# Patient Record
Sex: FEMALE | Race: WHITE | HISPANIC OR LATINO | Employment: OTHER | URBAN - METROPOLITAN AREA
[De-identification: names, ages, dates, MRNs, and addresses within clinical notes are randomized per-mention and may not be internally consistent; named-entity substitution may affect disease eponyms.]

---

## 2020-05-22 ENCOUNTER — OFFICE VISIT (OUTPATIENT)
Dept: URGENT CARE | Facility: CLINIC | Age: 30
End: 2020-05-22

## 2020-05-22 VITALS
HEIGHT: 55 IN | DIASTOLIC BLOOD PRESSURE: 58 MMHG | OXYGEN SATURATION: 98 % | TEMPERATURE: 98.3 F | RESPIRATION RATE: 18 BRPM | WEIGHT: 204 LBS | BODY MASS INDEX: 47.21 KG/M2 | HEART RATE: 107 BPM | SYSTOLIC BLOOD PRESSURE: 113 MMHG

## 2020-05-22 DIAGNOSIS — B35.4 TINEA CORPORIS: Primary | ICD-10-CM

## 2020-05-22 PROCEDURE — 99213 OFFICE O/P EST LOW 20 MIN: CPT | Performed by: NURSE PRACTITIONER

## 2020-05-22 RX ORDER — LAMOTRIGINE 200 MG/1
50 TABLET ORAL 2 TIMES DAILY
COMMUNITY
End: 2020-08-27 | Stop reason: SDUPTHER

## 2020-07-30 ENCOUNTER — APPOINTMENT (EMERGENCY)
Dept: RADIOLOGY | Facility: HOSPITAL | Age: 30
End: 2020-07-30
Payer: COMMERCIAL

## 2020-07-30 ENCOUNTER — HOSPITAL ENCOUNTER (EMERGENCY)
Facility: HOSPITAL | Age: 30
Discharge: HOME | End: 2020-07-30
Attending: EMERGENCY MEDICINE
Payer: COMMERCIAL

## 2020-07-30 VITALS
RESPIRATION RATE: 18 BRPM | HEART RATE: 98 BPM | HEIGHT: 55 IN | OXYGEN SATURATION: 98 % | DIASTOLIC BLOOD PRESSURE: 86 MMHG | SYSTOLIC BLOOD PRESSURE: 138 MMHG | TEMPERATURE: 98.7 F | WEIGHT: 213 LBS | BODY MASS INDEX: 49.3 KG/M2

## 2020-07-30 DIAGNOSIS — S39.012A LUMBOSACRAL STRAIN, INITIAL ENCOUNTER: ICD-10-CM
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
## 2020-07-30 PROCEDURE — 72100 X-RAY EXAM L-S SPINE 2/3 VWS: CPT

## 2020-07-30 PROCEDURE — 99284 EMERGENCY DEPT VISIT MOD MDM: CPT

## 2020-07-30 PROCEDURE — 99284 EMERGENCY DEPT VISIT MOD MDM: CPT | Performed by: PHYSICIAN ASSISTANT

## 2020-07-30 RX ORDER — ARIPIPRAZOLE 10 MG/1
10 TABLET ORAL
COMMUNITY

## 2020-07-30 RX ORDER — PRAZOSIN HYDROCHLORIDE 2 MG/1
2 CAPSULE ORAL
COMMUNITY
End: 2021-06-30 | Stop reason: ALTCHOICE

## 2020-07-30 RX ORDER — QUETIAPINE FUMARATE 25 MG/1
50 TABLET, FILM COATED ORAL
COMMUNITY
End: 2022-06-28

## 2020-07-30 RX ORDER — CITALOPRAM 10 MG/1
10 TABLET ORAL EVERY MORNING
COMMUNITY
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
## 2020-08-24 ENCOUNTER — TELEPHONE (OUTPATIENT)
Dept: NEUROLOGY | Facility: CLINIC | Age: 30
End: 2020-08-24

## 2020-08-24 NOTE — TELEPHONE ENCOUNTER
[facility] called regarding getting this patient scheduled with our office  Patient has seizures and is on Keppra and Lamictal  Recently moved from [place]  Saw neuro in [place] via telehealth but he will not refill Lamictal unless she is seen in person (which isn't possible due to the patient living in [place] now)   [facility] will fax referral and will have patient call back to schedule

## 2020-08-25 ENCOUNTER — HOSPITAL ENCOUNTER (EMERGENCY)
Facility: HOSPITAL | Age: 30
Discharge: HOME | End: 2020-08-26
Attending: EMERGENCY MEDICINE | Admitting: EMERGENCY MEDICINE
Payer: SUBSIDIZED

## 2020-08-25 ENCOUNTER — APPOINTMENT (EMERGENCY)
Dept: RADIOLOGY | Facility: HOSPITAL | Age: 30
End: 2020-08-25
Payer: SUBSIDIZED

## 2020-08-25 VITALS
OXYGEN SATURATION: 100 % | HEART RATE: 98 BPM | SYSTOLIC BLOOD PRESSURE: 128 MMHG | DIASTOLIC BLOOD PRESSURE: 65 MMHG | TEMPERATURE: 98.3 F | RESPIRATION RATE: 20 BRPM

## 2020-08-25 DIAGNOSIS — E87.1 HYPONATREMIA: Primary | ICD-10-CM

## 2020-08-25 DIAGNOSIS — Z20.822 PERSON UNDER INVESTIGATION FOR COVID-19: ICD-10-CM

## 2020-08-25 DIAGNOSIS — Z76.0 MEDICATION REFILL: ICD-10-CM

## 2020-08-25 DIAGNOSIS — Z87.898 HISTORY OF SEIZURE: ICD-10-CM

## 2020-08-25 LAB
ALBUMIN SERPL BCP-MCNC: 3.1 G/DL (ref 3.5–5)
ALP SERPL-CCNC: 129 U/L (ref 46–116)
ALT SERPL W P-5'-P-CCNC: 21 U/L (ref 12–78)
ANION GAP SERPL CALCULATED.3IONS-SCNC: 8 MMOL/L (ref 4–13)
AST SERPL W P-5'-P-CCNC: 18 U/L (ref 5–45)
BACTERIA UR QL AUTO: ABNORMAL /HPF
BASOPHILS # BLD AUTO: 0.02 THOUSANDS/ΜL (ref 0–0.1)
BASOPHILS NFR BLD AUTO: 0 % (ref 0–1)
BILIRUB SERPL-MCNC: 0.1 MG/DL (ref 0.2–1)
BILIRUB UR QL STRIP: ABNORMAL
BUN SERPL-MCNC: 20 MG/DL (ref 5–25)
CALCIUM SERPL-MCNC: 8.8 MG/DL (ref 8.3–10.1)
CHLORIDE SERPL-SCNC: 101 MMOL/L (ref 100–108)
CLARITY UR: ABNORMAL
CO2 SERPL-SCNC: 26 MMOL/L (ref 21–32)
COLOR UR: ABNORMAL
CREAT SERPL-MCNC: 0.79 MG/DL (ref 0.6–1.3)
EOSINOPHIL # BLD AUTO: 0.09 THOUSAND/ΜL (ref 0–0.61)
EOSINOPHIL NFR BLD AUTO: 2 % (ref 0–6)
ERYTHROCYTE [DISTWIDTH] IN BLOOD BY AUTOMATED COUNT: 13.8 % (ref 11.6–15.1)
EXT PREG TEST URINE: NEGATIVE
EXT. CONTROL ED NAV: NORMAL
GFR SERPL CREATININE-BSD FRML MDRD: 101 ML/MIN/1.73SQ M
GLUCOSE SERPL-MCNC: 103 MG/DL (ref 65–140)
GLUCOSE UR STRIP-MCNC: NEGATIVE MG/DL
HCT VFR BLD AUTO: 34.9 % (ref 34.8–46.1)
HGB BLD-MCNC: 11.5 G/DL (ref 11.5–15.4)
HGB UR QL STRIP.AUTO: ABNORMAL
KETONES UR STRIP-MCNC: NEGATIVE MG/DL
LEUKOCYTE ESTERASE UR QL STRIP: NEGATIVE
LYMPHOCYTES # BLD AUTO: 1.17 THOUSANDS/ΜL (ref 0.6–4.47)
LYMPHOCYTES NFR BLD AUTO: 22 % (ref 14–44)
MCH RBC QN AUTO: 28.3 PG (ref 26.8–34.3)
MCHC RBC AUTO-ENTMCNC: 33 G/DL (ref 31.4–37.4)
MCV RBC AUTO: 86 FL (ref 82–98)
MONOCYTES # BLD AUTO: 0.58 THOUSAND/ΜL (ref 0.17–1.22)
MONOCYTES NFR BLD AUTO: 11 % (ref 4–12)
NEUTROPHILS # BLD AUTO: 3.42 THOUSANDS/ΜL (ref 1.85–7.62)
NEUTS SEG NFR BLD AUTO: 65 % (ref 43–75)
NITRITE UR QL STRIP: NEGATIVE
NON-SQ EPI CELLS URNS QL MICRO: ABNORMAL /HPF
PH UR STRIP.AUTO: 5.5 [PH]
PLATELET # BLD AUTO: 349 THOUSANDS/UL (ref 149–390)
PMV BLD AUTO: 8.7 FL (ref 8.9–12.7)
POTASSIUM SERPL-SCNC: 3.5 MMOL/L (ref 3.5–5.3)
PROT SERPL-MCNC: 7.8 G/DL (ref 6.4–8.2)
PROT UR STRIP-MCNC: ABNORMAL MG/DL
RBC # BLD AUTO: 4.06 MILLION/UL (ref 3.81–5.12)
RBC #/AREA URNS AUTO: ABNORMAL /HPF
S PYO DNA THROAT QL NAA+PROBE: NORMAL
SODIUM SERPL-SCNC: 135 MMOL/L (ref 136–145)
SP GR UR STRIP.AUTO: >=1.03 (ref 1–1.03)
UROBILINOGEN UR QL STRIP.AUTO: 0.2 E.U./DL
WBC # BLD AUTO: 5.28 THOUSAND/UL (ref 4.31–10.16)
WBC #/AREA URNS AUTO: ABNORMAL /HPF

## 2020-08-25 PROCEDURE — 85025 COMPLETE CBC W/AUTO DIFF WBC: CPT | Performed by: EMERGENCY MEDICINE

## 2020-08-25 PROCEDURE — 81025 URINE PREGNANCY TEST: CPT | Performed by: EMERGENCY MEDICINE

## 2020-08-25 PROCEDURE — 81001 URINALYSIS AUTO W/SCOPE: CPT | Performed by: EMERGENCY MEDICINE

## 2020-08-25 PROCEDURE — U0003 INFECTIOUS AGENT DETECTION BY NUCLEIC ACID (DNA OR RNA); SEVERE ACUTE RESPIRATORY SYNDROME CORONAVIRUS 2 (SARS-COV-2) (CORONAVIRUS DISEASE [COVID-19]), AMPLIFIED PROBE TECHNIQUE, MAKING USE OF HIGH THROUGHPUT TECHNOLOGIES AS DESCRIBED BY CMS-2020-01-R: HCPCS | Performed by: EMERGENCY MEDICINE

## 2020-08-25 PROCEDURE — 36415 COLL VENOUS BLD VENIPUNCTURE: CPT | Performed by: EMERGENCY MEDICINE

## 2020-08-25 PROCEDURE — 87651 STREP A DNA AMP PROBE: CPT | Performed by: EMERGENCY MEDICINE

## 2020-08-25 PROCEDURE — 96360 HYDRATION IV INFUSION INIT: CPT

## 2020-08-25 PROCEDURE — 80053 COMPREHEN METABOLIC PANEL: CPT | Performed by: EMERGENCY MEDICINE

## 2020-08-25 PROCEDURE — 99284 EMERGENCY DEPT VISIT MOD MDM: CPT

## 2020-08-25 PROCEDURE — 99284 EMERGENCY DEPT VISIT MOD MDM: CPT | Performed by: EMERGENCY MEDICINE

## 2020-08-25 PROCEDURE — 71045 X-RAY EXAM CHEST 1 VIEW: CPT

## 2020-08-25 RX ORDER — LAMOTRIGINE 25 MG/1
50 TABLET ORAL 2 TIMES DAILY
Qty: 28 TABLET | Refills: 0 | Status: SHIPPED | OUTPATIENT
Start: 2020-08-25 | End: 2020-08-27 | Stop reason: SDUPTHER

## 2020-08-25 RX ORDER — LEVETIRACETAM 500 MG/1
1500 TABLET ORAL EVERY 12 HOURS SCHEDULED
Qty: 42 TABLET | Refills: 0 | Status: SHIPPED | OUTPATIENT
Start: 2020-08-25 | End: 2020-08-27 | Stop reason: SDUPTHER

## 2020-08-25 RX ADMIN — SODIUM CHLORIDE 1000 ML: 0.9 INJECTION, SOLUTION INTRAVENOUS at 22:58

## 2020-08-25 NOTE — Clinical Note
Moris Schmitt was seen and treated in our emergency department on 8/25/2020  Diagnosis:     Flor Dejesus  may return to work on return date  She may return on this date: 08/27/2020    Do not return to work until you have been informed of a negative COVID test     If you have any questions or concerns, please don't hesitate to call        Shruti Yeboah, DO    ______________________________           _______________          _______________  Hospital Representative                              Date                                Time

## 2020-08-26 NOTE — ED NOTES
Per MD patient is to go home after she finishes her fluids        Eulalia Villaseñor RN  08/25/20 9032

## 2020-08-26 NOTE — DISCHARGE INSTRUCTIONS
Return to the ER for further concerns or worsening symptoms  Follow up with your primary care physician in 1-2 days  You have been given a prescription of your anti-epileptic meds, so you may resume them until your appointment with neurology  Continue them as previously prescribed  You are being tested for Corona Virus (COVID 19)  You must self isolate until the test results are released, which may take as many as 7 days  Wear a mask when you need to be within 6 feet of anyone, until you are cleared  Do not go to work or school until your results are negative

## 2020-08-26 NOTE — ED PROVIDER NOTES
History  Chief Complaint   Patient presents with    Sore Throat     pt presents to the ed wtih sore throat, chills and loss of taste with nasal congestion for 2 days     Chills     Pt in the ER with c/o sore throat x 2 days, associated with ageusia/nasal congestion/vomiting/diarrhea  Pt is able to tolerate oral secretions, and is in NAD during my initial eval  She denies voice changes  She denies abd pain or fevers  She denies sick contacts  No relief with dayquil  Pt has a hx of epilepsy and depression  She is currently not compliant with her anti-epileptics because she recently moved from [facility], was unable to get a refill, and has an appt with neuro in approx 1 wk  History provided by:  Patient   used: No    Sore Throat   Location:  Generalized  Quality:  Aching  Severity:  Mild  Onset quality:  Gradual  Timing:  Constant  Progression:  Worsening  Chronicity:  New  Relieved by:  None tried  Worsened by:  Drinking and eating  Ineffective treatments:  OTC medications  Associated symptoms: chills and trouble swallowing    Associated symptoms: no abdominal pain, no cough, no fever, no neck stiffness, no shortness of breath and no voice change        Prior to Admission Medications   Prescriptions Last Dose Informant Patient Reported? Taking?    ARIPiprazole (ABILIFY) 5 mg tablet   Yes No   Sig: Take 5 mg by mouth daily   QUEtiapine (SEROquel) 25 mg tablet   Yes No   Sig: Take 25 mg by mouth daily at bedtime   citalopram (CeleXA) 10 mg tablet   Yes No   Sig: Take 10 mg by mouth daily   lamoTRIgine (LaMICtal) 200 MG tablet   Yes No   Sig: Take 50 mg by mouth 2 (two) times a day    levETIRAcetam (KEPPRA PO)   Yes No   Sig: Take 1,500 mg by mouth 2 (two) times a day   prazosin (MINIPRESS) 2 mg capsule   Yes No   Sig: Take 2 mg by mouth daily at bedtime      Facility-Administered Medications: None       Past Medical History:   Diagnosis Date    [place]'s esophagus     Depression     Hypoglycemia  Seizures ([facility] [address] )     Tachycardia        Past Surgical History:   Procedure Laterality Date    COLON SURGERY      EYE SURGERY         History reviewed  No pertinent family history  I have reviewed and agree with the history as documented  E-Cigarette/Vaping    E-Cigarette Use Never User      E-Cigarette/Vaping Substances     Social History     Tobacco Use    Smoking status: Former Smoker    Smokeless tobacco: Never Used   Substance Use Topics    Alcohol use: Never     Frequency: Never    Drug use: Yes     Types: Marijuana     Comment: last use was a couple weeks ago       Review of Systems   Constitutional: Positive for chills  Negative for fever  HENT: Positive for sore throat and trouble swallowing  Negative for voice change  Respiratory: Negative for cough, chest tightness and shortness of breath  Gastrointestinal: Positive for diarrhea and vomiting  Negative for abdominal pain and nausea  Genitourinary: Negative for dysuria, frequency, hematuria and urgency  Musculoskeletal: Negative for back pain, neck pain and neck stiffness  All other systems reviewed and are negative  Physical Exam  Physical Exam  Vitals signs and nursing note reviewed  Constitutional:       General: She is not in acute distress  Appearance: She is well-developed  She is not diaphoretic  HENT:      Head: Normocephalic and atraumatic  Right Ear: No tenderness  Left Ear: No tenderness  Nose: Congestion present  Mouth/Throat:      Mouth: Mucous membranes are moist  No oral lesions  Pharynx: No pharyngeal swelling, oropharyngeal exudate, posterior oropharyngeal erythema or uvula swelling  Tonsils: No tonsillar exudate  2+ on the right  2+ on the left  Eyes:      Conjunctiva/sclera: Conjunctivae normal       Pupils: Pupils are equal, round, and reactive to light  Neck:      Musculoskeletal: Normal range of motion and neck supple     Cardiovascular:      Rate and Rhythm: Normal rate and regular rhythm  Heart sounds: Normal heart sounds  No murmur  Pulmonary:      Effort: Pulmonary effort is normal  No respiratory distress  Breath sounds: Normal breath sounds  Abdominal:      General: Bowel sounds are normal  There is no distension  Palpations: Abdomen is soft  Tenderness: There is no abdominal tenderness  Musculoskeletal: Normal range of motion  General: No deformity  Skin:     General: Skin is warm and dry  Capillary Refill: Capillary refill takes less than 2 seconds  Coloration: Skin is not pale  Findings: No rash  Neurological:      General: No focal deficit present  Mental Status: She is alert and oriented to person, place, and time  Cranial Nerves: No cranial nerve deficit     Psychiatric:         Mood and Affect: Mood normal          Behavior: Behavior normal          Vital Signs  ED Triage Vitals [08/25/20 2152]   Temperature Pulse Respirations Blood Pressure SpO2   98 3 °F (36 8 °C) 98 20 128/65 100 %      Temp Source Heart Rate Source Patient Position - Orthostatic VS BP Location FiO2 (%)   Tympanic Monitor -- -- --      Pain Score       --           Vitals:    08/25/20 2152   BP: 128/65   Pulse: 98         Visual Acuity      ED Medications  Medications   sodium chloride 0 9 % bolus 1,000 mL (0 mL Intravenous Stopped 8/26/20 0012)       Diagnostic Studies  Results Reviewed     Procedure Component Value Units Date/Time    Strep A PCR [[id number]]  (Normal) Collected:  08/25/20 2223    Lab Status:  Final result Specimen:  Throat Updated:  08/25/20 2257     STREP A PCR None Detected    Comprehensive metabolic panel [[id number]]  (Abnormal) Collected:  08/25/20 2221    Lab Status:  Final result Specimen:  Blood from Arm, Left Updated:  08/25/20 2243     Sodium 135 mmol/L      Potassium 3 5 mmol/L      Chloride 101 mmol/L      CO2 26 mmol/L      ANION GAP 8 mmol/L      BUN 20 mg/dL      Creatinine 0 79 mg/dL Glucose 103 mg/dL      Calcium 8 8 mg/dL      AST 18 U/L      ALT 21 U/L      Alkaline Phosphatase 129 U/L      Total Protein 7 8 g/dL      Albumin 3 1 g/dL      Total Bilirubin 0 10 mg/dL      eGFR 101 ml/min/1 73sq m     Narrative:       National Kidney Disease Foundation guidelines for Chronic Kidney Disease (CKD):     Stage 1 with normal or high GFR (GFR > 90 mL/min/1 73 square meters)    Stage 2 Mild CKD (GFR = 60-89 mL/min/1 73 square meters)    Stage 3A Moderate CKD (GFR = 45-59 mL/min/1 73 square meters)    Stage 3B Moderate CKD (GFR = 30-44 mL/min/1 73 square meters)    Stage 4 Severe CKD (GFR = 15-29 mL/min/1 73 square meters)    Stage 5 End Stage CKD (GFR <15 mL/min/1 73 square meters)  Note: GFR calculation is accurate only with a steady state creatinine    Urine Microscopic [[id number]]  (Abnormal) Collected:  08/25/20 2226    Lab Status:  Final result Specimen:  Urine, Clean Catch Updated:  08/25/20 2243     RBC, UA Innumerable /hpf      WBC, UA 2-4 /hpf      Epithelial Cells Moderate /hpf      Bacteria, UA None Seen /hpf     UA (URINE) with reflex to Scope [[id number]]  (Abnormal) Collected:  08/25/20 2226    Lab Status:  Final result Specimen:  Urine, Clean Catch Updated:  08/25/20 2233     Color, UA Nan     Clarity, UA Cloudy     Specific Gravity, UA >=1 030     pH, UA 5 5     Leukocytes, UA Negative     Nitrite, UA Negative     Protein, UA 30 (1+) mg/dl      Glucose, UA Negative mg/dl      Ketones, UA Negative mg/dl      Urobilinogen, UA 0 2 E U /dl      Bilirubin, UA Interference- unable to analyze     Blood, UA Large    CBC and differential [[id number]]  (Abnormal) Collected:  08/25/20 2221    Lab Status:  Final result Specimen:  Blood from Arm, Left Updated:  08/25/20 2229     WBC 5 28 Thousand/uL      RBC 4 06 Million/uL      Hemoglobin 11 5 g/dL      Hematocrit 34 9 %      MCV 86 fL      MCH 28 3 pg      MCHC 33 0 g/dL      RDW 13 8 %      MPV 8 7 fL      Platelets 938 Thousands/uL Neutrophils Relative 65 %      Lymphocytes Relative 22 %      Monocytes Relative 11 %      Eosinophils Relative 2 %      Basophils Relative 0 %      Neutrophils Absolute 3 42 Thousands/µL      Lymphocytes Absolute 1 17 Thousands/µL      Monocytes Absolute 0 58 Thousand/µL      Eosinophils Absolute 0 09 Thousand/µL      Basophils Absolute 0 02 Thousands/µL     Novel Coronavirus (COVID-19), PCR [facility] [[id number]] Collected:  08/25/20 2223    Lab Status: In process Specimen:  Nares from Nose Updated:  08/25/20 2227    POCT pregnancy, urine [[id number]]  (Normal) Resulted:  08/25/20 2226    Lab Status:  Final result Updated:  08/25/20 2226     EXT PREG TEST UR (Ref: Negative) negative     Control valid                 XR chest 1 view portable    (Results Pending)              Procedures  Procedures         ED Course       US AUDIT      Most Recent Value   Initial Alcohol Screen: US AUDIT-C    1  How often do you have a drink containing alcohol?  0 Filed at: 08/25/2020 2243   2  How many drinks containing alcohol do you have on a typical day you are drinking? 0 Filed at: 08/25/2020 2243   3a  Male UNDER 65: How often do you have five or more drinks on one occasion? 0 Filed at: 08/25/2020 2243   3b  FEMALE Any Age, or MALE 65+: How often do you have 4 or more drinks on one occassion? 0 Filed at: 08/25/2020 2243   Audit-C Score  0 Filed at: 08/25/2020 2243                  RANDY/DAST-10      Most Recent Value   How many times in the past year have you    Used an illegal drug or used a prescription medication for non-medical reasons? Never Filed at: 08/25/2020 2242                                MDM  Number of Diagnoses or Management Options  History of seizure:   Hyponatremia:   Medication refill:   Person under investigation for COVID-19:   Diagnosis management comments: Pt in the ER with multiple somatic complaints  Labs and imaging reviewed  +hyponatremia  COVID 19 test ordered and pending   Pt advised to self isolate until resulted  Will give a script for 1wk of Keppra and Lamictal  She will keep appt as scheduled  Amount and/or Complexity of Data Reviewed  Clinical lab tests: ordered and reviewed  Tests in the radiology section of CPT®: ordered and reviewed    Risk of Complications, Morbidity, and/or Mortality  Presenting problems: moderate  Diagnostic procedures: moderate  Management options: moderate    Patient Progress  Patient progress: improved        Disposition  Final diagnoses:   Hyponatremia   Person under investigation for COVID-19   History of seizure   Medication refill     Time reflects when diagnosis was documented in both MDM as applicable and the Disposition within this note     Time User Action Codes Description Comment    8/25/2020 11:39 PM Radha Epp Add [E87 1] Hyponatremia     8/25/2020 11:40 PM Kathie Martinez [Z20 828] Person under investigation for COVID-19     8/25/2020 11:41 PM Radha Epp Add [C24 049] History of seizure     8/25/2020 11:41 PM Radha Epp Add [Z76 0] Medication refill       ED Disposition     ED Disposition Condition Date/Time Comment    Discharge Stable Tue Aug 25, 2020 11:39 PM Margoth Cook discharge to home/self care              Follow-up Information     Follow up With Specialties Details Why [facility] Schedule an appointment as soon as possible for a visit in 2 days for follow up [address] [place] [address]  [phone]            Discharge Medication List as of 8/25/2020 11:44 PM      START taking these medications    Details   !! lamoTRIgine (LaMICtal) 25 mg tablet Take 2 tablets (50 mg total) by mouth 2 (two) times a day for 7 days, Starting Tue 8/25/2020, Until Tue 9/1/2020, Print      !! levETIRAcetam (KEPPRA) 500 mg tablet Take 3 tablets (1,500 mg total) by mouth every 12 (twelve) hours for 7 days, Starting Tue 8/25/2020, Until Tue 9/1/2020, Print       !! - Potential duplicate medications found  Please discuss with provider  CONTINUE these medications which have NOT CHANGED    Details   ARIPiprazole (ABILIFY) 5 mg tablet Take 5 mg by mouth daily, Historical Med      citalopram (CeleXA) 10 mg tablet Take 10 mg by mouth daily, Historical Med      !! lamoTRIgine (LaMICtal) 200 MG tablet Take 50 mg by mouth 2 (two) times a day , Historical Med      !! levETIRAcetam (KEPPRA PO) Take 1,500 mg by mouth 2 (two) times a day, Historical Med      prazosin (MINIPRESS) 2 mg capsule Take 2 mg by mouth daily at bedtime, Historical Med      QUEtiapine (SEROquel) 25 mg tablet Take 25 mg by mouth daily at bedtime, Historical Med       !! - Potential duplicate medications found  Please discuss with provider  No discharge procedures on file      PDMP Review     None          ED Provider  Electronically Signed by           Ximena Tuttle DO  08/26/20 5681

## 2020-08-27 ENCOUNTER — CONSULT (OUTPATIENT)
Dept: NEUROLOGY | Facility: CLINIC | Age: 30
End: 2020-08-27

## 2020-08-27 VITALS — BODY MASS INDEX: 49.06 KG/M2 | TEMPERATURE: 96.2 F | HEIGHT: 55 IN | WEIGHT: 212 LBS

## 2020-08-27 DIAGNOSIS — G40.909 NONINTRACTABLE EPILEPSY WITHOUT STATUS EPILEPTICUS, UNSPECIFIED EPILEPSY TYPE (HCC): ICD-10-CM

## 2020-08-27 PROCEDURE — 99204 OFFICE O/P NEW MOD 45 MIN: CPT | Performed by: PSYCHIATRY & NEUROLOGY

## 2020-08-27 RX ORDER — LEVETIRACETAM 750 MG/1
1500 TABLET ORAL EVERY 12 HOURS SCHEDULED
Qty: 120 TABLET | Refills: 5 | Status: SHIPPED | OUTPATIENT
Start: 2020-08-27 | End: 2020-12-31 | Stop reason: SDUPTHER

## 2020-08-27 RX ORDER — LAMOTRIGINE 25 MG/1
50 TABLET ORAL 2 TIMES DAILY
Qty: 120 TABLET | Refills: 5 | Status: SHIPPED | OUTPATIENT
Start: 2020-08-27 | End: 2020-09-10 | Stop reason: SDUPTHER
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
## 2020-08-27 NOTE — PATIENT INSTRUCTIONS
1  Restart lamotrigine 50 mg twice daily and levetiracetam 1500 mg twice daily  2  Call in 2 weeks to consider increase in lamotrigine  3  Return in 3 months  4  Let us know if seizures worsen

## 2020-08-28 ENCOUNTER — TELEPHONE (OUTPATIENT)
Dept: OTHER | Facility: OTHER | Age: 30
End: 2020-08-28

## 2020-08-28 LAB — SARS-COV-2 RNA SPEC QL NAA+PROBE: NOT DETECTED

## 2020-08-28 NOTE — TELEPHONE ENCOUNTER
Your test for COVID-19, also known as novel coronavirus, came back negative  You do not have COVID-19  If you have any additional questions, we can schedule a virtual visit for you with a provider or call the [phone] Option 7 for care advice  For additional information , please visit the Coronavirus FAQ on the [id number] [address]  ([employer])  Advised that test results can be printed out from My Chart  Patient verbalized understanding and denied having any questions

## 2020-09-10 ENCOUNTER — TELEPHONE (OUTPATIENT)
Dept: NEUROLOGY | Facility: CLINIC | Age: 30
End: 2020-09-10

## 2020-09-10 DIAGNOSIS — Z87.898 HISTORY OF SEIZURE: ICD-10-CM

## 2020-09-10 RX ORDER — LAMOTRIGINE 100 MG/1
100 TABLET ORAL 2 TIMES DAILY
Qty: 60 TABLET | Refills: 5 | Status: SHIPPED | OUTPATIENT
Start: 2020-09-10 | End: 2020-11-11 | Stop reason: SDUPTHER

## 2020-09-10 NOTE — TELEPHONE ENCOUNTER
Patient called to provide update  Since office visit 8/27/2020, patient has had 7 seizures  Taking medications as prescribed  Confirmed medications as:  Lamotrigine 25mg tablet - 2 tablets BID  levetiracetam 750mg tablet - 2 tablets BID  Please advise with any further recommendations

## 2020-09-14 NOTE — TELEPHONE ENCOUNTER
Patient made aware to increase lamotrigine and that a new rx was sent to pharm  Advised her to be aware of SE and risk of SJS rash  Patient verbalized understanding  Transferred to schedule a f/u

## 2020-09-17 ENCOUNTER — TELEPHONE (OUTPATIENT)
Dept: OTHER | Facility: HOSPITAL | Age: 30
End: 2020-09-17

## 2020-09-17 DIAGNOSIS — G40.909 NONINTRACTABLE EPILEPSY WITHOUT STATUS EPILEPTICUS, UNSPECIFIED EPILEPSY TYPE (HCC): Primary | ICD-10-CM

## 2020-09-17 PROBLEM — F41.8 DEPRESSION WITH ANXIETY: Status: ACTIVE | Noted: 2020-09-17

## 2020-09-17 NOTE — TELEPHONE ENCOUNTER
Can you look into her current insurance situation and what options she has for coverage right now? There is now more motivation to obtain EEG data and potentially MRI given that seizures have continued after restarting levetiracetam and lamotrigine  However, my understanding at her visit was that she did not have health insurance  I am placing an order for a routine EEG now  This may need to be followed up by additional tests such as ambulatory EEG or brain MRI

## 2020-09-18 NOTE — TELEPHONE ENCOUNTER
MSW phoned patient and discussed Medical Assistance application process  Patient shared that she has not applied MA yet because she is waiting on the award letter from [employer] since she just got approved for benefits  MSW encouraged patient to start online application w/o award letter since she currently has no income she should be eligible for insurance from the state  MSW offered to provide patient with contact info to contact the [facility] dept human services and [employer] for online application but patient shared that she already has it  MSW asked patient if she can follow up with her in 3 weeks  Patient is agreeable with plan and will complete MA online application ASAP

## 2020-10-09 NOTE — TELEPHONE ENCOUNTER
Patient shared that she was approved for disability and already applied for MA  MSW informed patient that if she has any billing issues to contact the [facility] billing department/financial counselors  At this point patient has to wait for her application to be processed and approved  MSW made patient aware to contact her for any questions or future social needs  Patient reported understanding

## 2020-11-11 ENCOUNTER — APPOINTMENT (EMERGENCY)
Dept: RADIOLOGY | Facility: HOSPITAL | Age: 30
End: 2020-11-11

## 2020-11-11 ENCOUNTER — HOSPITAL ENCOUNTER (EMERGENCY)
Facility: HOSPITAL | Age: 30
Discharge: HOME | End: 2020-11-11
Attending: EMERGENCY MEDICINE | Admitting: EMERGENCY MEDICINE

## 2020-11-11 ENCOUNTER — TELEPHONE (OUTPATIENT)
Dept: NEUROLOGY | Facility: CLINIC | Age: 30
End: 2020-11-11

## 2020-11-11 VITALS
BODY MASS INDEX: 51.83 KG/M2 | RESPIRATION RATE: 20 BRPM | DIASTOLIC BLOOD PRESSURE: 58 MMHG | SYSTOLIC BLOOD PRESSURE: 110 MMHG | WEIGHT: 223 LBS | HEART RATE: 94 BPM | TEMPERATURE: 97.5 F | OXYGEN SATURATION: 98 %

## 2020-11-11 DIAGNOSIS — G40.909 NONINTRACTABLE EPILEPSY WITHOUT STATUS EPILEPTICUS, UNSPECIFIED EPILEPSY TYPE (HCC): Primary | ICD-10-CM

## 2020-11-11 DIAGNOSIS — Z87.898 HISTORY OF SEIZURE: ICD-10-CM

## 2020-11-11 DIAGNOSIS — Z87.898 HISTORY OF SEIZURES: ICD-10-CM

## 2020-11-11 DIAGNOSIS — M54.9 BACK PAIN: Primary | ICD-10-CM

## 2020-11-11 DIAGNOSIS — M25.562 LEFT KNEE PAIN: ICD-10-CM

## 2020-11-11 PROCEDURE — 72100 X-RAY EXAM L-S SPINE 2/3 VWS: CPT

## 2020-11-11 PROCEDURE — 99283 EMERGENCY DEPT VISIT LOW MDM: CPT

## 2020-11-11 PROCEDURE — 99284 EMERGENCY DEPT VISIT MOD MDM: CPT | Performed by: PHYSICIAN ASSISTANT

## 2020-11-11 PROCEDURE — 73564 X-RAY EXAM KNEE 4 OR MORE: CPT

## 2020-11-11 RX ORDER — LAMOTRIGINE 150 MG/1
150 TABLET ORAL 2 TIMES DAILY
Qty: 60 TABLET | Refills: 5 | Status: SHIPPED | OUTPATIENT
Start: 2020-11-11 | End: 2020-12-02 | Stop reason: HOSPADM

## 2020-11-11 RX ORDER — CYCLOBENZAPRINE HCL 10 MG
10 TABLET ORAL 3 TIMES DAILY PRN
Qty: 9 TABLET | Refills: 0 | Status: SHIPPED | OUTPATIENT
Start: 2020-11-11 | End: 2020-12-02 | Stop reason: HOSPADM

## 2020-11-13 ENCOUNTER — OFFICE VISIT (OUTPATIENT)
Dept: OBGYN CLINIC | Facility: CLINIC | Age: 30
End: 2020-11-13

## 2020-11-13 VITALS
HEART RATE: 90 BPM | DIASTOLIC BLOOD PRESSURE: 70 MMHG | BODY MASS INDEX: 51.61 KG/M2 | SYSTOLIC BLOOD PRESSURE: 100 MMHG | HEIGHT: 55 IN | WEIGHT: 223 LBS

## 2020-11-13 DIAGNOSIS — S80.02XA CONTUSION OF LEFT KNEE, INITIAL ENCOUNTER: Primary | ICD-10-CM

## 2020-11-13 DIAGNOSIS — M25.562 ACUTE PAIN OF LEFT KNEE: ICD-10-CM

## 2020-11-13 PROCEDURE — 99203 OFFICE O/P NEW LOW 30 MIN: CPT | Performed by: ORTHOPAEDIC SURGERY

## 2020-11-21 ENCOUNTER — OFFICE VISIT (OUTPATIENT)
Dept: URGENT CARE | Age: 30
End: 2020-11-21
Payer: OTHER GOVERNMENT

## 2020-11-21 VITALS — OXYGEN SATURATION: 100 % | HEART RATE: 99 BPM | RESPIRATION RATE: 18 BRPM | TEMPERATURE: 98.4 F

## 2020-11-21 DIAGNOSIS — Z20.822 CLOSE EXPOSURE TO COVID-19 VIRUS: Primary | ICD-10-CM

## 2020-11-21 PROCEDURE — U0003 INFECTIOUS AGENT DETECTION BY NUCLEIC ACID (DNA OR RNA); SEVERE ACUTE RESPIRATORY SYNDROME CORONAVIRUS 2 (SARS-COV-2) (CORONAVIRUS DISEASE [COVID-19]), AMPLIFIED PROBE TECHNIQUE, MAKING USE OF HIGH THROUGHPUT TECHNOLOGIES AS DESCRIBED BY CMS-2020-01-R: HCPCS | Performed by: NURSE PRACTITIONER

## 2020-11-21 PROCEDURE — G0382 LEV 3 HOSP TYPE B ED VISIT: HCPCS | Performed by: NURSE PRACTITIONER

## 2020-11-23 ENCOUNTER — TELEPHONE (OUTPATIENT)
Dept: NEUROLOGY | Facility: CLINIC | Age: 30
End: 2020-11-23

## 2020-11-24 LAB — SARS-COV-2 RNA SPEC QL NAA+PROBE: NOT DETECTED

## 2020-11-30 ENCOUNTER — APPOINTMENT (EMERGENCY)
Dept: RADIOLOGY | Facility: HOSPITAL | Age: 30
End: 2020-11-30
Payer: COMMERCIAL

## 2020-11-30 ENCOUNTER — TELEPHONE (OUTPATIENT)
Dept: NEUROLOGY | Facility: CLINIC | Age: 30
End: 2020-11-30

## 2020-11-30 ENCOUNTER — TELEPHONE (OUTPATIENT)
Dept: OTHER | Facility: OTHER | Age: 30
End: 2020-11-30

## 2020-11-30 ENCOUNTER — HOSPITAL ENCOUNTER (OUTPATIENT)
Facility: HOSPITAL | Age: 30
Setting detail: OBSERVATION
Discharge: HOME | End: 2020-12-02
Attending: EMERGENCY MEDICINE | Admitting: INTERNAL MEDICINE
Payer: COMMERCIAL

## 2020-11-30 DIAGNOSIS — R42 DIZZINESS: ICD-10-CM

## 2020-11-30 DIAGNOSIS — R56.9 SEIZURES (HCC): Primary | ICD-10-CM

## 2020-11-30 DIAGNOSIS — R20.2 PARESTHESIA: ICD-10-CM

## 2020-11-30 LAB
ALBUMIN SERPL BCP-MCNC: 3.2 G/DL (ref 3.5–5)
ALP SERPL-CCNC: 110 U/L (ref 46–116)
ALT SERPL W P-5'-P-CCNC: 22 U/L (ref 12–78)
ANION GAP SERPL CALCULATED.3IONS-SCNC: 12 MMOL/L (ref 4–13)
AST SERPL W P-5'-P-CCNC: 30 U/L (ref 5–45)
BACTERIA UR QL AUTO: ABNORMAL /HPF
BASOPHILS # BLD AUTO: 0.12 THOUSANDS/ΜL (ref 0–0.1)
BASOPHILS NFR BLD AUTO: 1 % (ref 0–1)
BILIRUB SERPL-MCNC: 0.2 MG/DL (ref 0.2–1)
BILIRUB UR QL STRIP: NEGATIVE
BUN SERPL-MCNC: 19 MG/DL (ref 5–25)
CALCIUM ALBUM COR SERPL-MCNC: 9.5 MG/DL (ref 8.3–10.1)
CALCIUM SERPL-MCNC: 8.9 MG/DL (ref 8.3–10.1)
CHLORIDE SERPL-SCNC: 101 MMOL/L (ref 100–108)
CLARITY UR: ABNORMAL
CO2 SERPL-SCNC: 24 MMOL/L (ref 21–32)
COLOR UR: ABNORMAL
CREAT SERPL-MCNC: 0.72 MG/DL (ref 0.6–1.3)
EOSINOPHIL # BLD AUTO: 0.08 THOUSAND/ΜL (ref 0–0.61)
EOSINOPHIL NFR BLD AUTO: 1 % (ref 0–6)
ERYTHROCYTE [DISTWIDTH] IN BLOOD BY AUTOMATED COUNT: 14.2 % (ref 11.6–15.1)
EXT PREG TEST URINE: NEGATIVE
EXT. CONTROL ED NAV: NORMAL
GFR SERPL CREATININE-BSD FRML MDRD: 114 ML/MIN/1.73SQ M
GLUCOSE SERPL-MCNC: 103 MG/DL (ref 65–140)
GLUCOSE UR STRIP-MCNC: NEGATIVE MG/DL
HCT VFR BLD AUTO: 39.6 % (ref 34.8–46.1)
HGB BLD-MCNC: 12.2 G/DL (ref 11.5–15.4)
HGB UR QL STRIP.AUTO: ABNORMAL
IMM GRANULOCYTES # BLD AUTO: 0.01 THOUSAND/UL (ref 0–0.2)
IMM GRANULOCYTES NFR BLD AUTO: 0 % (ref 0–2)
KETONES UR STRIP-MCNC: NEGATIVE MG/DL
LEUKOCYTE ESTERASE UR QL STRIP: ABNORMAL
LYMPHOCYTES # BLD AUTO: 1.89 THOUSANDS/ΜL (ref 0.6–4.47)
LYMPHOCYTES NFR BLD AUTO: 23 % (ref 14–44)
MAGNESIUM SERPL-MCNC: 1.9 MG/DL (ref 1.6–2.6)
MCH RBC QN AUTO: 26.8 PG (ref 26.8–34.3)
MCHC RBC AUTO-ENTMCNC: 30.8 G/DL (ref 31.4–37.4)
MCV RBC AUTO: 87 FL (ref 82–98)
MONOCYTES # BLD AUTO: 0.51 THOUSAND/ΜL (ref 0.17–1.22)
MONOCYTES NFR BLD AUTO: 6 % (ref 4–12)
NEUTROPHILS # BLD AUTO: 5.75 THOUSANDS/ΜL (ref 1.85–7.62)
NEUTS SEG NFR BLD AUTO: 69 % (ref 43–75)
NITRITE UR QL STRIP: NEGATIVE
NON-SQ EPI CELLS URNS QL MICRO: ABNORMAL /HPF
NRBC BLD AUTO-RTO: 0 /100 WBCS
PH UR STRIP.AUTO: 5.5 [PH]
PLATELET # BLD AUTO: 417 THOUSANDS/UL (ref 149–390)
PMV BLD AUTO: 9.1 FL (ref 8.9–12.7)
POTASSIUM SERPL-SCNC: 3.8 MMOL/L (ref 3.5–5.3)
PROT SERPL-MCNC: 8 G/DL (ref 6.4–8.2)
PROT UR STRIP-MCNC: ABNORMAL MG/DL
RBC # BLD AUTO: 4.55 MILLION/UL (ref 3.81–5.12)
RBC #/AREA URNS AUTO: ABNORMAL /HPF
SODIUM SERPL-SCNC: 137 MMOL/L (ref 136–145)
SP GR UR STRIP.AUTO: >=1.03 (ref 1–1.03)
TROPONIN I SERPL-MCNC: <0.02 NG/ML
TSH SERPL DL<=0.05 MIU/L-ACNC: 3.77 UIU/ML (ref 0.36–3.74)
UROBILINOGEN UR QL STRIP.AUTO: 0.2 E.U./DL
WBC # BLD AUTO: 8.36 THOUSAND/UL (ref 4.31–10.16)
WBC #/AREA URNS AUTO: ABNORMAL /HPF

## 2020-11-30 PROCEDURE — 93005 ELECTROCARDIOGRAM TRACING: CPT

## 2020-11-30 PROCEDURE — 70498 CT ANGIOGRAPHY NECK: CPT

## 2020-11-30 PROCEDURE — 99285 EMERGENCY DEPT VISIT HI MDM: CPT

## 2020-11-30 PROCEDURE — 85025 COMPLETE CBC W/AUTO DIFF WBC: CPT | Performed by: EMERGENCY MEDICINE

## 2020-11-30 PROCEDURE — 87086 URINE CULTURE/COLONY COUNT: CPT | Performed by: PHYSICIAN ASSISTANT

## 2020-11-30 PROCEDURE — 96361 HYDRATE IV INFUSION ADD-ON: CPT

## 2020-11-30 PROCEDURE — 83735 ASSAY OF MAGNESIUM: CPT | Performed by: EMERGENCY MEDICINE

## 2020-11-30 PROCEDURE — 36415 COLL VENOUS BLD VENIPUNCTURE: CPT

## 2020-11-30 PROCEDURE — 81001 URINALYSIS AUTO W/SCOPE: CPT | Performed by: EMERGENCY MEDICINE

## 2020-11-30 PROCEDURE — 84484 ASSAY OF TROPONIN QUANT: CPT | Performed by: EMERGENCY MEDICINE

## 2020-11-30 PROCEDURE — 99285 EMERGENCY DEPT VISIT HI MDM: CPT | Performed by: EMERGENCY MEDICINE

## 2020-11-30 PROCEDURE — 96360 HYDRATION IV INFUSION INIT: CPT

## 2020-11-30 PROCEDURE — 80053 COMPREHEN METABOLIC PANEL: CPT | Performed by: EMERGENCY MEDICINE

## 2020-11-30 PROCEDURE — 84443 ASSAY THYROID STIM HORMONE: CPT | Performed by: EMERGENCY MEDICINE

## 2020-11-30 PROCEDURE — 70496 CT ANGIOGRAPHY HEAD: CPT

## 2020-11-30 PROCEDURE — 81025 URINE PREGNANCY TEST: CPT | Performed by: EMERGENCY MEDICINE

## 2020-11-30 PROCEDURE — G1004 CDSM NDSC: HCPCS

## 2020-11-30 RX ORDER — LAMOTRIGINE 25 MG/1
25 TABLET ORAL ONCE
Status: COMPLETED | OUTPATIENT
Start: 2020-12-01 | End: 2020-12-01

## 2020-11-30 RX ADMIN — SODIUM CHLORIDE 1000 ML: 0.9 INJECTION, SOLUTION INTRAVENOUS at 21:34

## 2020-11-30 RX ADMIN — IOHEXOL 85 ML: 350 INJECTION, SOLUTION INTRAVENOUS at 23:00

## 2020-12-01 ENCOUNTER — APPOINTMENT (OUTPATIENT)
Dept: NON INVASIVE DIAGNOSTICS | Facility: HOSPITAL | Age: 30
End: 2020-12-01
Payer: COMMERCIAL

## 2020-12-01 ENCOUNTER — APPOINTMENT (OUTPATIENT)
Dept: RADIOLOGY | Facility: HOSPITAL | Age: 30
End: 2020-12-01
Payer: COMMERCIAL

## 2020-12-01 PROBLEM — R20.2 PARESTHESIA: Status: ACTIVE | Noted: 2020-12-01

## 2020-12-01 PROBLEM — R42 DIZZINESS: Status: ACTIVE | Noted: 2020-12-01

## 2020-12-01 PROBLEM — R79.89 TSH ELEVATION: Status: ACTIVE | Noted: 2020-12-01

## 2020-12-01 PROBLEM — N39.0 URINARY TRACT INFECTION WITHOUT HEMATURIA: Status: ACTIVE | Noted: 2020-12-01

## 2020-12-01 PROBLEM — R56.9 SEIZURES (HCC): Status: ACTIVE | Noted: 2020-12-01

## 2020-12-01 LAB
ANION GAP SERPL CALCULATED.3IONS-SCNC: 10 MMOL/L (ref 4–13)
BASOPHILS # BLD AUTO: 0.05 THOUSANDS/ΜL (ref 0–0.1)
BASOPHILS NFR BLD AUTO: 1 % (ref 0–1)
BUN SERPL-MCNC: 19 MG/DL (ref 5–25)
CALCIUM SERPL-MCNC: 8.2 MG/DL (ref 8.3–10.1)
CHLORIDE SERPL-SCNC: 103 MMOL/L (ref 100–108)
CHOLEST SERPL-MCNC: 158 MG/DL (ref 50–200)
CO2 SERPL-SCNC: 24 MMOL/L (ref 21–32)
CREAT SERPL-MCNC: 0.73 MG/DL (ref 0.6–1.3)
EOSINOPHIL # BLD AUTO: 0.08 THOUSAND/ΜL (ref 0–0.61)
EOSINOPHIL NFR BLD AUTO: 1 % (ref 0–6)
ERYTHROCYTE [DISTWIDTH] IN BLOOD BY AUTOMATED COUNT: 14.5 % (ref 11.6–15.1)
EST. AVERAGE GLUCOSE BLD GHB EST-MCNC: 108 MG/DL
GFR SERPL CREATININE-BSD FRML MDRD: 112 ML/MIN/1.73SQ M
GLUCOSE SERPL-MCNC: 98 MG/DL (ref 65–140)
HBA1C MFR BLD: 5.4 %
HCT VFR BLD AUTO: 34.3 % (ref 34.8–46.1)
HDLC SERPL-MCNC: 51 MG/DL
HGB BLD-MCNC: 10.2 G/DL (ref 11.5–15.4)
IMM GRANULOCYTES # BLD AUTO: 0.01 THOUSAND/UL (ref 0–0.2)
IMM GRANULOCYTES NFR BLD AUTO: 0 % (ref 0–2)
LDLC SERPL CALC-MCNC: 81 MG/DL (ref 0–100)
LYMPHOCYTES # BLD AUTO: 1.21 THOUSANDS/ΜL (ref 0.6–4.47)
LYMPHOCYTES NFR BLD AUTO: 22 % (ref 14–44)
MCH RBC QN AUTO: 26.6 PG (ref 26.8–34.3)
MCHC RBC AUTO-ENTMCNC: 29.7 G/DL (ref 31.4–37.4)
MCV RBC AUTO: 89 FL (ref 82–98)
MONOCYTES # BLD AUTO: 0.45 THOUSAND/ΜL (ref 0.17–1.22)
MONOCYTES NFR BLD AUTO: 8 % (ref 4–12)
NEUTROPHILS # BLD AUTO: 3.83 THOUSANDS/ΜL (ref 1.85–7.62)
NEUTS SEG NFR BLD AUTO: 68 % (ref 43–75)
NRBC BLD AUTO-RTO: 0 /100 WBCS
PLATELET # BLD AUTO: 310 THOUSANDS/UL (ref 149–390)
PMV BLD AUTO: 9.1 FL (ref 8.9–12.7)
POTASSIUM SERPL-SCNC: 3.5 MMOL/L (ref 3.5–5.3)
RBC # BLD AUTO: 3.84 MILLION/UL (ref 3.81–5.12)
SODIUM SERPL-SCNC: 137 MMOL/L (ref 136–145)
T4 FREE SERPL-MCNC: 0.97 NG/DL (ref 0.76–1.46)
TRIGL SERPL-MCNC: 132 MG/DL
WBC # BLD AUTO: 5.63 THOUSAND/UL (ref 4.31–10.16)

## 2020-12-01 PROCEDURE — 84439 ASSAY OF FREE THYROXINE: CPT | Performed by: PHYSICIAN ASSISTANT

## 2020-12-01 PROCEDURE — 83036 HEMOGLOBIN GLYCOSYLATED A1C: CPT | Performed by: PHYSICIAN ASSISTANT

## 2020-12-01 PROCEDURE — 70553 MRI BRAIN STEM W/O & W/DYE: CPT

## 2020-12-01 PROCEDURE — 80061 LIPID PANEL: CPT | Performed by: PHYSICIAN ASSISTANT

## 2020-12-01 PROCEDURE — 99219 PR INITIAL OBSERVATION CARE/DAY 50 MINUTES: CPT | Performed by: INTERNAL MEDICINE

## 2020-12-01 PROCEDURE — 97116 GAIT TRAINING THERAPY: CPT

## 2020-12-01 PROCEDURE — 90686 IIV4 VACC NO PRSV 0.5 ML IM: CPT | Performed by: PHYSICIAN ASSISTANT

## 2020-12-01 PROCEDURE — 85025 COMPLETE CBC W/AUTO DIFF WBC: CPT | Performed by: PHYSICIAN ASSISTANT

## 2020-12-01 PROCEDURE — 90471 IMMUNIZATION ADMIN: CPT | Performed by: PHYSICIAN ASSISTANT

## 2020-12-01 PROCEDURE — 99245 OFF/OP CONSLTJ NEW/EST HI 55: CPT | Performed by: NURSE PRACTITIONER

## 2020-12-01 PROCEDURE — G1004 CDSM NDSC: HCPCS

## 2020-12-01 PROCEDURE — A9585 GADOBUTROL INJECTION: HCPCS | Performed by: PHYSICIAN ASSISTANT

## 2020-12-01 PROCEDURE — 80048 BASIC METABOLIC PNL TOTAL CA: CPT | Performed by: PHYSICIAN ASSISTANT

## 2020-12-01 PROCEDURE — 97163 PT EVAL HIGH COMPLEX 45 MIN: CPT

## 2020-12-01 RX ORDER — ARIPIPRAZOLE 5 MG/1
5 TABLET ORAL DAILY
Status: DISCONTINUED | OUTPATIENT
Start: 2020-12-01 | End: 2020-12-02 | Stop reason: HOSPADM

## 2020-12-01 RX ORDER — SULFAMETHOXAZOLE AND TRIMETHOPRIM 800; 160 MG/1; MG/1
1 TABLET ORAL EVERY 12 HOURS SCHEDULED
Status: DISCONTINUED | OUTPATIENT
Start: 2020-12-01 | End: 2020-12-01

## 2020-12-01 RX ORDER — PRAZOSIN HYDROCHLORIDE 1 MG/1
2 CAPSULE ORAL
Status: DISCONTINUED | OUTPATIENT
Start: 2020-12-01 | End: 2020-12-02 | Stop reason: HOSPADM

## 2020-12-01 RX ORDER — LAMOTRIGINE 25 MG/1
25 TABLET ORAL 2 TIMES DAILY
Status: DISCONTINUED | OUTPATIENT
Start: 2020-12-01 | End: 2020-12-02 | Stop reason: HOSPADM

## 2020-12-01 RX ORDER — ATORVASTATIN CALCIUM 40 MG/1
40 TABLET, FILM COATED ORAL EVERY EVENING
Status: DISCONTINUED | OUTPATIENT
Start: 2020-12-01 | End: 2020-12-02 | Stop reason: HOSPADM

## 2020-12-01 RX ORDER — ONDANSETRON 2 MG/ML
4 INJECTION INTRAMUSCULAR; INTRAVENOUS ONCE
Status: COMPLETED | OUTPATIENT
Start: 2020-12-01 | End: 2020-12-01

## 2020-12-01 RX ORDER — ACETAMINOPHEN 325 MG/1
650 TABLET ORAL EVERY 4 HOURS PRN
Status: DISCONTINUED | OUTPATIENT
Start: 2020-12-01 | End: 2020-12-02 | Stop reason: HOSPADM

## 2020-12-01 RX ORDER — CITALOPRAM 20 MG/1
10 TABLET ORAL DAILY
Status: DISCONTINUED | OUTPATIENT
Start: 2020-12-01 | End: 2020-12-02 | Stop reason: HOSPADM

## 2020-12-01 RX ORDER — LEVETIRACETAM 500 MG/1
1500 TABLET ORAL EVERY 12 HOURS SCHEDULED
Status: DISCONTINUED | OUTPATIENT
Start: 2020-12-01 | End: 2020-12-02 | Stop reason: HOSPADM

## 2020-12-01 RX ORDER — LEVETIRACETAM 500 MG/1
1500 TABLET ORAL EVERY 12 HOURS SCHEDULED
Status: DISCONTINUED | OUTPATIENT
Start: 2020-12-01 | End: 2020-12-01

## 2020-12-01 RX ORDER — QUETIAPINE FUMARATE 25 MG/1
25 TABLET, FILM COATED ORAL
Status: DISCONTINUED | OUTPATIENT
Start: 2020-12-01 | End: 2020-12-02 | Stop reason: HOSPADM

## 2020-12-01 RX ORDER — ASPIRIN 81 MG/1
81 TABLET, CHEWABLE ORAL DAILY
Status: DISCONTINUED | OUTPATIENT
Start: 2020-12-01 | End: 2020-12-02 | Stop reason: HOSPADM

## 2020-12-01 RX ADMIN — ENOXAPARIN SODIUM 40 MG: 40 INJECTION SUBCUTANEOUS at 08:33

## 2020-12-01 RX ADMIN — LAMOTRIGINE 25 MG: 25 TABLET ORAL at 00:10

## 2020-12-01 RX ADMIN — LEVETIRACETAM 1500 MG: 500 TABLET, FILM COATED ORAL at 08:32

## 2020-12-01 RX ADMIN — SULFAMETHOXAZOLE AND TRIMETHOPRIM 1 TABLET: 800; 160 TABLET ORAL at 02:30

## 2020-12-01 RX ADMIN — QUETIAPINE FUMARATE 25 MG: 25 TABLET ORAL at 21:18

## 2020-12-01 RX ADMIN — SULFAMETHOXAZOLE AND TRIMETHOPRIM 1 TABLET: 800; 160 TABLET ORAL at 12:07

## 2020-12-01 RX ADMIN — ARIPIPRAZOLE 5 MG: 5 TABLET ORAL at 08:33

## 2020-12-01 RX ADMIN — INFLUENZA VIRUS VACCINE 0.5 ML: 15; 15; 15; 15 SUSPENSION INTRAMUSCULAR at 02:32

## 2020-12-01 RX ADMIN — ONDANSETRON 4 MG: 2 INJECTION INTRAMUSCULAR; INTRAVENOUS at 00:21

## 2020-12-01 RX ADMIN — LEVETIRACETAM 1500 MG: 100 INJECTION, SOLUTION INTRAVENOUS at 00:10

## 2020-12-01 RX ADMIN — ATORVASTATIN CALCIUM 40 MG: 40 TABLET, FILM COATED ORAL at 18:44

## 2020-12-01 RX ADMIN — ASPIRIN 81 MG CHEWABLE TABLET 81 MG: 81 TABLET CHEWABLE at 08:33

## 2020-12-01 RX ADMIN — QUETIAPINE FUMARATE 25 MG: 25 TABLET ORAL at 02:30

## 2020-12-01 RX ADMIN — LEVETIRACETAM 1500 MG: 500 TABLET, FILM COATED ORAL at 21:18

## 2020-12-01 RX ADMIN — LAMOTRIGINE 25 MG: 25 TABLET ORAL at 18:44

## 2020-12-01 RX ADMIN — CITALOPRAM HYDROBROMIDE 10 MG: 20 TABLET ORAL at 08:32

## 2020-12-01 RX ADMIN — ACETAMINOPHEN 650 MG: 325 TABLET, FILM COATED ORAL at 20:57

## 2020-12-01 RX ADMIN — LAMOTRIGINE 25 MG: 25 TABLET ORAL at 08:33

## 2020-12-01 RX ADMIN — GADOBUTROL 10 ML: 604.72 INJECTION INTRAVENOUS at 13:08

## 2020-12-02 ENCOUNTER — APPOINTMENT (OUTPATIENT)
Dept: NEUROLOGY | Facility: HOSPITAL | Age: 30
End: 2020-12-02
Attending: FAMILY MEDICINE
Payer: COMMERCIAL

## 2020-12-02 ENCOUNTER — APPOINTMENT (OUTPATIENT)
Dept: NON INVASIVE DIAGNOSTICS | Facility: HOSPITAL | Age: 30
End: 2020-12-02
Payer: COMMERCIAL

## 2020-12-02 VITALS
RESPIRATION RATE: 17 BRPM | OXYGEN SATURATION: 100 % | DIASTOLIC BLOOD PRESSURE: 77 MMHG | WEIGHT: 223 LBS | SYSTOLIC BLOOD PRESSURE: 123 MMHG | TEMPERATURE: 98.5 F | BODY MASS INDEX: 51.61 KG/M2 | HEIGHT: 55 IN | HEART RATE: 95 BPM

## 2020-12-02 PROBLEM — N39.0 URINARY TRACT INFECTION WITHOUT HEMATURIA: Status: RESOLVED | Noted: 2020-12-01 | Resolved: 2020-12-02

## 2020-12-02 PROBLEM — R20.2 PARESTHESIA: Status: RESOLVED | Noted: 2020-12-01 | Resolved: 2020-12-02

## 2020-12-02 LAB
ANION GAP SERPL CALCULATED.3IONS-SCNC: 7 MMOL/L (ref 4–13)
BACTERIA UR CULT: NORMAL
BUN SERPL-MCNC: 13 MG/DL (ref 5–25)
CALCIUM SERPL-MCNC: 8.5 MG/DL (ref 8.3–10.1)
CHLORIDE SERPL-SCNC: 104 MMOL/L (ref 100–108)
CO2 SERPL-SCNC: 27 MMOL/L (ref 21–32)
CREAT SERPL-MCNC: 0.65 MG/DL (ref 0.6–1.3)
GFR SERPL CREATININE-BSD FRML MDRD: 120 ML/MIN/1.73SQ M
GLUCOSE SERPL-MCNC: 97 MG/DL (ref 65–140)
POTASSIUM SERPL-SCNC: 3.7 MMOL/L (ref 3.5–5.3)
SODIUM SERPL-SCNC: 138 MMOL/L (ref 136–145)

## 2020-12-02 PROCEDURE — 93306 TTE W/DOPPLER COMPLETE: CPT

## 2020-12-02 PROCEDURE — 95816 EEG AWAKE AND DROWSY: CPT | Performed by: PSYCHIATRY & NEUROLOGY

## 2020-12-02 PROCEDURE — NC001 PR NO CHARGE: Performed by: PSYCHIATRY & NEUROLOGY

## 2020-12-02 PROCEDURE — 99214 OFFICE O/P EST MOD 30 MIN: CPT | Performed by: NURSE PRACTITIONER

## 2020-12-02 PROCEDURE — 95816 EEG AWAKE AND DROWSY: CPT

## 2020-12-02 PROCEDURE — 80048 BASIC METABOLIC PNL TOTAL CA: CPT | Performed by: FAMILY MEDICINE

## 2020-12-02 PROCEDURE — 93306 TTE W/DOPPLER COMPLETE: CPT | Performed by: INTERNAL MEDICINE

## 2020-12-02 PROCEDURE — 99217 PR OBSERVATION CARE DISCHARGE MANAGEMENT: CPT | Performed by: FAMILY MEDICINE

## 2020-12-02 RX ORDER — LAMOTRIGINE 25 MG/1
TABLET ORAL
Qty: 140 TABLET | Refills: 0 | Status: SHIPPED | OUTPATIENT
Start: 2020-12-02 | End: 2021-04-15

## 2020-12-02 RX ADMIN — LAMOTRIGINE 25 MG: 25 TABLET ORAL at 09:17

## 2020-12-02 RX ADMIN — LEVETIRACETAM 1500 MG: 500 TABLET, FILM COATED ORAL at 11:00

## 2020-12-02 RX ADMIN — ENOXAPARIN SODIUM 40 MG: 40 INJECTION SUBCUTANEOUS at 09:21

## 2020-12-02 RX ADMIN — ARIPIPRAZOLE 5 MG: 5 TABLET ORAL at 09:17

## 2020-12-02 RX ADMIN — CITALOPRAM HYDROBROMIDE 10 MG: 20 TABLET ORAL at 09:17

## 2020-12-02 RX ADMIN — ASPIRIN 81 MG CHEWABLE TABLET 81 MG: 81 TABLET CHEWABLE at 09:17

## 2020-12-04 LAB
ATRIAL RATE: 96 BPM
P AXIS: 10 DEGREES
PR INTERVAL: 116 MS
QRS AXIS: 27 DEGREES
QRSD INTERVAL: 90 MS
QT INTERVAL: 348 MS
QTC INTERVAL: 439 MS
T WAVE AXIS: 13 DEGREES
VENTRICULAR RATE: 96 BPM

## 2020-12-04 PROCEDURE — 93010 ELECTROCARDIOGRAM REPORT: CPT | Performed by: INTERNAL MEDICINE

## 2020-12-31 ENCOUNTER — TELEMEDICINE (OUTPATIENT)
Dept: NEUROLOGY | Facility: CLINIC | Age: 30
End: 2020-12-31
Payer: COMMERCIAL

## 2020-12-31 VITALS — BODY MASS INDEX: 50.91 KG/M2 | WEIGHT: 220 LBS | HEIGHT: 55 IN

## 2020-12-31 DIAGNOSIS — R20.2 PARESTHESIA: ICD-10-CM

## 2020-12-31 DIAGNOSIS — G40.909 NONINTRACTABLE EPILEPSY WITHOUT STATUS EPILEPTICUS, UNSPECIFIED EPILEPSY TYPE (HCC): Primary | ICD-10-CM

## 2020-12-31 PROCEDURE — 99214 OFFICE O/P EST MOD 30 MIN: CPT | Performed by: PSYCHIATRY & NEUROLOGY

## 2020-12-31 RX ORDER — LEVETIRACETAM 750 MG/1
1500 TABLET ORAL EVERY 12 HOURS SCHEDULED
Qty: 360 TABLET | Refills: 3 | Status: SHIPPED | OUTPATIENT
Start: 2020-12-31 | End: 2022-01-04

## 2020-12-31 RX ORDER — LAMOTRIGINE 100 MG/1
TABLET ORAL
Qty: 120 TABLET | Refills: 3 | Status: SHIPPED | OUTPATIENT
Start: 2021-01-15 | End: 2021-04-15 | Stop reason: ALTCHOICE
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
## 2020-12-31 NOTE — PATIENT INSTRUCTIONS
1  Continue levetiracetam 1500 mg twice daily  2  Increase lamotrigine to 75 mg twice daily tomorrow x 2 weeks, then 100 mg twice daily x 1 week, then 100 mg AM / 150 mg PM x 1 week, then 150 mg twice daily x 1 week, then 150 mg AM / 200 mg PM x 1 week, then take 200 mg twice daily  3  Schedule 24 hour ambulatory EEG  4  Let us know if there are seizures  5  Return in about 3 months

## 2021-01-15 ENCOUNTER — OFFICE VISIT (OUTPATIENT)
Dept: OBGYN CLINIC | Facility: CLINIC | Age: 31
End: 2021-01-15
Payer: COMMERCIAL

## 2021-01-15 VITALS — BODY MASS INDEX: 53.46 KG/M2 | WEIGHT: 230 LBS | DIASTOLIC BLOOD PRESSURE: 80 MMHG | SYSTOLIC BLOOD PRESSURE: 120 MMHG

## 2021-01-15 DIAGNOSIS — N91.2 AMENORRHEA: ICD-10-CM

## 2021-01-15 DIAGNOSIS — Z87.42 HISTORY OF IRREGULAR MENSTRUAL BLEEDING: Primary | ICD-10-CM

## 2021-01-15 PROCEDURE — 99203 OFFICE O/P NEW LOW 30 MIN: CPT | Performed by: NURSE PRACTITIONER

## 2021-01-15 NOTE — ASSESSMENT & PLAN NOTE
Rx for labs and pelvic ultrasound to assess for irregular menses  Pt to schedule appt to review results  Will also need to schedule annual exam to update pap smear

## 2021-01-15 NOTE — PROGRESS NOTES
Assessment/Plan:    History of irregular menstrual bleeding  Rx for labs and pelvic ultrasound to assess for irregular menses  Pt to schedule appt to review results  Will also need to schedule annual exam to update pap smear  Diagnoses and all orders for this visit:    History of irregular menstrual bleeding  -     CBC and differential  -     Follicle stimulating hormone  -     Insulin, fasting  -     Luteinizing hormone  -     Prolactin  -     T4, free  -     Testosterone  -     TSH, 3rd generation  -     Pregnancy Test (HCG Qualitative); Future  -     US pelvis complete w transvaginal; Future    Amenorrhea  -     CBC and differential  -     Follicle stimulating hormone  -     Insulin, fasting  -     Luteinizing hormone  -     Prolactin  -     T4, free  -     Testosterone  -     TSH, 3rd generation  -     Pregnancy Test (HCG Qualitative); Future  -     US pelvis complete w transvaginal; Future          Subjective:      Patient ID: Radha Gonzalez is a 35 y o  female  Pt presents as a new patient for irregular menses  In the past she has had irregular menses but they have been very regular x 5 years  Had regular monthly menses and now her menses is 15 days late  Is trying for pregnancy and took several OTC pregnancy tests all which were negative  Concerned due to history of irregular menses and already difficult time trying to conceive  Has been trying to conceive x 6 years with current partner  Did conceive in past x 2 which ended in early loss, different partner  Current partner has one daughter 3years old, healthy  Current partner denies any fertility issues  Lluveras several times a week without contraception  Has been tracking ovulation, and timing intercourse as well as using OPKs x 1 year  Seems to get positive ovulation tests  Denies any abnormal facial hair, does have issues with losing weight, No thyroid issues she is aware of     Strong family hx of diabetes  Unsure of last annual exam and pap smear  Denies any hx of abnormal pap smears in her past      Denies any change in medications  Has changed her diet and started to exercise in an effort to lose weight  Denies any pelvic pain, dyspareunia  Denies any abnormal discharge, itching, or odor vaginally  The following portions of the patient's history were reviewed and updated as appropriate: allergies, current medications, past family history, past medical history, past social history, past surgical history and problem list     Review of Systems   Genitourinary: Positive for menstrual problem  All other systems reviewed and are negative  Objective:      /80   Wt 104 kg (230 lb)   LMP 11/29/2020   Breastfeeding No   BMI 53 46 kg/m²          Physical Exam  Constitutional:       Appearance: Normal appearance  Cardiovascular:      Rate and Rhythm: Normal rate and regular rhythm  Pulmonary:      Effort: Pulmonary effort is normal       Breath sounds: Normal breath sounds  Abdominal:      General: Abdomen is flat  Bowel sounds are normal       Palpations: Abdomen is soft  Musculoskeletal: Normal range of motion  Skin:     General: Skin is warm and dry  Neurological:      Mental Status: She is alert  Psychiatric:         Mood and Affect: Mood normal          Behavior: Behavior normal          Thought Content:  Thought content normal          Judgment: Judgment normal

## 2021-01-18 ENCOUNTER — LAB (OUTPATIENT)
Dept: LAB | Facility: HOSPITAL | Age: 31
End: 2021-01-18
Payer: COMMERCIAL

## 2021-01-18 ENCOUNTER — TRANSCRIBE ORDERS (OUTPATIENT)
Dept: ADMINISTRATIVE | Facility: HOSPITAL | Age: 31
End: 2021-01-18

## 2021-01-18 DIAGNOSIS — Z87.42 HISTORY OF IRREGULAR MENSTRUAL BLEEDING: ICD-10-CM

## 2021-01-18 DIAGNOSIS — N91.2 AMENORRHEA: ICD-10-CM

## 2021-01-18 LAB
BASOPHILS # BLD AUTO: 0.07 THOUSANDS/ΜL (ref 0–0.1)
BASOPHILS NFR BLD AUTO: 1 % (ref 0–1)
EOSINOPHIL # BLD AUTO: 0.12 THOUSAND/ΜL (ref 0–0.61)
EOSINOPHIL NFR BLD AUTO: 2 % (ref 0–6)
ERYTHROCYTE [DISTWIDTH] IN BLOOD BY AUTOMATED COUNT: 14.1 % (ref 11.6–15.1)
FSH SERPL-ACNC: 2.1 MIU/ML
HCG SERPL QL: NEGATIVE
HCT VFR BLD AUTO: 37 % (ref 34.8–46.1)
HGB BLD-MCNC: 10.9 G/DL (ref 11.5–15.4)
IMM GRANULOCYTES # BLD AUTO: 0.02 THOUSAND/UL (ref 0–0.2)
IMM GRANULOCYTES NFR BLD AUTO: 0 % (ref 0–2)
INSULIN SERPL-ACNC: 167.8 MU/L (ref 3–25)
LH SERPL-ACNC: 2.8 MIU/ML
LYMPHOCYTES # BLD AUTO: 1.16 THOUSANDS/ΜL (ref 0.6–4.47)
LYMPHOCYTES NFR BLD AUTO: 21 % (ref 14–44)
MCH RBC QN AUTO: 25.9 PG (ref 26.8–34.3)
MCHC RBC AUTO-ENTMCNC: 29.5 G/DL (ref 31.4–37.4)
MCV RBC AUTO: 88 FL (ref 82–98)
MONOCYTES # BLD AUTO: 0.36 THOUSAND/ΜL (ref 0.17–1.22)
MONOCYTES NFR BLD AUTO: 6 % (ref 4–12)
NEUTROPHILS # BLD AUTO: 3.94 THOUSANDS/ΜL (ref 1.85–7.62)
NEUTS SEG NFR BLD AUTO: 70 % (ref 43–75)
NRBC BLD AUTO-RTO: 0 /100 WBCS
PLATELET # BLD AUTO: 372 THOUSANDS/UL (ref 149–390)
PMV BLD AUTO: 9.3 FL (ref 8.9–12.7)
PROLACTIN SERPL-MCNC: 11.8 NG/ML
RBC # BLD AUTO: 4.21 MILLION/UL (ref 3.81–5.12)
T4 FREE SERPL-MCNC: 0.82 NG/DL (ref 0.76–1.46)
TESTOST SERPL-MCNC: 24 NG/DL
TSH SERPL DL<=0.05 MIU/L-ACNC: 3.6 UIU/ML (ref 0.36–3.74)
WBC # BLD AUTO: 5.67 THOUSAND/UL (ref 4.31–10.16)

## 2021-01-18 PROCEDURE — 36415 COLL VENOUS BLD VENIPUNCTURE: CPT | Performed by: NURSE PRACTITIONER

## 2021-01-18 PROCEDURE — 83001 ASSAY OF GONADOTROPIN (FSH): CPT | Performed by: NURSE PRACTITIONER

## 2021-01-18 PROCEDURE — 84403 ASSAY OF TOTAL TESTOSTERONE: CPT | Performed by: NURSE PRACTITIONER

## 2021-01-18 PROCEDURE — 84443 ASSAY THYROID STIM HORMONE: CPT | Performed by: NURSE PRACTITIONER

## 2021-01-18 PROCEDURE — 84146 ASSAY OF PROLACTIN: CPT | Performed by: NURSE PRACTITIONER

## 2021-01-18 PROCEDURE — 83002 ASSAY OF GONADOTROPIN (LH): CPT | Performed by: NURSE PRACTITIONER

## 2021-01-18 PROCEDURE — 84439 ASSAY OF FREE THYROXINE: CPT | Performed by: NURSE PRACTITIONER

## 2021-01-18 PROCEDURE — 85025 COMPLETE CBC W/AUTO DIFF WBC: CPT | Performed by: NURSE PRACTITIONER

## 2021-01-18 PROCEDURE — 83525 ASSAY OF INSULIN: CPT | Performed by: NURSE PRACTITIONER

## 2021-01-18 PROCEDURE — 84703 CHORIONIC GONADOTROPIN ASSAY: CPT

## 2021-01-28 ENCOUNTER — HOSPITAL ENCOUNTER (OUTPATIENT)
Dept: RADIOLOGY | Facility: HOSPITAL | Age: 31
Discharge: HOME | End: 2021-01-28
Payer: COMMERCIAL

## 2021-01-28 DIAGNOSIS — Z87.42 HISTORY OF IRREGULAR MENSTRUAL BLEEDING: ICD-10-CM

## 2021-01-28 DIAGNOSIS — N91.2 AMENORRHEA: ICD-10-CM

## 2021-01-28 PROCEDURE — 76856 US EXAM PELVIC COMPLETE: CPT

## 2021-01-28 PROCEDURE — 76830 TRANSVAGINAL US NON-OB: CPT

## 2021-02-08 ENCOUNTER — TELEMEDICINE (OUTPATIENT)
Dept: OBGYN CLINIC | Facility: CLINIC | Age: 31
End: 2021-02-08
Payer: COMMERCIAL

## 2021-02-08 DIAGNOSIS — E28.2 PCOS (POLYCYSTIC OVARIAN SYNDROME): Primary | ICD-10-CM

## 2021-02-08 DIAGNOSIS — N84.0 ENDOMETRIAL POLYP: ICD-10-CM

## 2021-02-08 PROCEDURE — 99213 OFFICE O/P EST LOW 20 MIN: CPT | Performed by: OBSTETRICS & GYNECOLOGY

## 2021-02-08 NOTE — PROGRESS NOTES
Virtual Brief Visit    Assessment/Plan:    Problem List Items Addressed This Visit        Endocrine    PCOS (polycystic ovarian syndrome) - Primary     I have reviewed the criteria for diagnosis of PCOS and note that the patient has a clinical picture of PCOS with her obesity and amenorrhea as well as increased insulin resistance, and testosterone  Pelvic ultrasound also indicated PCOS  I have counseled the patient on the risks of chronic anovulation, including the risk of endometrial hyperplasia as well as endometrial cancer  Reviewed the importance of a period at least every 3 months  I reviewed various management options including but not limited to, regular exercise and diet modifications, metformin, and hormonal therapy (combination and progesterone only)  We discussed strategies for weight loss in light of the metabolic risks of hypertension, diabetes as well as hyperlipidemia and coronary artery disease in the long term  Encourage possibly considering advise from a nutritionist and/or  to help with diet modifications and regular exercise regimen  Reviewed metformon vs pregnitude and will likely pursue one of these to help regulate cycles  Also, encouraged patient to consider learning a form of NFP to help track her fertile signs and increase her chance for conception  We discussed the risks and benefits of starting metformin for insulin resistance  Patient is aware that metformin is not a birth control and will increase her ferility  Will start on Metformin 500mg BID with meals x 1 month to assess for side effects and how well she tolerates the Metformin  Will then increase to 850mg or 1,000mg  Pt to schedule follow up appointment for endometrial biopsy, will discuss at that appointment  All questions have been answered to her satisfaction           Relevant Medications    metFORMIN (GLUCOPHAGE) 500 mg tablet       Genitourinary    Endometrial polyp     Discussed pelvic ultrasound findings from ultrasound completed on 1/28/2021    "Endometrium:   Normal caliber of 7 mm  Endometrium is heterogeneous  Ill-defined relatively echogenic ovoid structure within the endometrial echocomplex measuring 7 mm x 6 mm which may be a small endometrial mass or polyp  No focally increased vascularity "    The ovaries are normal size  There are numerous bilateral ovarian follicles which are mostly peripheral distribution with relatively echogenic central ovarian stroma  This appearance can be seen in the setting of (but is not diagnostic of) polycystic   ovarian syndrome "    Discussed combination of pelvic ultrasound and recently completed lab results indicate PCOS  PCOS discussed in detail  Reviewed endometrial polyp, will perform endometrial biopsy to assess for "mass or polyp" as well as heterogeneous endometrium  RTO endometrial biopsy or sooner as needed  Reason for visit is No chief complaint on file  Encounter provider REY Teresa    Provider located at [address] [place] [address] OB/GYN [address]  [place] [address] [phone]    Recent Visits  No visits were found meeting these conditions  Showing recent visits within past 7 days and meeting all other requirements     Today's Visits  Date Type Provider Dept   02/08/21 Telemedicine RE[facility] Marbella Johnson   Showing today's visits and meeting all other requirements     Future Appointments  No visits were found meeting these conditions  Showing future appointments within next 150 days and meeting all other requirements        After connecting through telephone and patient was informed that this is not a secure, HIPAA-compliant platform  She agrees to proceed  , the patient was identified by name and date of birth   Markbrad Nilesh was informed that this is a telemedicine visit and that the visit is being conducted through telephone and patient was informed that this is not a secure, HIPAA-compliant platform  She agrees to proceed     My office door was closed  No one else was in the room  She acknowledged consent and understanding of privacy and security of the platform  The patient has agreed to participate and understands she can discontinue the visit at any time  Patient is aware this is a billable service  Subjective    Constance Lockwood is a 28 y o  female who present for a follow up  Pt presents via telephone for follow up to discuss pelvic ultrasound and lab results  She was scheduled as an in office visit but had transportation problems and asked to switch to a telephone visit  She was seen in office 1/15/2021 to discuss irregular menses  She has infrequent, irregular menses and hx of infertility  Has been trying to conceive with same partner x 6 years  After office visit was given a script for lab tests and pelvic ultrasound to assess for irregular menses  States she did get a menses since she saw me  LMP: 1/20/2021 lasted about 4 days and was moderate flow, denies any pain  Previous menses was 11/29/2020             Past Medical History:   Diagnosis Date    Autism     [place]'s esophagus     Depression     Female infertility     Gastric ulcer     Hypoglycemia     Miscarriage     Seizures (HCC)     Tachycardia        Past Surgical History:   Procedure Laterality Date    EYE SURGERY         Current Outpatient Medications   Medication Sig Dispense Refill    ARIPiprazole (ABILIFY) 5 mg tablet Take 5 mg by mouth daily      citalopram (CeleXA) 10 mg tablet Take 10 mg by mouth daily      lamoTRIgine (LaMICtal) 100 mg tablet 1 pill twice daily x 1 wk, 1 pill AM / 1 5 pills PM x 1 wk, 1 5 pills twice daily x 1 wk, 1 5 pills AM / 2 pills PM x 1 wk, then take 2 pills twice daily 120 tablet 3    lamoTRIgine (LaMICtal) 25 mg tablet Take 50mg (2 tabs) BID x 2 weeks, then increase to 75mg (3 tabs) bid x 2 weeks then start the 100mg bid that you have at home 140 tablet 0    levETIRAcetam (KEPPRA) 750 mg tablet Take 2 tablets (1,500 mg total) by mouth every 12 (twelve) hours 360 tablet 3    metFORMIN (GLUCOPHAGE) 500 mg tablet Take 1 tablet (500 mg total) by mouth 2 (two) times a day with meals 60 tablet 0    prazosin (MINIPRESS) 2 mg capsule Take 2 mg by mouth daily at bedtime      QUEtiapine (SEROquel) 25 mg tablet Take 25 mg by mouth daily at bedtime       No current facility-administered medications for this visit  Allergies   Allergen Reactions    Penicillins Hives       Review of Systems    There were no vitals filed for this visit  I spent 30 minutes directly with the patient during this visit    VIRTUAL VISIT DISCLAIMER    Deondre Lantigua acknowledges that she has consented to an online visit or consultation  She understands that the online visit is based solely on information provided by her, and that, in the absence of a face-to-face physical evaluation by the physician, the diagnosis she receives is both limited and provisional in terms of accuracy and completeness  This is not intended to replace a full medical face-to-face evaluation by the physician  Deondre Lantigua understands and accepts these terms

## 2021-02-08 NOTE — ASSESSMENT & PLAN NOTE
Discussed pelvic ultrasound findings from ultrasound completed on 1/28/2021    "Endometrium:   Normal caliber of 7 mm  Endometrium is heterogeneous  Ill-defined relatively echogenic ovoid structure within the endometrial echocomplex measuring 7 mm x 6 mm which may be a small endometrial mass or polyp  No focally increased vascularity "    The ovaries are normal size  There are numerous bilateral ovarian follicles which are mostly peripheral distribution with relatively echogenic central ovarian stroma  This appearance can be seen in the setting of (but is not diagnostic of) polycystic   ovarian syndrome "    Discussed combination of pelvic ultrasound and recently completed lab results indicate PCOS  PCOS discussed in detail  Reviewed endometrial polyp, will perform endometrial biopsy to assess for "mass or polyp" as well as heterogeneous endometrium  RTO endometrial biopsy or sooner as needed

## 2021-02-08 NOTE — ASSESSMENT & PLAN NOTE
I have reviewed the criteria for diagnosis of PCOS and note that the patient has a clinical picture of PCOS with her obesity and amenorrhea as well as increased insulin resistance, and testosterone  Pelvic ultrasound also indicated PCOS  I have counseled the patient on the risks of chronic anovulation, including the risk of endometrial hyperplasia as well as endometrial cancer  Reviewed the importance of a period at least every 3 months  I reviewed various management options including but not limited to, regular exercise and diet modifications, metformin, and hormonal therapy (combination and progesterone only)  We discussed strategies for weight loss in light of the metabolic risks of hypertension, diabetes as well as hyperlipidemia and coronary artery disease in the long term  Encourage possibly considering advise from a nutritionist and/or  to help with diet modifications and regular exercise regimen  Reviewed metformon vs pregnitude and will likely pursue one of these to help regulate cycles  Also, encouraged patient to consider learning a form of NFP to help track her fertile signs and increase her chance for conception  We discussed the risks and benefits of starting metformin for insulin resistance  Patient is aware that metformin is not a birth control and will increase her ferility  Will start on Metformin 500mg BID with meals x 1 month to assess for side effects and how well she tolerates the Metformin  Will then increase to 850mg or 1,000mg  Pt to schedule follow up appointment for endometrial biopsy, will discuss at that appointment  All questions have been answered to her satisfaction

## 2021-02-19 ENCOUNTER — PROCEDURE VISIT (OUTPATIENT)
Dept: OBGYN CLINIC | Facility: CLINIC | Age: 31
End: 2021-02-19
Payer: COMMERCIAL

## 2021-02-19 VITALS
DIASTOLIC BLOOD PRESSURE: 62 MMHG | BODY MASS INDEX: 54.48 KG/M2 | WEIGHT: 234.4 LBS | TEMPERATURE: 97.2 F | SYSTOLIC BLOOD PRESSURE: 100 MMHG

## 2021-02-19 DIAGNOSIS — N84.0 ENDOMETRIAL POLYP: Primary | ICD-10-CM

## 2021-02-19 DIAGNOSIS — Z12.4 PAP SMEAR FOR CERVICAL CANCER SCREENING: ICD-10-CM

## 2021-02-19 DIAGNOSIS — E28.2 PCOS (POLYCYSTIC OVARIAN SYNDROME): ICD-10-CM

## 2021-02-19 PROCEDURE — 58100 BIOPSY OF UTERUS LINING: CPT | Performed by: NURSE PRACTITIONER

## 2021-02-19 NOTE — PROGRESS NOTES
Endometrial biopsy    Date/Time: 2/19/2021 2:49 PM  Performed by: REY Mendenhall  Authorized by: REY Mendenhall   Universal Protocol:  Consent: Verbal consent obtained  Written consent obtained  Risks and benefits: risks, benefits and alternatives were discussed  Consent given by: patient  Time out: Immediately prior to procedure a "time out" was called to verify the correct patient, procedure, equipment, support staff and site/side marked as required  Timeout called at: 2/19/2021 2:05 AM   Patient understanding: patient states understanding of the procedure being performed  Patient consent: the patient's understanding of the procedure matches consent given  Procedure consent: procedure consent matches procedure scheduled  Relevant documents: relevant documents present and verified  Radiology Images displayed and confirmed  If images not available, report reviewed: imaging studies available  Patient identity confirmed: verbally with patient      Indication:     Indications: Other disorder of menstruation and other abnormal bleeding from female genital tract    Pre-procedure:     Premeds:  Ibuprofen  Procedure:     Procedure: endometrial biopsy with Pipelle      A bivalve speculum was placed in the vagina: yes      Cervix cleaned and prepped: yes      Uterus sounded: no      Patient tolerated procedure well with no complications: yes      Unable to perform due to: cervical stenosis    Findings:     Uterus size:  6-8 weeks    Cervix: stenotic      Adnexa: normal    Comments:     Procedure comments:  No intercourse, tampon use, soaking in bath tub, or swimming for the next 3 days to avoid risk of infection  Call office with excessive bleeding, cramping, fever, or chills  Unable to complete endometrial biopsy due to cervical stenosis of inernal os  Pt tolerated procedure well   Options reviewed such as repeat ultrasound in 6 weeks to reassess "mass or polyp", return for repeat of endometrial biopsy after cytotec use the night before procedure, or schedule surgical consult with physican for hysteroscopy  Pt would like to repeat pelvic ultrasound  Aware based on results may still require endometrial biopsy vs hysteroscopy  Will call with follow up results  Pap done today, will call with results  She started Metformin about 2 weeks ago  Taking 500mg BID with meals, only side effect so far is a headache  Advised to continue Metformin 500mg and when current supply is finished (30 day supply) will switch to 850mg BID with meals     RTO annual exam or sooner as needed

## 2021-02-23 LAB
CYTOLOGIST CVX/VAG CYTO: NORMAL
DX ICD CODE: NORMAL
Lab: NORMAL
OTHER STN SPEC: NORMAL
OTHER STN SPEC: NORMAL
PATH REPORT.FINAL DX SPEC: NORMAL
SL AMB NOTE:: NORMAL
SL AMB SPECIMEN ADEQUACY: NORMAL
SL AMB TEST METHODOLOGY: NORMAL

## 2021-03-02 ENCOUNTER — TELEPHONE (OUTPATIENT)
Dept: BARIATRICS | Facility: CLINIC | Age: 31
End: 2021-03-02

## 2021-03-02 RX ORDER — LAMOTRIGINE 200 MG/1
225 TABLET ORAL 2 TIMES DAILY
COMMUNITY
End: 2021-08-04 | Stop reason: SDUPTHER
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
## 2021-03-03 ENCOUNTER — CLINICAL SUPPORT (OUTPATIENT)
Dept: BARIATRICS | Facility: CLINIC | Age: 31
End: 2021-03-03

## 2021-03-03 VITALS
SYSTOLIC BLOOD PRESSURE: 110 MMHG | HEART RATE: 108 BPM | HEIGHT: 55 IN | DIASTOLIC BLOOD PRESSURE: 80 MMHG | WEIGHT: 231.8 LBS | TEMPERATURE: 97 F | BODY MASS INDEX: 53.64 KG/M2

## 2021-03-03 DIAGNOSIS — E66.01 MORBID (SEVERE) OBESITY DUE TO EXCESS CALORIES (HCC): Primary | ICD-10-CM

## 2021-03-03 DIAGNOSIS — Z98.84 BARIATRIC SURGERY STATUS: ICD-10-CM

## 2021-03-03 PROCEDURE — RECHECK

## 2021-03-03 NOTE — PROGRESS NOTES
Bariatric Nutrition Assessment Note    Type of surgery    Preop (6 months wt checks)  Surgery Date: TBD  Surgeon: TBD    Nutrition Assessment   Margoth Cook  28 y o   female     Wt with BMI of 25: 108lbs  Pre-Op Excess Wt: 123 8lbs  Temperature (!) 97 °F (36 1 °C), height 4' 7" (1 397 m), weight 105 kg (231 lb 12 8 oz), not currently breastfeeding  Body mass index is 53 88 kg/m²  Weight History   Onset of Obesity: Childhood  Family history of obesity: Yes  Wt Loss Attempts: Exercise  High Protein/Low CHO diets (Atkins, Union, etc )  Self Created Diets (Portion Control, Healthy Food Choices, etc )  Maximum Wt Lost: 50lbs with diet and exercise in 5949-0000    Review of History and Medications   · Tested in 2017 for sleep apnea--did home study while in [place] and found to have GARIMA  Used CPAP at that time, but doesn't have the CPAP any more because insurance took it away when she lost her insurance when she moved out of [place]    · PCOS  · Depression  · Per pt Pre-e DM started on Metformin in Feb 2021  · H/o of LINA--just eating higher iron foods, no supplements  · GERD with [place]'s esophagus    Labs:  TSH & CBC checked in Jan 2021--may need repeat closer to sx  CBC, BMP, lipdis & AC1 checked in Dec 2020--may need repeat (and CMP) closer to sx    Past Medical History:   Diagnosis Date    Autism     [place]'s esophagus     Depression     Female infertility     Gastric ulcer     Hypoglycemia     Miscarriage     Seizures ([address] )     Tachycardia      Past Surgical History:   Procedure Laterality Date    EYE SURGERY       Social History     Socioeconomic History    Marital status: Single     Spouse name: Not on file    Number of children: Not on file    Years of education: Not on file    Highest education level: Not on file   Occupational History    Not on file   Social Needs    Financial resource strain: Not on file    Food insecurity     Worry: Not on file     Inability: Not on file   Meggan Kimball Transportation needs     Medical: Not on file     Non-medical: Not on file   Tobacco Use    Smoking status: Never Smoker    Smokeless tobacco: Never Used   Substance and Sexual Activity    Alcohol use: Never     Frequency: Never    Drug use: Not Currently    Sexual activity: Yes     Partners: Male     Birth control/protection: None     Comment: Trying to conceive for 6 years now   Lifestyle    Physical activity     Days per week: Not on file     Minutes per session: Not on file    Stress: Not on file   Relationships    Social connections     Talks on phone: Not on file     Gets together: Not on file     Attends Jewish service: Not on file     Active member of club or organization: Not on file     Attends meetings of clubs or organizations: Not on file     Relationship status: Not on file    Intimate partner violence     Fear of current or ex partner: Not on file     Emotionally abused: Not on file     Physically abused: Not on file     Forced sexual activity: Not on file   Other Topics Concern    Not on file   Social History Narrative    Not on file       Current Outpatient Medications:     ARIPiprazole (ABILIFY) 5 mg tablet, Take 5 mg by mouth daily, Disp: , Rfl:     citalopram (CeleXA) 10 mg tablet, Take 10 mg by mouth daily, Disp: , Rfl:     lamoTRIgine (LaMICtal) 100 mg tablet, 1 pill twice daily x 1 wk, 1 pill AM / 1 5 pills PM x 1 wk, 1 5 pills twice daily x 1 wk, 1 5 pills AM / 2 pills PM x 1 wk, then take 2 pills twice daily, Disp: 120 tablet, Rfl: 3    lamoTRIgine (LaMICtal) 200 MG tablet, Take 200 mg by mouth 2 (two) times a day, Disp: , Rfl:     lamoTRIgine (LaMICtal) 25 mg tablet, Take 50mg (2 tabs) BID x 2 weeks, then increase to 75mg (3 tabs) bid x 2 weeks then start the 100mg bid that you have at home, Disp: 140 tablet, Rfl: 0    levETIRAcetam (KEPPRA) 750 mg tablet, Take 2 tablets (1,500 mg total) by mouth every 12 (twelve) hours, Disp: 360 tablet, Rfl: 3    metFORMIN (GLUCOPHAGE) 850 mg tablet, Take 1 tablet (850 mg total) by mouth 2 (two) times a day with meals, Disp: 60 tablet, Rfl: 3    prazosin (MINIPRESS) 2 mg capsule, Take 2 mg by mouth daily at bedtime, Disp: , Rfl:     QUEtiapine (SEROquel) 25 mg tablet, Take 25 mg by mouth daily at bedtime, Disp: , Rfl:     Food Intake and Lifestyle Assessment   Food Intake Assessment completed via usual diet recall  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), Sat/Sun, 9-10am  Breakfast: Cereal (apple cinn cheerios or rice krispie cereal or cheerios or frosted flakes w/whole milk lactaid) or frozen pancakes or eggs  Snack: none   Lunch: times varies b/w 1-3pm:  Usually fast food-[employer] or [employer] (cheese burger w/fries and soda) or subway  If home will make mac & cheese or hot dog or burger or sandwiches or Ramen  Snack: very rare but sometimes a granola bar or cookie  Dinner: she normally cooks (cooks for the household which includes roommate, roommate's  and son and roommate's sister & boyfriend and herself)-pasta or chicken stir barrera or ravoli or beef stew or sloppy joes, no veggies  Snack: ice cream and/or cookies  Beverage intake: water, whole milk, regular soda and alcohol (very rare)  Protein supplement: none  Estimated protein intake per day: 50-70gm  Estimated fluid intake per day: used to be 2L soda per day, now down to 2 cans per day, 48oz water per day, no coffee, very rare wine  Meals eaten away from home: At least once a day is usually fast food  Typical meal pattern: 3 meals per day and 1-2 snacks per day  Eating Behaviors: Consumption of high calorie/ high fat foods, Consumption of high calorie beverages, Large portion sizes, Frequent snacking/ grazing, Mindless eating and Craves sweet foods  Food allergies or intolerances:    Allergies   Allergen Reactions    Haloperidol Other (See Comments)     Jaw locks up    Penicillins Hives     Cultural or Jewish considerations: none    Physical Assessment  Physical Activity  Types of exercise: None because weather  Before was doing 2 walks per day--1st walk about 10 min, 2nd walk 20-30mins  Current physical limitations: h/o seizures (had since childhood, but controlled with meds)    Psychosocial Assessment   Support systems: parent(s) friend(s) relative(s)  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    Nutrition Diagnosis  Diagnosis: Overweight / Obesity (NC-3 3)  Related to: Physical inactivity and Excessive energy intake  As Evidenced by: BMI >25     Nutrition Prescription: Recommend the following diet  Regular    Interventions and Teaching   Discussed pre-op and post-op nutrition guidelines  Patient educated and handouts provided    Surgical changes to stomach / GI  Capacity of post-surgery stomach  Diet progression  Adequate hydration  Sugar and fat restriction to decrease "dumping syndrome"  Fat restriction to decrease steatorrhea  Expected weight loss  Weight loss plateaus/ possibility of weight regain  Exercise  Suggestions for pre-op diet  Nutrition considerations after surgery  Protein supplements  Meal planning and preparation  Appropriate carbohydrate, protein, and fat intake, and food/fluid choices to maximize safe weight loss, nutrient intake, and tolerance   Dietary and lifestyle changes  Possible problems with poor eating habits  Intuitive eating  Techniques for self monitoring and keeping daily food journal  Potential for food intolerance after surgery, and ways to deal with them including: lactose intolerance, nausea, reflux, vomiting, diarrhea, food intolerance, appetite changes, gas  Vitamin / Mineral supplementation of Multivitamin with minerals and Vitamin D  Post-operative pregnancy guidelines    Education provided to: patient    Barriers to learning: No barriers identified  Readiness to change: preparation    Prior research on procedure: books, internet and friends or family    Comprehension: verbalizes understanding     Expected Compliance: good    Recommendations  Pt is an appropriate candidate for surgery   Yes     Evaluation / Monitoring  Dietitian to Monitor: Eating pattern as discussed Tolerance of nutrition prescription Body weight Lab values Physical activity    Pre-op weight loss:  Do Not Gain weight  Lose about 5%: -11 6lbs (220 2lbs)    Goals  Eliminate sugar sweetened beverages: decrease soda and increase water  Food journal via [employer]  Exercise 30 minutes 5 times per week--can split up into 10 mins 3x/day of walking  Complete lesson plans 1-6  Eat 3 meals per day   fast food-can use a protein shake as a meal replacement for lunch (provided with samples) or pack lunch  Eliminate mindless snacking    Time Spent:   1 Hour

## 2021-03-08 ENCOUNTER — APPOINTMENT (EMERGENCY)
Dept: RADIOLOGY | Facility: HOSPITAL | Age: 31
End: 2021-03-08
Payer: COMMERCIAL

## 2021-03-08 ENCOUNTER — HOSPITAL ENCOUNTER (EMERGENCY)
Facility: HOSPITAL | Age: 31
Discharge: HOME | End: 2021-03-09
Attending: EMERGENCY MEDICINE
Payer: COMMERCIAL

## 2021-03-08 VITALS
WEIGHT: 238.4 LBS | DIASTOLIC BLOOD PRESSURE: 88 MMHG | RESPIRATION RATE: 18 BRPM | OXYGEN SATURATION: 99 % | SYSTOLIC BLOOD PRESSURE: 137 MMHG | BODY MASS INDEX: 55.41 KG/M2 | TEMPERATURE: 99.1 F | HEART RATE: 102 BPM

## 2021-03-08 DIAGNOSIS — S09.90XA INJURY OF HEAD, INITIAL ENCOUNTER: ICD-10-CM

## 2021-03-08 DIAGNOSIS — S43.409A SHOULDER SPRAIN: ICD-10-CM
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
## 2021-03-08 LAB
EXT PREG TEST URINE: NEGATIVE
EXT. CONTROL ED NAV: NORMAL

## 2021-03-08 PROCEDURE — 73030 X-RAY EXAM OF SHOULDER: CPT

## 2021-03-08 PROCEDURE — 70450 CT HEAD/BRAIN W/O DYE: CPT

## 2021-03-08 PROCEDURE — G1004 CDSM NDSC: HCPCS

## 2021-03-08 PROCEDURE — 99284 EMERGENCY DEPT VISIT MOD MDM: CPT

## 2021-03-08 PROCEDURE — 71045 X-RAY EXAM CHEST 1 VIEW: CPT

## 2021-03-08 PROCEDURE — 99285 EMERGENCY DEPT VISIT HI MDM: CPT | Performed by: EMERGENCY MEDICINE

## 2021-03-08 PROCEDURE — 81025 URINE PREGNANCY TEST: CPT | Performed by: EMERGENCY MEDICINE
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
## 2021-03-09 NOTE — DISCHARGE INSTRUCTIONS
Return for any new or worsening symptoms  You may follow up with your neurologist if you may feel dizzy or have headaches after this head injury

## 2021-03-14 ENCOUNTER — HOSPITAL ENCOUNTER (EMERGENCY)
Facility: HOSPITAL | Age: 31
Discharge: HOME | End: 2021-03-14
Attending: EMERGENCY MEDICINE | Admitting: EMERGENCY MEDICINE
Payer: COMMERCIAL

## 2021-03-14 ENCOUNTER — APPOINTMENT (EMERGENCY)
Dept: RADIOLOGY | Facility: HOSPITAL | Age: 31
End: 2021-03-14
Payer: COMMERCIAL

## 2021-03-14 VITALS
RESPIRATION RATE: 20 BRPM | HEART RATE: 119 BPM | TEMPERATURE: 99.1 F | SYSTOLIC BLOOD PRESSURE: 131 MMHG | BODY MASS INDEX: 54.81 KG/M2 | WEIGHT: 235.8 LBS | OXYGEN SATURATION: 98 % | DIASTOLIC BLOOD PRESSURE: 80 MMHG
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
## 2021-03-14 DIAGNOSIS — M79.605 LEFT LEG PAIN: ICD-10-CM

## 2021-03-14 PROCEDURE — 73552 X-RAY EXAM OF FEMUR 2/>: CPT

## 2021-03-14 PROCEDURE — 99284 EMERGENCY DEPT VISIT MOD MDM: CPT

## 2021-03-14 PROCEDURE — 99284 EMERGENCY DEPT VISIT MOD MDM: CPT | Performed by: PHYSICIAN ASSISTANT
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
## 2021-03-20 ENCOUNTER — HOSPITAL ENCOUNTER (EMERGENCY)
Facility: HOSPITAL | Age: 31
Discharge: HOME | End: 2021-03-20
Attending: EMERGENCY MEDICINE | Admitting: EMERGENCY MEDICINE
Payer: COMMERCIAL

## 2021-03-20 ENCOUNTER — APPOINTMENT (EMERGENCY)
Dept: RADIOLOGY | Facility: HOSPITAL | Age: 31
End: 2021-03-20
Attending: EMERGENCY MEDICINE
Payer: COMMERCIAL

## 2021-03-20 VITALS
OXYGEN SATURATION: 100 % | SYSTOLIC BLOOD PRESSURE: 137 MMHG | WEIGHT: 230 LBS | TEMPERATURE: 100.6 F | BODY MASS INDEX: 53.46 KG/M2 | DIASTOLIC BLOOD PRESSURE: 70 MMHG | HEART RATE: 126 BPM | RESPIRATION RATE: 20 BRPM

## 2021-03-20 DIAGNOSIS — B34.9 VIRAL SYNDROME: Primary | ICD-10-CM

## 2021-03-20 PROCEDURE — 87635 SARS-COV-2 COVID-19 AMP PRB: CPT | Performed by: EMERGENCY MEDICINE

## 2021-03-20 PROCEDURE — 99283 EMERGENCY DEPT VISIT LOW MDM: CPT

## 2021-03-20 PROCEDURE — 99284 EMERGENCY DEPT VISIT MOD MDM: CPT | Performed by: EMERGENCY MEDICINE

## 2021-03-20 PROCEDURE — 71045 X-RAY EXAM CHEST 1 VIEW: CPT

## 2021-03-21 LAB — SARS-COV-2 RNA RESP QL NAA+PROBE: NEGATIVE

## 2021-03-21 NOTE — ED PROVIDER NOTES
History  Chief Complaint   Patient presents with    Fever - 9 weeks to 74 years     Pt states she is SOB, congested, and fever of 100 7 at home  Last took tylenol at 2p  Patient states she started with nasal congestion which dropped into the chest associated with poorly productive cough, shortness of breath, body aches and fever  Patient is unsure of any known sick contacts  Denies any urinary complaints  Patient arrives awake alert SaO2 is 100% on room air on arrival          Prior to Admission Medications   Prescriptions Last Dose Informant Patient Reported? Taking?    ARIPiprazole (ABILIFY) 5 mg tablet   Yes No   Sig: Take 5 mg by mouth daily   QUEtiapine (SEROquel) 25 mg tablet   Yes No   Sig: Take 25 mg by mouth daily at bedtime   citalopram (CeleXA) 10 mg tablet   Yes No   Sig: Take 10 mg by mouth daily   lamoTRIgine (LaMICtal) 100 mg tablet   No No   Si pill twice daily x 1 wk, 1 pill AM / 1 5 pills PM x 1 wk, 1 5 pills twice daily x 1 wk, 1 5 pills AM / 2 pills PM x 1 wk, then take 2 pills twice daily   Patient not taking: Reported on 3/3/2021   lamoTRIgine (LaMICtal) 200 MG tablet   Yes No   Sig: Take 200 mg by mouth 2 (two) times a day   lamoTRIgine (LaMICtal) 25 mg tablet   No No   Sig: Take 50mg (2 tabs) BID x 2 weeks, then increase to 75mg (3 tabs) bid x 2 weeks then start the 100mg bid that you have at home   Patient not taking: Reported on 3/3/2021   levETIRAcetam (KEPPRA) 750 mg tablet   No No   Sig: Take 2 tablets (1,500 mg total) by mouth every 12 (twelve) hours   metFORMIN (GLUCOPHAGE) 850 mg tablet   No No   Sig: Take 1 tablet (850 mg total) by mouth 2 (two) times a day with meals   prazosin (MINIPRESS) 2 mg capsule   Yes No   Sig: Take 2 mg by mouth daily at bedtime      Facility-Administered Medications: None       Past Medical History:   Diagnosis Date    Autism     [place]'s esophagus     Depression     Female infertility     Gastric ulcer     Hypoglycemia     Miscarriage  Reflux esophagitis     Seizures (HCC)     Sleep apnea     Tachycardia        Past Surgical History:   Procedure Laterality Date    EYE SURGERY         Family History   Problem Relation Age of Onset    Bipolar disorder Mother     Depression Mother     Depression Father     Diabetes Maternal Grandmother     Thyroid disease Maternal Grandmother     Diabetes Maternal Grandfather     Diabetes Paternal Grandmother     Diabetes Paternal Grandfather      I have reviewed and agree with the history as documented  E-Cigarette/Vaping    E-Cigarette Use Never User      E-Cigarette/Vaping Substances    Nicotine No     THC No     CBD No      Social History     Tobacco Use    Smoking status: Never Smoker    Smokeless tobacco: Never Used   Substance Use Topics    Alcohol use: Never     Frequency: Never    Drug use: Not Currently       Review of Systems   Constitutional: Positive for fatigue and fever  Negative for chills  HENT: Positive for congestion and sore throat  Respiratory: Positive for cough and shortness of breath  Cardiovascular: Negative for chest pain and leg swelling  Gastrointestinal: Negative for abdominal pain and nausea  Genitourinary: Negative for difficulty urinating and dysuria  Musculoskeletal: Positive for arthralgias and myalgias  Skin: Negative for rash  Neurological: Positive for weakness  Negative for headaches  Hematological: Does not bruise/bleed easily  Psychiatric/Behavioral: Negative for confusion  All other systems reviewed and are negative  Physical Exam  Physical Exam  Vitals signs and nursing note reviewed  Constitutional:       Appearance: Normal appearance  HENT:      Head: Normocephalic  Right Ear: External ear normal       Left Ear: External ear normal       Nose: Nose normal       Mouth/Throat:      Pharynx: Oropharynx is clear     Eyes:      Conjunctiva/sclera: Conjunctivae normal    Neck:      Musculoskeletal: Normal range of motion and neck supple  Cardiovascular:      Rate and Rhythm: Normal rate and regular rhythm  Pulses: Normal pulses  Pulmonary:      Effort: Pulmonary effort is normal       Breath sounds: Normal breath sounds  Abdominal:      Palpations: Abdomen is soft  Tenderness: There is no abdominal tenderness  Musculoskeletal: Normal range of motion  Skin:     General: Skin is warm and dry  Capillary Refill: Capillary refill takes less than 2 seconds  Neurological:      General: No focal deficit present  Mental Status: She is alert  Psychiatric:         Mood and Affect: Mood normal          Behavior: Behavior normal          Vital Signs  ED Triage Vitals [03/20/21 2206]   Temperature Pulse Respirations Blood Pressure SpO2   (!) 100 6 °F (38 1 °C) (!) 126 20 137/70 100 %      Temp Source Heart Rate Source Patient Position - Orthostatic VS BP Location FiO2 (%)   Tympanic Monitor Sitting Right arm --      Pain Score       No Pain           Vitals:    03/20/21 2206   BP: 137/70   Pulse: (!) 126   Patient Position - Orthostatic VS: Sitting         Visual Acuity      ED Medications  Medications - No data to display    Diagnostic Studies  Results Reviewed     Procedure Component Value Units Date/Time    Novel Coronavirus [facility] [[id number]] Collected: 03/20/21 2227    Lab Status: In process Specimen: Nares from Nose Updated: 03/20/21 2229                 XR chest 1 view portable    (Results Pending)              Procedures  Procedures         ED Course                                           MDM  Number of Diagnoses or Management Options  Viral syndrome:   Diagnosis management comments: Chest x-ray reviewed with patient  O2 sat remains 100%  COVID testing sent    Have advised the patient's stay sequestered until results are known      Disposition  Final diagnoses:   Viral syndrome     Time reflects when diagnosis was documented in both MDM as applicable and the Disposition within this note     Time User Action Codes Description Comment    3/20/2021 11:05 PM Synetta Graft Add [B34 9] Viral syndrome       ED Disposition     ED Disposition Condition Date/Time Comment    Discharge Stable Sat Mar 20, 2021 11:05 PM Margoth Kimtomayor discharge to home/self care  Follow-up Information     Follow up With Specialties Details Why Contact Info    [employer]  Schedule an appointment as soon as possible for a visit in 1 day  [phone]            Patient's Medications   Discharge Prescriptions    No medications on file     No discharge procedures on file      PDMP Review     None          ED Provider  Electronically Signed by           Sylvia Gutierrez MD  03/20/21 5695

## 2021-04-13 ENCOUNTER — TELEPHONE (OUTPATIENT)
Dept: BARIATRICS | Facility: CLINIC | Age: 31
End: 2021-04-13

## 2021-04-13 NOTE — TELEPHONE ENCOUNTER
Spoke with patient to follow up on scheduling her first weight check  Patient said she did get scheduled with a PCP who she will be meeting with on 4/22 to discuss bariatric surgery  Patient was able to schedule with SW and BRENNAN for 4/26 at 1:30 to follow up and patient can be scheduled with surgeon after that appointment

## 2021-04-15 ENCOUNTER — TELEMEDICINE (OUTPATIENT)
Dept: NEUROLOGY | Facility: CLINIC | Age: 31
End: 2021-04-15
Payer: COMMERCIAL

## 2021-04-15 ENCOUNTER — TELEPHONE (OUTPATIENT)
Dept: NEUROLOGY | Facility: CLINIC | Age: 31
End: 2021-04-15

## 2021-04-15 DIAGNOSIS — G40.909 NONINTRACTABLE EPILEPSY WITHOUT STATUS EPILEPTICUS, UNSPECIFIED EPILEPSY TYPE (HCC): Primary | ICD-10-CM

## 2021-04-15 PROCEDURE — 99214 OFFICE O/P EST MOD 30 MIN: CPT | Performed by: PSYCHIATRY & NEUROLOGY

## 2021-04-15 RX ORDER — LAMOTRIGINE 25 MG/1
25 TABLET ORAL 2 TIMES DAILY
Qty: 180 TABLET | Refills: 1 | Status: SHIPPED | OUTPATIENT
Start: 2021-04-15 | End: 2021-11-11 | Stop reason: SDUPTHER

## 2021-04-15 NOTE — TELEPHONE ENCOUNTER
Patient calling to switch her appointment that is scheduled for today to a virtual video appointment  States that she does not have a ride to her appointment  She is agreeable to a video appointment using [employer]   Can be reached at [phone]
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
## 2021-04-15 NOTE — PATIENT INSTRUCTIONS
1  Increase lamotrigine to 225 mg twice daily  2  Schedule ambulatory EEG  3  Let us know if there are additional seizures after increasing lamotrigine  4  Return in about 3 months

## 2021-04-16 ENCOUNTER — OFFICE VISIT (OUTPATIENT)
Dept: OBGYN CLINIC | Facility: CLINIC | Age: 31
End: 2021-04-16
Payer: COMMERCIAL

## 2021-04-16 VITALS
BODY MASS INDEX: 54.38 KG/M2 | HEIGHT: 55 IN | DIASTOLIC BLOOD PRESSURE: 74 MMHG | SYSTOLIC BLOOD PRESSURE: 128 MMHG | WEIGHT: 235 LBS

## 2021-04-16 DIAGNOSIS — E28.2 PCOS (POLYCYSTIC OVARIAN SYNDROME): ICD-10-CM

## 2021-04-16 DIAGNOSIS — N97.9 INFERTILITY, FEMALE: Primary | ICD-10-CM

## 2021-04-16 PROCEDURE — 3008F BODY MASS INDEX DOCD: CPT | Performed by: NURSE PRACTITIONER

## 2021-04-16 PROCEDURE — 99213 OFFICE O/P EST LOW 20 MIN: CPT | Performed by: NURSE PRACTITIONER

## 2021-04-16 NOTE — PROGRESS NOTES
Assessment/Plan:    Infertility, female  Recommend patient follow up with Joanne Brie in office to further discuss infertility  Reviewed list on patients phone of labs required prior to seeing reproductive endocrinologist  Will order labs  Also noted was the need for HSG  I reviewed with patient she may require a D & C d/t known polyp in uterus  Pt was explaining her reproductive endocrinologist in [place] will send recommended testing and orders to us and she will have the procedure through them  Reviewed with patient unsure if we will be able to accommodate the needed procedures and tests required  I recommend she follow with Joanne Baird in our office to discuss fertility  PCOS (polycystic ovarian syndrome)  Recommend pt start taking Metformin twice daily with meals and continue to monitor menses  Call if no menses x 3 months  Diagnoses and all orders for this visit:    Infertility, female  -     Vitamin D 25 hydroxy; Future  -     Antimullerian hormone (AMH); Future  -     Estradiol; Future  -     Progesterone; Future  -     Follicle stimulating hormone  -     Luteinizing hormone  -     Rubella antibody, IgG  -     Comprehensive metabolic panel; Future  -     Hepatitis B surface antigen; Future  -     Hepatitis C antibody; Future  -     HIV 1/2 Antigen/Antibody (4th Generation) w Reflex [facility]  -     RPR; Future  -     Varicella zoster antibody, IgG; Future  -     Progesterone; Future  -     Estradiol; Future    PCOS (polycystic ovarian syndrome)  -     metFORMIN (GLUCOPHAGE) 850 mg tablet; Take 1 tablet (850 mg total) by mouth 2 (two) times a day with meals          Subjective:      Patient ID: Omega Donaldson is a 28 y o  female  Pt presents to discuss irregular menses and fertility  Baseline lab work was done 1/2021 and patient was started on Metformin to encourage cycle regulation and fertility  She is here today as a follow up after starting Metformin      Pt had researched reproductive endocrinologist to help in conception  She had found an office in [place] and was given orders to have several labs and procedures done prior to seeing them  She states they will work with her regular GYN due to distance  Pt will need an HSG per [facility] in [place]  Pt is beginning fertility treatement with them  Planning on September for ICSI  Pt has a list of labs to be ordered per her fertility specilist prior to being seen  Was advised since a "travel" pt they recommend she have labs done through GYN and have records released to them for reviewing  Reviewed need for follow up ultrasound to assess polyp as may first require a D & C due to known polyp in uterus  Pt states menses occurred in January which was heavy, lasted about 5 days  Last menses was 4/1/2021 and lasted 7 days, moderate flow menses  Pt is on Metformin 850mg ordered as BID but pt mistook and was taking it daily with breakfast  Occasional diarrhea but not too bad  The following portions of the patient's history were reviewed and updated as appropriate: allergies, current medications, past family history, past medical history, past social history, past surgical history and problem list     Review of Systems   Genitourinary: Positive for menstrual problem  All other systems reviewed and are negative  Objective:      /74 (BP Location: Right arm, Patient Position: Sitting, Cuff Size: Large)   Ht 4' 7" (1 397 m)   Wt 107 kg (235 lb)   LMP 04/01/2021 (Exact Date)   BMI 54 62 kg/m²          Physical Exam  Vitals signs and nursing note reviewed  Constitutional:       Appearance: Normal appearance  Cardiovascular:      Rate and Rhythm: Normal rate and regular rhythm  Pulses: Normal pulses  Heart sounds: Normal heart sounds  Pulmonary:      Effort: Pulmonary effort is normal       Breath sounds: Normal breath sounds  Abdominal:      General: Abdomen is flat   Bowel sounds are normal  Palpations: Abdomen is soft  Skin:     General: Skin is warm and dry  Neurological:      Mental Status: She is alert and oriented to person, place, and time  Psychiatric:         Mood and Affect: Mood normal          Behavior: Behavior normal          Thought Content:  Thought content normal          Judgment: Judgment normal

## 2021-04-19 PROBLEM — N97.9 INFERTILITY, FEMALE: Status: ACTIVE | Noted: 2021-04-19

## 2021-04-19 NOTE — ASSESSMENT & PLAN NOTE
Recommend pt start taking Metformin twice daily with meals and continue to monitor menses  Call if no menses x 3 months

## 2021-04-19 NOTE — ASSESSMENT & PLAN NOTE
Recommend patient follow up with Florin Araujo in office to further discuss infertility  Reviewed list on patients phone of labs required prior to seeing reproductive endocrinologist  Will order labs  Also noted was the need for HSG  I reviewed with patient she may require a D & C d/t known polyp in uterus  Pt was explaining her reproductive endocrinologist in [place] will send recommended testing and orders to us and she will have the procedure through them  Reviewed with patient unsure if we will be able to accommodate the needed procedures and tests required  I recommend she follow with Florin Araujo in our office to discuss fertility

## 2021-04-26 ENCOUNTER — HOSPITAL ENCOUNTER (OUTPATIENT)
Dept: RADIOLOGY | Facility: HOSPITAL | Age: 31
Discharge: HOME | End: 2021-04-26
Payer: COMMERCIAL

## 2021-04-26 DIAGNOSIS — N84.0 ENDOMETRIAL POLYP: ICD-10-CM

## 2021-04-26 PROCEDURE — 76856 US EXAM PELVIC COMPLETE: CPT

## 2021-04-26 PROCEDURE — 76830 TRANSVAGINAL US NON-OB: CPT

## 2021-04-27 ENCOUNTER — TELEPHONE (OUTPATIENT)
Dept: BARIATRICS | Facility: CLINIC | Age: 31
End: 2021-04-27

## 2021-04-27 NOTE — TELEPHONE ENCOUNTER
DANUTA spoke with patient about missed appointment with DANUTA and RD yesterday and offered to reschedule  Patient said she is out of town until the week of May 10, she was able to schedule again for Friday, May 14  DANUTA informed patient that if in the future she is out of town or not able to come to the office for any reason but still wants to attend her appointment, she can do so virtually  If she ever needs to reschedule however, just to call the office  She appreciated the information

## 2021-05-17 ENCOUNTER — TELEPHONE (OUTPATIENT)
Dept: BARIATRICS | Facility: CLINIC | Age: 31
End: 2021-05-17

## 2021-05-17 NOTE — TELEPHONE ENCOUNTER
DANUTA spoke with patient regarding missed appointment on 5/14, patient said the person who usually gives her a ride is out of town and she was hoping that her  would be set up in time to make it to the appointment  Patient asked if this first weight check could be virtual and then she would schedule to come in person to the next visit since her  would be able to help with rides then    Patient is scheduled for a virtual visit with DANUTA and BRENNAN on 5/18 at [facility]

## 2021-05-18 ENCOUNTER — OFFICE VISIT (OUTPATIENT)
Dept: BARIATRICS | Facility: CLINIC | Age: 31
End: 2021-05-18

## 2021-05-18 DIAGNOSIS — E66.01 MORBID (SEVERE) OBESITY DUE TO EXCESS CALORIES (HCC): Primary | ICD-10-CM

## 2021-05-18 DIAGNOSIS — E66.01 MORBID OBESITY (HCC): Primary | ICD-10-CM

## 2021-05-18 PROCEDURE — RECHECK

## 2021-05-18 NOTE — PROGRESS NOTES
Patient's name and  were verified during visit  Provider explained that [employer] is a HIPPA compliant platform and to further protect their confidentiality the provider was alone and the office door was closed  Patient consented to the virtual video visit  This visit is free  Patient presented for virtual visit, she said she hasn't weighed herself lately because she has been traveling a lot to see family  Patient's sister is in [place] and due to have a baby, her great grandmother is very ill in [place] and she has also been with her  Patient doesn't plan to travel again until about July to see her sister, she is not going to see her great grandmother again due to [place] and how sick she is  Patient is experiencing some intense grief over her grandmother's poor health, she describes periods of acceptance over her not being in pain in more but also anger over losing her  Patient will isolate herself because she doesn't want to be nasty to her family and doesn't want to be around others  She also doesn't have an appetite at times, she will only eat one meal a day  On average she says she's having two meals a day, still struggling to get in breakfast because she doesn't normally have an appetite for it  She admits to emotional, bored eating, she will have a bowl of ice cream or snack throughout the day  She was logging her foods but hasn't been consistent with all that is going on  Patient was receptive to education about how grief can impact a person's mood and choices and finding non-food coping skills to navigate those difficult feelings is important to her overall mind and body wellness  Patient agreed to work on those skills, seeking out a support person that she can vent to, channeling her anger into physical activity and being mindful about how her emotions are impacting her choices    Patient is going to be seeing her therapist again this coming week and agrees to increase the frequency of her appointments to support her during this difficult time  She reports she is taking her medication consistently to manage symptoms of anxiety and depression, she denies any current SI/HI  Patient has been taking her dogs for a walk individually so that she gets in more steps  She was having difficult sleeping for the past few weeks but more recently she is sleeping better  Patient is scheduled to come into the office in a month to meet with DANUTA and BRENNAN

## 2021-05-18 NOTE — PROGRESS NOTES
Bariatric Nutrition Follow-up Note    Virtual visit:  i  Name was verified by patient stating name? yes  ii   verified by patient stating? yes  iii  You verified the patient is alone? yes  iv  I would like to verify that you were offered a live visit but are now consenting to this telephone visit? yes  v  This visit is free yes      Type of surgery    Preop (6 months wt checks)---1 of 6 wt check today  Surgery Date: TBD  Surgeon: TBD--pt still needs to see the surgeon    Nutrition Assessment   Margoth Alcantarr  28 y o   female     Wt with BMI of 25: 108lbs  Pre-Op Excess Wt: 123 8lbs  not currently breastfeeding  There is no height or weight on file to calculate BMI  Pt does not have working scale at home therefore no weight on file for this visit    Review of History and Medications   · Tested in  for sleep apnea--did home study while in [place] and found to have GARIMA  Used CPAP at that time, but doesn't have the CPAP any more because insurance took it away when she lost her insurance when she moved out of [place]    · PCOS  · Depression  · Per pt Pre-e DM started on Metformin in 2021  · H/o of LINA--just eating higher iron foods, no supplements  · GERD with [place]'s esophagus    Labs:  TSH & CBC checked in 2021--may need repeat closer to sx  CBC, BMP, lipdis & AC1 checked in Dec 2020--may need repeat (and CMP) closer to sx    Past Medical History:   Diagnosis Date    Autism     [place]'s esophagus     Depression     Female infertility     Gastric ulcer     Hypoglycemia     Miscarriage     Reflux esophagitis     Seizures ([facility] [address] )     Sleep apnea     Tachycardia      Past Surgical History:   Procedure Laterality Date    EYE SURGERY       Social History     Socioeconomic History    Marital status: Single     Spouse name: Not on file    Number of children: Not on file    Years of education: Not on file    Highest education level: Not on file   Occupational History    Not on file   Social Needs    Financial resource strain: Not on file    Food insecurity     Worry: Not on file     Inability: Not on file    Transportation needs     Medical: Not on file     Non-medical: Not on file   Tobacco Use    Smoking status: Never Smoker    Smokeless tobacco: Never Used   Substance and Sexual Activity    Alcohol use: Never     Frequency: Never    Drug use: Not Currently    Sexual activity: Not Currently     Partners: Male     Birth control/protection: None     Comment: Trying to conceive for 6 years now   Lifestyle    Physical activity     Days per week: Not on file     Minutes per session: Not on file    Stress: Not on file   Relationships    Social connections     Talks on phone: Not on file     Gets together: Not on file     Attends Druze service: Not on file     Active member of club or organization: Not on file     Attends meetings of clubs or organizations: Not on file     Relationship status: Not on file    Intimate partner violence     Fear of current or ex partner: Not on file     Emotionally abused: Not on file     Physically abused: Not on file     Forced sexual activity: Not on file   Other Topics Concern    Not on file   Social History Narrative    Not on file       Current Outpatient Medications:     ARIPiprazole (ABILIFY) 5 mg tablet, Take 5 mg by mouth daily, Disp: , Rfl:     citalopram (CeleXA) 10 mg tablet, Take 10 mg by mouth daily, Disp: , Rfl:     lamoTRIgine (LaMICtal) 200 MG tablet, Take 200 mg by mouth 2 (two) times a day, Disp: , Rfl:     lamoTRIgine (LaMICtal) 25 mg tablet, Take 1 tablet (25 mg total) by mouth 2 (two) times a day, Disp: 180 tablet, Rfl: 1    levETIRAcetam (KEPPRA) 750 mg tablet, Take 2 tablets (1,500 mg total) by mouth every 12 (twelve) hours, Disp: 360 tablet, Rfl: 3    metFORMIN (GLUCOPHAGE) 850 mg tablet, Take 1 tablet (850 mg total) by mouth 2 (two) times a day with meals, Disp: 60 tablet, Rfl: 3    prazosin (MINIPRESS) 2 mg capsule, Take 2 mg by mouth daily at bedtime, Disp: , Rfl:     QUEtiapine (SEROquel) 25 mg tablet, Take 25 mg by mouth daily at bedtime, Disp: , Rfl:     Food Intake and Lifestyle Assessment   Food Intake Assessment completed via usual diet recall  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), Sat/Sun, 9-10am  Breakfast: skips often OR will do cereal (about 1 cup w/milk) OR 1/2 bagel or 1 frozen pancake OR toast--suggested a protein shake or protein food for breakfast   Snack: none   Lunch: yesterday-had sandwich with roasted turkey, 1/2 slice of cheese on white bread  times varies b/w 1-3pm:  Usually fast food-[employer] or [employer] (cheese burger w/fries and soda) or subway  If home will make mac & cheese or hot dog or burger or sandwiches or Ramen  Snack: this is the weak time, but has become better  Ie:  Apple or ice cream or smoothie with frozen fruit, Lactose free whole milk, but will switch to skim that she also enjoys  Dinner: Last night had 1 beef taco on a regular tortilla and only used 1/2 of the [employer] seasoning pack for less salt  Other options:  hamburger or pasta with sunil sauce (larger portion) OR grilled chicken Caesar salad and dips fork into dressing OR chicken patties OR fried chicken in air fryer  Occasional mixed veggies --will work on increasing Or hotdog in cressent roll OR sloppy alvaro's OR pulled BBQ chicken   Snack: ice cream and/or cookies  Beverage intake: water, whole milk, regular soda and alcohol (very rare)  Protein supplement: none  Estimated protein intake per day: 50-70gm  Estimated fluid intake per day: used to be 2L soda per day--now cut it out,  48oz water per day, no coffee, very rare wine  Meals eaten away from home:   At least once a day is usually fast food  Typical meal pattern: 3 meals per day and 1-2 snacks per day  Eating Behaviors: Consumption of high calorie/ high fat foods, Consumption of high calorie beverages, Large portion sizes, Frequent snacking/ grazing, Mindless eating and Craves sweet foods    Food allergies or intolerances: Allergies   Allergen Reactions    Haloperidol Other (See Comments)     Jaw locks up    Penicillins Hives     Cultural or Muslim considerations: none    Physical Assessment  Physical Activity  Types of exercise: walking more--1-2 walks per day  Didn't do for about 2 weeks because feet were very swollen  Taking the 3 dogs out separately for their walks  Current physical limitations: h/o seizures (had since childhood, but controlled with meds)    Psychosocial Assessment   Support systems: parent(s) friend(s) relative(s)  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    Nutrition Diagnosis  Diagnosis: Overweight / Obesity (NC-3 3)  Related to: Physical inactivity and Excessive energy intake  As Evidenced by: BMI >25     Nutrition Prescription: Recommend the following diet  Regular    Interventions and Teaching   Discussed pre-op and post-op nutrition guidelines  Patient educated and handouts provided    Surgical changes to stomach / GI  Capacity of post-surgery stomach  Diet progression  Adequate hydration  Expected weight loss  Weight loss plateaus/ possibility of weight regain  Exercise  Suggestions for pre-op diet  Nutrition considerations after surgery  Protein supplements  Meal planning and preparation  Appropriate carbohydrate, protein, and fat intake, and food/fluid choices to maximize safe weight loss, nutrient intake, and tolerance   Dietary and lifestyle changes  Possible problems with poor eating habits  Intuitive eating  Techniques for self monitoring and keeping daily food journal    Education provided to: patient    Barriers to learning: No barriers identified  Readiness to change: preparation    Prior research on procedure: books, internet and friends or family    Comprehension: verbalizes understanding     Expected Compliance: good      Evaluation / Monitoring  Dietitian to Monitor: Eating pattern as discussed Tolerance of nutrition prescription Body weight Lab values Physical activity    Workflow:   Psych and/or D+A Clearance: N/A   Bloodwork: will get done after 3rd wt check in July   PCP Letter: going to PCP 5/28/21   Support Group: not complete   Weight Loss: Do Not Gain weight, Lose about 5%: -11 6lbs (220 2lbs)   Nicotine test: N/A   6 Month Pre-Operative Program: 1 of 6 today   EGD: to be scheduled   Cardiac Risk Assessment: not complete    Sleep Studies: encouraged to call and schedule    Pt has done well with some diet changes--eliminated the soda, but still needs to increase water to 64oz  Has been cooking more at home, but most often skips breakfast   Suggested to pt to at least do a protein shake for breakfast to start the day off with some food and ensure it is high protein  Reviewed work flow and pt scheduled to come back in one month        Goals  Continue to avoid the soda  Increase water to 64oz  Food journal via notebook and bring in at next visit  Exercise 30 minutes 5 times per week--can split up into 10 mins 3x/day of walking  Complete lesson plans 1-6  Eat 3 meals per day  Have protein shake for breakfast    Time Spent:   30 mins

## 2021-05-28 ENCOUNTER — OFFICE VISIT (OUTPATIENT)
Dept: FAMILY MEDICINE CLINIC | Facility: CLINIC | Age: 31
End: 2021-05-28
Payer: COMMERCIAL

## 2021-05-28 VITALS
WEIGHT: 234 LBS | DIASTOLIC BLOOD PRESSURE: 70 MMHG | RESPIRATION RATE: 18 BRPM | HEIGHT: 55 IN | BODY MASS INDEX: 54.15 KG/M2 | OXYGEN SATURATION: 97 % | SYSTOLIC BLOOD PRESSURE: 108 MMHG | HEART RATE: 118 BPM | TEMPERATURE: 97.7 F

## 2021-05-28 DIAGNOSIS — G40.909 NONINTRACTABLE EPILEPSY WITHOUT STATUS EPILEPTICUS, UNSPECIFIED EPILEPSY TYPE (HCC): ICD-10-CM

## 2021-05-28 DIAGNOSIS — R20.2 PARESTHESIA OF RIGHT UPPER EXTREMITY: ICD-10-CM

## 2021-05-28 DIAGNOSIS — Z76.89 ENCOUNTER TO ESTABLISH CARE: Primary | ICD-10-CM

## 2021-05-28 DIAGNOSIS — Z23 ENCOUNTER FOR IMMUNIZATION: ICD-10-CM

## 2021-05-28 PROCEDURE — 90471 IMMUNIZATION ADMIN: CPT

## 2021-05-28 PROCEDURE — 99213 OFFICE O/P EST LOW 20 MIN: CPT | Performed by: FAMILY MEDICINE

## 2021-05-28 PROCEDURE — 90715 TDAP VACCINE 7 YRS/> IM: CPT

## 2021-05-28 NOTE — PROGRESS NOTES
Assessment/Plan:     Diagnoses and all orders for this visit:    Encounter to establish care    Nonintractable epilepsy without status epilepticus, unspecified epilepsy type ([facility] [address] )  Pt had recent absence seizures after being seizure free for 5 months in setting of medication compliance  Has not notified neurologist  No neurologic deficits noted on exam  Counseled pt to notify neurologist ASAP  Has upcoming EEG  Bipolar depression  Pt is on antipsychotics - seroquel and abilify- for bipolar depression  These medications can decrease seizure threshold  Pt reports psychiatrist at [facility] and neurologist are aware pt is on these medications  With current seizure episode, I strongly recommend that pt contact both physicians and re-assess appropriateness of these medications with history of epilepsy  Paresthesia of right upper extremity  Pt had positive Phalen's test with reproduction of paresthesias at tips of all fingers  Since symptoms first involved entire upper extremity but only involves hand now, suspect transient brachial plexus entrapment which is slowly resolving with some residual median nerve associated symptoms  Counseled pt to follow up with neurologist for this also in case they recommend an EMG  Encounter for immunization  -     TDAP VACCINE GREATER THAN OR EQUAL TO 8YO IM        Subjective:      Patient ID: Meryle Croft is a 28 y o  female  HPI  Pt is here to establish care  - reports having epilepsy since age 13  Had been seizure free since dec 2020 but reports a absence seizure 1 week ago for 2 mins, witnessed by friend  Denies tongue biting, loss of bowel or bladder function, post-ictal state  Pt reports having both grand mal and absence seizures  Reports compliance with antiepileptics  Scheduled for EEG 6/21  Follows with neurologist   - Reports pain, numbness and tingling in right hand including all fingers   Onset 1 week ago before seizure, initially involved entire right upper extremity  Symptoms are intermittent  Strength and ROM intact  The following portions of the patient's history were reviewed and updated as appropriate: allergies, current medications, past family history, past medical history, past social history, past surgical history and problem list     Review of Systems   Constitutional: Negative for chills and fever  HENT: Negative for ear pain and sore throat  Eyes: Negative for pain and visual disturbance  Respiratory: Negative for cough, chest tightness and shortness of breath  Cardiovascular: Negative for chest pain and palpitations  Gastrointestinal: Negative for abdominal pain, constipation, diarrhea, nausea and vomiting  Genitourinary: Negative for dysuria, hematuria and menstrual problem  Musculoskeletal: Negative for arthralgias and back pain  Skin: Negative for color change and rash  Neurological: Positive for seizures (per hpi) and numbness (per hpi)  Negative for syncope  Psychiatric/Behavioral: Negative for dysphoric mood and suicidal ideas  All other systems reviewed and are negative  Objective:      /70 (BP Location: Left arm, Patient Position: Sitting, Cuff Size: Large)   Pulse (!) 118   Temp 97 7 °F (36 5 °C) (Tympanic)   Resp 18   Ht 4' 7" (1 397 m)   Wt 106 kg (234 lb)   LMP 05/07/2021   SpO2 97%   BMI 54 39 kg/m²          Physical Exam  Constitutional:       Appearance: Normal appearance  HENT:      Head: Normocephalic and atraumatic  Cardiovascular:      Rate and Rhythm: Normal rate and regular rhythm  Pulses: Normal pulses  Heart sounds: Normal heart sounds  Pulmonary:      Effort: Pulmonary effort is normal  No respiratory distress  Breath sounds: Normal breath sounds  No wheezing  Skin:     General: Skin is warm and dry  Capillary Refill: Capillary refill takes less than 2 seconds  Neurological:      General: No focal deficit present        Mental Status: She is alert and oriented to person, place, and time  Cranial Nerves: Cranial nerves are intact  Sensory: Sensation is intact  Motor: Motor function is intact        Comments: When performing Phalen's test, pt reports paresthesias at tip of all fingers, like indicating median nerve entrapment   Psychiatric:         Mood and Affect: Mood normal          Behavior: Behavior normal

## 2021-05-28 NOTE — PATIENT INSTRUCTIONS
- Discuss with neurologist and psychiatrist if you should continue seroquel and abilify since it increases your likelihood of having a seizure

## 2021-05-29 PROBLEM — F31.9 BIPOLAR DEPRESSION (HCC): Status: ACTIVE | Noted: 2021-05-29

## 2021-06-04 ENCOUNTER — TELEPHONE (OUTPATIENT)
Dept: NEUROLOGY | Facility: CLINIC | Age: 31
End: 2021-06-04

## 2021-06-04 NOTE — TELEPHONE ENCOUNTER
Spoke with patient  DARCY  Primary care concerned for resolving right brachial plexus injury  She describes relatively sudden onset right arm pain from the shoulder to the hand 2 days prior to the seizure  The arm pain has resolved, but right hand paresthesias and pain have continued - primarily the fingers, thumb not involved  Pain reaches 5/10  No clear weakness, but has been dropping thing at times  Will have her monitor the symptoms for now  If symptoms worsen or do not continue to improve over the next 2 weeks she will let us know  Seizure  She considers the seizure provoked by possible UTI and stress  Offered to increase lamotrigine to 250 mg bid, but she prefers to stay at 225 mg bid  AEEG is scheduled to take place in a couple weeks

## 2021-06-04 NOTE — TELEPHONE ENCOUNTER
Patient calling to report a seizure that occurred 2 weeks ago  Said that she had right arm pain 2 days leading up to the seizure  Said that she still has this pain and is described as numbness and tingling from shoulder to fingertips  Was told to follow up with our office to determine if it is related to seizures or possible carpel tunnel  Seizure description: Described as an absence seizure that lasted less than a minute  Said that she was starring off to her right side  Witnessed? yes  Duration? Less than a minute  Multiple Seizures? no     Brought to the ER? No     Usual type of seizure? Yes  Sleep deprivation? No  Missed Medications? No     Recently/Currently ill (URI, UTI, infections etc ) Was taking an antibiotic for a UTI a week prior to seizure  Nitrofurantoin mono mcr 100 mg 7 days  Any new medicatons (including OTC) No    ETOH or Drug use? No    New Stressors?  No  Current Medications confirmed as:      Lamotrigine: 225 mg BID  Keppra: 1,500 mg BID    [phone]

## 2021-06-10 ENCOUNTER — CONSULT (OUTPATIENT)
Dept: CARDIOLOGY CLINIC | Facility: CLINIC | Age: 31
End: 2021-06-10
Payer: COMMERCIAL

## 2021-06-10 VITALS
OXYGEN SATURATION: 97 % | HEIGHT: 55 IN | DIASTOLIC BLOOD PRESSURE: 64 MMHG | HEART RATE: 98 BPM | BODY MASS INDEX: 54.85 KG/M2 | SYSTOLIC BLOOD PRESSURE: 110 MMHG | RESPIRATION RATE: 18 BRPM | TEMPERATURE: 98 F | WEIGHT: 237 LBS

## 2021-06-10 DIAGNOSIS — E66.01 MORBID OBESITY (HCC): Primary | ICD-10-CM

## 2021-06-10 PROCEDURE — 1036F TOBACCO NON-USER: CPT | Performed by: INTERNAL MEDICINE

## 2021-06-10 PROCEDURE — 93000 ELECTROCARDIOGRAM COMPLETE: CPT | Performed by: INTERNAL MEDICINE

## 2021-06-10 PROCEDURE — 99243 OFF/OP CNSLTJ NEW/EST LOW 30: CPT | Performed by: INTERNAL MEDICINE

## 2021-06-10 PROCEDURE — 3008F BODY MASS INDEX DOCD: CPT | Performed by: INTERNAL MEDICINE

## 2021-06-10 NOTE — PROGRESS NOTES
Consultation - Cardiology Office  [facility]  Eddye Balwinder Cook 28 y o  female MRN: [id number]  : 1990  Unit/Bed#:  Encounter: [id number]      ASSESSMENT:  Perioperative cardiac risk assessment for bariatric surgery  Patient has no cardiac symptoms like chest pain, unusual dyspnea, palpitations or syncope  She has no cardiac history of MI, CHF or cardiac arrhythmias  Her functional capacity is more than 4 Mets  Her cardiac examination is benign  She is hemodynamically stable  He EKG does not show any evidence of ischemia or prior infarction    Patient has low/acceptable perioperative cardiac risk for bariatric surgery      Morbid obesity:  BMI 54 39    Seizure disorder    Bipolar depression    Paresthesia of right upper extremity secondary to 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    Diabetes mellitus type 2      RECOMMENDATIONS:    Patient has low/acceptable perioperative cardiac risk for bariatric surgery and may proceed without any further cardiac testing or intervention          Thank you for your consultation  If you have any question please call me at [facility]      Primary Care Physician Requesting Consult: No primary care provider on file  Reason for Consult / Principal Problem:  Perioperative cardiac risk assessment        HPI :     Yanci Frost is a 28y o  year old female who was referred by primary care doctor for evaluation of perioperative cardiac risk  Patient has morbid obesity, diabetes mellitus and seizure disorder  She is going to have bariatric surgery  She has no cardiac symptoms or any could cardiac history  She is hemodynamically stable and her cardiac examination is benign  Her EKG does not show any evidence of ischemia or prior infarction    REVIEW OF SYSTEMS:      Constitutional: Negative for activity change, appetite change, chills, fatigue, fever and unexpected weight change     HENT: Negative for congestion, sore throat and trouble swallowing  Eyes: Negative for discharge and redness  Respiratory: Negative for apnea, cough, chest tightness, shortness of breath and wheezing  Cardiovascular: Negative for chest pain, shortness of breath, palpitations and leg swelling  Gastrointestinal: Negative for abdominal distention, abdominal pain, anal bleeding, blood in stool, constipation, diarrhea, nausea and vomiting  Endocrine: Negative for polydipsia, polyphagia and polyuria  Genitourinary: Negative for difficulty urinating, dysuria, flank pain and hematuria  Musculoskeletal: Negative for arthralgias, myalgias and neck stiffness  Skin: Negative for pallor and rash  Allergic/Immunologic: Negative for environmental allergies  Neurological: Negative for dizziness, syncope, light-headedness, numbness and headaches  Hematological: Negative for adenopathy  Does not bruise/bleed easily  Psychiatric/Behavioral: Negative for confusion and hallucinations  The patient is not nervous/anxious        Historical Information   Past Medical History:   Diagnosis Date    Autism     [place]'s esophagus     Female infertility     Gastric ulcer     Reflux esophagitis     Sleep apnea      Past Surgical History:   Procedure Laterality Date    EYE SURGERY       Social History     Substance and Sexual Activity   Alcohol Use Yes    Frequency: Monthly or less     Social History     Substance and Sexual Activity   Drug Use Not Currently     Social History     Tobacco Use   Smoking Status Never Smoker   Smokeless Tobacco Never Used     Family History:   Family History   Problem Relation Age of Onset    Bipolar disorder Mother     Depression Mother     Depression Father     Diabetes Maternal Grandmother     Thyroid disease Maternal Grandmother     Diabetes Maternal Grandfather     Diabetes Paternal Grandmother     Diabetes Paternal Grandfather        Meds/Allergies     Allergies   Allergen Reactions    Haloperidol Other (See Comments) Jaw locks up    Penicillins Hives    Pollen Extract Allergic Rhinitis       Current Outpatient Medications:     ARIPiprazole (ABILIFY) 5 mg tablet, Take 5 mg by mouth daily, Disp: , Rfl:     citalopram (CeleXA) 10 mg tablet, Take 10 mg by mouth daily, Disp: , Rfl:     lamoTRIgine (LaMICtal) 200 MG tablet, Take 200 mg by mouth 2 (two) times a day, Disp: , Rfl:     lamoTRIgine (LaMICtal) 25 mg tablet, Take 1 tablet (25 mg total) by mouth 2 (two) times a day, Disp: 180 tablet, Rfl: 1    levETIRAcetam (KEPPRA) 750 mg tablet, Take 2 tablets (1,500 mg total) by mouth every 12 (twelve) hours, Disp: 360 tablet, Rfl: 3    metFORMIN (GLUCOPHAGE) 850 mg tablet, Take 1 tablet (850 mg total) by mouth 2 (two) times a day with meals, Disp: 60 tablet, Rfl: 3    prazosin (MINIPRESS) 2 mg capsule, Take 2 mg by mouth daily at bedtime, Disp: , Rfl:     QUEtiapine (SEROquel) 25 mg tablet, Take 25 mg by mouth daily at bedtime, Disp: , Rfl:     Vitals:   BP is 110/64 mmHg  HR 98/Min  BP Readings from Last 3 Encounters:   05/28/21 108/70   04/16/21 128/74   03/20/21 137/70         PHYSICAL EXAMINATION:  Neurologic:  Alert & oriented x 3, no new focal deficits, Not in any acute distress,  Constitutional:  Obese  Eyes:  Pupil equal and reacting to light, conjunctiva normal, No JVP, No LNP   HENT:  Atraumatic, oropharynx moist, Neck- normal range of motion, no tenderness,  Neck supple   Respiratory:  Bilateral air entry, mostly clear to auscultation  Cardiovascular: S1-S2 regular with a I/VI systolic murmur   GI:  Soft, nondistended, normal bowel sounds, nontender, no hepatosplenomegaly appreciated  Musculoskeletal:  No edema, no tenderness, no deformities     Skin:  Well hydrated, no rash   Lymphatic:  No lymphadenopathy noted   Extremities:  No edema and distal pulses are present    Diagnostic Studies Review Cardio:      EKG:  Normal sinus rhythm, heart rate 98 per minute,    Cardiac testing:   Results for orders placed during the hospital encounter of 20   Echo complete with contrast if indicated    Narrative Emily [address]  [address] [facility]  [phone]    Transthoracic Echocardiogram  2D, M-mode, Doppler, and Color Doppler    Study date:  02-Dec-2020    Patient: Rama George  MR number: [id number]  Account number: [de-identified]  : 1990  Age: 35 years  Gender: Female  Status: Outpatient  Location: Bedside  Height: 55 in  Weight: 222 6 lb  BP: 116/ 74 mmHg    Indications: TIA    Diagnoses: G45 9 - Transient cerebral ischemic attack, unspecified    Sonographer:  KATIE Oconnor  Referring Physician:  Sarahy Porter PA-C  Group:  [facility]  Interpreting Physician:  Veronica Campoverde MD    SUMMARY    LEFT VENTRICLE:  Normal left ventricular size and systolic function with ejection fraction of about 65%  There is mild concentric left ventricular hypertrophy  Diastolic function appears normal    RIGHT VENTRICLE:  Normal right ventricular size and systolic function  TAPSE is 1 6    LEFT ATRIUM:  Normal size left atrium  No definite left atrial thrombus seen    ATRIAL SEPTUM:  No definite interatrial shunt noted    RIGHT ATRIUM:  Normal size right atrium    MITRAL VALVE:  Mitral valve leaflets are mildly thickened  There is trace to mild mitral regurgitation  No mitral stenosis seen    AORTIC VALVE:  Normal appearing trileaflet aortic valve with no appreciable stenosis or regurgitation    TRICUSPID VALVE:  There is trace tricuspid regurgitation with normal PA pressure    HISTORY: PRIOR HISTORY: Autism,Spain's esophagus,Gastric ulcer,Hypoglycemia,tachycardia,seizures  PROCEDURE: The procedure was performed at the bedside  This was a routine study  The transthoracic approach was used  The study included complete 2D imaging, M-mode, complete spectral Doppler, and color Doppler  The heart rate was 86 bpm,  at the start of the study   Images were obtained from the parasternal, apical, subcostal, and suprasternal notch acoustic windows  Image quality was adequate  LEFT VENTRICLE: Normal left ventricular size and systolic function with ejection fraction of about 65%  There is mild concentric left ventricular hypertrophy  Diastolic function appears normal    RIGHT VENTRICLE: Normal right ventricular size and systolic function  TAPSE is 1 6    LEFT ATRIUM: Normal size left atrium  No definite left atrial thrombus seen    ATRIAL SEPTUM: No definite interatrial shunt noted    RIGHT ATRIUM: Normal size right atrium    MITRAL VALVE: Mitral valve leaflets are mildly thickened  There is trace to mild mitral regurgitation  No mitral stenosis seen    AORTIC VALVE: Normal appearing trileaflet aortic valve with no appreciable stenosis or regurgitation    TRICUSPID VALVE: There is trace tricuspid regurgitation with normal PA pressure    SYSTEM MEASUREMENT TABLES    2D  EF (Teich): 62 15 %  EF (Teich): 62 15 %  %FS: 33 49 %  %FS: 33 49 %  Ao Diam: 2 84 cm  Ao Diam: 2 84 cm  EDV(Teich): 105 3 ml  EDV(Teich): 105 3 ml  ESV(Teich): 39 86 ml  ESV(Teich): 39 86 ml  IVSd: 0 97 cm  IVSd: 0 97 cm  LA Area: 16 09 cm2  LA Area: 16 09 cm2  LA Diam: 3 93 cm  LA Diam: 3 93 cm  LVIDd: 4 76 cm  LVIDd: 4 76 cm  LVIDs: 3 16 cm  LVIDs: 3 16 cm  LVOT Diam: 1 97 cm  LVOT Diam: 1 97 cm  LVPWd: 0 96 cm  LVPWd: 0 96 cm  RA Area: 11 67 cm2  RA Area: 11 67 cm2  RVIDd: 3 06 cm  RVIDd: 3 06 cm  SV (Teich): 65 45 ml  SV (Teich): 65 45 ml    PW  MV E/A Ratio: 1 07    [employer] Accredited Echocardiography Laboratory    Prepared and electronically signed by    Jeremiah Miller MD  Signed 02-Dec-2020 15:03:11           Imaging:  Chest X-Ray:   No Chest XR results available for this patient  CT-scan of the chest:     Cta Head And Neck With And Without Contrast    Result Date: 11/30/2020  Impression 1  No acute intracranial hemorrhage, mass effect or extra-axial collection   2   No hemodynamically significant stenosis, dissection or occlusion of the carotid or vertebral arteries  3   No intracranial aneurysm  No hemodynamically significant stenosis or occlusion of the major vessels of the [place]  Workstation performed: [id number]     Lab Review   Lab Results   Component Value Date    WBC 5 67 01/18/2021    HGB 10 9 (L) 01/18/2021    HCT 37 0 01/18/2021    MCV 88 01/18/2021    RDW 14 1 01/18/2021     01/18/2021     BMP:  Lab Results   Component Value Date    SODIUM 138 12/02/2020    K 3 7 12/02/2020     12/02/2020    CO2 27 12/02/2020    BUN 13 12/02/2020    CREATININE 0 65 12/02/2020    GLUC 97 12/02/2020    CALCIUM 8 5 12/02/2020    CORRECTEDCA 9 5 11/30/2020    EGFR 120 12/02/2020    MG 1 9 11/30/2020     LFT:  Lab Results   Component Value Date    AST 30 11/30/2020    ALT 22 11/30/2020    ALKPHOS 110 11/30/2020    TP 8 0 11/30/2020    ALB 3 2 (L) 11/30/2020      Lab Results   Component Value Date    NYP1OIYYOPXM 3 596 01/18/2021     No components found for: [facility]  Lab Results   Component Value Date    HGBA1C 5 4 12/01/2020     Lipid Profile:   Lab Results   Component Value Date    CHOLESTEROL 158 12/01/2020    HDL 51 12/01/2020    LDLCALC 81 12/01/2020    TRIG 132 12/01/2020     Lab Results   Component Value Date    CHOLESTEROL 158 12/01/2020     Lab Results   Component Value Date    TROPONINI <0 02 11/30/2020     No results found for: NTBNP   No results found for this or any previous visit (from the past 672 hour(s))  Dr Violeta Nichols MD, [facility]      "This note has been constructed using a voice recognition system  Therefore there may be syntax, spelling, and/or grammatical errors   Please call if you have any questions  "

## 2021-06-21 ENCOUNTER — OFFICE VISIT (OUTPATIENT)
Dept: FAMILY MEDICINE CLINIC | Facility: CLINIC | Age: 31
End: 2021-06-21
Payer: COMMERCIAL

## 2021-06-21 VITALS
OXYGEN SATURATION: 99 % | HEART RATE: 103 BPM | SYSTOLIC BLOOD PRESSURE: 102 MMHG | WEIGHT: 235 LBS | RESPIRATION RATE: 18 BRPM | BODY MASS INDEX: 54.38 KG/M2 | DIASTOLIC BLOOD PRESSURE: 64 MMHG | TEMPERATURE: 98.7 F | HEIGHT: 55 IN

## 2021-06-21 DIAGNOSIS — F84.0 AUTISM: ICD-10-CM

## 2021-06-21 DIAGNOSIS — F31.9 BIPOLAR DEPRESSION (HCC): ICD-10-CM

## 2021-06-21 DIAGNOSIS — H91.93 DECREASED HEARING OF BOTH EARS: ICD-10-CM

## 2021-06-21 DIAGNOSIS — G40.909 NONINTRACTABLE EPILEPSY WITHOUT STATUS EPILEPTICUS, UNSPECIFIED EPILEPSY TYPE (HCC): ICD-10-CM

## 2021-06-21 DIAGNOSIS — Z00.00 ANNUAL VISIT FOR GENERAL ADULT MEDICAL EXAMINATION WITHOUT ABNORMAL FINDINGS: Primary | ICD-10-CM

## 2021-06-21 DIAGNOSIS — F41.8 DEPRESSION WITH ANXIETY: ICD-10-CM

## 2021-06-21 PROCEDURE — 99395 PREV VISIT EST AGE 18-39: CPT | Performed by: FAMILY MEDICINE

## 2021-06-21 NOTE — PROGRESS NOTES
[address] [facility]    NAME: Margoth Cook  AGE: 28 y o  SEX: female  : 1990     DATE: 2021     Assessment and Plan:     1  Annual visit for general adult medical examination without abnormal findings    2  Nonintractable epilepsy without status epilepticus, unspecified epilepsy type [facility])  Had recent absence seizure and has followed up with neurology  No medications changed  EEG scheduled  3  Autism  Follows with Family [facility]ance  Referral to Behavioral therapy for Cognitive Behavioral therapy, per insurance   Referral to Social Work to coordinate need for caretaker to help with ADLs, appointments, transportation    - Ambulatory referral to [facility] for Cognitive Behavioral Therapy  - Ambulatory referral to social work care management program; Future    4  Bipolar depression ([facility] [address] )  Follows with [facility]  Referral to Behavioral therapy for Cognitive Behavioral therapy, per insurance   Referral to Social Work to coordinate need for caretaker to help with ADLs, appointments, transportation    - Ambulatory referral to [facility] for Cognitive Behavioral Therapy  - Ambulatory referral to social work care management program; Future    5  Depression with anxiety  Follows with Family [facility]ance  Referral to Behavioral therapy for Cognitive Behavioral therapy, per insurance   Referral to Social Work to coordinate need for caretaker to help with ADLs, appointments, transportation    - Ambulatory referral to [facility] for Cognitive Behavioral Therapy  - Ambulatory referral to social work care management program; Future    6  Decreased hearing of both ears  Ear exam clear, cranial nerves intact  - Ambulatory referral to Audiology; Future    Immunizations and preventive care screenings were discussed with patient today   Appropriate education was printed on patient's after visit summary  Counseling:  Alcohol/drug use: discussed moderation in alcohol intake, the recommendations for healthy alcohol use, and avoidance of illicit drug use  Dental Health: discussed importance of regular tooth brushing, flossing, and dental visits  Injury prevention: discussed safety/seat belts, safety helmets, smoke detectors, carbon dioxide detectors, and smoking near bedding or upholstery  Sexual health: discussed sexually transmitted diseases, partner selection, use of condoms, avoidance of unintended pregnancy, and contraceptive alternatives  · Exercise: the importance of regular exercise/physical activity was discussed  Recommend exercise 3-5 times per week for at least 30 minutes  No follow-ups on file  Chief Complaint:     Chief Complaint   Patient presents with    reports that she spoke with neuro and psych     was advised to continue Seroquel and Abilify for now with monitoring    needs referral for cognitive behavioral therapy    Per her CM, needs letter stating that she needs a caretaker     needs assist with transport to appts and housekeeping      History of Present Illness:     Adult Annual Physical   Patient here for a comprehensive physical exam   - discussed with neurology and psychiatry about re-adjusting seroquel and abilify since they decrease seizure threshold- pt reports they recommended continuing the medications as are right now and will continue to monitor  If seizures recur, will discontinue seroquel and consider alternative therapy  EEG was scheduled for today but has to be rescheduled due to transport issues  - pt reports her  from her insurance has recommended pt receive cognitive behavioral thera[y and also get a "caretaker" to help coordinate appointments, transportation, ADLs  Diet and Physical Activity  · Diet/Nutrition: following pre-op bariatic diet  · Exercise: no formal exercise        General Health  · Sleep: gets 4-6 hours of sleep on average  · Hearing: decreased - bilateral   · Vision: no vision problems  · Dental: regular dental visits  /GYN Health  · Last menstrual period: 6/11/2021  · Irregular menses due to polyps  Plan for polypectomy with gynecologist    · Contraceptive method: none  · History of STDs?: no      Review of Systems:     Review of Systems   Constitutional: Negative for chills and fever  HENT: Negative for ear pain and sore throat  Eyes: Negative for pain and visual disturbance  Respiratory: Negative for cough, chest tightness and shortness of breath  Cardiovascular: Negative for chest pain and palpitations  Gastrointestinal: Negative for abdominal pain, constipation, diarrhea, nausea and vomiting  Genitourinary: Negative for dysuria, hematuria and menstrual problem  Musculoskeletal: Negative for arthralgias and back pain  Skin: Negative for color change and rash  Neurological: Negative for seizures and syncope  Psychiatric/Behavioral: Negative for dysphoric mood and suicidal ideas  Past Medical History:     Past Medical History:   Diagnosis Date    Autism     [place]'s esophagus     Female infertility     Gastric ulcer     Reflux esophagitis     Sleep apnea       Past Surgical History:     Past Surgical History:   Procedure Laterality Date    EYE SURGERY        Social History:     Social History     Socioeconomic History    Marital status: Single     Spouse name: None    Number of children: None    Years of education: None    Highest education level: None   Occupational History    None   Tobacco Use    Smoking status: Never Smoker    Smokeless tobacco: Never Used   Vaping Use    Vaping Use: Never used   Substance and Sexual Activity    Alcohol use:  Yes    Drug use: Not Currently    Sexual activity: Not Currently     Partners: Male     Birth control/protection: None     Comment: Trying to conceive for 6 years now   Other Topics Concern    None   Social History Narrative    None     Social Determinants of Health     Financial Resource Strain:     Difficulty of Paying Living Expenses:    Food Insecurity:     Worried About Running Out of Food in the Last Year:     [address] in the Last Year:    Transportation Needs:     Lack of Transportation (Medical):      Lack of Transportation (Non-Medical):    Physical Activity:     Days of Exercise per Week:     Minutes of Exercise per Session:    Stress:     Feeling of Stress :    Social Connections:     Frequency of Communication with Friends and Family:     Frequency of Social Gatherings with Friends and Family:     Attends Taoism Services:     Active Member of Clubs or Organizations:     Attends Club or Organization Meetings:     Marital Status:    Intimate Partner Violence:     Fear of Current or Ex-Partner:     Emotionally Abused:     Physically Abused:     Sexually Abused:       Family History:     Family History   Problem Relation Age of Onset    Bipolar disorder Mother     Depression Mother     Depression Father     Diabetes Maternal Grandmother     Thyroid disease Maternal Grandmother     Diabetes Maternal Grandfather     Diabetes Paternal Grandmother     Diabetes Paternal Grandfather       Current Medications:     Current Outpatient Medications   Medication Sig Dispense Refill    ARIPiprazole (ABILIFY) 5 mg tablet Take 5 mg by mouth daily      citalopram (CeleXA) 10 mg tablet Take 10 mg by mouth daily      lamoTRIgine (LaMICtal) 200 MG tablet Take 200 mg by mouth 2 (two) times a day      lamoTRIgine (LaMICtal) 25 mg tablet Take 1 tablet (25 mg total) by mouth 2 (two) times a day 180 tablet 1    levETIRAcetam (KEPPRA) 750 mg tablet Take 2 tablets (1,500 mg total) by mouth every 12 (twelve) hours 360 tablet 3    metFORMIN (GLUCOPHAGE) 850 mg tablet Take 1 tablet (850 mg total) by mouth 2 (two) times a day with meals 60 tablet 3    prazosin (MINIPRESS) 2 mg capsule Take 2 mg by mouth daily at bedtime      QUEtiapine (SEROquel) 25 mg tablet Take 25 mg by mouth daily at bedtime       No current facility-administered medications for this visit  Allergies: Allergies   Allergen Reactions    Haloperidol Other (See Comments)     Jaw locks up    Penicillins Hives    Pollen Extract Allergic Rhinitis      Physical Exam:     /64 (BP Location: Left arm, Patient Position: Sitting, Cuff Size: Large)   Pulse 103   Temp 98 7 °F (37 1 °C) (Tympanic)   Resp 18   Ht 4' 7" (1 397 m)   Wt 107 kg (235 lb)   SpO2 99%   BMI 54 62 kg/m²     Physical Exam  Vitals and nursing note reviewed  Constitutional:       General: She is not in acute distress  Appearance: She is well-developed  HENT:      Head: Normocephalic and atraumatic  Right Ear: Tympanic membrane, ear canal and external ear normal  There is no impacted cerumen  Left Ear: Tympanic membrane, ear canal and external ear normal  There is no impacted cerumen  Nose: Nose normal  No rhinorrhea  Mouth/Throat:      Mouth: Mucous membranes are moist       Pharynx: Oropharynx is clear  No oropharyngeal exudate  Eyes:      Extraocular Movements: Extraocular movements intact  Conjunctiva/sclera: Conjunctivae normal       Pupils: Pupils are equal, round, and reactive to light  Cardiovascular:      Rate and Rhythm: Normal rate and regular rhythm  Heart sounds: No murmur heard  Pulmonary:      Effort: Pulmonary effort is normal  No respiratory distress  Breath sounds: Normal breath sounds  No wheezing  Abdominal:      General: Bowel sounds are normal  There is no distension  Palpations: Abdomen is soft  There is no mass  Tenderness: There is no abdominal tenderness  Hernia: No hernia is present  Musculoskeletal:         General: Normal range of motion  Cervical back: Normal range of motion and neck supple  Right lower leg: No edema  Left lower leg: No edema  Lymphadenopathy:      Cervical: No cervical adenopathy  Skin:     General: Skin is warm and dry  Capillary Refill: Capillary refill takes less than 2 seconds  Neurological:      General: No focal deficit present  Mental Status: She is alert and oriented to person, place, and time     Psychiatric:         Mood and Affect: Mood normal          Behavior: Behavior normal           Debora Farnsworth DO   [address]

## 2021-06-22 ENCOUNTER — TELEPHONE (OUTPATIENT)
Dept: BARIATRICS | Facility: CLINIC | Age: 31
End: 2021-06-22

## 2021-06-22 NOTE — PATIENT INSTRUCTIONS

## 2021-06-22 NOTE — TELEPHONE ENCOUNTER
first call made regarding missed appointment on 6/14,  patient informed of necessity to keep continuous contact with office to support her through surgical process and halting process if she's not able to proceed at this time   and SW numbers given for her to make contact by 6/21
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
## 2021-06-25 ENCOUNTER — PATIENT OUTREACH (OUTPATIENT)
Dept: FAMILY MEDICINE CLINIC | Facility: CLINIC | Age: 31
End: 2021-06-25

## 2021-06-29 ENCOUNTER — PATIENT OUTREACH (OUTPATIENT)
Dept: FAMILY MEDICINE CLINIC | Facility: CLINIC | Age: 31
End: 2021-06-29

## 2021-06-29 NOTE — PROGRESS NOTES
DANUTA RAMOS received referral from Dr Leary Call due to "Pt reports that her  with her insurance recommended that pt get a "caretaker" to coordinate her appointments, transportation and help her with ADLs at home " Dr Leary Call unsure if patient would qualify or if appropriate since patient is able to complete ADLs independently and is able to get to her appointments on her own  DANUTA RAMOS called patient ([phone]) and introduced DANUTA CM role  DANUTA RAMOS confirmed patient's address, contact information, emergency contacts and insurance  DANUTA RAMOS completed assessment with patient  Patient is single, disabled and receives about $1,219 a month in SSD  Patient stated she receives about $40 a month in SSD  Patient has a HS diploma  Patient stated she rents an apartment with her friend who she spits the rent with  Patient stated she pays about $400 a month in rent and does not receive any rental assistance  Patient has a 2nd floor apartment with a FF BRITTANY  Patient does not utilize an assistive device  Patient stated that sometimes when she is weak after a seizure she needs assistance with bathing and dressing  Patient is independent with all other ADLs  Patient stated her roommate Whitney Fan drives her to places when available  Per patient Whitney Fan works Monday, Thursday and Friday  Patient stated she has a  through [employer] but cannot remember her name or phone number  Patient considers her friend Whitney Fan and her father her supports  Patient denies any substance abuse hx and is not a   Patient stated she has PTSD, anxiety, MDD and Autism  Patient stated she sees her counselor one time a week at [facility] and sees her psychiatrist every 4-6 weeks  Patient stated no barriers to obtaining medication  DANUTA RAMOS discussed MLTSS program with patient and the application process  Patient stated she will call to apply  DANUTA RAMOS provided patient with MLTSS phone number and DANUTA RAMOS contact information   Patient had no other questions, concerns or needs  SW CM will continue to follow up regarding

## 2021-06-30 ENCOUNTER — CONSULT (OUTPATIENT)
Dept: OBGYN CLINIC | Facility: CLINIC | Age: 31
End: 2021-06-30
Payer: COMMERCIAL

## 2021-06-30 VITALS
SYSTOLIC BLOOD PRESSURE: 102 MMHG | BODY MASS INDEX: 54.38 KG/M2 | WEIGHT: 235 LBS | HEIGHT: 55 IN | DIASTOLIC BLOOD PRESSURE: 72 MMHG

## 2021-06-30 DIAGNOSIS — N84.0 ENDOMETRIAL POLYP: Primary | ICD-10-CM

## 2021-06-30 PROCEDURE — 99213 OFFICE O/P EST LOW 20 MIN: CPT | Performed by: OBSTETRICS & GYNECOLOGY

## 2021-06-30 PROCEDURE — 3008F BODY MASS INDEX DOCD: CPT | Performed by: OBSTETRICS & GYNECOLOGY

## 2021-06-30 PROCEDURE — 1036F TOBACCO NON-USER: CPT | Performed by: OBSTETRICS & GYNECOLOGY

## 2021-06-30 NOTE — PROGRESS NOTES
Assessment/Plan:    Endometrial polyp, for hysteroscopy with D&C for removal and further evaluation  Risks and benefits and anticipated postoperative course reviewed with patient  Consent reviewed and signed  Will return for H and P prior to procedure as she would like to schedule in later August when she returns from a pre planned engagement  All       Problem List Items Addressed This Visit        Genitourinary    Endometrial polyp - Primary            Subjective:      Patient ID: Porfirio Blank is a 28 y o  female  Leisa Suresh being seen today for discussion - had irregular cycles but then became regular for about 2 years - then became irregular again in Jan - had an [address] which showed small possible polyp and they tried an EBx but it was unsuccessful due to cervical stenosis - repeat US still showed small polyp - would like to have D and C for evaluation and treatment - last 2 cycles were regular and not an issue with flow or cramping - but does have some blood clots - lasts 4-6 days -  Reviewed risks and benefits of hysteroscopy with D&C for further evaluation and treatment  Reviewed the consent and the consent was signed  Patient plans to proceed with infertility treatment after this procedure with her provider in [place]  Will schedule a procedure for later in August when she has returned from a pre planned engagement  The following portions of the patient's history were reviewed and updated as appropriate:   She  has a past medical history of Autism, Spain's esophagus, Female infertility, Gastric ulcer, Reflux esophagitis, and Sleep apnea    She   Patient Active Problem List    Diagnosis Date Noted    Bipolar depression ([facility] ) 05/29/2021    [place]'s esophagus     Gastric ulcer     Sleep apnea     Reflux esophagitis     Female infertility     Autism     Infertility, female 04/19/2021    PCOS (polycystic ovarian syndrome) 02/08/2021    Endometrial polyp 02/08/2021    History of irregular menstrual bleeding 01/15/2021    TSH elevation 12/01/2020    Depression with anxiety 09/17/2020    Nonintractable epilepsy without status epilepticus ([facility] ) 09/17/2020     She  has a past surgical history that includes Eye surgery  Her family history includes Bipolar disorder in her mother; Depression in her father and mother; Diabetes in her maternal grandfather, maternal grandmother, paternal grandfather, and paternal grandmother; Thyroid disease in her maternal grandmother  She  reports that she has never smoked  She has never used smokeless tobacco  She reports current alcohol use  She reports previous drug use  Current Outpatient Medications   Medication Sig Dispense Refill    ARIPiprazole (ABILIFY) 5 mg tablet Take 5 mg by mouth daily      citalopram (CeleXA) 10 mg tablet Take 10 mg by mouth daily      lamoTRIgine (LaMICtal) 200 MG tablet Take 200 mg by mouth 2 (two) times a day      lamoTRIgine (LaMICtal) 25 mg tablet Take 1 tablet (25 mg total) by mouth 2 (two) times a day 180 tablet 1    levETIRAcetam (KEPPRA) 750 mg tablet Take 2 tablets (1,500 mg total) by mouth every 12 (twelve) hours 360 tablet 3    metFORMIN (GLUCOPHAGE) 850 mg tablet Take 1 tablet (850 mg total) by mouth 2 (two) times a day with meals 60 tablet 3    QUEtiapine (SEROquel) 25 mg tablet Take 25 mg by mouth daily at bedtime       No current facility-administered medications for this visit       Current Outpatient Medications on File Prior to Visit   Medication Sig    ARIPiprazole (ABILIFY) 5 mg tablet Take 5 mg by mouth daily    citalopram (CeleXA) 10 mg tablet Take 10 mg by mouth daily    lamoTRIgine (LaMICtal) 200 MG tablet Take 200 mg by mouth 2 (two) times a day    lamoTRIgine (LaMICtal) 25 mg tablet Take 1 tablet (25 mg total) by mouth 2 (two) times a day    levETIRAcetam (KEPPRA) 750 mg tablet Take 2 tablets (1,500 mg total) by mouth every 12 (twelve) hours    metFORMIN (GLUCOPHAGE) 850 mg tablet Take 1 tablet (850 mg total) by mouth 2 (two) times a day with meals    QUEtiapine (SEROquel) 25 mg tablet Take 25 mg by mouth daily at bedtime    [DISCONTINUED] prazosin (MINIPRESS) 2 mg capsule Take 2 mg by mouth daily at bedtime (Patient not taking: Reported on 6/30/2021)     No current facility-administered medications on file prior to visit  She is allergic to haloperidol, penicillins, and pollen extract       Review of Systems   Constitutional: Negative for chills and fever  HENT: Negative for ear pain and sore throat  Eyes: Negative for pain and visual disturbance  Respiratory: Negative for cough and shortness of breath  Cardiovascular: Negative for chest pain and palpitations  Gastrointestinal: Negative for abdominal pain and vomiting  Genitourinary: Negative for dysuria and hematuria  Musculoskeletal: Negative for arthralgias and back pain  Skin: Negative for color change and rash  Neurological: Negative for seizures and syncope  All other systems reviewed and are negative  Objective:      Ht 4' 7" (1 397 m)   Wt 107 kg (235 lb)   LMP 06/11/2021 (Exact Date)   BMI 54 62 kg/m²          Physical Exam  Constitutional:       General: She is not in acute distress  Appearance: She is obese  She is not ill-appearing  Comments: Young white female    HENT:      Head: Normocephalic and atraumatic  Neurological:      General: No focal deficit present  Mental Status: She is alert and oriented to person, place, and time     Psychiatric:         Mood and Affect: Mood normal          Behavior: Behavior normal

## 2021-07-01 ENCOUNTER — PREP FOR PROCEDURE (OUTPATIENT)
Dept: BARIATRICS | Facility: CLINIC | Age: 31
End: 2021-07-01

## 2021-07-01 ENCOUNTER — OFFICE VISIT (OUTPATIENT)
Dept: BARIATRICS | Facility: CLINIC | Age: 31
End: 2021-07-01

## 2021-07-01 VITALS — BODY MASS INDEX: 54.25 KG/M2 | TEMPERATURE: 97.5 F | WEIGHT: 233.4 LBS

## 2021-07-01 DIAGNOSIS — E66.01 MORBID (SEVERE) OBESITY DUE TO EXCESS CALORIES (HCC): Primary | ICD-10-CM

## 2021-07-01 DIAGNOSIS — Z98.84 BARIATRIC SURGERY STATUS: Primary | ICD-10-CM

## 2021-07-01 DIAGNOSIS — Z11.59 SPECIAL SCREENING EXAMINATION FOR UNSPECIFIED VIRAL DISEASE: ICD-10-CM

## 2021-07-01 PROCEDURE — RECHECK

## 2021-07-01 NOTE — PROGRESS NOTES
Patient presents to weight check 2 of 6 with a slight weight gain  She reports some mood and sleep disturbance due to the passing of her great grandmother and the grief she is experiencing  Patient's appetite has been poor, she hasn't been eating much and sleep has bee disrupted and fitful  She has done some meal planning, focusing mostly on lunch and dinner, still higher in carbs at times  She skips breakfast at times due to no appetite or will have a protein shake, she doesn't really like breakfast foods  DANUTA suggested having whatever food she enjoys as long as its protein, pointing out the impact on metabolism and eating done later in the evening may impact morning appetite  Patient reviewed foods she enjoys and healthiest versions such as sandwiches and fish, along with portion control  She is using smaller plates, DANUTA suggested she reference manual regarding that and food choices, will follow up with RD at next visit  Patient is seeing her therapist and discussing coping skills to manage her grief through talking with others and switching up activities  She has worked on allowing herself a limited time on some days to grieve and think of her sadness but then making herself get up and do some type of activity  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

## 2021-07-13 ENCOUNTER — PATIENT OUTREACH (OUTPATIENT)
Dept: FAMILY MEDICINE CLINIC | Facility: CLINIC | Age: 31
End: 2021-07-13

## 2021-07-13 NOTE — PROGRESS NOTES
DANUTA RAMOS called patient ([phone]) to follow up regarding patient applying for MLTSS program  Patient stated that she has not applied and had 'a lot' going on  DANUTA RAMOS reviewed MLTSS process with patient  Per patient, she will apply on her own  DANUTA RAMOS will continue to follow up regarding and encouraged patient to call DANUTA RAMOS for any future needs

## 2021-07-14 ENCOUNTER — TELEPHONE (OUTPATIENT)
Dept: NEUROLOGY | Facility: CLINIC | Age: 31
End: 2021-07-14

## 2021-07-14 NOTE — TELEPHONE ENCOUNTER
THE [facility] for patient to [facility] and confirm appointment with Dr Yoanna Tai  Gave direct numbers in [place]

## 2021-07-22 NOTE — PROGRESS NOTES
Bariatric Nutrition Follow-up Note    Virtual visit:  i  Name was verified by patient stating name? yes  ii   verified by patient stating? yes  iii  You verified the patient is alone? yes  iv  I would like to verify that you were offered a live visit but are now consenting to this telephone visit? yes  v  This visit is free yes      Type of surgery    Preop (6 months wt checks)---3 of 6 wt check today  Surgery Date: TBD  Surgeon: TBD--pt still needs to see the surgeon    Nutrition Assessment   Margoth Cook  28 y o   female     Wt with BMI of 25: 108lbs  Pre-Op Excess Wt: 123 8lbs  Height 4' 7" (1 397 m), weight 105 kg (232 lb), not currently breastfeeding  Body mass index is 53 92 kg/m²  Weight stable     Review of History and Medications   · Tested in  for sleep apnea--did home study while in [place] and found to have GARIMA  Used CPAP at that time, but doesn't have the CPAP any more because insurance took it away when she lost her insurance when she moved out of [place]    · PCOS  · Depression  · Per pt Pre-e DM started on Metformin in 2021  · H/o of LINA--just eating higher iron foods, no supplements  · GERD with Spain's esophagus    Labs:  TSH & CBC checked in 2021--may need repeat closer to sx  CBC, BMP, lipdis & AC1 checked in Dec 2020--may need repeat (and CMP) closer to sx    Past Medical History:   Diagnosis Date    Autism     [place]'s esophagus     Female infertility     Gastric ulcer     Reflux esophagitis     Sleep apnea      Past Surgical History:   Procedure Laterality Date    EYE SURGERY       Social History     Socioeconomic History    Marital status: Single     Spouse name: Not on file    Number of children: Not on file    Years of education: Not on file    Highest education level: Not on file   Occupational History    Not on file   Tobacco Use    Smoking status: Never Smoker    Smokeless tobacco: Never Used   Vaping Use    Vaping Use: Never used   Substance and Sexual Activity    Alcohol use: Yes    Drug use: Not Currently    Sexual activity: Not Currently     Partners: Male     Birth control/protection: None     Comment: Trying to conceive for 6 years now   Other Topics Concern    Not on file   Social History Narrative    Not on file     Social Determinants of Health     Financial Resource Strain: Low Risk     Difficulty of Paying Living Expenses: Not very hard   Food Insecurity:     Worried About [address] [employer] [address] in the Last Year:     [facility] in the Last Year:    Transportation Needs: No Transportation Needs    Lack of Transportation (Medical): No    Lack of Transportation (Non-Medical):  No   Physical Activity:     Days of Exercise per Week:     Minutes of Exercise per Session:    Stress:     Feeling of Stress :    Social Connections:     Frequency of Communication with Friends and Family:     Frequency of Social Gatherings with Friends and Family:     Attends Buddhism Services:     Active Member of Clubs or Organizations:     Attends Club or Organization Meetings:     Marital Status:    Intimate Partner Violence:     Fear of Current or Ex-Partner:     Emotionally Abused:     Physically Abused:     Sexually Abused:        Current Outpatient Medications:     ARIPiprazole (ABILIFY) 5 mg tablet, Take 5 mg by mouth daily, Disp: , Rfl:     citalopram (CeleXA) 10 mg tablet, Take 10 mg by mouth daily, Disp: , Rfl:     lamoTRIgine (LaMICtal) 200 MG tablet, Take 200 mg by mouth 2 (two) times a day, Disp: , Rfl:     lamoTRIgine (LaMICtal) 25 mg tablet, Take 1 tablet (25 mg total) by mouth 2 (two) times a day, Disp: 180 tablet, Rfl: 1    levETIRAcetam (KEPPRA) 750 mg tablet, Take 2 tablets (1,500 mg total) by mouth every 12 (twelve) hours, Disp: 360 tablet, Rfl: 3    metFORMIN (GLUCOPHAGE) 850 mg tablet, Take 1 tablet (850 mg total) by mouth 2 (two) times a day with meals, Disp: 60 tablet, Rfl: 3    QUEtiapine (SEROquel) 25 mg tablet, Take 25 mg by mouth daily at bedtime, Disp: , Rfl:    patient has not yet started her MVI and vitamin D    Food Intake and Lifestyle Assessment -updates in bold  Food Intake Assessment completed via usual diet recall  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), Sat/Sun, 9-10am  Breakfast: still continues to skip most days, using Ensure Max protein shake 3-5 days a week  Snack: none   Lunch: yesterday-had sandwich with roasted turkey, 1/2 slice of cheese on white bread  times varies b/w 1-3pm:  Usually fast food-[employer] or [employer] (cheese burger w/fries and soda) or subway  If home will make mac & cheese or hot dog or burger or sandwiches or Rik did not eat lunch yesterday because she was not feeling well; grilled chicken with asparagus or pizza sauce on whole wheat libia bread, water  Snack:none  Dinner: BBQ chicken (in crock pot) with mixed vegetables or a fruit; air fried chicken with sweet potato fries  Snack: ice cream and/or cookies granola bar or handful of roasted peanuts  Beverage intake: water, whole milk, regular soda and alcohol (very rare)  Protein supplement: none using Ensure Max  Estimated protein intake per day: 50-70gm  Estimated fluid intake per day: used to be 2L soda per day--now cut it out,  48oz water per day,  8oz lactiad milk  no coffee, very rare wine  Meals eaten away from home: At least once a day is usually fast food has not had fast food in 1 month  Typical meal pattern: 3 meals per day and 1-2 snacks per day  Eating Behaviors: Consumption of high calorie/ high fat foods, Consumption of high calorie beverages, Large portion sizes, Frequent snacking/ grazing, Mindless eating and Craves sweet foods    Food allergies or intolerances:    Allergies   Allergen Reactions    Haloperidol Other (See Comments)     Jaw locks up    Penicillins Hives    Pollen Extract Allergic Rhinitis     Cultural or Baptism considerations: none    Physical Assessment  Physical Activity  Types of exercise: walking 5-6 days a week (AM 15-20 minutes, PM 30-60 minutes)  Current physical limitations: h/o seizures (had since childhood, but controlled with meds)    Psychosocial Assessment   Support systems: parent(s) friend(s) relative(s)  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    Nutrition Diagnosis  Diagnosis: Overweight / Obesity (NC-3 3)  Related to: Physical inactivity and Excessive energy intake  As Evidenced by: BMI >25     Nutrition Prescription: Recommend the following diet  Regular    Interventions and Teaching   Discussed pre-op and post-op nutrition guidelines  Patient educated and handouts provided    Surgical changes to stomach / GI  Capacity of post-surgery stomach  Diet progression  Adequate hydration  Expected weight loss  Weight loss plateaus/ possibility of weight regain  Exercise  Suggestions for pre-op diet  Nutrition considerations after surgery  Protein supplements  Meal planning and preparation  Appropriate carbohydrate, protein, and fat intake, and food/fluid choices to maximize safe weight loss, nutrient intake, and tolerance   Dietary and lifestyle changes  Possible problems with poor eating habits  Intuitive eating  Techniques for self monitoring and keeping daily food journal    Education provided to: patient    Barriers to learning: No barriers identified  Readiness to change: preparation    Prior research on procedure: books, internet and friends or family    Comprehension: verbalizes understanding     Expected Compliance: good      Evaluation / Monitoring  Dietitian to Monitor: Eating pattern as discussed Tolerance of nutrition prescription Body weight Lab values Physical activity    Workflow:   Psych and/or D+A Clearance: N/A   Bloodwork: will get done after 3rd wt check in July   PCP Letter: going to PCP 5/28/21   Support Group: not complete   Weight Loss: Do Not Gain weight, Lose about 5%: -11 6lbs (220 2lbs)   Nicotine test: N/A   6 Month Pre-Operative Program: 3 of 6 today   EGD: scheduled for 8/6/2021   Cardiac Risk Assessment: complete 6/16/2021   Sleep Studies: scheduled for 8/3/2021    Patient has started to include a protein shake for breakfast, encouraged her to do so daily  She appears to have decreased the dining out and increased her walking  Encouraged patient to begin measuring portions and food journaling to support greater pre-op weight loss  Reviewed 30/60 rule and recommend that patient begin to practice  Reviewed workflow and ordered updated blood work for patient to complete  Goals  1  Continue with a protein shake for breakfast  2  Practice 30/60 rule daily  3    Food journal [place] 4 days per week includes measuring portions    Time Spent:   30 mins

## 2021-07-26 ENCOUNTER — TELEPHONE (OUTPATIENT)
Dept: GASTROENTEROLOGY | Facility: AMBULARY SURGERY CENTER | Age: 31
End: 2021-07-26

## 2021-07-27 ENCOUNTER — OFFICE VISIT (OUTPATIENT)
Dept: BARIATRICS | Facility: CLINIC | Age: 31
End: 2021-07-27

## 2021-07-27 VITALS — HEIGHT: 55 IN | BODY MASS INDEX: 53.69 KG/M2 | WEIGHT: 232 LBS

## 2021-07-27 DIAGNOSIS — Z01.812 PRE-PROCEDURAL LABORATORY EXAMINATION: Primary | ICD-10-CM

## 2021-07-27 DIAGNOSIS — E66.01 MORBID (SEVERE) OBESITY DUE TO EXCESS CALORIES (HCC): Primary | ICD-10-CM

## 2021-07-27 DIAGNOSIS — E66.01 MORBID (SEVERE) OBESITY DUE TO EXCESS CALORIES (HCC): ICD-10-CM

## 2021-07-27 PROCEDURE — RECHECK

## 2021-07-27 PROCEDURE — 3008F BODY MASS INDEX DOCD: CPT | Performed by: OBSTETRICS & GYNECOLOGY

## 2021-07-27 PROCEDURE — RECHECK: Performed by: DIETITIAN, REGISTERED

## 2021-07-28 ENCOUNTER — TELEPHONE (OUTPATIENT)
Dept: PREADMISSION TESTING | Facility: HOSPITAL | Age: 31
End: 2021-07-28

## 2021-07-28 NOTE — PRE-PROCEDURE INSTRUCTIONS
Pre-Surgery Instructions:   Medication Instructions    ARIPiprazole (ABILIFY) 5 mg tablet Patient was instructed by Physician and understands   citalopram (CeleXA) 10 mg tablet Patient was instructed by Physician and understands   lamoTRIgine (LaMICtal) 200 MG tablet Patient was instructed by Physician and understands   lamoTRIgine (LaMICtal) 25 mg tablet Patient was instructed by Physician and understands   levETIRAcetam (KEPPRA) 750 mg tablet Instructed patient per Anesthesia Guidelines   metFORMIN (GLUCOPHAGE) 850 mg tablet Patient was instructed by Physician and understands   QUEtiapine (SEROquel) 25 mg tablet Patient was instructed by Physician and understands  Pt to take keppra a m of procedure

## 2021-07-31 PROCEDURE — 87635 SARS-COV-2 COVID-19 AMP PRB: CPT | Performed by: OBSTETRICS & GYNECOLOGY

## 2021-08-03 ENCOUNTER — HOSPITAL ENCOUNTER (EMERGENCY)
Facility: HOSPITAL | Age: 31
Discharge: HOME | End: 2021-08-03
Attending: EMERGENCY MEDICINE | Admitting: EMERGENCY MEDICINE
Payer: COMMERCIAL

## 2021-08-03 ENCOUNTER — APPOINTMENT (EMERGENCY)
Dept: RADIOLOGY | Facility: HOSPITAL | Age: 31
End: 2021-08-03
Payer: COMMERCIAL

## 2021-08-03 VITALS
SYSTOLIC BLOOD PRESSURE: 118 MMHG | OXYGEN SATURATION: 96 % | DIASTOLIC BLOOD PRESSURE: 78 MMHG | HEART RATE: 100 BPM | RESPIRATION RATE: 16 BRPM | TEMPERATURE: 99.2 F

## 2021-08-03 DIAGNOSIS — J20.9 ACUTE BRONCHITIS: ICD-10-CM

## 2021-08-03 DIAGNOSIS — B34.9 VIRAL SYNDROME: Primary | ICD-10-CM

## 2021-08-03 PROBLEM — R50.9 FEVER: Status: ACTIVE | Noted: 2021-08-03

## 2021-08-03 PROBLEM — R65.10 SIRS (SYSTEMIC INFLAMMATORY RESPONSE SYNDROME) (HCC): Status: ACTIVE | Noted: 2021-08-03

## 2021-08-03 LAB
ALBUMIN SERPL BCP-MCNC: 3.1 G/DL (ref 3.5–5)
ALP SERPL-CCNC: 107 U/L (ref 46–116)
ALT SERPL W P-5'-P-CCNC: 30 U/L (ref 12–78)
AMPHETAMINES SERPL QL SCN: NEGATIVE
ANION GAP SERPL CALCULATED.3IONS-SCNC: 13 MMOL/L (ref 4–13)
AST SERPL W P-5'-P-CCNC: 23 U/L (ref 5–45)
BARBITURATES UR QL: NEGATIVE
BASOPHILS # BLD AUTO: 0.05 THOUSANDS/ΜL (ref 0–0.1)
BASOPHILS NFR BLD AUTO: 1 % (ref 0–1)
BENZODIAZ UR QL: NEGATIVE
BILIRUB SERPL-MCNC: 0.19 MG/DL (ref 0.2–1)
BILIRUB UR QL STRIP: NEGATIVE
BUN SERPL-MCNC: 16 MG/DL (ref 5–25)
CALCIUM ALBUM COR SERPL-MCNC: 9 MG/DL (ref 8.3–10.1)
CALCIUM SERPL-MCNC: 8.3 MG/DL (ref 8.3–10.1)
CHLORIDE SERPL-SCNC: 99 MMOL/L (ref 100–108)
CLARITY UR: NORMAL
CO2 SERPL-SCNC: 24 MMOL/L (ref 21–32)
COCAINE UR QL: NEGATIVE
COLOR UR: NORMAL
CREAT SERPL-MCNC: 0.9 MG/DL (ref 0.6–1.3)
EOSINOPHIL # BLD AUTO: 0.06 THOUSAND/ΜL (ref 0–0.61)
EOSINOPHIL NFR BLD AUTO: 1 % (ref 0–6)
ERYTHROCYTE [DISTWIDTH] IN BLOOD BY AUTOMATED COUNT: 14.5 % (ref 11.6–15.1)
ETHANOL SERPL-MCNC: <3 MG/DL (ref 0–3)
GFR SERPL CREATININE-BSD FRML MDRD: 86 ML/MIN/1.73SQ M
GLUCOSE SERPL-MCNC: 99 MG/DL (ref 65–140)
GLUCOSE UR STRIP-MCNC: NEGATIVE MG/DL
HCT VFR BLD AUTO: 34.7 % (ref 34.8–46.1)
HGB BLD-MCNC: 10.6 G/DL (ref 11.5–15.4)
HGB UR QL STRIP.AUTO: NEGATIVE
IMM GRANULOCYTES # BLD AUTO: 0.02 THOUSAND/UL (ref 0–0.2)
IMM GRANULOCYTES NFR BLD AUTO: 0 % (ref 0–2)
KETONES UR STRIP-MCNC: NEGATIVE MG/DL
LEUKOCYTE ESTERASE UR QL STRIP: NEGATIVE
LYMPHOCYTES # BLD AUTO: 0.68 THOUSANDS/ΜL (ref 0.6–4.47)
LYMPHOCYTES NFR BLD AUTO: 15 % (ref 14–44)
MAGNESIUM SERPL-MCNC: 1.8 MG/DL (ref 1.6–2.6)
MCH RBC QN AUTO: 24.9 PG (ref 26.8–34.3)
MCHC RBC AUTO-ENTMCNC: 30.5 G/DL (ref 31.4–37.4)
MCV RBC AUTO: 82 FL (ref 82–98)
METHADONE UR QL: NEGATIVE
MONOCYTES # BLD AUTO: 0.39 THOUSAND/ΜL (ref 0.17–1.22)
MONOCYTES NFR BLD AUTO: 9 % (ref 4–12)
NEUTROPHILS # BLD AUTO: 3.34 THOUSANDS/ΜL (ref 1.85–7.62)
NEUTS SEG NFR BLD AUTO: 74 % (ref 43–75)
NITRITE UR QL STRIP: NEGATIVE
NRBC BLD AUTO-RTO: 0 /100 WBCS
OPIATES UR QL SCN: NEGATIVE
OXYCODONE+OXYMORPHONE UR QL SCN: NEGATIVE
PCP UR QL: NEGATIVE
PH UR STRIP.AUTO: 6 [PH]
PLATELET # BLD AUTO: 353 THOUSANDS/UL (ref 149–390)
PMV BLD AUTO: 9.1 FL (ref 8.9–12.7)
POTASSIUM SERPL-SCNC: 3.5 MMOL/L (ref 3.5–5.3)
PROT SERPL-MCNC: 7.5 G/DL (ref 6.4–8.2)
PROT UR STRIP-MCNC: NEGATIVE MG/DL
RBC # BLD AUTO: 4.25 MILLION/UL (ref 3.81–5.12)
SARS-COV-2 RNA RESP QL NAA+PROBE: NEGATIVE
SODIUM SERPL-SCNC: 136 MMOL/L (ref 136–145)
SP GR UR STRIP.AUTO: 1.01 (ref 1–1.03)
THC UR QL: POSITIVE
UROBILINOGEN UR QL STRIP.AUTO: 0.2 E.U./DL
WBC # BLD AUTO: 4.54 THOUSAND/UL (ref 4.31–10.16)

## 2021-08-03 PROCEDURE — 80053 COMPREHEN METABOLIC PANEL: CPT | Performed by: EMERGENCY MEDICINE

## 2021-08-03 PROCEDURE — 99285 EMERGENCY DEPT VISIT HI MDM: CPT | Performed by: EMERGENCY MEDICINE

## 2021-08-03 PROCEDURE — 93005 ELECTROCARDIOGRAM TRACING: CPT

## 2021-08-03 PROCEDURE — 80307 DRUG TEST PRSMV CHEM ANLYZR: CPT | Performed by: EMERGENCY MEDICINE

## 2021-08-03 PROCEDURE — 82077 ASSAY SPEC XCP UR&BREATH IA: CPT | Performed by: EMERGENCY MEDICINE

## 2021-08-03 PROCEDURE — 80177 DRUG SCRN QUAN LEVETIRACETAM: CPT | Performed by: EMERGENCY MEDICINE

## 2021-08-03 PROCEDURE — 96361 HYDRATE IV INFUSION ADD-ON: CPT

## 2021-08-03 PROCEDURE — 85025 COMPLETE CBC W/AUTO DIFF WBC: CPT | Performed by: EMERGENCY MEDICINE

## 2021-08-03 PROCEDURE — 96374 THER/PROPH/DIAG INJ IV PUSH: CPT

## 2021-08-03 PROCEDURE — 83735 ASSAY OF MAGNESIUM: CPT | Performed by: EMERGENCY MEDICINE

## 2021-08-03 PROCEDURE — U0003 INFECTIOUS AGENT DETECTION BY NUCLEIC ACID (DNA OR RNA); SEVERE ACUTE RESPIRATORY SYNDROME CORONAVIRUS 2 (SARS-COV-2) (CORONAVIRUS DISEASE [COVID-19]), AMPLIFIED PROBE TECHNIQUE, MAKING USE OF HIGH THROUGHPUT TECHNOLOGIES AS DESCRIBED BY CMS-2020-01-R: HCPCS | Performed by: EMERGENCY MEDICINE

## 2021-08-03 PROCEDURE — 36415 COLL VENOUS BLD VENIPUNCTURE: CPT | Performed by: EMERGENCY MEDICINE

## 2021-08-03 PROCEDURE — 99284 EMERGENCY DEPT VISIT MOD MDM: CPT

## 2021-08-03 PROCEDURE — 94640 AIRWAY INHALATION TREATMENT: CPT

## 2021-08-03 PROCEDURE — 71045 X-RAY EXAM CHEST 1 VIEW: CPT

## 2021-08-03 PROCEDURE — 81003 URINALYSIS AUTO W/O SCOPE: CPT | Performed by: EMERGENCY MEDICINE

## 2021-08-03 PROCEDURE — U0005 INFEC AGEN DETEC AMPLI PROBE: HCPCS | Performed by: EMERGENCY MEDICINE

## 2021-08-03 RX ORDER — BENZONATATE 100 MG/1
100 CAPSULE ORAL EVERY 8 HOURS
Qty: 30 CAPSULE | Refills: 0 | Status: SHIPPED | OUTPATIENT
Start: 2021-08-03 | End: 2021-08-20 | Stop reason: SDDI

## 2021-08-03 RX ORDER — ALBUTEROL SULFATE 90 UG/1
2 AEROSOL, METERED RESPIRATORY (INHALATION) ONCE
Status: COMPLETED | OUTPATIENT
Start: 2021-08-03 | End: 2021-08-03

## 2021-08-03 RX ORDER — HYDROCODONE POLISTIREX AND CHLORPHENIRAMINE POLISTIREX 10; 8 MG/5ML; MG/5ML
5 SUSPENSION, EXTENDED RELEASE ORAL EVERY 12 HOURS PRN
Status: DISCONTINUED | OUTPATIENT
Start: 2021-08-03 | End: 2021-08-04 | Stop reason: HOSPADM

## 2021-08-03 RX ORDER — PROMETHAZINE HYDROCHLORIDE AND CODEINE PHOSPHATE 6.25; 1 MG/5ML; MG/5ML
5 SYRUP ORAL EVERY 4 HOURS PRN
Qty: 120 ML | Refills: 0 | Status: SHIPPED | OUTPATIENT
Start: 2021-08-03 | End: 2021-08-13

## 2021-08-03 RX ORDER — PREDNISONE 50 MG/1
50 TABLET ORAL DAILY
Qty: 5 TABLET | Refills: 0 | Status: SHIPPED | OUTPATIENT
Start: 2021-08-03 | End: 2021-08-08

## 2021-08-03 RX ORDER — ALBUTEROL SULFATE 90 UG/1
2 AEROSOL, METERED RESPIRATORY (INHALATION) EVERY 4 HOURS PRN
Qty: 18 G | Refills: 0 | Status: ON HOLD | OUTPATIENT
Start: 2021-08-03 | End: 2021-09-22 | Stop reason: ALTCHOICE

## 2021-08-03 RX ORDER — METHYLPREDNISOLONE SODIUM SUCCINATE 125 MG/2ML
60 INJECTION, POWDER, LYOPHILIZED, FOR SOLUTION INTRAMUSCULAR; INTRAVENOUS ONCE
Status: COMPLETED | OUTPATIENT
Start: 2021-08-03 | End: 2021-08-03

## 2021-08-03 RX ADMIN — IPRATROPIUM BROMIDE 0.5 MG: 0.5 SOLUTION RESPIRATORY (INHALATION) at 21:05

## 2021-08-03 RX ADMIN — ALBUTEROL SULFATE 2 PUFF: 90 AEROSOL, METERED RESPIRATORY (INHALATION) at 21:06

## 2021-08-03 RX ADMIN — HYDROCODONE POLISTIREX AND CHLORPHENIRAMINE POLISITREX 5 ML: 10; 8 SUSPENSION, EXTENDED RELEASE ORAL at 21:06

## 2021-08-03 RX ADMIN — METHYLPREDNISOLONE SODIUM SUCCINATE 60 MG: 125 INJECTION, POWDER, FOR SOLUTION INTRAMUSCULAR; INTRAVENOUS at 21:06

## 2021-08-03 RX ADMIN — SODIUM CHLORIDE 1000 ML: 0.9 INJECTION, SOLUTION INTRAVENOUS at 18:55

## 2021-08-03 NOTE — ASSESSMENT & PLAN NOTE
Patient was diagnosed with depression in ____  Patient stated she has been stable on current home medical regimen  Follows-up with _____      · Continue home Abilify 5mg  · Continue home Celexa 10mg  · Continue home Seroquel 25mg

## 2021-08-03 NOTE — ASSESSMENT & PLAN NOTE
Patient states her temperature was a high of 103 with relief from Tylenol  Patient's current temp is 100 7°      · Tylenol 650 mg PRN  · Cold compress  · Maintenance fluids  · Monitor and replete electrolytes as needed

## 2021-08-03 NOTE — ASSESSMENT & PLAN NOTE
Patient stated she has had a temperature as high as 103 today, was relived with tylenol  In the ED patient's intial temp was 100 7° and her pulse was 115  Patient states she has also had a non-productive cough x1 day  COVID negative       · Blood cultures x2  · Chest x-ray  · Start patient on pip/tazo  · UA with reflex to culture

## 2021-08-04 ENCOUNTER — TELEPHONE (OUTPATIENT)
Dept: FAMILY MEDICINE CLINIC | Facility: CLINIC | Age: 31
End: 2021-08-04

## 2021-08-04 DIAGNOSIS — G40.909 NONINTRACTABLE EPILEPSY WITHOUT STATUS EPILEPTICUS, UNSPECIFIED EPILEPSY TYPE (HCC): Primary | ICD-10-CM

## 2021-08-04 DIAGNOSIS — J20.9 ACUTE BRONCHITIS, UNSPECIFIED ORGANISM: Primary | ICD-10-CM

## 2021-08-04 LAB
ATRIAL RATE: 109 BPM
P AXIS: 44 DEGREES
PR INTERVAL: 122 MS
QRS AXIS: 21 DEGREES
QRSD INTERVAL: 80 MS
QT INTERVAL: 324 MS
QTC INTERVAL: 436 MS
T WAVE AXIS: 8 DEGREES
VENTRICULAR RATE: 109 BPM

## 2021-08-04 PROCEDURE — 93010 ELECTROCARDIOGRAM REPORT: CPT | Performed by: INTERNAL MEDICINE

## 2021-08-04 RX ORDER — LAMOTRIGINE 200 MG/1
200 TABLET ORAL 2 TIMES DAILY
Qty: 60 TABLET | Refills: 11 | Status: SHIPPED | OUTPATIENT
Start: 2021-08-04 | End: 2021-11-11 | Stop reason: SDUPTHER

## 2021-08-04 NOTE — TELEPHONE ENCOUNTER
Patient calling to say that [facility] does not have the patient's lamotrigine 200 mg tabs on file and needs a new script  Please sign if agreeable

## 2021-08-04 NOTE — TELEPHONE ENCOUNTER
Patient was seen in the ER last night and said she was diagnosed with bronchitis  She was told to call us for albuterol neb solution refills  - she has a nebulizer at home already

## 2021-08-04 NOTE — ASSESSMENT & PLAN NOTE
Patient was diagnosed and confirmed to have PCOS in 4/21  Patient was previously counseled on the importance of a menstrual period every 3 months and various weight loss strategies was discussed  Patient was cautioned on metabolic diseases and was started on metformin  · Continue home Metformin 850mg

## 2021-08-04 NOTE — TELEPHONE ENCOUNTER
[facility]  Let me know if you wanted it sent somewhere else or if we should be sending a different pills size

## 2021-08-04 NOTE — ED PROVIDER NOTES
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  Her symptoms started after she babysat the child  Patient is having cough productive of white and yellow sputum  Patient feels chest tightness  Patient is not a smoker  Patient came to the ED for further evaluation  Patient took Tylenol prior to arrival to the ED  T-max at home was 103 4  History provided by:  Patient  Fever - 9 weeks to 74 years  Associated symptoms: cough    Associated symptoms: no chest pain, no chills, no confusion, no congestion, no diarrhea, no dysuria, no ear pain, no headaches, no nausea and no rash    Cough  Associated symptoms: fever and shortness of breath    Associated symptoms: no chest pain, no chills, no ear pain, no eye discharge, no headaches, no rash and no wheezing    Syncope  Associated symptoms: fever and shortness of breath    Associated symptoms: no chest pain, no confusion, no dizziness, no headaches, no nausea, no palpitations and no weakness        Prior to Admission Medications   Prescriptions Last Dose Informant Patient Reported? Taking?    ARIPiprazole (ABILIFY) 5 mg tablet   Yes Yes   Sig: Take 5 mg by mouth daily   QUEtiapine (SEROquel) 25 mg tablet   Yes Yes   Sig: Take 50 mg by mouth daily at bedtime    citalopram (CeleXA) 10 mg tablet   Yes Yes   Sig: Take 10 mg by mouth daily   lamoTRIgine (LaMICtal) 25 mg tablet Not Taking at Unknown time  No No   Sig: Take 1 tablet (25 mg total) by mouth 2 (two) times a day   Patient not taking: Reported on 8/3/2021   levETIRAcetam (KEPPRA) 750 mg tablet   No Yes   Sig: Take 2 tablets (1,500 mg total) by mouth every 12 (twelve) hours   Patient taking differently: Take 1,500 mg by mouth every 12 (twelve) hours    metFORMIN (GLUCOPHAGE) 850 mg tablet   No Yes   Sig: Take 1 tablet (850 mg total) by mouth 2 (two) times a day with meals      Facility-Administered Medications: None       Past Medical History:   Diagnosis Date    Autism     [place]'s esophagus     Female infertility     Gastric ulcer     Reflux esophagitis     Sleep apnea        Past Surgical History:   Procedure Laterality Date    EGD      with Bx due to barretts esophagus    EYE SURGERY Bilateral     lazy eye repair       Family History   Problem Relation Age of Onset    Bipolar disorder Mother     Depression Mother     Depression Father     Diabetes Maternal Grandmother     Thyroid disease Maternal Grandmother     Diabetes Maternal Grandfather     Diabetes Paternal Grandmother     Diabetes Paternal Grandfather      I have reviewed and agree with the history as documented  E-Cigarette/Vaping    E-Cigarette Use Never User      E-Cigarette/Vaping Substances    Nicotine No     THC No     CBD No      Social History     Tobacco Use    Smoking status: Never Smoker    Smokeless tobacco: Never Used   Vaping Use    Vaping Use: Never used   Substance Use Topics    Alcohol use: Yes     Comment: occ    Drug use: Not Currently       Review of Systems   Constitutional: Positive for fever  Negative for activity change, appetite change and chills  HENT: Negative for congestion and ear pain  Eyes: Negative for pain and discharge  Respiratory: Positive for cough, chest tightness and shortness of breath  Negative for wheezing and stridor  Cardiovascular: Positive for syncope  Negative for chest pain and palpitations  Gastrointestinal: Negative for abdominal distention, abdominal pain, constipation, diarrhea and nausea  Endocrine: Negative for cold intolerance  Genitourinary: Negative for dysuria, frequency and urgency  Musculoskeletal: Negative for arthralgias and back pain  Skin: Negative for color change and rash  Allergic/Immunologic: Negative for environmental allergies and food allergies  Neurological: Negative for dizziness, weakness, numbness and headaches  Hematological: Negative for adenopathy  Psychiatric/Behavioral: Negative for agitation, behavioral problems and confusion  The patient is not nervous/anxious  All other systems reviewed and are negative  Physical Exam  Physical Exam  Vitals and nursing note reviewed  Constitutional:       Appearance: She is well-developed  HENT:      Head: Normocephalic and atraumatic  Eyes:      Conjunctiva/sclera: Conjunctivae normal    Cardiovascular:      Rate and Rhythm: Regular rhythm  Tachycardia present  Heart sounds: Normal heart sounds  Pulmonary:      Effort: Pulmonary effort is normal       Breath sounds: Normal breath sounds  Comments: Patient is mildly tachypneic  Lungs are clear to auscultation bilateral   Abdominal:      General: Bowel sounds are normal  There is no distension  Palpations: Abdomen is soft  Tenderness: There is no abdominal tenderness  Musculoskeletal:         General: Normal range of motion  Cervical back: Normal range of motion and neck supple  Skin:     General: Skin is warm and dry  Neurological:      Mental Status: She is alert and oriented to person, place, and time  Psychiatric:         Behavior: Behavior normal          Thought Content:  Thought content normal          Judgment: Judgment normal          Vital Signs  ED Triage Vitals   Temperature Pulse Respirations Blood Pressure SpO2   08/03/21 1802 08/03/21 1802 08/03/21 1802 08/03/21 1802 08/03/21 1802   (!) 100 7 °F (38 2 °C) (!) 115 20 128/64 99 %      Temp src Heart Rate Source Patient Position - Orthostatic VS BP Location FiO2 (%)   -- 08/03/21 1852 08/03/21 1802 08/03/21 1802 --    Monitor Sitting Left arm       Pain Score       --                  Vitals:    08/03/21 2030 08/03/21 2045 08/03/21 2200 08/03/21 2215   BP: 126/67 118/74 127/61 118/78   Pulse: 92 100 100 100   Patient Position - Orthostatic VS:             Visual Acuity      ED Medications  Medications   sodium chloride 0 9 % bolus 1,000 mL (0 mL Intravenous Stopped 8/3/21 2036)   ipratropium (ATROVENT) 0 02 % inhalation solution 0 5 mg (0 5 mg Nebulization Given 8/3/21 2105)   albuterol (PROVENTIL HFA,VENTOLIN HFA) inhaler 2 puff (2 puffs Inhalation Given 8/3/21 2106)   methylPREDNISolone sodium succinate (Solu-MEDROL) injection 60 mg (60 mg Intravenous Given 8/3/21 2106)       Diagnostic Studies  Results Reviewed     Procedure Component Value Units Date/Time    Rapid drug screen, urine [[id number]]  (Abnormal) Collected: 08/03/21 1945    Lab Status: Final result Specimen: Urine, Clean Catch Updated: 08/03/21 2039     Amph/Meth UR Negative     Barbiturate Ur Negative     Benzodiazepine Urine Negative     Cocaine Urine Negative     Methadone Urine Negative     Opiate Urine Negative     PCP Ur Negative     THC Urine Positive     Oxycodone Urine Negative    Narrative:      Presumptive report  If requested, specimen will be sent to reference lab for confirmation  FOR MEDICAL PURPOSES ONLY  IF CONFIRMATION NEEDED PLEASE CONTACT THE LAB WITHIN 5 DAYS      Drug Screen Cutoff Levels:  AMPHETAMINE/METHAMPHETAMINES  1000 ng/mL  BARBITURATES     200 ng/mL  BENZODIAZEPINES     200 ng/mL  COCAINE      300 ng/mL  METHADONE      300 ng/mL  OPIATES      300 ng/mL  PHENCYCLIDINE     25 ng/mL  THC       50 ng/mL  OXYCODONE      100 ng/mL    UA w Reflex to Microscopic w Reflex to Culture [[id number]] Collected: 08/03/21 1944    Lab Status: Final result Specimen: Urine, Clean Catch Updated: 08/03/21 1954     Color, UA Light Yellow     Clarity, UA Slightly Cloudy     Specific Gravity, UA 1 015     pH, UA 6 0     Leukocytes, UA Negative     Nitrite, UA Negative     Protein, UA Negative mg/dl      Glucose, UA Negative mg/dl      Ketones, UA Negative mg/dl      Urobilinogen, UA 0 2 E U /dl      Bilirubin, UA Negative     Blood, UA Negative    Novel Coronavirus (Covid-19),PCR [facility] - 2 Hour Stat [[id number]]  (Normal) Collected: 08/03/21 1844    Lab Status: Final result Specimen: Nares from Nose Updated: 08/03/21 1945     SARS-CoV-2 Negative    Narrative: The specimen collection materials, transport medium, and/or testing methodology utilized in the production of these test results have been proven to be reliable in a limited validation with an abbreviated program under the Emergency Utilization Authorization provided by the FDA  Testing reported as "Presumptive positive" will be confirmed with secondary testing to ensure result accuracy  Clinical caution and judgement should be used with the interpretation of these results with consideration of the clinical impression and other laboratory testing  Testing reported as "Positive" or "Negative" has been proven to be accurate according to standard laboratory validation requirements  All testing is performed with control materials showing appropriate reactivity at standard intervals        Comprehensive metabolic panel [[id number]]  (Abnormal) Collected: 08/03/21 1842    Lab Status: Final result Specimen: Blood from Arm, Left Updated: 08/03/21 1910     Sodium 136 mmol/L      Potassium 3 5 mmol/L      Chloride 99 mmol/L      CO2 24 mmol/L      ANION GAP 13 mmol/L      BUN 16 mg/dL      Creatinine 0 90 mg/dL      Glucose 99 mg/dL      Calcium 8 3 mg/dL      Corrected Calcium 9 0 mg/dL      AST 23 U/L      ALT 30 U/L      Alkaline Phosphatase 107 U/L      Total Protein 7 5 g/dL      Albumin 3 1 g/dL      Total Bilirubin 0 19 mg/dL      eGFR 86 ml/min/1 73sq m     Narrative:      [place] guidelines for Chronic Kidney Disease (CKD):     Stage 1 with normal or high GFR (GFR > 90 mL/min/1 73 square meters)    Stage 2 Mild CKD (GFR = 60-89 mL/min/1 73 square meters)    Stage 3A Moderate CKD (GFR = 45-59 mL/min/1 73 square meters)    Stage 3B Moderate CKD (GFR = 30-44 mL/min/1 73 square meters)    Stage 4 Severe CKD (GFR = 15-29 mL/min/1 73 square meters)    Stage 5 End Stage CKD (GFR <15 mL/min/1 73 square meters)  Note: GFR calculation is accurate only with a steady state creatinine    Magnesium [[id number]]  (Normal) Collected: 08/03/21 1842    Lab Status: Final result Specimen: Blood from Arm, Left Updated: 08/03/21 1910     Magnesium 1 8 mg/dL     Ethanol [[id number]]  (Normal) Collected: 08/03/21 1842    Lab Status: Final result Specimen: Blood from Arm, Left Updated: 08/03/21 1905     Ethanol Lvl <3 mg/dL     CBC and differential [[id number]]  (Abnormal) Collected: 08/03/21 1842    Lab Status: Final result Specimen: Blood from Arm, Left Updated: 08/03/21 1854     WBC 4 54 Thousand/uL      RBC 4 25 Million/uL      Hemoglobin 10 6 g/dL      Hematocrit 34 7 %      MCV 82 fL      MCH 24 9 pg      MCHC 30 5 g/dL      RDW 14 5 %      MPV 9 1 fL      Platelets 531 Thousands/uL      nRBC 0 /100 WBCs      Neutrophils Relative 74 %      Immat GRANS % 0 %      Lymphocytes Relative 15 %      Monocytes Relative 9 %      Eosinophils Relative 1 %      Basophils Relative 1 %      Neutrophils Absolute 3 34 Thousands/µL      Immature Grans Absolute 0 02 Thousand/uL      Lymphocytes Absolute 0 68 Thousands/µL      Monocytes Absolute 0 39 Thousand/µL      Eosinophils Absolute 0 06 Thousand/µL      Basophils Absolute 0 05 Thousands/µL     Levetiracetam level [[id number]] Collected: 08/03/21 1842    Lab Status: In process Specimen: Blood from Arm, Left Updated: 08/03/21 1851                 XR chest 1 view portable   Final Result by Alix Baca MD (08/04 0543)      No acute cardiopulmonary disease                    Workstation performed: [id number]                    Procedures  ECG 12 Lead Documentation Only    Date/Time: 8/3/2021 7:00 PM  Performed by: Lenny Fishman DO  Authorized by: Lenny Fishman DO     Indications / Diagnosis:  Shortness of breath  ECG reviewed by me, the ED Provider: yes    Patient location:  ED  Previous ECG:     Previous ECG:  Compared to current    Similarity:  Changes noted    Comparison to cardiac monitor: Yes    Interpretation:     Interpretation: non-specific    Comments:      Sinus rhythm, rate 109, normal axis, normal intervals, no acute issues sensorium is noted, otherwise unremarkable EKG, sinus tachycardia there is new compared to previous EKG  ED Course  ED Course as of Aug 04 1232   Tue Aug 03, 2021   2147 Patient re-examined bedside  Patient is feeling significantly better after steroids and neb treatment  Patient no longer has chest tightness  After taking Tussionex patient is no longer coughing  At this time that her symptoms are most likely viral in origin secondary to viral syndrome as well as acute bronchitis  Patient discharged home on prednisone burst, albuterol as well as Phenergan with codeine with follow-up to PCP in 2-3 days  Patient to take over-the-counter Tylenol/ibuprofen as needed for pain/fever                                                MDM  Number of Diagnoses or Management Options  Acute bronchitis: new and requires workup  Viral syndrome: new and requires workup  Diagnosis management comments: Obtain blood work, EKG, chest x-ray  Give IV fluids, steroids, neb treatment, cough medicine and reassessed       Amount and/or Complexity of Data Reviewed  Clinical lab tests: ordered and reviewed  Tests in the radiology section of CPT®: ordered and reviewed  Tests in the medicine section of CPT®: reviewed and ordered  Review and summarize past medical records: yes  Independent visualization of images, tracings, or specimens: yes    Risk of Complications, Morbidity, and/or Mortality  General comments: Lab and radiology results were essentially unremarkable  Patient felt significantly better after medication she was given in the ED  At this time patient's symptoms are consistent with viral syndrome as well as acute bronchitis that is also most likely viral in origin  I discussed supportive care  At this time patient discharged home on steroid burst, albuterol inhaler, cough syrup as well as Tessalon Perles  Patient to follow-up with PCP in 3-4 days  Close return instructions given to return to the ER for any worsening symptoms  Patient agrees with discharge plan  Patient well appearing at time of discharge  Please Note: Fluency Direct voice recognition software may have been used in the creation of this document  Wrong words or sound a like substitutions may have occurred due to the inherent limitations of the voice software  Patient Progress  Patient progress: improved      Disposition  Final diagnoses:   Viral syndrome   Acute bronchitis     Time reflects when diagnosis was documented in both MDM as applicable and the Disposition within this note     Time User Action Codes Description Comment    8/3/2021  9:51 PM Roseanne Pac Add [B34 9] Viral syndrome     8/3/2021  9:52 PM Medardoris Pac Add [J20 9] Acute bronchitis       ED Disposition     ED Disposition Condition Date/Time Comment    Discharge Stable Tue Aug 3, 2021  9:51 PM [facility] discharge to home/self care              Follow-up Information    None         Discharge Medication List as of 8/3/2021  9:57 PM      START taking these medications    Details   albuterol (Ventolin HFA) 90 mcg/act inhaler Inhale 2 puffs every 4 (four) hours as needed for wheezing, Starting Tue 8/3/2021, Normal      benzonatate (TESSALON PERLES) 100 mg capsule Take 1 capsule (100 mg total) by mouth every 8 (eight) hours, Starting Tue 8/3/2021, Normal      predniSONE 50 mg tablet Take 1 tablet (50 mg total) by mouth daily for 5 days, Starting Tue 8/3/2021, Until Sun 8/8/2021, Normal      promethazine-codeine (PHENERGAN WITH CODEINE) 6 25-10 mg/5 mL syrup Take 5 mL by mouth every 4 (four) hours as needed for cough for up to 10 days, Starting Tue 8/3/2021, Until Fri 8/13/2021 at 2359, Normal         CONTINUE these medications which have NOT CHANGED    Details   ARIPiprazole (ABILIFY) 5 mg tablet Take 5 mg by mouth daily, Historical Med      citalopram (CeleXA) 10 mg tablet Take 10 mg by mouth daily, Historical Med      !! lamoTRIgine (LaMICtal) 25 mg tablet Take 1 tablet (25 mg total) by mouth 2 (two) times a day, Starting Thu 4/15/2021, Normal      levETIRAcetam (KEPPRA) 750 mg tablet Take 2 tablets (1,500 mg total) by mouth every 12 (twelve) hours, Starting u 12/31/2020, Normal      metFORMIN (GLUCOPHAGE) 850 mg tablet Take 1 tablet (850 mg total) by mouth 2 (two) times a day with meals, Starting Fri 4/16/2021, Normal      QUEtiapine (SEROquel) 25 mg tablet Take 50 mg by mouth daily at bedtime , Historical Med      !! lamoTRIgine (LaMICtal) 200 MG tablet Take 225 mg by mouth 2 (two) times a day , Historical Med       !! - Potential duplicate medications found  Please discuss with provider  No discharge procedures on file      PDMP Review     None          ED Provider  Electronically Signed by           Rusty Bill DO  08/04/21 3306

## 2021-08-05 ENCOUNTER — TELEPHONE (OUTPATIENT)
Dept: BARIATRICS | Facility: CLINIC | Age: 31
End: 2021-08-05

## 2021-08-05 NOTE — TELEPHONE ENCOUNTER
Patient called in to advise she had a fever on Tuesday and has not had one since  She is scheduled for a EGD on 8/6/21 and wanted to see if the doctor feels she can still have the procedure  Currently being treated for bronchitis and on meds since Tuesday  Sent a msg to the doctor to advise

## 2021-08-06 ENCOUNTER — PREP FOR PROCEDURE (OUTPATIENT)
Dept: BARIATRICS | Facility: CLINIC | Age: 31
End: 2021-08-06

## 2021-08-06 DIAGNOSIS — Z11.59 SPECIAL SCREENING EXAMINATION FOR UNSPECIFIED VIRAL DISEASE: Primary | ICD-10-CM

## 2021-08-06 DIAGNOSIS — E66.01 MORBID (SEVERE) OBESITY DUE TO EXCESS CALORIES (HCC): Primary | ICD-10-CM

## 2021-08-06 RX ORDER — ALBUTEROL SULFATE 2.5 MG/3ML
2.5 SOLUTION RESPIRATORY (INHALATION) EVERY 4 HOURS PRN
Qty: 180 ML | Refills: 0 | Status: ON HOLD | OUTPATIENT
Start: 2021-08-06 | End: 2021-09-22 | Stop reason: ALTCHOICE

## 2021-08-06 NOTE — TELEPHONE ENCOUNTER
Called pt, she reported she is still wheezing  Prescribed nebulizer refills  Counseled pt that nebulizer and inhaler are the same albuterol so she should not use both more than 3 times a week  Pt verbalizes understanding

## 2021-08-09 ENCOUNTER — TELEPHONE (OUTPATIENT)
Dept: OBGYN CLINIC | Facility: CLINIC | Age: 31
End: 2021-08-09

## 2021-08-09 LAB — LEVETIRACETAM SERPL-MCNC: <1 UG/ML (ref 10–40)

## 2021-08-09 NOTE — TELEPHONE ENCOUNTER
Patient needs to reschedule surgery because could not keep apt for H&P  Aware has to have H&P in order to proceed with surgical procedure  Patient offered all open availability between now and her surgery and was unable to accommodate  Patient aware will call her to reschedule surgery

## 2021-08-11 VITALS — WEIGHT: 236 LBS | BODY MASS INDEX: 54.62 KG/M2 | HEIGHT: 55 IN

## 2021-08-11 NOTE — PRE-PROCEDURE INSTRUCTIONS
Pre-Surgery Instructions:   Medication Instructions    albuterol (2 5 mg/3 mL) 0 083 % nebulizer solution Not using    albuterol (Ventolin HFA) 90 mcg/act inhaler Not using    ARIPiprazole (ABILIFY) 5 mg tablet Instructed patient per Anesthesia Guidelines   benzonatate (TESSALON PERLES) 100 mg capsule Not using    citalopram (CeleXA) 10 mg tablet Pt to take the am of the procedure    lamoTRIgine (LaMICtal) 200 MG tablet Pt to take the am of the procedure    lamoTRIgine (LaMICtal) 25 mg tablet Pt to take the am of the procedure    levETIRAcetam (KEPPRA) 750 mg tablet Pt to take the am of the procedure    metFORMIN (GLUCOPHAGE) 850 mg tablet Instructed patient per Anesthesia Guidelines   QUEtiapine (SEROquel) 25 mg tablet Instructed patient per Anesthesia Guidelines  Pt to follow Dr Karen Lew instructions  Pt to take blood sugar the am of the procedure  Pt to take lamictal,keppra, celexa the am of the procedure at least 2 hrs prior to arrival time, with a sip of water    Omar De La Cruz [phone]

## 2021-08-13 ENCOUNTER — PATIENT OUTREACH (OUTPATIENT)
Dept: FAMILY MEDICINE CLINIC | Facility: CLINIC | Age: 31
End: 2021-08-13

## 2021-08-13 NOTE — PROGRESS NOTES
DANUTA RAMOS called patient ([phone]) to follow up regarding patient applying for MLTSS program Patient stated she had called and spoke with insurance and was told she will not qualify for MLTSS due to being independent with all ADLs  Dr Amarjit Perez had stated prior that she was unsure if patient would qualify due to being able to perform all ADLs independently  Patient had no other questions, concerns or needs  DANUTA RAMOS encouraged patient to call DANUTA RAMOS for any future needs  Please re consult DANUTA RAMOS as needed

## 2021-08-17 ENCOUNTER — TELEPHONE (OUTPATIENT)
Dept: BARIATRICS | Facility: CLINIC | Age: 31
End: 2021-08-17

## 2021-08-17 PROCEDURE — U0003 INFECTIOUS AGENT DETECTION BY NUCLEIC ACID (DNA OR RNA); SEVERE ACUTE RESPIRATORY SYNDROME CORONAVIRUS 2 (SARS-COV-2) (CORONAVIRUS DISEASE [COVID-19]), AMPLIFIED PROBE TECHNIQUE, MAKING USE OF HIGH THROUGHPUT TECHNOLOGIES AS DESCRIBED BY CMS-2020-01-R: HCPCS | Performed by: SURGERY

## 2021-08-17 PROCEDURE — U0005 INFEC AGEN DETEC AMPLI PROBE: HCPCS | Performed by: SURGERY

## 2021-08-17 NOTE — TELEPHONE ENCOUNTER
Spoke w/patient on 8/16/21 to make sure she was Covid-19 tested  She never went on 8/14/21 to be tested  She told me she had no transportation on 8/16/21 to get it done and was going to get it done on 8/17/21

## 2021-08-20 ENCOUNTER — HOSPITAL ENCOUNTER (OUTPATIENT)
Dept: PERIOP | Facility: HOSPITAL | Age: 31
Setting detail: OUTPATIENT SURGERY
Discharge: HOME | End: 2021-08-20
Attending: SURGERY
Payer: COMMERCIAL

## 2021-08-20 ENCOUNTER — ANESTHESIA (OUTPATIENT)
Dept: PERIOP | Facility: HOSPITAL | Age: 31
End: 2021-08-20
Payer: COMMERCIAL

## 2021-08-20 ENCOUNTER — ANESTHESIA EVENT (OUTPATIENT)
Dept: PERIOP | Facility: HOSPITAL | Age: 31
End: 2021-08-20

## 2021-08-20 VITALS
DIASTOLIC BLOOD PRESSURE: 72 MMHG | TEMPERATURE: 98.5 F | SYSTOLIC BLOOD PRESSURE: 114 MMHG | WEIGHT: 235.38 LBS | RESPIRATION RATE: 16 BRPM | HEART RATE: 101 BPM | BODY MASS INDEX: 54.47 KG/M2 | HEIGHT: 55 IN | OXYGEN SATURATION: 100 %

## 2021-08-20 DIAGNOSIS — E66.01 MORBID (SEVERE) OBESITY DUE TO EXCESS CALORIES (HCC): ICD-10-CM

## 2021-08-20 DIAGNOSIS — K22.70 BARRETT'S ESOPHAGUS DETERMINED BY ENDOSCOPY: Primary | ICD-10-CM

## 2021-08-20 LAB
EXT PREGNANCY TEST URINE: NEGATIVE
EXT. CONTROL: NORMAL

## 2021-08-20 PROCEDURE — 88342 IMHCHEM/IMCYTCHM 1ST ANTB: CPT | Performed by: PATHOLOGY

## 2021-08-20 PROCEDURE — 81025 URINE PREGNANCY TEST: CPT | Performed by: ANESTHESIOLOGY

## 2021-08-20 PROCEDURE — 43239 EGD BIOPSY SINGLE/MULTIPLE: CPT | Performed by: SURGERY

## 2021-08-20 PROCEDURE — 88341 IMHCHEM/IMCYTCHM EA ADD ANTB: CPT | Performed by: PATHOLOGY

## 2021-08-20 PROCEDURE — 88305 TISSUE EXAM BY PATHOLOGIST: CPT | Performed by: PATHOLOGY

## 2021-08-20 RX ORDER — PROPOFOL 10 MG/ML
INJECTION, EMULSION INTRAVENOUS AS NEEDED
Status: DISCONTINUED | OUTPATIENT
Start: 2021-08-20 | End: 2021-08-20

## 2021-08-20 RX ORDER — PANTOPRAZOLE SODIUM 40 MG/1
40 TABLET, DELAYED RELEASE ORAL DAILY
Qty: 60 TABLET | Refills: 0 | Status: SHIPPED | OUTPATIENT
Start: 2021-08-20 | End: 2021-12-09

## 2021-08-20 RX ORDER — SODIUM CHLORIDE, SODIUM LACTATE, POTASSIUM CHLORIDE, CALCIUM CHLORIDE 600; 310; 30; 20 MG/100ML; MG/100ML; MG/100ML; MG/100ML
INJECTION, SOLUTION INTRAVENOUS CONTINUOUS PRN
Status: DISCONTINUED | OUTPATIENT
Start: 2021-08-20 | End: 2021-08-20

## 2021-08-20 RX ORDER — ONDANSETRON 2 MG/ML
4 INJECTION INTRAMUSCULAR; INTRAVENOUS ONCE AS NEEDED
Status: CANCELLED | OUTPATIENT
Start: 2021-08-20

## 2021-08-20 RX ADMIN — SODIUM CHLORIDE, SODIUM LACTATE, POTASSIUM CHLORIDE, AND CALCIUM CHLORIDE: .6; .31; .03; .02 INJECTION, SOLUTION INTRAVENOUS at 09:00

## 2021-08-20 RX ADMIN — PROPOFOL 150 MG: 10 INJECTION, EMULSION INTRAVENOUS at 09:03

## 2021-08-20 RX ADMIN — PROPOFOL 50 MG: 10 INJECTION, EMULSION INTRAVENOUS at 09:07

## 2021-08-20 RX ADMIN — PROPOFOL 50 MG: 10 INJECTION, EMULSION INTRAVENOUS at 09:05

## 2021-08-20 NOTE — ANESTHESIA PREPROCEDURE EVALUATION
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)      PULMONARY   (+) Sleep apnea        Physical Exam    Airway    Mallampati score: II  TM Distance: >3 FB  Neck ROM: full     Dental   No notable dental hx     Cardiovascular  Rhythm: regular, Rate: normal,     Pulmonary  Breath sounds clear to auscultation,     Other Findings        Anesthesia Plan  ASA Score- 3     Anesthesia Type- IV sedation with anesthesia with ASA Monitors  Additional Monitors:   Airway Plan:           Plan Factors-Exercise tolerance (METS): >4 METS  Chart reviewed  Existing labs reviewed  Patient summary reviewed  Patient is not a current smoker  Induction- intravenous  Postoperative Plan-     Informed Consent- Anesthetic plan and risks discussed with patient  I personally reviewed this patient with the CRNA  Discussed and agreed on the Anesthesia Plan with the CRNA  Amairani Barraza

## 2021-08-20 NOTE — H&P
This is a 28 y o  female with a history of morbid obesity and Body mass index is 54 71 kg/m²  History of GERD and [place]'s esophagus  Last endoscopy 11/2019  Here for an EGD to evaluate the anatomy of the GI tract and to rule out the presence of H  pylori  Physical Exam    /57   Pulse 103   Temp 98 5 °F (36 9 °C) (Tympanic)   Resp 18   Ht 4' 7" (1 397 m)   Wt 107 kg (235 lb 6 oz)   LMP 07/13/2021 Comment: Negative  SpO2 98%   BMI 54 71 kg/m²    AAOx3  RRR  CTA B  Abdomen obese  Benign  A/P:    This is a 28 y o  female with a history of morbid obesity and Body mass index is 54 71 kg/m²  History of GERD and [place]'s esophagus  Last endoscopy 11/2019  Will proceed with the EGD and biopsies        Obye Cohen MD  8/20/2021  8:56 AM

## 2021-08-20 NOTE — ANESTHESIA POSTPROCEDURE EVALUATION
Post-Op Assessment Note    CV Status:  Stable  Pain Score: 0    Pain management: adequate     Mental Status:  Sleepy   Hydration Status:  Stable and euvolemic   PONV Controlled:  Controlled   Airway Patency:  Patent and adequate      Post Op Vitals Reviewed: Yes      Staff: CRNA         No complications documented      BP   134/59   Temp      Pulse  106   Resp   20   SpO2 98

## 2021-08-25 ENCOUNTER — OFFICE VISIT (OUTPATIENT)
Dept: BARIATRICS | Facility: CLINIC | Age: 31
End: 2021-08-25

## 2021-08-25 ENCOUNTER — OFFICE VISIT (OUTPATIENT)
Dept: AUDIOLOGY | Facility: CLINIC | Age: 31
End: 2021-08-25
Payer: COMMERCIAL

## 2021-08-25 VITALS
SYSTOLIC BLOOD PRESSURE: 102 MMHG | BODY MASS INDEX: 53.97 KG/M2 | HEIGHT: 55 IN | DIASTOLIC BLOOD PRESSURE: 64 MMHG | HEART RATE: 110 BPM | WEIGHT: 233.2 LBS

## 2021-08-25 DIAGNOSIS — H90.5 SENSORY HEARING LOSS, UNILATERAL: Primary | ICD-10-CM

## 2021-08-25 DIAGNOSIS — H91.93 DECREASED HEARING OF BOTH EARS: ICD-10-CM

## 2021-08-25 DIAGNOSIS — G40.909 NONINTRACTABLE EPILEPSY WITHOUT STATUS EPILEPTICUS, UNSPECIFIED EPILEPSY TYPE (HCC): ICD-10-CM

## 2021-08-25 DIAGNOSIS — E66.01 CLASS 3 SEVERE OBESITY DUE TO EXCESS CALORIES WITH SERIOUS COMORBIDITY AND BODY MASS INDEX (BMI) OF 50.0 TO 59.9 IN ADULT (HCC): ICD-10-CM

## 2021-08-25 DIAGNOSIS — K22.70 BARRETT'S ESOPHAGUS: ICD-10-CM

## 2021-08-25 DIAGNOSIS — K21.00 REFLUX ESOPHAGITIS: ICD-10-CM

## 2021-08-25 DIAGNOSIS — E66.01 MORBID (SEVERE) OBESITY DUE TO EXCESS CALORIES (HCC): ICD-10-CM

## 2021-08-25 DIAGNOSIS — N97.9 INFERTILITY, FEMALE: ICD-10-CM

## 2021-08-25 DIAGNOSIS — E28.2 PCOS (POLYCYSTIC OVARIAN SYNDROME): ICD-10-CM

## 2021-08-25 DIAGNOSIS — G47.30 SLEEP APNEA: ICD-10-CM

## 2021-08-25 DIAGNOSIS — Z01.818 ENCOUNTER FOR OTHER PREPROCEDURAL EXAMINATION: Primary | ICD-10-CM

## 2021-08-25 PROBLEM — E66.813 CLASS 3 SEVERE OBESITY DUE TO EXCESS CALORIES WITH SERIOUS COMORBIDITY AND BODY MASS INDEX (BMI) OF 50.0 TO 59.9 IN ADULT (HCC): Status: ACTIVE | Noted: 2021-08-25

## 2021-08-25 PROCEDURE — 92567 TYMPANOMETRY: CPT | Performed by: AUDIOLOGIST

## 2021-08-25 PROCEDURE — 92557 COMPREHENSIVE HEARING TEST: CPT | Performed by: AUDIOLOGIST

## 2021-08-25 PROCEDURE — 99204 OFFICE O/P NEW MOD 45 MIN: CPT | Performed by: SURGERY

## 2021-08-25 NOTE — LETTER
August 25, 2021     Mendel GainesHugh  [facility] [address] [facility] [place][note 2[place] [address]    Patient: Mario Pal   YOB: 1990   Date of Visit: 8/25/2021       Dear Dr Janine Singer:    Thank you for referring Mario Pal to me for evaluation for metabolic and bariatric surgery  Below are my notes for this consultation  If you have questions, please do not hesitate to call me  I look forward to following your patient along with you  Sincerely,        Tereza Lawrence MD        CC: No Recipients  Tereza Lawrence MD  8/25/2021 12:06 PM  Sign when Signing Visit      BARIATRIC INITIAL CONSULT - Emiliano Colby [address] [facility] 28 y o  female MRN: [id number]  Unit/Bed#:  Encounter: [id number]      HPI:  Mario Pal is a 28 y o  female who presents with a longstanding history of morbid obesity and inability to sustain a meaningful weight loss  She is currently employed in   She desires to pursue metabolic and bariatric surgery to improve her health  Denies tobacco  Denies NSAIDs  +GERD (h/o [place]'s esophagus)  Denies DVT/PE  Here today to discuss bariatric options  Visit type: initial visit    Symptoms: inability to loss weight, edema and knee pain    Associated Symptoms: none    Associated Conditions: glucose intolerance, sleep apnea and abdominal obesity  Disease Complications: PCOS, mild stress incontinence, GERD ([place]'s esophagus), GARIMA  Weight Loss Interest: high    Exercise Frequency:daily  Types of Exercise: walking      Review of Systems   Cardiovascular: Positive for leg swelling (foot swelling)  Gastrointestinal:        Reflux   Genitourinary:        Mild stress incontinence   Musculoskeletal:        Knee pain   Neurological: Positive for seizures         Historical Information   Past Medical History:   Diagnosis Date    Autism     [place]'s esophagus     Diabetes mellitus ([address] )     Female infertility     Gastric ulcer     History of bronchitis     Irregular menses     Morbid obesity with BMI of 50 0-59 9, adult (HCC)     Reflux esophagitis     Seizures (HCC)     Sleep apnea     no CPAP     Past Surgical History:   Procedure Laterality Date    EGD      with Bx due to barretts esophagus    EYE SURGERY Bilateral     lazy eye repair    WISDOM TOOTH EXTRACTION       Social History   Social History     Substance and Sexual Activity   Alcohol Use Yes    Comment: occ     Social History     Substance and Sexual Activity   Drug Use Not Currently     Social History     Tobacco Use   Smoking Status Never Smoker   Smokeless Tobacco Never Used     Family History: non-contributory    Meds/Allergies   all medications and allergies reviewed  Allergies   Allergen Reactions    Haloperidol Other (See Comments)     Jaw locks up    Penicillins Hives    Pollen Extract Allergic Rhinitis       Objective       Current Vitals:   /64 (BP Location: Right arm, Patient Position: Sitting, Cuff Size: Large)   Pulse (!) 110   Ht 4' 7" (1 397 m)   Wt 106 kg (233 lb 3 2 oz)   BMI 54 20 kg/m²       Invasive Devices     None                 Physical Exam  Constitutional:       Appearance: Normal appearance  HENT:      Head: Atraumatic  Nose: No rhinorrhea  Eyes:      Extraocular Movements: Extraocular movements intact  Cardiovascular:      Rate and Rhythm: Normal rate  Pulmonary:      Effort: Pulmonary effort is normal  No respiratory distress  Abdominal:      General: Abdomen is flat  There is no distension  Palpations: Abdomen is soft  Tenderness: There is no abdominal tenderness  Musculoskeletal:         General: Normal range of motion  Cervical back: Normal range of motion  Skin:     General: Skin is warm and dry  Neurological:      General: No focal deficit present  Mental Status: She is alert and oriented to person, place, and time     Psychiatric:         Mood and Affect: Mood normal          Behavior: Behavior normal          Lab Results: I have personally reviewed pertinent lab results  Imaging: I have personally reviewed pertinent reports  EKG, Pathology, and Other Studies: I have personally reviewed pertinent reports  Assessment/PLAN:    28 y o  yo female with a long standing h/o of obesity and inability to sustain any meaningful weight loss on her own despite several attempts  She is interested in the Laparoscopic najma-en-y gastric bypass  As a part of her pre op evaluation, she will be referred to a cardiologist and for a sleep evaluation and consult after successfully completing an evaluation with our pre-certification/, registered dietician and licensed clinical   She needs an EGD to evaluate the anatomy of her GI tract prior to the operation  I have spent over 45 minutes with her face to face in the office today discussing her options and details of the surgery  We have seen an animation of the surgery on the computer that illustrates how the operation is done and how the anatomy will be altered with the procedure  Over 50% of this was coordinating care  She was given the opportunity to ask questions and I have answered all of them  I have discussed and educated the patient with regards to the components of our multidisciplinary program and the importance of compliance and follow up in the post operative period  The patient was also instructed with regards to the importance of behavior modification, nutritional counseling, support meeting attendance and lifestyle changes that are important to ensure success  Although there is a great statistical chance of improvement or even resolution of most of her associated comorbidities, the results vary from patient to patient and they largely depend on her commitment and compliance  Weight loss goal will be determined at the time of her evaluation      Tyesha Ponce MD  8/25/2021  12:00 PM

## 2021-08-25 NOTE — PROGRESS NOTES
BARIATRIC INITIAL CONSULT - BARIATRIC SURGERY    Margoth Cook 28 y o  female MRN: [id number]  Unit/Bed#:  Encounter: [id number]      HPI:  Florian Peres is a 28 y o  female who presents with a longstanding history of morbid obesity and inability to sustain a meaningful weight loss  She is currently employed in   She desires to pursue metabolic and bariatric surgery to improve her health  Denies tobacco  Denies NSAIDs  +GERD (h/o [place]'s esophagus)  Denies DVT/PE  Here today to discuss bariatric options  Visit type: initial visit    Symptoms: inability to loss weight, edema and knee pain    Associated Symptoms: none    Associated Conditions: glucose intolerance, sleep apnea and abdominal obesity  Disease Complications: PCOS, mild stress incontinence, GERD ([place]'s esophagus), GARIMA  Weight Loss Interest: high    Exercise Frequency:daily  Types of Exercise: walking      Review of Systems   Cardiovascular: Positive for leg swelling (foot swelling)  Gastrointestinal:        Reflux   Genitourinary:        Mild stress incontinence   Musculoskeletal:        Knee pain   Neurological: Positive for seizures         Historical Information   Past Medical History:   Diagnosis Date    Autism     [place]'s esophagus     Diabetes mellitus [facility] [address] )     Female infertility     Gastric ulcer     History of bronchitis     Irregular menses     Morbid obesity with BMI of 50 0-59 9, adult (HCC)     Reflux esophagitis     Seizures (HCC)     Sleep apnea     no CPAP     Past Surgical History:   Procedure Laterality Date    EGD      with Bx due to barretts esophagus    EYE SURGERY Bilateral     lazy eye repair    WISDOM TOOTH EXTRACTION       Social History   Social History     Substance and Sexual Activity   Alcohol Use Yes    Comment: occ     Social History     Substance and Sexual Activity   Drug Use Not Currently     Social History     Tobacco Use   Smoking Status Never Smoker   Smokeless Tobacco Never Used     Family History: non-contributory    Meds/Allergies   all medications and allergies reviewed  Allergies   Allergen Reactions    Haloperidol Other (See Comments)     Jaw locks up    Penicillins Hives    Pollen Extract Allergic Rhinitis       Objective       Current Vitals:   /64 (BP Location: Right arm, Patient Position: Sitting, Cuff Size: Large)   Pulse (!) 110   Ht 4' 7" (1 397 m)   Wt 106 kg (233 lb 3 2 oz)   BMI 54 20 kg/m²       Invasive Devices     None                 Physical Exam  Constitutional:       Appearance: Normal appearance  HENT:      Head: Atraumatic  Nose: No rhinorrhea  Eyes:      Extraocular Movements: Extraocular movements intact  Cardiovascular:      Rate and Rhythm: Normal rate  Pulmonary:      Effort: Pulmonary effort is normal  No respiratory distress  Abdominal:      General: Abdomen is flat  There is no distension  Palpations: Abdomen is soft  Tenderness: There is no abdominal tenderness  Musculoskeletal:         General: Normal range of motion  Cervical back: Normal range of motion  Skin:     General: Skin is warm and dry  Neurological:      General: No focal deficit present  Mental Status: She is alert and oriented to person, place, and time  Psychiatric:         Mood and Affect: Mood normal          Behavior: Behavior normal          Lab Results: I have personally reviewed pertinent lab results  Imaging: I have personally reviewed pertinent reports  EKG, Pathology, and Other Studies: I have personally reviewed pertinent reports  Assessment/PLAN:    28 y o  yo female with a long standing h/o of obesity and inability to sustain any meaningful weight loss on her own despite several attempts  She is interested in the Laparoscopic najma-en-y gastric bypass      As a part of her pre op evaluation, she will be referred to a cardiologist and for a sleep evaluation and consult after successfully completing an evaluation with our pre-certification/, registered dietician and licensed clinical   She needs an EGD to evaluate the anatomy of her GI tract prior to the operation  I have spent over 45 minutes with her face to face in the office today discussing her options and details of the surgery  We have seen an animation of the surgery on the computer that illustrates how the operation is done and how the anatomy will be altered with the procedure  Over 50% of this was coordinating care  She was given the opportunity to ask questions and I have answered all of them  I have discussed and educated the patient with regards to the components of our multidisciplinary program and the importance of compliance and follow up in the post operative period  The patient was also instructed with regards to the importance of behavior modification, nutritional counseling, support meeting attendance and lifestyle changes that are important to ensure success  Although there is a great statistical chance of improvement or even resolution of most of her associated comorbidities, the results vary from patient to patient and they largely depend on her commitment and compliance  Weight loss goal will be determined at the time of her evaluation      Hector Diane MD  8/25/2021  12:00 PM

## 2021-08-25 NOTE — PROGRESS NOTES
HEARING EVALUATION    Name:  Radha Goode  :  1990  Age:  28 y o  Date of Evaluation: 21     History: Difficulty Understanding  Reason for visit: Radha Goode is being seen today at the request of Dr Grzegorz Riley for an evaluation of hearing  Patient reports subjective changes with her hearing sensitivities that began several years ago  Subjectively patient feels she hears better out of her left ear vs her right ear  Patient reports noticeable difficulties when others whisper to her and when in background noise situations  Patient also finds localizing where sounds are coming from to be hard for her and admits her television volume is louder then most prefer  Patient denies any family history of hearing loss  Admits to recreational noise exposure with target shooting; does use hearing protection  Patient reports as a child she had several ear infections; did not require PE tubes  Today she reports intermittent otalgia in both ears along with bilateral tinnitus  EVALUATION:    Otoscopic Evaluation:   Right Ear: Clear and healthy ear canal and tympanic membrane   Left Ear: Clear and healthy ear canal and tympanic membrane    Tympanometry:   Right: Type A - normal middle ear pressure and compliance   Left: Type A - normal middle ear pressure and compliance    Audiogram Results:  Pure tone testing revealed normal hearing sensitivity bilaterally  SRT and PTA are in agreement indicating good test reliability  Word recognition scores were  excellent bilaterally  *see attached audiogram      RECOMMENDATIONS:  Return to [facility]  for F/U and Copy to Patient/Caregiver    PATIENT EDUCATION:   Discussed results and recommendations with the patient at length  Overall hearing appears to be within normal hearing limits, bilaterally  Effective communication strategies were reviewed with the patient at length   Patient instructed should any issues/changes arise in the future to return for another audiogram; she voiced appreciation  Questions were addressed and the patient was encouraged to contact our department should concerns arise        Giulia Mcclure , CCC-A  Clinical Audiologist

## 2021-08-27 ENCOUNTER — TELEPHONE (OUTPATIENT)
Dept: OBGYN CLINIC | Facility: CLINIC | Age: 31
End: 2021-08-27

## 2021-09-09 ENCOUNTER — OFFICE VISIT (OUTPATIENT)
Dept: OBGYN CLINIC | Facility: CLINIC | Age: 31
End: 2021-09-09

## 2021-09-09 VITALS
TEMPERATURE: 97.7 F | BODY MASS INDEX: 54.62 KG/M2 | SYSTOLIC BLOOD PRESSURE: 112 MMHG | DIASTOLIC BLOOD PRESSURE: 80 MMHG | WEIGHT: 236 LBS | HEIGHT: 55 IN

## 2021-09-09 DIAGNOSIS — N88.2 CERVICAL STENOSIS (UTERINE CERVIX): ICD-10-CM

## 2021-09-09 DIAGNOSIS — Z01.818 PREOP EXAMINATION: Primary | ICD-10-CM

## 2021-09-09 DIAGNOSIS — N84.0 ENDOMETRIAL POLYP: ICD-10-CM

## 2021-09-09 PROCEDURE — PREOP: Performed by: STUDENT IN AN ORGANIZED HEALTH CARE EDUCATION/TRAINING PROGRAM

## 2021-09-09 RX ORDER — MISOPROSTOL 200 UG/1
400 TABLET ORAL ONCE
Qty: 2 TABLET | Refills: 0 | Status: SHIPPED | OUTPATIENT
Start: 2021-09-09 | End: 2021-09-22 | Stop reason: HOSPADM

## 2021-09-09 NOTE — H&P (VIEW-ONLY)
History & Physical - OB/GYN   Mario Pal 28 y o  female MRN: [id number]  Unit/Bed#:  Encounter: [id number]    HPI:  Mario Pal is a 28 y o  Juan Carlos Speller who presents for surgical H&P for [facility]  hysteroscopy and polypectomy on 9/22/2021 with Dr Jose Barry  Patient was seen on 6/30/2021 for consultation with Dr Devang Márquez and was counseled at this time for [facility]  hysteroscopy- please refer to not from this day for full history  She denies any interim changes since last visit  Continues to have irregular menses and last menses lasted 9 days which is abnormal from her last menstrual- 6/13/2021  Patient has plans for clomid IUI with donor sperm with her IUI following her D&C      Active Problems:  Patient Active Problem List   Diagnosis    Depression with anxiety    Nonintractable epilepsy without status epilepticus ([facility] )    TSH elevation    History of irregular menstrual bleeding    PCOS (polycystic ovarian syndrome)    Endometrial polyp    Infertility, female    [place]'s esophagus    Gastric ulcer    Sleep apnea    Reflux esophagitis    Female infertility    Autism    Bipolar depression ([facility] )    SIRS (systemic inflammatory response syndrome) (HCC)    Fever    Morbid (severe) obesity due to excess calories (HCC)    Class 3 severe obesity due to excess calories with serious comorbidity and body mass index (BMI) of 50 0 to 59 9 in adult [facility])       PMH:  Past Medical History:   Diagnosis Date    Autism     [place]'s esophagus     [facility]     Female infertility     Gastric ulcer     History of bronchitis     Irregular menses     Morbid obesity with BMI of 50 0-59 9, adult (HCC)     Reflux esophagitis     Seizures (HCC)     Sleep apnea     no CPAP       PSH:  Past Surgical History:   Procedure Laterality Date    EGD      with Bx due to barretts esophagus    EYE SURGERY Bilateral     lazy eye repair    WISDOM TOOTH EXTRACTION       Social Hx:  Denies x3    OB Hx:  OB History  Para Term  AB Living   2 0 0 0 2 0   SAB TAB Ectopic Multiple Live Births   2 0 0 0 0      # Outcome Date GA Lbr Lopez/2nd Weight Sex Delivery Anes PTL Lv   2 2019           1 2012               Meds:  Current Outpatient Medications on File Prior to Visit   Medication Sig Dispense Refill    ARIPiprazole (ABILIFY) 5 mg tablet Take 5 mg by mouth daily with lunch       citalopram (CeleXA) 10 mg tablet Take 10 mg by mouth every morning       lamoTRIgine (LaMICtal) 200 MG tablet Take 1 tablet (200 mg total) by mouth 2 (two) times a day Take with one 25 mg tablet for total dose of 225 mg  60 tablet 11    lamoTRIgine (LaMICtal) 25 mg tablet Take 1 tablet (25 mg total) by mouth 2 (two) times a day 180 tablet 1    levETIRAcetam (KEPPRA) 750 mg tablet Take 2 tablets (1,500 mg total) by mouth every 12 (twelve) hours (Patient taking differently: Take 1,500 mg by mouth every 12 (twelve) hours ) 360 tablet 3    metFORMIN (GLUCOPHAGE) 850 mg tablet Take 1 tablet (850 mg total) by mouth 2 (two) times a day with meals 60 tablet 3    pantoprazole (PROTONIX) 40 mg tablet Take 1 tablet (40 mg total) by mouth daily 60 tablet 0    prazosin (MINIPRESS) 2 mg capsule Take 2 mg by mouth daily at bedtime      QUEtiapine (SEROquel) 25 mg tablet Take 25 mg by mouth daily at bedtime       albuterol (2 5 mg/3 mL) 0 083 % nebulizer solution Take 3 mL (2 5 mg total) by nebulization every 4 (four) hours as needed for wheezing or shortness of breath (Patient not taking: Reported on 2021) 180 mL 0    albuterol (Ventolin HFA) 90 mcg/act inhaler Inhale 2 puffs every 4 (four) hours as needed for wheezing (Patient not taking: Reported on 2021) 18 g 0     No current facility-administered medications on file prior to visit  Allergies:   Allergies   Allergen Reactions    Haloperidol Other (See Comments)     Jaw locks up    Penicillins Hives    Pollen Extract Allergic Rhinitis     Physical Exam:  /80 (BP Location: Right arm, Patient Position: Sitting, Cuff Size: Extra-Large)   Temp 97 7 °F (36 5 °C) (Temporal)   Ht 4' 7" (1 397 m)   Wt 107 kg (236 lb)   LMP 07/13/2021 (Exact Date)   BMI 54 85 kg/m²     Physical Exam  Vitals reviewed  Constitutional:       General: She is not in acute distress  Appearance: Normal appearance  She is well-developed  She is obese  She is not ill-appearing or diaphoretic  HENT:      Head: Normocephalic and atraumatic  Cardiovascular:      Rate and Rhythm: Normal rate and regular rhythm  Heart sounds: Normal heart sounds  No murmur heard  No friction rub  No gallop  Pulmonary:      Effort: Pulmonary effort is normal  No respiratory distress  Breath sounds: Normal breath sounds  No wheezing or rales  Abdominal:      Palpations: Abdomen is soft  Tenderness: There is no abdominal tenderness  There is no guarding or rebound  Genitourinary:     Vagina: Normal    Musculoskeletal:      Cervical back: Normal range of motion and neck supple  Comments: Extremities: Trace edema bilaterally on LE, No tenderness or erythema   Skin:     General: Skin is warm and dry  Neurological:      Mental Status: She is alert and oriented to person, place, and time  Psychiatric:         Mood and Affect: Mood normal          Behavior: Behavior normal        Ida Bryan MD  09/09/21        Assessment/ Plan:  28 y o  [id number] with irregular menses and possible polyp for D&C hysteroscopy and polypectomy scheduled 9/22/2021  Reviewed procedure, same day procedure, pain expectations with patient  Cytotec sent to pharmacy to be taken night before surgery to assist with cervical stenosis  All questions and concerns addressed       Ida Bryan MD  09/09/21

## 2021-09-09 NOTE — H&P
History & Physical - OB/GYN   Mario Pal 28 y o  female MRN: [id number]  Unit/Bed#:  Encounter: [id number]    HPI:  Mario Pal is a 28 y o  Juan Carlos Speller who presents for surgical H&P for [facility]  hysteroscopy and polypectomy on 9/22/2021 with Dr Jose Barry  Patient was seen on 6/30/2021 for consultation with Dr Devang Márquez and was counseled at this time for [facility]  hysteroscopy- please refer to not from this day for full history  She denies any interim changes since last visit  Continues to have irregular menses and last menses lasted 9 days which is abnormal from her last menstrual- 6/13/2021  Patient has plans for clomid IUI with donor sperm with her IUI following her D&C      Active Problems:  Patient Active Problem List   Diagnosis    Depression with anxiety    Nonintractable epilepsy without status epilepticus ([facility] )    TSH elevation    History of irregular menstrual bleeding    PCOS (polycystic ovarian syndrome)    Endometrial polyp    Infertility, female    [place]'s esophagus    Gastric ulcer    Sleep apnea    Reflux esophagitis    Female infertility    Autism    Bipolar depression ([facility] )    SIRS (systemic inflammatory response syndrome) (HCC)    Fever    Morbid (severe) obesity due to excess calories (HCC)    Class 3 severe obesity due to excess calories with serious comorbidity and body mass index (BMI) of 50 0 to 59 9 in adult [facility])       PMH:  Past Medical History:   Diagnosis Date    Autism     [place]'s esophagus     Diabetes mellitus ([facility] )     Female infertility     Gastric ulcer     History of bronchitis     Irregular menses     Morbid obesity with BMI of 50 0-59 9, adult (HCC)     Reflux esophagitis     Seizures (HCC)     Sleep apnea     no CPAP       PSH:  Past Surgical History:   Procedure Laterality Date    EGD      with Bx due to barretts esophagus    EYE SURGERY Bilateral     lazy eye repair    WISDOM TOOTH EXTRACTION       Social Hx:  Denies x3    OB Hx:  OB History  Para Term  AB Living   2 0 0 0 2 0   SAB TAB Ectopic Multiple Live Births   2 0 0 0 0      # Outcome Date GA Lbr Lopez/2nd Weight Sex Delivery Anes PTL Lv   2 2019           1 2012               Meds:  Current Outpatient Medications on File Prior to Visit   Medication Sig Dispense Refill    ARIPiprazole (ABILIFY) 5 mg tablet Take 5 mg by mouth daily with lunch       citalopram (CeleXA) 10 mg tablet Take 10 mg by mouth every morning       lamoTRIgine (LaMICtal) 200 MG tablet Take 1 tablet (200 mg total) by mouth 2 (two) times a day Take with one 25 mg tablet for total dose of 225 mg  60 tablet 11    lamoTRIgine (LaMICtal) 25 mg tablet Take 1 tablet (25 mg total) by mouth 2 (two) times a day 180 tablet 1    levETIRAcetam (KEPPRA) 750 mg tablet Take 2 tablets (1,500 mg total) by mouth every 12 (twelve) hours (Patient taking differently: Take 1,500 mg by mouth every 12 (twelve) hours ) 360 tablet 3    metFORMIN (GLUCOPHAGE) 850 mg tablet Take 1 tablet (850 mg total) by mouth 2 (two) times a day with meals 60 tablet 3    pantoprazole (PROTONIX) 40 mg tablet Take 1 tablet (40 mg total) by mouth daily 60 tablet 0    prazosin (MINIPRESS) 2 mg capsule Take 2 mg by mouth daily at bedtime      QUEtiapine (SEROquel) 25 mg tablet Take 25 mg by mouth daily at bedtime       albuterol (2 5 mg/3 mL) 0 083 % nebulizer solution Take 3 mL (2 5 mg total) by nebulization every 4 (four) hours as needed for wheezing or shortness of breath (Patient not taking: Reported on 2021) 180 mL 0    albuterol (Ventolin HFA) 90 mcg/act inhaler Inhale 2 puffs every 4 (four) hours as needed for wheezing (Patient not taking: Reported on 2021) 18 g 0     No current facility-administered medications on file prior to visit  Allergies:   Allergies   Allergen Reactions    Haloperidol Other (See Comments)     Jaw locks up    Penicillins Hives    Pollen Extract Allergic Rhinitis     Physical Exam:  /80 (BP Location: Right arm, Patient Position: Sitting, Cuff Size: Extra-Large)   Temp 97 7 °F (36 5 °C) (Temporal)   Ht 4' 7" (1 397 m)   Wt 107 kg (236 lb)   LMP 07/13/2021 (Exact Date)   BMI 54 85 kg/m²     Physical Exam  Vitals reviewed  Constitutional:       General: She is not in acute distress  Appearance: Normal appearance  She is well-developed  She is obese  She is not ill-appearing or diaphoretic  HENT:      Head: Normocephalic and atraumatic  Cardiovascular:      Rate and Rhythm: Normal rate and regular rhythm  Heart sounds: Normal heart sounds  No murmur heard  No friction rub  No gallop  Pulmonary:      Effort: Pulmonary effort is normal  No respiratory distress  Breath sounds: Normal breath sounds  No wheezing or rales  Abdominal:      Palpations: Abdomen is soft  Tenderness: There is no abdominal tenderness  There is no guarding or rebound  Genitourinary:     Vagina: Normal    Musculoskeletal:      Cervical back: Normal range of motion and neck supple  Comments: Extremities: Trace edema bilaterally on LE, No tenderness or erythema   Skin:     General: Skin is warm and dry  Neurological:      Mental Status: She is alert and oriented to person, place, and time  Psychiatric:         Mood and Affect: Mood normal          Behavior: Behavior normal        Jagjit Zuñiga MD  09/09/21        Assessment/ Plan:  28 y o  [id number] with irregular menses and possible polyp for D&C hysteroscopy and polypectomy scheduled 9/22/2021  Reviewed procedure, same day procedure, pain expectations with patient  Cytotec sent to pharmacy to be taken night before surgery to assist with cervical stenosis  All questions and concerns addressed       Jagjit Zuñiga MD  09/09/21

## 2021-09-16 NOTE — PRE-PROCEDURE INSTRUCTIONS
My Surgical Experience    The following information was developed to assist you to prepare for your operation  What do I need to do before coming to the hospital?   Arrange for a responsible person to drive you to and from the hospital    Arrange care for your children at home  Children are not allowed in the recovery areas of the hospital   Plan to wear clothing that is easy to put on and take off  If you are having shoulder surgery, wear a shirt that buttons or zippers in the front  Bathing  o Shower the evening before and the morning of your surgery with an antibacterial soap  Please refer to the Pre Op Showering Instructions for Surgery Patients Sheet   o Remove nail polish and all body piercing jewelry  o Do not shave any body part for at least 24 hours before surgery-this includes face, arms, legs and upper body  Food  o Nothing to eat or drink after midnight the night before your surgery  This includes candy and chewing gum  o Exception: If your surgery is after 12:00pm (noon), you may have clear liquids such as 7-Up®, ginger ale, apple or cranberry juice, Jell-O®, water, or clear broth until 8:00 am  o Do not drink milk or juice with pulp on the morning before surgery  o Do not drink alcohol 24 hours before surgery  Medicine  o Follow instructions you received from your surgeon about which medicines you may take on the day of surgery  o If instructed to take medicine on the morning of surgery, take pills with just a small sip of water  Call your prescribing doctor for specific infroamtion on what to do if you take insulin    What should I bring to the hospital?    Bring:  Larjefe Merl or a walker, if you have them, for foot or knee surgery   A list of the daily medicines, vitamins, minerals, herbals and nutritional supplements you take   Include the dosages of medicines and the time you take them each day   Glasses, dentures or hearing aids   Minimal clothing; you will be wearing hospital sleepwear   Photo ID; required to verify your identity   If you have a Living Will or Power of , bring a copy of the documents   If you have an ostomy, bring an extra pouch and any supplies you use    Do not bring   Medicines or inhalers   Money, valuables or jewelry    What other information should I know about the day of surgery?  Notify your surgeons if you develop a cold, sore throat, cough, fever, rash or any other illness   Report to the Ambulatory Surgical/Same Day Surgery Unit   You will be instructed to stop at Registration only if you have not been pre-registered   Inform your  fi they do not stay that they will be asked by the staff to leave a phone number where they can be reached   Be available to be reached before surgery  In the event the operating room schedule changes, you may be asked to come in earlier or later than expected    *It is important to tell your doctor and others involved in your health care if you are taking or have been taking any non-prescription drugs, vitamins, minerals, herbals or other nutritional supplements  Any of these may interact with some food or medicines and cause a reaction      Pre-Surgery Instructions:   Medication Instructions    ARIPiprazole (ABILIFY) 5 mg tablet Instructed patient per Anesthesia Guidelines   citalopram (CeleXA) 10 mg tablet Instructed patient per Anesthesia Guidelines   lamoTRIgine (LaMICtal) 200 MG tablet Instructed patient per Anesthesia Guidelines   lamoTRIgine (LaMICtal) 25 mg tablet Instructed patient per Anesthesia Guidelines   levETIRAcetam (KEPPRA) 750 mg tablet Instructed patient per Anesthesia Guidelines   metFORMIN (GLUCOPHAGE) 850 mg tablet Instructed patient per Anesthesia Guidelines   pantoprazole (PROTONIX) 40 mg tablet Instructed patient per Anesthesia Guidelines   prazosin (MINIPRESS) 2 mg capsule Instructed patient per Anesthesia Guidelines      QUEtiapine (SEROquel) 25 mg tablet Instructed patient per Anesthesia Guidelines  To take lamictal, keppra, protonix and celexa a m  of surgery

## 2021-09-17 ENCOUNTER — TELEPHONE (OUTPATIENT)
Dept: BARIATRICS | Facility: CLINIC | Age: 31
End: 2021-09-17

## 2021-09-17 ENCOUNTER — APPOINTMENT (OUTPATIENT)
Dept: LAB | Facility: HOSPITAL | Age: 31
End: 2021-09-17
Attending: OBSTETRICS & GYNECOLOGY
Payer: COMMERCIAL

## 2021-09-17 DIAGNOSIS — N84.0 POLYP OF CORPUS UTERI: ICD-10-CM

## 2021-09-17 LAB
ALBUMIN SERPL BCP-MCNC: 3 G/DL (ref 3.5–5)
ALP SERPL-CCNC: 104 U/L (ref 46–116)
ALT SERPL W P-5'-P-CCNC: 28 U/L (ref 12–78)
ANION GAP SERPL CALCULATED.3IONS-SCNC: 9 MMOL/L (ref 4–13)
AST SERPL W P-5'-P-CCNC: 17 U/L (ref 5–45)
BASOPHILS # BLD AUTO: 0.05 THOUSANDS/ΜL (ref 0–0.1)
BASOPHILS NFR BLD AUTO: 1 % (ref 0–1)
BILIRUB SERPL-MCNC: 0.24 MG/DL (ref 0.2–1)
BUN SERPL-MCNC: 13 MG/DL (ref 5–25)
CALCIUM ALBUM COR SERPL-MCNC: 9.7 MG/DL (ref 8.3–10.1)
CALCIUM SERPL-MCNC: 8.9 MG/DL (ref 8.3–10.1)
CHLORIDE SERPL-SCNC: 103 MMOL/L (ref 100–108)
CO2 SERPL-SCNC: 28 MMOL/L (ref 21–32)
CREAT SERPL-MCNC: 0.71 MG/DL (ref 0.6–1.3)
EOSINOPHIL # BLD AUTO: 0.08 THOUSAND/ΜL (ref 0–0.61)
EOSINOPHIL NFR BLD AUTO: 2 % (ref 0–6)
ERYTHROCYTE [DISTWIDTH] IN BLOOD BY AUTOMATED COUNT: 14.8 % (ref 11.6–15.1)
GFR SERPL CREATININE-BSD FRML MDRD: 115 ML/MIN/1.73SQ M
GLUCOSE SERPL-MCNC: 128 MG/DL (ref 65–140)
HCT VFR BLD AUTO: 34.9 % (ref 34.8–46.1)
HGB BLD-MCNC: 10.4 G/DL (ref 11.5–15.4)
IMM GRANULOCYTES # BLD AUTO: 0.02 THOUSAND/UL (ref 0–0.2)
IMM GRANULOCYTES NFR BLD AUTO: 0 % (ref 0–2)
LYMPHOCYTES # BLD AUTO: 1.29 THOUSANDS/ΜL (ref 0.6–4.47)
LYMPHOCYTES NFR BLD AUTO: 24 % (ref 14–44)
MCH RBC QN AUTO: 24.9 PG (ref 26.8–34.3)
MCHC RBC AUTO-ENTMCNC: 29.8 G/DL (ref 31.4–37.4)
MCV RBC AUTO: 84 FL (ref 82–98)
MONOCYTES # BLD AUTO: 0.22 THOUSAND/ΜL (ref 0.17–1.22)
MONOCYTES NFR BLD AUTO: 4 % (ref 4–12)
NEUTROPHILS # BLD AUTO: 3.7 THOUSANDS/ΜL (ref 1.85–7.62)
NEUTS SEG NFR BLD AUTO: 69 % (ref 43–75)
NRBC BLD AUTO-RTO: 0 /100 WBCS
PLATELET # BLD AUTO: 363 THOUSANDS/UL (ref 149–390)
PMV BLD AUTO: 9.3 FL (ref 8.9–12.7)
POTASSIUM SERPL-SCNC: 3.5 MMOL/L (ref 3.5–5.3)
PROT SERPL-MCNC: 7.3 G/DL (ref 6.4–8.2)
RBC # BLD AUTO: 4.17 MILLION/UL (ref 3.81–5.12)
SODIUM SERPL-SCNC: 140 MMOL/L (ref 136–145)
TSH SERPL DL<=0.05 MIU/L-ACNC: 2.09 UIU/ML (ref 0.36–3.74)
WBC # BLD AUTO: 5.36 THOUSAND/UL (ref 4.31–10.16)

## 2021-09-17 PROCEDURE — 36415 COLL VENOUS BLD VENIPUNCTURE: CPT

## 2021-09-17 PROCEDURE — 85025 COMPLETE CBC W/AUTO DIFF WBC: CPT

## 2021-09-17 PROCEDURE — 80053 COMPREHEN METABOLIC PANEL: CPT

## 2021-09-17 PROCEDURE — U0003 INFECTIOUS AGENT DETECTION BY NUCLEIC ACID (DNA OR RNA); SEVERE ACUTE RESPIRATORY SYNDROME CORONAVIRUS 2 (SARS-COV-2) (CORONAVIRUS DISEASE [COVID-19]), AMPLIFIED PROBE TECHNIQUE, MAKING USE OF HIGH THROUGHPUT TECHNOLOGIES AS DESCRIBED BY CMS-2020-01-R: HCPCS | Performed by: OBSTETRICS & GYNECOLOGY

## 2021-09-17 PROCEDURE — 84443 ASSAY THYROID STIM HORMONE: CPT

## 2021-09-17 PROCEDURE — U0005 INFEC AGEN DETEC AMPLI PROBE: HCPCS | Performed by: OBSTETRICS & GYNECOLOGY

## 2021-09-17 NOTE — TELEPHONE ENCOUNTER
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

## 2021-09-21 ENCOUNTER — ANESTHESIA EVENT (OUTPATIENT)
Dept: PERIOP | Facility: HOSPITAL | Age: 31
End: 2021-09-21
Payer: COMMERCIAL

## 2021-09-21 PROBLEM — E11.9 DIABETES MELLITUS, TYPE 2 (HCC): Status: ACTIVE | Noted: 2021-09-21

## 2021-09-21 NOTE — ANESTHESIA PREPROCEDURE EVALUATION
Procedure:  DILATATION AND CURETTAGE (D&C) WITH HYSTEROSCOPY (N/A Uterus)    Relevant Problems   ENDO   (+) Diabetes mellitus, type 2 (HCC)      GI/HEPATIC   (+) Gastric ulcer   (+) Gastroesophageal reflux disease      NEURO/PSYCH   (+) Depression with anxiety   (+) History of irregular menstrual bleeding   (+) Nonintractable epilepsy without status epilepticus ([address] ) (Last Sz 7/13/21)      PULMONARY   (+) Sleep apnea (no CPAP)      Other   (+) Autism   (+) [place]'s esophagus   (+) Bipolar depression (HCC)   (+) Morbid (severe) obesity due to excess calories (HCC)   (+) PCOS (polycystic ovarian syndrome)        Physical Exam    Airway    Mallampati score: III  TM Distance: >3 FB  Neck ROM: full     Dental       Cardiovascular  Rhythm: regular, Rate: normal,     Pulmonary  Breath sounds clear to auscultation,     Other Findings        Anesthesia Plan  ASA Score- 3     Anesthesia Type- general with ASA Monitors  Additional Monitors:   Airway Plan: LMA  Plan Factors-    Chart reviewed  Patient is not a current smoker  Induction- intravenous  Postoperative Plan- Plan for postoperative opioid use  Informed Consent- Anesthetic plan and risks discussed with patient  I personally reviewed this patient with the CRNA  Discussed and agreed on the Anesthesia Plan with the CRNA  Sj Chatman

## 2021-09-22 ENCOUNTER — HOSPITAL ENCOUNTER (OUTPATIENT)
Facility: HOSPITAL | Age: 31
Setting detail: OUTPATIENT SURGERY
Discharge: HOME | End: 2021-09-22
Attending: OBSTETRICS & GYNECOLOGY | Admitting: OBSTETRICS & GYNECOLOGY
Payer: COMMERCIAL

## 2021-09-22 ENCOUNTER — ANESTHESIA (OUTPATIENT)
Dept: PERIOP | Facility: HOSPITAL | Age: 31
End: 2021-09-22
Payer: COMMERCIAL

## 2021-09-22 VITALS
HEIGHT: 55 IN | HEART RATE: 96 BPM | WEIGHT: 236.6 LBS | DIASTOLIC BLOOD PRESSURE: 73 MMHG | OXYGEN SATURATION: 100 % | TEMPERATURE: 97.4 F | SYSTOLIC BLOOD PRESSURE: 127 MMHG | RESPIRATION RATE: 18 BRPM | BODY MASS INDEX: 54.76 KG/M2

## 2021-09-22 DIAGNOSIS — G89.18 POST-OP PAIN: Primary | ICD-10-CM

## 2021-09-22 DIAGNOSIS — N84.0 ENDOMETRIAL POLYP: ICD-10-CM

## 2021-09-22 PROBLEM — K21.9 GASTROESOPHAGEAL REFLUX DISEASE: Status: ACTIVE | Noted: 2021-09-22

## 2021-09-22 LAB
EXT PREGNANCY TEST URINE: NEGATIVE
EXT. CONTROL: NORMAL

## 2021-09-22 PROCEDURE — 81025 URINE PREGNANCY TEST: CPT | Performed by: OBSTETRICS & GYNECOLOGY

## 2021-09-22 PROCEDURE — 88305 TISSUE EXAM BY PATHOLOGIST: CPT | Performed by: PATHOLOGY

## 2021-09-22 PROCEDURE — 58558 HYSTEROSCOPY BIOPSY: CPT | Performed by: STUDENT IN AN ORGANIZED HEALTH CARE EDUCATION/TRAINING PROGRAM

## 2021-09-22 RX ORDER — MIDAZOLAM HYDROCHLORIDE 2 MG/2ML
INJECTION, SOLUTION INTRAMUSCULAR; INTRAVENOUS AS NEEDED
Status: DISCONTINUED | OUTPATIENT
Start: 2021-09-22 | End: 2021-09-22

## 2021-09-22 RX ORDER — FENTANYL CITRATE 50 UG/ML
INJECTION, SOLUTION INTRAMUSCULAR; INTRAVENOUS AS NEEDED
Status: DISCONTINUED | OUTPATIENT
Start: 2021-09-22 | End: 2021-09-22

## 2021-09-22 RX ORDER — ACETAMINOPHEN 500 MG
500 TABLET ORAL EVERY 6 HOURS PRN
Refills: 0
Start: 2021-09-22

## 2021-09-22 RX ORDER — SODIUM CHLORIDE, SODIUM LACTATE, POTASSIUM CHLORIDE, CALCIUM CHLORIDE 600; 310; 30; 20 MG/100ML; MG/100ML; MG/100ML; MG/100ML
125 INJECTION, SOLUTION INTRAVENOUS CONTINUOUS
Status: DISCONTINUED | OUTPATIENT
Start: 2021-09-22 | End: 2021-09-22 | Stop reason: HOSPADM

## 2021-09-22 RX ORDER — ONDANSETRON 2 MG/ML
4 INJECTION INTRAMUSCULAR; INTRAVENOUS EVERY 6 HOURS PRN
Status: CANCELLED | OUTPATIENT
Start: 2021-09-22

## 2021-09-22 RX ORDER — ACETAMINOPHEN 325 MG/1
975 TABLET ORAL ONCE
Status: COMPLETED | OUTPATIENT
Start: 2021-09-22 | End: 2021-09-22

## 2021-09-22 RX ORDER — FENTANYL CITRATE/PF 50 MCG/ML
25 SYRINGE (ML) INJECTION
Status: DISCONTINUED | OUTPATIENT
Start: 2021-09-22 | End: 2021-09-22 | Stop reason: HOSPADM

## 2021-09-22 RX ORDER — ONDANSETRON 2 MG/ML
INJECTION INTRAMUSCULAR; INTRAVENOUS AS NEEDED
Status: DISCONTINUED | OUTPATIENT
Start: 2021-09-22 | End: 2021-09-22

## 2021-09-22 RX ORDER — DEXAMETHASONE SODIUM PHOSPHATE 4 MG/ML
INJECTION, SOLUTION INTRA-ARTICULAR; INTRALESIONAL; INTRAMUSCULAR; INTRAVENOUS; SOFT TISSUE AS NEEDED
Status: DISCONTINUED | OUTPATIENT
Start: 2021-09-22 | End: 2021-09-22

## 2021-09-22 RX ORDER — CLINDAMYCIN PHOSPHATE 900 MG/50ML
900 INJECTION INTRAVENOUS ONCE
Status: COMPLETED | OUTPATIENT
Start: 2021-09-22 | End: 2021-09-22

## 2021-09-22 RX ORDER — GABAPENTIN 100 MG/1
100 CAPSULE ORAL ONCE
Status: COMPLETED | OUTPATIENT
Start: 2021-09-22 | End: 2021-09-22

## 2021-09-22 RX ORDER — CEFAZOLIN SODIUM 2 G/50ML
2000 SOLUTION INTRAVENOUS ONCE
Status: DISCONTINUED | OUTPATIENT
Start: 2021-09-22 | End: 2021-09-22

## 2021-09-22 RX ORDER — SODIUM CHLORIDE, SODIUM LACTATE, POTASSIUM CHLORIDE, CALCIUM CHLORIDE 600; 310; 30; 20 MG/100ML; MG/100ML; MG/100ML; MG/100ML
75 INJECTION, SOLUTION INTRAVENOUS CONTINUOUS
Status: DISCONTINUED | OUTPATIENT
Start: 2021-09-22 | End: 2021-09-22 | Stop reason: HOSPADM

## 2021-09-22 RX ORDER — ACETAMINOPHEN 325 MG/1
650 TABLET ORAL EVERY 6 HOURS PRN
Status: CANCELLED | OUTPATIENT
Start: 2021-09-22

## 2021-09-22 RX ORDER — MAGNESIUM HYDROXIDE 1200 MG/15ML
LIQUID ORAL AS NEEDED
Status: DISCONTINUED | OUTPATIENT
Start: 2021-09-22 | End: 2021-09-22 | Stop reason: HOSPADM

## 2021-09-22 RX ORDER — PROPOFOL 10 MG/ML
INJECTION, EMULSION INTRAVENOUS AS NEEDED
Status: DISCONTINUED | OUTPATIENT
Start: 2021-09-22 | End: 2021-09-22

## 2021-09-22 RX ORDER — IBUPROFEN 400 MG/1
400 TABLET ORAL EVERY 8 HOURS PRN
Status: CANCELLED | OUTPATIENT
Start: 2021-09-22

## 2021-09-22 RX ADMIN — DEXAMETHASONE SODIUM PHOSPHATE 8 MG: 4 INJECTION, SOLUTION INTRA-ARTICULAR; INTRALESIONAL; INTRAMUSCULAR; INTRAVENOUS; SOFT TISSUE at 12:06

## 2021-09-22 RX ADMIN — FENTANYL CITRATE 25 MCG: 50 INJECTION INTRAMUSCULAR; INTRAVENOUS at 13:08

## 2021-09-22 RX ADMIN — PROPOFOL 50 MG: 10 INJECTION, EMULSION INTRAVENOUS at 11:54

## 2021-09-22 RX ADMIN — GENTAMICIN SULFATE 80 MG: 40 INJECTION, SOLUTION INTRAMUSCULAR; INTRAVENOUS at 11:51

## 2021-09-22 RX ADMIN — ACETAMINOPHEN 975 MG: 325 TABLET, FILM COATED ORAL at 10:55

## 2021-09-22 RX ADMIN — FENTANYL CITRATE 25 MCG: 50 INJECTION INTRAMUSCULAR; INTRAVENOUS at 13:24

## 2021-09-22 RX ADMIN — SODIUM CHLORIDE, SODIUM LACTATE, POTASSIUM CHLORIDE, AND CALCIUM CHLORIDE 75 ML/HR: .6; .31; .03; .02 INJECTION, SOLUTION INTRAVENOUS at 11:14

## 2021-09-22 RX ADMIN — GABAPENTIN 100 MG: 100 CAPSULE ORAL at 10:55

## 2021-09-22 RX ADMIN — FENTANYL CITRATE 25 MCG: 50 INJECTION INTRAMUSCULAR; INTRAVENOUS at 13:00

## 2021-09-22 RX ADMIN — FENTANYL CITRATE 50 MCG: 50 INJECTION, SOLUTION INTRAMUSCULAR; INTRAVENOUS at 11:57

## 2021-09-22 RX ADMIN — CLINDAMYCIN PHOSPHATE 900 MG: 18 INJECTION, SOLUTION INTRAMUSCULAR; INTRAVENOUS at 11:43

## 2021-09-22 RX ADMIN — PROPOFOL 50 MG: 10 INJECTION, EMULSION INTRAVENOUS at 11:47

## 2021-09-22 RX ADMIN — ONDANSETRON 4 MG: 2 INJECTION INTRAMUSCULAR; INTRAVENOUS at 12:06

## 2021-09-22 RX ADMIN — PROPOFOL 200 MG: 10 INJECTION, EMULSION INTRAVENOUS at 11:45

## 2021-09-22 RX ADMIN — SODIUM CHLORIDE, SODIUM LACTATE, POTASSIUM CHLORIDE, AND CALCIUM CHLORIDE: .6; .31; .03; .02 INJECTION, SOLUTION INTRAVENOUS at 11:37

## 2021-09-22 RX ADMIN — FENTANYL CITRATE 50 MCG: 50 INJECTION, SOLUTION INTRAMUSCULAR; INTRAVENOUS at 11:45

## 2021-09-22 RX ADMIN — MIDAZOLAM 2 MG: 1 INJECTION INTRAMUSCULAR; INTRAVENOUS at 11:38

## 2021-09-22 NOTE — DISCHARGE INSTRUCTIONS
Hysteroscopy   WHAT YOU SHOULD KNOW:   A hysteroscopy is a procedure to look at the inside of your uterus  Other procedures may also be done during the hysteroscopy  AFTER YOU LEAVE:   Medicines:   · Acetaminophen  decreases pain  It is available without a doctor's order  Ask how much to take and how often to take it  Follow directions  Acetaminophen can cause liver damage if not taken correctly  · NSAIDs  help decrease swelling and pain  This medicine is available with or without a doctor's order  NSAIDs can cause stomach bleeding or kidney problems in certain people  If you take blood thinner medicine, always ask your primary healthcare provider (PHP) if NSAIDs are safe for you  Always read the medicine label and follow directions  · Take your medicine as directed  Contact your PHP if you think your medicine is not helping or if you have side effects  Tell him if you are allergic to any medicine  Keep a list of the medicines, vitamins, and herbs you take  Include the amounts, and when and why you take them  Bring the list or the pill bottles to follow-up visits  Carry your medicine list with you in case of an emergency  Follow up with your PHP or gynecologist as directed:  Write down your questions so you remember to ask them during your visits  Activity guidelines: You may feel like resting more after the procedure  Slowly start to do more each day  Rest when you feel it is needed  Ask your PHP when you can return to your daily activities  Self-care:   · Do not put anything into your vagina until your PHP says it is okay  Do not have sex, douche, or use tampons  · You may need to continue to wear a sanitary pad for up to a week  You may have light bleeding or spotting  Contact your PHP if:   · You have a fever  · You have to change your sanitary pad every hour  · You have chills, a cough, or feel weak and achy  · You have abdominal pain that does not go away      · You have abnormal or foul-smelling vaginal discharge  · Your skin is itchy, swollen, or you have a rash  · You have questions or concerns about your condition or care  © 2014 [address] is for End User's use only and may not be sold, redistributed or otherwise used for commercial purposes  All illustrations and images included in CareNotes® are the copyrighted property of A D A M [employer] Inc  or [employer] Charles  The above information is an  only  It is not intended as medical advice for individual conditions or treatments  Talk to your doctor, nurse or pharmacist before following any medical regimen to see if it is safe and effective for you

## 2021-09-22 NOTE — INTERVAL H&P NOTE
H&P reviewed  After examining the patient I find no changes in the patients condition since the H&P had been written    Reviewed procedure and anticipated recovery - questions answered    Vitals:    09/22/21 1040   BP: 111/58   Pulse: 102   Resp: 16   Temp: 98 5 °F (36 9 °C)   SpO2: 98%

## 2021-09-22 NOTE — OP NOTE
OPERATIVE REPORT  PATIENT NAME: Murali Zavala    :  1990  MRN: [id number]  Pt Location: [facility] OR ROOM 01    SURGERY DATE: 2021    Surgeon(s) and Role:     * Alena Slade MD - Primary     * Demetris Pepe MD - Assisting    Preop Diagnosis:  Endometrial polyp [N84 0]    Post-Op Diagnosis Codes:     * Endometrial polyp [N84 0]    Procedure(s) (LRB):  DILATATION AND CURETTAGE (D&C) WITH HYSTEROSCOPY (N/A)    Specimen(s):  ID Type Source Tests Collected by Time Destination   1 : ENDOMETRIAL CURRETTINGS Tissue Endometrium TISSUE EXAM Alena Slade MD 2021 1204    2 : POLYP Tissue Polyp, Cervical/Endometrial TISSUE EXAM Alena Slade MD 2021 1205        Estimated Blood Loss:   5cc    Drains:  * No LDAs found *    Anesthesia Type:   General LMA    Operative Indications:  Endometrial polyp [N84 0]    Operative Findings:  1  Bimanual exam is small and mobile anteverted uterus is palpated, no adnexal masses appreciated, however exam was limited secondary to patient's body habitus  2  Speculum exam the vulva, vagina cervix appears grossly normal with no visible lesions- the cervix was small and nulliparous appearing  3  On hysteroscopy there was abundant polypoid tissue noted on the anterior and right side of the uterine cavity with fluffy endometrial tissue- bilateral ostia were not visualized  Following polypectomy curettage uterus appeared devoid of polyps structurally grossly normal     Complications:   None    Brief History  [facility] is a 27 yo G0 obese female presenting with menorrhagia and suspected endometrial polyp  Patient had endometrial biopsy attempted but was unsuccessful secondary to cervical stenosis  Patient was treated prior to surgery with Cytotec  All risks, benefits, and alternatives to the procedure were discussed with the patient and she had the opportunity to ask questions  Informed consent was obtained       Description of Procedure    Patient was taken to the operating room correct patient and correct procedure were confirmed  General LMA anesthesia (LMA) was administered and the patient was positioned on the OR table in the dorsal lithotomy position  All pressure points were padded and a adri hugger was placed to maintain control of core body temperature  A bimanual exam was performed and the uterus was noted to be anteverted, normal in size and consistency with no palpable adnexal masses or fullness appreciated, however this exam was limited secondary to patient body habitus  The patient was prepped vaginally and chlorhexidine and draped in the usual sterile fashion  A time-out was performed per procedure  Operative Technique    A weighted speculum was inserted into the vagina and a Sins retractor was used to visualize the anterior lip of the cervix, which was then grasped with an Allis  The uterus was sounded to 6cm  The cervix was serially dilated using hanks dilators to accommodate for introduction of the hysteroscope  Hysteroscope was introduced under direct visualization using normal saline solution as the distention media  Hysteroscope was advanced to the uterine fundus and the entire uterine cavity was inspected in a systematic manner  There was noted to be abundant polypoid tissue and fluffy endometrial tissue  Hysteroscope was withdrawn- polypectomy was performed polyp forceps and sharp curetting was performed, starting at the 12'oclock position and rotating a total of 360 degrees to cover all surfaces  Polypoid and endometrial tissue was obtained and sent for pathology  The hysteroscope was then re-introduced under direct visualization, again using normal saline solution as the distention media  Entire uterine cavity was inspected in a systematic manner  Adequate curetting was confirmed and hysteroscopy was withdrawn  The Allis was removed from the anterior lip of the cervix  Weighted speculum was then removed from the vagina      At the conclusion of the procedure, all needle, sponge, and instrument counts were noted to be correct x2  Patient tolerated the procedure well and was transferred to PACU in stable condition prior to discharge with follow up in 1-2 weeks  Procedure was performed along with Dr Severino Shah  No qualified resident was available for this case       Patient Disposition:  PACU     SIGNATURE: Tiesha Ewing MD  DATE: September 22, 2021  TIME: 12:30 PM

## 2021-09-23 ENCOUNTER — TELEPHONE (OUTPATIENT)
Dept: OBGYN CLINIC | Facility: CLINIC | Age: 31
End: 2021-09-23

## 2021-09-23 NOTE — TELEPHONE ENCOUNTER
Called patient to check in on her post op  Patient is doing well  Aware will call with results once reported  Aware to call if needs anything

## 2021-10-12 ENCOUNTER — TELEPHONE (OUTPATIENT)
Dept: BARIATRICS | Facility: CLINIC | Age: 31
End: 2021-10-12

## 2021-10-18 ENCOUNTER — TELEPHONE (OUTPATIENT)
Dept: FAMILY MEDICINE CLINIC | Facility: CLINIC | Age: 31
End: 2021-10-18

## 2021-10-18 DIAGNOSIS — R09.81 CONGESTED NOSE: ICD-10-CM

## 2021-10-18 DIAGNOSIS — R05.9 COUGH: Primary | ICD-10-CM

## 2021-10-19 PROCEDURE — U0005 INFEC AGEN DETEC AMPLI PROBE: HCPCS | Performed by: STUDENT IN AN ORGANIZED HEALTH CARE EDUCATION/TRAINING PROGRAM

## 2021-10-19 PROCEDURE — U0003 INFECTIOUS AGENT DETECTION BY NUCLEIC ACID (DNA OR RNA); SEVERE ACUTE RESPIRATORY SYNDROME CORONAVIRUS 2 (SARS-COV-2) (CORONAVIRUS DISEASE [COVID-19]), AMPLIFIED PROBE TECHNIQUE, MAKING USE OF HIGH THROUGHPUT TECHNOLOGIES AS DESCRIBED BY CMS-2020-01-R: HCPCS | Performed by: STUDENT IN AN ORGANIZED HEALTH CARE EDUCATION/TRAINING PROGRAM

## 2021-10-22 ENCOUNTER — OFFICE VISIT (OUTPATIENT)
Dept: URGENT CARE | Facility: CLINIC | Age: 31
End: 2021-10-22
Payer: COMMERCIAL

## 2021-10-22 VITALS
HEART RATE: 88 BPM | WEIGHT: 238 LBS | OXYGEN SATURATION: 100 % | DIASTOLIC BLOOD PRESSURE: 71 MMHG | TEMPERATURE: 98.4 F | SYSTOLIC BLOOD PRESSURE: 143 MMHG | BODY MASS INDEX: 55.32 KG/M2 | RESPIRATION RATE: 18 BRPM

## 2021-10-22 DIAGNOSIS — J06.9 VIRAL URI WITH COUGH: Primary | ICD-10-CM

## 2021-10-22 PROCEDURE — 99212 OFFICE O/P EST SF 10 MIN: CPT | Performed by: PHYSICIAN ASSISTANT

## 2021-10-25 ENCOUNTER — HOSPITAL ENCOUNTER (OUTPATIENT)
Dept: NEUROLOGY | Facility: HOSPITAL | Age: 31
Discharge: HOME | End: 2021-10-25
Attending: PSYCHIATRY & NEUROLOGY

## 2021-10-25 DIAGNOSIS — G40.909 NONINTRACTABLE EPILEPSY WITHOUT STATUS EPILEPTICUS, UNSPECIFIED EPILEPSY TYPE (HCC): ICD-10-CM

## 2021-10-26 ENCOUNTER — HOSPITAL ENCOUNTER (OUTPATIENT)
Dept: NEUROLOGY | Facility: HOSPITAL | Age: 31
Discharge: HOME | End: 2021-10-26
Attending: PSYCHIATRY & NEUROLOGY
Payer: COMMERCIAL

## 2021-10-26 DIAGNOSIS — G40.909 NONINTRACTABLE EPILEPSY WITHOUT STATUS EPILEPTICUS, UNSPECIFIED EPILEPSY TYPE (HCC): ICD-10-CM

## 2021-10-26 PROCEDURE — 95708 EEG WO VID EA 12-26HR UNMNTR: CPT

## 2021-10-28 PROCEDURE — 95719 EEG PHYS/QHP EA INCR W/O VID: CPT | Performed by: PSYCHIATRY & NEUROLOGY

## 2021-11-04 ENCOUNTER — TELEPHONE (OUTPATIENT)
Dept: NEUROLOGY | Facility: CLINIC | Age: 31
End: 2021-11-04

## 2021-11-11 ENCOUNTER — OFFICE VISIT (OUTPATIENT)
Dept: NEUROLOGY | Facility: CLINIC | Age: 31
End: 2021-11-11
Payer: COMMERCIAL

## 2021-11-11 VITALS
BODY MASS INDEX: 54.85 KG/M2 | TEMPERATURE: 97.2 F | WEIGHT: 236 LBS | HEART RATE: 95 BPM | SYSTOLIC BLOOD PRESSURE: 96 MMHG | DIASTOLIC BLOOD PRESSURE: 60 MMHG

## 2021-11-11 DIAGNOSIS — R20.2 PARESTHESIA OF RIGHT UPPER EXTREMITY: ICD-10-CM

## 2021-11-11 DIAGNOSIS — G40.909 NONINTRACTABLE EPILEPSY WITHOUT STATUS EPILEPTICUS, UNSPECIFIED EPILEPSY TYPE (HCC): Primary | ICD-10-CM

## 2021-11-11 DIAGNOSIS — M79.601 RIGHT UPPER LIMB PAIN: ICD-10-CM

## 2021-11-11 PROCEDURE — 99214 OFFICE O/P EST MOD 30 MIN: CPT | Performed by: PSYCHIATRY & NEUROLOGY

## 2021-11-11 PROCEDURE — 1036F TOBACCO NON-USER: CPT | Performed by: PSYCHIATRY & NEUROLOGY

## 2021-11-11 RX ORDER — LAMOTRIGINE 200 MG/1
200 TABLET ORAL 2 TIMES DAILY
Qty: 180 TABLET | Refills: 3 | Status: SHIPPED | OUTPATIENT
Start: 2021-11-11

## 2021-11-11 RX ORDER — LAMOTRIGINE 25 MG/1
50 TABLET ORAL 2 TIMES DAILY
Qty: 180 TABLET | Refills: 1 | Status: SHIPPED | OUTPATIENT
Start: 2021-11-11 | End: 2022-06-14

## 2021-11-16 NOTE — PROGRESS NOTES
Patient's name and  were verified during visit  Provider explained that [employer] is a HIPPA compliant platform and to further protect their confidentiality the provider was alone and the office door was closed  Patient consented to the virtual video visit  This visit is free  Patient presented for 3 of 6 weight check, self reporting a weight on her home scale of 232lbs on  when she last weighed herself  She has been working on having breakfast each morning, being able to have three meals a day for 4 out of 7 days in the last week  Prior to that she was still struggling with some grieving and stress but she reports feeling better  Her sister just had a baby so she is enjoying spending time with her, going for frequent walks with her as activity  Patient is getting bored with the foods she makes and she is also frustrated with being the only one in the home who cooks  She reports she buys all of the food and cooks it, otherwise her friend and the friend's child would just eat PB&J sandwiches or Por[employer] noodles  SW talked with patient about self care and the importance of setting boundaries with others  Patient agreed that she could have a talk with her again, SW encouraged patient to work on having three meals a day, checking in on body cues and being aware of emotions having impact on appetite  Patient is scheduled for in office visit in a month with DANUTA and BRENNAN 
details…

## 2021-12-08 DIAGNOSIS — K22.70 BARRETT'S ESOPHAGUS DETERMINED BY ENDOSCOPY: ICD-10-CM

## 2021-12-09 RX ORDER — PANTOPRAZOLE SODIUM 40 MG/1
TABLET, DELAYED RELEASE ORAL
Qty: 60 TABLET | Refills: 0 | Status: SHIPPED | OUTPATIENT
Start: 2021-12-09 | End: 2022-02-23

## 2021-12-13 ENCOUNTER — TELEPHONE (OUTPATIENT)
Dept: NEUROLOGY | Facility: CLINIC | Age: 31
End: 2021-12-13

## 2022-01-04 DIAGNOSIS — G40.909 NONINTRACTABLE EPILEPSY WITHOUT STATUS EPILEPTICUS, UNSPECIFIED EPILEPSY TYPE (HCC): ICD-10-CM

## 2022-01-04 RX ORDER — LEVETIRACETAM 750 MG/1
1500 TABLET ORAL EVERY 12 HOURS SCHEDULED
Qty: 360 TABLET | Refills: 3 | Status: SHIPPED | OUTPATIENT
Start: 2022-01-04

## 2022-02-12 ENCOUNTER — HOSPITAL ENCOUNTER (EMERGENCY)
Facility: HOSPITAL | Age: 32
Discharge: HOME | End: 2022-02-12
Attending: EMERGENCY MEDICINE | Admitting: EMERGENCY MEDICINE
Payer: COMMERCIAL

## 2022-02-12 VITALS
OXYGEN SATURATION: 100 % | RESPIRATION RATE: 20 BRPM | DIASTOLIC BLOOD PRESSURE: 56 MMHG | WEIGHT: 234.13 LBS | HEIGHT: 55 IN | BODY MASS INDEX: 54.18 KG/M2 | HEART RATE: 95 BPM | TEMPERATURE: 98.7 F | SYSTOLIC BLOOD PRESSURE: 112 MMHG

## 2022-02-12 DIAGNOSIS — G40.919 BREAKTHROUGH SEIZURE (HCC): Primary | ICD-10-CM

## 2022-02-12 LAB
ANION GAP SERPL CALCULATED.3IONS-SCNC: 12 MMOL/L (ref 4–13)
BASOPHILS # BLD AUTO: 0.11 THOUSANDS/ΜL (ref 0–0.1)
BASOPHILS NFR BLD AUTO: 1 % (ref 0–1)
BUN SERPL-MCNC: 17 MG/DL (ref 5–25)
CALCIUM SERPL-MCNC: 8.8 MG/DL (ref 8.3–10.1)
CHLORIDE SERPL-SCNC: 101 MMOL/L (ref 100–108)
CO2 SERPL-SCNC: 26 MMOL/L (ref 21–32)
CREAT SERPL-MCNC: 0.65 MG/DL (ref 0.6–1.3)
EOSINOPHIL # BLD AUTO: 0.06 THOUSAND/ΜL (ref 0–0.61)
EOSINOPHIL NFR BLD AUTO: 1 % (ref 0–6)
ERYTHROCYTE [DISTWIDTH] IN BLOOD BY AUTOMATED COUNT: 15.9 % (ref 11.6–15.1)
GFR SERPL CREATININE-BSD FRML MDRD: 118 ML/MIN/1.73SQ M
GLUCOSE SERPL-MCNC: 89 MG/DL (ref 65–140)
HCG SERPL QL: NEGATIVE
HCT VFR BLD AUTO: 37.2 % (ref 34.8–46.1)
HGB BLD-MCNC: 11.3 G/DL (ref 11.5–15.4)
IMM GRANULOCYTES # BLD AUTO: 0.02 THOUSAND/UL (ref 0–0.2)
IMM GRANULOCYTES NFR BLD AUTO: 0 % (ref 0–2)
LYMPHOCYTES # BLD AUTO: 1.68 THOUSANDS/ΜL (ref 0.6–4.47)
LYMPHOCYTES NFR BLD AUTO: 22 % (ref 14–44)
MCH RBC QN AUTO: 25.1 PG (ref 26.8–34.3)
MCHC RBC AUTO-ENTMCNC: 30.4 G/DL (ref 31.4–37.4)
MCV RBC AUTO: 83 FL (ref 82–98)
MONOCYTES # BLD AUTO: 0.38 THOUSAND/ΜL (ref 0.17–1.22)
MONOCYTES NFR BLD AUTO: 5 % (ref 4–12)
NEUTROPHILS # BLD AUTO: 5.45 THOUSANDS/ΜL (ref 1.85–7.62)
NEUTS SEG NFR BLD AUTO: 71 % (ref 43–75)
NRBC BLD AUTO-RTO: 0 /100 WBCS
PLATELET # BLD AUTO: 419 THOUSANDS/UL (ref 149–390)
PMV BLD AUTO: 8.6 FL (ref 8.9–12.7)
POTASSIUM SERPL-SCNC: 3.6 MMOL/L (ref 3.5–5.3)
RBC # BLD AUTO: 4.5 MILLION/UL (ref 3.81–5.12)
SODIUM SERPL-SCNC: 139 MMOL/L (ref 136–145)
WBC # BLD AUTO: 7.7 THOUSAND/UL (ref 4.31–10.16)

## 2022-02-12 PROCEDURE — 96360 HYDRATION IV INFUSION INIT: CPT

## 2022-02-12 PROCEDURE — 99284 EMERGENCY DEPT VISIT MOD MDM: CPT

## 2022-02-12 PROCEDURE — 85025 COMPLETE CBC W/AUTO DIFF WBC: CPT | Performed by: EMERGENCY MEDICINE

## 2022-02-12 PROCEDURE — 80048 BASIC METABOLIC PNL TOTAL CA: CPT | Performed by: EMERGENCY MEDICINE

## 2022-02-12 PROCEDURE — 36415 COLL VENOUS BLD VENIPUNCTURE: CPT | Performed by: EMERGENCY MEDICINE

## 2022-02-12 PROCEDURE — 99285 EMERGENCY DEPT VISIT HI MDM: CPT | Performed by: EMERGENCY MEDICINE

## 2022-02-12 PROCEDURE — 96361 HYDRATE IV INFUSION ADD-ON: CPT

## 2022-02-12 PROCEDURE — 84703 CHORIONIC GONADOTROPIN ASSAY: CPT | Performed by: EMERGENCY MEDICINE

## 2022-02-12 RX ADMIN — SODIUM CHLORIDE 1000 ML: 0.9 INJECTION, SOLUTION INTRAVENOUS at 19:15

## 2022-02-13 NOTE — ED NOTES
Patient is resting in room,no distress or seizure activity notes at this time,vital signs within normal limits,IVF's infusing via gravity  Side rails are up,call bell in reach       Jamey Gurrola RN  02/12/22 1947

## 2022-02-13 NOTE — ED PROVIDER NOTES
History  Chief Complaint   Patient presents with    Seizure - Prior Hx Of     Patient comes today post two seizure at home,was having a headache prior to seizure     32yoF hx epilepsy, only slept 2 hours last night, had a witnessed clonic-tonic seizure today  Now back to baseline  Adherent to lamictal   No recent illness  Prior to Admission Medications   Prescriptions Last Dose Informant Patient Reported? Taking?    ARIPiprazole (ABILIFY) 5 mg tablet   Yes Yes   Sig: Take 5 mg by mouth daily with lunch    QUEtiapine (SEROquel) 25 mg tablet   Yes Yes   Sig: Take 50 mg by mouth daily at bedtime     acetaminophen (TYLENOL) 500 mg tablet   No No   Sig: Take 1 tablet (500 mg total) by mouth every 6 (six) hours as needed for mild pain   citalopram (CeleXA) 10 mg tablet   Yes Yes   Sig: Take 10 mg by mouth every morning    lamoTRIgine (LaMICtal) 200 MG tablet   No Yes   Sig: Take 1 tablet (200 mg total) by mouth 2 (two) times a day Take with one 25 mg tablet for total dose of 225 mg    lamoTRIgine (LaMICtal) 25 mg tablet   No Yes   Sig: Take 2 tablets (50 mg total) by mouth 2 (two) times a day   levETIRAcetam (KEPPRA) 750 mg tablet   No Yes   Sig: Take 2 tablets (1,500 mg total) by mouth every 12 (twelve) hours   metFORMIN (GLUCOPHAGE) 850 mg tablet   No Yes   Sig: Take 1 tablet (850 mg total) by mouth 2 (two) times a day with meals   pantoprazole (PROTONIX) 40 mg tablet   No Yes   Sig: TAKE ONE TABLET BY MOUTH EVERY DAY   prazosin (MINIPRESS) 2 mg capsule   Yes Yes   Sig: Take 2 mg by mouth daily at bedtime      Facility-Administered Medications: None       Past Medical History:   Diagnosis Date    Autism     [place]'s esophagus     Diabetes mellitus ([facility] [address] )     Female infertility     Gastric ulcer     History of bronchitis     Irregular menses     Morbid obesity with BMI of 50 0-59 9, adult ([facility])     Reflux esophagitis     Seizures ([facility] [address] )     last one 7/13/21    Sleep apnea     no CPAP       Past Surgical History:   Procedure Laterality Date    EGD      with Bx due to barretts esophagus    EYE SURGERY Bilateral     lazy eye repair    RI HYSTEROSCOPY,W/ENDO BX N/A 9/22/2021    Procedure: DILATATION AND CURETTAGE (D&C) WITH HYSTEROSCOPY;  Surgeon: Teressa Agrawal MD;  Location: [facility];  Service: Gynecology    WISDOM TOOTH EXTRACTION         Family History   Problem Relation Age of Onset    Bipolar disorder Mother     Depression Mother     Depression Father     Diabetes Maternal Grandmother     Thyroid disease Maternal Grandmother     Diabetes Maternal Grandfather     Diabetes Paternal Grandmother     Diabetes Paternal Grandfather      I have reviewed and agree with the history as documented  E-Cigarette/Vaping    E-Cigarette Use Never User      E-Cigarette/Vaping Substances    Nicotine No     THC No     CBD No      Social History     Tobacco Use    Smoking status: Never Smoker    Smokeless tobacco: Never Used   Vaping Use    Vaping Use: Never used   Substance Use Topics    Alcohol use: Yes     Comment: occ    Drug use: Not Currently       Review of Systems   Constitutional: Negative for fever  Respiratory: Negative for cough  Gastrointestinal: Negative for abdominal pain  Musculoskeletal: Negative for back pain  Neurological: Negative for headaches  All other systems reviewed and are negative  Physical Exam  Physical Exam  Vitals reviewed  Constitutional:       Appearance: She is well-developed  HENT:      Head: Normocephalic and atraumatic  Right Ear: External ear normal       Left Ear: External ear normal       Nose: Nose normal  No rhinorrhea  Mouth/Throat:      Mouth: Mucous membranes are moist    Eyes:      Conjunctiva/sclera: Conjunctivae normal    Cardiovascular:      Rate and Rhythm: Normal rate and regular rhythm  Pulmonary:      Effort: Pulmonary effort is normal       Breath sounds: Normal breath sounds  No wheezing or rales     Abdominal: Palpations: Abdomen is soft  Tenderness: There is no abdominal tenderness  Musculoskeletal:      Cervical back: Neck supple  Right lower leg: No edema  Left lower leg: No edema  Skin:     General: Skin is warm and dry  Neurological:      General: No focal deficit present  Mental Status: She is alert and oriented to person, place, and time     Psychiatric:         Mood and Affect: Mood normal          Vital Signs  ED Triage Vitals [02/12/22 1901]   Temperature Pulse Respirations Blood Pressure SpO2   98 7 °F (37 1 °C) 96 20 116/66 100 %      Temp Source Heart Rate Source Patient Position - Orthostatic VS BP Location FiO2 (%)   Tympanic Monitor Lying Left arm --      Pain Score       --           Vitals:    02/12/22 1915 02/12/22 1930 02/12/22 2000 02/12/22 2030   BP: 116/66 126/77 132/70 112/56   Pulse: 97 93 90 95   Patient Position - Orthostatic VS:    Lying         Visual Acuity      ED Medications  Medications   sodium chloride 0 9 % bolus 1,000 mL (0 mL Intravenous Stopped 2/12/22 2149)       Diagnostic Studies  Results Reviewed     Procedure Component Value Units Date/Time    Pregnancy Test (HCG Qualitative) [[id number]]  (Normal) Collected: 02/12/22 1915    Lab Status: Final result Specimen: Blood from Arm, Right Updated: 02/12/22 2101     Preg, Serum Negative    Basic metabolic panel [[id number]] Collected: 02/12/22 1915    Lab Status: Final result Specimen: Blood from Arm, Right Updated: 02/12/22 1954     Sodium 139 mmol/L      Potassium 3 6 mmol/L      Chloride 101 mmol/L      CO2 26 mmol/L      ANION GAP 12 mmol/L      BUN 17 mg/dL      Creatinine 0 65 mg/dL      Glucose 89 mg/dL      Calcium 8 8 mg/dL      eGFR 118 ml/min/1 73sq m     Narrative:      [place] guidelines for Chronic Kidney Disease (CKD):     Stage 1 with normal or high GFR (GFR > 90 mL/min/1 73 square meters)    Stage 2 Mild CKD (GFR = 60-89 mL/min/1 73 square meters)    Stage 3A Moderate CKD (GFR = 45-59 mL/min/1 73 square meters)    Stage 3B Moderate CKD (GFR = 30-44 mL/min/1 73 square meters)    Stage 4 Severe CKD (GFR = 15-29 mL/min/1 73 square meters)    Stage 5 End Stage CKD (GFR <15 mL/min/1 73 square meters)  Note: GFR calculation is accurate only with a steady state creatinine    CBC and differential [[id number]]  (Abnormal) Collected: 02/12/22 1915    Lab Status: Final result Specimen: Blood from Arm, Right Updated: 02/12/22 1921     WBC 7 70 Thousand/uL      RBC 4 50 Million/uL      Hemoglobin 11 3 g/dL      Hematocrit 37 2 %      MCV 83 fL      MCH 25 1 pg      MCHC 30 4 g/dL      RDW 15 9 %      MPV 8 6 fL      Platelets 370 Thousands/uL      nRBC 0 /100 WBCs      Neutrophils Relative 71 %      Immat GRANS % 0 %      Lymphocytes Relative 22 %      Monocytes Relative 5 %      Eosinophils Relative 1 %      Basophils Relative 1 %      Neutrophils Absolute 5 45 Thousands/µL      Immature Grans Absolute 0 02 Thousand/uL      Lymphocytes Absolute 1 68 Thousands/µL      Monocytes Absolute 0 38 Thousand/µL      Eosinophils Absolute 0 06 Thousand/µL      Basophils Absolute 0 11 Thousands/µL                  No orders to display              Procedures  Procedures         ED Course                               SBIRT 20yo+      Most Recent Value   SBIRT (24 yo +)    In order to provide better care to our patients, we are screening all of our patients for alcohol and drug use  Would it be okay to ask you these screening questions? Yes Filed at: 02/12/2022 1908   Initial Alcohol Screen: US AUDIT-C     1  How often do you have a drink containing alcohol? 0 Filed at: 02/12/2022 1908   2  How many drinks containing alcohol do you have on a typical day you are drinking? 0 Filed at: 02/12/2022 1908   3a  Male UNDER 65: How often do you have five or more drinks on one occasion? 0 Filed at: 02/12/2022 1908   3b  FEMALE Any Age, or MALE 65+:  How often do you have 4 or more drinks on one occassion? 0 Filed at: 02/12/2022 1908   Audit-C Score 0 Filed at: 02/12/2022 5346   RANDY: How many times in the past year have you    Used an illegal drug or used a prescription medication for non-medical reasons? Never Filed at: 02/12/2022 1908                    [facility]  Number of Diagnoses or Management Options  Breakthrough seizure [facility])  Diagnosis management comments: Breakthrough seizure - likely due to sleep deprivation  Pt will f/u with neuro outpt  Discharged with safe ride home  Disposition  Final diagnoses:   Breakthrough seizure ([address] )     Time reflects when diagnosis was documented in both MDM as applicable and the Disposition within this note     Time User Action Codes Description Comment    2/12/2022  9:04 PM Reather Todd Add [G40 919] Breakthrough seizure [facility])       ED Disposition     ED Disposition Condition Date/Time Comment    Discharge Stable Sat Feb 12, 2022 2[address] discharge to home/self care              Follow-up Information     Follow up With Specialties Details Why Contact Info    Neurology - call tomorrow to discuss breakthrough seizure and medication dosing              Discharge Medication List as of 2/12/2022  9:05 PM      CONTINUE these medications which have NOT CHANGED    Details   ARIPiprazole (ABILIFY) 5 mg tablet Take 5 mg by mouth daily with lunch , Historical Med      citalopram (CeleXA) 10 mg tablet Take 10 mg by mouth every morning , Historical Med      !! lamoTRIgine (LaMICtal) 200 MG tablet Take 1 tablet (200 mg total) by mouth 2 (two) times a day Take with one 25 mg tablet for total dose of 225 mg , Starting Thu 11/11/2021, Normal      !! lamoTRIgine (LaMICtal) 25 mg tablet Take 2 tablets (50 mg total) by mouth 2 (two) times a day, Starting Thu 11/11/2021, Normal      levETIRAcetam (KEPPRA) 750 mg tablet Take 2 tablets (1,500 mg total) by mouth every 12 (twelve) hours, Starting Tue 1/4/2022, Normal      metFORMIN (GLUCOPHAGE) 850 mg tablet Take 1 tablet (850 mg total) by mouth 2 (two) times a day with meals, Starting Fri 4/16/2021, Normal      pantoprazole (PROTONIX) 40 mg tablet TAKE ONE TABLET BY MOUTH EVERY DAY, Normal      prazosin (MINIPRESS) 2 mg capsule Take 2 mg by mouth daily at bedtime, Historical Med      QUEtiapine (SEROquel) 25 mg tablet Take 50 mg by mouth daily at bedtime  , Historical Med      acetaminophen (TYLENOL) 500 mg tablet Take 1 tablet (500 mg total) by mouth every 6 (six) hours as needed for mild pain, Starting Wed 9/22/2021, No Print       !! - Potential duplicate medications found  Please discuss with provider  No discharge procedures on file      PDMP Review     None          ED Provider  Electronically Signed by           Mehrdad Rodriguez DO  02/13/22 2729

## 2022-02-15 ENCOUNTER — OFFICE VISIT (OUTPATIENT)
Dept: PULMONOLOGY | Facility: MEDICAL CENTER | Age: 32
End: 2022-02-15
Payer: COMMERCIAL

## 2022-02-15 VITALS
BODY MASS INDEX: 52.95 KG/M2 | DIASTOLIC BLOOD PRESSURE: 74 MMHG | HEIGHT: 55 IN | HEART RATE: 88 BPM | OXYGEN SATURATION: 98 % | RESPIRATION RATE: 12 BRPM | SYSTOLIC BLOOD PRESSURE: 122 MMHG | TEMPERATURE: 97 F | WEIGHT: 228.8 LBS

## 2022-02-15 DIAGNOSIS — E66.01 MORBID (SEVERE) OBESITY DUE TO EXCESS CALORIES (HCC): ICD-10-CM

## 2022-02-15 DIAGNOSIS — E66.01 MORBID OBESITY (HCC): ICD-10-CM

## 2022-02-15 DIAGNOSIS — G47.33 OBSTRUCTIVE SLEEP APNEA SYNDROME: ICD-10-CM

## 2022-02-15 DIAGNOSIS — G47.33 OSA (OBSTRUCTIVE SLEEP APNEA): Primary | ICD-10-CM

## 2022-02-15 DIAGNOSIS — R06.83 SNORING: ICD-10-CM

## 2022-02-15 DIAGNOSIS — G47.19 EXCESSIVE DAYTIME SLEEPINESS: ICD-10-CM

## 2022-02-15 PROCEDURE — 99214 OFFICE O/P EST MOD 30 MIN: CPT | Performed by: INTERNAL MEDICINE

## 2022-02-15 PROCEDURE — 1036F TOBACCO NON-USER: CPT | Performed by: INTERNAL MEDICINE

## 2022-02-15 PROCEDURE — 3008F BODY MASS INDEX DOCD: CPT | Performed by: INTERNAL MEDICINE

## 2022-02-15 NOTE — PATIENT INSTRUCTIONS
Sleep Apnea   AMBULATORY CARE:   Sleep apnea  is a condition that causes you to stop breathing often during sleep  Types of sleep apnea:   · Obstructive sleep apnea (GARIMA)  is the most common kind  The muscles and tissues around your throat relax and block air from passing through  Obesity, use of alcohol or cigarettes, or a family history are common causes  GARIMA may increase your risk for complications after surgery  · Central sleep apnea (CSA)  means your brain does not send signals to the muscles that control breathing  You do not take a breath even though your airway is open  Common causes include medical conditions such as heart failure, being older than 40, or use of opioids  · Complex (or mixed) sleep apnea  means you have both obstructive and central sleep apnea  Common signs and symptoms:   · Loud snoring or long pauses in breathing    · Feeling sleepy, slow, and tired during the day    · Snorting, gasping, or choking while you sleep, and waking up suddenly because of these    · Feeling irritable during the day    · Dry mouth or a headache in the mornings    · Heavy night sweating    · A hard time thinking, remembering things, or focusing on your tasks the following day    Call your local emergency number (911 in the [address]) if:   · You have chest pain or trouble breathing  Call your doctor if:   · You have new or worsening signs or symptoms  · You have questions or concerns about your condition or care  Treatment  depends on the kind of apnea you have  · A mouth device  may be needed if you have mild sleep apnea  These are designed to keep your throat open  Ask your dentist or healthcare provider about the best mouth device for you  · A machine  may be used to help you get more air during sleep  A mask may be placed over your nose and mouth, or just your nose  The mask is hooked to the machine  You will get air through the mask      ? A continuous positive airway pressure (CPAP) machine is used to keep your airway open during sleep  The machine blows a gentle stream of air into the mask when you breathe  This helps keep your airway open so you can breathe more regularly  Extra oxygen may be given through the machine  ? A bilevel positive airway pressure (BiPAP) machine  gives air but lowers the pressure when you breathe out  ? An adaptive servo-ventilator (ASV)  is a machine that learns your usual breathing pattern  Then, it uses pressure to give you air and prevent stops in your breathing  · Surgery  to expand your airway or remove extra tissues may be needed  Surgery is usually only considered if other treatments do not work  Manage or prevent sleep apnea:   · Reach and maintain a healthy weight  Ask your healthcare provider what a healthy weight is for you  Ask him or her to help you create a safe weight loss plan if you are overweight  Even a small goal of a 10% weight loss can improve your symptoms  · Do not smoke  Nicotine and other chemicals in cigarettes and cigars can cause lung damage  Ask your healthcare provider for information if you currently smoke and need help to quit  E-cigarettes or smokeless tobacco still contain nicotine  Talk to your healthcare provider before you use these products  · Do not drink alcohol or take sedative medicine before you go to sleep  Alcohol and sedatives can relax the muscles and tissues around your throat  This can block the airflow to your lungs  · Sleep on your side or use pillows designed to prevent sleep apnea  This prevents your tongue or other tissues from blocking your throat  You can also raise the head of your bed  Follow up with your doctor or specialist as directed: You may need to have blood tests during your follow-up visits  Work with your provider to find the right breathing support equipment and settings for you  Write down your questions so you remember to ask them during your visits    © Copyright [employer] 2021 Information is for [employer] use only and may not be sold, redistributed or otherwise used for commercial purposes  All illustrations and images included in CareNotes® are the copyrighted property of [employer] M [employer]  or Birdie Houston  The above information is an  only  It is not intended as medical advice for individual conditions or treatments  Talk to your doctor, nurse or pharmacist before following any medical regimen to see if it is safe and effective for you

## 2022-02-15 NOTE — ASSESSMENT & PLAN NOTE
· Shantell Montoya has high pretest probability for sleep apnea given her body mass index, history of prior diagnosed obstructive sleep apnea, snoring, excessive daytime sleepiness    I would like to order a home sleep study test to reestablish her diagnosis and restart PAP therapy

## 2022-02-15 NOTE — ASSESSMENT & PLAN NOTE
Body mass index of 53 18, she will benefit from weight loss, she follows up with weight loss program possibly undergoing a bariatric surgery not determined when

## 2022-02-15 NOTE — PROGRESS NOTES
Sleep Consultation   Miguel Goldberg 33 y o  female MRN: [id number]      Reason for consultation: GARIMA    Requesting physician: Dr Jessika Ramos     Assessment/Plan  1  GARIMA (obstructive sleep apnea)  Assessment & Plan:  · Kishore Ellsworth has high pretest probability for sleep apnea given her body mass index, history of prior diagnosed obstructive sleep apnea, snoring, excessive daytime sleepiness  I would like to order a home sleep study test to reestablish her diagnosis and restart PAP therapy    Orders:  -     Home Study; Future    2  Morbid obesity ([facility] [address] )  Assessment & Plan: Body mass index of 53 18, she will benefit from weight loss, she follows up with weight loss program possibly undergoing a bariatric surgery not determined when    Orders:  -     Ambulatory referral to Sleep Medicine  -     Home Study; Future    3  Obstructive sleep apnea syndrome  Assessment & Plan:  · Kishore Ellsworth has high pretest probability for sleep apnea given her body mass index, history of prior diagnosed obstructive sleep apnea, snoring, excessive daytime sleepiness  I would like to order a home sleep study test to reestablish her diagnosis and restart PAP therapy      4  Morbid (severe) obesity due to excess calories [facility])  Assessment & Plan: Body mass index of 53 18, she will benefit from weight loss, she follows up with weight loss program possibly undergoing a bariatric surgery not determined when      5  Excessive daytime sleepiness  Assessment & Plan:  Secondary to untreated GARIMA, detailed above      6   Snoring  Assessment & Plan:  Secondary to GARIMA, as detailed above            History of Present Illness   HPI:  Miguel Goldberg is a 33 y o  female with PMHx as below who comes in for evaluation of obstructive sleep apnea the patient was diagnosed many years ago in [place], she lost her machine, she moved to [place] recently and would like to undergo a sleep study for clearance for bariatric surgery, knowing that her history was proven to be positive for sleep apnea she will need a sleep study, she is also very symptomatic she tells me that she has a very low quality sleep wakes up very unrefreshed with frequent headache in the morning, she is witnessed to snore, snort and stops breathing while she is asleep, her average sleep is from 10:00 p m  To 740 a m  She wakes up unrefreshed and she would take a nap if she gets an opportunity in the afternoon she also has complained of restless leg syndrome symptoms, recurrent acid reflux and sleep talking she denies alcohol, caffeine, drug, tobacco abuse and her Zapata scale is 13            Review of Systems      Genitourinary need to urinate more than twice a night and hot flashes at night   Cardiology ankle/leg swelling   Gastrointestinal frequent heartburn/acid reflux   Neurology need to move extremities, numbness/tingling of an extremity, forgetfulness, poor concentration or confusion, , difficulty with memory and balance problems   Constitutional fatigue and weight change   Integumentary none   Psychiatry anxiety, depression and aggressiveness or irritability   Musculoskeletal muscle aches, legs twitching/jerking and leg cramps   Pulmonary shortness of breath with activity, frequent cough, snoring and difficulty breathing when lying flat    ENT throat clearing   Endocrine none   Hematological none               Historical Information   Past Medical History:   Diagnosis Date    Autism     Spain's esophagus     Diabetes mellitus ([facility] )     Female infertility     Gastric ulcer     History of bronchitis     Irregular menses     Morbid obesity with BMI of 50 0-59 9, adult ([facility])     Reflux esophagitis     Seizures ([facility] [address] )     last one 7/13/21    Sleep apnea     no CPAP     Past Surgical History:   Procedure Laterality Date    EGD      with Bx due to barretts esophagus    EYE SURGERY Bilateral     lazy eye repair    [place] HYSTEROSCOPY,W/ENDO BX N/A 9/22/2021    Procedure: DILATATION AND CURETTAGE (D&C) WITH HYSTEROSCOPY;  Surgeon: Maximus Sears MD;  Location: [facility];  Service: Gynecology    WISDOM TOOTH EXTRACTION       Family History   Problem Relation Age of Onset    Bipolar disorder Mother     Depression Mother     Depression Father     Diabetes Maternal Grandmother     Thyroid disease Maternal Grandmother     Diabetes Maternal Grandfather     Diabetes Paternal Grandmother     Diabetes Paternal Grandfather      Social History     Socioeconomic History    Marital status: Single     Spouse name: Not on file    Number of children: Not on file    Years of education: Not on file    Highest education level: Not on file   Occupational History    Not on file   Tobacco Use    Smoking status: Never Smoker    Smokeless tobacco: Never Used   Vaping Use    Vaping Use: Never used   Substance and Sexual Activity    Alcohol use: Yes     Comment: occ    Drug use: Not Currently    Sexual activity: Not Currently     Partners: Male     Birth control/protection: None     Comment: Trying to conceive for 6 years now   Other Topics Concern    Not on file   Social History Narrative    Not on file     Social Determinants of Health     Financial Resource Strain: Low Risk     Difficulty of Paying Living Expenses: Not very hard   Food Insecurity: Not on file   Transportation Needs: No Transportation Needs    Lack of Transportation (Medical): No    Lack of Transportation (Non-Medical):  No   Physical Activity: Not on file   Stress: Not on file   Social Connections: Not on file   Intimate Partner Violence: Not on file   Housing Stability: Low Risk     Unable to Pay for Housing in the Last Year: No    Number of Places Lived in the Last Year: 1    Unstable Housing in the Last Year: No       Occupational History: On disability     Meds/Allergies   Allergies   Allergen Reactions    Haloperidol Other (See Comments)     Jaw locks up    Penicillins Hives    Pollen Extract Allergic Rhinitis       Home medications:  Prior to Admission medications    Medication Sig Start Date End Date Taking? Authorizing Provider   acetaminophen (TYLENOL) 500 mg tablet Take 1 tablet (500 mg total) by mouth every 6 (six) hours as needed for mild pain 9/22/21   Nettie Smalls MD   ARIPiprazole (ABILIFY) 5 mg tablet Take 5 mg by mouth daily with lunch     Historical Provider, MD   citalopram (CeleXA) 10 mg tablet Take 10 mg by mouth every morning     Historical Provider, MD   lamoTRIgine (LaMICtal) 200 MG tablet Take 1 tablet (200 mg total) by mouth 2 (two) times a day Take with one 25 mg tablet for total dose of 225 mg  11/11/21   Treasure Armas MD   lamoTRIgine (LaMICtal) 25 mg tablet Take 2 tablets (50 mg total) by mouth 2 (two) times a day 11/11/21   Treasure Armas MD   levETIRAcetam (KEPPRA) 750 mg tablet Take 2 tablets (1,500 mg total) by mouth every 12 (twelve) hours 1/4/22   Treasure Armas MD   metFORMIN (GLUCOPHAGE) 850 mg tablet Take 1 tablet (850 mg total) by mouth 2 (two) times a day with meals 4/16/21   REY Perry   pantoprazole (PROTONIX) 40 mg tablet TAKE ONE TABLET BY MOUTH EVERY DAY 12/9/21   Osmany Sanchez MD   prazosin (MINIPRESS) 2 mg capsule Take 2 mg by mouth daily at bedtime    Historical Provider, MD   QUEtiapine (SEROquel) 25 mg tablet Take 50 mg by mouth daily at bedtime      Historical Provider, MD       Vitals:   Blood pressure 122/74, pulse 88, temperature (!) 97 °F (36 1 °C), temperature source Temporal, resp  rate 12, height 4' 7" (1 397 m), weight 104 kg (228 lb 12 8 oz), SpO2 98 %, not currently breastfeeding ,  Body mass index is 53 18 kg/m²         Physical Exam  General:  Awake alert and oriented x 3, conversant without conversational dyspnea, NAD, normal affect  HEENT:   Sclera noninjected, nonicteric OU, Nares patent,  no craniofacial abnormalities, Mucous membranes, moist, no oral lesions, normal dentition, Mallampati class 4  NECK:  Trachea midline, no accessory muscle use, no stridor,  JVP not elevated  CARDIAC: Reg, single s1/S2, no m/r/g  PULM: CTA bilaterally no wheezing, rhonchi or rales  ABD: Soft nontender, nondistended, no rebound, no rigidity, no guarding  EXT: No cyanosis, no clubbing, no edema, normal capillary refill  NEURO: no focal neurologic deficits, AAOx3, moving all extremities appropriately    Labs: I have personally reviewed pertinent lab results  , ABG: No results found for: PHART, MFY5FQI, PO2ART, KAH3GGU, C7GIMIEZ, BEART, SOURCE, BNP: No results found for: BNP, CBC: No results found for: WBC, HGB, HCT, MCV, PLT, ADJUSTEDWBC, MCH, MCHC, RDW, MPV, NRBC, CMP: No results found for: SODIUM, K, CL, CO2, ANIONGAP, BUN, CREATININE, GLUCOSE, CALCIUM, AST, ALT, ALKPHOS, PROT, BILITOT, EGFR, PT/INR: No results found for: PT, INR, Troponin: No results found for: TROPONINI  Lab Results   Component Value Date    WBC 7 70 02/12/2022    HGB 11 3 (L) 02/12/2022    HCT 37 2 02/12/2022    MCV 83 02/12/2022     (H) 02/12/2022      Lab Results   Component Value Date    CALCIUM 8 8 02/12/2022    K 3 6 02/12/2022    CO2 26 02/12/2022     02/12/2022    BUN 17 02/12/2022    CREATININE 0 65 02/12/2022     No results found for: IRON, TIBC, FERRITIN  No results found for: [id number]  No results found for: Julieta Cole MD  [address] [facility]       Portions of the record may have been created with voice recognition software  Occasional wrong word or "sound a like" substitutions may have occurred due to the inherent limitations of voice recognition software  Read the chart carefully and recognize, using context, where substitutions have occurred

## 2022-02-23 ENCOUNTER — TELEPHONE (OUTPATIENT)
Dept: NEUROLOGY | Facility: CLINIC | Age: 32
End: 2022-02-23

## 2022-02-23 DIAGNOSIS — K22.70 BARRETT'S ESOPHAGUS DETERMINED BY ENDOSCOPY: ICD-10-CM

## 2022-02-23 RX ORDER — PANTOPRAZOLE SODIUM 40 MG/1
TABLET, DELAYED RELEASE ORAL
Qty: 60 TABLET | Refills: 0 | Status: SHIPPED | OUTPATIENT
Start: 2022-02-23 | End: 2022-03-02

## 2022-03-02 ENCOUNTER — CONSULT (OUTPATIENT)
Dept: GASTROENTEROLOGY | Facility: CLINIC | Age: 32
End: 2022-03-02
Payer: MEDICARE

## 2022-03-02 VITALS
WEIGHT: 227 LBS | SYSTOLIC BLOOD PRESSURE: 125 MMHG | DIASTOLIC BLOOD PRESSURE: 86 MMHG | BODY MASS INDEX: 52.54 KG/M2 | HEART RATE: 92 BPM | TEMPERATURE: 97.8 F | OXYGEN SATURATION: 96 % | HEIGHT: 55 IN

## 2022-03-02 DIAGNOSIS — R13.19 ESOPHAGEAL DYSPHAGIA: ICD-10-CM

## 2022-03-02 DIAGNOSIS — D53.9 NUTRITIONAL ANEMIA, UNSPECIFIED: ICD-10-CM

## 2022-03-02 DIAGNOSIS — D64.9 NORMOCYTIC ANEMIA: ICD-10-CM

## 2022-03-02 DIAGNOSIS — Z87.19 HISTORY OF BARRETT'S ESOPHAGUS: ICD-10-CM

## 2022-03-02 DIAGNOSIS — K21.9 GASTROESOPHAGEAL REFLUX DISEASE WITHOUT ESOPHAGITIS: Primary | ICD-10-CM

## 2022-03-02 DIAGNOSIS — R11.2 NON-INTRACTABLE VOMITING WITH NAUSEA, UNSPECIFIED VOMITING TYPE: ICD-10-CM

## 2022-03-02 DIAGNOSIS — K59.09 CHRONIC CONSTIPATION: ICD-10-CM

## 2022-03-02 PROCEDURE — 99204 OFFICE O/P NEW MOD 45 MIN: CPT | Performed by: PHYSICIAN ASSISTANT

## 2022-03-02 RX ORDER — DOCUSATE SODIUM 100 MG/1
100 CAPSULE, LIQUID FILLED ORAL 2 TIMES DAILY
Qty: 60 CAPSULE | Refills: 5 | Status: SHIPPED | OUTPATIENT
Start: 2022-03-02

## 2022-03-02 RX ORDER — OMEPRAZOLE 40 MG/1
40 CAPSULE, DELAYED RELEASE ORAL DAILY
Qty: 30 CAPSULE | Refills: 5 | Status: SHIPPED | OUTPATIENT
Start: 2022-03-02 | End: 2022-03-31 | Stop reason: SDUPTHER

## 2022-03-02 RX ORDER — FAMOTIDINE 40 MG/1
40 TABLET, FILM COATED ORAL
Qty: 30 TABLET | Refills: 5 | Status: SHIPPED | OUTPATIENT
Start: 2022-03-02 | End: 2022-05-11 | Stop reason: SDUPTHER

## 2022-03-02 NOTE — PROGRESS NOTES
[facility] - Outpatient Consultation  Royce Partida 33 y o  female MRN: [id number]  Encounter: [id number]          ASSESSMENT AND PLAN:      #1  History of Hmalin's esophagus with GERD and esophageal dysphagia: reports having EGD every 3 months before moving her from [place] but does not recall having RFA or dysplasia  Recent EGD in fall 2021 with bariatrics did not show any hamlin's esophagus on biopsies, reports being on pantoprazole but having persistent symptoms, worse at night  -resume prilosec as this helped her in the past in [place]  -add pepcid before bedtime  -anti-reflux diet and measures  -likely recommend repeat EGD in 3-5 years for hx hamlin's  -will try to obtain records from GI in [place] Dr Ryan Chaparro [phone]  -GES to rule out gastroparesis which may be an exacerbating cause for GERD    #2  Chronic constipation: reports straining and only having 2 BM weekly  Reports taking a stool softener twice daily before and this helped and she would go 4-5 times per week  TSH normal in September   -start colace BID  -if no improvement, consider miralax or prescription options such as linzess or amitiza  -increase water intake  -fiber supplement    #3  Non-intractable vomiting with nausea, unspecified vomiting type: rule out gastroparesis, also could be from uncontrolled GERD  -switch meds as above  -small frequent meals  -glycemic control  -plan for gastric emptying study    #4   Normocytic anemia: reports only having her menses twice yearly, no obvious GI blood loss, hgb appears to have slowly decreased since 2020  -recheck hgb  -check iron, folate, B12  -may need to consider repeat colonoscopy if anemia persists and she is iron deficient, reports having one in 2017    Follow up in office after gastric emptying study   ______________________________________________________________________    HPI:  This is a 21-year-old female with a history of Hamlin's esophagus, GERD, diabetes, PCOS, sleep apnea, epilepsy, infertility, bipolar, obesity presents for new patient visit for multiple GI issues  Patient reports that she was following with a GI specialist in [place] before she moved up here last year  She reports that she had a history of Spain's esophagus and was getting upper endoscopies every 3 months but could not recall being told that she had dysplasia or having RFA in the past   She reports that she originally had her 1st EGD and colonoscopy in 2017 and been following with her GI physician up until 2019 but has not seen anyone since that time  She was taking Prilosec previously which was helping her but since coming here she has been taking pantoprazole  She saw the weight  and had an EGD by the bariatric surgeons in September of 2021 and biopsies at that time did not show any signs of Spain's however he did have endoscopic signs of Spain's esophagus  She reports that recently she has been having significant worsening of acid reflux specifically at night and she will sometimes vomit acid in the middle the night  She has nausea and bloating but denies any vomiting  She reports that she feels like food gets stuck on the way down as well as liquids  Denies any pain with swallowing  She reports that she has had history of constipation for many years she typically moves her bowels one to 2 times a week and has to strain to move her bowels  She denies any significant rectal bleeding or black tarry stools  She reports that she was on a stool softener twice daily previously and was moving her bowels 4 to 5 times a week but has not been taking this now  She reports in the past she has had intermittent bleeding from hemorrhoids and had hemorrhoidal banding in the past   She has a new anemia that was noted since 2020  She had a normal TSH recently    She reports that her maternal aunt has history of stomach cancer but denies any other family history of GI cancers  She currently is not working  REVIEW OF SYSTEMS:    CONSTITUTIONAL: Denies any fever, chills, rigors, and weight loss  HEENT: No earache or tinnitus  Denies hearing loss or visual disturbances  CARDIOVASCULAR: No chest pain or palpitations  RESPIRATORY: Denies any cough, hemoptysis, shortness of breath or dyspnea on exertion  GASTROINTESTINAL: As noted in the History of Present Illness  GENITOURINARY: No problems with urination  Denies any hematuria or dysuria  NEUROLOGIC: No dizziness or vertigo, denies headaches  MUSCULOSKELETAL: Denies any muscle or joint pain  SKIN: Denies skin rashes or itching  ENDOCRINE: Denies excessive thirst  Denies intolerance to heat or cold  PSYCHOSOCIAL: Denies depression or anxiety  Denies any recent memory loss         Historical Information   Past Medical History:   Diagnosis Date    Autism     [place] esophagus     [place]'s esophagus     Diabetes mellitus ([facility] [address] [facility]     Female infertility     Gastric ulcer     History of bronchitis     Irregular menses     Morbid obesity with BMI of 50 0-59 9, adult (HCC)     Reflux esophagitis     Seizures ([facility] [address] )     last one 7/13/21    Sleep apnea     no CPAP     Past Surgical History:   Procedure Laterality Date    EGD      with Bx due to barretts esophagus    EYE SURGERY Bilateral     lazy eye repair    [place] HYSTEROSCOPY,W/ENDO BX N/A 9/22/2021    Procedure: DILATATION AND CURETTAGE (D&C) WITH HYSTEROSCOPY;  Surgeon: Paty Olson MD;  Location: [facility];  Service: Gynecology    WISDOM TOOTH EXTRACTION       Social History   Social History     Substance and Sexual Activity   Alcohol Use Yes    Comment: occ     Social History     Substance and Sexual Activity   Drug Use Not Currently     Social History     Tobacco Use   Smoking Status Never Smoker   Smokeless Tobacco Never Used     Family History   Problem Relation Age of Onset    Bipolar disorder Mother     Depression Mother     Depression Father  Diabetes Maternal Grandmother     Thyroid disease Maternal Grandmother     Diabetes Maternal Grandfather     Diabetes Paternal Grandmother     Diabetes Paternal Grandfather        Meds/Allergies       Current Outpatient Medications:     acetaminophen (TYLENOL) 500 mg tablet    ARIPiprazole (ABILIFY) 10 mg tablet    citalopram (CeleXA) 10 mg tablet    lamoTRIgine (LaMICtal) 200 MG tablet    lamoTRIgine (LaMICtal) 25 mg tablet    levETIRAcetam (KEPPRA) 750 mg tablet    PRAZOSIN HCL PO    docusate sodium (COLACE) 100 mg capsule    famotidine (PEPCID) 40 MG tablet    metFORMIN (GLUCOPHAGE) 850 mg tablet    omeprazole (PriLOSEC) 40 MG capsule    QUEtiapine (SEROquel) 25 mg tablet    Allergies   Allergen Reactions    Haloperidol Other (See Comments)     Jaw locks up    Penicillins Hives    Pollen Extract Allergic Rhinitis           Objective     Blood pressure 125/86, pulse 92, temperature 97 8 °F (36 6 °C), height 4' 7" (1 397 m), weight 103 kg (227 lb), SpO2 96 %, not currently breastfeeding  Body mass index is 52 76 kg/m²  PHYSICAL EXAM:      General Appearance:   Alert, cooperative, no distress   HEENT:   Normocephalic, atraumatic, anicteric      Neck:  Supple, symmetrical, trachea midline   Lungs:   Clear to auscultation bilaterally; no rales, rhonchi or wheezing; respirations unlabored    Heart[de-identified]   Regular rate and rhythm; no murmur, rub, or gallop  Abdomen:   Soft, non-tender, obese abdomen, non-distended; normal bowel sounds; no masses, no organomegaly    Genitalia:   Deferred    Rectal:   Deferred    Extremities:  No cyanosis, clubbing or edema    Pulses:  2+ and symmetric    Skin:  No jaundice, rashes, or lesions    Lymph nodes:  No palpable cervical lymphadenopathy        Lab Results:   No visits with results within 1 Day(s) from this visit     Latest known visit with results is:   Admission on 02/12/2022, Discharged on 02/12/2022   Component Date Value    WBC 02/12/2022 7 70  RBC 02/12/2022 4 50     Hemoglobin 02/12/2022 11 3*    Hematocrit 02/12/2022 37 2     MCV 02/12/2022 83     MCH 02/12/2022 25 1*    MCHC 02/12/2022 30 4*    RDW 02/12/2022 15 9*    MPV 02/12/2022 8 6*    Platelets 25/78/1789 419*    nRBC 02/12/2022 0     Neutrophils Relative 02/12/2022 71     Immat GRANS % 02/12/2022 0     Lymphocytes Relative 02/12/2022 22     Monocytes Relative 02/12/2022 5     Eosinophils Relative 02/12/2022 1     Basophils Relative 02/12/2022 1     Neutrophils Absolute 02/12/2022 5 45     Immature Grans Absolute 02/12/2022 0 02     Lymphocytes Absolute 02/12/2022 1 68     Monocytes Absolute 02/12/2022 0 38     Eosinophils Absolute 02/12/2022 0 06     Basophils Absolute 02/12/2022 0 11*    Sodium 02/12/2022 139     Potassium 02/12/2022 3 6     Chloride 02/12/2022 101     CO2 02/12/2022 26     ANION GAP 02/12/2022 12     BUN 02/12/2022 17     Creatinine 02/12/2022 0 65     Glucose 02/12/2022 89     Calcium 02/12/2022 8 8     eGFR 02/12/2022 118     Preg, Serum 02/12/2022 Negative          Radiology Results:   No results found

## 2022-03-03 ENCOUNTER — OFFICE VISIT (OUTPATIENT)
Dept: NEUROLOGY | Facility: CLINIC | Age: 32
End: 2022-03-03
Payer: MEDICARE

## 2022-03-03 VITALS
TEMPERATURE: 96.1 F | SYSTOLIC BLOOD PRESSURE: 112 MMHG | HEART RATE: 105 BPM | DIASTOLIC BLOOD PRESSURE: 77 MMHG | BODY MASS INDEX: 52.54 KG/M2 | HEIGHT: 55 IN | WEIGHT: 227 LBS

## 2022-03-03 DIAGNOSIS — G40.909 NONINTRACTABLE EPILEPSY WITHOUT STATUS EPILEPTICUS, UNSPECIFIED EPILEPSY TYPE (HCC): Primary | ICD-10-CM

## 2022-03-03 PROCEDURE — 99214 OFFICE O/P EST MOD 30 MIN: CPT | Performed by: PSYCHIATRY & NEUROLOGY

## 2022-03-03 PROCEDURE — 3008F BODY MASS INDEX DOCD: CPT | Performed by: INTERNAL MEDICINE
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
## 2022-03-03 NOTE — PATIENT INSTRUCTIONS
1  Continue current seizure medications unchanged  2  Let us know if there are seizures or problems with your medication  3  Return in 3 months to see Dr Maribel Messina

## 2022-03-25 ENCOUNTER — HOSPITAL ENCOUNTER (OUTPATIENT)
Dept: RADIOLOGY | Facility: HOSPITAL | Age: 32
Discharge: HOME | End: 2022-03-25
Payer: MEDICARE

## 2022-03-25 DIAGNOSIS — R11.2 NON-INTRACTABLE VOMITING WITH NAUSEA, UNSPECIFIED VOMITING TYPE: ICD-10-CM

## 2022-03-25 PROCEDURE — G1004 CDSM NDSC: HCPCS

## 2022-03-25 PROCEDURE — A9541 TC99M SULFUR COLLOID: HCPCS

## 2022-03-25 PROCEDURE — 78264 GASTRIC EMPTYING IMG STUDY: CPT

## 2022-04-06 ENCOUNTER — TELEPHONE (OUTPATIENT)
Dept: NEUROLOGY | Facility: CLINIC | Age: 32
End: 2022-04-06

## 2022-04-06 NOTE — TELEPHONE ENCOUNTER
Clark Huffman called to schedule her follow up appointment with Dr Wil Mayer from her 3-3-22 appointment  She asked for an August appointment   Scheduled her for 8/18/22 at 430 pm in [facility]

## 2022-04-15 ENCOUNTER — HOSPITAL ENCOUNTER (EMERGENCY)
Facility: HOSPITAL | Age: 32
Discharge: HOME | End: 2022-04-15
Attending: EMERGENCY MEDICINE | Admitting: EMERGENCY MEDICINE
Payer: MEDICARE

## 2022-04-15 ENCOUNTER — APPOINTMENT (EMERGENCY)
Dept: RADIOLOGY | Facility: HOSPITAL | Age: 32
End: 2022-04-15
Payer: MEDICARE

## 2022-04-15 VITALS
DIASTOLIC BLOOD PRESSURE: 62 MMHG | BODY MASS INDEX: 53.78 KG/M2 | SYSTOLIC BLOOD PRESSURE: 128 MMHG | HEART RATE: 93 BPM | OXYGEN SATURATION: 100 % | RESPIRATION RATE: 18 BRPM | TEMPERATURE: 97.5 F | WEIGHT: 231.4 LBS

## 2022-04-15 DIAGNOSIS — R30.0 DYSURIA: ICD-10-CM

## 2022-04-15 DIAGNOSIS — R10.9 ABDOMINAL PAIN: Primary | ICD-10-CM

## 2022-04-15 LAB
ALBUMIN SERPL BCP-MCNC: 3.1 G/DL (ref 3.5–5)
ALP SERPL-CCNC: 119 U/L (ref 46–116)
ALT SERPL W P-5'-P-CCNC: 27 U/L (ref 12–78)
ANION GAP SERPL CALCULATED.3IONS-SCNC: 10 MMOL/L (ref 4–13)
AST SERPL W P-5'-P-CCNC: 17 U/L (ref 5–45)
BASOPHILS # BLD AUTO: 0.1 THOUSANDS/ΜL (ref 0–0.1)
BASOPHILS NFR BLD AUTO: 2 % (ref 0–1)
BILIRUB SERPL-MCNC: 0.1 MG/DL (ref 0.2–1)
BILIRUB UR QL STRIP: NEGATIVE
BUN SERPL-MCNC: 18 MG/DL (ref 5–25)
CALCIUM ALBUM COR SERPL-MCNC: 9.2 MG/DL (ref 8.3–10.1)
CALCIUM SERPL-MCNC: 8.5 MG/DL (ref 8.3–10.1)
CHLORIDE SERPL-SCNC: 100 MMOL/L (ref 100–108)
CLARITY UR: ABNORMAL
CO2 SERPL-SCNC: 25 MMOL/L (ref 21–32)
COLOR UR: ABNORMAL
CREAT SERPL-MCNC: 0.8 MG/DL (ref 0.6–1.3)
EOSINOPHIL # BLD AUTO: 0.12 THOUSAND/ΜL (ref 0–0.61)
EOSINOPHIL NFR BLD AUTO: 2 % (ref 0–6)
ERYTHROCYTE [DISTWIDTH] IN BLOOD BY AUTOMATED COUNT: 14.9 % (ref 11.6–15.1)
EXT PREG TEST URINE: NEGATIVE
EXT. CONTROL ED NAV: NORMAL
GFR SERPL CREATININE-BSD FRML MDRD: 98 ML/MIN/1.73SQ M
GLUCOSE SERPL-MCNC: 95 MG/DL (ref 65–140)
GLUCOSE UR STRIP-MCNC: NEGATIVE MG/DL
HCT VFR BLD AUTO: 37.7 % (ref 34.8–46.1)
HGB BLD-MCNC: 11.2 G/DL (ref 11.5–15.4)
HGB UR QL STRIP.AUTO: NEGATIVE
IMM GRANULOCYTES # BLD AUTO: 0.03 THOUSAND/UL (ref 0–0.2)
IMM GRANULOCYTES NFR BLD AUTO: 0 % (ref 0–2)
KETONES UR STRIP-MCNC: ABNORMAL MG/DL
LEUKOCYTE ESTERASE UR QL STRIP: NEGATIVE
LIPASE SERPL-CCNC: 81 U/L (ref 73–393)
LYMPHOCYTES # BLD AUTO: 1.36 THOUSANDS/ΜL (ref 0.6–4.47)
LYMPHOCYTES NFR BLD AUTO: 20 % (ref 14–44)
MCH RBC QN AUTO: 24.7 PG (ref 26.8–34.3)
MCHC RBC AUTO-ENTMCNC: 29.7 G/DL (ref 31.4–37.4)
MCV RBC AUTO: 83 FL (ref 82–98)
MONOCYTES # BLD AUTO: 0.48 THOUSAND/ΜL (ref 0.17–1.22)
MONOCYTES NFR BLD AUTO: 7 % (ref 4–12)
NEUTROPHILS # BLD AUTO: 4.69 THOUSANDS/ΜL (ref 1.85–7.62)
NEUTS SEG NFR BLD AUTO: 69 % (ref 43–75)
NITRITE UR QL STRIP: NEGATIVE
NRBC BLD AUTO-RTO: 0 /100 WBCS
PH UR STRIP.AUTO: 6.5 [PH]
PLATELET # BLD AUTO: 378 THOUSANDS/UL (ref 149–390)
PMV BLD AUTO: 8.9 FL (ref 8.9–12.7)
POTASSIUM SERPL-SCNC: 3.8 MMOL/L (ref 3.5–5.3)
PROT SERPL-MCNC: 7.6 G/DL (ref 6.4–8.2)
PROT UR STRIP-MCNC: NEGATIVE MG/DL
RBC # BLD AUTO: 4.53 MILLION/UL (ref 3.81–5.12)
SODIUM SERPL-SCNC: 135 MMOL/L (ref 136–145)
SP GR UR STRIP.AUTO: 1.02 (ref 1–1.03)
UROBILINOGEN UR QL STRIP.AUTO: 0.2 E.U./DL
WBC # BLD AUTO: 6.78 THOUSAND/UL (ref 4.31–10.16)

## 2022-04-15 PROCEDURE — 83690 ASSAY OF LIPASE: CPT | Performed by: EMERGENCY MEDICINE

## 2022-04-15 PROCEDURE — 36415 COLL VENOUS BLD VENIPUNCTURE: CPT | Performed by: EMERGENCY MEDICINE

## 2022-04-15 PROCEDURE — 85025 COMPLETE CBC W/AUTO DIFF WBC: CPT | Performed by: EMERGENCY MEDICINE

## 2022-04-15 PROCEDURE — 80053 COMPREHEN METABOLIC PANEL: CPT | Performed by: EMERGENCY MEDICINE

## 2022-04-15 PROCEDURE — 81003 URINALYSIS AUTO W/O SCOPE: CPT | Performed by: EMERGENCY MEDICINE

## 2022-04-15 PROCEDURE — 81025 URINE PREGNANCY TEST: CPT | Performed by: EMERGENCY MEDICINE

## 2022-04-15 PROCEDURE — 99284 EMERGENCY DEPT VISIT MOD MDM: CPT | Performed by: EMERGENCY MEDICINE

## 2022-04-15 PROCEDURE — 74177 CT ABD & PELVIS W/CONTRAST: CPT

## 2022-04-15 PROCEDURE — G1004 CDSM NDSC: HCPCS

## 2022-04-15 PROCEDURE — 99284 EMERGENCY DEPT VISIT MOD MDM: CPT

## 2022-04-15 RX ADMIN — IOHEXOL 100 ML: 350 INJECTION, SOLUTION INTRAVENOUS at 18:39

## 2022-04-15 NOTE — ED NOTES
IV removed  Discharge discussed with pt  Pt independably ambulatory out of department       Divine Argueta RN  04/15/22 [id number]

## 2022-04-15 NOTE — ED PROVIDER NOTES
History  Chief Complaint   Patient presents with    Abdominal Pain     States since 4/9, pain anterior aspect right thigh, at times radiates to right groin, pain RLQ abdomen that radiates suprapubic area  States " pressure to urinate " and pain perineum   Pain with urination and BM  Patient started having pain in her right upper thigh worse with certain movement and position  Pain then spread within last week to the right groin area and the right lower quadrant  Patient states the pain is associated with burning dysuria, frequency, urgency and low volumes of urine  She has been trying to hold her urine until she has to go and then is very painful as she urinates  Pain is radiating to the right side of the back  No fever chills  Prior to Admission Medications   Prescriptions Last Dose Informant Patient Reported? Taking?    ARIPiprazole (ABILIFY) 10 mg tablet  Self Yes No   Sig: Take 10 mg by mouth daily with lunch     PRAZOSIN HCL PO  Self Yes No   Sig: Take 3 mg by mouth daily at bedtime     QUEtiapine (SEROquel) 25 mg tablet  Self Yes No   Sig: Take 50 mg by mouth daily at bedtime     Patient not taking: Reported on 3/2/2022    acetaminophen (TYLENOL) 500 mg tablet  Self No No   Sig: Take 1 tablet (500 mg total) by mouth every 6 (six) hours as needed for mild pain   citalopram (CeleXA) 10 mg tablet  Self Yes No   Sig: Take 10 mg by mouth every morning    docusate sodium (COLACE) 100 mg capsule   No No   Sig: Take 1 capsule (100 mg total) by mouth 2 (two) times a day   famotidine (PEPCID) 40 MG tablet   No No   Sig: Take 1 tablet (40 mg total) by mouth daily at bedtime   lamoTRIgine (LaMICtal) 200 MG tablet  Self No No   Sig: Take 1 tablet (200 mg total) by mouth 2 (two) times a day Take with one 25 mg tablet for total dose of 225 mg    lamoTRIgine (LaMICtal) 25 mg tablet  Self No No   Sig: Take 2 tablets (50 mg total) by mouth 2 (two) times a day   levETIRAcetam (KEPPRA) 750 mg tablet  Self No No Sig: Take 2 tablets (1,500 mg total) by mouth every 12 (twelve) hours   metFORMIN (GLUCOPHAGE) 850 mg tablet  Self No No   Sig: Take 1 tablet (850 mg total) by mouth 2 (two) times a day with meals   Patient not taking: Reported on 3/2/2022    omeprazole (PriLOSEC) 40 MG capsule   No No   Sig: Take 1 capsule (40 mg total) by mouth 2 (two) times a day before meals   ondansetron (ZOFRAN) 4 mg tablet   No No   Sig: Take 1 tablet (4 mg total) by mouth every 8 (eight) hours as needed for nausea or vomiting      Facility-Administered Medications: None       Past Medical History:   Diagnosis Date    Autism     [place] esophagus     [place]'s esophagus     Diabetes mellitus ([facility] [address] )     Female infertility     Gastric ulcer     History of bronchitis     Irregular menses     Morbid obesity with BMI of 50 0-59 9, adult (HCC)     Reflux esophagitis     Seizures ([facility] [address] )     last one 7/13/21    Sleep apnea     no CPAP       Past Surgical History:   Procedure Laterality Date    EGD      with Bx due to barretts esophagus    EYE SURGERY Bilateral     lazy eye repair    [facility] HYSTEROSCOPY,W/ENDO BX N/A 9/22/2021    Procedure: DILATATION AND CURETTAGE (D&C) WITH HYSTEROSCOPY;  Surgeon: Tre Kimball MD;  Location: [facility];  Service: Gynecology    WISDOM TOOTH EXTRACTION         Family History   Problem Relation Age of Onset    Bipolar disorder Mother     Depression Mother     Depression Father     Diabetes Maternal Grandmother     Thyroid disease Maternal Grandmother     Diabetes Maternal Grandfather     Diabetes Paternal Grandmother     Diabetes Paternal Grandfather      I have reviewed and agree with the history as documented      E-Cigarette/Vaping    E-Cigarette Use Never User      E-Cigarette/Vaping Substances    Nicotine No     THC No     CBD No     Flavoring No     Other No     Unknown No      Social History     Tobacco Use    Smoking status: Never Smoker    Smokeless tobacco: Never Used   Vaping Use  Vaping Use: Never used   Substance Use Topics    Alcohol use: Yes     Comment: occ    Drug use: Not Currently       Review of Systems   Constitutional: Negative for chills and fever  HENT: Negative for congestion and sore throat  Respiratory: Negative for cough and shortness of breath  Cardiovascular: Negative for chest pain  Gastrointestinal: Positive for abdominal pain  Negative for vomiting  Genitourinary: Positive for difficulty urinating, dysuria, flank pain, pelvic pain and urgency  Negative for hematuria and vaginal discharge  Musculoskeletal: Positive for arthralgias  Skin: Negative for rash  Neurological: Negative for weakness and headaches  Hematological: Does not bruise/bleed easily  Psychiatric/Behavioral: Negative for confusion  All other systems reviewed and are negative  Physical Exam  Physical Exam  Vitals and nursing note reviewed  Constitutional:       Appearance: She is obese  HENT:      Head: Normocephalic  Mouth/Throat:      Mouth: Mucous membranes are moist    Eyes:      Extraocular Movements: Extraocular movements intact  Cardiovascular:      Rate and Rhythm: Normal rate and regular rhythm  Heart sounds: Normal heart sounds  Pulmonary:      Effort: Pulmonary effort is normal    Abdominal:      General: Abdomen is protuberant  Bowel sounds are normal       Palpations: Abdomen is soft  Tenderness: There is abdominal tenderness in the right lower quadrant, suprapubic area and left lower quadrant  Hernia: No hernia is present  Skin:     General: Skin is warm and dry  Capillary Refill: Capillary refill takes less than 2 seconds  Neurological:      General: No focal deficit present  Mental Status: She is alert and oriented to person, place, and time     Psychiatric:         Mood and Affect: Mood normal          Behavior: Behavior normal          Vital Signs  ED Triage Vitals [04/15/22 1515]   Temperature Pulse Respirations Blood Pressure SpO2   99 1 °F (37 3 °C) (!) 112 20 120/76 99 %      Temp Source Heart Rate Source Patient Position - Orthostatic VS BP Location FiO2 (%)   Tympanic Monitor Sitting Left arm --      Pain Score       7           Vitals:    04/15/22 1515 04/15/22 1721 04/15/22 1730   BP: 120/76 121/56 121/56   Pulse: (!) 112 (!) 106 (!) 110   Patient Position - Orthostatic VS: Sitting Lying          Visual Acuity      ED Medications  Medications   iohexol (OMNIPAQUE) 350 MG/ML injection (SINGLE-DOSE) 100 mL (100 mL Intravenous Given 4/15/22 1839)       Diagnostic Studies  Results Reviewed     Procedure Component Value Units Date/Time    Comprehensive metabolic panel [[id number]]  (Abnormal) Collected: 04/15/22 1807    Lab Status: Final result Specimen: Blood from Arm, Right Updated: 04/15/22 1828     Sodium 135 mmol/L      Potassium 3 8 mmol/L      Chloride 100 mmol/L      CO2 25 mmol/L      ANION GAP 10 mmol/L      BUN 18 mg/dL      Creatinine 0 80 mg/dL      Glucose 95 mg/dL      Calcium 8 5 mg/dL      Corrected Calcium 9 2 mg/dL      AST 17 U/L      ALT 27 U/L      Alkaline Phosphatase 119 U/L      Total Protein 7 6 g/dL      Albumin 3 1 g/dL      Total Bilirubin 0 10 mg/dL      eGFR 98 ml/min/1 73sq m     Narrative:      [place] guidelines for Chronic Kidney Disease (CKD):     Stage 1 with normal or high GFR (GFR > 90 mL/min/1 73 square meters)    Stage 2 Mild CKD (GFR = 60-89 mL/min/1 73 square meters)    Stage 3A Moderate CKD (GFR = 45-59 mL/min/1 73 square meters)    Stage 3B Moderate CKD (GFR = 30-44 mL/min/1 73 square meters)    Stage 4 Severe CKD (GFR = 15-29 mL/min/1 73 square meters)    Stage 5 End Stage CKD (GFR <15 mL/min/1 73 square meters)  Note: GFR calculation is accurate only with a steady state creatinine    Lipase [[id number]]  (Normal) Collected: 04/15/22 1807    Lab Status: Final result Specimen: Blood from Arm, Right Updated: 04/15/22 1828     Lipase 81 u/L     CBC and differential [[id number]]  (Abnormal) Collected: 04/15/22 1807    Lab Status: Final result Specimen: Blood from Arm, Right Updated: 04/15/22 1812     WBC 6 78 Thousand/uL      RBC 4 53 Million/uL      Hemoglobin 11 2 g/dL      Hematocrit 37 7 %      MCV 83 fL      MCH 24 7 pg      MCHC 29 7 g/dL      RDW 14 9 %      MPV 8 9 fL      Platelets 379 Thousands/uL      nRBC 0 /100 WBCs      Neutrophils Relative 69 %      Immat GRANS % 0 %      Lymphocytes Relative 20 %      Monocytes Relative 7 %      Eosinophils Relative 2 %      Basophils Relative 2 %      Neutrophils Absolute 4 69 Thousands/µL      Immature Grans Absolute 0 03 Thousand/uL      Lymphocytes Absolute 1 36 Thousands/µL      Monocytes Absolute 0 48 Thousand/µL      Eosinophils Absolute 0 12 Thousand/µL      Basophils Absolute 0 10 Thousands/µL     UA (URINE) with reflex to Scope [[id number]]  (Abnormal) Collected: 04/15/22 1716    Lab Status: Final result Specimen: Urine, Clean Catch Updated: 04/15/22 1723     Color, UA Light Yellow     Clarity, UA Slightly Cloudy     Specific Gravity, UA 1 025     pH, UA 6 5     Leukocytes, UA Negative     Nitrite, UA Negative     Protein, UA Negative mg/dl      Glucose, UA Negative mg/dl      Ketones, UA 15 (1+) mg/dl      Urobilinogen, UA 0 2 E U /dl      Bilirubin, UA Negative     Blood, UA Negative    POCT pregnancy, urine [[id number]]  (Normal) Resulted: 04/15/22 1718    Lab Status: Final result Updated: 04/15/22 1718     EXT PREG TEST UR (Ref: Negative) negative     Control valid                 CT abdomen pelvis with contrast   Final Result by Gigi Grewal MD (04/15 1902)      No acute intra-abdominal or pelvic process              Workstation performed: [id number]                    Procedures  Procedures         ED Course                                             MDM  Number of Diagnoses or Management Options  Abdominal pain  Dysuria  Diagnosis management comments: Lower abdominal pain associated with urinary symptoms  Will check possible UTI or pyelonephritis  CT scan the abdomen      Disposition  Final diagnoses:   Abdominal pain   Dysuria     Time reflects when diagnosis was documented in both MDM as applicable and the Disposition within this note     Time User Action Codes Description Comment    4/15/2022  7:13 PM Rolando Ifrah [email or web address] Add [R10 9] Abdominal pain     4/15/2022  7:13 PM Pamela Balderrama Add [R30 0] Dysuria       ED Disposition     ED Disposition Condition Date/Time Comment    Discharge Stable Fri Apr 15, 2022  7:13 PM [address] discharge to home/self care  Follow-up Information     Follow up With Specialties Details Why [address]   Schedule an appointment as soon as possible for a visit   [address] [facility]    Kelley Licona MD Urology   [address] [facility]  [phone]            Patient's Medications   Discharge Prescriptions    No medications on file       No discharge procedures on file      PDMP Review     None          ED Provider  Electronically Signed by           Robert Michele MD  04/15/22 [id number]

## 2022-04-22 ENCOUNTER — OFFICE VISIT (OUTPATIENT)
Dept: BARIATRICS | Facility: CLINIC | Age: 32
End: 2022-04-22
Payer: MEDICARE

## 2022-04-22 VITALS
HEIGHT: 56 IN | SYSTOLIC BLOOD PRESSURE: 116 MMHG | DIASTOLIC BLOOD PRESSURE: 80 MMHG | HEART RATE: 101 BPM | BODY MASS INDEX: 51.92 KG/M2 | TEMPERATURE: 99.3 F | WEIGHT: 230.8 LBS | RESPIRATION RATE: 18 BRPM

## 2022-04-22 DIAGNOSIS — E28.2 PCOS (POLYCYSTIC OVARIAN SYNDROME): ICD-10-CM

## 2022-04-22 DIAGNOSIS — F31.9 BIPOLAR DEPRESSION (HCC): ICD-10-CM

## 2022-04-22 DIAGNOSIS — K21.9 GASTROESOPHAGEAL REFLUX DISEASE: ICD-10-CM

## 2022-04-22 DIAGNOSIS — E66.01 CLASS 3 SEVERE OBESITY DUE TO EXCESS CALORIES WITH SERIOUS COMORBIDITY AND BODY MASS INDEX (BMI) OF 50.0 TO 59.9 IN ADULT (HCC): Primary | ICD-10-CM

## 2022-04-22 DIAGNOSIS — E11.9 DIABETES MELLITUS, TYPE 2 (HCC): ICD-10-CM

## 2022-04-22 DIAGNOSIS — G47.33 OSA (OBSTRUCTIVE SLEEP APNEA): ICD-10-CM

## 2022-04-22 DIAGNOSIS — G40.909 NONINTRACTABLE EPILEPSY WITHOUT STATUS EPILEPTICUS (HCC): ICD-10-CM

## 2022-04-22 PROCEDURE — 99214 OFFICE O/P EST MOD 30 MIN: CPT | Performed by: PHYSICIAN ASSISTANT

## 2022-04-22 NOTE — PATIENT INSTRUCTIONS
Goals: Food log (ie ) www myfitnesspal com,sparkpeople  com,loseit com,calorieking  com,etc  baritastic-weigh and measure   No sugary beverages  At least 64oz of water daily  eliminate soda consumption, use 2% milk   Increase physical activity by 10 minutes daily   Gradually increase physical activity to a goal of 5 days per week for 30 minutes of MODERATE intensity PLUS 2 days per week of FULL BODY resistance training -keep up the great work with the walking -30 minutes a day 3-4 days per week   1584-9346 calories   Look into protein based pasta

## 2022-04-22 NOTE — ASSESSMENT & PLAN NOTE
Taking pepcid and prilosec  -should improve with weight loss, dietary, and lifestyle changes  -continue management with prescribing provider

## 2022-04-22 NOTE — ASSESSMENT & PLAN NOTE
-Discussed options of HealthyCORE-Intensive Lifestyle Intervention Program, Very Low Calorie Diet-VLCD, Conservative Program, Alex-En-Y Gastric Bypass and Vertical Sleeve Gastrectomy and the role of weight loss medications   -Initial weight loss goal of 5-10% weight loss for improved health  -Screening labs  Recommend checking lab coverage before having labs drawn   -tsh 9/17/21 and cmp 4/15/22 reviewed and all within acceptable limits  -Patient is interested in pursuing Conservative Program     Goals:  Food log (ie ) www myfitnesspal com,sparkpeople  com,loseit com,calorieking  com,etc  baritastic-weigh and measure   No sugary beverages  At least 64oz of water daily  eliminate soda consumption, use 2% milk   Increase physical activity by 10 minutes daily   Gradually increase physical activity to a goal of 5 days per week for 30 minutes of MODERATE intensity PLUS 2 days per week of FULL BODY resistance training -keep up the great work with the walking -30 minutes a day 3-4 days per week   [id number] calories   Look into protein based pasta

## 2022-04-22 NOTE — ASSESSMENT & PLAN NOTE
Lab Results   Component Value Date    HGBA1C 5 4 12/01/2020   Taking metformin  -should improve with weight loss, dietary, and lifestyle changes  -continue management with prescribing provider

## 2022-04-22 NOTE — PROGRESS NOTES
Assessment/Plan:    Nonintractable epilepsy without status epilepticus ([facility] )  Taking keppra  -continue management with prescribing provider      Bipolar depression ([place] [address] )  Taking seroquel,lamictal, celexa, abilify  -continue management with prescribing provider      Gastroesophageal reflux disease  Taking pepcid and prilosec  -should improve with weight loss, dietary, and lifestyle changes  -continue management with prescribing provider      Diabetes mellitus, type 2 ([place] [address] )    Lab Results   Component Value Date    HGBA1C 5 4 12/01/2020   Taking metformin  -should improve with weight loss, dietary, and lifestyle changes  -continue management with prescribing provider      Class 3 severe obesity due to excess calories with serious comorbidity and body mass index (BMI) of 50 0 to 59 9 in adult [facility])  -Discussed options of HealthyCORE-Intensive Lifestyle Intervention Program, Very Low Calorie Diet-VLCD, Conservative Program, Alex-En-Y Gastric Bypass and Vertical Sleeve Gastrectomy and the role of weight loss medications   -Initial weight loss goal of 5-10% weight loss for improved health  -Screening labs  Recommend checking lab coverage before having labs drawn   -tsh 9/17/21 and cmp 4/15/22 reviewed and all within acceptable limits  -Patient is interested in pursuing Conservative Program     Goals:  Food log (ie ) www myfitnesspal com,sparkpeople  com,loseit com,calorieking  com,etc  baritastic-weigh and measure   No sugary beverages  At least 64oz of water daily  eliminate soda consumption, use 2% milk   Increase physical activity by 10 minutes daily   Gradually increase physical activity to a goal of 5 days per week for 30 minutes of MODERATE intensity PLUS 2 days per week of FULL BODY resistance training -keep up the great work with the walking -30 minutes a day 3-4 days per week   [id number] calories   Look into protein based pasta       GARIMA (obstructive sleep apnea)  -encouraged to follow with sleep medicine  -may improve with 20-30% weight loss        Follow up in approximately 2 months with Non-Surgical Physician/Advanced Practitioner  Diagnoses and all orders for this visit:    Class 3 severe obesity due to excess calories with serious comorbidity and body mass index (BMI) of 50 0 to 59 9 in adult [facility])  -     Lipid panel; Future  -     HEMOGLOBIN A1C W/ EAG ESTIMATION; Future  -     Insulin, fasting; Future    Nonintractable epilepsy without status epilepticus ([place] [address] )  -     Lipid panel; Future  -     HEMOGLOBIN A1C W/ EAG ESTIMATION; Future  -     Insulin, fasting; Future    Bipolar depression ([place] [address] )  -     Lipid panel; Future  -     HEMOGLOBIN A1C W/ EAG ESTIMATION; Future  -     Insulin, fasting; Future    Gastroesophageal reflux disease  -     Lipid panel; Future  -     HEMOGLOBIN A1C W/ EAG ESTIMATION; Future  -     Insulin, fasting; Future    Diabetes mellitus, type 2 ([place] [address] )  -     Lipid panel; Future  -     HEMOGLOBIN A1C W/ EAG ESTIMATION; Future  -     Insulin, fasting; Future    GARIMA (obstructive sleep apnea)  -     Lipid panel; Future  -     HEMOGLOBIN A1C W/ EAG ESTIMATION; Future  -     Insulin, fasting; Future    PCOS (polycystic ovarian syndrome)  -     Lipid panel; Future  -     HEMOGLOBIN A1C W/ EAG ESTIMATION; Future  -     Insulin, fasting; Future          Subjective:   Chief Complaint   Patient presents with    Consult     Initial visit w/ baritricdona        Patient ID: Melissa Koenig  is a 33 y o  female with excess weight/obesity here to pursue weight management      Past Medical History:   Diagnosis Date    Autism     [place] esophagus     [place]'s esophagus     Diabetes mellitus ([place] [address] )     Female infertility     Gastric ulcer     History of bronchitis     Irregular menses     Morbid obesity with BMI of 50 0-59 9, adult ([facility])     Reflux esophagitis     Seizures ([place] [address] )     last one 7/13/21    Sleep apnea     no CPAP       HPI:  Patient was previously in the surgical program and got nervous so she decided not to go through with surgery and just wants to try medical first    Currently she has cut back on her portions and working on eliminating soda  Walking 5-6 days per week for no less than 45 minutes   Goals: STG under 200lbs PAL681 lbs   Hydration: 1-2 bottles of water, 1 can of soda  Whole milk with cereal  Alcohol: none     The following portions of the patient's history were reviewed and updated as appropriate: allergies, current medications, past family history, past medical history, past social history, past surgical history and problem list     Review of Systems   HENT: Negative for sore throat  Respiratory: Negative for cough and shortness of breath  Cardiovascular: Negative for chest pain and palpitations  Gastrointestinal: Positive for abdominal pain (interstial cystits )  Negative for constipation, diarrhea, nausea and vomiting         + GERD-controlled with meds    Skin: Negative for rash  Psychiatric/Behavioral: Negative for suicidal ideas (denies HI)  + depression and anxiety-controlled        Objective:    /80 (BP Location: Left arm, Patient Position: Sitting, Cuff Size: Large)   Pulse 101   Temp 99 3 °F (37 4 °C) (Tympanic)   Resp 18   Ht 4' 8" (1 422 m)   Wt 105 kg (230 lb 12 8 oz)   BMI 51 74 kg/m²     Physical Exam  Vitals and nursing note reviewed  Constitutional   General appearance: Abnormal   well developed and morbidly obese  Eyes No conjunctival injection   Pulmonary   Respiratory effort: No increased work of breathing or signs of respiratory distress  Abdomen   Abdomen: Abnormal   The abdomen was obese  Musculoskeletal   Gait and station: Normal     Skin  Dry   No visible rashes   Psychiatric   Orientation to person, place and time: Normal     Affect: appropriate  Neurological   Normal gait

## 2022-04-29 ENCOUNTER — OFFICE VISIT (OUTPATIENT)
Dept: FAMILY MEDICINE CLINIC | Facility: CLINIC | Age: 32
End: 2022-04-29
Payer: MEDICARE

## 2022-04-29 VITALS
RESPIRATION RATE: 18 BRPM | WEIGHT: 229.6 LBS | HEIGHT: 56 IN | SYSTOLIC BLOOD PRESSURE: 118 MMHG | OXYGEN SATURATION: 98 % | TEMPERATURE: 97.7 F | HEART RATE: 107 BPM | BODY MASS INDEX: 51.65 KG/M2 | DIASTOLIC BLOOD PRESSURE: 76 MMHG

## 2022-04-29 DIAGNOSIS — R82.4 KETONURIA: Primary | ICD-10-CM

## 2022-04-29 DIAGNOSIS — R30.0 DYSURIA: ICD-10-CM

## 2022-04-29 PROBLEM — D50.8 IRON DEFICIENCY ANEMIA SECONDARY TO INADEQUATE DIETARY IRON INTAKE: Status: ACTIVE | Noted: 2022-04-29

## 2022-04-29 LAB
SL AMB  POCT GLUCOSE, UA: ABNORMAL
SL AMB LEUKOCYTE ESTERASE,UA: 500
SL AMB POCT BILIRUBIN,UA: 3
SL AMB POCT BLOOD,UA: 250
SL AMB POCT CLARITY,UA: ABNORMAL
SL AMB POCT COLOR,UA: ABNORMAL
SL AMB POCT KETONES,UA: 15
SL AMB POCT NITRITE,UA: ABNORMAL
SL AMB POCT PH,UA: 6.5
SL AMB POCT SPECIFIC GRAVITY,UA: 1.01
SL AMB POCT URINE PROTEIN: 500
SL AMB POCT UROBILINOGEN: 4

## 2022-04-29 PROCEDURE — 99213 OFFICE O/P EST LOW 20 MIN: CPT | Performed by: FAMILY MEDICINE

## 2022-04-29 PROCEDURE — 81002 URINALYSIS NONAUTO W/O SCOPE: CPT | Performed by: FAMILY MEDICINE

## 2022-04-29 NOTE — PROGRESS NOTES
Assessment/Plan:     Diagnoses and all orders for this visit:    Ketonuria  Dysuria  Results for orders placed or performed in visit on 04/29/22   POCT urine dip   Result Value Ref Range    LEUKOCYTE ESTERASE,     NITRITE,UA pos     SL AMB POCT UROBILINOGEN 4     POCT URINE PROTEIN 500      PH,UA 6 5     BLOOD,     SPECIFIC GRAVITY,UA 1 015     KETONES,UA 15     BILIRUBIN,UA 3     GLUCOSE, UA norm      COLOR,UA red     CLARITY,UA cloudy    pt on menses so UA findings can be secondary to this  One prior urine culture on file, no growth on 11/30/2020  Prior UA does not demonstrate persistent/recurrent leukocytes, blood, protein but only intermittently  Will check urine culture at this time  This can be interstitial cystitis due to pt's persistent  symptoms  Will rule out UTI by urine culture  No antibiotics at this time, will await urine culture  If urine culture negative, will delve into further extensive history taking which was limited today due to short appointment time which is not adequate to discuss possible diagnosis of interstitial cystitis  If diagnosis present, discussed with pt that lifestyle modifications are paramount  Will need pelvic exam to rule out secondary causes such as cystocele, urethral prolapse, atrophic vaginitis  -     Urine culture        Subjective:      Patient ID: Radha Gonzalez is a 33 y o  female  HPI  Pt was seen in ED with concerns of UTI/pyelonephritis  CT wnl  UA showed ketones  Per pt, she was asked to follow up for concerns of interstitial cystitis and urology follow up  Reports she always has urinary pressure but does not urinate when trying  Reports intermittent dysuria  Does not recall history of recurrent UTI       The following portions of the patient's history were reviewed and updated as appropriate: allergies, current medications, past family history, past medical history, past social history, past surgical history and problem list     Review of Systems   Constitutional: Negative for chills and fever  Respiratory: Negative for chest tightness and shortness of breath  Cardiovascular: Negative for chest pain and palpitations  Gastrointestinal: Negative for abdominal pain  Genitourinary: Positive for difficulty urinating and dysuria  Skin: Negative for rash  Neurological: Negative for light-headedness and headaches  Psychiatric/Behavioral: Negative for dysphoric mood and suicidal ideas  The patient is not nervous/anxious  Objective:      /76   Pulse (!) 107   Temp 97 7 °F (36 5 °C) (Tympanic)   Resp 18   Ht 4' 8" (1 422 m)   Wt 104 kg (229 lb 9 6 oz)   SpO2 98%   BMI 51 48 kg/m²          Physical Exam  Constitutional:       Appearance: Normal appearance  HENT:      Head: Normocephalic and atraumatic  Cardiovascular:      Rate and Rhythm: Normal rate and regular rhythm  Pulses: Normal pulses  Heart sounds: Normal heart sounds  No murmur heard  Pulmonary:      Effort: Pulmonary effort is normal  No respiratory distress  Breath sounds: Normal breath sounds  No wheezing  Abdominal:      General: Bowel sounds are normal  There is no distension  Palpations: Abdomen is soft  Tenderness: There is no abdominal tenderness  Musculoskeletal:      Right lower leg: No edema  Left lower leg: No edema  Skin:     General: Skin is warm and dry  Neurological:      General: No focal deficit present  Mental Status: She is alert and oriented to person, place, and time     Psychiatric:         Mood and Affect: Mood normal          Behavior: Behavior normal

## 2022-04-30 LAB
BACTERIA UR CULT: NORMAL
Lab: NORMAL

## 2022-05-05 ENCOUNTER — HOSPITAL ENCOUNTER (OUTPATIENT)
Dept: NEUROLOGY | Facility: HOSPITAL | Age: 32
Discharge: HOME | End: 2022-05-05
Attending: PSYCHIATRY & NEUROLOGY
Payer: MEDICARE

## 2022-05-05 DIAGNOSIS — M79.601 RIGHT UPPER LIMB PAIN: ICD-10-CM

## 2022-05-05 PROCEDURE — 95909 NRV CNDJ TST 5-6 STUDIES: CPT | Performed by: PSYCHIATRY & NEUROLOGY

## 2022-05-05 PROCEDURE — 95886 MUSC TEST DONE W/N TEST COMP: CPT | Performed by: PSYCHIATRY & NEUROLOGY

## 2022-05-11 ENCOUNTER — OFFICE VISIT (OUTPATIENT)
Dept: GASTROENTEROLOGY | Facility: CLINIC | Age: 32
End: 2022-05-11
Payer: MEDICARE

## 2022-05-11 ENCOUNTER — TELEPHONE (OUTPATIENT)
Dept: GASTROENTEROLOGY | Facility: CLINIC | Age: 32
End: 2022-05-11

## 2022-05-11 VITALS
HEIGHT: 56 IN | SYSTOLIC BLOOD PRESSURE: 118 MMHG | DIASTOLIC BLOOD PRESSURE: 74 MMHG | WEIGHT: 230.6 LBS | BODY MASS INDEX: 51.87 KG/M2 | OXYGEN SATURATION: 100 % | RESPIRATION RATE: 18 BRPM | HEART RATE: 90 BPM

## 2022-05-11 DIAGNOSIS — R13.19 ESOPHAGEAL DYSPHAGIA: ICD-10-CM

## 2022-05-11 DIAGNOSIS — Z87.19 HISTORY OF BARRETT'S ESOPHAGUS: ICD-10-CM

## 2022-05-11 DIAGNOSIS — K21.9 GASTROESOPHAGEAL REFLUX DISEASE WITHOUT ESOPHAGITIS: Primary | ICD-10-CM

## 2022-05-11 DIAGNOSIS — K59.09 OTHER CONSTIPATION: ICD-10-CM

## 2022-05-11 PROBLEM — R13.10 DYSPHAGIA: Status: ACTIVE | Noted: 2022-05-11

## 2022-05-11 PROBLEM — K59.00 CONSTIPATION: Status: ACTIVE | Noted: 2022-05-11

## 2022-05-11 PROCEDURE — 99213 OFFICE O/P EST LOW 20 MIN: CPT | Performed by: INTERNAL MEDICINE

## 2022-05-11 RX ORDER — FAMOTIDINE 40 MG/1
40 TABLET, FILM COATED ORAL
Qty: 30 TABLET | Refills: 5 | Status: SHIPPED | OUTPATIENT
Start: 2022-05-11

## 2022-05-11 RX ORDER — OMEPRAZOLE 40 MG/1
40 CAPSULE, DELAYED RELEASE ORAL
Qty: 60 CAPSULE | Refills: 3 | Status: SHIPPED | OUTPATIENT
Start: 2022-05-11

## 2022-05-11 NOTE — PATIENT INSTRUCTIONS
Continue omeprazole twice daily and take famotidine close to bedtime  Prop yourself up while asleep (wedge pillow, or old phone books [facility] [place])

## 2022-05-11 NOTE — LETTER
May 11, 2022     [place], [address] [facility] [address]    Patient: Ameya Bo   YOB: 1990   Date of Visit: 5/11/2022       Dear Dr Lauren Cespedes:    Thank you for referring Ameya Bo to me for evaluation  Below are my notes for this consultation  If you have questions, please do not hesitate to call me  I look forward to following your patient along with you  Sincerely,        Josefina Linton MD        CC: No Recipients  Josefina Linton MD  5/11/2022  3:37 PM  Sign when Signing Visit  [address] [facility]  Ameya Bo 33 y o  female MRN: [id number]            Assessment & Plan:  81-year-old female here for follow-up of reflux symptoms, chronic constipation    1  GERD: Endoscopy last year that ext was negative for [place]'s esophagus  -continue omeprazole 40 mg twice daily  -recommend that she take her famotidine closer to bedtime, she is currently taking her 2nd dose of famotidine around 8:00 p m   -continue to maintain at least 3 or 4 hours between dinner and lying down   -recommended that she use a wedge pillow to keep the head of bed elevated to minimize reflux symptoms  -continue with weight loss goals   -no further changes at this time, patient appears to be gradually improving  -if she is being considered for bariatric surgery, she would do better with a gastric bypass    2  Mild dysphagia: Unclear etiology, most likely secondary to reflux, may have some mild underlying dysmotility   -recommend checking a barium esophagram  -if she has persistent dysphagia, would recommend esophageal manometry prior to her gastric bypass    3  Chronic constipation: Much improved with daily Colace  -continue current regimen   -patient had a colonoscopy in 2018 outside hospital      [facility] was seen today for gerd  Diagnoses and all orders for this visit:    Gastroesophageal reflux disease without esophagitis  -     FL barium swallow;  Future  - omeprazole (PriLOSEC) 40 MG capsule; Take 1 capsule (40 mg total) by mouth in the morning and 1 capsule (40 mg total) in the evening  Take before meals  -     famotidine (PEPCID) 40 MG tablet; Take 1 tablet (40 mg total) by mouth daily at bedtime    Other constipation    Esophageal dysphagia  -     FL barium swallow; Future    History of [place]'s esophagus  -     omeprazole (PriLOSEC) 40 MG capsule; Take 1 capsule (40 mg total) by mouth in the morning and 1 capsule (40 mg total) in the evening  Take before meals  -     famotidine (PEPCID) 40 MG tablet; Take 1 tablet (40 mg total) by mouth daily at bedtime            _____________________________________________________________        CC: Follow-up    HPI:  Ailyn Mcleod is a 33 y  o female who is here for follow-up  Pleasant 23-year-old female, here for follow-up of GERD, dysphagia, constipation  Doing much better, reports a bowel function has improved with daily stool softeners, she takes Colace not having 1 to 2 formed bowel movements daily  Still having occasional nocturnal regurgitation by 2 days per week  Her reflux symptoms during the daytime much improved with current regimen  Still having occasional dysphagia as well  Denies any nausea vomiting  Denies any abdominal pain    Trying to lose weight, has dropped about 10 lb           ROS:  The remainder of the ROS was negative except for the pertinent positives mentioned in HPI        Allergies: Haloperidol, Penicillins, and Pollen extract    Medications:   Current Outpatient Medications:     ARIPiprazole (ABILIFY) 10 mg tablet, Take 10 mg by mouth daily with lunch  , Disp: , Rfl:     citalopram (CeleXA) 10 mg tablet, Take 10 mg by mouth every morning , Disp: , Rfl:     docusate sodium (COLACE) 100 mg capsule, Take 1 capsule (100 mg total) by mouth 2 (two) times a day, Disp: 60 capsule, Rfl: 5    famotidine (PEPCID) 40 MG tablet, Take 1 tablet (40 mg total) by mouth daily at bedtime, Disp: 30 tablet, Rfl: 5    lamoTRIgine (LaMICtal) 200 MG tablet, Take 1 tablet (200 mg total) by mouth 2 (two) times a day Take with one 25 mg tablet for total dose of 225 mg , Disp: 180 tablet, Rfl: 3    lamoTRIgine (LaMICtal) 25 mg tablet, Take 2 tablets (50 mg total) by mouth 2 (two) times a day, Disp: 180 tablet, Rfl: 1    levETIRAcetam (KEPPRA) 750 mg tablet, Take 2 tablets (1,500 mg total) by mouth every 12 (twelve) hours, Disp: 360 tablet, Rfl: 3    metFORMIN (GLUCOPHAGE) 850 mg tablet, Take 1 tablet (850 mg total) by mouth 2 (two) times a day with meals, Disp: 60 tablet, Rfl: 3    omeprazole (PriLOSEC) 40 MG capsule, Take 1 capsule (40 mg total) by mouth in the morning and 1 capsule (40 mg total) in the evening  Take before meals  , Disp: 60 capsule, Rfl: 3    ondansetron (ZOFRAN) 4 mg tablet, Take 1 tablet (4 mg total) by mouth every 8 (eight) hours as needed for nausea or vomiting, Disp: 60 tablet, Rfl: 1    acetaminophen (TYLENOL) 500 mg tablet, Take 1 tablet (500 mg total) by mouth every 6 (six) hours as needed for mild pain (Patient not taking: No sig reported), Disp: , Rfl: 0    PRAZOSIN HCL PO, Take 3 mg by mouth daily at bedtime   (Patient not taking: No sig reported), Disp: , Rfl:     QUEtiapine (SEROquel) 25 mg tablet, Take 50 mg by mouth daily at bedtime   (Patient not taking: No sig reported), Disp: , Rfl:     Past Medical History:   Diagnosis Date    Autism     [place] esophagus     [place]'s esophagus     Diabetes mellitus ([facility] )     Female infertility     Gastric ulcer     History of bronchitis     Irregular menses     Morbid obesity with BMI of 50 0-59 9, adult ([facility])     Reflux esophagitis     Seizures ([facility] )     last one 7/13/21    Sleep apnea     no CPAP       Past Surgical History:   Procedure Laterality Date    EGD      with Bx due to barretts esophagus    EYE SURGERY Bilateral     lazy eye repair    ND HYSTEROSCOPY,W/ENDO BX N/A 9/22/2021    Procedure: DILATATION AND CURETTAGE (D&C) WITH HYSTEROSCOPY;  Surgeon: Tameka Waddell MD;  Location: [facility];  Service: Gynecology    WISDOM TOOTH EXTRACTION         Family History   Problem Relation Age of Onset    Bipolar disorder Mother     Depression Mother     Depression Father     Diabetes Maternal Grandmother     Thyroid disease Maternal Grandmother     Hypertension Maternal Grandmother     Heart disease Maternal Grandmother     Diabetes Maternal Grandfather     Diabetes Paternal Grandmother     Breast cancer Paternal Grandmother     Stroke Paternal Grandmother     Diabetes Paternal Grandfather     Breast cancer Maternal Aunt     Stomach cancer Maternal Aunt         reports that she has been smoking cigarettes  She has been smoking about 0 00 packs per day for the past 0 00 years  She has never used smokeless tobacco  She reports current alcohol use  She reports current drug use  Drug: Marijuana        Physical Exam:    /74 (BP Location: Right arm, Patient Position: Sitting, Cuff Size: Standard)   Pulse 90   Resp 18   Ht 4' 8" (1 422 m)   Wt 105 kg (230 lb 9 6 oz)   SpO2 100%   BMI 51 70 kg/m²     Gen: wn/wd, NAD, morbidly obese  HEENT: anicteric, MMM, no cervical LAD  CVS: RRR, no m/r/g  CHEST: CTA b/l  ABD: +BS, soft, NT,ND, no hepatosplenomegaly  EXT: no c/c/e  NEURO: aaox3  SKIN: NO rashes

## 2022-05-11 NOTE — PROGRESS NOTES
[facility]) [facility]  Stone Dey 33 y o  female MRN: [id number]            Assessment & Plan:  52-year-old female here for follow-up of reflux symptoms, chronic constipation    1  GERD: Endoscopy last year that ext was negative for [place]'s esophagus  -continue omeprazole 40 mg twice daily  -recommend that she take her famotidine closer to bedtime, she is currently taking her 2nd dose of famotidine around 8:00 p m   -continue to maintain at least 3 or 4 hours between dinner and lying down   -recommended that she use a wedge pillow to keep the head of bed elevated to minimize reflux symptoms  -continue with weight loss goals   -no further changes at this time, patient appears to be gradually improving  -if she is being considered for bariatric surgery, she would do better with a gastric bypass    2  Mild dysphagia: Unclear etiology, most likely secondary to reflux, may have some mild underlying dysmotility   -recommend checking a barium esophagram  -if she has persistent dysphagia, would recommend esophageal manometry prior to her gastric bypass    3  Chronic constipation: Much improved with daily Colace  -continue current regimen   -patient had a colonoscopy in 2018 outside hospital      Yadijesse Mann was seen today for gerd  Diagnoses and all orders for this visit:    Gastroesophageal reflux disease without esophagitis  -     FL barium swallow; Future  -     omeprazole (PriLOSEC) 40 MG capsule; Take 1 capsule (40 mg total) by mouth in the morning and 1 capsule (40 mg total) in the evening  Take before meals  -     famotidine (PEPCID) 40 MG tablet; Take 1 tablet (40 mg total) by mouth daily at bedtime    Other constipation    Esophageal dysphagia  -     FL barium swallow; Future    History of [place]'s esophagus  -     omeprazole (PriLOSEC) 40 MG capsule; Take 1 capsule (40 mg total) by mouth in the morning and 1 capsule (40 mg total) in the evening  Take before meals    -     famotidine (PEPCID) 40 MG tablet; Take 1 tablet (40 mg total) by mouth daily at bedtime            _____________________________________________________________        CC: Follow-up    HPI:  Eusebia Francois is a 33 y  o female who is here for follow-up  Pleasant 36-year-old female, here for follow-up of GERD, dysphagia, constipation  Doing much better, reports a bowel function has improved with daily stool softeners, she takes Colace not having 1 to 2 formed bowel movements daily  Still having occasional nocturnal regurgitation by 2 days per week  Her reflux symptoms during the daytime much improved with current regimen  Still having occasional dysphagia as well  Denies any nausea vomiting  Denies any abdominal pain    Trying to lose weight, has dropped about 10 lb           ROS:  The remainder of the ROS was negative except for the pertinent positives mentioned in HPI        Allergies: Haloperidol, Penicillins, and Pollen extract    Medications:   Current Outpatient Medications:     ARIPiprazole (ABILIFY) 10 mg tablet, Take 10 mg by mouth daily with lunch  , Disp: , Rfl:     citalopram (CeleXA) 10 mg tablet, Take 10 mg by mouth every morning , Disp: , Rfl:     docusate sodium (COLACE) 100 mg capsule, Take 1 capsule (100 mg total) by mouth 2 (two) times a day, Disp: 60 capsule, Rfl: 5    famotidine (PEPCID) 40 MG tablet, Take 1 tablet (40 mg total) by mouth daily at bedtime, Disp: 30 tablet, Rfl: 5    lamoTRIgine (LaMICtal) 200 MG tablet, Take 1 tablet (200 mg total) by mouth 2 (two) times a day Take with one 25 mg tablet for total dose of 225 mg , Disp: 180 tablet, Rfl: 3    lamoTRIgine (LaMICtal) 25 mg tablet, Take 2 tablets (50 mg total) by mouth 2 (two) times a day, Disp: 180 tablet, Rfl: 1    levETIRAcetam (KEPPRA) 750 mg tablet, Take 2 tablets (1,500 mg total) by mouth every 12 (twelve) hours, Disp: 360 tablet, Rfl: 3    metFORMIN (GLUCOPHAGE) 850 mg tablet, Take 1 tablet (850 mg total) by mouth 2 (two) times a day with meals, Disp: 60 tablet, Rfl: 3    omeprazole (PriLOSEC) 40 MG capsule, Take 1 capsule (40 mg total) by mouth in the morning and 1 capsule (40 mg total) in the evening  Take before meals  , Disp: 60 capsule, Rfl: 3    ondansetron (ZOFRAN) 4 mg tablet, Take 1 tablet (4 mg total) by mouth every 8 (eight) hours as needed for nausea or vomiting, Disp: 60 tablet, Rfl: 1    acetaminophen (TYLENOL) 500 mg tablet, Take 1 tablet (500 mg total) by mouth every 6 (six) hours as needed for mild pain (Patient not taking: No sig reported), Disp: , Rfl: 0    PRAZOSIN HCL PO, Take 3 mg by mouth daily at bedtime   (Patient not taking: No sig reported), Disp: , Rfl:     QUEtiapine (SEROquel) 25 mg tablet, Take 50 mg by mouth daily at bedtime   (Patient not taking: No sig reported), Disp: , Rfl:     Past Medical History:   Diagnosis Date    Autism     [place] esophagus     [place]'s esophagus     Diabetes mellitus ([facility] )     Female infertility     Gastric ulcer     History of bronchitis     Irregular menses     Morbid obesity with BMI of 50 0-59 9, adult ([facility])     Reflux esophagitis     Seizures ([facility] )     last one 7/13/21    Sleep apnea     no CPAP       Past Surgical History:   Procedure Laterality Date    EGD      with Bx due to barretts esophagus    EYE SURGERY Bilateral     lazy eye repair    [place] HYSTEROSCOPY,W/ENDO BX N/A 9/22/2021    Procedure: DILATATION AND CURETTAGE (D&C) WITH HYSTEROSCOPY;  Surgeon: Pilo Shea MD;  Location: [facility];  Service: Gynecology    WISDOM TOOTH EXTRACTION         Family History   Problem Relation Age of Onset    Bipolar disorder Mother     Depression Mother     Depression Father     Diabetes Maternal Grandmother     Thyroid disease Maternal Grandmother     Hypertension Maternal Grandmother     Heart disease Maternal Grandmother     Diabetes Maternal Grandfather     Diabetes Paternal Grandmother     Breast cancer Paternal Grandmother     Stroke Paternal Grandmother     Diabetes Paternal Grandfather     Breast cancer Maternal Aunt     Stomach cancer Maternal Aunt         reports that she has been smoking cigarettes  She has been smoking about 0 00 packs per day for the past 0 00 years  She has never used smokeless tobacco  She reports current alcohol use  She reports current drug use  Drug: Marijuana        Physical Exam:    /74 (BP Location: Right arm, Patient Position: Sitting, Cuff Size: Standard)   Pulse 90   Resp 18   Ht 4' 8" (1 422 m)   Wt 105 kg (230 lb 9 6 oz)   SpO2 100%   BMI 51 70 kg/m²     Gen: wn/wd, NAD, morbidly obese  HEENT: anicteric, MMM, no cervical LAD  CVS: RRR, no m/r/g  CHEST: CTA b/l  ABD: +BS, soft, NT,ND, no hepatosplenomegaly  EXT: no c/c/e  NEURO: aaox3  SKIN: NO rashes

## 2022-05-14 ENCOUNTER — OFFICE VISIT (OUTPATIENT)
Dept: URGENT CARE | Facility: CLINIC | Age: 32
End: 2022-05-14
Payer: MEDICARE

## 2022-05-14 VITALS
SYSTOLIC BLOOD PRESSURE: 118 MMHG | WEIGHT: 230 LBS | HEART RATE: 93 BPM | HEIGHT: 55 IN | TEMPERATURE: 97.4 F | RESPIRATION RATE: 16 BRPM | BODY MASS INDEX: 53.23 KG/M2 | DIASTOLIC BLOOD PRESSURE: 74 MMHG | OXYGEN SATURATION: 99 %

## 2022-05-14 DIAGNOSIS — J01.00 ACUTE MAXILLARY SINUSITIS, RECURRENCE NOT SPECIFIED: Primary | ICD-10-CM

## 2022-05-14 PROCEDURE — 99213 OFFICE O/P EST LOW 20 MIN: CPT | Performed by: PHYSICIAN ASSISTANT

## 2022-05-14 RX ORDER — AZITHROMYCIN 250 MG/1
TABLET, FILM COATED ORAL
Qty: 6 TABLET | Refills: 0 | Status: SHIPPED | OUTPATIENT
Start: 2022-05-14 | End: 2022-05-18

## 2022-05-14 NOTE — PATIENT INSTRUCTIONS
Take antibiotic as prescribed  Can continue over-the-counter cough and cold medication as needed  Follow-up with PCP  Return or be seen in ER with any progressing or worsening symptoms

## 2022-05-14 NOTE — PROGRESS NOTES
[facility]        NAME: Norma Esqueda is a 33 y o  female  : 1990    MRN: [id number]  DATE: May 14, 2022  TIME: 8:37 AM    Assessment and Plan   Acute maxillary sinusitis, recurrence not specified [J01 00]  1  Acute maxillary sinusitis, recurrence not specified  azithromycin (ZITHROMAX) 250 mg tablet         Patient Instructions     Patient Instructions   Take antibiotic as prescribed  Can continue over-the-counter cough and cold medication as needed  Follow-up with PCP  Return or be seen in ER with any progressing or worsening symptoms  Follow up with PCP in 3-5 days  Proceed to  ER if symptoms worsen  Chief Complaint     Chief Complaint   Patient presents with    Earache     Pt reports of worsening congestion and bilateral ear pain  History of Present Illness       Patient is a 36-year-old female presenting today with nasal congestion, sinus pain and pressure x5 days  Patient notes over the last few days she has had worsening congestion and sinus pressure, has been taking over-the-counter Tylenol which she states provides temporary resolution of her symptoms, notes she has had sinus problems in the past which have developed and to infections and feels similar symptoms today  Denies any known sick contacts  Denies fever, chills, nasal discharge or drainage, N/V/D  Review of Systems   Review of Systems   Constitutional: Negative for chills, fatigue and fever  HENT: Positive for congestion, postnasal drip, sinus pressure and sinus pain  Negative for ear pain, sore throat and trouble swallowing  Eyes: Negative for pain  Respiratory: Negative for cough and shortness of breath  Gastrointestinal: Negative for abdominal pain  Musculoskeletal: Negative for arthralgias and myalgias  Skin: Negative for rash  Neurological: Negative for headaches           Current Medications       Current Outpatient Medications:     azithromycin (ZITHROMAX) 250 mg tablet, Take 2 tablets today then 1 tablet daily x 4 days, Disp: 6 tablet, Rfl: 0    acetaminophen (TYLENOL) 500 mg tablet, Take 1 tablet (500 mg total) by mouth every 6 (six) hours as needed for mild pain (Patient not taking: No sig reported), Disp: , Rfl: 0    ARIPiprazole (ABILIFY) 10 mg tablet, Take 10 mg by mouth daily with lunch  , Disp: , Rfl:     citalopram (CeleXA) 10 mg tablet, Take 10 mg by mouth every morning , Disp: , Rfl:     docusate sodium (COLACE) 100 mg capsule, Take 1 capsule (100 mg total) by mouth 2 (two) times a day, Disp: 60 capsule, Rfl: 5    famotidine (PEPCID) 40 MG tablet, Take 1 tablet (40 mg total) by mouth daily at bedtime, Disp: 30 tablet, Rfl: 5    lamoTRIgine (LaMICtal) 200 MG tablet, Take 1 tablet (200 mg total) by mouth 2 (two) times a day Take with one 25 mg tablet for total dose of 225 mg , Disp: 180 tablet, Rfl: 3    lamoTRIgine (LaMICtal) 25 mg tablet, Take 2 tablets (50 mg total) by mouth 2 (two) times a day, Disp: 180 tablet, Rfl: 1    levETIRAcetam (KEPPRA) 750 mg tablet, Take 2 tablets (1,500 mg total) by mouth every 12 (twelve) hours, Disp: 360 tablet, Rfl: 3    metFORMIN (GLUCOPHAGE) 850 mg tablet, Take 1 tablet (850 mg total) by mouth 2 (two) times a day with meals, Disp: 60 tablet, Rfl: 3    omeprazole (PriLOSEC) 40 MG capsule, Take 1 capsule (40 mg total) by mouth in the morning and 1 capsule (40 mg total) in the evening  Take before meals  , Disp: 60 capsule, Rfl: 3    ondansetron (ZOFRAN) 4 mg tablet, Take 1 tablet (4 mg total) by mouth every 8 (eight) hours as needed for nausea or vomiting, Disp: 60 tablet, Rfl: 1    PRAZOSIN HCL PO, Take 3 mg by mouth daily at bedtime   (Patient not taking: No sig reported), Disp: , Rfl:     QUEtiapine (SEROquel) 25 mg tablet, Take 50 mg by mouth daily at bedtime   (Patient not taking: No sig reported), Disp: , Rfl:     Current Allergies     Allergies as of 05/14/2022 - Reviewed 05/14/2022   Allergen Reaction Noted    Haloperidol Other (See Comments) 02/04/2021    Penicillins Hives 05/22/2020    Pollen extract Allergic Rhinitis 05/28/2021            The following portions of the patient's history were reviewed and updated as appropriate: allergies, current medications, past family history, past medical history, past social history, past surgical history and problem list      Past Medical History:   Diagnosis Date    Autism     [place] esophagus     [place]'s esophagus     Diabetes mellitus ([facility]     Female infertility     Gastric ulcer     History of bronchitis     Irregular menses     Morbid obesity with BMI of 50 0-59 9, adult ([facility] )     Reflux esophagitis     Seizures ([facility] )     last one 7/13/21    Sleep apnea     no CPAP       Past Surgical History:   Procedure Laterality Date    EGD      with Bx due to barretts esophagus    EYE SURGERY Bilateral     lazy eye repair    NV HYSTEROSCOPY,W/ENDO BX N/A 9/22/2021    Procedure: DILATATION AND CURETTAGE (D&C) WITH HYSTEROSCOPY;  Surgeon: Dwight Buenrsotro MD;  Location: [address] [place] [address];  Service: Gynecology    WISDOM TOOTH EXTRACTION         Family History   Problem Relation Age of Onset    Bipolar disorder Mother     Depression Mother     Depression Father     Diabetes Maternal Grandmother     Thyroid disease Maternal Grandmother     Hypertension Maternal Grandmother     Heart disease Maternal Grandmother     Diabetes Maternal Grandfather     Diabetes Paternal Grandmother     Breast cancer Paternal Grandmother     Stroke Paternal Grandmother     Diabetes Paternal Grandfather     Breast cancer Maternal Aunt     Stomach cancer Maternal Aunt          Medications have been verified  Objective   /74   Pulse 93   Temp (!) 97 4 °F (36 3 °C)   Resp 16   Ht 4' 7" (1 397 m)   Wt 104 kg (230 lb)   SpO2 99%   BMI 53 46 kg/m²        Physical Exam     Physical Exam  Vitals and nursing note reviewed     Constitutional:       General: She is not in acute distress  Appearance: Normal appearance  She is not toxic-appearing  HENT:      Head: Normocephalic and atraumatic  Right Ear: Tympanic membrane, ear canal and external ear normal       Left Ear: Tympanic membrane, ear canal and external ear normal       Nose:      Right Turbinates: Swollen and pale  Left Turbinates: Swollen and pale  Right Sinus: Maxillary sinus tenderness present  No frontal sinus tenderness  Left Sinus: Maxillary sinus tenderness present  No frontal sinus tenderness  Mouth/Throat:      Mouth: Mucous membranes are moist       Pharynx: Oropharynx is clear  No oropharyngeal exudate or posterior oropharyngeal erythema  Eyes:      Conjunctiva/sclera: Conjunctivae normal       Pupils: Pupils are equal, round, and reactive to light  Cardiovascular:      Rate and Rhythm: Normal rate and regular rhythm  Pulses: Normal pulses  Heart sounds: Normal heart sounds  Pulmonary:      Effort: Pulmonary effort is normal       Breath sounds: Normal breath sounds  Musculoskeletal:      Cervical back: Normal range of motion  No tenderness  Lymphadenopathy:      Cervical: No cervical adenopathy  Skin:     General: Skin is warm  Capillary Refill: Capillary refill takes less than 2 seconds  Neurological:      Mental Status: She is alert

## 2022-05-18 ENCOUNTER — OFFICE VISIT (OUTPATIENT)
Dept: OBGYN CLINIC | Facility: CLINIC | Age: 32
End: 2022-05-18
Payer: MEDICARE

## 2022-05-18 VITALS
DIASTOLIC BLOOD PRESSURE: 80 MMHG | BODY MASS INDEX: 53.46 KG/M2 | HEIGHT: 55 IN | WEIGHT: 231 LBS | SYSTOLIC BLOOD PRESSURE: 106 MMHG

## 2022-05-18 DIAGNOSIS — Z11.3 SCREEN FOR STD (SEXUALLY TRANSMITTED DISEASE): Primary | ICD-10-CM

## 2022-05-18 PROCEDURE — 99213 OFFICE O/P EST LOW 20 MIN: CPT | Performed by: NURSE PRACTITIONER

## 2022-05-18 PROCEDURE — 87491 CHLMYD TRACH DNA AMP PROBE: CPT | Performed by: NURSE PRACTITIONER

## 2022-05-18 PROCEDURE — 87591 N.GONORRHOEAE DNA AMP PROB: CPT | Performed by: NURSE PRACTITIONER

## 2022-05-18 PROCEDURE — 87661 TRICHOMONAS VAGINALIS AMPLIF: CPT | Performed by: NURSE PRACTITIONER

## 2022-05-18 NOTE — PROGRESS NOTES
Assessment/Plan:    Screen for STD (sexually transmitted disease)  GC/CT/Trich testing done today  Rx for HIV, Hep C, RPR given  Will call with results and appropriate follow up  Condoms with every sexual interaction       Diagnoses and all orders for this visit:    Screen for STD (sexually transmitted disease)  -     Hepatitis C antibody  -     Human Immunodeficiency Virus 1/2 Antigen / Antibody ( Fourth Generation) with Reflex Testing  -     RPR  -     Chlamydia/GC amplified DNA by PCR  -     Trichomonas Vaginalis, FINA    Other orders  -     albuterol (PROVENTIL HFA,VENTOLIN HFA) 90 mcg/act inhaler; Inhale 2 puffs every 6 (six) hours as needed for wheezing          Subjective:      Patient ID: Melissa Koenig is a 33 y o  female  Pt presents for STD testing  Has new partner x 3 months  Would like STD testing  Partner denies any STDs  Pt denies any discharge, itching, or odor vaginally  Denies any lumps, bumps or sores vaginally  LMP: 4/27/2022  Menses regular  Recently stopped Metformin as PCP would like her to redo blood work  Has had unprotected intercourse  Denies pregnancy        The following portions of the patient's history were reviewed and updated as appropriate: allergies, current medications, past family history, past medical history, past social history, past surgical history and problem list     Review of Systems   Genitourinary: Negative  Objective:    /80 (BP Location: Right arm, Patient Position: Sitting, Cuff Size: Extra-Large)   Ht 4' 7" (1 397 m)   Wt 105 kg (231 lb)   LMP 04/27/2022 (Exact Date)   BMI 53 69 kg/m²      Physical Exam  Vitals and nursing note reviewed  Constitutional:       Appearance: Normal appearance  She is well-developed  HENT:      Head: Normocephalic and atraumatic  Neck:      Thyroid: No thyromegaly  Cardiovascular:      Rate and Rhythm: Normal rate and regular rhythm  Heart sounds: Normal heart sounds  Pulmonary:      Effort: Pulmonary effort is normal       Breath sounds: Normal breath sounds  Abdominal:      General: Bowel sounds are normal  There is no distension  Palpations: Abdomen is soft  There is no mass  Tenderness: There is no abdominal tenderness  There is no guarding or rebound  Genitourinary:     Labia:         Right: No rash, tenderness, lesion or injury  Left: No rash, tenderness, lesion or injury  Vagina: Normal       Cervix: Normal       Uterus: Normal        Adnexa: Right adnexa normal and left adnexa normal         Right: No mass, tenderness or fullness  Left: No mass, tenderness or fullness  Musculoskeletal:         General: Normal range of motion  Cervical back: Normal range of motion and neck supple  Skin:     General: Skin is warm and dry  Neurological:      Mental Status: She is alert and oriented to person, place, and time  Psychiatric:         Behavior: Behavior normal          Thought Content:  Thought content normal          Judgment: Judgment normal

## 2022-05-18 NOTE — ASSESSMENT & PLAN NOTE
GC/CT/Trich testing done today    Rx for HIV, Hep C, RPR given  Will call with results and appropriate follow up  Condoms with every sexual interaction

## 2022-05-19 ENCOUNTER — HOSPITAL ENCOUNTER (OUTPATIENT)
Dept: SLEEP CENTER | Facility: CLINIC | Age: 32
Discharge: HOME | End: 2022-05-19
Payer: MEDICARE

## 2022-05-19 DIAGNOSIS — G47.33 OSA (OBSTRUCTIVE SLEEP APNEA): ICD-10-CM

## 2022-05-19 DIAGNOSIS — E66.01 MORBID OBESITY (HCC): ICD-10-CM

## 2022-05-19 PROCEDURE — G0399 HOME SLEEP TEST/TYPE 3 PORTA: HCPCS

## 2022-05-20 LAB
C TRACH DNA SPEC QL NAA+PROBE: NEGATIVE
N GONORRHOEA DNA SPEC QL NAA+PROBE: NEGATIVE

## 2022-05-20 NOTE — PROGRESS NOTES
Home Sleep Study Documentation    Pre-Sleep Home Study:    Set-up and instructions performed by: BRITTNY Bueno, MARTINEZ    Technician performed demonstration for Patient: yes    Return demonstration performed by Patient: yes    Written instructions provided to Patient: yes    Patient signed consent form: yes        Post-Sleep Home Study:    Additional comments by Patient:       Home Sleep Study Failed:no:    Failure reason: N/A    Reported or Detected: N/A    Scored by: SE Dela Cruz, MARTINEZ

## 2022-05-21 PROCEDURE — 95806 SLEEP STUDY UNATT&RESP EFFT: CPT | Performed by: INTERNAL MEDICINE

## 2022-05-22 LAB — T VAGINALIS RRNA SPEC QL NAA+PROBE: NEGATIVE

## 2022-06-03 ENCOUNTER — TELEPHONE (OUTPATIENT)
Dept: PULMONOLOGY | Facility: CLINIC | Age: 32
End: 2022-06-03

## 2022-06-03 NOTE — TELEPHONE ENCOUNTER
Pt called re: she would like a call back from the doctor to discuss he sleep study results and next steps    Please advise: [phone]

## 2022-06-06 ENCOUNTER — TELEPHONE (OUTPATIENT)
Dept: SLEEP CENTER | Facility: CLINIC | Age: 32
End: 2022-06-06

## 2022-06-06 DIAGNOSIS — G47.33 OSA (OBSTRUCTIVE SLEEP APNEA): Primary | ICD-10-CM

## 2022-06-06 NOTE — TELEPHONE ENCOUNTER
An order for an auto titrating CPAP placed, could you guys discussed with her the results and set her up with the DME please thanks

## 2022-06-06 NOTE — TELEPHONE ENCOUNTER
Patient of Dr Janette Delgado in the [facility] pulmonary office  Sleep study resulted and shows severe GARIMA  AHI 68 2  Per study results, recommended that patient restart CPAP therapy  Per office note 2/15/22, patient was diagnosed many years ago but lost her machine  Dr Janette Delgado, would you like to place order for CPAP?

## 2022-06-06 NOTE — TELEPHONE ENCOUNTER
Pt calling back again to discuss sleep study results and next steps w/ Dr Osmin Teague    Please advise: [phone]

## 2022-06-07 NOTE — TELEPHONE ENCOUNTER
Called patient and reviewed sleep study results  Patient agreeable to use [employer] for DME provider  Advised I will send message to pulmonary office to process order  Someone from [address] should then reach out to arrange set up of machine  Also advised that insurance will require follow up with the physician 31-90 days after starting use of machine  I will send message to pulmonary office to reach out to schedule appointment  Patient agreeable

## 2022-06-12 DIAGNOSIS — G40.909 NONINTRACTABLE EPILEPSY WITHOUT STATUS EPILEPTICUS, UNSPECIFIED EPILEPSY TYPE (HCC): ICD-10-CM

## 2022-06-13 ENCOUNTER — TELEPHONE (OUTPATIENT)
Dept: NEUROLOGY | Facility: CLINIC | Age: 32
End: 2022-06-13

## 2022-06-13 NOTE — TELEPHONE ENCOUNTER
[facility] requesting the patient contact the office to reschedule the appointment on 08/18/22 due the provider no longer coming to the [place] branch

## 2022-06-14 RX ORDER — LAMOTRIGINE 25 MG/1
TABLET ORAL
Qty: 180 TABLET | Refills: 3 | Status: SHIPPED | OUTPATIENT
Start: 2022-06-14

## 2022-06-14 NOTE — TELEPHONE ENCOUNTER
Pt called and advised of the below  Pt states that she is currently taking lamotrigine 225 mg bid  She's been stable w/ current dosage  She has enough 200 mg tabs     She needs script for 25 mg tabs

## 2022-06-14 NOTE — TELEPHONE ENCOUNTER
We received refill request for lamotrigine, but Rx was for 25 mg twice a day  Please confirm dosing with patient, as it appears she is taking two 25 mg tabs with one 200 mg tab for total dose of 250 mg twice a day

## 2022-06-21 NOTE — TELEPHONE ENCOUNTER
Patient is calling, she received a call from [facility]  They told her everything was all good to go and that she needed to call the office to get set up and start the process of her Cpap   Please advise

## 2022-06-28 ENCOUNTER — OFFICE VISIT (OUTPATIENT)
Dept: BARIATRICS | Facility: CLINIC | Age: 32
End: 2022-06-28
Payer: MEDICARE

## 2022-06-28 VITALS
HEIGHT: 55 IN | DIASTOLIC BLOOD PRESSURE: 70 MMHG | HEART RATE: 96 BPM | SYSTOLIC BLOOD PRESSURE: 100 MMHG | BODY MASS INDEX: 51.84 KG/M2 | WEIGHT: 224 LBS

## 2022-06-28 DIAGNOSIS — E16.1 HYPERINSULINEMIA: ICD-10-CM

## 2022-06-28 DIAGNOSIS — E66.9 DIABETES MELLITUS TYPE 2 IN OBESE (HCC): ICD-10-CM

## 2022-06-28 DIAGNOSIS — E11.69 DIABETES MELLITUS TYPE 2 IN OBESE (HCC): ICD-10-CM

## 2022-06-28 DIAGNOSIS — E28.2 PCOS (POLYCYSTIC OVARIAN SYNDROME): ICD-10-CM

## 2022-06-28 DIAGNOSIS — E66.01 CLASS 3 SEVERE OBESITY DUE TO EXCESS CALORIES WITH SERIOUS COMORBIDITY AND BODY MASS INDEX (BMI) OF 50.0 TO 59.9 IN ADULT (HCC): Primary | ICD-10-CM

## 2022-06-28 PROCEDURE — 99214 OFFICE O/P EST MOD 30 MIN: CPT | Performed by: FAMILY MEDICINE

## 2022-06-28 RX ORDER — METFORMIN HYDROCHLORIDE 750 MG/1
750 TABLET, EXTENDED RELEASE ORAL
Qty: 30 TABLET | Refills: 2 | Status: SHIPPED | OUTPATIENT
Start: 2022-06-28

## 2022-06-28 NOTE — PROGRESS NOTES
Assessment/Plan:  GERMAINE was seen today for follow-up  Diagnoses and all orders for this visit:    Class 3 severe obesity due to excess calories with serious comorbidity and body mass index (BMI) of 50 0 to 59 9 in adult [facility])  -     Comprehensive metabolic panel; Future  -     Hemoglobin A1C; Future  -     Insulin, fasting; Future  -     metFORMIN (GLUCOPHAGE-XR) 750 mg 24 hr tablet; Take 1 tablet (750 mg total) by mouth daily with breakfast  Has made progress with weight loss  Patient requesting medication  Due to PCOS and hyperinsulinemia have recommended restarting metformin but with extended release which may decrease the side effect of diarrhea  Patient in agreement  She will take metformin extended release 750 mg daily  Check labs  PCOS (polycystic ovarian syndrome)  -     metFORMIN (GLUCOPHAGE-XR) 750 mg 24 hr tablet; Take 1 tablet (750 mg total) by mouth daily with breakfast  Patient has documented PCOS  Should benefit from metformin  Diabetes mellitus type 2 in obese (HCC)  -     Hemoglobin A1C; Future  -     Insulin, fasting; Future  Patient has diagnosis of type 2 diabetes on her problem list however I question the diagnosis based on previous glucose levels and A1c values  Check additional labs for monitoring  Hyperinsulinemia  -     Hemoglobin A1C; Future  -     Insulin, fasting; Future  -     metFORMIN (GLUCOPHAGE-XR) 750 mg 24 hr tablet; Take 1 tablet (750 mg total) by mouth daily with breakfast  Rather high insulin levels previously  May benefit from metformin  Check labs to monitor insulin levels  Goals:  Food log (ie ) www myfitnesspal com,sparkpeople  com,loseit com,calorieking  com,etc  , No sugary beverages  At least 64oz of water daily  , Increase physical activity by 10 minutes daily and Gradually increase physical activity to a goal of 5 days per week for 30 minutes of MODERATE intensity PLUS 2 days per week of FULL BODY resistance training    Total time spent: 30 minutes with >50% face-to-face time with the patient  Follow up in approximately 3 months with Non-Surgical Physician/Advanced Practitioner  Subjective:   Chief Complaint   Patient presents with    Follow-up     Pt is here for MWM follow up       Patient ID: Faizan Elizondo  is a 33 y o  female with excess weight/obesity here to pursue weight management  Patient is pursuing Conservative Program    Most recent notes and records were reviewed  Patient presents for weight management follow-up  She has cut back on her portions and is tracking her calories using my fitness Pal   Currently she is locking about 1028 calories daily  She is measuring all of her food  She continues to walk 5-6 days per week and has increased her time walking to 1-1 5 hours  She has stop drinking soda  She has increased her water intake and is drinking about 2 5 16 9 oz bottles of water daily  She would like to try medication to help with further weight loss  She was on metformin previously for fertility  Was taking the medication twice daily but it was causing diarrhea     -Initial weight loss goal of 5-10% weight loss for improved health  Wt Readings from Last 10 Encounters:   06/28/22 102 kg (224 lb)   05/18/22 105 kg (231 lb)   05/14/22 104 kg (230 lb)   05/11/22 105 kg (230 lb 9 6 oz)   04/29/22 104 kg (229 lb 9 6 oz)   04/22/22 105 kg (230 lb 12 8 oz)   04/15/22 105 kg (231 lb 6 4 oz)   03/03/22 103 kg (227 lb)   03/02/22 103 kg (227 lb)   02/15/22 104 kg (228 lb 12 8 oz)     Initial weight: 230 8lbs  Current weight: 224lbs  Change in weight: -6 8lbs          The following portions of the patient's history were reviewed and updated as appropriate: allergies, current medications, past family history, past medical history, past social history, past surgical history, and problem list     Review of Systems   Constitutional: Negative for fever  Respiratory: Negative for shortness of breath  Cardiovascular: Negative for chest pain  Objective:  /70 (BP Location: Left arm, Patient Position: Sitting, Cuff Size: Large)   Pulse 96   Ht 4' 7" (1 397 m)   Wt 102 kg (224 lb)   LMP 06/21/2022   BMI 52 06 kg/m²   Constitutional: Well-developed, well-nourished and Obese Body mass index is 52 06 kg/m²  Veljeannine [place] HEENT: No conjunctival injection  Pulmonary: No increased work of breathing or signs of respiratory distress  CV: Well-perfused  GI: Obese  Non-distended  MSK: No edema   Neuro: Oriented to person, place and time  Normal Speech  Normal gait  Psych: Normal affect and mood       Labs and Imaging  Most recent labs and imaging reviewed

## 2022-07-19 NOTE — PROGRESS NOTES
Patient seen with DME Mask Fitting at our [facility] office for 30 day [employer] hugo

## 2022-07-21 ENCOUNTER — OFFICE VISIT (OUTPATIENT)
Dept: FAMILY MEDICINE CLINIC | Facility: CLINIC | Age: 32
End: 2022-07-21
Payer: MEDICARE

## 2022-07-21 VITALS
SYSTOLIC BLOOD PRESSURE: 112 MMHG | DIASTOLIC BLOOD PRESSURE: 68 MMHG | WEIGHT: 229.4 LBS | BODY MASS INDEX: 53.09 KG/M2 | HEART RATE: 97 BPM | TEMPERATURE: 98.9 F | HEIGHT: 55 IN | OXYGEN SATURATION: 97 %

## 2022-07-21 DIAGNOSIS — Z91.89 OTHER SPECIFIED PERSONAL RISK FACTORS, NOT ELSEWHERE CLASSIFIED: ICD-10-CM

## 2022-07-21 DIAGNOSIS — G47.33 OSA (OBSTRUCTIVE SLEEP APNEA): ICD-10-CM

## 2022-07-21 DIAGNOSIS — N97.9 FEMALE INFERTILITY: ICD-10-CM

## 2022-07-21 DIAGNOSIS — Z80.3 FAMILY HISTORY OF MALIGNANT NEOPLASM OF FEMALE BREAST: ICD-10-CM

## 2022-07-21 DIAGNOSIS — Z00.00 WELCOME TO MEDICARE PREVENTIVE VISIT: Primary | ICD-10-CM

## 2022-07-21 DIAGNOSIS — E11.9 TYPE 2 DIABETES MELLITUS WITHOUT COMPLICATION, WITHOUT LONG-TERM CURRENT USE OF INSULIN (HCC): ICD-10-CM

## 2022-07-21 DIAGNOSIS — Z12.39 BREAST CANCER SCREENING, HIGH RISK PATIENT: ICD-10-CM

## 2022-07-21 PROBLEM — R65.10 SIRS (SYSTEMIC INFLAMMATORY RESPONSE SYNDROME) (HCC): Status: RESOLVED | Noted: 2021-08-03 | Resolved: 2022-07-21

## 2022-07-21 PROCEDURE — G0402 INITIAL PREVENTIVE EXAM: HCPCS | Performed by: FAMILY MEDICINE

## 2022-07-21 NOTE — PATIENT INSTRUCTIONS
Medicare Preventive Visit Patient Instructions  Thank you for completing your Welcome to Medicare Visit or Medicare Annual Wellness Visit today  Your next wellness visit will be due in one year (7/22/2023)  The screening/preventive services that you may require over the next 5-10 years are detailed below  Some tests may not apply to you based off risk factors and/or age  Screening tests ordered at today's visit but not completed yet may show as past due  Also, please note that scanned in results may not display below  Preventive Screenings:  Service Recommendations Previous Testing/Comments   Colorectal Cancer Screening  * Colonoscopy    * Fecal Occult Blood Test (FOBT)/Fecal Immunochemical Test (FIT)  * Fecal DNA/Cologuard Test  * Flexible Sigmoidoscopy Age: 54-65 years old   Colonoscopy: every 10 years (may be performed more frequently if at higher risk)  OR  FOBT/FIT: every 1 year  OR  Cologuard: every 3 years  OR  Sigmoidoscopy: every 5 years  Screening may be recommended earlier than age 48 if at higher risk for colorectal cancer  Also, an individualized decision between you and your healthcare provider will decide whether screening between the ages of 74-80 would be appropriate  Colonoscopy: Not on file  FOBT/FIT: Not on file  Cologuard: Not on file  Sigmoidoscopy: Not on file          Breast Cancer Screening Age: 36 years old  Frequency: every 1-2 years  Not required if history of left and right mastectomy Mammogram: Not on file        Cervical Cancer Screening Between the ages of 21-29, pap smear recommended once every 3 years  Between the ages of 33-67, can perform pap smear with HPV co-testing every 5 years     Recommendations may differ for women with a history of total hysterectomy, cervical cancer, or abnormal pap smears in past  Pap Smear: 02/19/2021        Hepatitis C Screening Once for adults born between 1945 and 1965  More frequently in patients at high risk for Hepatitis C Hep C Antibody: Not on file        Diabetes Screening 1-2 times per year if you're at risk for diabetes or have pre-diabetes Fasting glucose: No results in last 5 years   A1C: 5 4 %        Cholesterol Screening Once every 5 years if you don't have a lipid disorder  May order more often based on risk factors  Lipid panel: 12/01/2020          Other Preventive Screenings Covered by Medicare:  1  Abdominal Aortic Aneurysm (AAA) Screening: covered once if your at risk  You're considered to be at risk if you have a family history of AAA  2  Lung Cancer Screening: covers low dose CT scan once per year if you meet all of the following conditions: (1) Age 50-69; (2) No signs or symptoms of lung cancer; (3) Current smoker or have quit smoking within the last 15 years; (4) You have a tobacco smoking history of at least 30 pack years (packs per day multiplied by number of years you smoked); (5) You get a written order from a healthcare provider  3  Glaucoma Screening: covered annually if you're considered high risk: (1) You have diabetes OR (2) Family history of glaucoma OR (3)  aged 48 and older OR (3)  American aged 72 and older  3  Osteoporosis Screening: covered every 2 years if you meet one of the following conditions: (1) You're estrogen deficient and at risk for osteoporosis based off medical history and other findings; (2) Have a vertebral abnormality; (3) On glucocorticoid therapy for more than 3 months; (4) Have primary hyperparathyroidism; (5) On osteoporosis medications and need to assess response to drug therapy  · Last bone density test (DXA Scan): Not on file  5  HIV Screening: covered annually if you're between the age of 12-76  Also covered annually if you are younger than 13 and older than 72 with risk factors for HIV infection  For pregnant patients, it is covered up to 3 times per pregnancy      Immunizations:  Immunization Recommendations   Influenza Vaccine Annual influenza vaccination during flu season is recommended for all persons aged >= 6 months who do not have contraindications   Pneumococcal Vaccine (Prevnar and Pneumovax)  * Prevnar = PCV13  * Pneumovax = PPSV23   Adults 25-60 years old: 1-3 doses may be recommended based on certain risk factors  Adults 72 years old: Prevnar (PCV13) vaccine recommended followed by Pneumovax (PPSV23) vaccine  If already received PPSV23 since turning 65, then PCV13 recommended at least one year after PPSV23 dose  Hepatitis B Vaccine 3 dose series if at intermediate or high risk (ex: diabetes, end stage renal disease, liver disease)   Tetanus (Td) Vaccine - COST NOT COVERED BY MEDICARE PART B Following completion of primary series, a booster dose should be given every 10 years to maintain immunity against tetanus  Td may also be given as tetanus wound prophylaxis  Tdap Vaccine - COST NOT COVERED BY MEDICARE PART B Recommended at least once for all adults  For pregnant patients, recommended with each pregnancy  Shingles Vaccine (Shingrix) - COST NOT COVERED BY MEDICARE PART B  2 shot series recommended in those aged 48 and above     Health Maintenance Due:      Topic Date Due    Hepatitis C Screening  Never done    HIV Screening  Never done    Cervical Cancer Screening  02/19/2026     Immunizations Due:      Topic Date Due    Pneumococcal Vaccine: Pediatrics (0 to 5 Years) and At-Risk Patients (6 to 59 Years) (1 - PCV) Never done    COVID-19 Vaccine (3 - Booster for Pfizer series) 02/19/2022    Influenza Vaccine (1) 09/01/2022     Advance Directives   What are advance directives? Advance directives are legal documents that state your wishes and plans for medical care  These plans are made ahead of time in case you lose your ability to make decisions for yourself  Advance directives can apply to any medical decision, such as the treatments you want, and if you want to donate organs  What are the types of advance directives?   There are many types of advance directives, and each state has rules about how to use them  You may choose a combination of any of the following:  · Living will: This is a written record of the treatment you want  You can also choose which treatments you do not want, which to limit, and which to stop at a certain time  This includes surgery, medicine, IV fluid, and tube feedings  · Durable power of  for healthcare [facility]): This is a written record that states who you want to make healthcare choices for you when you are unable to make them for yourself  This person, called a proxy, is usually a family member or a friend  You may choose more than 1 proxy  · Do not resuscitate (DNR) order:  A DNR order is used in case your heart stops beating or you stop breathing  It is a request not to have certain forms of treatment, such as CPR  A DNR order may be included in other types of advance directives  · Medical directive: This covers the care that you want if you are in a coma, near death, or unable to make decisions for yourself  You can list the treatments you want for each condition  Treatment may include pain medicine, surgery, blood transfusions, dialysis, IV or tube feedings, and a ventilator (breathing machine)  · Values history: This document has questions about your views, beliefs, and how you feel and think about life  This information can help others choose the care that you would choose  Why are advance directives important? An advance directive helps you control your care  Although spoken wishes may be used, it is better to have your wishes written down  Spoken wishes can be misunderstood, or not followed  Treatments may be given even if you do not want them  An advance directive may make it easier for your family to make difficult choices about your care  Cigarette Smoking and Your Health   Risks to your health if you smoke:  Nicotine and other chemicals found in tobacco damage every cell in your body   Even if you are a light smoker, you have an increased risk for cancer, heart disease, and lung disease  If you are pregnant or have diabetes, smoking increases your risk for complications  Benefits to your health if you stop smoking:   · You decrease respiratory symptoms such as coughing, wheezing, and shortness of breath  · You reduce your risk for cancers of the lung, mouth, throat, kidney, bladder, pancreas, stomach, and cervix  If you already have cancer, you increase the benefits of chemotherapy  You also reduce your risk for cancer returning or a second cancer from developing  · You reduce your risk for heart disease, blood clots, heart attack, and stroke  · You reduce your risk for lung infections, and diseases such as pneumonia, asthma, chronic bronchitis, and emphysema  · Your circulation improves  More oxygen can be delivered to your body  If you have diabetes, you lower your risk for complications, such as kidney, artery, and eye diseases  You also lower your risk for nerve damage  Nerve damage can lead to amputations, poor vision, and blindness  · You improve your body's ability to heal and to fight infections  For more information and support to stop smoking:   · [employer]  Phone: 6- 157 - [phone]  Web Address: www [employer]  Weight Management   Why it is important to manage your weight:  Being overweight increases your risk of health conditions such as heart disease, high blood pressure, type 2 diabetes, and certain types of cancer  It can also increase your risk for osteoarthritis, sleep apnea, and other respiratory problems  Aim for a slow, steady weight loss  Even a small amount of weight loss can lower your risk of health problems  How to lose weight safely:  A safe and healthy way to lose weight is to eat fewer calories and get regular exercise  You can lose up about 1 pound a week by decreasing the number of calories you eat by 500 calories each day     Healthy meal plan for weight management:  A healthy meal plan includes a variety of foods, contains fewer calories, and helps you stay healthy  A healthy meal plan includes the following:  · Eat whole-grain foods more often  A healthy meal plan should contain fiber  Fiber is the part of grains, fruits, and vegetables that is not broken down by your body  Whole-grain foods are healthy and provide extra fiber in your diet  Some examples of whole-grain foods are whole-wheat breads and pastas, oatmeal, brown rice, and bulgur  · Eat a variety of vegetables every day  Include dark, leafy greens such as spinach, kale, mundo greens, and mustard greens  Eat yellow and orange vegetables such as carrots, sweet potatoes, and winter squash  · Eat a variety of fruits every day  Choose fresh or canned fruit (canned in its own juice or light syrup) instead of juice  Fruit juice has very little or no fiber  · Eat low-fat dairy foods  Drink fat-free (skim) milk or 1% milk  Eat fat-free yogurt and low-fat cottage cheese  Try low-fat cheeses such as mozzarella and other reduced-fat cheeses  · Choose meat and other protein foods that are low in fat  Choose beans or other legumes such as split peas or lentils  Choose fish, skinless poultry (chicken or turkey), or lean cuts of red meat (beef or pork)  Before you cook meat or poultry, cut off any visible fat  · Use less fat and oil  Try baking foods instead of frying them  Add less fat, such as margarine, sour cream, regular salad dressing and mayonnaise to foods  Eat fewer high-fat foods  Some examples of high-fat foods include french fries, doughnuts, ice cream, and cakes  · Eat fewer sweets  Limit foods and drinks that are high in sugar  This includes candy, cookies, regular soda, and sweetened drinks  Exercise:  Exercise at least 30 minutes per day on most days of the week  Some examples of exercise include walking, biking, dancing, and swimming   You can also fit in more physical activity by taking the stairs instead of the elevator or parking farther away from stores  Ask your healthcare provider about the best exercise plan for you  © Copyright [employer] 2018 Information is for End User's use only and may not be sold, redistributed or otherwise used for commercial purposes   All illustrations and images included in CareNotes® are the copyrighted property of [employer]  or [address] [place] [address] [employer]

## 2022-07-21 NOTE — PROGRESS NOTES
Assessment and Plan:     1  Welcome to Medicare preventive visit  Hgb A1c, lipid panel ordered by different providers- re-printed scripts for pt  2  Type 2 diabetes mellitus without complication, without long-term current use of insulin ([address] )  DM foot exam normal  Hgb A1c ordered by another provider- reprinted script for pt  Hgb A1c 5 4 12/2020  At goal continue metformin  Regular eye exams- asked pt to have records faxed  - Microalbumin / creatinine urine ratio    3  GARIMA (obstructive sleep apnea)  Compliant with CPAP  4  Female infertility  Following with specialist in [place]  Friend is going to be surrogate  5  Family history of malignant neoplasm of female breast  6  Breast cancer screening, high risk patient  Maternal aunt BL inflammatory breast cancer at age 35  High risk  Mammogram ordered  - Mammo diagnostic left w 3d & cad; Future       Preventive health issues were discussed with patient, and age appropriate screening tests were ordered as noted in patient's After Visit Summary  Personalized health advice and appropriate referrals for health education or preventive services given if needed, as noted in patient's After Visit Summary  History of Present Illness:     Patient presents for a Medicare Wellness Visit    HPI   Patient Care Team:  Barbara Andres DO as PCP - General (Family Medicine)  Rita Calero MD as PCP - [address] [place] [address] (RTE)  Rita Calero MD as PCP - [facility] ([facility])     Review of Systems:     Review of Systems   Constitutional: Negative for chills and fever  HENT: Negative for ear pain and sore throat  Eyes: Negative for pain and visual disturbance  Respiratory: Negative for cough, chest tightness and shortness of breath  Cardiovascular: Negative for chest pain and palpitations  Gastrointestinal: Negative for abdominal pain, constipation, diarrhea, nausea and vomiting  Genitourinary: Negative for dysuria, hematuria and menstrual problem  Musculoskeletal: Negative for arthralgias and back pain  Skin: Negative for color change and rash  Neurological: Negative for seizures and syncope  Psychiatric/Behavioral: Negative for dysphoric mood and suicidal ideas  All other systems reviewed and are negative         Problem List:     Patient Active Problem List   Diagnosis    Depression with anxiety    Nonintractable epilepsy without status epilepticus ([place] [address] )    TSH elevation    History of irregular menstrual bleeding    PCOS (polycystic ovarian syndrome)    Endometrial polyp    Infertility, female    [place]'s esophagus    Gastric ulcer    GARIMA (obstructive sleep apnea)    Female infertility    Autism    Bipolar depression ([place] [address] )    SIRS (systemic inflammatory response syndrome) (HCC)    Fever    Morbid obesity ([place] [address] )    Class 3 severe obesity due to excess calories with serious comorbidity and body mass index (BMI) of 50 0 to 59 9 in adult ([place] [address] )    Diabetes mellitus, type 2 (HCC)    Gastroesophageal reflux disease    Excessive daytime sleepiness    Snoring    Iron deficiency anemia secondary to inadequate dietary iron intake    Right upper limb pain    Constipation    Dysphagia    Screen for STD (sexually transmitted disease)      Past Medical and Surgical History:     Past Medical History:   Diagnosis Date    Autism     [place] esophagus     [place]'s esophagus     Depression     Diabetes mellitus ([place] [address] )     Female infertility     Gastric ulcer     History of bronchitis     Irregular menses     Miscarriage     Morbid obesity with BMI of 50 0-59 9, adult ([place] [address] )     Reflux esophagitis     Seizures ([place] [address] )     last one 7/13/21    Sleep apnea     no CPAP     Past Surgical History:   Procedure Laterality Date    EGD      with Bx due to barretts esophagus    EYE SURGERY Bilateral     lazy eye repair    MO HYSTEROSCOPY,W/ENDO BX N/A 9/22/2021    Procedure: DILATATION AND CURETTAGE (D&C) WITH HYSTEROSCOPY;  Surgeon: Porter Miller Danielle Smith MD;  [facility];  Service: Gynecology    WISDOM TOOTH EXTRACTION        Family History:     Family History   Problem Relation Age of Onset    Bipolar disorder Mother     Depression Mother     Depression Father     Diabetes Maternal Grandmother     Thyroid disease Maternal Grandmother     Hypertension Maternal Grandmother     Heart disease Maternal Grandmother     Diabetes Maternal Grandfather     Diabetes Paternal Grandmother     Breast cancer Paternal Grandmother     Stroke Paternal Grandmother     Diabetes Paternal Grandfather     Breast cancer Maternal Aunt     Stomach cancer Maternal Aunt       Social History:     Social History     Socioeconomic History    Marital status: Single     Spouse name: Not on file    Number of children: Not on file    Years of education: Not on file    Highest education level: Not on file   Occupational History    Not on file   Tobacco Use    Smoking status: Current Every Day Smoker     Packs/day: 0 00     Years: 0 00     Pack years: 0 00     Types: Cigarettes    Smokeless tobacco: Never Used    Tobacco comment: Black n mild    Vaping Use    Vaping Use: Never used   Substance and Sexual Activity    Alcohol use: Yes     Comment: occ    Drug use: Not Currently     Types: Marijuana     Comment: about a couple times a week     Sexual activity: Yes     Partners: Male     Birth control/protection: None     Comment: Trying to conceive for 2 years now   Other Topics Concern    Not on file   Social History Narrative    Not on file     Social Determinants of Health     Financial Resource Strain: Not on file   Food Insecurity: Not on file   Transportation Needs: Not on file   Physical Activity: Not on file   Stress: Not on file   Social Connections: Not on file   Intimate Partner Violence: Not on file   Housing Stability: Not on file      Medications and Allergies:     Current Outpatient Medications   Medication Sig Dispense Refill    acetaminophen (TYLENOL) 500 mg tablet Take 1 tablet (500 mg total) by mouth every 6 (six) hours as needed for mild pain (Patient not taking: No sig reported)  0    albuterol (PROVENTIL HFA,VENTOLIN HFA) 90 mcg/act inhaler Inhale 2 puffs every 6 (six) hours as needed for wheezing      ARIPiprazole (ABILIFY) 10 mg tablet Take 10 mg by mouth daily with lunch        citalopram (CeleXA) 10 mg tablet Take 10 mg by mouth every morning       docusate sodium (COLACE) 100 mg capsule Take 1 capsule (100 mg total) by mouth 2 (two) times a day 60 capsule 5    famotidine (PEPCID) 40 MG tablet Take 1 tablet (40 mg total) by mouth daily at bedtime 30 tablet 5    lamoTRIgine (LaMICtal) 200 MG tablet Take 1 tablet (200 mg total) by mouth 2 (two) times a day Take with one 25 mg tablet for total dose of 225 mg  180 tablet 3    lamoTRIgine (LaMICtal) 25 mg tablet TAKE ONE TABLET BY MOUTH TWICE A  tablet 3    levETIRAcetam (KEPPRA) 750 mg tablet Take 2 tablets (1,500 mg total) by mouth every 12 (twelve) hours 360 tablet 3    metFORMIN (GLUCOPHAGE-XR) 750 mg 24 hr tablet Take 1 tablet (750 mg total) by mouth daily with breakfast 30 tablet 2    omeprazole (PriLOSEC) 40 MG capsule Take 1 capsule (40 mg total) by mouth in the morning and 1 capsule (40 mg total) in the evening  Take before meals  60 capsule 3    ondansetron (ZOFRAN) 4 mg tablet Take 1 tablet (4 mg total) by mouth every 8 (eight) hours as needed for nausea or vomiting 60 tablet 1     No current facility-administered medications for this visit       Allergies   Allergen Reactions    Haloperidol Other (See Comments)     Jaw locks up    Penicillins Hives    Pollen Extract Allergic Rhinitis      Immunizations:     Immunization History   Administered Date(s) Administered    COVID-19 PFIZER VACCINE 0 3 ML IM 08/29/2021, 09/19/2021    Influenza, injectable, quadrivalent, preservative free 0 5 mL 12/01/2020    Tdap 05/28/2021      Health Maintenance:         Topic Date Due  Hepatitis C Screening  Never done    HIV Screening  Never done    Cervical Cancer Screening  02/19/2026         Topic Date Due    Pneumococcal Vaccine: Pediatrics (0 to 5 Years) and At-Risk Patients (6 to 59 Years) (1 - PCV) Never done    COVID-19 Vaccine (3 - Booster for Pfizer series) 02/19/2022    Influenza Vaccine (1) 09/01/2022      Medicare Screening Tests and Risk Assessments:     Siddharth Bai is here for her Welcome to Medicare visit  Health Risk Assessment:   Patient rates overall health as very good  Patient feels that their physical health rating is much better  Patient is very satisfied with their life  Eyesight was rated as same  Hearing was rated as same  Patient feels that their emotional and mental health rating is much better  Patients states they are never, rarely angry  Patient states they are sometimes unusually tired/fatigued  Pain experienced in the last 7 days has been none  Patient states that she has experienced no weight loss or gain in last 6 months  Fall Risk Screening: In the past year, patient has experienced: no history of falling in past year      Urinary Incontinence Screening:   Patient has not leaked urine accidently in the last six months  Home Safety:  Patient does not have trouble with stairs inside or outside of their home  Patient has working smoke alarms and has working carbon monoxide detector  Home safety hazards include: none  Nutrition:   Current diet is Low Cholesterol, Low Carb and Limited junk food  Medications:   Patient is not currently taking any over-the-counter supplements  Patient is able to manage medications  Activities of Daily Living (ADLs)/Instrumental Activities of Daily Living (IADLs):   Walk and transfer into and out of bed and chair?: Yes  Dress and groom yourself?: Yes    Bathe or shower yourself?: Yes    Feed yourself?  Yes  Do your laundry/housekeeping?: Yes  Manage your money, pay your bills and track your expenses?: Yes  Make your own meals?: Yes    Do your own shopping?: Yes    Previous Hospitalizations:   Any hospitalizations or ED visits within the last 12 months?: Yes      Cognitive Screening:   Provider or family/friend/caregiver concerned regarding cognition?: No    PREVENTIVE SCREENINGS      Cardiovascular Screening:    General: Screening Current    Due for: Lipid Panel      Diabetes Screening:     General: History Diabetes    Due for: Blood Glucose      Colorectal Cancer Screening:     General: Screening Not Indicated      Breast Cancer Screening:     General: Screening Not Indicated      Cervical Cancer Screening:    General: Screening Current      Osteoporosis Screening:    General: Screening Not Indicated      Abdominal Aortic Aneurysm (AAA) Screening:        General: Screening Not Indicated      Lung Cancer Screening:     General: Screening Not Indicated      Hepatitis C Screening:    General: Screening Not Indicated    Screening, Brief Intervention, and Referral to Treatment (SBIRT)    Screening  Typical number of drinks in a day: 0  Typical number of drinks in a week: 0  Interpretation: Low risk drinking behavior  Single Item Drug Screening:  How often have you used an illegal drug (including marijuana) or a prescription medication for non-medical reasons in the past year? never    Single Item Drug Screen Score: 0  Interpretation: Negative screen for possible drug use disorder    Other Counseling Topics:   Car/seat belt/driving safety, skin self-exam, sunscreen and regular weightbearing exercise and calcium and vitamin D intake  No exam data present     Physical Exam:     /68 (BP Location: Left arm, Patient Position: Sitting, Cuff Size: Extra-Large)   Pulse 97   Temp 98 9 °F (37 2 °C) (Tympanic)   Ht 4' 7" (1 397 m)   Wt 104 kg (229 lb 6 4 oz)   LMP 06/21/2022   SpO2 97%   BMI 53 32 kg/m²     Physical Exam  Constitutional:       Appearance: Normal appearance     HENT:      Head: Normocephalic and atraumatic  Cardiovascular:      Rate and Rhythm: Normal rate  Pulses: no weak pulses          Dorsalis pedis pulses are 2+ on the right side and 2+ on the left side  Posterior tibial pulses are 2+ on the right side and 2+ on the left side  Pulmonary:      Effort: Pulmonary effort is normal  No respiratory distress  Feet:      Right foot:      Skin integrity: No ulcer, skin breakdown, erythema, warmth, callus or dry skin  Left foot:      Skin integrity: No ulcer, skin breakdown, erythema, warmth, callus or dry skin  Skin:     Capillary Refill: Capillary refill takes less than 2 seconds  Neurological:      General: No focal deficit present  Mental Status: She is alert and oriented to person, place, and time  Psychiatric:         Mood and Affect: Mood normal          Behavior: Behavior normal       Diabetic Foot Exam    Patient's shoes and socks removed  Right Foot/Ankle   Right Foot Inspection  Skin Exam: skin normal and skin intact  No dry skin, no warmth, no callus, no erythema, no maceration, no abnormal color, no pre-ulcer, no ulcer and no callus  Toe Exam: ROM and strength within normal limits  Sensory   Vibration: intact  Proprioception: intact  Monofilament testing: intact    Vascular  Capillary refills: < 3 seconds  The right DP pulse is 2+  The right PT pulse is 2+  Left Foot/Ankle  Left Foot Inspection  Skin Exam: skin normal and skin intact  No dry skin, no warmth, no erythema, no maceration, normal color, no pre-ulcer, no ulcer and no callus  Toe Exam: ROM and strength within normal limits  Sensory   Vibration: intact  Proprioception: intact  Monofilament testing: intact    Vascular  Capillary refills: < 3 seconds  The left DP pulse is 2+  The left PT pulse is 2+       Assign Risk Category  No deformity present  No loss of protective sensation  No weak pulses  Risk: 0     Visual Acuity Screening    Right eye Left eye Both eyes   Without correction:  20/40 20/30-2   With correction:      Comments: Unable to see with Right Eye      Rohan Calzada DO

## 2022-07-27 ENCOUNTER — CLINICAL SUPPORT (OUTPATIENT)
Dept: FAMILY MEDICINE CLINIC | Facility: CLINIC | Age: 32
End: 2022-07-27
Payer: MEDICARE

## 2022-07-27 ENCOUNTER — TELEPHONE (OUTPATIENT)
Dept: FAMILY MEDICINE CLINIC | Facility: CLINIC | Age: 32
End: 2022-07-27

## 2022-07-27 DIAGNOSIS — Z11.1 SCREENING-PULMONARY TB: Primary | ICD-10-CM

## 2022-07-27 PROCEDURE — 86580 TB INTRADERMAL TEST: CPT

## 2022-07-27 NOTE — TELEPHONE ENCOUNTER
Patient came in today for PPD placement, she has a form that needs completion and your signature, then place it back in the needs attention white folder so she can have it completed when she comes in for abisai PPD Read on Friday    The form is in your folder

## 2022-07-28 ENCOUNTER — TELEPHONE (OUTPATIENT)
Dept: PULMONOLOGY | Facility: CLINIC | Age: 32
End: 2022-07-28

## 2022-07-28 DIAGNOSIS — G47.33 OSA (OBSTRUCTIVE SLEEP APNEA): Primary | ICD-10-CM

## 2022-07-28 NOTE — TELEPHONE ENCOUNTER
Do you have the form? She is coming in tomorrow for the PPD read so hopefully she can get the form back completed  Can you put it in the white team needs attention folder at the nurses station?   Thank you

## 2022-07-28 NOTE — TELEPHONE ENCOUNTER
Pt called re: she is having issues with her nasal mask  Water is pooling in the nasal mask and she feels like she's drowning  She has tried to place the tube temp on auto and place it on different settings but nothing is helping    Please advise: [phone]

## 2022-07-28 NOTE — TELEPHONE ENCOUNTER
Hello! I have completed the form and signed it except for the PPD results and the question asking if pt is free of communicable diseases  Once PPD is negative, we can officially answer that pt is free of communicable diseases and give her the form back  Thank you!

## 2022-07-29 ENCOUNTER — CLINICAL SUPPORT (OUTPATIENT)
Dept: FAMILY MEDICINE CLINIC | Facility: CLINIC | Age: 32
End: 2022-07-29

## 2022-07-29 ENCOUNTER — APPOINTMENT (OUTPATIENT)
Dept: LAB | Facility: HOSPITAL | Age: 32
End: 2022-07-29
Payer: MEDICARE

## 2022-07-29 ENCOUNTER — HOSPITAL ENCOUNTER (OUTPATIENT)
Dept: RADIOLOGY | Facility: HOSPITAL | Age: 32
Discharge: HOME | End: 2022-07-29
Payer: MEDICARE

## 2022-07-29 VITALS — HEIGHT: 55 IN | BODY MASS INDEX: 52.54 KG/M2 | WEIGHT: 227 LBS

## 2022-07-29 DIAGNOSIS — E16.1 HYPERINSULINEMIA: ICD-10-CM

## 2022-07-29 DIAGNOSIS — E28.2 PCOS (POLYCYSTIC OVARIAN SYNDROME): ICD-10-CM

## 2022-07-29 DIAGNOSIS — Z12.39 BREAST CANCER SCREENING, HIGH RISK PATIENT: ICD-10-CM

## 2022-07-29 DIAGNOSIS — G40.909 NONINTRACTABLE EPILEPSY WITHOUT STATUS EPILEPTICUS, UNSPECIFIED EPILEPSY TYPE (HCC): ICD-10-CM

## 2022-07-29 DIAGNOSIS — D64.9 NORMOCYTIC ANEMIA: ICD-10-CM

## 2022-07-29 DIAGNOSIS — K21.9 GASTROESOPHAGEAL REFLUX DISEASE: ICD-10-CM

## 2022-07-29 DIAGNOSIS — Z80.3 FAMILY HISTORY OF MALIGNANT NEOPLASM OF FEMALE BREAST: ICD-10-CM

## 2022-07-29 DIAGNOSIS — E66.9 DIABETES MELLITUS TYPE 2 IN OBESE (HCC): ICD-10-CM

## 2022-07-29 DIAGNOSIS — G40.909 NONINTRACTABLE EPILEPSY WITHOUT STATUS EPILEPTICUS (HCC): ICD-10-CM

## 2022-07-29 DIAGNOSIS — Z11.1 ENCOUNTER FOR PPD SKIN TEST READING: Primary | ICD-10-CM

## 2022-07-29 DIAGNOSIS — G47.33 OSA (OBSTRUCTIVE SLEEP APNEA): ICD-10-CM

## 2022-07-29 DIAGNOSIS — F31.9 BIPOLAR DEPRESSION (HCC): ICD-10-CM

## 2022-07-29 DIAGNOSIS — D53.9 NUTRITIONAL ANEMIA, UNSPECIFIED: ICD-10-CM

## 2022-07-29 DIAGNOSIS — E66.01 CLASS 3 SEVERE OBESITY DUE TO EXCESS CALORIES WITH SERIOUS COMORBIDITY AND BODY MASS INDEX (BMI) OF 50.0 TO 59.9 IN ADULT (HCC): ICD-10-CM

## 2022-07-29 DIAGNOSIS — E11.69 DIABETES MELLITUS TYPE 2 IN OBESE (HCC): ICD-10-CM

## 2022-07-29 DIAGNOSIS — Z91.89 OTHER SPECIFIED PERSONAL RISK FACTORS, NOT ELSEWHERE CLASSIFIED: ICD-10-CM

## 2022-07-29 DIAGNOSIS — E11.9 DIABETES MELLITUS, TYPE 2 (HCC): ICD-10-CM

## 2022-07-29 LAB
ALBUMIN SERPL BCP-MCNC: 3.3 G/DL (ref 3.5–5)
ALP SERPL-CCNC: 128 U/L (ref 46–116)
ALT SERPL W P-5'-P-CCNC: 27 U/L (ref 12–78)
ANION GAP SERPL CALCULATED.3IONS-SCNC: 9 MMOL/L (ref 4–13)
AST SERPL W P-5'-P-CCNC: 19 U/L (ref 5–45)
BASOPHILS # BLD AUTO: 0.09 THOUSANDS/ΜL (ref 0–0.1)
BASOPHILS NFR BLD AUTO: 2 % (ref 0–1)
BILIRUB SERPL-MCNC: 0.25 MG/DL (ref 0.2–1)
BUN SERPL-MCNC: 15 MG/DL (ref 5–25)
CALCIUM ALBUM COR SERPL-MCNC: 9.5 MG/DL (ref 8.3–10.1)
CALCIUM SERPL-MCNC: 8.9 MG/DL (ref 8.3–10.1)
CHLORIDE SERPL-SCNC: 101 MMOL/L (ref 96–108)
CHOLEST SERPL-MCNC: 185 MG/DL
CO2 SERPL-SCNC: 28 MMOL/L (ref 21–32)
CREAT SERPL-MCNC: 0.66 MG/DL (ref 0.6–1.3)
EOSINOPHIL # BLD AUTO: 0.13 THOUSAND/ΜL (ref 0–0.61)
EOSINOPHIL NFR BLD AUTO: 2 % (ref 0–6)
ERYTHROCYTE [DISTWIDTH] IN BLOOD BY AUTOMATED COUNT: 14.5 % (ref 11.6–15.1)
EST. AVERAGE GLUCOSE BLD GHB EST-MCNC: 120 MG/DL
FERRITIN SERPL-MCNC: 8 NG/ML (ref 8–388)
GFR SERPL CREATININE-BSD FRML MDRD: 118 ML/MIN/1.73SQ M
GLUCOSE P FAST SERPL-MCNC: 92 MG/DL (ref 65–99)
HBA1C MFR BLD: 5.8 %
HCT VFR BLD AUTO: 37.7 % (ref 34.8–46.1)
HCV AB SER QL: NORMAL
HDLC SERPL-MCNC: 58 MG/DL
HGB BLD-MCNC: 11.4 G/DL (ref 11.5–15.4)
IMM GRANULOCYTES # BLD AUTO: 0.02 THOUSAND/UL (ref 0–0.2)
IMM GRANULOCYTES NFR BLD AUTO: 0 % (ref 0–2)
INSULIN SERPL-ACNC: 20.1 MU/L (ref 3–25)
IRON SATN MFR SERPL: 9 % (ref 15–50)
IRON SERPL-MCNC: 37 UG/DL (ref 50–170)
LDLC SERPL CALC-MCNC: 103 MG/DL (ref 0–100)
LYMPHOCYTES # BLD AUTO: 1.31 THOUSANDS/ΜL (ref 0.6–4.47)
LYMPHOCYTES NFR BLD AUTO: 22 % (ref 14–44)
MCH RBC QN AUTO: 25.1 PG (ref 26.8–34.3)
MCHC RBC AUTO-ENTMCNC: 30.2 G/DL (ref 31.4–37.4)
MCV RBC AUTO: 83 FL (ref 82–98)
MONOCYTES # BLD AUTO: 0.27 THOUSAND/ΜL (ref 0.17–1.22)
MONOCYTES NFR BLD AUTO: 5 % (ref 4–12)
NEUTROPHILS # BLD AUTO: 4.08 THOUSANDS/ΜL (ref 1.85–7.62)
NEUTS SEG NFR BLD AUTO: 69 % (ref 43–75)
NRBC BLD AUTO-RTO: 0 /100 WBCS
PLATELET # BLD AUTO: 458 THOUSANDS/UL (ref 149–390)
PMV BLD AUTO: 8.9 FL (ref 8.9–12.7)
POTASSIUM SERPL-SCNC: 3.7 MMOL/L (ref 3.5–5.3)
PROT SERPL-MCNC: 8.2 G/DL (ref 6.4–8.4)
RBC # BLD AUTO: 4.55 MILLION/UL (ref 3.81–5.12)
SODIUM SERPL-SCNC: 138 MMOL/L (ref 135–147)
TIBC SERPL-MCNC: 416 UG/DL (ref 250–450)
TRIGL SERPL-MCNC: 120 MG/DL
TSH SERPL DL<=0.05 MIU/L-ACNC: 3.07 UIU/ML (ref 0.45–4.5)
WBC # BLD AUTO: 5.9 THOUSAND/UL (ref 4.31–10.16)

## 2022-07-29 PROCEDURE — 82043 UR ALBUMIN QUANTITATIVE: CPT | Performed by: STUDENT IN AN ORGANIZED HEALTH CARE EDUCATION/TRAINING PROGRAM

## 2022-07-29 PROCEDURE — 86803 HEPATITIS C AB TEST: CPT | Performed by: NURSE PRACTITIONER

## 2022-07-29 PROCEDURE — 83525 ASSAY OF INSULIN: CPT

## 2022-07-29 PROCEDURE — 82570 ASSAY OF URINE CREATININE: CPT | Performed by: STUDENT IN AN ORGANIZED HEALTH CARE EDUCATION/TRAINING PROGRAM

## 2022-07-29 PROCEDURE — 80061 LIPID PANEL: CPT

## 2022-07-29 PROCEDURE — 83540 ASSAY OF IRON: CPT

## 2022-07-29 PROCEDURE — 84443 ASSAY THYROID STIM HORMONE: CPT

## 2022-07-29 PROCEDURE — 80177 DRUG SCRN QUAN LEVETIRACETAM: CPT

## 2022-07-29 PROCEDURE — 77063 BREAST TOMOSYNTHESIS BI: CPT

## 2022-07-29 PROCEDURE — 80175 DRUG SCREEN QUAN LAMOTRIGINE: CPT

## 2022-07-29 PROCEDURE — 80053 COMPREHEN METABOLIC PANEL: CPT

## 2022-07-29 PROCEDURE — 85025 COMPLETE CBC W/AUTO DIFF WBC: CPT

## 2022-07-29 PROCEDURE — 77067 SCR MAMMO BI INCL CAD: CPT

## 2022-07-29 PROCEDURE — 83036 HEMOGLOBIN GLYCOSYLATED A1C: CPT

## 2022-07-29 PROCEDURE — 82728 ASSAY OF FERRITIN: CPT

## 2022-07-29 PROCEDURE — 36415 COLL VENOUS BLD VENIPUNCTURE: CPT | Performed by: NURSE PRACTITIONER

## 2022-07-29 PROCEDURE — 82607 VITAMIN B-12: CPT

## 2022-07-29 PROCEDURE — 83550 IRON BINDING TEST: CPT

## 2022-07-29 PROCEDURE — 86592 SYPHILIS TEST NON-TREP QUAL: CPT | Performed by: NURSE PRACTITIONER

## 2022-07-29 PROCEDURE — 87389 HIV-1 AG W/HIV-1&-2 AB AG IA: CPT

## 2022-07-29 PROCEDURE — 82746 ASSAY OF FOLIC ACID SERUM: CPT

## 2022-07-30 LAB
CREAT UR-MCNC: 135 MG/DL
FOLATE SERPL-MCNC: 17.4 NG/ML (ref 3.1–17.5)
MICROALBUMIN UR-MCNC: 23.2 MG/L (ref 0–20)
MICROALBUMIN/CREAT 24H UR: 17 MG/G CREATININE (ref 0–30)
VIT B12 SERPL-MCNC: 570 PG/ML (ref 100–900)

## 2022-07-31 LAB
HIV 1+2 AB+HIV1 P24 AG SERPL QL IA: NORMAL
RPR SER QL: NORMAL

## 2022-08-01 LAB
LAMOTRIGINE SERPL-MCNC: <1 UG/ML (ref 2–20)
LEVETIRACETAM SERPL-MCNC: <1 UG/ML (ref 10–40)

## 2022-08-02 ENCOUNTER — TELEPHONE (OUTPATIENT)
Dept: BARIATRICS | Facility: CLINIC | Age: 32
End: 2022-08-02

## 2022-08-02 ENCOUNTER — OFFICE VISIT (OUTPATIENT)
Dept: FAMILY MEDICINE CLINIC | Facility: CLINIC | Age: 32
End: 2022-08-02
Payer: MEDICARE

## 2022-08-02 VITALS
HEART RATE: 94 BPM | BODY MASS INDEX: 52.77 KG/M2 | HEIGHT: 55 IN | SYSTOLIC BLOOD PRESSURE: 98 MMHG | WEIGHT: 228 LBS | OXYGEN SATURATION: 99 % | TEMPERATURE: 98.1 F | DIASTOLIC BLOOD PRESSURE: 66 MMHG | RESPIRATION RATE: 18 BRPM

## 2022-08-02 DIAGNOSIS — D50.9 IRON DEFICIENCY ANEMIA, UNSPECIFIED IRON DEFICIENCY ANEMIA TYPE: ICD-10-CM

## 2022-08-02 DIAGNOSIS — E03.9 HYPOTHYROIDISM, UNSPECIFIED TYPE: Primary | ICD-10-CM

## 2022-08-02 DIAGNOSIS — R79.89 ELEVATED PLATELET COUNT: ICD-10-CM

## 2022-08-02 DIAGNOSIS — Z71.3 NUTRITIONAL COUNSELING: ICD-10-CM

## 2022-08-02 PROBLEM — E11.9 DIABETES MELLITUS, TYPE 2 (HCC): Status: RESOLVED | Noted: 2021-09-21 | Resolved: 2022-08-02

## 2022-08-02 PROCEDURE — 99213 OFFICE O/P EST LOW 20 MIN: CPT | Performed by: FAMILY MEDICINE

## 2022-08-02 RX ORDER — LEVOTHYROXINE SODIUM 0.03 MG/1
25 TABLET ORAL
Qty: 30 TABLET | Refills: 2 | Status: SHIPPED | OUTPATIENT
Start: 2022-08-02 | End: 2022-10-31

## 2022-08-02 RX ORDER — FERROUS SULFATE TAB EC 324 MG (65 MG FE EQUIVALENT) 324 (65 FE) MG
324 TABLET DELAYED RESPONSE ORAL
Qty: 60 TABLET | Refills: 2 | Status: SHIPPED | OUTPATIENT
Start: 2022-08-02

## 2022-08-02 NOTE — TELEPHONE ENCOUNTER
----- Message from Lucía Riggins sent at 8/2/2022  7:58 AM EDT -----  Spoke with pt who stated she is interested in going back to surgical side  She does have a 3 month f/u with you scheduled in October  What do you advice?

## 2022-08-02 NOTE — PROGRESS NOTES
[id number] [address]  Pranav Glasgow MD, 22     Meryle Croft MRN: [id number] : 1990 Age: 33 y o  Assessment/Plan       Diagnoses and all orders for this visit:    Hypothyroidism, unspecified type  -     levothyroxine (Synthroid) 25 mcg tablet; Take 1 tablet (25 mcg total) by mouth daily in the early morning    Iron deficiency anemia, unspecified iron deficiency anemia type  -     ferrous sulfate 324 (65 Fe) mg; Take 1 tablet (324 mg total) by mouth 2 (two) times a day before meals  -Start Vitamin C supplementation    Nutrition counseling          -Provided    Elevated platelet count  -     Retic Count; Future          Reviewed labs with patient  Started patient on synthroid 25 mcg and iron supplement with Vitamin C  Patient states she has been taking her lamictal and keppra regularly now that insurance issues are corrected  Scheduled to see neurologist in November  Follow up TSH in 3 months  No follow-ups on file  Meryle Croft acknowledged understanding of treatment plan, all questions answered  Subjective      Meryle Croft is a 33 y o  female  No chief complaint on file  Patient presents today to review labs  She states that she has a history of seizures and her last seizure was in May  Notes she had not been taking her lamictal and keppra for a while due to insurance issues and having to switch neurologists  Notes she has been having weight gain and hair loss  She is on metformin for PCOS        HPI      The following portions of the patient's history were reviewed and updated as appropriate: allergies, current medications, past family history, past medical history, past social history, past surgical history and problem list      Past Medical History:   Diagnosis Date    Autism     [place] esophagus     [place]'s esophagus     Depression     Diabetes mellitus ([facility] )     Female infertility     Gastric ulcer     History of bronchitis     Irregular menses     Miscarriage     Morbid obesity with BMI of 50 0-59 9, adult ([facility] [address] )     Reflux esophagitis     Seizures ([facility] [address] )     last one 7/13/21    Sleep apnea     no CPAP       Allergies   Allergen Reactions    Haloperidol Other (See Comments)     Jaw locks up    Penicillins Hives    Pollen Extract Allergic Rhinitis       Past Surgical History:   Procedure Laterality Date    EGD      with Bx due to barretts esophagus    EYE SURGERY Bilateral     lazy eye repair    [place] HYSTEROSCOPY,W/ENDO BX N/A 9/22/2021    Procedure: DILATATION AND CURETTAGE (D&C) WITH HYSTEROSCOPY;  Surgeon: Dany Nunez MD;  Location: [facility];  Service: Gynecology    WISDOM TOOTH EXTRACTION         Family History   Problem Relation Age of Onset    Bipolar disorder Mother     Depression Mother     Depression Father     Diabetes Maternal Grandmother     Thyroid disease Maternal Grandmother     Hypertension Maternal Grandmother     Heart disease Maternal Grandmother     Diabetes Maternal Grandfather     Diabetes Paternal Grandmother     Breast cancer Paternal Grandmother     Stroke Paternal Grandmother     Diabetes Paternal Grandfather     Breast cancer Maternal Aunt 35        bilateral    Stomach cancer Maternal Aunt        Social History     Socioeconomic History    Marital status: Single     Spouse name: Not on file    Number of children: Not on file    Years of education: Not on file    Highest education level: Not on file   Occupational History    Not on file   Tobacco Use    Smoking status: Never Smoker    Smokeless tobacco: Never Used   Vaping Use    Vaping Use: Never used   Substance and Sexual Activity    Alcohol use: Yes     Comment: occ    Drug use: Not Currently     Types: Marijuana     Comment: about a couple times a week, quit June 2022    Sexual activity: Yes     Partners: Male     Birth control/protection: None     Comment: Trying to conceive for 2 years now   Other Topics Concern    Not on file   Social History Narrative    Not on file     Social Determinants of Health     Financial Resource Strain: Not on file   Food Insecurity: Not on file   Transportation Needs: Not on file   Physical Activity: Not on file   Stress: Not on file   Social Connections: Not on file   Intimate Partner Violence: Not on file   Housing Stability: Not on file       Current Outpatient Medications   Medication Sig Dispense Refill    acetaminophen (TYLENOL) 500 mg tablet Take 1 tablet (500 mg total) by mouth every 6 (six) hours as needed for mild pain (Patient not taking: No sig reported)  0    albuterol (PROVENTIL HFA,VENTOLIN HFA) 90 mcg/act inhaler Inhale 2 puffs every 6 (six) hours as needed for wheezing      ARIPiprazole (ABILIFY) 10 mg tablet Take 10 mg by mouth daily with lunch        citalopram (CeleXA) 10 mg tablet Take 10 mg by mouth every morning       docusate sodium (COLACE) 100 mg capsule Take 1 capsule (100 mg total) by mouth 2 (two) times a day 60 capsule 5    famotidine (PEPCID) 40 MG tablet Take 1 tablet (40 mg total) by mouth daily at bedtime 30 tablet 5    lamoTRIgine (LaMICtal) 200 MG tablet Take 1 tablet (200 mg total) by mouth 2 (two) times a day Take with one 25 mg tablet for total dose of 225 mg  180 tablet 3    lamoTRIgine (LaMICtal) 25 mg tablet TAKE ONE TABLET BY MOUTH TWICE A  tablet 3    levETIRAcetam (KEPPRA) 750 mg tablet Take 2 tablets (1,500 mg total) by mouth every 12 (twelve) hours 360 tablet 3    metFORMIN (GLUCOPHAGE-XR) 750 mg 24 hr tablet Take 1 tablet (750 mg total) by mouth daily with breakfast 30 tablet 2    omeprazole (PriLOSEC) 40 MG capsule Take 1 capsule (40 mg total) by mouth in the morning and 1 capsule (40 mg total) in the evening  Take before meals   60 capsule 3    ondansetron (ZOFRAN) 4 mg tablet Take 1 tablet (4 mg total) by mouth every 8 (eight) hours as needed for nausea or vomiting 60 tablet 1     No current facility-administered medications for this visit  Review of Systems   Constitutional: Positive for fatigue  Negative for activity change and appetite change  Respiratory: Negative for cough, shortness of breath and wheezing  Cardiovascular: Negative for chest pain and leg swelling  Gastrointestinal: Negative for abdominal pain, constipation, diarrhea, nausea and vomiting  Musculoskeletal: Negative for back pain  Neurological: Negative for light-headedness and headaches  And as noted in HPI    Objective      LMP 07/25/2022     Physical Exam  Constitutional:       Appearance: She is obese  Cardiovascular:      Rate and Rhythm: Normal rate and regular rhythm  Pulses: Normal pulses  Heart sounds: Normal heart sounds  Pulmonary:      Effort: Pulmonary effort is normal       Breath sounds: Normal breath sounds  Abdominal:      General: Bowel sounds are normal  There is no distension  Tenderness: There is no abdominal tenderness  Musculoskeletal:         General: Normal range of motion  Neurological:      General: No focal deficit present  Mental Status: She is alert and oriented to person, place, and time  Psychiatric:         Mood and Affect: Mood normal          Behavior: Behavior normal          Thought Content: Thought content normal          Judgment: Judgment normal              Some portions of this record may have been generated with voice recognition software  There may be translation, syntax, or grammatical errors  Occasional wrong word or "sound-a-like" substitutions may have occurred due to the inherent limitations of the voice recognition software  Read the chart carefully and recognize, using context, where substations may have occurred  If you have any questions, please contact the dictating provider for clarification or correction, as needed

## 2022-08-02 NOTE — TELEPHONE ENCOUNTER
If she does not wish to follow-up with me that is fine  She will then need to obtain her metformin from her PCP however

## 2022-08-03 ENCOUNTER — APPOINTMENT (OUTPATIENT)
Dept: LAB | Facility: HOSPITAL | Age: 32
End: 2022-08-03
Payer: MEDICARE

## 2022-08-03 DIAGNOSIS — R79.89 ELEVATED PLATELET COUNT: ICD-10-CM

## 2022-08-03 LAB
RETICS # AUTO: NORMAL 10*3/UL (ref 14097–95744)
RETICS # CALC: 1.3 % (ref 0.37–1.87)

## 2022-08-03 PROCEDURE — 36415 COLL VENOUS BLD VENIPUNCTURE: CPT

## 2022-08-03 PROCEDURE — 85045 AUTOMATED RETICULOCYTE COUNT: CPT

## 2022-08-04 ENCOUNTER — TELEPHONE (OUTPATIENT)
Dept: BARIATRICS | Facility: CLINIC | Age: 32
End: 2022-08-04

## 2022-08-04 DIAGNOSIS — D64.9 ANEMIA: Primary | ICD-10-CM

## 2022-08-04 NOTE — TELEPHONE ENCOUNTER
Please advise patient of LINA - I am referring her to hematology for iron infusions  Please notify her LDL cholesterol is mildly elevated and TSH thyroid function WNL     Thank you

## 2022-08-04 NOTE — TELEPHONE ENCOUNTER
Pt had an appt with PCP and these things were discussed at that appt  I'm not even sure why you received a copy

## 2022-08-09 ENCOUNTER — TELEPHONE (OUTPATIENT)
Dept: HEMATOLOGY ONCOLOGY | Facility: CLINIC | Age: 32
End: 2022-08-09

## 2022-08-11 ENCOUNTER — TELEPHONE (OUTPATIENT)
Dept: NEUROLOGY | Facility: CLINIC | Age: 32
End: 2022-08-11

## 2022-08-11 DIAGNOSIS — G40.909 NONINTRACTABLE EPILEPSY WITHOUT STATUS EPILEPTICUS, UNSPECIFIED EPILEPSY TYPE (HCC): Primary | ICD-10-CM

## 2022-08-11 NOTE — TELEPHONE ENCOUNTER
Called and advised pt of all of the below  She verbalized clear understanding  Pt states that she was off of keppra for 3 months d/t insurance issues  Ins issues had been resolved  She re-started keppra 750 mg 2 tab bid this month  She has been taking lamotrigine 225 mg bid  She did not missed any dose of her lamotrigine  States that since off of keppra, she had 1 unwitnessed seizure in June  Her usual seizure  Since restarting keppra, no new episode        [phone], ok to leave a detailed message

## 2022-08-11 NOTE — TELEPHONE ENCOUNTER
Recheck levels on week before scheduled visit with Dr Adrienne Gupta in November  Let us know if there are more seizures

## 2022-08-11 NOTE — TELEPHONE ENCOUNTER
----- Message from Michelle Cardoza MD sent at 8/8/2022  1:41 PM EDT -----  Levetiracetam and lamotrigine levels were undetectable suggesting she was not taking lamotrigine and levetiracetam as prescribed at the time of the collection  Please ask about adherence and seizure frequency

## 2022-08-29 ENCOUNTER — TELEPHONE (OUTPATIENT)
Dept: HEMATOLOGY ONCOLOGY | Facility: CLINIC | Age: 32
End: 2022-08-29

## 2022-08-29 NOTE — TELEPHONE ENCOUNTER
Appointment Cancellation Or Reschedule     Person calling in Patient    Provider Dr Ousmane Patiño   Office Visit Date and Time  9/30/22 9:40am   Office Visit Location [place]   Did patient want to reschedule their office appointment? If so, when was it scheduled to? Yes 9/14/22 9:40am   Did you have STAR scheduled for this appointment? no   Do you need STAR set up for your new appointment? If yes, please send to "PATIENT RIDESHARE" pool for STAR rescheduling no   If you are cancelling appointment, can we notify STAR to cancel ride? If yes, please send to "PATIENT RIDESHARE" pool for STAR to cancel service no   Is this patient calling to reschedule an infusion appointment? no   When is their next infusion appointment? no   Is this patient a Chemo patient? no   Reason for Cancellation or Reschedule Wanted earlier date     If the patient is a treatment patient, please route this to the office nurse  If the patient is not on treatment, please route to the office MA  If the patient is a surgical oncology patient, please route to surg/onc clinical pool

## 2022-09-07 ENCOUNTER — OFFICE VISIT (OUTPATIENT)
Dept: PULMONOLOGY | Facility: MEDICAL CENTER | Age: 32
End: 2022-09-07
Payer: MEDICARE

## 2022-09-07 VITALS
HEIGHT: 55 IN | BODY MASS INDEX: 54.11 KG/M2 | WEIGHT: 233.8 LBS | SYSTOLIC BLOOD PRESSURE: 122 MMHG | DIASTOLIC BLOOD PRESSURE: 68 MMHG | RESPIRATION RATE: 12 BRPM | HEART RATE: 81 BPM | OXYGEN SATURATION: 98 % | TEMPERATURE: 97.3 F

## 2022-09-07 DIAGNOSIS — E66.01 CLASS 3 SEVERE OBESITY DUE TO EXCESS CALORIES WITH SERIOUS COMORBIDITY AND BODY MASS INDEX (BMI) OF 50.0 TO 59.9 IN ADULT (HCC): Chronic | ICD-10-CM

## 2022-09-07 DIAGNOSIS — G47.33 OSA (OBSTRUCTIVE SLEEP APNEA): Primary | Chronic | ICD-10-CM

## 2022-09-07 PROBLEM — E66.813 CLASS 3 SEVERE OBESITY DUE TO EXCESS CALORIES WITH SERIOUS COMORBIDITY AND BODY MASS INDEX (BMI) OF 50.0 TO 59.9 IN ADULT (HCC): Chronic | Status: ACTIVE | Noted: 2021-08-25

## 2022-09-07 PROCEDURE — 99214 OFFICE O/P EST MOD 30 MIN: CPT | Performed by: NURSE PRACTITIONER

## 2022-09-07 RX ORDER — TRAZODONE HYDROCHLORIDE 50 MG/1
25 TABLET ORAL
COMMUNITY
Start: 2022-09-01

## 2022-09-07 NOTE — ASSESSMENT & PLAN NOTE
Patient has severe obstructive sleep apnea  She is here today as she has used her device for over 30 days  Compliance is reviewed from August 7th to September 5, 2022  Patient has 100% compliant in usage and usage over 4 hours is 25 days or 83%  Her average usage is 6 hours and 11 minutes  This is on an auto CPAP 5-20 cm of water pressure  Her apneic hypopnea index is reduced to 0 2 events per hour  She also feels as though she does have a problem with condensation in her mask  I would like her to have a mask refit and she can make an appointment here on a Tuesday or go to her durable medical equipment company    She continues to use an benefit from her auto CPAP

## 2022-09-07 NOTE — PROGRESS NOTES
Assessment/Plan:     Problem List Items Addressed This Visit        Respiratory    GARIMA (obstructive sleep apnea) - Primary (Chronic)     Patient has severe obstructive sleep apnea  She is here today as she has used her device for over 30 days  Compliance is reviewed from August 7th to September 5, 2022  Patient has 100% compliant in usage and usage over 4 hours is 25 days or 83%  Her average usage is 6 hours and 11 minutes  This is on an auto CPAP 5-20 cm of water pressure  Her apneic hypopnea index is reduced to 0 2 events per hour  She also feels as though she does have a problem with condensation in her mask  I would like her to have a mask refit and she can make an appointment here on a Tuesday or go to her durable medical equipment company  She continues to use an benefit from her auto CPAP            Other    Class 3 severe obesity due to excess calories with serious comorbidity and body mass index (BMI) of 50 0 to 59 9 in [facility]) (Chronic)     [facility]  She has been evaluated by Bariatric surgery  She is contemplating now whether to have the bariatric surgery  According to patient her primary care physician has recent concerns about the safety and efficacy  She also was slightly anemic and is following up with Hematology-Oncology  Last hemoglobin was 11 2 g  Patient is very committed on losing weight and is currently involved in meal planning and increasing exercise to 2 hours a day  She also has a diagnosis of polycystic ovarian syndrome  She is now on Glucophage                   Return in about 1 year (around 9/7/2023)  All questions are answered to the patient's satisfaction and understanding  She verbalizes understanding  She is encouraged to call with any further questions or concerns  Portions of the record may have been created with voice recognition software    Occasional wrong word or "sound a like" substitutions may have occurred due to the inherent limitations of voice recognition software  Read the chart carefully and recognize, using context, where substitutions have occurred  Electronically Signed by Iola Boas, CRNP    ______________________________________________________________________    Chief Complaint: No chief complaint on file  Patient ID: Donis Severino is a 33 y o  y o  female has a past medical history of Autism, [place] esophagus, [place]'s esophagus, Depression, Diabetes mellitus ([facility] [address] ), Female infertility, Gastric ulcer, History of bronchitis, Irregular menses, Miscarriage, Morbid obesity with BMI of 50 0-59 9, adult ([facility] [address] ), Reflux esophagitis, Seizures ([facility] [address] ), and Sleep apnea  9/7/2022  Patient presents today for follow-up visit  HPI  Donis Severino is a 27-year-old female who was seen initially in February 2022  She was noted to have high probability for sleep apnea secondary to her elevated body mass index and also snoring  This patient also has a history of morbid obesity  She also has been contemplating bariatric surgery  She is here today for compliance  Patient had her sleep study with apneic hypopnea index as 68 2  Her lowest O2 saturation was 62%  Review of Systems   Constitutional: Negative  HENT: Negative  Eyes: Negative  Respiratory: Positive for shortness of breath  Cardiovascular: Negative  Gastrointestinal: Negative  Endocrine: Negative  Genitourinary: Negative  Musculoskeletal: Negative  Skin: Negative  Allergic/Immunologic: Negative  Hematological: Negative  Psychiatric/Behavioral: Negative  Smoking history: She reports that she has never smoked   She has never used smokeless tobacco     The following portions of the patient's history were reviewed and updated as appropriate: current medications, past family history, past medical history, past social history, past surgical history and problem list     Immunization History   Administered Date(s) Administered   [employer] COVID-19 PFIZER VACCINE 0 3 ML IM 08/29/2021, 09/19/2021    Influenza, injectable, quadrivalent, preservative free 0 5 mL 12/01/2020    Tdap 05/28/2021    Tuberculin Skin Test-PPD Intradermal 07/27/2022     Current Outpatient Medications   Medication Sig Dispense Refill    albuterol (PROVENTIL HFA,VENTOLIN HFA) 90 mcg/act inhaler Inhale 2 puffs every 6 (six) hours as needed for wheezing      ARIPiprazole (ABILIFY) 10 mg tablet Take 10 mg by mouth daily with lunch        citalopram (CeleXA) 10 mg tablet Take 10 mg by mouth every morning       docusate sodium (COLACE) 100 mg capsule Take 1 capsule (100 mg total) by mouth 2 (two) times a day 60 capsule 5    famotidine (PEPCID) 40 MG tablet Take 1 tablet (40 mg total) by mouth daily at bedtime 30 tablet 5    ferrous sulfate 324 (65 Fe) mg Take 1 tablet (324 mg total) by mouth 2 (two) times a day before meals 60 tablet 2    lamoTRIgine (LaMICtal) 200 MG tablet Take 1 tablet (200 mg total) by mouth 2 (two) times a day Take with one 25 mg tablet for total dose of 225 mg  180 tablet 3    lamoTRIgine (LaMICtal) 25 mg tablet TAKE ONE TABLET BY MOUTH TWICE A  tablet 3    levETIRAcetam (KEPPRA) 750 mg tablet Take 2 tablets (1,500 mg total) by mouth every 12 (twelve) hours 360 tablet 3    levothyroxine (Synthroid) 25 mcg tablet Take 1 tablet (25 mcg total) by mouth daily in the early morning 30 tablet 2    metFORMIN (GLUCOPHAGE-XR) 750 mg 24 hr tablet Take 1 tablet (750 mg total) by mouth daily with breakfast 30 tablet 2    omeprazole (PriLOSEC) 40 MG capsule Take 1 capsule (40 mg total) by mouth in the morning and 1 capsule (40 mg total) in the evening  Take before meals   60 capsule 3    ondansetron (ZOFRAN) 4 mg tablet Take 1 tablet (4 mg total) by mouth every 8 (eight) hours as needed for nausea or vomiting 60 tablet 1    traZODone (DESYREL) 50 mg tablet       acetaminophen (TYLENOL) 500 mg tablet Take 1 tablet (500 mg total) by mouth every 6 (six) hours as needed for mild pain (Patient not taking: No sig reported)  0     No current facility-administered medications for this visit  Allergies: Haloperidol, Penicillins, and Pollen extract    Objective:  Vitals:    09/07/22 0824   BP: 122/68   Pulse: 81   Resp: 12   Temp: (!) 97 3 °F (36 3 °C)   TempSrc: Tympanic   SpO2: 98%   Weight: 106 kg (233 lb 12 8 oz)   Height: 4' (1 219 m)   Oxygen Therapy  SpO2: 98 %    Wt Readings from Last 3 Encounters:   09/07/22 106 kg (233 lb 12 8 oz)   08/02/22 103 kg (228 lb)   07/29/22 103 kg (227 lb)     Body mass index is 71 35 kg/m²  Physical Exam  Constitutional:       Appearance: She is obese  HENT:      Head: Normocephalic and atraumatic  Nose: Nose normal       Mouth/Throat:      Mouth: Mucous membranes are moist       Comments: Mallampati 4  Eyes:      Pupils: Pupils are equal, round, and reactive to light  Cardiovascular:      Rate and Rhythm: Normal rate and regular rhythm  Pulses: Normal pulses  Pulmonary:      Effort: Pulmonary effort is normal    Musculoskeletal:         General: Normal range of motion  Cervical back: Normal range of motion  Skin:     General: Skin is warm and dry  Capillary Refill: Capillary refill takes less than 2 seconds  Neurological:      General: No focal deficit present  Mental Status: She is alert and oriented to person, place, and time     Psychiatric:         Mood and Affect: Mood normal          Behavior: Behavior normal          Lab Review:   Appointment on 08/03/2022   Component Date Value    Retic Ct Abs 08/03/2022 53,600     Retic Ct Pct 08/03/2022 1 30    Appointment on 07/29/2022   Component Date Value    Vitamin B-12 07/29/2022 570     Folate 07/29/2022 17 4     Sodium 07/29/2022 138     Potassium 07/29/2022 3 7     Chloride 07/29/2022 101     CO2 07/29/2022 28     ANION GAP 07/29/2022 9     BUN 07/29/2022 15     Creatinine 07/29/2022 0 66     Glucose, Fasting 07/29/2022 92     Calcium 07/29/2022 8 9     Corrected Calcium 07/29/2022 9 5     AST 07/29/2022 19     ALT 07/29/2022 27     Alkaline Phosphatase 07/29/2022 128 (A)    Total Protein 07/29/2022 8 2     Albumin 07/29/2022 3 3 (A)    Total Bilirubin 07/29/2022 0 25     eGFR 07/29/2022 118     Hemoglobin A1C 07/29/2022 5 8 (A)    EAG 07/29/2022 120     Insulin, Fasting 07/29/2022 20 1     Lamotrigine Lvl 07/29/2022 <1 0 (A)    Levetiracetam Lvl 07/29/2022 <1 0 (A)    HIV-1/HIV-2 Ab 07/29/2022 Non-Reactive     TSH 3RD GENERATON 07/29/2022 3 073     Cholesterol 07/29/2022 185     Triglycerides 07/29/2022 120     HDL, Direct 07/29/2022 58     LDL Calculated 07/29/2022 103 (A)    WBC 07/29/2022 5 90     RBC 07/29/2022 4 55     Hemoglobin 07/29/2022 11 4 (A)    Hematocrit 07/29/2022 37 7     MCV 07/29/2022 83     MCH 07/29/2022 25 1 (A)    MCHC 07/29/2022 30 2 (A)    RDW 07/29/2022 14 5     MPV 07/29/2022 8 9     Platelets 84/76/6640 458 (A)    nRBC 07/29/2022 0     Neutrophils Relative 07/29/2022 69     Immat GRANS % 07/29/2022 0     Lymphocytes Relative 07/29/2022 22     Monocytes Relative 07/29/2022 5     Eosinophils Relative 07/29/2022 2     Basophils Relative 07/29/2022 2 (A)    Neutrophils Absolute 07/29/2022 4 08     Immature Grans Absolute 07/29/2022 0 02     Lymphocytes Absolute 07/29/2022 1 31     Monocytes Absolute 07/29/2022 0 27     Eosinophils Absolute 07/29/2022 0 13     Basophils Absolute 07/29/2022 0 09     Iron Saturation 07/29/2022 9 (A)    TIBC 07/29/2022 416     Iron 07/29/2022 37 (A)    Ferritin 07/29/2022 8    Office Visit on 07/21/2022   Component Date Value    Creatinine, Ur 07/29/2022 135 0     Microalbum  ,U,Random 07/29/2022 23 2 (A)    Microalb Creat Ratio 07/29/2022 17    Office Visit on 05/18/2022   Component Date Value    Hepatitis C Ab 07/29/2022 Non-reactive     RPR 07/29/2022 Non-Reactive     N gonorrhoeae, DNA Probe 05/18/2022 Negative     Chlamydia trachomatis, D* 05/18/2022 Negative     Trichomonas vaginosis 05/18/2022 Negative    Office Visit on 04/29/2022   Component Date Value    LEUKOCYTE ESTERASE,UA 04/29/2022 500     NITRITE,UA 04/29/2022 pos     SL AMB POCT UROBILINOGEN 04/29/2022 4     POCT URINE PROTEIN 04/29/2022 500      PH,UA 04/29/2022 6 5     BLOOD,UA 04/29/2022 250     SPECIFIC GRAVITY,UA 04/29/2022 1 015     KETONES,UA 04/29/2022 15     BILIRUBIN,UA 04/29/2022 3     GLUCOSE, UA 04/29/2022 norm      COLOR,UA 04/29/2022 red     CLARITY,UA 04/29/2022 cloudy     Urine Culture Result 04/29/2022 Final report     Result 1 04/29/2022 Comment    Admission on 04/15/2022, Discharged on 04/15/2022   Component Date Value    Color, UA 04/15/2022 Light Yellow     Clarity, UA 04/15/2022 Slightly Cloudy     Specific Gravity, UA 04/15/2022 1 025     pH, UA 04/15/2022 6 5     Leukocytes, UA 04/15/2022 Negative     Nitrite, UA 04/15/2022 Negative     Protein, UA 04/15/2022 Negative     Glucose, UA 04/15/2022 Negative     Ketones, UA 04/15/2022 15 (1+) (A)    Urobilinogen, UA 04/15/2022 0 2     Bilirubin, UA 04/15/2022 Negative     Occult Blood, UA 04/15/2022 Negative     EXT PREG TEST UR (Ref: N* 04/15/2022 negative     Control 04/15/2022 valid     WBC 04/15/2022 6 78     RBC 04/15/2022 4 53     Hemoglobin 04/15/2022 11 2 (A)    Hematocrit 04/15/2022 37 7     MCV 04/15/2022 83     MCH 04/15/2022 24 7 (A)    MCHC 04/15/2022 29 7 (A)    RDW 04/15/2022 14 9     MPV 04/15/2022 8 9     Platelets 80/15/9366 378     nRBC 04/15/2022 0     Neutrophils Relative 04/15/2022 69     Immat GRANS % 04/15/2022 0     Lymphocytes Relative 04/15/2022 20     Monocytes Relative 04/15/2022 7     Eosinophils Relative 04/15/2022 2     Basophils Relative 04/15/2022 2 (A)    Neutrophils Absolute 04/15/2022 4 69     Immature Grans Absolute 04/15/2022 0 03     Lymphocytes Absolute 04/15/2022 1 36     Monocytes Absolute 04/15/2022 0 48     Eosinophils Absolute 04/15/2022 0 12     Basophils Absolute 04/15/2022 0 10     Sodium 04/15/2022 135 (A)    Potassium 04/15/2022 3 8     Chloride 04/15/2022 100     CO2 04/15/2022 25     ANION GAP 04/15/2022 10     BUN 04/15/2022 18     Creatinine 04/15/2022 0 80     Glucose 04/15/2022 95     Calcium 04/15/2022 8 5     Corrected Calcium 04/15/2022 9 2     AST 04/15/2022 17     ALT 04/15/2022 27     Alkaline Phosphatase 04/15/2022 119 (A)    Total Protein 04/15/2022 7 6     Albumin 04/15/2022 3 1 (A)    Total Bilirubin 04/15/2022 0 10 (A)    eGFR 04/15/2022 98     Lipase 04/15/2022 81        Past Surgical History:   Procedure Laterality Date    EGD      with Bx due to barretts esophagus    EYE SURGERY Bilateral     lazy eye repair    FL HYSTEROSCOPY,W/ENDO BX N/A 9/22/2021    Procedure: DILATATION AND CURETTAGE (D&C) WITH HYSTEROSCOPY;  Surgeon: Alirio Amezquita MD;  Location: [facility] OR;  Service: Gynecology    WISDOM TOOTH EXTRACTION          Family History   Problem Relation Age of Onset    Bipolar disorder Mother     Depression Mother     Depression Father     Diabetes Maternal Grandmother     Thyroid disease Maternal Grandmother     Hypertension Maternal Grandmother     Heart disease Maternal Grandmother     Diabetes Maternal Grandfather     Diabetes Paternal Grandmother     Breast cancer Paternal Grandmother     Stroke Paternal Grandmother     Diabetes Paternal Grandfather     Breast cancer Maternal Aunt 35        bilateral    Stomach cancer Maternal Aunt         Diagnostics:  I have personally reviewed pertinent reports  Office Spirometry Results:     ESS:    No results found

## 2022-09-07 NOTE — ASSESSMENT & PLAN NOTE
[facility]  She has been evaluated by Bariatric surgery  She is contemplating now whether to have the bariatric surgery  According to patient her primary care physician has recent concerns about the safety and efficacy  She also was slightly anemic and is following up with Hematology-Oncology  Last hemoglobin was 11 2 g  Patient is very committed on losing weight and is currently involved in meal planning and increasing exercise to 2 hours a day  She also has a diagnosis of polycystic ovarian syndrome    She is now on Glucophage

## 2022-09-07 NOTE — PATIENT INSTRUCTIONS
Sleep Apnea   AMBULATORY CARE:   Sleep apnea  is a condition that causes you to stop breathing often during sleep  Types of sleep apnea:   Obstructive sleep apnea (GARIMA)  is the most common kind  The muscles and tissues around your throat relax and block air from passing through  Obesity, use of alcohol or cigarettes, or a family history are common causes  GARIMA may increase your risk for complications after surgery  Central sleep apnea (CSA)  means your brain does not send signals to the muscles that control breathing  You do not take a breath even though your airway is open  Common causes include medical conditions such as heart failure, being older than 40, or use of opioids  Complex (or mixed) sleep apnea  means you have both obstructive and central sleep apnea  Common signs and symptoms:   Loud snoring or long pauses in breathing    Feeling sleepy, slow, and tired during the day    Snorting, gasping, or choking while you sleep, and waking up suddenly because of these    Feeling irritable during the day    Dry mouth or a headache in the mornings    Heavy night sweating    A hard time thinking, remembering things, or focusing on your tasks the following day    Call your local emergency number (911 in the [address] [facility]) if:   You have chest pain or trouble breathing  Call your doctor if:   You have new or worsening signs or symptoms  You have questions or concerns about your condition or care  Treatment  depends on the kind of apnea you have  A mouth device  may be needed if you have mild sleep apnea  These are designed to keep your throat open  Ask your dentist or healthcare provider about the best mouth device for you  A machine  may be used to help you get more air during sleep  A mask may be placed over your nose and mouth, or just your nose  The mask is hooked to the machine  You will get air through the mask      A continuous positive airway pressure (CPAP) machine  is used to keep your airway open during sleep  The machine blows a gentle stream of air into the mask when you breathe  This helps keep your airway open so you can breathe more regularly  Extra oxygen may be given through the machine  A bilevel positive airway pressure (BiPAP) machine  gives air but lowers the pressure when you breathe out  An adaptive servo-ventilator (ASV)  is a machine that learns your usual breathing pattern  Then, it uses pressure to give you air and prevent stops in your breathing  Surgery  to expand your airway or remove extra tissues may be needed  Surgery is usually only considered if other treatments do not work  Manage or prevent sleep apnea:   Reach and maintain a healthy weight  Ask your healthcare provider what a healthy weight is for you  Ask him or her to help you create a safe weight loss plan if you are overweight  Even a small goal of a 10% weight loss can improve your symptoms  Do not smoke  Nicotine and other chemicals in cigarettes and cigars can cause lung damage  Ask your healthcare provider for information if you currently smoke and need help to quit  E-cigarettes or smokeless tobacco still contain nicotine  Talk to your healthcare provider before you use these products  Do not drink alcohol or take sedative medicine before you go to sleep  Alcohol and sedatives can relax the muscles and tissues around your throat  This can block the airflow to your lungs  Sleep on your side or use pillows designed to prevent sleep apnea  This prevents your tongue or other tissues from blocking your throat  You can also raise the head of your bed  Follow up with your doctor or specialist as directed: You may need to have blood tests during your follow-up visits  Work with your provider to find the right breathing support equipment and settings for you  Write down your questions so you remember to ask them during your visits    © Copyright [employer] 2022 Information is for End User's use only and may not be sold, redistributed or otherwise used for commercial purposes  All illustrations and images included in CareNotes® are the copyrighted property of A [employer]  or [employer]ie [employer]  The above information is an  only  It is not intended as medical advice for individual conditions or treatments  Talk to your doctor, nurse or pharmacist before following any medical regimen to see if it is safe and effective for you

## 2022-09-08 DIAGNOSIS — G40.909 NONINTRACTABLE EPILEPSY WITHOUT STATUS EPILEPTICUS, UNSPECIFIED EPILEPSY TYPE (HCC): ICD-10-CM

## 2022-09-08 RX ORDER — LAMOTRIGINE 25 MG/1
TABLET ORAL
Qty: 180 TABLET | Refills: 1 | Status: SHIPPED | OUTPATIENT
Start: 2022-09-08

## 2022-09-14 ENCOUNTER — TELEPHONE (OUTPATIENT)
Dept: HEMATOLOGY ONCOLOGY | Facility: MEDICAL CENTER | Age: 32
End: 2022-09-14

## 2022-09-14 ENCOUNTER — CONSULT (OUTPATIENT)
Dept: HEMATOLOGY ONCOLOGY | Facility: MEDICAL CENTER | Age: 32
End: 2022-09-14
Payer: MEDICARE

## 2022-09-14 VITALS
DIASTOLIC BLOOD PRESSURE: 66 MMHG | RESPIRATION RATE: 18 BRPM | WEIGHT: 235 LBS | OXYGEN SATURATION: 98 % | BODY MASS INDEX: 54.38 KG/M2 | HEART RATE: 79 BPM | HEIGHT: 55 IN | TEMPERATURE: 97.8 F | SYSTOLIC BLOOD PRESSURE: 124 MMHG

## 2022-09-14 DIAGNOSIS — K90.49 MALABSORPTION DUE TO INTOLERANCE, NOT ELSEWHERE CLASSIFIED: ICD-10-CM

## 2022-09-14 DIAGNOSIS — E66.01 MORBID OBESITY (HCC): Chronic | ICD-10-CM

## 2022-09-14 DIAGNOSIS — D64.9 ANEMIA: ICD-10-CM

## 2022-09-14 DIAGNOSIS — D50.8 IRON DEFICIENCY ANEMIA SECONDARY TO INADEQUATE DIETARY IRON INTAKE: ICD-10-CM

## 2022-09-14 DIAGNOSIS — D50.8 IRON DEFICIENCY ANEMIA SECONDARY TO INADEQUATE DIETARY IRON INTAKE: Primary | ICD-10-CM

## 2022-09-14 DIAGNOSIS — E28.2 PCOS (POLYCYSTIC OVARIAN SYNDROME): Primary | ICD-10-CM

## 2022-09-14 PROCEDURE — 99204 OFFICE O/P NEW MOD 45 MIN: CPT | Performed by: INTERNAL MEDICINE

## 2022-09-14 RX ORDER — SODIUM CHLORIDE 9 MG/ML
20 INJECTION, SOLUTION INTRAVENOUS ONCE
Status: CANCELLED | OUTPATIENT
Start: 2022-09-20

## 2022-09-14 NOTE — PROGRESS NOTES
Margoth Kimtomayor  1990  [facility] [address] [id number]  HEMATOLOGY/ONCOLOGY CONSULTATION REPORT    DISCUSSION/SUMMARY:    78-year-old female with anemia  Recent workup was consistent with iron deficiency anemia  Patient has trouble tolerating the oral iron supplements  Mrs Qiu is symptomatic  We discussed options  We discussed IV iron, potential benefits versus risks/toxicities  Patient is in agreement - will be treated with Feraheme  We discussed what to monitor for as far as progressive fatigue, decreased activities, chewing ice, restless legs, respiratory issues, dizziness etc  Mrs Zuhair Valverde also understands that after she has gastric bypass, the iron deficiency anemia may become more of an issue  Patient is to return in 3 months with repeat blood work before  Mrs Zuhair Valverde knows to call the hematology/oncology office if there are any other questions or concerns  Carefully review your medication list and verify that the list is accurate and up-to-date  Please call the hematology/oncology office if there are medications missing from the list, medications on the list that you are not currently taking or if there is a dosage or instruction that is different from how you're taking that medication  Patient goals and areas of care:  IV iron  Barriers to care:  None   Patient is able to self-care   ______________________________________________________________________________________    Chief Complaint   Patient presents with    Consult    Iron deficiency anemia     History of Present Illness:  78-year-old female referred for evaluation of anemia  Mrs Zuhair Valverde is being scheduled for gastric bypass surgery  Patient has anemia  Prior workup was consistent with iron deficiency and patient has been placed on oral iron supplements    Patient does not always able to tolerate the medications  Mrs Leandro Polanco states being fatigued  Patient finds it hard to get through the day  No chewing ice or restless legs  No headaches, blurred vision or dizziness but activities are decreased  Patient also finds it hard to concentrate  No GI or  issues or bleeding  Patient has PCOS and has irregular and at times heavy menstrual periods  Patient states that her mother and grandmother had anemia problems and required transfusions routinely  Specifics not presently available  Patient is unaware of any family members with thalassemia  Review of Systems   Constitutional: Positive for fatigue  HENT: Negative  Eyes: Negative  Respiratory: Negative  Cardiovascular: Negative  Gastrointestinal: Negative  Endocrine: Negative  Genitourinary: Negative  Musculoskeletal: Negative  Skin: Negative  Allergic/Immunologic: Negative  Neurological: Negative  Hematological: Negative  Psychiatric/Behavioral: Negative  All other systems reviewed and are negative      Patient Active Problem List   Diagnosis    Depression with anxiety    [facility]intractable epilepsy without status epilepticus [facility]    TSH elevation    History of irregular menstrual bleeding    PCOS (polycystic ovarian syndrome)    Endometrial polyp    Infertility, female    [place]'s esophagus    Gastric ulcer    GARIMA (obstructive sleep apnea)    Female infertility    Autism    Bipolar depression [facility]    Fever    Morbid obesity ([facility] )    Class 3 severe obesity due to excess calories with serious comorbidity and body mass index (BMI) of 50 0 to 59 9 in adult (HCC)    Gastroesophageal reflux disease    Excessive daytime sleepiness    Iron deficiency anemia secondary to inadequate dietary iron intake    Right upper limb pain    Constipation    Dysphagia     Past Medical History:   Diagnosis Date    Autism     [place] esophagus     [place]'s esophagus     Depression     Diabetes mellitus [facility])     Female infertility     Gastric ulcer     History of bronchitis     Irregular menses     Miscarriage     Morbid obesity with BMI of 50 0-59 9, adult ([facility] )     Reflux esophagitis     Seizures ([facility] )     last one 7/13/21    Sleep apnea     no CPAP     Past Surgical History:   Procedure Laterality Date    EGD      with Bx due to barretts esophagus    EYE SURGERY Bilateral     lazy eye repair    OR HYSTEROSCOPY,W/ENDO BX N/A 9/22/2021    Procedure: DILATATION AND CURETTAGE (D&C) WITH HYSTEROSCOPY;  Surgeon: Eve Meyers MD;  [facility];  Service: Gynecology    WISDOM TOOTH EXTRACTION     Past surgical history:  No prior blood transfusions    Family History   Problem Relation Age of Onset    Bipolar disorder Mother     Depression Mother     Depression Father     Diabetes Maternal Grandmother     Thyroid disease Maternal Grandmother     Hypertension Maternal Grandmother     Heart disease Maternal Grandmother     Diabetes Maternal Grandfather     Diabetes Paternal Grandmother     Breast cancer Paternal Grandmother     Stroke Paternal Grandmother     Diabetes Paternal Grandfather     Breast cancer Maternal Aunt 35        bilateral    Stomach cancer Maternal Aunt    Family history:  No known familial or genetic diseases, no children    Social History     Socioeconomic History    Marital status: Single     Spouse name: Not on file    Number of children: Not on file    Years of education: Not on file    Highest education level: Not on file   Occupational History    Not on file   Tobacco Use    Smoking status: Never Smoker    Smokeless tobacco: Never Used   Vaping Use    Vaping Use: Never used   Substance and Sexual Activity    Alcohol use: Yes     Comment: occ    Drug use: Not Currently     Types: Marijuana     Comment: about a couple times a week, quit June 2022    Sexual activity: Yes     Partners: Male     Birth control/protection: None     Comment: Trying to conceive for 2 years now   Other Topics Concern    Not on file   Social History Narrative    Not on file     Social Determinants of Health     Financial Resource Strain: Not on file   Food Insecurity: Not on file   Transportation Needs: Not on file   Physical Activity: Not on file   Stress: Not on file   Social Connections: Not on file   Intimate Partner Violence: Not on file   Housing Stability: Not on file   Social history: Presently not working, no toxic exposure, no drug, alcohol or tobacco    Current Outpatient Medications:     albuterol (PROVENTIL HFA,VENTOLIN HFA) 90 mcg/act inhaler, Inhale 2 puffs every 6 (six) hours as needed for wheezing, Disp: , Rfl:     ARIPiprazole (ABILIFY) 10 mg tablet, Take 10 mg by mouth daily with lunch  , Disp: , Rfl:     citalopram (CeleXA) 10 mg tablet, Take 10 mg by mouth every morning , Disp: , Rfl:     docusate sodium (COLACE) 100 mg capsule, Take 1 capsule (100 mg total) by mouth 2 (two) times a day, Disp: 60 capsule, Rfl: 5    famotidine (PEPCID) 40 MG tablet, Take 1 tablet (40 mg total) by mouth daily at bedtime, Disp: 30 tablet, Rfl: 5    ferrous sulfate 324 (65 Fe) mg, Take 1 tablet (324 mg total) by mouth 2 (two) times a day before meals, Disp: 60 tablet, Rfl: 2    lamoTRIgine (LaMICtal) 200 MG tablet, Take 1 tablet (200 mg total) by mouth 2 (two) times a day Take with one 25 mg tablet for total dose of 225 mg , Disp: 180 tablet, Rfl: 3    lamoTRIgine (LaMICtal) 25 mg tablet, TAKE TWO TABLETS BY MOUTH TWICE A DAY, Disp: 180 tablet, Rfl: 1    levETIRAcetam (KEPPRA) 750 mg tablet, Take 2 tablets (1,500 mg total) by mouth every 12 (twelve) hours, Disp: 360 tablet, Rfl: 3    levothyroxine (Synthroid) 25 mcg tablet, Take 1 tablet (25 mcg total) by mouth daily in the early morning, Disp: 30 tablet, Rfl: 2    metFORMIN (GLUCOPHAGE-XR) 750 mg 24 hr tablet, Take 1 tablet (750 mg total) by mouth daily with breakfast, Disp: 30 tablet, Rfl: 2    omeprazole (PriLOSEC) 40 MG capsule, Take 1 capsule (40 mg total) by mouth in the morning and 1 capsule (40 mg total) in the evening  Take before meals  , Disp: 60 capsule, Rfl: 3    ondansetron (ZOFRAN) 4 mg tablet, Take 1 tablet (4 mg total) by mouth every 8 (eight) hours as needed for nausea or vomiting, Disp: 60 tablet, Rfl: 1    traZODone (DESYREL) 50 mg tablet, , Disp: , Rfl:     acetaminophen (TYLENOL) 500 mg tablet, Take 1 tablet (500 mg total) by mouth every 6 (six) hours as needed for mild pain (Patient not taking: No sig reported), Disp: , Rfl: 0    Allergies   Allergen Reactions    Haloperidol Other (See Comments)     Jaw locks up    Penicillins Hives    Pollen Extract Allergic Rhinitis       Vitals:    09/14/22 0945   BP: 124/66   Pulse: 79   Resp: 18   Temp: 97 8 °F (36 6 °C)   SpO2: 98%     Physical Exam  Constitutional:       Appearance: She is well-developed  Comments: Obese young female, no respiratory distress, no signs of pain   HENT:      Head: Normocephalic and atraumatic  Right Ear: External ear normal       Left Ear: External ear normal    Eyes:      Conjunctiva/sclera: Conjunctivae normal       Pupils: Pupils are equal, round, and reactive to light  Cardiovascular:      Rate and Rhythm: Normal rate and regular rhythm  Heart sounds: Normal heart sounds  Pulmonary:      Effort: Pulmonary effort is normal       Breath sounds: Normal breath sounds  Comments: Clear bilaterally  Abdominal:      General: Bowel sounds are normal       Palpations: Abdomen is soft  Comments: +bowel sounds, nontender, soft, no guarding, can not palpate liver or spleen   Musculoskeletal:         General: Normal range of motion  Cervical back: Normal range of motion and neck supple  Skin:     General: Skin is warm  Comments: Good color, warm, moist, no petechiae or ecchymoses   Neurological:      Mental Status: She is alert and oriented to person, place, and time        Deep Tendon Reflexes: Reflexes are normal and symmetric  Psychiatric:         Behavior: Behavior normal          Thought Content:  Thought content normal          Judgment: Judgment normal      Extremities:  Minimal bilateral lower extremity edema, no cords, pulses are 1+  Lymphatics:  No adenopathy in the neck, supraclavicular region, axilla bilaterally    Labs        07/29/2022 BUN = 15 creatinine = 0 66 calcium = 8 9 LFTs WNL TSH = 3 073 iron = 37 = decreased iron saturation = 9 = decreased TIBC = 416 ferritin = 8 (8-388 ng/mL)

## 2022-09-14 NOTE — TELEPHONE ENCOUNTER
Please set up IV iron infusions at [place] on [id number] [facility] or Friday please

## 2022-09-19 ENCOUNTER — TELEPHONE (OUTPATIENT)
Dept: OBGYN CLINIC | Facility: HOSPITAL | Age: 32
End: 2022-09-19

## 2022-09-19 ENCOUNTER — TELEPHONE (OUTPATIENT)
Dept: HEMATOLOGY ONCOLOGY | Facility: CLINIC | Age: 32
End: 2022-09-19

## 2022-09-19 NOTE — TELEPHONE ENCOUNTER
CALL RETURN FORM   Reason for patient call? Patient calling requesting a call back to confirm if she needs to have labs completed before her first infusion tomorrow 9/19 at 2:30pm      Patient's primary oncologist?  Dr Alma Carmen    Name of person the patient was calling for? Tino De La Rosa    Any additional information to add, if applicable? [phone]   Informed patient that the message will be forwarded to the team and someone will get back to them as soon as possible    Did you relay this information to the patient?  Yes

## 2022-09-20 ENCOUNTER — HOSPITAL ENCOUNTER (OUTPATIENT)
Dept: INFUSION CENTER | Facility: HOSPITAL | Age: 32
Discharge: HOME | End: 2022-09-20
Attending: INTERNAL MEDICINE
Payer: MEDICARE

## 2022-09-20 VITALS
RESPIRATION RATE: 20 BRPM | DIASTOLIC BLOOD PRESSURE: 56 MMHG | TEMPERATURE: 96.5 F | OXYGEN SATURATION: 99 % | SYSTOLIC BLOOD PRESSURE: 107 MMHG | HEART RATE: 99 BPM

## 2022-09-20 DIAGNOSIS — D50.8 IRON DEFICIENCY ANEMIA SECONDARY TO INADEQUATE DIETARY IRON INTAKE: Primary | ICD-10-CM

## 2022-09-20 PROCEDURE — 96365 THER/PROPH/DIAG IV INF INIT: CPT

## 2022-09-20 RX ORDER — SODIUM CHLORIDE 9 MG/ML
20 INJECTION, SOLUTION INTRAVENOUS ONCE
Status: COMPLETED | OUTPATIENT
Start: 2022-09-20 | End: 2022-09-20

## 2022-09-20 RX ORDER — SODIUM CHLORIDE 9 MG/ML
20 INJECTION, SOLUTION INTRAVENOUS ONCE
Status: CANCELLED | OUTPATIENT
Start: 2022-09-27

## 2022-09-20 RX ADMIN — SODIUM CHLORIDE 20 ML/HR: 0.9 INJECTION, SOLUTION INTRAVENOUS at 15:17

## 2022-09-20 RX ADMIN — FERUMOXYTOL 510 MG: 510 INJECTION INTRAVENOUS at 15:17

## 2022-09-20 NOTE — PROGRESS NOTES
Assessment and Plan:   Ms Ming Spivey is a 67-year-old female with history significant for Barretts esophagus, recurrent miscarriages and iron deficiency anemia who presents for an evaluation of multiple symptoms including chronic pain and to evaluate for an autoimmune disease given her significant family history of systemic lupus erythematosus  She is self-referred today  - Susy Lockett presents today for an evaluation of multiple complaints she has been experiencing over the past few years including constitutional symptoms, hair thinning, sicca complaints, recurrent painful mouth ulcerations, chronic myalgias and a history of 2 miscarriages in the context of a significant family history of systemic lupus erythematosus  Given her family history as well as the multiple complaints she is presenting with to further evaluate I would like to complete a full autoimmune panel as listed below  I will see her back in the office to review the results and determine based on this if there may be concerns for an underlying rheumatic condition  In the meanwhile to help with the chronic pain which appears to be one of her main concerns I requested she discontinue Tylenol and ibuprofen and I will prescribe her meloxicam 15 mg once a day as needed  I will need to be cautious with NSAID use given the history of [place]'s esophagus  Plan:  Diagnoses and all orders for this visit:    Diffuse arthralgia  -     Antinuclear Antibodies, IFA; Future  -     Sjogren's Antibodies; Future  -     Sedimentation rate, automated; Future  -     C-reactive protein; Future  -     RF Screen w/ Reflex to Titer; Future  -     Cyclic citrul peptide antibody, IgG; Future  -     HLA-B27 antigen; Future  -     Thyroid Antibodies Panel; Future  -     meloxicam (Mobic) 15 mg tablet;  Take 1 tablet (15 mg total) by mouth daily as needed for moderate pain    Sicca, unspecified type ([address] )    Recurrent mouth ulceration    History of recurrent miscarriages    Family history of systemic lupus erythematosus  -     Antinuclear Antibodies, IFA; Future  -     Sjogren's Antibodies; Future  -     Sedimentation rate, automated; Future  -     C-reactive protein; Future  -     RF Screen w/ Reflex to Titer; Future  -     Cyclic citrul peptide antibody, IgG; Future  -     HLA-B27 antigen; Future    Vitamin D deficiency  -     Vitamin D 25 hydroxy; Future    Iron deficiency    Hypothyroidism, unspecified type  -     Thyroid Antibodies Panel; Future    Chronic gout with tophus, unspecified cause, unspecified site   Comments:  For HLA B27  Orders:  -     HLA-B27 antigen; Future    Class 3 severe obesity without serious comorbidity with body mass index (BMI) of 50 0 to 59 9 in adult, unspecified obesity type ([facility] )      I have personally reviewed pertinent films in PACS of the right shoulder XR which is normal        Activities as tolerated  Exercise: try to maintain a low impact exercise regimen as much as possible  Walk for 30 minutes a day for at least 3 days a week  Continue other medications as prescribed by PCP and other specialists  RTC in 2 months  HPI  Ms Cally Roper is a 45-year-old female with history significant for Barretts esophagus, recurrent miscarriages and iron deficiency anemia who presents for an evaluation of multiple symptoms including chronic pain and to evaluate for an autoimmune disease given her significant family history of systemic lupus erythematosus  She is self-referred today  Patient reports due to multiple symptoms she has been experiencing over the past few years as well as an extensive family history of systemic lupus erythematosus in her great grandmother and aunts as well as her grandmother being worked up for lupus, she wanted to seek a rheumatology evaluation  She has experienced chronic body pain for a few years now but states since November 2021 her symptoms have worsened and are present fairly constantly    She does have good days where she is asymptomatic and is unable to attribute any aggravating or relieving factors  She describes most of her pain as affecting her arms diffusely as well as in her lower legs  She does not really endorse specific joint pains  No joint swelling or morning stiffness of her joints  She has been taking over-the-counter Tylenol and ibuprofen 2 tablets 1 to 2 times a day as needed which does not really help her  She has not been prescribed any medications for pain  She has also been experiencing a numbness sensation in her hands and did undergo a right upper extremity EMG/NCS study which showed very mild carpal tunnel syndrome  She reports over the past 3 years she has experienced intermittent fevers of up to 80 F which occurs every few months and lasts 1-2 days before resolving spontaneously  She does not have any additional infectious symptoms with the fevers  She describes generalized hair thinning without focal alopecia  She has been experiencing occasional dry eyes but more significant dry mouth  She reports from time to time she will notice small scab like lesions scattered on her arms which resolve spontaneously within a few days to weeks  She does not have any pictures of this  She experiences recurrent painful mouth ulcerations mostly on her inner lip  She describes symptoms of chest pain but this does not sound only descriptive of pleuritic type of chest pain as she is able to reproduce the pain  She has a history of 2 miscarriages of unclear etiology  In addition to the family history of lupus she also reports a history of Crohn's disease in an uncle  She denies unintentional weight loss, focal alopecia, inflammatory eye disease, skin rashes, psoriasis, photosensitivity, nose ulcers, swollen glands, inflammatory bowel disease, blood clots, Raynaud's or history of renal disease      The following portions of the patient's history were reviewed and updated as appropriate: allergies, current medications, past family history, past medical history, past social history, past surgical history and problem list       Review of Systems  Constitutional: Negative for weight change, chills, night sweats  Positive for fatigue and fevers  ENT/Mouth: Negative for hearing changes, ear pain, nasal congestion, sinus pain, hoarseness, sore throat, rhinorrhea, swallowing difficulty  Eyes: Negative for pain, redness, discharge, vision changes  Cardiovascular: Negative for SOB, palpitations  Positive for chest pain  Respiratory: Negative for cough, sputum, wheezing, dyspnea  Gastrointestinal: Negative for nausea, vomiting, diarrhea, constipation, pain, heartburn  Genitourinary: Negative for dysuria, urinary frequency, hematuria  Musculoskeletal: As per HPI  Skin: Negative for skin rash, color changes  Neuro: Negative for weakness, tingling, loss of consciousness  Positive for numbness  Psych: Negative for anxiety, depression  Heme/Lymph: Negative for easy bruising, bleeding, lymphadenopathy          Past Medical History:   Diagnosis Date    Autism     [place] esophagus     [place]'s esophagus     Depression     Diabetes mellitus ([facility] )     Female infertility     Gastric ulcer     History of bronchitis     Irregular menses     Miscarriage     Morbid obesity with BMI of 50 0-59 9, adult (HCC)     Reflux esophagitis     Seizures ([facility] )     last one 7/13/21    Sleep apnea     no CPAP       Past Surgical History:   Procedure Laterality Date    EGD      with Bx due to barretts esophagus    EYE SURGERY Bilateral     lazy eye repair    [place] HYSTEROSCOPY,W/ENDO BX N/A 9/22/2021    Procedure: DILATATION AND CURETTAGE (D&C) WITH HYSTEROSCOPY;  Surgeon: Jillian Randall MD;  Location: [facility];  Service: Gynecology    WISDOM TOOTH EXTRACTION         Social History     Socioeconomic History    Marital status: Single     Spouse name: Not on file    Number of children: Not on file    Years of education: Not on file    Highest education level: Not on file   Occupational History    Not on file   Tobacco Use    Smoking status: Never Smoker    Smokeless tobacco: Never Used   Vaping Use    Vaping Use: Never used   Substance and Sexual Activity    Alcohol use: Yes     Comment: occ    Drug use: Not Currently     Types: Marijuana     Comment: about a couple times a week, quit June 2022    Sexual activity: Yes     Partners: Male     Birth control/protection: None     Comment: Trying to conceive for 2 years now   Other Topics Concern    Not on file   Social History Narrative    Not on file     Social Determinants of Health     Financial Resource Strain: Not on file   Food Insecurity: Not on file   Transportation Needs: Not on file   Physical Activity: Not on file   Stress: Not on file   Social Connections: Not on file   Intimate Partner Violence: Not on file   Housing Stability: Not on file       Family History   Problem Relation Age of Onset    Bipolar disorder Mother     Depression Mother     Depression Father     Diabetes Maternal Grandmother     Thyroid disease Maternal Grandmother     Hypertension Maternal Grandmother     Heart disease Maternal Grandmother     Diabetes Maternal Grandfather     Diabetes Paternal Grandmother     Breast cancer Paternal Grandmother     Stroke Paternal Grandmother     Diabetes Paternal Grandfather     Breast cancer Maternal Aunt 35        bilateral    Stomach cancer Maternal Aunt        Allergies   Allergen Reactions    Haloperidol Other (See Comments)     Jaw locks up    Penicillins Hives    Pollen Extract Allergic Rhinitis       Current Outpatient Medications:     meloxicam (Mobic) 15 mg tablet, Take 1 tablet (15 mg total) by mouth daily as needed for moderate pain, Disp: 30 tablet, Rfl: 0    acetaminophen (TYLENOL) 500 mg tablet, Take 1 tablet (500 mg total) by mouth every 6 (six) hours as needed for mild pain (Patient not taking: No sig reported), Disp: , Rfl: 0    albuterol (PROVENTIL HFA,VENTOLIN HFA) 90 mcg/act inhaler, Inhale 2 puffs every 6 (six) hours as needed for wheezing, Disp: , Rfl:     ARIPiprazole (ABILIFY) 10 mg tablet, Take 10 mg by mouth daily with lunch  , Disp: , Rfl:     citalopram (CeleXA) 10 mg tablet, Take 10 mg by mouth every morning , Disp: , Rfl:     docusate sodium (COLACE) 100 mg capsule, Take 1 capsule (100 mg total) by mouth 2 (two) times a day, Disp: 60 capsule, Rfl: 5    famotidine (PEPCID) 40 MG tablet, Take 1 tablet (40 mg total) by mouth daily at bedtime, Disp: 30 tablet, Rfl: 5    ferrous sulfate 324 (65 Fe) mg, Take 1 tablet (324 mg total) by mouth 2 (two) times a day before meals, Disp: 60 tablet, Rfl: 2    lamoTRIgine (LaMICtal) 200 MG tablet, Take 1 tablet (200 mg total) by mouth 2 (two) times a day Take with one 25 mg tablet for total dose of 225 mg , Disp: 180 tablet, Rfl: 3    lamoTRIgine (LaMICtal) 25 mg tablet, TAKE TWO TABLETS BY MOUTH TWICE A DAY, Disp: 180 tablet, Rfl: 1    levETIRAcetam (KEPPRA) 750 mg tablet, Take 2 tablets (1,500 mg total) by mouth every 12 (twelve) hours, Disp: 360 tablet, Rfl: 3    levothyroxine (Synthroid) 25 mcg tablet, Take 1 tablet (25 mcg total) by mouth daily in the early morning, Disp: 30 tablet, Rfl: 2    metFORMIN (GLUCOPHAGE-XR) 750 mg 24 hr tablet, Take 1 tablet (750 mg total) by mouth daily with breakfast, Disp: 30 tablet, Rfl: 2    omeprazole (PriLOSEC) 40 MG capsule, Take 1 capsule (40 mg total) by mouth in the morning and 1 capsule (40 mg total) in the evening  Take before meals  , Disp: 60 capsule, Rfl: 3    ondansetron (ZOFRAN) 4 mg tablet, Take 1 tablet (4 mg total) by mouth every 8 (eight) hours as needed for nausea or vomiting, Disp: 60 tablet, Rfl: 1    traZODone (DESYREL) 50 mg tablet, , Disp: , Rfl:   No current facility-administered medications for this visit        Objective:    Vitals:    09/21/22 1043   BP: 110/60   Pulse: 87 SpO2: 99%   Weight: 107 kg (236 lb)   Height: 4' 7" (1 397 m)       Physical Exam  General: Well appearing, well nourished, in no distress  Oriented x 3, normal mood and affect  Ambulating without difficulty  Obese  Skin: Good turgor, no rash visualized today, unusual bruising or prominent lesions  No malar rash  Hair: Normal texture and distribution  No alopecia noted  Nails: Normal color, no deformities  HEENT:  Head: Normocephalic, atraumatic  Eyes: Conjunctiva clear, sclera non-icteric, EOM intact  Mouth:  No oral ulcerations  Extremities: No amputations or deformities, cyanosis, edema  Musculoskeletal:   There is no peripheral joint soft tissue swelling or tenderness noted  Negative fibromyalgia tender points and no specific myofascial tenderness noted  Neurologic: Alert and oriented  No focal neurological deficits appreciated  Psychiatric: Normal mood and affect  Dorthey Schirmer, M D    Rheumatology

## 2022-09-21 ENCOUNTER — LAB (OUTPATIENT)
Dept: LAB | Facility: HOSPITAL | Age: 32
End: 2022-09-21
Payer: MEDICARE

## 2022-09-21 ENCOUNTER — OFFICE VISIT (OUTPATIENT)
Dept: RHEUMATOLOGY | Facility: CLINIC | Age: 32
End: 2022-09-21
Payer: MEDICARE

## 2022-09-21 VITALS
OXYGEN SATURATION: 99 % | SYSTOLIC BLOOD PRESSURE: 110 MMHG | WEIGHT: 236 LBS | HEART RATE: 87 BPM | BODY MASS INDEX: 54.62 KG/M2 | HEIGHT: 55 IN | DIASTOLIC BLOOD PRESSURE: 60 MMHG

## 2022-09-21 DIAGNOSIS — E55.9 VITAMIN D DEFICIENCY: ICD-10-CM

## 2022-09-21 DIAGNOSIS — E66.01 CLASS 3 SEVERE OBESITY WITHOUT SERIOUS COMORBIDITY WITH BODY MASS INDEX (BMI) OF 50.0 TO 59.9 IN ADULT, UNSPECIFIED OBESITY TYPE (HCC): ICD-10-CM

## 2022-09-21 DIAGNOSIS — Z82.69 FAMILY HISTORY OF SYSTEMIC LUPUS ERYTHEMATOSUS: ICD-10-CM

## 2022-09-21 DIAGNOSIS — G40.909 NONINTRACTABLE EPILEPSY WITHOUT STATUS EPILEPTICUS, UNSPECIFIED EPILEPSY TYPE (HCC): ICD-10-CM

## 2022-09-21 DIAGNOSIS — M25.50 DIFFUSE ARTHRALGIA: ICD-10-CM

## 2022-09-21 DIAGNOSIS — M1A.9XX1 CHRONIC GOUT WITH TOPHUS, UNSPECIFIED CAUSE, UNSPECIFIED SITE: ICD-10-CM

## 2022-09-21 DIAGNOSIS — K13.79 RECURRENT MOUTH ULCERATION: ICD-10-CM

## 2022-09-21 DIAGNOSIS — M35.00 SICCA, UNSPECIFIED TYPE (HCC): ICD-10-CM

## 2022-09-21 DIAGNOSIS — M25.50 DIFFUSE ARTHRALGIA: Primary | ICD-10-CM

## 2022-09-21 DIAGNOSIS — E61.1 IRON DEFICIENCY: ICD-10-CM

## 2022-09-21 DIAGNOSIS — E03.9 HYPOTHYROIDISM, UNSPECIFIED TYPE: ICD-10-CM

## 2022-09-21 DIAGNOSIS — N96 HISTORY OF RECURRENT MISCARRIAGES: ICD-10-CM

## 2022-09-21 LAB
25(OH)D3 SERPL-MCNC: 17.3 NG/ML (ref 30–100)
CRP SERPL QL: 19.5 MG/L
ERYTHROCYTE [SEDIMENTATION RATE] IN BLOOD: 52 MM/HOUR (ref 0–19)

## 2022-09-21 PROCEDURE — 86376 MICROSOMAL ANTIBODY EACH: CPT

## 2022-09-21 PROCEDURE — 86140 C-REACTIVE PROTEIN: CPT

## 2022-09-21 PROCEDURE — 86235 NUCLEAR ANTIGEN ANTIBODY: CPT

## 2022-09-21 PROCEDURE — 86800 THYROGLOBULIN ANTIBODY: CPT

## 2022-09-21 PROCEDURE — 36415 COLL VENOUS BLD VENIPUNCTURE: CPT

## 2022-09-21 PROCEDURE — 86200 CCP ANTIBODY: CPT

## 2022-09-21 PROCEDURE — 82306 VITAMIN D 25 HYDROXY: CPT

## 2022-09-21 PROCEDURE — 86430 RHEUMATOID FACTOR TEST QUAL: CPT

## 2022-09-21 PROCEDURE — 85652 RBC SED RATE AUTOMATED: CPT

## 2022-09-21 PROCEDURE — 86038 ANTINUCLEAR ANTIBODIES: CPT

## 2022-09-21 PROCEDURE — 99205 OFFICE O/P NEW HI 60 MIN: CPT | Performed by: INTERNAL MEDICINE

## 2022-09-21 RX ORDER — MELOXICAM 15 MG/1
15 TABLET ORAL DAILY PRN
Qty: 30 TABLET | Refills: 0 | Status: SHIPPED | OUTPATIENT
Start: 2022-09-21

## 2022-09-22 LAB
ENA SS-A AB SER-ACNC: <0.2 AI (ref 0–0.9)
ENA SS-B AB SER-ACNC: <0.2 AI (ref 0–0.9)
RHEUMATOID FACT SER QL LA: NEGATIVE
THYROGLOB AB SERPL-ACNC: <1 IU/ML (ref 0–0.9)
THYROPEROXIDASE AB SERPL-ACNC: 19 IU/ML (ref 0–34)

## 2022-09-23 LAB
ANA TITR SER IF: NEGATIVE {TITER}
CCP AB SER IA-ACNC: 1.3

## 2022-09-27 ENCOUNTER — HOSPITAL ENCOUNTER (OUTPATIENT)
Dept: INFUSION CENTER | Facility: HOSPITAL | Age: 32
Discharge: HOME | End: 2022-09-27
Attending: INTERNAL MEDICINE
Payer: MEDICARE

## 2022-09-27 VITALS
HEART RATE: 93 BPM | OXYGEN SATURATION: 98 % | SYSTOLIC BLOOD PRESSURE: 113 MMHG | DIASTOLIC BLOOD PRESSURE: 56 MMHG | TEMPERATURE: 98.6 F | RESPIRATION RATE: 20 BRPM

## 2022-09-27 DIAGNOSIS — D50.8 IRON DEFICIENCY ANEMIA SECONDARY TO INADEQUATE DIETARY IRON INTAKE: Primary | ICD-10-CM

## 2022-09-27 PROCEDURE — 96365 THER/PROPH/DIAG IV INF INIT: CPT

## 2022-09-27 RX ORDER — SODIUM CHLORIDE 9 MG/ML
20 INJECTION, SOLUTION INTRAVENOUS ONCE
Status: COMPLETED | OUTPATIENT
Start: 2022-09-27 | End: 2022-09-27

## 2022-09-27 RX ORDER — SODIUM CHLORIDE 9 MG/ML
20 INJECTION, SOLUTION INTRAVENOUS ONCE
Status: CANCELLED | OUTPATIENT
Start: 2022-09-27

## 2022-09-27 RX ADMIN — FERUMOXYTOL 510 MG: 510 INJECTION INTRAVENOUS at 15:19

## 2022-09-27 RX ADMIN — SODIUM CHLORIDE 20 ML/HR: 0.9 INJECTION, SOLUTION INTRAVENOUS at 14:55

## 2022-09-30 ENCOUNTER — TELEPHONE (OUTPATIENT)
Dept: RHEUMATOLOGY | Facility: CLINIC | Age: 32
End: 2022-09-30

## 2022-09-30 ENCOUNTER — APPOINTMENT (EMERGENCY)
Dept: RADIOLOGY | Facility: HOSPITAL | Age: 32
End: 2022-09-30
Payer: MEDICARE

## 2022-09-30 ENCOUNTER — DOCUMENTATION (OUTPATIENT)
Dept: RHEUMATOLOGY | Facility: CLINIC | Age: 32
End: 2022-09-30

## 2022-09-30 ENCOUNTER — HOSPITAL ENCOUNTER (EMERGENCY)
Facility: HOSPITAL | Age: 32
Discharge: HOME | End: 2022-10-01
Attending: EMERGENCY MEDICINE
Payer: MEDICARE

## 2022-09-30 DIAGNOSIS — M54.9 UPPER BACK PAIN ON LEFT SIDE: Primary | ICD-10-CM

## 2022-09-30 DIAGNOSIS — E55.9 VITAMIN D DEFICIENCY: Primary | ICD-10-CM

## 2022-09-30 DIAGNOSIS — M35.00 SICCA, UNSPECIFIED TYPE (HCC): Primary | ICD-10-CM

## 2022-09-30 LAB
ALBUMIN SERPL BCP-MCNC: 3.5 G/DL (ref 3.5–5)
ALP SERPL-CCNC: 129 U/L (ref 46–116)
ALT SERPL W P-5'-P-CCNC: 47 U/L (ref 12–78)
ANION GAP SERPL CALCULATED.3IONS-SCNC: 10 MMOL/L (ref 4–13)
AST SERPL W P-5'-P-CCNC: 27 U/L (ref 5–45)
BASOPHILS # BLD AUTO: 0.1 THOUSANDS/ΜL (ref 0–0.1)
BASOPHILS NFR BLD AUTO: 1 % (ref 0–1)
BILIRUB SERPL-MCNC: 0.2 MG/DL (ref 0.2–1)
BUN SERPL-MCNC: 18 MG/DL (ref 5–25)
CALCIUM SERPL-MCNC: 9 MG/DL (ref 8.3–10.1)
CARDIAC TROPONIN I PNL SERPL HS: 4 NG/L
CHLORIDE SERPL-SCNC: 102 MMOL/L (ref 96–108)
CO2 SERPL-SCNC: 27 MMOL/L (ref 21–32)
CREAT SERPL-MCNC: 0.75 MG/DL (ref 0.6–1.3)
D DIMER PPP FEU-MCNC: 0.65 UG/ML FEU
EOSINOPHIL # BLD AUTO: 0.16 THOUSAND/ΜL (ref 0–0.61)
EOSINOPHIL NFR BLD AUTO: 2 % (ref 0–6)
ERYTHROCYTE [DISTWIDTH] IN BLOOD BY AUTOMATED COUNT: 17.3 % (ref 11.6–15.1)
GFR SERPL CREATININE-BSD FRML MDRD: 106 ML/MIN/1.73SQ M
GLUCOSE SERPL-MCNC: 94 MG/DL (ref 65–140)
HCT VFR BLD AUTO: 38.8 % (ref 34.8–46.1)
HGB BLD-MCNC: 11.8 G/DL (ref 11.5–15.4)
HLA-B27 QL NAA+PROBE: NORMAL
IMM GRANULOCYTES # BLD AUTO: 0.04 THOUSAND/UL (ref 0–0.2)
IMM GRANULOCYTES NFR BLD AUTO: 1 % (ref 0–2)
LYMPHOCYTES # BLD AUTO: 1.7 THOUSANDS/ΜL (ref 0.6–4.47)
LYMPHOCYTES NFR BLD AUTO: 24 % (ref 14–44)
MCH RBC QN AUTO: 25.7 PG (ref 26.8–34.3)
MCHC RBC AUTO-ENTMCNC: 30.4 G/DL (ref 31.4–37.4)
MCV RBC AUTO: 85 FL (ref 82–98)
MONOCYTES # BLD AUTO: 0.37 THOUSAND/ΜL (ref 0.17–1.22)
MONOCYTES NFR BLD AUTO: 5 % (ref 4–12)
NEUTROPHILS # BLD AUTO: 4.65 THOUSANDS/ΜL (ref 1.85–7.62)
NEUTS SEG NFR BLD AUTO: 67 % (ref 43–75)
NRBC BLD AUTO-RTO: 0 /100 WBCS
PLATELET # BLD AUTO: 363 THOUSANDS/UL (ref 149–390)
PMV BLD AUTO: 9 FL (ref 8.9–12.7)
POTASSIUM SERPL-SCNC: 3.6 MMOL/L (ref 3.5–5.3)
PROT SERPL-MCNC: 8.1 G/DL (ref 6.4–8.4)
RBC # BLD AUTO: 4.59 MILLION/UL (ref 3.81–5.12)
SODIUM SERPL-SCNC: 139 MMOL/L (ref 135–147)
WBC # BLD AUTO: 7.02 THOUSAND/UL (ref 4.31–10.16)

## 2022-09-30 PROCEDURE — 99285 EMERGENCY DEPT VISIT HI MDM: CPT | Performed by: EMERGENCY MEDICINE

## 2022-09-30 PROCEDURE — 36415 COLL VENOUS BLD VENIPUNCTURE: CPT | Performed by: EMERGENCY MEDICINE

## 2022-09-30 PROCEDURE — 80053 COMPREHEN METABOLIC PANEL: CPT | Performed by: EMERGENCY MEDICINE

## 2022-09-30 PROCEDURE — 71275 CT ANGIOGRAPHY CHEST: CPT

## 2022-09-30 PROCEDURE — G1004 CDSM NDSC: HCPCS

## 2022-09-30 PROCEDURE — 99284 EMERGENCY DEPT VISIT MOD MDM: CPT

## 2022-09-30 PROCEDURE — 93005 ELECTROCARDIOGRAM TRACING: CPT

## 2022-09-30 PROCEDURE — 85025 COMPLETE CBC W/AUTO DIFF WBC: CPT | Performed by: EMERGENCY MEDICINE

## 2022-09-30 PROCEDURE — 84484 ASSAY OF TROPONIN QUANT: CPT | Performed by: EMERGENCY MEDICINE

## 2022-09-30 PROCEDURE — 85379 FIBRIN DEGRADATION QUANT: CPT | Performed by: EMERGENCY MEDICINE

## 2022-09-30 RX ORDER — ERGOCALCIFEROL 1.25 MG/1
50000 CAPSULE ORAL WEEKLY
Qty: 8 CAPSULE | Refills: 0 | Status: SHIPPED | OUTPATIENT
Start: 2022-09-30

## 2022-10-01 VITALS
DIASTOLIC BLOOD PRESSURE: 70 MMHG | BODY MASS INDEX: 54.34 KG/M2 | RESPIRATION RATE: 20 BRPM | HEART RATE: 92 BPM | SYSTOLIC BLOOD PRESSURE: 128 MMHG | WEIGHT: 233.8 LBS | OXYGEN SATURATION: 100 % | TEMPERATURE: 100 F

## 2022-10-01 LAB
ATRIAL RATE: 100 BPM
P AXIS: 48 DEGREES
PR INTERVAL: 124 MS
QRS AXIS: 24 DEGREES
QRSD INTERVAL: 82 MS
QT INTERVAL: 328 MS
QTC INTERVAL: 423 MS
T WAVE AXIS: 11 DEGREES
VENTRICULAR RATE: 100 BPM

## 2022-10-01 PROCEDURE — 93010 ELECTROCARDIOGRAM REPORT: CPT | Performed by: INTERNAL MEDICINE

## 2022-10-01 RX ORDER — NAPROXEN 250 MG/1
250 TABLET ORAL ONCE
Status: COMPLETED | OUTPATIENT
Start: 2022-10-01 | End: 2022-10-01

## 2022-10-01 RX ORDER — BACLOFEN 10 MG/1
10 TABLET ORAL EVERY 8 HOURS PRN
Qty: 30 TABLET | Refills: 0 | Status: SHIPPED | OUTPATIENT
Start: 2022-10-01

## 2022-10-01 RX ORDER — LIDOCAINE 50 MG/G
1 PATCH TOPICAL ONCE
Status: DISCONTINUED | OUTPATIENT
Start: 2022-10-01 | End: 2022-10-01 | Stop reason: HOSPADM

## 2022-10-01 RX ORDER — NAPROXEN 500 MG/1
500 TABLET ORAL 2 TIMES DAILY WITH MEALS
Qty: 20 TABLET | Refills: 0 | Status: SHIPPED | OUTPATIENT
Start: 2022-10-01

## 2022-10-01 RX ORDER — LIDOCAINE 50 MG/G
1 PATCH TOPICAL DAILY
Qty: 30 PATCH | Refills: 0 | Status: SHIPPED | OUTPATIENT
Start: 2022-10-01

## 2022-10-01 RX ORDER — BACLOFEN 10 MG/1
10 TABLET ORAL ONCE
Status: COMPLETED | OUTPATIENT
Start: 2022-10-01 | End: 2022-10-01

## 2022-10-01 RX ADMIN — NAPROXEN 250 MG: 250 TABLET ORAL at 01:27

## 2022-10-01 RX ADMIN — LIDOCAINE 5% 1 PATCH: 700 PATCH TOPICAL at 01:27

## 2022-10-01 RX ADMIN — IOHEXOL 100 ML: 350 INJECTION, SOLUTION INTRAVENOUS at 00:15

## 2022-10-01 RX ADMIN — BACLOFEN 10 MG: 10 TABLET ORAL at 01:27

## 2022-10-01 NOTE — ED CARE HANDOFF
Emergency Department Sign Out Note        Sign out and transfer of care from previous provider  See Separate Emergency Department note  The patient, Aleah Shultz, was evaluated by the previous provider for left upper/scapular back pain  Workup Completed:  Blood work    ED Course / Workup Pending (followup):  CTA lungs                                  ED Course as of 10/01/22 0125   Sat Oct 01, 2022   [id number] Patient pending CTA lungs study  If CTA lungs is unremarkable then discharge patient home on NSAIDs  Procedures  MDM  Number of Diagnoses or Management Options  Upper back pain on left side: new and requires workup     Amount and/or Complexity of Data Reviewed  Clinical lab tests: reviewed  Tests in the radiology section of CPT®: reviewed  Tests in the medicine section of CPT®: reviewed and ordered  Review and summarize past medical records: yes  Independent visualization of images, tracings, or specimens: yes    Risk of Complications, Morbidity, and/or Mortality  General comments: Patient presented with acute onset left upper back pain around the scapular region  Pain started at rest about 1-2 hours prior to arrival   EKG and troponin is unremarkable  Patient has no previous history of cardiac events  Patient's D-dimer was mildly elevated  Patient was pending CTA lungs PE study  No acute abnormalities noted on CTA lungs  At this time patient's pain is most likely musculoskeletal in origin as there is tenderness to palpation of upper back  At this time patient is discharged home on Lidoderm patch, naproxen, baclofen and follow-up to PCP for further evaluation and management  Close return instructions given to return to the ER for any worsening symptoms  Patient agrees with discharge plan  Patient well appearing at time of discharge  Please Note: Fluency Direct voice recognition software may have been used in the creation of this document   Wrong words or sound a like substitutions may have occurred due to the inherent limitations of the voice software  Patient Progress  Patient progress: stable          Disposition  Final diagnoses:   Upper back pain on left side     Time reflects when diagnosis was documented in both MDM as applicable and the Disposition within this note     Time User Action Codes Description Comment    10/1/2022  1:21 AM Ilan Cárdenas [M54 9] Upper back pain on left side       ED Disposition     ED Disposition   Discharge    Condition   Stable    Date/Time   Sat Oct 1, 2022  1:20 AM    Comment   Margoth Cook discharge to home/self care  Follow-up Information     Follow up With Specialties Details Why [facility] Schedule an appointment as soon as possible for a visit   Regan Calderon [address]  [address] [facility]  [phone]          Patient's Medications   Discharge Prescriptions    BACLOFEN 10 MG TABLET    Take 1 tablet (10 mg total) by mouth every 8 (eight) hours as needed for muscle spasms       Start Date: 10/1/2022 End Date: --       Order Dose: 10 mg       Quantity: 30 tablet    Refills: 0    LIDOCAINE (LIDODERM) 5 %    Apply 1 patch topically daily Remove & Discard patch within 12 hours or as directed by MD       Start Date: 10/1/2022 End Date: --       Order Dose: 1 patch       Quantity: 30 patch    Refills: 0    NAPROXEN (NAPROSYN) 500 MG TABLET    Take 1 tablet (500 mg total) by mouth 2 (two) times a day with meals       Start Date: 10/1/2022 End Date: --       Order Dose: 500 mg       Quantity: 20 tablet    Refills: 0     No discharge procedures on file         ED Provider  Electronically Signed by     Jacqueline Chadwick DO  10/01/22 5114

## 2022-10-01 NOTE — ED NOTES
As per CT technician; patient's IV infiltrated during saline flush at the end of the exam  This RN observed slight swelling to the area  IV removed and catheter intact  Patient's arm wrapped in an ace bandage and ice pack applied  Dr Ladi Manley made aware        Samuella Mandi, [place]  10/01/22 5331

## 2022-10-01 NOTE — ED PROVIDER NOTES
History  Chief Complaint   Patient presents with    Shoulder Pain     States started 1 hour ago with pain left shoulder blade   States pain worse if she lifts her arm and when she inhales  States she has been having body pain for a month and was seen by rheumatologist and had blood work done about a week ago  Had iron transfusion last week  Pain upper chest and left breast area for more than a month  32 yo female c/o sudden onset pain in left scapula while doing the dishes about 1 5 hours ago  Pain is sharp and worse with movement and deep breath  No recent illness  No fever, cough, diarrhea, injury  Has been having chest pain for a month  Has been having body pain for a while now and did see rheumatologist outpt  Recently - autommune markers negative but CRP and sed rate were elevated  She was prescribed Meloxicam but doesn't want to take it  History provided by:  Patient   used: No    Shoulder Pain  Associated symptoms: no fever        Prior to Admission Medications   Prescriptions Last Dose Informant Patient Reported? Taking?    ARIPiprazole (ABILIFY) 10 mg tablet  Self Yes No   Sig: Take 10 mg by mouth daily with lunch     acetaminophen (TYLENOL) 500 mg tablet  Self No No   Sig: Take 1 tablet (500 mg total) by mouth every 6 (six) hours as needed for mild pain   Patient not taking: No sig reported   albuterol (PROVENTIL HFA,VENTOLIN HFA) 90 mcg/act inhaler  Self Yes No   Sig: Inhale 2 puffs every 6 (six) hours as needed for wheezing   citalopram (CeleXA) 10 mg tablet  Self Yes No   Sig: Take 10 mg by mouth every morning    docusate sodium (COLACE) 100 mg capsule  Self No No   Sig: Take 1 capsule (100 mg total) by mouth 2 (two) times a day   ergocalciferol (VITAMIN D2) 50,000 units   No No   Sig: Take 1 capsule (50,000 Units total) by mouth once a week   famotidine (PEPCID) 40 MG tablet  Self No No   Sig: Take 1 tablet (40 mg total) by mouth daily at bedtime   ferrous sulfate 324 (65 Fe) mg  Self No No   Sig: Take 1 tablet (324 mg total) by mouth 2 (two) times a day before meals   lamoTRIgine (LaMICtal) 200 MG tablet  Self No No   Sig: Take 1 tablet (200 mg total) by mouth 2 (two) times a day Take with one 25 mg tablet for total dose of 225 mg    lamoTRIgine (LaMICtal) 25 mg tablet  Self No No   Sig: TAKE TWO TABLETS BY MOUTH TWICE A DAY   levETIRAcetam (KEPPRA) 750 mg tablet  Self No No   Sig: Take 2 tablets (1,500 mg total) by mouth every 12 (twelve) hours   levothyroxine (Synthroid) 25 mcg tablet  Self No No   Sig: Take 1 tablet (25 mcg total) by mouth daily in the early morning   meloxicam (Mobic) 15 mg tablet   No No   Sig: Take 1 tablet (15 mg total) by mouth daily as needed for moderate pain   metFORMIN (GLUCOPHAGE-XR) 750 mg 24 hr tablet  Self No No   Sig: Take 1 tablet (750 mg total) by mouth daily with breakfast   omeprazole (PriLOSEC) 40 MG capsule  Self No No   Sig: Take 1 capsule (40 mg total) by mouth in the morning and 1 capsule (40 mg total) in the evening  Take before meals     ondansetron (ZOFRAN) 4 mg tablet  Self No No   Sig: Take 1 tablet (4 mg total) by mouth every 8 (eight) hours as needed for nausea or vomiting   traZODone (DESYREL) 50 mg tablet  Self Yes No      Facility-Administered Medications: None       Past Medical History:   Diagnosis Date    Autism     [place] esophagus     [place]'s esophagus     Depression     Diabetes mellitus ([facility] [address] )     Female infertility     Gastric ulcer     History of bronchitis     Irregular menses     Miscarriage     Morbid obesity with BMI of 50 0-59 9, adult (HCC)     Reflux esophagitis     Seizures ([facility] [address] )     last one 7/13/21    Sleep apnea     no CPAP       Past Surgical History:   Procedure Laterality Date    EGD      with Bx due to barretts esophagus    EYE SURGERY Bilateral     lazy eye repair    OK HYSTEROSCOPY,W/ENDO BX N/A 9/22/2021    Procedure: DILATATION AND CURETTAGE (D&C) WITH HYSTEROSCOPY;  Surgeon: Rena Barrera MD;  Location: [facility];  Service: Gynecology    WISDOM TOOTH EXTRACTION         Family History   Problem Relation Age of Onset    Bipolar disorder Mother     Depression Mother     Depression Father     Diabetes Maternal Grandmother     Thyroid disease Maternal Grandmother     Hypertension Maternal Grandmother     Heart disease Maternal Grandmother     Diabetes Maternal Grandfather     Diabetes Paternal Grandmother     Breast cancer Paternal Grandmother     Stroke Paternal Grandmother     Diabetes Paternal Grandfather     Breast cancer Maternal Aunt 35        bilateral    Stomach cancer Maternal Aunt      I have reviewed and agree with the history as documented  E-Cigarette/Vaping    E-Cigarette Use Never User      E-Cigarette/Vaping Substances    Nicotine No     THC No     CBD No     Flavoring No     Other No     Unknown No      Social History     Tobacco Use    Smoking status: Never Smoker    Smokeless tobacco: Never Used   Vaping Use    Vaping Use: Never used   Substance Use Topics    Alcohol use: Yes     Comment: occ    Drug use: Not Currently     Types: Marijuana     Comment: about a couple times a week, quit June 2022       Review of Systems   Constitutional: Negative for fever  Respiratory: Negative for cough  Cardiovascular: Positive for chest pain  Negative for leg swelling  Gastrointestinal: Negative for diarrhea and vomiting  Musculoskeletal: Positive for arthralgias and myalgias  Skin: Negative for rash  Physical Exam  Physical Exam  Vitals and nursing note reviewed  Constitutional:       General: She is not in acute distress  Appearance: She is well-developed  She is obese  She is not ill-appearing or diaphoretic  HENT:      Head: Normocephalic and atraumatic  Right Ear: External ear normal       Left Ear: External ear normal    Eyes:      General: No scleral icterus       Conjunctiva/sclera: Conjunctivae normal    Cardiovascular: Rate and Rhythm: Regular rhythm  Tachycardia present  Heart sounds: Normal heart sounds  No murmur heard  Pulmonary:      Effort: Pulmonary effort is normal  No respiratory distress  Breath sounds: Normal breath sounds  No wheezing  Chest:      Chest wall: Tenderness present  Abdominal:      General: Bowel sounds are normal  There is no distension  Palpations: Abdomen is soft  Tenderness: There is no abdominal tenderness  Musculoskeletal:         General: No tenderness or deformity  Normal range of motion  Cervical back: Normal range of motion and neck supple  No tenderness  Right lower leg: No edema  Left lower leg: No edema  Skin:     General: Skin is warm and dry  Coloration: Skin is not pale  Findings: No rash  Neurological:      General: No focal deficit present  Mental Status: She is alert and oriented to person, place, and time  Cranial Nerves: No cranial nerve deficit  Motor: No weakness     Psychiatric:         Behavior: Behavior normal          Vital Signs  ED Triage Vitals [09/30/22 2209]   Temperature Pulse Respirations Blood Pressure SpO2   100 °F (37 8 °C) (!) 113 20 121/67 98 %      Temp Source Heart Rate Source Patient Position - Orthostatic VS BP Location FiO2 (%)   Tympanic Monitor Sitting Right arm --      Pain Score       6           Vitals:    09/30/22 2209 10/01/22 0016 10/01/22 0127   BP: 121/67 123/62 128/70   Pulse: (!) 113 100 92   Patient Position - Orthostatic VS: Sitting Sitting Sitting         Visual Acuity      ED Medications  Medications   iohexol (OMNIPAQUE) 350 MG/ML injection (SINGLE-DOSE) 100 mL (100 mL Intravenous Given 10/1/22 0015)   naproxen (NAPROSYN) tablet 250 mg (250 mg Oral Given 10/1/22 0127)   baclofen tablet 10 mg (10 mg Oral Given 10/1/22 0127)       Diagnostic Studies  Results Reviewed     Procedure Component Value Units Date/Time    HS Troponin 0hr (reflex protocol) [[id number]]  (Normal) Collected: 09/30/22 2256    Lab Status: Final result Specimen: Blood from Arm, Left Updated: 09/30/22 2335     hs TnI 0hr 4 ng/L     D-Dimer [[id number]]  (Abnormal) Collected: 09/30/22 2256    Lab Status: Final result Specimen: Blood from Arm, Left Updated: 09/30/22 2329     D-Dimer, Quant 0 65 ug/ml FEU     Comprehensive metabolic panel [[id number]]  (Abnormal) Collected: 09/30/22 2256    Lab Status: Final result Specimen: Blood from Arm, Left Updated: 09/30/22 2328     Sodium 139 mmol/L      Potassium 3 6 mmol/L      Chloride 102 mmol/L      CO2 27 mmol/L      ANION GAP 10 mmol/L      BUN 18 mg/dL      Creatinine 0 75 mg/dL      Glucose 94 mg/dL      Calcium 9 0 mg/dL      AST 27 U/L      ALT 47 U/L      Alkaline Phosphatase 129 U/L      Total Protein 8 1 g/dL      Albumin 3 5 g/dL      Total Bilirubin 0 20 mg/dL      eGFR 106 ml/min/1 73sq m     Narrative:      National Kidney Disease Foundation guidelines for Chronic Kidney Disease (CKD):     Stage 1 with normal or high GFR (GFR > 90 mL/min/1 73 square meters)    Stage 2 Mild CKD (GFR = 60-89 mL/min/1 73 square meters)    Stage 3A Moderate CKD (GFR = 45-59 mL/min/1 73 square meters)    Stage 3B Moderate CKD (GFR = 30-44 mL/min/1 73 square meters)    Stage 4 Severe CKD (GFR = 15-29 mL/min/1 73 square meters)    Stage 5 End Stage CKD (GFR <15 mL/min/1 73 square meters)  Note: GFR calculation is accurate only with a steady state creatinine    CBC and differential [[id number]]  (Abnormal) Collected: 09/30/22 2256    Lab Status: Final result Specimen: Blood from Arm, Left Updated: 09/30/22 2309     WBC 7 02 Thousand/uL      RBC 4 59 Million/uL      Hemoglobin 11 8 g/dL      Hematocrit 38 8 %      MCV 85 fL      MCH 25 7 pg      MCHC 30 4 g/dL      RDW 17 3 %      MPV 9 0 fL      Platelets 318 Thousands/uL      nRBC 0 /100 WBCs      Neutrophils Relative 67 %      Immat GRANS % 1 %      Lymphocytes Relative 24 %      Monocytes Relative 5 %      Eosinophils Relative 2 %      Basophils Relative 1 %      Neutrophils Absolute 4 65 Thousands/µL      Immature Grans Absolute 0 04 Thousand/uL      Lymphocytes Absolute 1 70 Thousands/µL      Monocytes Absolute 0 37 Thousand/µL      Eosinophils Absolute 0 16 Thousand/µL      Basophils Absolute 0 10 Thousands/µL                  CTA ED chest PE study   Final Result by Miguelangel Sheriff DO (10/01 0115)      No pulmonary embolism or acute pulmonary process is seen  Workstation performed: [id number]                    Procedures  ECG 12 Lead Documentation Only    Date/Time: 9/30/2022 11:03 PM  Performed by: Carter Dangelo MD  Authorized by: Carter Dangelo MD     Indications / Diagnosis:  Scapular pain with deep breathing  ECG reviewed by me, the ED Provider: yes    Patient location:  ED  Previous ECG:     Previous ECG:  Unavailable  Interpretation:     Interpretation: abnormal    Rate:     ECG rate:  100    ECG rate assessment: normal    Rhythm:     Rhythm: sinus rhythm    Ectopy:     Ectopy: none    QRS:     QRS axis:  Normal  Conduction:     Conduction: normal    ST segments:     ST segments:  Normal  T waves:     T waves: normal               ED Course                               SBIRT 22yo+    Flowsheet Row Most Recent Value   SBIRT (23 yo +)    In order to provide better care to our patients, we are screening all of our patients for alcohol and drug use  Would it be okay to ask you these screening questions? No Filed at: 09/30/2022 8541                    MDM  Number of Diagnoses or Management Options  Upper back pain on left side  Diagnosis management comments: 0100 - signed out to night doctor with CTA pending  Plan to discharge if negative        Disposition  Final diagnoses:   Upper back pain on left side     Time reflects when diagnosis was documented in both MDM as applicable and the Disposition within this note     Time User Action Codes Description Comment    10/1/2022  1:21 AM Irean Frankel Add [M54 9] Upper back pain on left side       ED Disposition     ED Disposition   Discharge    Condition   Stable    Date/Time   Sat Oct 1, 2022  1:20 AM    Comment   Margoth Cook discharge to home/self care                 Follow-up Information     Follow up With Specialties Details Why [facility] Schedule an appointment as soon as possible for a visit   [address]  [phone]            Discharge Medication List as of 10/1/2022  1:22 AM      START taking these medications    Details   baclofen 10 mg tablet Take 1 tablet (10 mg total) by mouth every 8 (eight) hours as needed for muscle spasms, Starting Sat 10/1/2022, Normal      lidocaine (Lidoderm) 5 % Apply 1 patch topically daily Remove & Discard patch within 12 hours or as directed by MD, Starting Sat 10/1/2022, Normal      naproxen (NAPROSYN) 500 mg tablet Take 1 tablet (500 mg total) by mouth 2 (two) times a day with meals, Starting Sat 10/1/2022, Normal         CONTINUE these medications which have NOT CHANGED    Details   acetaminophen (TYLENOL) 500 mg tablet Take 1 tablet (500 mg total) by mouth every 6 (six) hours as needed for mild pain, Starting Wed 9/22/2021, No Print      albuterol (PROVENTIL HFA,VENTOLIN HFA) 90 mcg/act inhaler Inhale 2 puffs every 6 (six) hours as needed for wheezing, Historical Med      ARIPiprazole (ABILIFY) 10 mg tablet Take 10 mg by mouth daily with lunch  , Historical Med      citalopram (CeleXA) 10 mg tablet Take 10 mg by mouth every morning , Historical Med      docusate sodium (COLACE) 100 mg capsule Take 1 capsule (100 mg total) by mouth 2 (two) times a day, Starting Wed 3/2/2022, Normal      ergocalciferol (VITAMIN D2) 50,000 units Take 1 capsule (50,000 Units total) by mouth once a week, Starting Fri 9/30/2022, Normal      famotidine (PEPCID) 40 MG tablet Take 1 tablet (40 mg total) by mouth daily at bedtime, Starting Wed 5/11/2022, Normal ferrous sulfate 324 (65 Fe) mg Take 1 tablet (324 mg total) by mouth 2 (two) times a day before meals, Starting Tue 8/2/2022, Normal      !! lamoTRIgine (LaMICtal) 200 MG tablet Take 1 tablet (200 mg total) by mouth 2 (two) times a day Take with one 25 mg tablet for total dose of 225 mg , Starting Thu 11/11/2021, Normal      !! lamoTRIgine (LaMICtal) 25 mg tablet TAKE TWO TABLETS BY MOUTH TWICE A DAY, Normal      levETIRAcetam (KEPPRA) 750 mg tablet Take 2 tablets (1,500 mg total) by mouth every 12 (twelve) hours, Starting Tue 1/4/2022, Normal      levothyroxine (Synthroid) 25 mcg tablet Take 1 tablet (25 mcg total) by mouth daily in the early morning, Starting Tue 8/2/2022, Until Mon 10/31/2022, Normal      meloxicam (Mobic) 15 mg tablet Take 1 tablet (15 mg total) by mouth daily as needed for moderate pain, Starting Wed 9/21/2022, Normal      metFORMIN (GLUCOPHAGE-XR) 750 mg 24 hr tablet Take 1 tablet (750 mg total) by mouth daily with breakfast, Starting Tue 6/28/2022, Normal      omeprazole (PriLOSEC) 40 MG capsule Take 1 capsule (40 mg total) by mouth in the morning and 1 capsule (40 mg total) in the evening  Take before meals  , Starting Wed 5/11/2022, Normal      ondansetron (ZOFRAN) 4 mg tablet Take 1 tablet (4 mg total) by mouth every 8 (eight) hours as needed for nausea or vomiting, Starting Thu 3/31/2022, Normal      traZODone (DESYREL) 50 mg tablet Starting Thu 9/1/2022, Historical Med       !! - Potential duplicate medications found  Please discuss with provider  No discharge procedures on file      PDMP Review     None          ED Provider  Electronically Signed by           Sarah Velazquez MD  75/81/28 1988

## 2022-10-06 ENCOUNTER — OFFICE VISIT (OUTPATIENT)
Dept: OTOLARYNGOLOGY | Facility: CLINIC | Age: 32
End: 2022-10-06
Payer: MEDICARE

## 2022-10-06 VITALS — HEIGHT: 55 IN | BODY MASS INDEX: 53.92 KG/M2 | TEMPERATURE: 97.4 F | WEIGHT: 233 LBS

## 2022-10-06 DIAGNOSIS — M35.00 SICCA, UNSPECIFIED TYPE (HCC): ICD-10-CM

## 2022-10-06 PROCEDURE — 99204 OFFICE O/P NEW MOD 45 MIN: CPT | Performed by: OTOLARYNGOLOGY

## 2022-10-06 NOTE — PROGRESS NOTES
Assessment/Plan: It is not a problem to schedule the patient for a lip biopsy, but it doesn't seem like the main suspicion is for Sjogren's Syndrome, as her symptoms are not suggestive of that  The patient will reevaluate the issue with her rheumatologist before scheduling the procedure  No problem-specific Assessment & Plan notes found for this encounter  Diagnoses and all orders for this visit:    Sicca, unspecified type [facility])  -     Ambulatory Referral to Otolaryngology          Subjective:      Patient ID: Jarrod Molina is a 33 y o  female  Pt referred for lip biopsy to r/o Sjogren's Syndrome  But the patient does not c/o any particular eye or mouth dryness  Her main c/o is diffuse body aches, making me suspect fibromyalgia  The following portions of the patient's history were reviewed and updated as appropriate: allergies, current medications, past family history, past medical history, past social history, past surgical history and problem list     Review of Systems      Objective:      Temp (!) 97 4 °F (36 3 °C) (Temporal)   Ht 4' 7" (1 397 m)   Wt 106 kg (233 lb)   LMP 09/10/2022 (Exact Date)   BMI 54 15 kg/m²          Physical Exam  Constitutional:       Appearance: She is well-developed  HENT:      Head: Normocephalic and atraumatic  Right Ear: Tympanic membrane, ear canal and external ear normal  No drainage  No middle ear effusion  Left Ear: Tympanic membrane, ear canal and external ear normal  No drainage  No middle ear effusion  Nose: Nose normal       Mouth/Throat:      Pharynx: Uvula midline  No oropharyngeal exudate  Tonsils: 2+ on the right  2+ on the left  Neck:      Thyroid: No thyroid mass or thyromegaly  Trachea: Trachea normal  No tracheal deviation  Lymphadenopathy:      Cervical: No cervical adenopathy  Neurological:      Mental Status: She is alert

## 2022-10-12 PROBLEM — R50.9 FEVER: Status: RESOLVED | Noted: 2021-08-03 | Resolved: 2022-10-12

## 2022-10-24 ENCOUNTER — OFFICE VISIT (OUTPATIENT)
Dept: FAMILY MEDICINE CLINIC | Facility: CLINIC | Age: 32
End: 2022-10-24
Payer: MEDICARE

## 2022-10-24 VITALS
TEMPERATURE: 98.2 F | WEIGHT: 232.6 LBS | BODY MASS INDEX: 53.83 KG/M2 | OXYGEN SATURATION: 99 % | DIASTOLIC BLOOD PRESSURE: 62 MMHG | SYSTOLIC BLOOD PRESSURE: 122 MMHG | HEIGHT: 55 IN | HEART RATE: 84 BPM

## 2022-10-24 DIAGNOSIS — E03.9 HYPOTHYROIDISM, UNSPECIFIED TYPE: Primary | ICD-10-CM

## 2022-10-24 DIAGNOSIS — Z80.3 FAMILY HISTORY OF MALIGNANT NEOPLASM OF FEMALE BREAST: ICD-10-CM

## 2022-10-24 DIAGNOSIS — Z23 ENCOUNTER FOR IMMUNIZATION: ICD-10-CM

## 2022-10-24 DIAGNOSIS — D50.8 IRON DEFICIENCY ANEMIA SECONDARY TO INADEQUATE DIETARY IRON INTAKE: ICD-10-CM

## 2022-10-24 DIAGNOSIS — Z12.39 BREAST CANCER SCREENING, HIGH RISK PATIENT: ICD-10-CM

## 2022-10-24 DIAGNOSIS — Z91.89 AT HIGH RISK FOR BREAST CANCER: ICD-10-CM

## 2022-10-24 PROCEDURE — 90682 RIV4 VACC RECOMBINANT DNA IM: CPT

## 2022-10-24 PROCEDURE — G0008 ADMIN INFLUENZA VIRUS VAC: HCPCS

## 2022-10-24 PROCEDURE — 99213 OFFICE O/P EST LOW 20 MIN: CPT | Performed by: FAMILY MEDICINE

## 2022-10-24 NOTE — PROGRESS NOTES
Assessment/Plan:      Diagnoses and all orders for this visit:    Hypothyroidism, unspecified type  Continue levothyroxine, symptomatically improving     Iron deficiency anemia secondary to inadequate dietary iron intake  Hgb 11 8 on 9/30/22  Can reduce supplements to once daily instead of BID, no side effects, continue with vit C  Family history of malignant neoplasm of female breast  At high risk for breast cancer  Mammogram wnl on 7/29/22, non-dense breasts, elevated lifetime risk at 24 72%  hx of maternal aunt, inflammatory breast cancer at age 35  Pt unsure if genetic testing was done  Denies personal or FH of ovarian cancer, uterine cancer, colon cancer  Discussed with pt and she is agreeable to genetic counseling to see if she needs BRCA testing    -     Ambulatory Referral to Oncology Genetics; Future    Encounter for immunization  -     influenza vaccine, quadrivalent, recombinant, PF, 0 5 mL, for patients 18 yr+ (FLUBLOK)        Subjective:      Patient ID: Pipe Gee is a 33 y o  female  HPI    Started levothyroxine, noticing less hair fall  Received feraheme infusion  Has been taking iron supplements BID and following with hematology  The following portions of the patient's history were reviewed and updated as appropriate: allergies, current medications, past family history, past medical history, past social history, past surgical history, and problem list     Review of Systems   Constitutional: Negative for chills and fever  Respiratory: Negative for chest tightness and shortness of breath  Cardiovascular: Negative for chest pain and palpitations  Gastrointestinal: Negative for abdominal pain  Genitourinary: Negative for dysuria  Skin: Negative for rash  Neurological: Negative for light-headedness and headaches  Psychiatric/Behavioral: Negative for dysphoric mood and suicidal ideas  The patient is not nervous/anxious            Objective:      /62 (BP Location: Left arm, Patient Position: Sitting, Cuff Size: Extra-Large)   Pulse 84   Temp 98 2 °F (36 8 °C) (Tympanic)   Ht 4' 7" (1 397 m)   Wt 106 kg (232 lb 9 6 oz)   LMP 10/18/2022 (Exact Date)   SpO2 99%   BMI 54 06 kg/m²          Physical Exam  Constitutional:       Appearance: Normal appearance  HENT:      Head: Normocephalic and atraumatic  Cardiovascular:      Rate and Rhythm: Normal rate and regular rhythm  Pulmonary:      Effort: Pulmonary effort is normal  No respiratory distress  Skin:     General: Skin is warm and dry  Neurological:      General: No focal deficit present  Mental Status: She is alert and oriented to person, place, and time     Psychiatric:         Mood and Affect: Mood normal          Behavior: Behavior normal

## 2022-10-28 ENCOUNTER — TELEPHONE (OUTPATIENT)
Dept: GENETICS | Facility: CLINIC | Age: 32
End: 2022-10-28

## 2022-10-28 NOTE — TELEPHONE ENCOUNTER
I called Andrew Timmons to schedule a new patient appointment with the [facility]       Outcome:  Genetics appointment scheduled for 2/16 at 1:30 for a virtual visit    Personal/Family History Related to Appointment:  Family history of maternal aunt with inflammatory breast at 35  Paternal grandmother with history of breast cancer  Mom with lump in breast, not biopsies  Family history of leukemia    History of Genetic Testing:  Patient reports no personal or family history of genetic testing    Genetics Family History Questionnaire:  I confirmed the patient's e-mail on file as the best e-mail to send an invite link for our genetics family history intake

## 2022-11-04 NOTE — PRE-PROCEDURE INSTRUCTIONS
My Surgical Experience    The following information was developed to assist you to prepare for your operation  What do I need to do before coming to the hospital?  • Arrange for a responsible person to drive you to and from the hospital   • Arrange care for your children at home  Children are not allowed in the recovery areas of the hospital  • Plan to wear clothing that is easy to put on and take off  If you are having shoulder surgery, wear a shirt that buttons or zippers in the front  Bathing  o Shower the evening before and the morning of your surgery with an antibacterial soap  Please refer to the Pre Op Showering Instructions for Surgery Patients Sheet   o Remove nail polish and all body piercing jewelry  o Do not shave any body part for at least 24 hours before surgery-this includes face, arms, legs and upper body  Food  o Nothing to eat or drink after midnight the night before your surgery  This includes candy and chewing gum  o Exception: If your surgery is after 12:00pm (noon), you may have clear liquids such as 7-Up®, ginger ale, apple or cranberry juice, Jell-O®, water, or clear broth until 8:00 am  o Do not drink milk or juice with pulp on the morning before surgery  o Do not drink alcohol 24 hours before surgery  Medicine  o Follow instructions you received from your surgeon about which medicines you may take on the day of surgery  o If instructed to take medicine on the morning of surgery, take pills with just a small sip of water  Call your prescribing doctor for specific infroamtion on what to do if you take insulin    What should I bring to the hospital?    Bring:  • Crutches or a walker, if you have them, for foot or knee surgery  • A list of the daily medicines, vitamins, minerals, herbals and nutritional supplements you take   Include the dosages of medicines and the time you take them each day  • Glasses, dentures or hearing aids  • Minimal clothing; you will be wearing hospital sleepwear  • Photo ID; required to verify your identity  • If you have a Living Will or Power of , bring a copy of the documents  • If you have an ostomy, bring an extra pouch and any supplies you use    Do not bring  • Medicines or inhalers  • Money, valuables or jewelry    What other information should I know about the day of surgery? • Notify your surgeons if you develop a cold, sore throat, cough, fever, rash or any other illness  • Report to the Ambulatory Surgical/Same Day Surgery Unit  • You will be instructed to stop at Registration only if you have not been pre-registered  • Inform your  fi they do not stay that they will be asked by the staff to leave a phone number where they can be reached  • Be available to be reached before surgery  In the event the operating room schedule changes, you may be asked to come in earlier or later than expected    *It is important to tell your doctor and others involved in your health care if you are taking or have been taking any non-prescription drugs, vitamins, minerals, herbals or other nutritional supplements  Any of these may interact with some food or medicines and cause a reaction      Pre-Surgery Instructions:   Medication Instructions   • albuterol (PROVENTIL HFA,VENTOLIN HFA) 90 mcg/act inhaler Uses PRN- OK to take day of surgery   • ARIPiprazole (ABILIFY) 10 mg tablet Take day of surgery  • baclofen 10 mg tablet Hold day of surgery  • citalopram (CeleXA) 10 mg tablet Take day of surgery  • ergocalciferol (VITAMIN D2) 50,000 units Hold day of surgery  • famotidine (PEPCID) 40 MG tablet Take night before surgery   • ferrous sulfate 324 (65 Fe) mg Hold day of surgery  • lamoTRIgine (LaMICtal) 200 MG tablet Take day of surgery  • lamoTRIgine (LaMICtal) 25 mg tablet Take day of surgery  • levETIRAcetam (KEPPRA) 750 mg tablet Take day of surgery  • levothyroxine (Synthroid) 25 mcg tablet Take day of surgery     • meloxicam (Mobic) 15 mg tablet Stop taking 7 days prior to surgery  • metFORMIN (GLUCOPHAGE-XR) 750 mg 24 hr tablet Hold day of surgery  • naproxen (NAPROSYN) 500 mg tablet Stop taking 7 days prior to surgery  • omeprazole (PriLOSEC) 40 MG capsule Take day of surgery     • ondansetron (ZOFRAN) 4 mg tablet Uses PRN- OK to take day of surgery   • traZODone (DESYREL) 50 mg tablet Take night before surgery

## 2022-11-05 DIAGNOSIS — E03.9 HYPOTHYROIDISM, UNSPECIFIED TYPE: ICD-10-CM

## 2022-11-05 DIAGNOSIS — D50.9 IRON DEFICIENCY ANEMIA, UNSPECIFIED IRON DEFICIENCY ANEMIA TYPE: ICD-10-CM

## 2022-11-05 DIAGNOSIS — E55.9 VITAMIN D DEFICIENCY: ICD-10-CM

## 2022-11-05 RX ORDER — ERGOCALCIFEROL 1.25 MG/1
CAPSULE ORAL
Qty: 8 CAPSULE | Refills: 0 | OUTPATIENT
Start: 2022-11-05

## 2022-11-07 RX ORDER — FERROUS SULFATE TAB EC 324 MG (65 MG FE EQUIVALENT) 324 (65 FE) MG
TABLET DELAYED RESPONSE ORAL
Qty: 60 TABLET | Refills: 2 | Status: SHIPPED | OUTPATIENT
Start: 2022-11-07

## 2022-11-07 RX ORDER — LEVOTHYROXINE SODIUM 0.03 MG/1
TABLET ORAL
Qty: 30 TABLET | Refills: 2 | Status: SHIPPED | OUTPATIENT
Start: 2022-11-07

## 2022-11-10 ENCOUNTER — ANESTHESIA EVENT (OUTPATIENT)
Dept: PERIOP | Facility: HOSPITAL | Age: 32
End: 2022-11-10

## 2022-11-10 ENCOUNTER — HOSPITAL ENCOUNTER (OUTPATIENT)
Facility: HOSPITAL | Age: 32
Setting detail: OUTPATIENT SURGERY
Discharge: HOME | End: 2022-11-10
Attending: OTOLARYNGOLOGY | Admitting: OTOLARYNGOLOGY

## 2022-11-10 ENCOUNTER — ANESTHESIA (OUTPATIENT)
Dept: PERIOP | Facility: HOSPITAL | Age: 32
End: 2022-11-10

## 2022-11-10 VITALS
TEMPERATURE: 98.6 F | DIASTOLIC BLOOD PRESSURE: 55 MMHG | BODY MASS INDEX: 53.88 KG/M2 | WEIGHT: 231.8 LBS | SYSTOLIC BLOOD PRESSURE: 107 MMHG | OXYGEN SATURATION: 100 % | HEART RATE: 76 BPM | RESPIRATION RATE: 18 BRPM

## 2022-11-10 DIAGNOSIS — M35.00 SICCA, UNSPECIFIED TYPE (HCC): ICD-10-CM

## 2022-11-10 LAB
EXT PREGNANCY TEST URINE: NEGATIVE
EXT. CONTROL: NORMAL
GLUCOSE SERPL-MCNC: 86 MG/DL (ref 65–140)

## 2022-11-10 RX ORDER — ONDANSETRON 2 MG/ML
4 INJECTION INTRAMUSCULAR; INTRAVENOUS ONCE AS NEEDED
Status: CANCELLED | OUTPATIENT
Start: 2022-11-10

## 2022-11-10 RX ORDER — ACETAMINOPHEN 325 MG/1
650 TABLET ORAL EVERY 6 HOURS PRN
Status: DISCONTINUED | OUTPATIENT
Start: 2022-11-10 | End: 2022-11-10 | Stop reason: HOSPADM

## 2022-11-10 RX ORDER — FENTANYL CITRATE/PF 50 MCG/ML
25 SYRINGE (ML) INJECTION
Status: CANCELLED | OUTPATIENT
Start: 2022-11-10

## 2022-11-10 RX ORDER — PROPOFOL 10 MG/ML
INJECTION, EMULSION INTRAVENOUS AS NEEDED
Status: DISCONTINUED | OUTPATIENT
Start: 2022-11-10 | End: 2022-11-10

## 2022-11-10 RX ORDER — KETAMINE HCL IN NACL, ISO-OSM 100MG/10ML
SYRINGE (ML) INJECTION AS NEEDED
Status: DISCONTINUED | OUTPATIENT
Start: 2022-11-10 | End: 2022-11-10

## 2022-11-10 RX ORDER — MAGNESIUM HYDROXIDE 1200 MG/15ML
LIQUID ORAL AS NEEDED
Status: DISCONTINUED | OUTPATIENT
Start: 2022-11-10 | End: 2022-11-10 | Stop reason: HOSPADM

## 2022-11-10 RX ORDER — MIDAZOLAM HYDROCHLORIDE 2 MG/2ML
INJECTION, SOLUTION INTRAMUSCULAR; INTRAVENOUS AS NEEDED
Status: DISCONTINUED | OUTPATIENT
Start: 2022-11-10 | End: 2022-11-10

## 2022-11-10 RX ORDER — LIDOCAINE HYDROCHLORIDE AND EPINEPHRINE 10; 10 MG/ML; UG/ML
INJECTION, SOLUTION INFILTRATION; PERINEURAL AS NEEDED
Status: DISCONTINUED | OUTPATIENT
Start: 2022-11-10 | End: 2022-11-10 | Stop reason: HOSPADM

## 2022-11-10 RX ORDER — SODIUM CHLORIDE, SODIUM LACTATE, POTASSIUM CHLORIDE, CALCIUM CHLORIDE 600; 310; 30; 20 MG/100ML; MG/100ML; MG/100ML; MG/100ML
125 INJECTION, SOLUTION INTRAVENOUS CONTINUOUS
Status: DISCONTINUED | OUTPATIENT
Start: 2022-11-10 | End: 2022-11-10 | Stop reason: HOSPADM

## 2022-11-10 RX ADMIN — SODIUM CHLORIDE, SODIUM LACTATE, POTASSIUM CHLORIDE, AND CALCIUM CHLORIDE: .6; .31; .03; .02 INJECTION, SOLUTION INTRAVENOUS at 13:00

## 2022-11-10 RX ADMIN — MIDAZOLAM 2 MG: 1 INJECTION INTRAMUSCULAR; INTRAVENOUS at 13:07

## 2022-11-10 RX ADMIN — PROPOFOL 20 MG: 10 INJECTION, EMULSION INTRAVENOUS at 13:10

## 2022-11-10 RX ADMIN — PROPOFOL 20 MG: 10 INJECTION, EMULSION INTRAVENOUS at 13:25

## 2022-11-10 RX ADMIN — MIDAZOLAM 2 MG: 1 INJECTION INTRAMUSCULAR; INTRAVENOUS at 13:02

## 2022-11-10 RX ADMIN — PROPOFOL 20 MG: 10 INJECTION, EMULSION INTRAVENOUS at 13:17

## 2022-11-10 RX ADMIN — PROPOFOL 20 MG: 10 INJECTION, EMULSION INTRAVENOUS at 13:07

## 2022-11-10 RX ADMIN — Medication 10 MG: at 13:07

## 2022-11-10 NOTE — OP NOTE
OPERATIVE REPORT  PATIENT NAME: John Ross    :  1990  MRN: [id number]  Pt Location: [facility] OR ROOM 02    SURGERY DATE: 11/10/2022    Surgeon(s) and Role:     * Maye Baez MD - Primary    Preop Diagnosis:  Sicca, unspecified type ([address] ) [M35 00]    Post-Op Diagnosis Codes:     * Sicca, unspecified type ([address] ) [M35 00]    Procedure(s) (LRB):  EXCISION BIOPSY SALVARY GLANDS LOWER LIP (N/A)    Specimen(s):  ID Type Source Tests Collected by Time Destination   1 : biopsy salvary glands lower lip Tissue Salivary Gland TISSUE EXAM Maye Baez MD 11/10/2022 1315        Estimated Blood Loss:   Minimal    Drains:  * No LDAs found *    Anesthesia Type:   IV Sedation with Anesthesia    Operative Indications:  Sicca, unspecified type ([facility] [address] ) [M35 00]      Operative Findings:  Sparse minor salivary glands    Complications:   None    Procedure and Technique:  After induction of intravenous sedation, the patient was draped in the usual sterile fashion  The lower lip was infiltrated with lidocaine 1% with epinephrine 1:100,000  A horizontal  incision was made along the lower lip, inside of the vermilion, with a #15-blade scalpel  Multiple minor salivary glands were dissected from surrounding tissue with tenotomy scissors, and harvested for biopsy  The wound was reapproximated with simple interrupted sutures of 4-0 chromic catgut     I was present for the entire procedure    Patient Disposition:  PACU         SIGNATURE: Maye Baez MD  DATE: November 10, 2022  TIME: 1:42 PM

## 2022-11-10 NOTE — INTERVAL H&P NOTE
H&P reviewed  After examining the patient I find no changes in the patients condition since the H&P had been written      Vitals:    11/10/22 1222   BP: 96/55   Pulse: 85   Resp: 18   Temp: 98 6 °F (37 °C)   SpO2: 98%

## 2022-11-10 NOTE — PERIOPERATIVE NURSING NOTE
Vitals wdl, Iv access removed, patient tolerate fluids, discharge instructions provided to patient, stated understanding, left floor via wheelchair, discharged to home

## 2022-11-10 NOTE — H&P (VIEW-ONLY)
Hx & Px- ENT   Stone Dey 33 y o  female MRN: [id number]  Unit/Bed#:  Encounter: [id number]      Assessment/Plan     Assessment:  Possible Sjogren's Disease  Plan:  Pt scheduled for minor salivary gland biopsy  History of Present Illness   Physician Requesting Consult: No att  providers found  Reason for Consult / Principal Problem: possible Sjogren's Disease  HPI: Stone Dey is a 33y o  year old female who presents with possible Sjogren's Disease      Consults    Review of Systems    Historical Information   Past Medical History:   Diagnosis Date   • Autism    • [place] esophagus    • [place]'s esophagus    • Depression    • Diabetes mellitus ([facility] [address] )    • Female infertility    • Gastric ulcer    • History of bronchitis    • Irregular menses    • Miscarriage    • Morbid obesity with BMI of 50 0-59 9, adult ([facility] [address] )    • Reflux esophagitis    • Seizures ([facility] [address] )     last one 7/13/22   • Sleep apnea     no CPAP     Past Surgical History:   Procedure Laterality Date   • EGD      with Bx due to barretts esophagus   • EYE SURGERY Bilateral     lazy eye repair   • [place] HYSTEROSCOPY,W/ENDO BX N/A 9/22/2021    Procedure: DILATATION AND CURETTAGE (D&C) WITH HYSTEROSCOPY;  Surgeon: Emily Rosa MD;  Location: [facility];  Service: Gynecology   • WISDOM TOOTH EXTRACTION       Social History   Social History     Substance and Sexual Activity   Alcohol Use Yes    Comment: occ     Social History     Substance and Sexual Activity   Drug Use Not Currently   • Types: Marijuana    Comment: about a couple times a week, quit June 2022     Social History     Tobacco Use   Smoking Status Never Smoker   Smokeless Tobacco Never Used     Family History: non-contributory    Meds/Allergies   all current active meds have been reviewed    Allergies   Allergen Reactions   • Haloperidol Other (See Comments)     Jaw locks up   • Penicillins Hives   • Pollen Extract Allergic Rhinitis       Objective     [unfilled]    Invasive Devices  Report Peripheral Intravenous Line  Duration           Peripheral IV 09/27/22 Left;Proximal;Ventral (anterior) Forearm 43 days                Physical Exam  Neck: no thyromegaly nor adenopathy  Nose: unremarkable  Oropharynx: unremarkable  Ears: unremarkable  Chest: CTA  Heart: RR, no murmur  Abd: soft, NT, nl BS  Lab Results: I have personally reviewed pertinent lab results  Imaging Studies: I have personally reviewed pertinent reports  EKG, Pathology, and Other Studies: I have personally reviewed pertinent reports

## 2022-11-10 NOTE — ANESTHESIA POSTPROCEDURE EVALUATION
Post-Op Assessment Note    CV Status:  Stable  Pain Score: 0    Pain management: adequate     Mental Status:  Alert   Hydration Status:  Stable   PONV Controlled:  None   Airway Patency:  Patent   Two or more mitigation strategies used for obstructive sleep apnea   Post Op Vitals Reviewed: Yes      Staff: CRNA         No complications documented      BP   93/51   Temp      Pulse 65   Resp 16   SpO2 99

## 2022-11-29 ENCOUNTER — OFFICE VISIT (OUTPATIENT)
Dept: GASTROENTEROLOGY | Facility: CLINIC | Age: 32
End: 2022-11-29

## 2022-11-29 VITALS
HEIGHT: 55 IN | DIASTOLIC BLOOD PRESSURE: 73 MMHG | BODY MASS INDEX: 53.92 KG/M2 | TEMPERATURE: 97.9 F | OXYGEN SATURATION: 97 % | SYSTOLIC BLOOD PRESSURE: 114 MMHG | WEIGHT: 233 LBS | HEART RATE: 102 BPM

## 2022-11-29 DIAGNOSIS — R13.14 PHARYNGOESOPHAGEAL DYSPHAGIA: ICD-10-CM

## 2022-11-29 DIAGNOSIS — K21.9 GASTROESOPHAGEAL REFLUX DISEASE WITHOUT ESOPHAGITIS: Primary | ICD-10-CM

## 2022-11-29 RX ORDER — PRAZOSIN HYDROCHLORIDE 2 MG/1
CAPSULE ORAL
COMMUNITY
Start: 2022-10-27

## 2022-11-29 NOTE — PROGRESS NOTES
[facility]  Ailyn Mcleod 33 y o  female MRN: [id number]            Assessment & Plan:    50-year-old female with morbid obesity, persistent reflux, regurgitation and dysphagia/globus sensation    1  Chronic GERD:  -recommend repeat EGD  -recommend 24 hour pH study  -continue twice daily PPI therapy, famotidine at night dietary modification    2  Dysphagia:  Suspect underlying esophageal dysmotility  -repeat EGD with been possible empiric dilation and biopsies for EoE  -schedule esophageal manometry    3  Regurgitation:  Again concern for underlying esophageal dysmotility contributing to her symptoms will evaluate as noted above      Margoth was seen today for gerd and difficulty swallowing  Diagnoses and all orders for this visit:    Gastroesophageal reflux disease without esophagitis  -     EGD; Future  -     Espoh manometry/24hr ph; Future    Pharyngoesophageal dysphagia  -     EGD; Future  -     Espoh manometry/24hr ph; Future    Other orders  -     Diet NPO; Sips with meds; Standing  -     Void on call to OR; Standing  -     Diet NPO; Sips with meds; Standing  -     Void on call to OR; Standing            _____________________________________________________________        CC: Follow-up    HPI:  Ailyn Mcleod is a 33 y  o female who is here for follow-up of GERD and regurgitation  50-year-old morbidly obese female, continues have reflux symptoms and regurgitation  Despite taking twice daily PPI therapy and famotidine at night  She has been very careful not to eat for 3 hours before bedtime  She has reflux symptoms about 2 to 3 days per week especially when she eats red meat and red sauces    She has regurgitation frequently with liquids, and has dysphagia which she describes as difficulty swallowing even her saliva and even some liquids which is more of a globus type sensation which happens on a son regular basis    She has a history of constipation has been taking Dulcolax to help move her bowels  She recently gained some weight  She has tried to modify her diet to lose weight, she has cut back soda  She does maintain about 3 hours between eating dinner and going to sleep  ROS:  The remainder of the ROS was negative except for the pertinent positives mentioned in HPI        Allergies: Haloperidol, Penicillins, and Pollen extract    Medications:   Current Outpatient Medications:   •  acetaminophen (TYLENOL) 500 mg tablet, Take 1 tablet (500 mg total) by mouth every 6 (six) hours as needed for mild pain, Disp: , Rfl: 0  •  albuterol (PROVENTIL HFA,VENTOLIN HFA) 90 mcg/act inhaler, Inhale 2 puffs every 6 (six) hours as needed for wheezing, Disp: , Rfl:   •  ARIPiprazole (ABILIFY) 10 mg tablet, Take 10 mg by mouth daily with lunch  , Disp: , Rfl:   •  baclofen 10 mg tablet, Take 1 tablet (10 mg total) by mouth every 8 (eight) hours as needed for muscle spasms, Disp: 30 tablet, Rfl: 0  •  citalopram (CeleXA) 10 mg tablet, Take 10 mg by mouth every morning , Disp: , Rfl:   •  ergocalciferol (VITAMIN D2) 50,000 units, Take 1 capsule (50,000 Units total) by mouth once a week, Disp: 8 capsule, Rfl: 0  •  famotidine (PEPCID) 40 MG tablet, Take 1 tablet (40 mg total) by mouth daily at bedtime, Disp: 30 tablet, Rfl: 5  •  ferrous sulfate 324 (65 Fe) mg, TAKE ONE TABLET BY MOUTH TWICE A DAY BEFORE MEALS (Patient taking differently: Take 324 mg by mouth daily before breakfast), Disp: 60 tablet, Rfl: 2  •  lamoTRIgine (LaMICtal) 200 MG tablet, Take 1 tablet (200 mg total) by mouth 2 (two) times a day Take with one 25 mg tablet for total dose of 225 mg , Disp: 180 tablet, Rfl: 3  •  lamoTRIgine (LaMICtal) 25 mg tablet, TAKE TWO TABLETS BY MOUTH TWICE A DAY, Disp: 180 tablet, Rfl: 1  •  levETIRAcetam (KEPPRA) 750 mg tablet, Take 2 tablets (1,500 mg total) by mouth every 12 (twelve) hours, Disp: 360 tablet, Rfl: 3  •  levothyroxine 25 mcg tablet, TAKE ONE TABLET BY MOUTH EVERY DAY IN THE EARLY MORNING, Disp: 30 tablet, Rfl: 2  •  meloxicam (Mobic) 15 mg tablet, Take 1 tablet (15 mg total) by mouth daily as needed for moderate pain, Disp: 30 tablet, Rfl: 0  •  metFORMIN (GLUCOPHAGE-XR) 750 mg 24 hr tablet, Take 1 tablet (750 mg total) by mouth daily with breakfast, Disp: 30 tablet, Rfl: 2  •  naproxen (NAPROSYN) 500 mg tablet, Take 1 tablet (500 mg total) by mouth 2 (two) times a day with meals, Disp: 20 tablet, Rfl: 0  •  omeprazole (PriLOSEC) 40 MG capsule, Take 1 capsule (40 mg total) by mouth in the morning and 1 capsule (40 mg total) in the evening  Take before meals  , Disp: 60 capsule, Rfl: 3  •  ondansetron (ZOFRAN) 4 mg tablet, Take 1 tablet (4 mg total) by mouth every 8 (eight) hours as needed for nausea or vomiting, Disp: 60 tablet, Rfl: 1  •  prazosin (MINIPRESS) 2 mg capsule, , Disp: , Rfl:   •  traZODone (DESYREL) 50 mg tablet, 25 mg, Disp: , Rfl:   •  docusate sodium (COLACE) 100 mg capsule, Take 1 capsule (100 mg total) by mouth 2 (two) times a day (Patient not taking: Reported on 10/6/2022), Disp: 60 capsule, Rfl: 5  •  lidocaine (Lidoderm) 5 %, Apply 1 patch topically daily Remove & Discard patch within 12 hours or as directed by MD (Patient not taking: Reported on 10/24/2022), Disp: 30 patch, Rfl: 0    Past Medical History:   Diagnosis Date   • Autism    • [place] esophagus    • [place]'s esophagus    • Depression    • Diabetes mellitus ([address] )    • Female infertility    • Gastric ulcer    • History of bronchitis    • Irregular menses    • Miscarriage    • Morbid obesity with BMI of 50 0-59 9, adult ([address] )    • Reflux esophagitis    • Seizures ([address] )     last one 7/13/22   • Sleep apnea     no CPAP       Past Surgical History:   Procedure Laterality Date   • EGD      with Bx due to barretts esophagus   • EYE SURGERY Bilateral     lazy eye repair   • [place] BIOPSY OF LIP N/A 11/10/2022    Procedure: EXCISION BIOPSY SALVARY GLANDS LOWER LIP;  Surgeon: Ford Felton MD;  Location: [facility];  Service: ENT   • [place] HYSTEROSCOPY,W/ENDO BX N/A 9/22/2021    Procedure: DILATATION AND CURETTAGE (D&C) WITH HYSTEROSCOPY;  Surgeon: Chepe Robb MD;  Location: [facility];  Service: Gynecology   • WISDOM TOOTH EXTRACTION         Family History   Problem Relation Age of Onset   • Bipolar disorder Mother    • Depression Mother    • Depression Father    • Diabetes Maternal Grandmother    • Thyroid disease Maternal Grandmother    • Hypertension Maternal Grandmother    • Heart disease Maternal Grandmother    • Diabetes Maternal Grandfather    • Diabetes Paternal Grandmother    • Breast cancer Paternal Grandmother    • Stroke Paternal Grandmother    • Diabetes Paternal Grandfather    • Breast cancer Maternal Aunt 35        bilateral   • Stomach cancer Maternal Aunt         reports that she has never smoked  She has never used smokeless tobacco  She reports current alcohol use  She reports that she does not currently use drugs after having used the following drugs: Marijuana        Physical Exam:    /73 (BP Location: Left arm, Patient Position: Sitting, Cuff Size: Standard)   Pulse 102   Temp 97 9 °F (36 6 °C)   Ht 4' 7" (1 397 m)   Wt 106 kg (233 lb)   SpO2 97%   BMI 54 15 kg/m²     Gen: wn/wd, NAD, morbidly obese  HEENT: anicteric, MMM, no cervical LAD  CVS: RRR, no m/r/g  CHEST: CTA b/l  ABD: +BS, soft, NT,ND, no hepatosplenomegaly  EXT:  Lower extremity edema  NEURO: aaox3  SKIN: NO rashes

## 2022-11-29 NOTE — PATIENT INSTRUCTIONS
Scheduled date of EGD(as of today): 01/13/23  Physician performing EGD: Cynthia Chaney  [facility]  Instructions reviewed with patient by: VIR[place]IA  Clearances: JEN

## 2022-11-29 NOTE — LETTER
November 29, 2022     [place] Janny, Hugh  [facility] [address] [facility] [address]    Patient: Constance Lockwood   YOB: 1990   Date of Visit: 11/29/2022       Dear Dr Malinda Vazquez:    Thank you for referring Constance Lockwood to me for evaluation  Below are my notes for this consultation  If you have questions, please do not hesitate to call me  I look forward to following your patient along with you  Sincerely,        Tano Nielsen MD        CC: No Recipients  Tano Nielsen MD  11/29/2022  4:37 PM  Sign when Signing Visit  [address] [facility]  Constance Lockwood 33 y o  female MRN: [id number]            Assessment & Plan:    80-year-old female with morbid obesity, persistent reflux, regurgitation and dysphagia/globus sensation    1  Chronic GERD:  -recommend repeat EGD  -recommend 24 hour pH study  -continue twice daily PPI therapy, famotidine at night dietary modification    2  Dysphagia:  Suspect underlying esophageal dysmotility  -repeat EGD with been possible empiric dilation and biopsies for EoE  -schedule esophageal manometry    3  Regurgitation:  Again concern for underlying esophageal dysmotility contributing to her symptoms will evaluate as noted above      Margoth was seen today for gerd and difficulty swallowing  Diagnoses and all orders for this visit:    Gastroesophageal reflux disease without esophagitis  -     EGD; Future  -     Espoh manometry/24hr ph; Future    Pharyngoesophageal dysphagia  -     EGD; Future  -     Espoh manometry/24hr ph; Future    Other orders  -     Diet NPO; Sips with meds; Standing  -     Void on call to OR; Standing  -     Diet NPO; Sips with meds; Standing  -     Void on call to OR; Standing            _____________________________________________________________        CC: Follow-up    HPI:  Constance Lockwood is a 33 y  o female who is here for follow-up of GERD and regurgitation    80-year-old morbidly obese female, continues have reflux symptoms and regurgitation  Despite taking twice daily PPI therapy and famotidine at night  She has been very careful not to eat for 3 hours before bedtime  She has reflux symptoms about 2 to 3 days per week especially when she eats red meat and red sauces  She has regurgitation frequently with liquids, and has dysphagia which she describes as difficulty swallowing even her saliva and even some liquids which is more of a globus type sensation which happens on a son regular basis    She has a history of constipation has been taking Dulcolax to help move her bowels  She recently gained some weight  She has tried to modify her diet to lose weight, she has cut back soda  She does maintain about 3 hours between eating dinner and going to sleep  ROS:  The remainder of the ROS was negative except for the pertinent positives mentioned in HPI        Allergies: Haloperidol, Penicillins, and Pollen extract    Medications:   Current Outpatient Medications:   •  acetaminophen (TYLENOL) 500 mg tablet, Take 1 tablet (500 mg total) by mouth every 6 (six) hours as needed for mild pain, Disp: , Rfl: 0  •  albuterol (PROVENTIL HFA,VENTOLIN HFA) 90 mcg/act inhaler, Inhale 2 puffs every 6 (six) hours as needed for wheezing, Disp: , Rfl:   •  ARIPiprazole (ABILIFY) 10 mg tablet, Take 10 mg by mouth daily with lunch  , Disp: , Rfl:   •  baclofen 10 mg tablet, Take 1 tablet (10 mg total) by mouth every 8 (eight) hours as needed for muscle spasms, Disp: 30 tablet, Rfl: 0  •  citalopram (CeleXA) 10 mg tablet, Take 10 mg by mouth every morning , Disp: , Rfl:   •  ergocalciferol (VITAMIN D2) 50,000 units, Take 1 capsule (50,000 Units total) by mouth once a week, Disp: 8 capsule, Rfl: 0  •  famotidine (PEPCID) 40 MG tablet, Take 1 tablet (40 mg total) by mouth daily at bedtime, Disp: 30 tablet, Rfl: 5  •  ferrous sulfate 324 (65 Fe) mg, TAKE ONE TABLET BY MOUTH TWICE A DAY BEFORE MEALS (Patient taking differently: Take 324 mg by mouth daily before breakfast), Disp: 60 tablet, Rfl: 2  •  lamoTRIgine (LaMICtal) 200 MG tablet, Take 1 tablet (200 mg total) by mouth 2 (two) times a day Take with one 25 mg tablet for total dose of 225 mg , Disp: 180 tablet, Rfl: 3  •  lamoTRIgine (LaMICtal) 25 mg tablet, TAKE TWO TABLETS BY MOUTH TWICE A DAY, Disp: 180 tablet, Rfl: 1  •  levETIRAcetam (KEPPRA) 750 mg tablet, Take 2 tablets (1,500 mg total) by mouth every 12 (twelve) hours, Disp: 360 tablet, Rfl: 3  •  levothyroxine 25 mcg tablet, TAKE ONE TABLET BY MOUTH EVERY DAY IN THE EARLY MORNING, Disp: 30 tablet, Rfl: 2  •  meloxicam (Mobic) 15 mg tablet, Take 1 tablet (15 mg total) by mouth daily as needed for moderate pain, Disp: 30 tablet, Rfl: 0  •  metFORMIN (GLUCOPHAGE-XR) 750 mg 24 hr tablet, Take 1 tablet (750 mg total) by mouth daily with breakfast, Disp: 30 tablet, Rfl: 2  •  naproxen (NAPROSYN) 500 mg tablet, Take 1 tablet (500 mg total) by mouth 2 (two) times a day with meals, Disp: 20 tablet, Rfl: 0  •  omeprazole (PriLOSEC) 40 MG capsule, Take 1 capsule (40 mg total) by mouth in the morning and 1 capsule (40 mg total) in the evening  Take before meals  , Disp: 60 capsule, Rfl: 3  •  ondansetron (ZOFRAN) 4 mg tablet, Take 1 tablet (4 mg total) by mouth every 8 (eight) hours as needed for nausea or vomiting, Disp: 60 tablet, Rfl: 1  •  prazosin (MINIPRESS) 2 mg capsule, , Disp: , Rfl:   •  traZODone (DESYREL) 50 mg tablet, 25 mg, Disp: , Rfl:   •  docusate sodium (COLACE) 100 mg capsule, Take 1 capsule (100 mg total) by mouth 2 (two) times a day (Patient not taking: Reported on 10/6/2022), Disp: 60 capsule, Rfl: 5  •  lidocaine (Lidoderm) 5 %, Apply 1 patch topically daily Remove & Discard patch within 12 hours or as directed by MD (Patient not taking: Reported on 10/24/2022), Disp: 30 patch, Rfl: 0    Past Medical History:   Diagnosis Date   • Autism    • [place] esophagus    • [place]'s esophagus    • Depression    • [facility]    • Female infertility    • Gastric ulcer    • History of bronchitis    • Irregular menses    • Miscarriage    • Morbid obesity with BMI of 50 0-59 9, adult ([facility] )    • Reflux esophagitis    • Seizures ([facility] )     last one 7/13/22   • Sleep apnea     no CPAP       Past Surgical History:   Procedure Laterality Date   • EGD      with Bx due to barretts esophagus   • EYE SURGERY Bilateral     lazy eye repair   • DE BIOPSY OF LIP N/A 11/10/2022    Procedure: EXCISION BIOPSY SALVARY GLANDS LOWER LIP;  Surgeon: Hattie Santos MD;  Location: [address] [place] [address];  Service: ENT   • DE HYSTEROSCOPY,W/ENDO BX N/A 9/22/2021    Procedure: DILATATION AND CURETTAGE (D&C) WITH HYSTEROSCOPY;  Surgeon: Yasmin Alexandra MD;  Location: [address] [place] [address];  Service: Gynecology   • WISDOM TOOTH EXTRACTION         Family History   Problem Relation Age of Onset   • Bipolar disorder Mother    • Depression Mother    • Depression Father    • Diabetes Maternal Grandmother    • Thyroid disease Maternal Grandmother    • Hypertension Maternal Grandmother    • Heart disease Maternal Grandmother    • Diabetes Maternal Grandfather    • Diabetes Paternal Grandmother    • Breast cancer Paternal Grandmother    • Stroke Paternal Grandmother    • Diabetes Paternal Grandfather    • Breast cancer Maternal Aunt 35        bilateral   • Stomach cancer Maternal Aunt         reports that she has never smoked  She has never used smokeless tobacco  She reports current alcohol use  She reports that she does not currently use drugs after having used the following drugs: Marijuana        Physical Exam:    /73 (BP Location: Left arm, Patient Position: Sitting, Cuff Size: Standard)   Pulse 102   Temp 97 9 °F (36 6 °C)   Ht 4' 7" (1 397 m)   Wt 106 kg (233 lb)   SpO2 97%   BMI 54 15 kg/m²     Gen: wn/wd, NAD, morbidly obese  HEENT: anicteric, MMM, no cervical LAD  CVS: RRR, no m/r/g  CHEST: CTA b/l  ABD: +BS, soft, NT,ND, no hepatosplenomegaly  EXT:  Lower extremity edema  NEURO: aaox3  SKIN: NO rashes

## 2022-12-08 ENCOUNTER — APPOINTMENT (OUTPATIENT)
Dept: LAB | Facility: HOSPITAL | Age: 32
End: 2022-12-08

## 2022-12-08 DIAGNOSIS — D50.8 IRON DEFICIENCY ANEMIA SECONDARY TO INADEQUATE DIETARY IRON INTAKE: ICD-10-CM

## 2022-12-08 DIAGNOSIS — K90.49 MALABSORPTION DUE TO INTOLERANCE, NOT ELSEWHERE CLASSIFIED: ICD-10-CM

## 2022-12-08 LAB
BASOPHILS # BLD AUTO: 0.11 THOUSANDS/ÂΜL (ref 0–0.1)
BASOPHILS NFR BLD AUTO: 2 % (ref 0–1)
EOSINOPHIL # BLD AUTO: 0.12 THOUSAND/ÂΜL (ref 0–0.61)
EOSINOPHIL NFR BLD AUTO: 2 % (ref 0–6)
ERYTHROCYTE [DISTWIDTH] IN BLOOD BY AUTOMATED COUNT: 16 % (ref 11.6–15.1)
HCT VFR BLD AUTO: 37.4 % (ref 34.8–46.1)
HGB BLD-MCNC: 12.5 G/DL (ref 11.5–15.4)
IMM GRANULOCYTES # BLD AUTO: 0.02 THOUSAND/UL (ref 0–0.2)
IMM GRANULOCYTES NFR BLD AUTO: 0 % (ref 0–2)
LYMPHOCYTES # BLD AUTO: 1.24 THOUSANDS/ÂΜL (ref 0.6–4.47)
LYMPHOCYTES NFR BLD AUTO: 19 % (ref 14–44)
MCH RBC QN AUTO: 29.7 PG (ref 26.8–34.3)
MCHC RBC AUTO-ENTMCNC: 33.4 G/DL (ref 31.4–37.4)
MCV RBC AUTO: 89 FL (ref 82–98)
MONOCYTES # BLD AUTO: 0.38 THOUSAND/ÂΜL (ref 0.17–1.22)
MONOCYTES NFR BLD AUTO: 6 % (ref 4–12)
NEUTROPHILS # BLD AUTO: 4.75 THOUSANDS/ÂΜL (ref 1.85–7.62)
NEUTS SEG NFR BLD AUTO: 71 % (ref 43–75)
NRBC BLD AUTO-RTO: 0 /100 WBCS
PLATELET # BLD AUTO: 346 THOUSANDS/UL (ref 149–390)
PMV BLD AUTO: 8.9 FL (ref 8.9–12.7)
RBC # BLD AUTO: 4.21 MILLION/UL (ref 3.81–5.12)
WBC # BLD AUTO: 6.62 THOUSAND/UL (ref 4.31–10.16)

## 2022-12-09 LAB
ALBUMIN SERPL BCP-MCNC: 3.2 G/DL (ref 3.5–5)
ALP SERPL-CCNC: 117 U/L (ref 46–116)
ALT SERPL W P-5'-P-CCNC: 28 U/L (ref 12–78)
ANION GAP SERPL CALCULATED.3IONS-SCNC: 7 MMOL/L (ref 4–13)
AST SERPL W P-5'-P-CCNC: 21 U/L (ref 5–45)
BILIRUB SERPL-MCNC: 0.12 MG/DL (ref 0.2–1)
BUN SERPL-MCNC: 17 MG/DL (ref 5–25)
CALCIUM ALBUM COR SERPL-MCNC: 9.6 MG/DL (ref 8.3–10.1)
CALCIUM SERPL-MCNC: 9 MG/DL (ref 8.3–10.1)
CHLORIDE SERPL-SCNC: 107 MMOL/L (ref 96–108)
CO2 SERPL-SCNC: 24 MMOL/L (ref 21–32)
CREAT SERPL-MCNC: 0.62 MG/DL (ref 0.6–1.3)
FERRITIN SERPL-MCNC: 87 NG/ML (ref 8–388)
FOLATE SERPL-MCNC: 15.2 NG/ML (ref 3.1–17.5)
GFR SERPL CREATININE-BSD FRML MDRD: 120 ML/MIN/1.73SQ M
GLUCOSE SERPL-MCNC: 102 MG/DL (ref 65–140)
IRON SATN MFR SERPL: 17 % (ref 15–50)
IRON SERPL-MCNC: 48 UG/DL (ref 50–170)
POTASSIUM SERPL-SCNC: 3.7 MMOL/L (ref 3.5–5.3)
PROT SERPL-MCNC: 7.4 G/DL (ref 6.4–8.4)
SODIUM SERPL-SCNC: 138 MMOL/L (ref 135–147)
TIBC SERPL-MCNC: 288 UG/DL (ref 250–450)
VIT B12 SERPL-MCNC: 512 PG/ML (ref 100–900)

## 2022-12-14 ENCOUNTER — OFFICE VISIT (OUTPATIENT)
Dept: HEMATOLOGY ONCOLOGY | Facility: MEDICAL CENTER | Age: 32
End: 2022-12-14

## 2022-12-14 VITALS
TEMPERATURE: 97.6 F | HEIGHT: 55 IN | HEART RATE: 89 BPM | OXYGEN SATURATION: 98 % | SYSTOLIC BLOOD PRESSURE: 128 MMHG | BODY MASS INDEX: 55.31 KG/M2 | RESPIRATION RATE: 18 BRPM | DIASTOLIC BLOOD PRESSURE: 62 MMHG | WEIGHT: 239 LBS

## 2022-12-14 DIAGNOSIS — E66.01 MORBID OBESITY (HCC): Primary | Chronic | ICD-10-CM

## 2022-12-14 DIAGNOSIS — D50.8 IRON DEFICIENCY ANEMIA SECONDARY TO INADEQUATE DIETARY IRON INTAKE: ICD-10-CM

## 2022-12-14 NOTE — PROGRESS NOTES
Margoth Cook  1990  [facility] [place] [address] [address] [facility] [id number]  HEMATOLOGY/ONCOLOGY CONSULTATION REPORT    DISCUSSION/SUMMARY:    43-year-old female with anemia  Recent workup was consistent with iron deficiency anemia  Patient has trouble tolerating oral iron supplements  Mrs Amanda Oconnor was symptomatic and patient was recently treated with IV iron  Patient states feeling better, closer to baseline  Symptoms associated with the prior anemia are gone  CBC parameters iron studies are better  Patient will continue with an oral iron supplement once a day monitoring for GI side effects  Bariatric surgery follow-up is pending - surgery still needs to be scheduled  We rediscussed what to monitor for as far as progressive fatigue, decreased activities, chewing ice, restless legs, respiratory issues, dizziness etc  Mrs Amanda Oconnor also understands that after she has gastric bypass, the iron deficiency anemia may become more of an issue  Patient is to return to the office in 6 months with blood work before  Mrs Amanda Oconnor knows to call the hematology/oncology office if there are any other questions or concerns  Carefully review your medication list and verify that the list is accurate and up-to-date  Please call the hematology/oncology office if there are medications missing from the list, medications on the list that you are not currently taking or if there is a dosage or instruction that is different from how you're taking that medication      Patient goals and areas of care: Monitor CBC and iron studies  Barriers to care:  None   Patient is able to self-care   ______________________________________________________________________________________    Chief Complaint   Patient presents with   • Follow-up     PCOS (polycystic ovarian syndrome   • History of iron deficiency anemia     History of Present Illness:  43-year-old female previously referred for evaluation of anemia  Mrs Silvia Arora is being scheduled for gastric bypass surgery  Patient has anemia  Prior workup was consistent with iron deficiency and patient had been placed on oral iron supplements  Patient was not able to tolerate the oral iron supplements  Previously we discussed options and patient was treated with IV iron  Mrs Qiu states feeling much better  Activities have improved  No chewing ice or restless legs  No headaches, blurred vision or dizziness but activities are better  Concentration has improved  No GI or  issues or bleeding  Patient has PCOS and has irregular and at times heavy menstrual periods  Patient states that her mother and grandmother had anemia problems and required transfusions routinely  Specifics not presently available  Patient is unaware of any family members with thalassemia  Review of Systems   Constitutional: Positive for fatigue  HENT: Negative  Eyes: Negative  Respiratory: Negative  Cardiovascular: Negative  Gastrointestinal: Negative  Endocrine: Negative  Genitourinary: Negative  Musculoskeletal: Negative  Skin: Negative  Allergic/Immunologic: Negative  Neurological: Negative  Hematological: Negative  Psychiatric/Behavioral: Negative  All other systems reviewed and are negative      Patient Active Problem List   Diagnosis   • Depression with anxiety   • Nonintractable [facility] without status epileptic[facility]   • TSH elevation   • History of irregular menstrual bleeding   • PCOS (polycystic ovarian syndrome)   • Endometrial polyp   • Infertility, female   • [place]'s esophagus   • Gastric ulcer   • GARIMA (obstructive sleep apnea)   • Female infertility   • Autism   • Bipolar depression ([facility] )   • Morbid obesity ([facility] )   • Class 3 severe obesity due to excess calories with serious comorbidity and body mass index (BMI) of 50 0 to 59 9 in [facility])   • Gastroesophageal reflux disease   • Excessive daytime sleepiness   • Iron deficiency anemia secondary to inadequate dietary iron intake   • Right upper limb pain   • Constipation   • Dysphagia   • Hypothyroidism     Past Medical History:   Diagnosis Date   • Autism    • [place] esophagus    • [place]'s esophagus    • Depression    • Diabetes mellitus ([facility] )    • Female infertility    • Gastric ulcer    • History of bronchitis    • Irregular menses    • Miscarriage    • Morbid obesity with BMI of 50 0-59 9, adult ([facility] )    • Reflux esophagitis    • Seizures ([facility] )     last one 7/13/22   • Sleep apnea     no CPAP     Past Surgical History:   Procedure Laterality Date   • EGD      with Bx due to barretts esophagus   • EYE SURGERY Bilateral     lazy eye repair   • NV BIOPSY OF LIP N/A 11/10/2022    Procedure: EXCISION BIOPSY SALVARY GLANDS LOWER LIP;  Surgeon: Jessika Vences MD;  Location: [address] [place] [address];  Service: ENT   • NV HYSTEROSCOPY,W/ENDO BX N/A 9/22/2021    Procedure: DILATATION AND CURETTAGE (D&C) WITH HYSTEROSCOPY;  Surgeon: Deandre Curtis MD;  Location: [address] [place] [address];  Service: Gynecology   • WISDOM TOOTH EXTRACTION     Past surgical history:  No prior blood transfusions    Family History   Problem Relation Age of Onset   • Bipolar disorder Mother    • Depression Mother    • Depression Father    • Diabetes Maternal Grandmother    • Thyroid disease Maternal Grandmother    • Hypertension Maternal Grandmother    • Heart disease Maternal Grandmother    • Diabetes Maternal Grandfather    • Diabetes Paternal Grandmother    • Breast cancer Paternal Grandmother    • Stroke Paternal Grandmother    • Diabetes Paternal Grandfather    • Breast cancer Maternal Aunt 35        bilateral   • Stomach cancer Maternal Aunt    Family history:  No known familial or genetic diseases, no children    Social History     Socioeconomic History   • Marital status: Single     Spouse name: Not on file   • Number of children: Not on file   • Years of education: Not on file   • Highest education level: Not on file   Occupational History   • Not on file   Tobacco Use   • Smoking status: Never   • Smokeless tobacco: Never   Vaping Use   • Vaping Use: Never used   Substance and Sexual Activity   • Alcohol use: Yes     Comment: occ   • Drug use: Not Currently     Types: Marijuana     Comment: about a couple times a week, quit June 2022   • Sexual activity: Yes     Partners: Male     Birth control/protection: None     Comment: Trying to conceive for 2 years now   Other Topics Concern   • Not on file   Social History Narrative   • Not on file     Social Determinants of Health     Financial Resource Strain: Not on file   Food Insecurity: Not on file   Transportation Needs: Not on file   Physical Activity: Not on file   Stress: Not on file   Social Connections: Not on file   Intimate Partner Violence: Not on file   Housing Stability: Not on file   Social history: Presently not working, no toxic exposure, no drug, alcohol or tobacco    Current Outpatient Medications:   •  albuterol (PROVENTIL HFA,VENTOLIN HFA) 90 mcg/act inhaler, Inhale 2 puffs every 6 (six) hours as needed for wheezing, Disp: , Rfl:   •  ARIPiprazole (ABILIFY) 10 mg tablet, Take 10 mg by mouth daily with lunch  , Disp: , Rfl:   •  baclofen 10 mg tablet, Take 1 tablet (10 mg total) by mouth every 8 (eight) hours as needed for muscle spasms, Disp: 30 tablet, Rfl: 0  •  citalopram (CeleXA) 10 mg tablet, Take 10 mg by mouth every morning , Disp: , Rfl:   •  ergocalciferol (VITAMIN D2) 50,000 units, Take 1 capsule (50,000 Units total) by mouth once a week, Disp: 8 capsule, Rfl: 0  •  famotidine (PEPCID) 40 MG tablet, Take 1 tablet (40 mg total) by mouth daily at bedtime, Disp: 30 tablet, Rfl: 5  •  ferrous sulfate 324 (65 Fe) mg, TAKE ONE TABLET BY MOUTH TWICE A DAY BEFORE MEALS (Patient taking differently: Take 324 mg by mouth daily before breakfast), Disp: 60 tablet, Rfl: 2  •  lamoTRIgine (LaMICtal) 200 MG tablet, Take 1 tablet (200 mg total) by mouth 2 (two) times a day Take with one 25 mg tablet for total dose of 225 mg , Disp: 180 tablet, Rfl: 3  •  lamoTRIgine (LaMICtal) 25 mg tablet, TAKE TWO TABLETS BY MOUTH TWICE A DAY, Disp: 180 tablet, Rfl: 1  •  levETIRAcetam (KEPPRA) 750 mg tablet, Take 2 tablets (1,500 mg total) by mouth every 12 (twelve) hours, Disp: 360 tablet, Rfl: 3  •  levothyroxine 25 mcg tablet, TAKE ONE TABLET BY MOUTH EVERY DAY IN THE EARLY MORNING, Disp: 30 tablet, Rfl: 2  •  metFORMIN (GLUCOPHAGE-XR) 750 mg 24 hr tablet, Take 1 tablet (750 mg total) by mouth daily with breakfast, Disp: 30 tablet, Rfl: 2  •  naproxen (NAPROSYN) 500 mg tablet, Take 1 tablet (500 mg total) by mouth 2 (two) times a day with meals, Disp: 20 tablet, Rfl: 0  •  omeprazole (PriLOSEC) 40 MG capsule, Take 1 capsule (40 mg total) by mouth in the morning and 1 capsule (40 mg total) in the evening  Take before meals  , Disp: 60 capsule, Rfl: 3  •  ondansetron (ZOFRAN) 4 mg tablet, Take 1 tablet (4 mg total) by mouth every 8 (eight) hours as needed for nausea or vomiting, Disp: 60 tablet, Rfl: 1  •  traZODone (DESYREL) 50 mg tablet, 25 mg, Disp: , Rfl:   •  acetaminophen (TYLENOL) 500 mg tablet, Take 1 tablet (500 mg total) by mouth every 6 (six) hours as needed for mild pain (Patient not taking: Reported on 12/14/2022), Disp: , Rfl: 0  •  docusate sodium (COLACE) 100 mg capsule, Take 1 capsule (100 mg total) by mouth 2 (two) times a day (Patient not taking: Reported on 10/6/2022), Disp: 60 capsule, Rfl: 5  •  lidocaine (Lidoderm) 5 %, Apply 1 patch topically daily Remove & Discard patch within 12 hours or as directed by MD (Patient not taking: Reported on 10/24/2022), Disp: 30 patch, Rfl: 0  •  meloxicam (Mobic) 15 mg tablet, Take 1 tablet (15 mg total) by mouth daily as needed for moderate pain (Patient not taking: Reported on 12/14/2022), Disp: 30 tablet, Rfl: 0  •  prazosin (MINIPRESS) 2 mg capsule, , Disp: , Rfl:     Allergies Allergen Reactions   • Haloperidol Other (See Comments)     Jaw locks up   • Penicillins Hives   • Pollen Extract Allergic Rhinitis       Vitals:    12/14/22 0915   BP: 128/62   Pulse: 89   Resp: 18   Temp: 97 6 °F (36 4 °C)   SpO2: 98%     Physical Exam  Constitutional:       Appearance: She is well-developed  Comments: Obese young female, no respiratory distress, no signs of pain   HENT:      Head: Normocephalic and atraumatic  Right Ear: External ear normal       Left Ear: External ear normal    Eyes:      Conjunctiva/sclera: Conjunctivae normal       Pupils: Pupils are equal, round, and reactive to light  Cardiovascular:      Rate and Rhythm: Normal rate and regular rhythm  Heart sounds: Normal heart sounds  Pulmonary:      Effort: Pulmonary effort is normal       Breath sounds: Normal breath sounds  Comments: Clear bilaterally  Abdominal:      General: Bowel sounds are normal       Palpations: Abdomen is soft  Comments: +bowel sounds, nontender, soft, no guarding, can not palpate liver or spleen   Musculoskeletal:         General: Normal range of motion  Cervical back: Normal range of motion and neck supple  Skin:     General: Skin is warm  Comments: Good color, warm, moist, no petechiae or ecchymoses   Neurological:      Mental Status: She is alert and oriented to person, place, and time  Deep Tendon Reflexes: Reflexes are normal and symmetric  Psychiatric:         Behavior: Behavior normal          Thought Content:  Thought content normal          Judgment: Judgment normal      Extremities:  Minimal bilateral lower extremity edema, no cords, pulses are 1+  Lymphatics:  No adenopathy in the neck, supraclavicular region, axilla bilaterally    Labs                07/29/2022 BUN = 15 creatinine = 0 66 calcium = 8 9 LFTs WNL TSH = 3 073 iron = 37 = decreased iron saturation = 9 = decreased TIBC = 416 ferritin = 8 (8-388 ng/mL)

## 2022-12-15 ENCOUNTER — HOSPITAL ENCOUNTER (OUTPATIENT)
Dept: RADIOLOGY | Facility: HOSPITAL | Age: 32
Discharge: HOME | End: 2022-12-15
Attending: INTERNAL MEDICINE

## 2022-12-15 DIAGNOSIS — K21.9 GASTROESOPHAGEAL REFLUX DISEASE WITHOUT ESOPHAGITIS: ICD-10-CM

## 2022-12-15 DIAGNOSIS — R13.19 ESOPHAGEAL DYSPHAGIA: ICD-10-CM

## 2022-12-19 DIAGNOSIS — G40.909 NONINTRACTABLE EPILEPSY WITHOUT STATUS EPILEPTICUS, UNSPECIFIED EPILEPSY TYPE (HCC): ICD-10-CM

## 2022-12-19 RX ORDER — LAMOTRIGINE 25 MG/1
TABLET ORAL
Qty: 180 TABLET | Refills: 1 | Status: SHIPPED | OUTPATIENT
Start: 2022-12-19

## 2022-12-19 NOTE — TELEPHONE ENCOUNTER
Please encourage patient to have lamotrigine and levetiracetam levels collected as ordered previously   Also remind her of appointment with Dr Vinay Jones taking place in Feb

## 2022-12-21 ENCOUNTER — TELEPHONE (OUTPATIENT)
Dept: GASTROENTEROLOGY | Facility: HOSPITAL | Age: 32
End: 2022-12-21

## 2023-01-03 ENCOUNTER — OFFICE VISIT (OUTPATIENT)
Dept: URGENT CARE | Facility: CLINIC | Age: 33
End: 2023-01-03

## 2023-01-03 ENCOUNTER — HOSPITAL ENCOUNTER (OUTPATIENT)
Dept: GASTROENTEROLOGY | Facility: HOSPITAL | Age: 33
Discharge: HOME | End: 2023-01-03
Attending: INTERNAL MEDICINE

## 2023-01-03 VITALS
HEART RATE: 99 BPM | TEMPERATURE: 97.8 F | HEIGHT: 55 IN | OXYGEN SATURATION: 98 % | RESPIRATION RATE: 20 BRPM | BODY MASS INDEX: 55.31 KG/M2 | WEIGHT: 239 LBS

## 2023-01-03 VITALS
RESPIRATION RATE: 16 BRPM | TEMPERATURE: 98.3 F | OXYGEN SATURATION: 98 % | SYSTOLIC BLOOD PRESSURE: 115 MMHG | DIASTOLIC BLOOD PRESSURE: 65 MMHG | HEART RATE: 76 BPM

## 2023-01-03 DIAGNOSIS — R13.14 PHARYNGOESOPHAGEAL DYSPHAGIA: ICD-10-CM

## 2023-01-03 DIAGNOSIS — K21.9 GASTROESOPHAGEAL REFLUX DISEASE WITHOUT ESOPHAGITIS: ICD-10-CM

## 2023-01-03 DIAGNOSIS — M72.2 PLANTAR FASCIITIS: Primary | ICD-10-CM

## 2023-01-03 RX ORDER — NAPROXEN 500 MG/1
500 TABLET ORAL 2 TIMES DAILY WITH MEALS
Qty: 30 TABLET | Refills: 0 | Status: SHIPPED | OUTPATIENT
Start: 2023-01-03

## 2023-01-03 NOTE — PROGRESS NOTES
[facility]        NAME: Ryan Pino is a 33 y o  female  : 1990    MRN: [id number]  DATE: January 3, 2023  TIME: 6:02 PM    Assessment and Plan   Plantar fasciitis [M72 2]  1  Plantar fasciitis  naproxen (Naprosyn) 500 mg tablet            Patient Instructions   Patient Instructions     Plantar Fasciitis   WHAT YOU NEED TO KNOW:   PF is swelling of the plantar fascia  The plantar fascia is a thick band of tissue that connects your heel bone to your toes  This part of your foot helps support the arch of your foot and absorbs shock  DISCHARGE INSTRUCTIONS:   Call your doctor if:   · Your pain or swelling suddenly increase  · You develop knee, hip, or back pain  · You have questions or concerns about your condition or care  Medicines: You may  need any of the following:  · Acetaminophen  decreases pain and fever  It is available without a doctor's order  Ask how much to take and how often to take it  Follow directions  Read the labels of all other medicines you are using to see if they also contain acetaminophen, or ask your doctor or pharmacist  Acetaminophen can cause liver damage if not taken correctly  Do not use more than 4 grams (4,000 milligrams) total of acetaminophen in one day  · NSAIDs , such as ibuprofen, help decrease swelling, pain, and fever  NSAIDs can cause stomach bleeding or kidney problems in certain people  If you take blood thinner medicine, always ask your healthcare provider if NSAIDs are safe for you  Always read the medicine label and follow directions  · Take your medicine as directed  Contact your healthcare provider if you think your medicine is not helping or if you have side effects  Tell him of her if you are allergic to any medicine  Keep a list of the medicines, vitamins, and herbs you take  Include the amounts, and when and why you take them  Bring the list or the pill bottles to follow-up visits   Carry your medicine list with you in case of an emergency  Self-care:   · Wear your splint or shoe inserts as directed  You may need to wear a splint at night to keep your foot stretched while you sleep  This will help prevent sharp pain first thing in the morning  Shoe inserts will help decrease stress on your plantar fascia when you walk or exercise  · Apply ice on your plantar fascia  Ice helps prevent tissue damage and decreases swelling and pain  Fill a water bottle with water and freeze it  Wrap a towel around the bottle or cover it with a pillow case  Roll the water bottle under your foot for 10 minutes in the morning and after work  · Massage your plantar fascia as directed  This may help decrease swelling and pain  Roll a golf ball under your foot for 10 minutes  Repeat 3 times each day  · Go to physical therapy as directed  A physical therapist teaches you exercises to help improve movement and strength, and to decrease pain  Prevent plantar fasciitis:   · Maintain a healthy weight  This will help decrease stress on your feet  Ask your healthcare provider how much you should weigh  Ask him to help you create a weight loss plan if you are overweight  · Do low-impact exercises  Low-impact exercises decrease stress on your plantar fascia  Examples include swimming or bicycling  · Start new activities slowly  Increase the intensity and time gradually  · Wear shoes that fit well and support your arch  Replace your shoes before the padding or shock absorption wears out  Do not walk or  bare feet or sandals for long periods of time  Follow up with your doctor as directed:  Write down your questions so you remember to ask them during your visits  © Copyright [employer] 2022 Information is for End User's use only and may not be sold, redistributed or otherwise used for commercial purposes   All illustrations and images included in CareNotes® are the copyrighted property of [employer]  or [employer]  The above information is an  only  It is not intended as medical advice for individual conditions or treatments  Talk to your doctor, nurse or pharmacist before following any medical regimen to see if it is safe and effective for you  Plantar Fasciitis Exercises   WHAT YOU NEED TO KNOW:   Plantar fasciitis exercises help stretch your plantar fascia, calf muscles, and Achilles tendon  They also help strengthen the muscles that support your heel and foot  Exercises and stretching can help prevent plantar fasciitis from getting worse or coming back  DISCHARGE INSTRUCTIONS:   Contact your healthcare provider if:   · Your pain and swelling increase  · You develop new knee, hip, or back pain  · You have questions or concerns about your condition or care  How to do plantar fasciitis exercises:  Ask your healthcare provider when to start these exercises and how often to do them  · Slant board stretch:  Stand on a slanted board with your toes higher than your heel  Press your heel into the board  Keep your knee slightly bent  Hold this position for 1 minute  Repeat 5 times  · Heel stretch:  Stand up straight with your hands on a wall  Place your injured leg slightly behind your other leg  Keep your heels flat on the floor, lean forward, and bend both knees  Hold for 30 seconds  · Calf stretch:  Stand up straight with your hands on a wall  Step forward so that your uninjured foot is in front of your injured foot  Keep your front leg bent and your back leg straight  Gently lean forward until you feel your calf stretch  Hold for 30 seconds and then relax  · Seated plantar fascia stretch:  Sit on a firm surface, such as the floor or a mat  Extend your legs out in front of you  Raise your injured foot a few inches off the ground  Keep your leg straight  Grab the toes of your injured foot and pull them toward you  With your other hand, feel your plantar fascia   You should feel it press outward  Hold for 30 seconds  If you cannot reach your toes, loop a towel or tie around your foot  Gently pull on the towel or tie and flex your toes toward you  · Heel raises:  Stand on the injured leg  Raise your other leg off the ground  Hold onto a railing or wall for balance  Slowly rise up on the toes of your injured leg  Hold for 5 seconds  Slowly lower your heel to the ground  · Toe curls:  Place a towel on the floor  Put your foot flat on the towel  Grab the towel with your toes by curling them around the towel  Lift the towel up with your toes  · Toe taps:  Sit down and place your foot flat on the floor  Keep your heel on the floor  Point all your toes up toward the ceiling  While the 4 smaller toes are pointed up, bend your big toe down and tap it on the ground  Do 10 to 50 taps  Point all 5 toes up toward the ceiling again  This time keep your big toe pointed up and tap the 4 smaller toes on the ground  Do 10 to 50 taps each time  Follow up with your doctor as directed:  Write down your questions so you remember to ask them during your visits  © Copyright [employer] 2022 Information is for End User's use only and may not be sold, redistributed or otherwise used for commercial purposes  All illustrations and images included in CareNotes® are the copyrighted property of [employer] D A M [employer] Inc  or Birdie Lawler   The above information is an  only  It is not intended as medical advice for individual conditions or treatments  Talk to your doctor, nurse or pharmacist before following any medical regimen to see if it is safe and effective for you  Follow up with PCP in 3-5 days  Proceed to  ER if symptoms worsen  Chief Complaint     Chief Complaint   Patient presents with   • Foot Pain     Foot pain from heel  to toe left foot has had symptoms x 2 weeks           History of Present Illness       The patient is a 73-year-old female presenting today for L foot pain  She has pain in her heel that radiates into the toe  Symptoms have been ongoing for 2 weeks  The pain is worse in the morning  Review of Systems   Review of Systems   Musculoskeletal: Positive for arthralgias and gait problem  Negative for joint swelling  Skin: Negative for color change, pallor and wound  Current Medications       Current Outpatient Medications:   •  ARIPiprazole (ABILIFY) 10 mg tablet, Take 10 mg by mouth daily with lunch  , Disp: , Rfl:   •  citalopram (CeleXA) 10 mg tablet, Take 10 mg by mouth every morning , Disp: , Rfl:   •  ergocalciferol (VITAMIN D2) 50,000 units, Take 1 capsule (50,000 Units total) by mouth once a week, Disp: 8 capsule, Rfl: 0  •  famotidine (PEPCID) 40 MG tablet, Take 1 tablet (40 mg total) by mouth daily at bedtime, Disp: 30 tablet, Rfl: 5  •  ferrous sulfate 324 (65 Fe) mg, TAKE ONE TABLET BY MOUTH TWICE A DAY BEFORE MEALS (Patient taking differently: Take 324 mg by mouth daily before breakfast), Disp: 60 tablet, Rfl: 2  •  lamoTRIgine (LaMICtal) 25 mg tablet, TAKE TWO TABLETS BY MOUTH TWICE A DAY, Disp: 180 tablet, Rfl: 1  •  levETIRAcetam (KEPPRA) 750 mg tablet, Take 2 tablets (1,500 mg total) by mouth every 12 (twelve) hours, Disp: 360 tablet, Rfl: 3  •  levothyroxine 25 mcg tablet, TAKE ONE TABLET BY MOUTH EVERY DAY IN THE EARLY MORNING, Disp: 30 tablet, Rfl: 2  •  metFORMIN (GLUCOPHAGE-XR) 750 mg 24 hr tablet, Take 1 tablet (750 mg total) by mouth daily with breakfast, Disp: 30 tablet, Rfl: 2  •  naproxen (Naprosyn) 500 mg tablet, Take 1 tablet (500 mg total) by mouth 2 (two) times a day with meals, Disp: 30 tablet, Rfl: 0  •  omeprazole (PriLOSEC) 40 MG capsule, Take 1 capsule (40 mg total) by mouth in the morning and 1 capsule (40 mg total) in the evening  Take before meals  , Disp: 60 capsule, Rfl: 3  •  traZODone (DESYREL) 50 mg tablet, 25 mg, Disp: , Rfl:   •  acetaminophen (TYLENOL) 500 mg tablet, Take 1 tablet (500 mg total) by mouth every 6 (six) hours as needed for mild pain (Patient not taking: Reported on 12/14/2022), Disp: , Rfl: 0  •  albuterol (PROVENTIL HFA,VENTOLIN HFA) 90 mcg/act inhaler, Inhale 2 puffs every 6 (six) hours as needed for wheezing, Disp: , Rfl:   •  baclofen 10 mg tablet, Take 1 tablet (10 mg total) by mouth every 8 (eight) hours as needed for muscle spasms (Patient not taking: Reported on 1/3/2023), Disp: 30 tablet, Rfl: 0  •  docusate sodium (COLACE) 100 mg capsule, Take 1 capsule (100 mg total) by mouth 2 (two) times a day (Patient not taking: Reported on 10/6/2022), Disp: 60 capsule, Rfl: 5  •  lamoTRIgine (LaMICtal) 200 MG tablet, Take 1 tablet (200 mg total) by mouth 2 (two) times a day Take with one 25 mg tablet for total dose of 225 mg , Disp: 180 tablet, Rfl: 3  •  lidocaine (Lidoderm) 5 %, Apply 1 patch topically daily Remove & Discard patch within 12 hours or as directed by MD (Patient not taking: Reported on 10/24/2022), Disp: 30 patch, Rfl: 0  •  meloxicam (Mobic) 15 mg tablet, Take 1 tablet (15 mg total) by mouth daily as needed for moderate pain (Patient not taking: Reported on 12/14/2022), Disp: 30 tablet, Rfl: 0  •  naproxen (NAPROSYN) 500 mg tablet, Take 1 tablet (500 mg total) by mouth 2 (two) times a day with meals (Patient not taking: Reported on 1/3/2023), Disp: 20 tablet, Rfl: 0  •  ondansetron (ZOFRAN) 4 mg tablet, Take 1 tablet (4 mg total) by mouth every 8 (eight) hours as needed for nausea or vomiting (Patient not taking: Reported on 1/3/2023), Disp: 60 tablet, Rfl: 1  •  prazosin (MINIPRESS) 2 mg capsule, , Disp: , Rfl:     Current Allergies     Allergies as of 01/03/2023 - Reviewed 01/03/2023   Allergen Reaction Noted   • Haloperidol Other (See Comments) 02/04/2021   • Penicillins Hives 05/22/2020   • Pollen extract Allergic Rhinitis 05/28/2021            The following portions of the patient's history were reviewed and updated as appropriate: allergies, current medications, past family history, past medical history, past social history, past surgical history and problem list      Past Medical History:   Diagnosis Date   • Autism    • [place] esophagus    • [place]'s esophagus    • Depression    • Diabetes mellitus ([facility] [address] [address] )    • Female infertility    • Gastric ulcer    • History of bronchitis    • Irregular menses    • Miscarriage    • Morbid obesity with BMI of 50 0-59 9, adult ([facility] [address] )    • Reflux esophagitis    • Seizures ([facility] )     last one 7/13/22   • Sleep apnea     no CPAP       Past Surgical History:   Procedure Laterality Date   • EGD      with Bx due to barretts esophagus   • EYE SURGERY Bilateral     lazy eye repair   • NJ BIOPSY OF LIP N/A 11/10/2022    Procedure: EXCISION BIOPSY SALVARY GLANDS LOWER LIP;  Surgeon: Nigel Piña MD;  Location: [address] [place] [address];  Service: ENT   • NJ HYSTEROSCOPY BX ENDOMETRIUM&/POLYPC W/WO D&C N/A 9/22/2021    Procedure: DILATATION AND CURETTAGE (D&C) WITH HYSTEROSCOPY;  Surgeon: Mark Anthony Evans MD;  Location: [address] [place] [address];  Service: Gynecology   • WISDOM TOOTH EXTRACTION         Family History   Problem Relation Age of Onset   • Bipolar disorder Mother    • Depression Mother    • Depression Father    • Diabetes Maternal Grandmother    • Thyroid disease Maternal Grandmother    • Hypertension Maternal Grandmother    • Heart disease Maternal Grandmother    • Diabetes Maternal Grandfather    • Diabetes Paternal Grandmother    • Breast cancer Paternal Grandmother    • Stroke Paternal Grandmother    • Diabetes Paternal Grandfather    • Breast cancer Maternal Aunt 35        bilateral   • Stomach cancer Maternal Aunt          Medications have been verified  Objective   Pulse 99   Temp 97 8 °F (36 6 °C)   Resp 20   Ht 4' 7" (1 397 m)   Wt 108 kg (239 lb)   SpO2 98%   BMI 55 55 kg/m²        Physical Exam     Physical Exam  Vitals and nursing note reviewed  Constitutional:       General: She is not in acute distress       Appearance: Normal appearance  She is obese  She is not ill-appearing, toxic-appearing or diaphoretic  HENT:      Head: Normocephalic and atraumatic  Cardiovascular:      Rate and Rhythm: Normal rate and regular rhythm  Heart sounds: Normal heart sounds  No murmur heard  No friction rub  No gallop  Pulmonary:      Effort: Pulmonary effort is normal  No respiratory distress  Breath sounds: Normal breath sounds  No stridor  No wheezing, rhonchi or rales  Chest:      Chest wall: No tenderness  Musculoskeletal:         General: Tenderness (left heel tenderness ) present  No swelling  Normal range of motion  Skin:     General: Skin is warm and dry  Capillary Refill: Capillary refill takes less than 2 seconds  Neurological:      Mental Status: She is alert

## 2023-01-03 NOTE — PATIENT INSTRUCTIONS
Plantar Fasciitis   WHAT YOU NEED TO KNOW:   PF is swelling of the plantar fascia  The plantar fascia is a thick band of tissue that connects your heel bone to your toes  This part of your foot helps support the arch of your foot and absorbs shock  DISCHARGE INSTRUCTIONS:   Call your doctor if:   Your pain or swelling suddenly increase  You develop knee, hip, or back pain  You have questions or concerns about your condition or care  Medicines: You may  need any of the following:  Acetaminophen  decreases pain and fever  It is available without a doctor's order  Ask how much to take and how often to take it  Follow directions  Read the labels of all other medicines you are using to see if they also contain acetaminophen, or ask your doctor or pharmacist  Acetaminophen can cause liver damage if not taken correctly  Do not use more than 4 grams (4,000 milligrams) total of acetaminophen in one day  NSAIDs , such as ibuprofen, help decrease swelling, pain, and fever  NSAIDs can cause stomach bleeding or kidney problems in certain people  If you take blood thinner medicine, always ask your healthcare provider if NSAIDs are safe for you  Always read the medicine label and follow directions  Take your medicine as directed  Contact your healthcare provider if you think your medicine is not helping or if you have side effects  Tell him of her if you are allergic to any medicine  Keep a list of the medicines, vitamins, and herbs you take  Include the amounts, and when and why you take them  Bring the list or the pill bottles to follow-up visits  Carry your medicine list with you in case of an emergency  Self-care:   Wear your splint or shoe inserts as directed  You may need to wear a splint at night to keep your foot stretched while you sleep  This will help prevent sharp pain first thing in the morning  Shoe inserts will help decrease stress on your plantar fascia when you walk or exercise       Apply ice on your plantar fascia  Ice helps prevent tissue damage and decreases swelling and pain  Fill a water bottle with water and freeze it  Wrap a towel around the bottle or cover it with a pillow case  Roll the water bottle under your foot for 10 minutes in the morning and after work  Massage your plantar fascia as directed  This may help decrease swelling and pain  Roll a golf ball under your foot for 10 minutes  Repeat 3 times each day  Go to physical therapy as directed  A physical therapist teaches you exercises to help improve movement and strength, and to decrease pain  Prevent plantar fasciitis:   Maintain a healthy weight  This will help decrease stress on your feet  Ask your healthcare provider how much you should weigh  Ask him to help you create a weight loss plan if you are overweight  Do low-impact exercises  Low-impact exercises decrease stress on your plantar fascia  Examples include swimming or bicycling  Start new activities slowly  Increase the intensity and time gradually  Wear shoes that fit well and support your arch  Replace your shoes before the padding or shock absorption wears out  Do not walk or  bare feet or sandals for long periods of time  Follow up with your doctor as directed:  Write down your questions so you remember to ask them during your visits  © [employer] 2022 Information is for End User's use only and may not be sold, redistributed or otherwise used for commercial purposes  All illustrations and images included in CareNotes® are the copyrighted property of [employer] M [employer] Inc  or [facility] [place] [employer] Law[employer]   The above information is an  only  It is not intended as medical advice for individual conditions or treatments  Talk to your doctor, nurse or pharmacist before following any medical regimen to see if it is safe and effective for you    Plantar Fasciitis Exercises   WHAT YOU NEED TO KNOW:   Plantar fasciitis exercises help stretch your plantar fascia, calf muscles, and Achilles tendon  They also help strengthen the muscles that support your heel and foot  Exercises and stretching can help prevent plantar fasciitis from getting worse or coming back  DISCHARGE INSTRUCTIONS:   Contact your healthcare provider if:   Your pain and swelling increase  You develop new knee, hip, or back pain  You have questions or concerns about your condition or care  How to do plantar fasciitis exercises:  Ask your healthcare provider when to start these exercises and how often to do them  Slant board stretch:  Stand on a slanted board with your toes higher than your heel  Press your heel into the board  Keep your knee slightly bent  Hold this position for 1 minute  Repeat 5 times  Heel stretch:  Stand up straight with your hands on a wall  Place your injured leg slightly behind your other leg  Keep your heels flat on the floor, lean forward, and bend both knees  Hold for 30 seconds  Calf stretch:  Stand up straight with your hands on a wall  Step forward so that your uninjured foot is in front of your injured foot  Keep your front leg bent and your back leg straight  Gently lean forward until you feel your calf stretch  Hold for 30 seconds and then relax  Seated plantar fascia stretch:  Sit on a firm surface, such as the floor or a mat  Extend your legs out in front of you  Raise your injured foot a few inches off the ground  Keep your leg straight  Grab the toes of your injured foot and pull them toward you  With your other hand, feel your plantar fascia  You should feel it press outward  Hold for 30 seconds  If you cannot reach your toes, loop a towel or tie around your foot  Gently pull on the towel or tie and flex your toes toward you  Heel raises:  Stand on the injured leg  Raise your other leg off the ground  Hold onto a railing or wall for balance  Slowly rise up on the toes of your injured leg   Hold for 5 seconds  Slowly lower your heel to the ground  Toe curls:  Place a towel on the floor  Put your foot flat on the towel  Grab the towel with your toes by curling them around the towel  Lift the towel up with your toes  Toe taps:  Sit down and place your foot flat on the floor  Keep your heel on the floor  Point all your toes up toward the ceiling  While the 4 smaller toes are pointed up, bend your big toe down and tap it on the ground  Do 10 to 50 taps  Point all 5 toes up toward the ceiling again  This time keep your big toe pointed up and tap the 4 smaller toes on the ground  Do 10 to 50 taps each time  Follow up with your doctor as directed:  Write down your questions so you remember to ask them during your visits  © Copyright [employer] 2022 Information is for End User's use only and may not be sold, redistributed or otherwise used for commercial purposes  All illustrations and images included in CareNotes® are the copyrighted property of [employer]  or Birdie Lawler   The above information is an  only  It is not intended as medical advice for individual conditions or treatments  Talk to your doctor, nurse or pharmacist before following any medical regimen to see if it is safe and effective for you

## 2023-01-03 NOTE — PERIOPERATIVE NURSING NOTE
Patient brought in the room and explained the esophageal manometry procedure  After the confirmation of allergies, lidocaine 2% inserted via right /left nostril and  a transnasal insertion of the High Resolution esophageal manometry catheter was inserted via left nostril  Patient given water to drink during the insertion and once the catheter inserted pressure bands of both Upper esophageal sphincter  (UES) and Lower esophageal sphincter ( LES) were observed on the color contour  Patient instructed to take a deep breath to verify placement of the catheter, diaphragmatic pinch noted on inspiration  Catheter was secured to right/left cheek  Patient was assisted to supine position   Patient was instructed to relax  while acclimating the catheter for about 5 minutes  A 30 second baseline resting pressure was obtained to identify the UES and LES followed by a series of 10 liquid swallows using 5 cc room temperature normal saline to assess esophageal motility and bolus transit  No 10 viscous swallows, 1 multiple rapid drink swallow using 2 cc room temperature normal saline given a total of 5 drinks  Patient instructed to sit up at the edge of the stretcher and given 5 upright liquid swallows using 5 cc room temperature normal saline and 1 rapid drink challenge using 200 cc room temperature water  At the end of the procedure the high resolution esophageal manometry catheter was removed from the nostril intact  sensor PH probe inserted via left nostril and secured  Zephr recorder teachback performed and patient verbalized understanding  Patient instructed to return next day to have probe remove  Discharge instructions given and patient ambulated out of room in stable condition

## 2023-01-10 ENCOUNTER — DOCUMENTATION (OUTPATIENT)
Dept: GENETICS | Facility: CLINIC | Age: 33
End: 2023-01-10

## 2023-01-13 ENCOUNTER — NURSE TRIAGE (OUTPATIENT)
Dept: OTHER | Facility: OTHER | Age: 33
End: 2023-01-13

## 2023-01-13 ENCOUNTER — HOSPITAL ENCOUNTER (OUTPATIENT)
Dept: PERIOP | Facility: HOSPITAL | Age: 33
Setting detail: OUTPATIENT SURGERY
End: 2023-01-13
Attending: INTERNAL MEDICINE

## 2023-01-13 ENCOUNTER — ANESTHESIA (OUTPATIENT)
Dept: PERIOP | Facility: HOSPITAL | Age: 33
End: 2023-01-13

## 2023-01-13 ENCOUNTER — ANESTHESIA EVENT (OUTPATIENT)
Dept: PERIOP | Facility: HOSPITAL | Age: 33
End: 2023-01-13

## 2023-01-13 VITALS
TEMPERATURE: 98.3 F | OXYGEN SATURATION: 100 % | RESPIRATION RATE: 18 BRPM | SYSTOLIC BLOOD PRESSURE: 127 MMHG | HEART RATE: 84 BPM | DIASTOLIC BLOOD PRESSURE: 83 MMHG

## 2023-01-13 DIAGNOSIS — K21.9 GASTROESOPHAGEAL REFLUX DISEASE WITHOUT ESOPHAGITIS: ICD-10-CM

## 2023-01-13 DIAGNOSIS — R13.14 PHARYNGOESOPHAGEAL DYSPHAGIA: ICD-10-CM

## 2023-01-13 LAB
EXT PREGNANCY TEST URINE: NEGATIVE
EXT PREGNANCY TEST URINE: NEGATIVE
EXT. CONTROL: NORMAL
EXT. CONTROL: NORMAL
GLUCOSE SERPL-MCNC: 87 MG/DL (ref 65–140)

## 2023-01-13 RX ORDER — SODIUM CHLORIDE, SODIUM LACTATE, POTASSIUM CHLORIDE, CALCIUM CHLORIDE 600; 310; 30; 20 MG/100ML; MG/100ML; MG/100ML; MG/100ML
INJECTION, SOLUTION INTRAVENOUS CONTINUOUS PRN
Status: DISCONTINUED | OUTPATIENT
Start: 2023-01-13 | End: 2023-01-13

## 2023-01-13 RX ORDER — PROPOFOL 10 MG/ML
INJECTION, EMULSION INTRAVENOUS AS NEEDED
Status: DISCONTINUED | OUTPATIENT
Start: 2023-01-13 | End: 2023-01-13

## 2023-01-13 RX ORDER — LIDOCAINE HYDROCHLORIDE 10 MG/ML
INJECTION, SOLUTION EPIDURAL; INFILTRATION; INTRACAUDAL; PERINEURAL AS NEEDED
Status: DISCONTINUED | OUTPATIENT
Start: 2023-01-13 | End: 2023-01-13

## 2023-01-13 RX ORDER — SODIUM CHLORIDE, SODIUM LACTATE, POTASSIUM CHLORIDE, CALCIUM CHLORIDE 600; 310; 30; 20 MG/100ML; MG/100ML; MG/100ML; MG/100ML
125 INJECTION, SOLUTION INTRAVENOUS CONTINUOUS
Status: DISCONTINUED | OUTPATIENT
Start: 2023-01-13 | End: 2023-01-17 | Stop reason: HOSPADM

## 2023-01-13 RX ADMIN — PROPOFOL 50 MG: 10 INJECTION, EMULSION INTRAVENOUS at 12:41

## 2023-01-13 RX ADMIN — PROPOFOL 100 MG: 10 INJECTION, EMULSION INTRAVENOUS at 12:38

## 2023-01-13 RX ADMIN — LIDOCAINE HYDROCHLORIDE 5 ML: 10 INJECTION, SOLUTION EPIDURAL; INFILTRATION; INTRACAUDAL; PERINEURAL at 12:36

## 2023-01-13 RX ADMIN — PROPOFOL 100 MG: 10 INJECTION, EMULSION INTRAVENOUS at 12:40

## 2023-01-13 RX ADMIN — PROPOFOL 200 MG: 10 INJECTION, EMULSION INTRAVENOUS at 12:36

## 2023-01-13 RX ADMIN — SODIUM CHLORIDE, SODIUM LACTATE, POTASSIUM CHLORIDE, AND CALCIUM CHLORIDE 125 ML/HR: .6; .31; .03; .02 INJECTION, SOLUTION INTRAVENOUS at 12:00

## 2023-01-13 RX ADMIN — SODIUM CHLORIDE, SODIUM LACTATE, POTASSIUM CHLORIDE, AND CALCIUM CHLORIDE: .6; .31; .03; .02 INJECTION, SOLUTION INTRAVENOUS at 11:22

## 2023-01-13 NOTE — TELEPHONE ENCOUNTER
Reason for Disposition  • Fever > 100 4 F (38 0 C)    Answer Assessment - Initial Assessment Questions  1  SYMPTOM: "What's the main symptom you're concerned about?" (e g , pain, fever, vomiting)      Fever, neck pain  2  ONSET: "When did fever  start?"      Around 1700  3  SURGERY: "What surgery was performed?"      Endoscopy  4  DATE of SURGERY: "When was surgery performed?"       Today  5  ANESTHESIA: " What type of anesthesia did you have?" (e g , general, spinal, epidural, local)      General  6  PAIN: "Is there any pain?" If Yes, ask: "How bad is it?"  (Scale 1-10; or mild, moderate, severe)      Tenderness right side of her neck between jaw line and her ear  -no lump present  No sore throat  7  FEVER: "Do you have a fever?" If Yes, ask: "What is your temperature, how was it measured, and when did it start?"      101 1-orally  Took 1000mgs  Tylenol at 1719 now temp is 99 9   8  VOMITING: "Is there any vomiting?" If yes, ask: "How many times?"      Vomited once when she got home after she ate  9  BLEEDING: "Is there any bleeding?" If Yes, ask: "How much?" and "Where?"      No bleeding  10  OTHER SYMPTOMS: "Do you have any other symptoms?" (e g , drainage from wound, painful urination, constipation)        Chills right before she took her temp      Protocols used: POST-OP SYMPTOMS AND QUESTIONS-[facility]

## 2023-01-13 NOTE — TELEPHONE ENCOUNTER
Regarding: post procedure fever  ----- Message from Bryant Richard sent at 1/13/2023  5:27 PM EST -----  " I had an endoscopy today   Now I have a fever of 101 1 and I just took 1000mg of Tylenol "

## 2023-01-13 NOTE — ANESTHESIA POSTPROCEDURE EVALUATION
Post-Op Assessment Note    CV Status:  Stable       Mental Status:  Sleepy   Hydration Status:  Stable   PONV Controlled:  Controlled   Airway Patency:  Patent      Post Op Vitals Reviewed: Yes      Staff: CRNA         No notable events documented      BP   101/59   Temp      Pulse  99   Resp   20   SpO2   97

## 2023-01-13 NOTE — ANESTHESIA PREPROCEDURE EVALUATION
Procedure:  EGD    Relevant Problems   ENDO   (+) Hypothyroidism      GI/HEPATIC   (+) Dysphagia   (+) Gastric ulcer   (+) Gastroesophageal reflux disease      HEMATOLOGY   (+) Iron deficiency anemia secondary to inadequate dietary iron intake      NEURO/PSYCH   (+) Depression with anxiety   (+) History of irregular menstrual bleeding   (+) Nonintractable epilepsy without status epilepticus (HCC)      PULMONARY   (+) GARIMA (obstructive sleep apnea)        Physical Exam    Airway    Mallampati score: III  TM Distance: >3 FB  Neck ROM: full     Dental   No notable dental hx     Cardiovascular  Cardiovascular exam normal    Pulmonary  Pulmonary exam normal     Other Findings        Anesthesia Plan  ASA Score- 3     Anesthesia Type- IV sedation with anesthesia with ASA Monitors  Additional Monitors:   Airway Plan:           Plan Factors-Exercise tolerance (METS): >4 METS  Chart reviewed  Imaging results reviewed  Existing labs reviewed  Patient summary reviewed  Patient is not a current smoker  Obstructive sleep apnea risk education given perioperatively  Induction-     Postoperative Plan-     Informed Consent- Anesthetic plan and risks discussed with patient  I personally reviewed this patient with the CRNA  Discussed and agreed on the Anesthesia Plan with the CRNA  Tania Christianson

## 2023-01-13 NOTE — H&P
History and Physical - [facility]  Porfirio Blank 33 y o  female MRN: [id number]    HPI: Porfirio Blank is a 33y o  year old female who presents with GERD and dysphagia         Review of Systems    Historical Information   Past Medical History:   Diagnosis Date   • Autism    • [place] esophagus    • CPAP (continuous positive airway pressure) dependence    • 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HCC)     not diagnosed as of 1/9/23-trying to R/O   • Depression    • Diabetes mellitus ([facility] [address] [address] )    • Disease of thyroid gland     hypo   • Dysphagia     solids and liquids   • Female infertility    • Fibromyalgia, primary    • Gastric ulcer    • History of bronchitis    • Iron deficiency anemia     infusion in 11/2022   • Irregular menses    • Miscarriage    • Morbid obesity with BMI of 50 0-59 9, adult ([facility])    • Plantar fasciitis of left foot    • Reflux esophagitis    • Seizures ([facility] [address] )     last one 7/13/22   • Sleep apnea     CPAP   • Wears glasses      Past Surgical History:   Procedure Laterality Date   • EGD      with Bx due to barretts esophagus   • EYE SURGERY Bilateral     lazy eye repair   • MI BIOPSY OF LIP N/A 11/10/2022    Procedure: EXCISION BIOPSY SALVARY GLANDS LOWER LIP;  Surgeon: Norma Nur MD;  Location: [facility] OR;  Service: ENT   • MI HYSTEROSCOPY BX ENDOMETRIUM&/POLYPC W/WO D&C N/A 9/22/2021    Procedure: DILATATION AND CURETTAGE (D&C) WITH HYSTEROSCOPY;  Surgeon: Roxie Estevez MD;  Location: [facility] OR;  Service: Gynecology   • WISDOM TOOTH EXTRACTION       Social History   Social History     Substance and Sexual Activity   Alcohol Use Yes    Comment: occ     Social History     Substance and Sexual Activity   Drug Use Not Currently   • Types: Marijuana    Comment: about a couple times a week, quit June 2022     Social History     Tobacco Use   Smoking Status Never   Smokeless Tobacco Never     Family History   Problem Relation Age of Onset   • Bipolar disorder Mother    • Depression Mother    • Depression Father    • Diabetes Maternal Grandmother    • Thyroid disease Maternal Grandmother    • Hypertension Maternal Grandmother    • Heart disease Maternal Grandmother    • Diabetes Maternal Grandfather    • Diabetes Paternal Grandmother    • Breast cancer Paternal Grandmother    • Stroke Paternal Grandmother    • Diabetes Paternal Grandfather    • Breast cancer Maternal Aunt 35        bilateral   • Stomach cancer Maternal Aunt        Meds/Allergies     (Not in a hospital admission)      Allergies   Allergen Reactions   • Haloperidol Other (See Comments)     Jaw locks up   • Penicillins Hives   • Pollen Extract Allergic Rhinitis       Objective     /57   Pulse 89   Temp 98 3 °F (36 8 °C) (Temporal)   Resp 20   LMP 12/24/2022 Comment: negative pregnancy test  SpO2 99%       PHYSICAL EXAM    Gen: NAD  CV: RRR  CHEST: Clear  ABD: soft, NT/ND  EXT: no edema  Neuro: AAO      ASSESSMENT/PLAN:  This is a 33y o  year old female here for GERD and dysphagia         PLAN:   Procedure: Efrain Odor

## 2023-01-13 NOTE — TELEPHONE ENCOUNTER
TC sent to Dr Vasquez [place] regarding patient's SXS  He responded back that if the pain persists to go to the ER otherwise take Tylenol  Patient will see how she feels in the morning so for now will continue with Tylenol every 4 hours

## 2023-01-14 ENCOUNTER — APPOINTMENT (EMERGENCY)
Dept: RADIOLOGY | Facility: HOSPITAL | Age: 33
End: 2023-01-14

## 2023-01-14 ENCOUNTER — HOSPITAL ENCOUNTER (EMERGENCY)
Facility: HOSPITAL | Age: 33
Discharge: HOME | End: 2023-01-14
Attending: EMERGENCY MEDICINE | Admitting: EMERGENCY MEDICINE

## 2023-01-14 VITALS
SYSTOLIC BLOOD PRESSURE: 110 MMHG | OXYGEN SATURATION: 97 % | HEART RATE: 96 BPM | TEMPERATURE: 98.2 F | RESPIRATION RATE: 20 BRPM | DIASTOLIC BLOOD PRESSURE: 60 MMHG

## 2023-01-14 DIAGNOSIS — K21.9 GERD (GASTROESOPHAGEAL REFLUX DISEASE): ICD-10-CM

## 2023-01-14 DIAGNOSIS — R50.82 POSTOPERATIVE FEVER: Primary | ICD-10-CM

## 2023-01-14 DIAGNOSIS — M54.2 NECK DISCOMFORT: ICD-10-CM

## 2023-01-14 LAB
2HR DELTA HS TROPONIN: 0 NG/L
ALBUMIN SERPL BCP-MCNC: 3.6 G/DL (ref 3.5–5)
ALP SERPL-CCNC: 133 U/L (ref 46–116)
ALT SERPL W P-5'-P-CCNC: 34 U/L (ref 12–78)
ANION GAP SERPL CALCULATED.3IONS-SCNC: 9 MMOL/L (ref 4–13)
AST SERPL W P-5'-P-CCNC: 20 U/L (ref 5–45)
BASOPHILS # BLD AUTO: 0.14 THOUSANDS/ÂΜL (ref 0–0.1)
BASOPHILS NFR BLD AUTO: 1 % (ref 0–1)
BILIRUB SERPL-MCNC: 0.29 MG/DL (ref 0.2–1)
BILIRUB UR QL STRIP: NEGATIVE
BUN SERPL-MCNC: 17 MG/DL (ref 5–25)
CALCIUM SERPL-MCNC: 9.4 MG/DL (ref 8.3–10.1)
CARDIAC TROPONIN I PNL SERPL HS: 3 NG/L
CARDIAC TROPONIN I PNL SERPL HS: 3 NG/L
CHLORIDE SERPL-SCNC: 101 MMOL/L (ref 96–108)
CLARITY UR: NORMAL
CO2 SERPL-SCNC: 28 MMOL/L (ref 21–32)
COLOR UR: YELLOW
CREAT SERPL-MCNC: 0.73 MG/DL (ref 0.6–1.3)
EOSINOPHIL # BLD AUTO: 0.05 THOUSAND/ÂΜL (ref 0–0.61)
EOSINOPHIL NFR BLD AUTO: 1 % (ref 0–6)
ERYTHROCYTE [DISTWIDTH] IN BLOOD BY AUTOMATED COUNT: 13.2 % (ref 11.6–15.1)
EXT PREGNANCY TEST URINE: NEGATIVE
EXT. CONTROL: NORMAL
FLUAV RNA RESP QL NAA+PROBE: NEGATIVE
FLUBV RNA RESP QL NAA+PROBE: NEGATIVE
GFR SERPL CREATININE-BSD FRML MDRD: 110 ML/MIN/1.73SQ M
GLUCOSE SERPL-MCNC: 103 MG/DL (ref 65–140)
GLUCOSE UR STRIP-MCNC: NEGATIVE MG/DL
HCT VFR BLD AUTO: 40.5 % (ref 34.8–46.1)
HGB BLD-MCNC: 13.4 G/DL (ref 11.5–15.4)
HGB UR QL STRIP.AUTO: NEGATIVE
IMM GRANULOCYTES # BLD AUTO: 0.01 THOUSAND/UL (ref 0–0.2)
IMM GRANULOCYTES NFR BLD AUTO: 0 % (ref 0–2)
KETONES UR STRIP-MCNC: NEGATIVE MG/DL
LEUKOCYTE ESTERASE UR QL STRIP: NEGATIVE
LYMPHOCYTES # BLD AUTO: 1.43 THOUSANDS/ÂΜL (ref 0.6–4.47)
LYMPHOCYTES NFR BLD AUTO: 14 % (ref 14–44)
MAGNESIUM SERPL-MCNC: 1.8 MG/DL (ref 1.6–2.6)
MCH RBC QN AUTO: 30.1 PG (ref 26.8–34.3)
MCHC RBC AUTO-ENTMCNC: 33.1 G/DL (ref 31.4–37.4)
MCV RBC AUTO: 91 FL (ref 82–98)
MONOCYTES # BLD AUTO: 0.54 THOUSAND/ÂΜL (ref 0.17–1.22)
MONOCYTES NFR BLD AUTO: 5 % (ref 4–12)
NEUTROPHILS # BLD AUTO: 8.3 THOUSANDS/ÂΜL (ref 1.85–7.62)
NEUTS SEG NFR BLD AUTO: 79 % (ref 43–75)
NITRITE UR QL STRIP: NEGATIVE
NRBC BLD AUTO-RTO: 0 /100 WBCS
PH UR STRIP.AUTO: 6 [PH]
PLATELET # BLD AUTO: 343 THOUSANDS/UL (ref 149–390)
PMV BLD AUTO: 9.1 FL (ref 8.9–12.7)
POTASSIUM SERPL-SCNC: 3.7 MMOL/L (ref 3.5–5.3)
PROT SERPL-MCNC: 8.1 G/DL (ref 6.4–8.4)
PROT UR STRIP-MCNC: NEGATIVE MG/DL
RBC # BLD AUTO: 4.45 MILLION/UL (ref 3.81–5.12)
RSV RNA RESP QL NAA+PROBE: NEGATIVE
S PYO DNA THROAT QL NAA+PROBE: NOT DETECTED
SARS-COV-2 RNA RESP QL NAA+PROBE: NEGATIVE
SODIUM SERPL-SCNC: 138 MMOL/L (ref 135–147)
SP GR UR STRIP.AUTO: >=1.03 (ref 1–1.03)
UROBILINOGEN UR QL STRIP.AUTO: 0.2 E.U./DL
WBC # BLD AUTO: 10.47 THOUSAND/UL (ref 4.31–10.16)

## 2023-01-14 RX ORDER — ACETAMINOPHEN 325 MG/1
650 TABLET ORAL ONCE
Status: COMPLETED | OUTPATIENT
Start: 2023-01-14 | End: 2023-01-14

## 2023-01-14 RX ORDER — SUCRALFATE ORAL 1 G/10ML
1 SUSPENSION ORAL 4 TIMES DAILY
Qty: 420 ML | Refills: 0 | Status: SHIPPED | OUTPATIENT
Start: 2023-01-14

## 2023-01-14 RX ORDER — DEXAMETHASONE SODIUM PHOSPHATE 4 MG/ML
10 INJECTION, SOLUTION INTRA-ARTICULAR; INTRALESIONAL; INTRAMUSCULAR; INTRAVENOUS; SOFT TISSUE ONCE
Status: COMPLETED | OUTPATIENT
Start: 2023-01-14 | End: 2023-01-14

## 2023-01-14 RX ORDER — FAMOTIDINE 10 MG/ML
40 INJECTION, SOLUTION INTRAVENOUS ONCE
Status: COMPLETED | OUTPATIENT
Start: 2023-01-14 | End: 2023-01-14

## 2023-01-14 RX ORDER — SUCRALFATE 1 G/1
1 TABLET ORAL ONCE
Status: COMPLETED | OUTPATIENT
Start: 2023-01-14 | End: 2023-01-14

## 2023-01-14 RX ADMIN — SUCRALFATE 1 G: 1 TABLET ORAL at 00:49

## 2023-01-14 RX ADMIN — IOHEXOL 50 ML: 350 INJECTION, SOLUTION INTRAVENOUS at 01:45

## 2023-01-14 RX ADMIN — IOHEXOL 50 ML: 350 INJECTION, SOLUTION INTRAVENOUS at 01:44

## 2023-01-14 RX ADMIN — ACETAMINOPHEN 650 MG: 325 TABLET, FILM COATED ORAL at 00:49

## 2023-01-14 RX ADMIN — FAMOTIDINE 40 MG: 10 INJECTION, SOLUTION INTRAVENOUS at 00:21

## 2023-01-14 RX ADMIN — SODIUM CHLORIDE 1000 ML: 0.9 INJECTION, SOLUTION INTRAVENOUS at 00:18

## 2023-01-14 RX ADMIN — DEXAMETHASONE SODIUM PHOSPHATE 10 MG: 4 INJECTION, SOLUTION INTRAMUSCULAR; INTRAVENOUS at 02:49

## 2023-01-14 NOTE — ED PROVIDER NOTES
History  Chief Complaint   Patient presents with   • Post-Op Problem - Major     Pt  C/o pain in neck, fever and vomiting after endoscopy procedure done yesterday  Pt  recently took tylenol at 6 pm       36-year-old female with past history of morbid obesity, GERD, peptic ulcer disease, autism, GARIMA on CPAP, depression, presents to the ED for evaluation of right ear pain radiating to right anterior neck as well as left anterior neck and left ear pain over the past few hours  Patient had an EGD done with Dr Wendy Colorado earlier this morning  Patient noted to have some fever earlier this evening with T-max of 101 5 °F   Patient took some Tylenol prior to arrival to the ED  Patient's temperature is within normal range in the emergency department  She denies any posterior neck pain  Anterior neck discomfort increases with movement and decreases with rest   Patient also notes some reflux symptoms  Patient came to the ED for further evaluation  Patient was intubated for the procedure  History provided by:  Patient      Prior to Admission Medications   Prescriptions Last Dose Informant Patient Reported? Taking?    ARIPiprazole (ABILIFY) 10 mg tablet   Yes No   Sig: Take 10 mg by mouth every morning   acetaminophen (TYLENOL) 500 mg tablet   No No   Sig: Take 1 tablet (500 mg total) by mouth every 6 (six) hours as needed for mild pain   albuterol (PROVENTIL HFA,VENTOLIN HFA) 90 mcg/act inhaler   Yes No   Sig: Inhale 2 puffs every 6 (six) hours as needed for wheezing   citalopram (CeleXA) 10 mg tablet   Yes No   Sig: Take 10 mg by mouth every morning    docusate sodium (COLACE) 100 mg capsule   No No   Sig: Take 1 capsule (100 mg total) by mouth 2 (two) times a day   ergocalciferol (VITAMIN D2) 50,000 units   No No   Sig: Take 1 capsule (50,000 Units total) by mouth once a week   Patient taking differently: Take 50,000 Units by mouth once a week On Monday   famotidine (PEPCID) 40 MG tablet   No No   Sig: Take 1 tablet (40 mg total) by mouth daily at bedtime   ferrous sulfate 324 (65 Fe) mg   No No   Sig: TAKE ONE TABLET BY MOUTH TWICE A DAY BEFORE MEALS   Patient taking differently: Take 324 mg by mouth daily before breakfast   lamoTRIgine (LaMICtal) 200 MG tablet   No No   Sig: Take 1 tablet (200 mg total) by mouth 2 (two) times a day Take with one 25 mg tablet for total dose of 225 mg    lamoTRIgine (LaMICtal) 25 mg tablet   No No   Sig: TAKE TWO TABLETS BY MOUTH TWICE A DAY   levETIRAcetam (KEPPRA) 750 mg tablet   No No   Sig: Take 2 tablets (1,500 mg total) by mouth every 12 (twelve) hours   levothyroxine 25 mcg tablet   No No   Sig: TAKE ONE TABLET BY MOUTH EVERY DAY IN THE EARLY MORNING   meloxicam (Mobic) 15 mg tablet   No No   Sig: Take 1 tablet (15 mg total) by mouth daily as needed for moderate pain   metFORMIN (GLUCOPHAGE-XR) 750 mg 24 hr tablet   No No   Sig: Take 1 tablet (750 mg total) by mouth daily with breakfast   naproxen (Naprosyn) 500 mg tablet   No No   Sig: Take 1 tablet (500 mg total) by mouth 2 (two) times a day with meals   Patient taking differently: Take 500 mg by mouth 2 (two) times a day as needed   omeprazole (PriLOSEC) 40 MG capsule   No No   Sig: Take 1 capsule (40 mg total) by mouth in the morning and 1 capsule (40 mg total) in the evening  Take before meals     ondansetron (ZOFRAN) 4 mg tablet   No No   Sig: Take 1 tablet (4 mg total) by mouth every 8 (eight) hours as needed for nausea or vomiting   traZODone (DESYREL) 50 mg tablet   Yes No   Si mg daily at bedtime      Facility-Administered Medications: None       Past Medical History:   Diagnosis Date   • Autism    • [place] esophagus    • CPAP (continuous positive airway pressure) dependence    • 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HCC)     not diagnosed as of 24-trying to R/O   • Depression    • Diabetes mellitus ([facility] [address] )    • Disease of thyroid gland     hypo   • Dysphagia     solids and liquids   • Female infertility    • Fibromyalgia, primary    • Gastric ulcer    • History of bronchitis    • Iron deficiency anemia     infusion in 11/2022   • Irregular menses    • Miscarriage    • Morbid obesity with BMI of 50 0-59 9, adult (HCC)    • Plantar fasciitis of left foot    • Reflux esophagitis    • Seizures ([address] )     last one 7/13/22   • Sleep apnea     CPAP   • Wears glasses        Past Surgical History:   Procedure Laterality Date   • EGD      with Bx due to barretts esophagus   • EYE SURGERY Bilateral     lazy eye repair   • SD BIOPSY OF LIP N/A 11/10/2022    Procedure: EXCISION BIOPSY SALVARY GLANDS LOWER LIP;  Surgeon: Garth Perez MD;  Location: [address] [place] [address];  Service: ENT   • SD HYSTEROSCOPY BX ENDOMETRIUM&/POLYPC W/WO D&C N/A 9/22/2021    Procedure: DILATATION AND CURETTAGE (D&C) WITH HYSTEROSCOPY;  Surgeon: Binta Johnson MD;  Location: [facility];  Service: Gynecology   • WISDOM TOOTH EXTRACTION         Family History   Problem Relation Age of Onset   • Bipolar disorder Mother    • Depression Mother    • Depression Father    • Diabetes Maternal Grandmother    • Thyroid disease Maternal Grandmother    • Hypertension Maternal Grandmother    • Heart disease Maternal Grandmother    • Diabetes Maternal Grandfather    • Diabetes Paternal Grandmother    • Breast cancer Paternal Grandmother    • Stroke Paternal Grandmother    • Diabetes Paternal Grandfather    • Breast cancer Maternal Aunt 35        bilateral   • Stomach cancer Maternal Aunt      I have reviewed and agree with the history as documented      E-Cigarette/Vaping   • E-Cigarette Use Never User      E-Cigarette/Vaping Substances   • Nicotine No    • THC No    • CBD No    • Flavoring No    • Other No    • Unknown No      Social History     Tobacco Use   • Smoking status: Never   • Smokeless tobacco: Never   Vaping Use   • Vaping Use: Never used   Substance Use Topics   • Alcohol use: Yes     Comment: occ   • Drug use: Not Currently     Types: Marijuana     Comment: about a couple times a week, quit June 2022       Review of Systems   Constitutional: Negative for chills and fever  HENT: Positive for ear pain  Negative for sore throat  Eyes: Negative for pain and visual disturbance  Respiratory: Negative for cough and shortness of breath  Cardiovascular: Negative for chest pain and palpitations  Gastrointestinal: Negative for abdominal pain and vomiting  Genitourinary: Negative for dysuria and hematuria  Musculoskeletal: Positive for neck pain  Negative for arthralgias and back pain  Skin: Negative for color change and rash  Neurological: Negative for seizures and syncope  All other systems reviewed and are negative  Physical Exam  Physical Exam  Vitals and nursing note reviewed  Constitutional:       General: She is not in acute distress  Appearance: She is well-developed  HENT:      Head: Normocephalic and atraumatic  Right Ear: Tympanic membrane normal       Left Ear: Tympanic membrane normal       Ears:      Comments: Tympanic membrane's are unremarkable bilateral   Eyes:      Conjunctiva/sclera: Conjunctivae normal    Neck:      Comments: Mild tenderness to palpation noted to bilateral anterior neck  No cervical adenopathy noted  No tenderness noted to palpation of posterior neck  Cardiovascular:      Rate and Rhythm: Normal rate and regular rhythm  Heart sounds: No murmur heard  Pulmonary:      Effort: Pulmonary effort is normal  No respiratory distress  Breath sounds: Normal breath sounds  Comments: Lungs are clear to auscultation bilateral   Abdominal:      Palpations: Abdomen is soft  Tenderness: There is no abdominal tenderness  Musculoskeletal:         General: No swelling  Cervical back: Neck supple  Skin:     General: Skin is warm and dry  Capillary Refill: Capillary refill takes less than 2 seconds  Neurological:      Mental Status: She is alert     Psychiatric:         Mood and Affect: Mood normal          Vital Signs  ED Triage Vitals   Temperature Pulse Respirations Blood Pressure SpO2   01/14/23 0003 01/14/23 0003 01/14/23 0003 01/14/23 0003 01/14/23 0003   98 6 °F (37 °C) 58 20 124/73 100 %      Temp Source Heart Rate Source Patient Position - Orthostatic VS BP Location FiO2 (%)   01/14/23 0003 01/14/23 0003 01/14/23 0003 01/14/23 0003 --   Oral Monitor Sitting Left arm       Pain Score       01/14/23 0049       6           Vitals:    01/14/23 0003 01/14/23 0215 01/14/23 0414   BP: 124/73 101/50 111/60   Pulse: 58 (!) 112 86   Patient Position - Orthostatic VS: Sitting Lying Lying         Visual Acuity      ED Medications  Medications   sodium chloride 0 9 % bolus 1,000 mL (0 mL Intravenous Stopped 1/14/23 0118)   Famotidine (PF) (PEPCID) injection 40 mg (40 mg Intravenous Given 1/14/23 0021)   sucralfate (CARAFATE) tablet 1 g (1 g Oral Given 1/14/23 0049)   acetaminophen (TYLENOL) tablet 650 mg (650 mg Oral Given 1/14/23 0049)   iohexol (OMNIPAQUE) 350 MG/ML injection (SINGLE-DOSE) 100 mL (50 mL Intravenous Given 1/14/23 0144)   iohexol (OMNIPAQUE) 350 MG/ML injection (SINGLE-DOSE) 100 mL (50 mL Intravenous Given 1/14/23 0145)   dexamethasone (DECADRON) injection 10 mg (10 mg Intravenous Given 1/14/23 0249)       Diagnostic Studies  Results Reviewed     Procedure Component Value Units Date/Time    HS Troponin I 2hr [[id number]]  (Normal) Collected: 01/14/23 0252    Lab Status: Final result Specimen: Blood from Arm, Right Updated: 01/14/23 0323     hs TnI 2hr 3 ng/L      Delta 2hr hsTnI 0 ng/L     Strep A PCR [[id number]]  (Normal) Collected: 01/14/23 0053    Lab Status: Final result Specimen: Throat Updated: 01/14/23 0128     STREP A PCR Not Detected    HS Troponin 0hr (reflex protocol) [[id number]]  (Normal) Collected: 01/14/23 0053    Lab Status: Final result Specimen: Blood from Arm, Right Updated: 01/14/23 0124     hs TnI 0hr 3 ng/L     FLU/RSV/COVID - if FLU/RSV clinically relevant [[id number]]  (Normal) Collected: 01/14/23 0017    Lab Status: Final result Specimen: Nares from Nose Updated: 01/14/23 0110     SARS-CoV-2 Negative     INFLUENZA A PCR Negative     INFLUENZA B PCR Negative     RSV PCR Negative    Narrative:      FOR PEDIATRIC PATIENTS - copy/paste COVID Guidelines URL to browser: [email or web address]    SARS-CoV-2 assay is a Nucleic Acid Amplification assay intended for the  qualitative detection of nucleic acid from SARS-CoV-2 in nasopharyngeal  swabs  Results are for the presumptive identification of SARS-CoV-2 RNA  Positive results are indicative of infection with SARS-CoV-2, the virus  causing COVID-19, but do not rule out bacterial infection or co-infection  with other viruses  Laboratories within the [place] and its  territories are required to report all positive results to the appropriate  public health authorities  Negative results do not preclude SARS-CoV-2  infection and should not be used as the sole basis for treatment or other  patient management decisions  Negative results must be combined with  clinical observations, patient history, and epidemiological information  This test has not been FDA cleared or approved  This test has been authorized by FDA under an Emergency Use Authorization  (EUA)  This test is only authorized for the duration of time the  declaration that circumstances exist justifying the authorization of the  emergency use of an in vitro diagnostic tests for detection of SARS-CoV-2  virus and/or diagnosis of COVID-19 infection under section 564(b)(1) of  the Act, 21 U  S C  994CWA-3(W)(8), unless the authorization is terminated  or revoked sooner  The test has been validated but independent review by FDA  and CLIA is pending  Test performed using [employer] Gene[employer]pert: This RT-PCR assay targets N2,  a region unique to SARS-CoV-2   A conserved region in the E-gene was chosen  for pan-Sarbecovirus detection which includes SARS-CoV-2  According to CMS-2020-01-R, this platform meets the definition of high-throughput technology      POCT pregnancy, urine [[id number]]  (Normal) Resulted: 01/14/23 0105    Lab Status: Final result Updated: 01/14/23 0105     EXT Preg Test, Ur Negative     Control Valid    UA w Reflex to Microscopic w Reflex to Culture [[id number]] Collected: 01/14/23 0017    Lab Status: Final result Specimen: Urine, Clean Catch Updated: 01/14/23 0046     Color, UA Yellow     Clarity, UA Slightly Cloudy     Specific Gravity, UA >=1 030     pH, UA 6 0     Leukocytes, UA Negative     Nitrite, UA Negative     Protein, UA Negative mg/dl      Glucose, UA Negative mg/dl      Ketones, UA Negative mg/dl      Urobilinogen, UA 0 2 E U /dl      Bilirubin, UA Negative     Occult Blood, UA Negative    Comprehensive metabolic panel [[id number]]  (Abnormal) Collected: 01/14/23 0017    Lab Status: Final result Specimen: Blood from Arm, Right Updated: 01/14/23 0045     Sodium 138 mmol/L      Potassium 3 7 mmol/L      Chloride 101 mmol/L      CO2 28 mmol/L      ANION GAP 9 mmol/L      BUN 17 mg/dL      Creatinine 0 73 mg/dL      Glucose 103 mg/dL      Calcium 9 4 mg/dL      AST 20 U/L      ALT 34 U/L      Alkaline Phosphatase 133 U/L      Total Protein 8 1 g/dL      Albumin 3 6 g/dL      Total Bilirubin 0 29 mg/dL      eGFR 110 ml/min/1 73sq m     Narrative:      [place] guidelines for Chronic Kidney Disease (CKD):   •  Stage 1 with normal or high GFR (GFR > 90 mL/min/1 73 square meters)  •  Stage 2 Mild CKD (GFR = 60-89 mL/min/1 73 square meters)  •  Stage 3A Moderate CKD (GFR = 45-59 mL/min/1 73 square meters)  •  Stage 3B Moderate CKD (GFR = 30-44 mL/min/1 73 square meters)  •  Stage 4 Severe CKD (GFR = 15-29 mL/min/1 73 square meters)  •  Stage 5 End Stage CKD (GFR <15 mL/min/1 73 square meters)  Note: GFR calculation is accurate only with a steady state creatinine    Magnesium [[id number]]  (Normal) Collected: 01/14/23 0017    Lab Status: Final result Specimen: Blood from Arm, Right Updated: 01/14/23 0045     Magnesium 1 8 mg/dL     CBC and differential [[id number]]  (Abnormal) Collected: 01/14/23 0017    Lab Status: Final result Specimen: Blood from Arm, Right Updated: 01/14/23 0029     WBC 10 47 Thousand/uL      RBC 4 45 Million/uL      Hemoglobin 13 4 g/dL      Hematocrit 40 5 %      MCV 91 fL      MCH 30 1 pg      MCHC 33 1 g/dL      RDW 13 2 %      MPV 9 1 fL      Platelets 399 Thousands/uL      nRBC 0 /100 WBCs      Neutrophils Relative 79 %      Immat GRANS % 0 %      Lymphocytes Relative 14 %      Monocytes Relative 5 %      Eosinophils Relative 1 %      Basophils Relative 1 %      Neutrophils Absolute 8 30 Thousands/µL      Immature Grans Absolute 0 01 Thousand/uL      Lymphocytes Absolute 1 43 Thousands/µL      Monocytes Absolute 0 54 Thousand/µL      Eosinophils Absolute 0 05 Thousand/µL      Basophils Absolute 0 14 Thousands/µL                  CT soft tissue neck with contrast    (Results Pending)   CT chest abdomen pelvis w contrast    (Results Pending)        PROCEDURE INFORMATION:  Exam: CT Neck With Contrast  Exam date and time: 1/14/2023 1:31 AM  Age: 33years old  Clinical indication: Prior surgery; Surgery date: Post-operative (0-2 days); Patient HX: Post-op  problem - major (pt  C/O pain in neck, fever and vomiting after endoscopy procedure done yesterday  PT  Recently took tylenol at 11 pm  )f  TECHNIQUE:  Imaging protocol: Computed tomography of the neck with contrast   Other technique: Coronal reformations were performed  Sagittal reformations were performed  COMPARISON:  CTA HEAD AND NECK W WO CONTRAST 11/30/2020 10:53 PM  FINDINGS:  Limitations: Evaluation somewhat limited secondary to patient body habitus  Brain: Visualized intracranial contents without definite acute abnormality  Orbital cavities: Suggestion of mild bilateral proptosis  Orbits otherwise grossly unremarkable    Mastoid air cells: Minimal bilateral mastoid air cell opacification  Mild bilateral mastoid sclerosis  Paranasal sinuses: Minimal paranasal sinus mucosal thickening  Pharynx: Waldeyer's ring prominent  Adenoids prominent  Larynx: Unremarkable  Prevertebral and retropharyngeal spaces: Unremarkable  Salivary glands: Bilateral parotid gland demonstrates a couple of nonspecific small soft tissue  nodular densities, which may reflect lymph nodes  Thyroid: Thyroid slightly enlarged  Lymph nodes: No lymphadenopathy  Trachea: Visualized trachea is unremarkable  Lungs: Unremarkable to the extent visualized  Esophagus: Unremarkable to the extent visualized  Bones/joints: Bones demonstrate no definite acute abnormality  Bones are demineralized within  portions  Bones demonstrate minimal degeneration  Loss of expected cervical lordosis  Slight upper  cervical kyphosis  Soft tissues: No definite mass  No definite mature fluid collection to suggest abscess  IMPRESSIONAlyssa Crown Accession: [id number] MRN: [id number]  Preliminary Radiology Report   (QA) DISCREPANCY? If there is a discrepancy between the preliminary and final interpretation, please notify [place] via [email or web address]  If you do not have access to our QA portal, call our QA team at [phone]  This report is intended only for the use of the referring physician, and only in accordance with law, If you received this in error, call [phone]  Page 2 of 2  1  Thoracic findings discussed elsewhere  2  No definite findings to explain symptoms  3  Additional findings, as described above  Thank you for allowing us to participate in the care of your patient    Dictated and Authenticated by: La Smith MD  01/14/2023 5:05 AM [facility] ([address])          PROCEDURE INFORMATION:  Exam: CT Chest With Contrast; Diagnostic  Exam date and time: 1/14/2023 1:31 AM  Age: 33years old  Clinical indication: Other: Post-op problem - major (pt  C/O pain in neck, fever and vomiting after  endoscopy procedure done yesterday  PT  Recently took tylenol at 11 pm  ); Prior surgery; Surgery  date: Post-operative (0-2 days)  TECHNIQUE:  Imaging protocol: Diagnostic computed tomography of the chest with contrast   COMPARISON:  CT ABDOMEN PELVIS W CONTRAST 4/15/2022 6:33 PM  FINDINGS:  Lungs: New mild areas of left perihilar ground-glass opacity are nonspecific but presumably  infectious or inflammatory  Pleural spaces: No significant pleural effusion  No pneumothorax  Heart: No cardiomegaly or pericardial effusion  Mediastinal space: No pneumomediastinum  Lymph nodes: No adenopathy  Vasculature: No aortic aneurysm or dissection  Bones/joints: No acute osseous abnormality  Soft tissues: Soft tissues are unremarkable as visualized  IMPRESSION:  New mild areas of left perihilar ground-glass opacity are nonspecific but presumably infectious or  inflammatory   =========================  PROCEDURE INFORMATION:  Exam: CT Abdomen And Pelvis With Contrast  Exam date and time: 1/14/2023 1:31 AM  Age: 33years old  Clinical indication: Other: Post-op problem - major (pt  C/O pain in neck, fever and vomiting after  endoscopy procedure done yesterday  PT  Recently took tylenol at 11 pm  ); Prior surgery; Surgery  date: Post-operative (0-2 days)  Tanya Lowe Accession: [id number] MRN: [id number]  Preliminary Radiology Report   (QA) DISCREPANCY? If there is a discrepancy between the preliminary and final interpretation, please notify [facility] via [email or web address]    If you do not have access to our QA portal, call our QA team at [phone]  This report is intended only for the use of the referring physician, and only in accordance with law, If you received this in error, call [phone]  Page 2 of 2  TECHNIQUE:  Imaging protocol: Computed tomography of the abdomen and pelvis with contrast   COMPARISON:  CT ABDOMEN PELVIS W CONTRAST 4/15/2022 6:33 PM  FINDINGS:  Liver: The liver is unremarkable  Gallbladder and bile ducts: The gallbladder is unremarkable  No biliary ductal dilatation  Pancreas: The pancreas is unremarkable  Spleen: The spleen is unremarkable  Adrenal glands: The adrenal glands are unremarkable  Kidneys and ureters: Symmetric renal enhancement without hydronephrosis  Stomach and bowel: No evidence of bowel obstruction  No pericolonic inflammatory stranding  Appendix: Normal appendix  Intraperitoneal space: No significant peritoneal free fluid  No free peritoneal air  Vasculature: No acute vascular findings  No abdominal aortic aneurysm  Lymph nodes: No adenopathy  Urinary bladder: No focal wall thickening of the urinary bladder  Reproductive: Uterus is unremarkable  No suspicious adnexal lesion seen  Bones/joints: Osteitis condensans ilii  No acute osseous abnormality  Soft tissues: Soft tissues are unremarkable as visualized  IMPRESSION:  No acute findings in the abdomen or pelvis  Please see separate chest impression above  Thank you for allowing us to participate in the care of your patient  Dictated and Authenticated by: Nba Hernandez MD  01/14/2023 5:13 AM [facility] Time ([address]    Procedures  ECG 12 Lead Documentation Only    Date/Time: 1/14/2023 1:55 AM  Performed by: Vidhya Mena DO  Authorized by: Vidhya Mena DO     Indications / Diagnosis:  Chest discomfort  ECG reviewed by me, the ED Provider: yes    Patient location:  ED  Previous ECG:     Previous ECG:  Compared to current    Similarity:  No change    Comparison to cardiac monitor: Yes    Interpretation:     Interpretation: abnormal    Comments:      Sinus rhythm, rate 114, normal axis, normal intervals, no acute ST/T wave abnormalities noted, sinus tachycardia noted, otherwise unremarkable EKG, unchanged from previous study               ED Course SBIRT 22yo+    Flowsheet Row Most Recent Value   SBIRT (25 yo +)    In order to provide better care to our patients, we are screening all of our patients for alcohol and drug use  Would it be okay to ask you these screening questions? Unable to answer at this time Filed at: 01/14/2023 0007                    Medical Decision Making  Obtain blood work, EKG, CT soft tissue neck/chest/abdomen/pelvis  Give IV fluids, Pepcid, Carafate, Tylenol and reassess    Lab and radiology results were unremarkable  Patient felt better after medications given in the ED  At this time the etiology of patient's fever is unclear  Patient was afebrile throughout her ED stay  Patient felt some burning sensation in her throat and chest that improved with Carafate  At this time Carafate is added to patient's regimen for GERD  Patient is discharged home with follow-up to PCP as well as GI  Close return instructions given to return to the ER for any worsening symptoms  Patient agrees with discharge plan  Patient well appearing at time of discharge  Please Note: [employer] Direct voice recognition software may have been used in the creation of this document  Wrong words or sound a like substitutions may have occurred due to the inherent limitations of the voice software  Amount and/or Complexity of Data Reviewed  Labs: ordered  Radiology: ordered  Risk  OTC drugs  Prescription drug management            Disposition  Final diagnoses:   Postoperative fever   GERD (gastroesophageal reflux disease)   Neck discomfort     Time reflects when diagnosis was documented in both MDM as applicable and the Disposition within this note     Time User Action Codes Description Comment    1/14/2023  5:41 AM Katelynn Galdamez Add [R50 9] Fever     1/14/2023  5:41 AM Huey Guallpa Add [R50 82] Postoperative fever     1/14/2023  5:41 AM Katelynn Galdamez Modify [R50 82] Postoperative fever     1/14/2023  5:41 AM Lerry Backbone [R50 9] Fever     1/14/2023  5:41 AM Marcello Guallpa Add [K21 9] GERD (gastroesophageal reflux disease)     1/14/2023  5:42 AM Haroon Chrissyhari Add [M54 2] Neck discomfort       ED Disposition     ED Disposition   Discharge    Condition   Stable    Date/Time   Sat Jan 14, 2023  5:41 AM    Comment   Margoth Joey discharge to home/self care  Follow-up Information     Follow up With Specialties Details Why [address], [address] In 2 days  [address]  [address] [facility]      Carlos Alba MD Gastroenterology Schedule an appointment as soon as possible for a visit   [facility] [address] [place] [address]  [phone]            Patient's Medications   Discharge Prescriptions    SUCRALFATE (CARAFATE) 1 G/10 ML SUSPENSION    Take 10 mL (1 g total) by mouth 4 (four) times a day       Start Date: 1/14/2023 End Date: --       Order Dose: 1 g       Quantity: 420 mL    Refills: 0       No discharge procedures on file      PDMP Review     None          ED Provider  Electronically Signed by           Sharla Wise DO  01/14/23 9807

## 2023-01-17 LAB
ATRIAL RATE: 114 BPM
P AXIS: 53 DEGREES
PR INTERVAL: 126 MS
QRS AXIS: 33 DEGREES
QRSD INTERVAL: 78 MS
QT INTERVAL: 326 MS
QTC INTERVAL: 449 MS
T WAVE AXIS: 16 DEGREES
VENTRICULAR RATE: 114 BPM

## 2023-01-20 ENCOUNTER — TELEPHONE (OUTPATIENT)
Dept: FAMILY MEDICINE CLINIC | Facility: CLINIC | Age: 33
End: 2023-01-20

## 2023-01-20 ENCOUNTER — OFFICE VISIT (OUTPATIENT)
Dept: URGENT CARE | Facility: CLINIC | Age: 33
End: 2023-01-20

## 2023-01-20 ENCOUNTER — APPOINTMENT (OUTPATIENT)
Dept: RADIOLOGY | Facility: CLINIC | Age: 33
End: 2023-01-20

## 2023-01-20 VITALS
RESPIRATION RATE: 16 BRPM | OXYGEN SATURATION: 99 % | DIASTOLIC BLOOD PRESSURE: 89 MMHG | HEART RATE: 94 BPM | WEIGHT: 234 LBS | SYSTOLIC BLOOD PRESSURE: 123 MMHG | HEIGHT: 55 IN | BODY MASS INDEX: 54.15 KG/M2 | TEMPERATURE: 99.5 F

## 2023-01-20 DIAGNOSIS — R05.1 ACUTE COUGH: Primary | ICD-10-CM

## 2023-01-20 DIAGNOSIS — J20.9 ACUTE BRONCHITIS, UNSPECIFIED ORGANISM: Primary | ICD-10-CM

## 2023-01-20 DIAGNOSIS — R05.1 ACUTE COUGH: ICD-10-CM

## 2023-01-20 RX ORDER — BENZONATATE 100 MG/1
100 CAPSULE ORAL 3 TIMES DAILY PRN
Qty: 20 CAPSULE | Refills: 0 | Status: SHIPPED | OUTPATIENT
Start: 2023-01-20

## 2023-01-20 RX ORDER — ALBUTEROL SULFATE 90 UG/1
2 AEROSOL, METERED RESPIRATORY (INHALATION) EVERY 6 HOURS PRN
Qty: 18 G | Refills: 3 | Status: SHIPPED | OUTPATIENT
Start: 2023-01-20

## 2023-01-20 NOTE — TELEPHONE ENCOUNTER
Patient calling in and asking for albuterol inhaler refill    [facility] will not let me hit refill button keeps getting error

## 2023-01-20 NOTE — PATIENT INSTRUCTIONS
Cough: -CXR shows no obvious cardiopulmonary abnormalities  Awaiting official read  -Lungs are CTABL  Pulse ox is 99%  -Will prescribe tessalon perles for the coughing    -Stay very well hydrated and push fluids  -Run a humidifier by your bed and take steam showers to clear mucus   -Zicam nasal AllClear can be soothing to the nasal passages   -You can use flonase once daily for two weeks   -Advil or Tylenol for fever or pain  -Mucinex OTC or Zyrtec or Claritin  Drink plenty of water with the mucinex    -Honey or throat lozenges for cough may be helpful  -Warm salt water gargles and tea with honey   -Obtain a pulse ox device and check your O2 1-2 times a day  You want your oxygen levels to be >93%  If they go below 93% go to the ED immediately  -Vitamin C and D daily  You can attempt to use zinc or Zicam    -If your sx worsen or persist follow up with your PCP for recheck  Red flag signs and ED precautions discussed  Follow up with PCP in 2-3 days for recheck is advised

## 2023-01-20 NOTE — PROGRESS NOTES
[employer]        NAME: Damaso Michael is a 33 y o  female  : 1990    MRN: [id number]  DATE: 2023  TIME: 2:14 PM    Assessment and Plan   Acute cough [R05 1]  1  Acute cough  Covid/Flu-Office Collect            Patient Instructions   Cough:   -CXR shows no obvious cardiopulmonary abnormalities  Awaiting official read  -Lungs are CTABL  Pulse ox is 99%  -Will prescribe tessalon perles for the coughing    -Stay very well hydrated and push fluids  -Run a humidifier by your bed and take steam showers to clear mucus   -Zicam nasal AllClear can be soothing to the nasal passages   -You can use flonase once daily for two weeks   -Advil or Tylenol for fever or pain  -Mucinex OTC or Zyrtec or Claritin  Drink plenty of water with the mucinex    -Honey or throat lozenges for cough may be helpful  -Warm salt water gargles and tea with honey   -Obtain a pulse ox device and check your O2 1-2 times a day  You want your oxygen levels to be >93%  If they go below 93% go to the ED immediately  -Vitamin C and D daily  You can attempt to use zinc or Zicam    -If your sx worsen or persist follow up with your PCP for recheck  Red flag signs and ED precautions discussed  Follow up with PCP in 2-3 days for recheck is advised  Follow up with PCP in 3-5 days  Proceed to  ER if symptoms worsen  Chief Complaint     Chief Complaint   Patient presents with   • Cold Like Symptoms     URI s/s x 1 week         History of Present Illness       The patient is a 81-year-old female with a morbid obesity, GERD, peptic ulcer disease, autism, GARIMA on CPAP, depression female who presents today for a five day hx of low grade fever, productive coughing, congestion, rhinorrhea  She also has a burning sensation in her lungs with the coughing  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  She has been taking robitussin for her sx with no relief  No chills, body aches   No wheezing, chest tightness, cp, palpitations  No dizziness or weakness  No GI sx  Good PO intake  No loss of taste or smell  No lower extremity edema  No calf swelling or redness  No hx of asthma or smoking  Review of Systems   Review of Systems   Constitutional: Positive for fatigue and fever  Negative for activity change, appetite change, chills and diaphoresis  HENT: Positive for congestion and rhinorrhea  Negative for ear discharge, ear pain, facial swelling, hearing loss, postnasal drip, sinus pressure, sinus pain, sore throat, tinnitus, trouble swallowing and voice change  Eyes: Negative for visual disturbance  Respiratory: Positive for cough and shortness of breath  Negative for apnea, chest tightness, wheezing and stridor  Cardiovascular: Negative for chest pain, palpitations and leg swelling  Gastrointestinal: Negative for abdominal distention and abdominal pain  Genitourinary: Negative for decreased urine volume  Musculoskeletal: Negative for arthralgias, joint swelling, myalgias, neck pain and neck stiffness  Skin: Negative for rash  Allergic/Immunologic: Negative for immunocompromised state  Neurological: Negative for dizziness, weakness, light-headedness, numbness and headaches  Hematological: Negative for adenopathy           Current Medications       Current Outpatient Medications:   •  acetaminophen (TYLENOL) 500 mg tablet, Take 1 tablet (500 mg total) by mouth every 6 (six) hours as needed for mild pain, Disp: , Rfl: 0  •  albuterol (Proventil HFA) 90 mcg/act inhaler, Inhale 2 puffs every 6 (six) hours as needed for wheezing, Disp: 18 g, Rfl: 3  •  ARIPiprazole (ABILIFY) 10 mg tablet, Take 10 mg by mouth every morning, Disp: , Rfl:   •  citalopram (CeleXA) 10 mg tablet, Take 10 mg by mouth every morning , Disp: , Rfl:   •  docusate sodium (COLACE) 100 mg capsule, Take 1 capsule (100 mg total) by mouth 2 (two) times a day, Disp: 60 capsule, Rfl: 5  •  ergocalciferol (VITAMIN D2) 50,000 units, Take 1 capsule (50,000 Units total) by mouth once a week (Patient taking differently: Take 50,000 Units by mouth once a week On Monday), Disp: 8 capsule, Rfl: 0  •  famotidine (PEPCID) 40 MG tablet, Take 1 tablet (40 mg total) by mouth daily at bedtime, Disp: 30 tablet, Rfl: 5  •  ferrous sulfate 324 (65 Fe) mg, TAKE ONE TABLET BY MOUTH TWICE A DAY BEFORE MEALS (Patient taking differently: Take 324 mg by mouth daily before breakfast), Disp: 60 tablet, Rfl: 2  •  lamoTRIgine (LaMICtal) 200 MG tablet, Take 1 tablet (200 mg total) by mouth 2 (two) times a day Take with one 25 mg tablet for total dose of 225 mg , Disp: 180 tablet, Rfl: 3  •  lamoTRIgine (LaMICtal) 25 mg tablet, TAKE TWO TABLETS BY MOUTH TWICE A DAY, Disp: 180 tablet, Rfl: 1  •  levETIRAcetam (KEPPRA) 750 mg tablet, Take 2 tablets (1,500 mg total) by mouth every 12 (twelve) hours, Disp: 360 tablet, Rfl: 3  •  levothyroxine 25 mcg tablet, TAKE ONE TABLET BY MOUTH EVERY DAY IN THE EARLY MORNING, Disp: 30 tablet, Rfl: 2  •  meloxicam (Mobic) 15 mg tablet, Take 1 tablet (15 mg total) by mouth daily as needed for moderate pain, Disp: 30 tablet, Rfl: 0  •  metFORMIN (GLUCOPHAGE-XR) 750 mg 24 hr tablet, Take 1 tablet (750 mg total) by mouth daily with breakfast, Disp: 30 tablet, Rfl: 2  •  naproxen (Naprosyn) 500 mg tablet, Take 1 tablet (500 mg total) by mouth 2 (two) times a day with meals (Patient taking differently: Take 500 mg by mouth 2 (two) times a day as needed), Disp: 30 tablet, Rfl: 0  •  omeprazole (PriLOSEC) 40 MG capsule, Take 1 capsule (40 mg total) by mouth in the morning and 1 capsule (40 mg total) in the evening  Take before meals  , Disp: 60 capsule, Rfl: 3  •  ondansetron (ZOFRAN) 4 mg tablet, Take 1 tablet (4 mg total) by mouth every 8 (eight) hours as needed for nausea or vomiting, Disp: 60 tablet, Rfl: 1  •  sucralfate (CARAFATE) 1 g/10 mL suspension, Take 10 mL (1 g total) by mouth 4 (four) times a day, Disp: 420 mL, Rfl: 0  •  traZODone (DESYREL) 50 mg tablet, 50 mg daily at bedtime, Disp: , Rfl:     Current Allergies     Allergies as of 01/20/2023 - Reviewed 01/20/2023   Allergen Reaction Noted   • Haloperidol Other (See Comments) 02/04/2021   • Penicillins Hives 05/22/2020   • Pollen extract Allergic Rhinitis 05/28/2021            The following portions of the patient's history were reviewed and updated as appropriate: allergies, current medications, past family history, past medical history, past social history, past surgical history and problem list      Past Medical History:   Diagnosis Date   • Autism    • [place] esophagus    • CPAP (continuous positive airway pressure) dependence    • 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[facility] )     not diagnosed as of 1/9/23-trying to R/O   • Depression    • Diabetes mellitus ([facility] )    • Disease of thyroid gland     hypo   • Dysphagia     solids and liquids   • Female infertility    • Fibromyalgia, primary    • Gastric ulcer    • History of bronchitis    • Iron deficiency anemia     infusion in 11/2022   • Irregular menses    • Miscarriage    • Morbid obesity with BMI of 50 0-59 9, adult ([facility])    • Plantar fasciitis of left foot    • Reflux esophagitis    • Seizures ([facility] )     last one 7/13/22   • Sleep apnea     CPAP   • Wears glasses        Past Surgical History:   Procedure Laterality Date   • EGD      with Bx due to barretts esophagus   • EYE SURGERY Bilateral     lazy eye repair   • [place] BIOPSY OF LIP N/A 11/10/2022    Procedure: EXCISION BIOPSY SALVARY GLANDS LOWER LIP;  Surgeon: Elizabeth Chavez MD;  Location: [facility];  Service: ENT   • [place] HYSTEROSCOPY BX ENDOMETRIUM&/POLYPC W/WO D&C N/A 9/22/2021    Procedure: DILATATION AND CURETTAGE (D&C) WITH HYSTEROSCOPY;  Surgeon: Magdalena Guzman MD;  Location: [facility];  Service: Gynecology   • WISDOM TOOTH EXTRACTION         Family History   Problem Relation Age of Onset   • Bipolar disorder Mother    • Depression Mother    • Depression Father    • Diabetes Maternal Grandmother    • Thyroid disease Maternal Grandmother    • Hypertension Maternal Grandmother    • Heart disease Maternal Grandmother    • Diabetes Maternal Grandfather    • Diabetes Paternal Grandmother    • Breast cancer Paternal Grandmother    • Stroke Paternal Grandmother    • Diabetes Paternal Grandfather    • Breast cancer Maternal Aunt 35        bilateral   • Stomach cancer Maternal Aunt          Medications have been verified  Objective   /89   Pulse 94   Temp 99 5 °F (37 5 °C)   Resp 16   Ht 4' 7" (1 397 m)   Wt 106 kg (234 lb)   LMP 12/24/2022 Comment: negative pregnancy test  SpO2 99%   BMI 54 39 kg/m²   Patient's last menstrual period was 12/24/2022  Physical Exam     Physical Exam  Vitals and nursing note reviewed  Constitutional:       General: She is not in acute distress  Appearance: She is well-developed  She is not diaphoretic  HENT:      Head: Normocephalic and atraumatic  Right Ear: Hearing, tympanic membrane, ear canal and external ear normal       Left Ear: Hearing, tympanic membrane, ear canal and external ear normal       Nose: Congestion present  No mucosal edema or rhinorrhea  Right Sinus: No maxillary sinus tenderness or frontal sinus tenderness  Left Sinus: No maxillary sinus tenderness or frontal sinus tenderness  Mouth/Throat:      Lips: Pink  Mouth: Mucous membranes are moist       Pharynx: Oropharynx is clear  Uvula midline  No pharyngeal swelling, oropharyngeal exudate, posterior oropharyngeal erythema or uvula swelling  Tonsils: No tonsillar exudate or tonsillar abscesses  Cardiovascular:      Rate and Rhythm: Normal rate and regular rhythm  No extrasystoles are present  Chest Wall: PMI is not displaced  Pulses: Normal pulses  Heart sounds: Normal heart sounds, S1 normal and S2 normal  Heart sounds not distant  No murmur heard  No friction rub  No gallop  No S3 or S4 sounds     Pulmonary:      Effort: Pulmonary effort is normal  No tachypnea, bradypnea, accessory muscle usage or respiratory distress  Breath sounds: Normal breath sounds and air entry  No stridor, decreased air movement or transmitted upper airway sounds  No decreased breath sounds, wheezing, rhonchi or rales  Musculoskeletal:      Cervical back: Normal range of motion and neck supple  Right lower leg: No edema  Left lower leg: No edema  Lymphadenopathy:      Cervical: No cervical adenopathy  Neurological:      Mental Status: She is alert and oriented to person, place, and time     Psychiatric:         Behavior: Behavior normal

## 2023-01-21 LAB
FLUAV RNA RESP QL NAA+PROBE: NEGATIVE
FLUBV RNA RESP QL NAA+PROBE: NEGATIVE
SARS-COV-2 RNA RESP QL NAA+PROBE: NEGATIVE

## 2023-01-23 ENCOUNTER — TELEPHONE (OUTPATIENT)
Dept: NEUROLOGY | Facility: CLINIC | Age: 33
End: 2023-01-23

## 2023-01-30 ENCOUNTER — HOSPITAL ENCOUNTER (EMERGENCY)
Facility: HOSPITAL | Age: 33
Discharge: HOME | End: 2023-01-31
Attending: EMERGENCY MEDICINE | Admitting: EMERGENCY MEDICINE

## 2023-01-30 ENCOUNTER — APPOINTMENT (EMERGENCY)
Dept: RADIOLOGY | Facility: HOSPITAL | Age: 33
End: 2023-01-30

## 2023-01-30 DIAGNOSIS — O26.891 ABDOMINAL PAIN DURING PREGNANCY IN FIRST TRIMESTER: Primary | ICD-10-CM

## 2023-01-30 DIAGNOSIS — R10.9 ABDOMINAL PAIN DURING PREGNANCY IN FIRST TRIMESTER: Primary | ICD-10-CM

## 2023-01-30 LAB
ABO GROUP BLD: NORMAL
ABO GROUP BLD: NORMAL
ANION GAP SERPL CALCULATED.3IONS-SCNC: 9 MMOL/L (ref 4–13)
B-HCG SERPL-ACNC: 692 MIU/ML (ref 0–11.6)
BASOPHILS # BLD AUTO: 0.12 THOUSANDS/ÂΜL (ref 0–0.1)
BASOPHILS NFR BLD AUTO: 1 % (ref 0–1)
BLD GP AB SCN SERPL QL: NEGATIVE
BUN SERPL-MCNC: 13 MG/DL (ref 5–25)
CALCIUM SERPL-MCNC: 9 MG/DL (ref 8.3–10.1)
CHLORIDE SERPL-SCNC: 102 MMOL/L (ref 96–108)
CO2 SERPL-SCNC: 24 MMOL/L (ref 21–32)
CREAT SERPL-MCNC: 0.62 MG/DL (ref 0.6–1.3)
EOSINOPHIL # BLD AUTO: 0.08 THOUSAND/ÂΜL (ref 0–0.61)
EOSINOPHIL NFR BLD AUTO: 1 % (ref 0–6)
ERYTHROCYTE [DISTWIDTH] IN BLOOD BY AUTOMATED COUNT: 13 % (ref 11.6–15.1)
EXT PREGNANCY TEST URINE: POSITIVE
EXT. CONTROL: ABNORMAL
GFR SERPL CREATININE-BSD FRML MDRD: 120 ML/MIN/1.73SQ M
GLUCOSE SERPL-MCNC: 111 MG/DL (ref 65–140)
HCT VFR BLD AUTO: 36.4 % (ref 34.8–46.1)
HGB BLD-MCNC: 12.2 G/DL (ref 11.5–15.4)
IMM GRANULOCYTES # BLD AUTO: 0.03 THOUSAND/UL (ref 0–0.2)
IMM GRANULOCYTES NFR BLD AUTO: 0 % (ref 0–2)
LYMPHOCYTES # BLD AUTO: 1.82 THOUSANDS/ÂΜL (ref 0.6–4.47)
LYMPHOCYTES NFR BLD AUTO: 22 % (ref 14–44)
MCH RBC QN AUTO: 30 PG (ref 26.8–34.3)
MCHC RBC AUTO-ENTMCNC: 33.5 G/DL (ref 31.4–37.4)
MCV RBC AUTO: 90 FL (ref 82–98)
MONOCYTES # BLD AUTO: 0.45 THOUSAND/ÂΜL (ref 0.17–1.22)
MONOCYTES NFR BLD AUTO: 5 % (ref 4–12)
NEUTROPHILS # BLD AUTO: 5.79 THOUSANDS/ÂΜL (ref 1.85–7.62)
NEUTS SEG NFR BLD AUTO: 71 % (ref 43–75)
NRBC BLD AUTO-RTO: 0 /100 WBCS
PLATELET # BLD AUTO: 317 THOUSANDS/UL (ref 149–390)
PMV BLD AUTO: 8.8 FL (ref 8.9–12.7)
POTASSIUM SERPL-SCNC: 3.3 MMOL/L (ref 3.5–5.3)
RBC # BLD AUTO: 4.06 MILLION/UL (ref 3.81–5.12)
RH BLD: NEGATIVE
RH BLD: NEGATIVE
SODIUM SERPL-SCNC: 135 MMOL/L (ref 135–147)
SPECIMEN EXPIRATION DATE: NORMAL
WBC # BLD AUTO: 8.29 THOUSAND/UL (ref 4.31–10.16)

## 2023-01-31 ENCOUNTER — TELEPHONE (OUTPATIENT)
Dept: FAMILY MEDICINE CLINIC | Facility: CLINIC | Age: 33
End: 2023-01-31

## 2023-01-31 VITALS
DIASTOLIC BLOOD PRESSURE: 59 MMHG | SYSTOLIC BLOOD PRESSURE: 137 MMHG | RESPIRATION RATE: 18 BRPM | HEART RATE: 98 BPM | OXYGEN SATURATION: 97 % | TEMPERATURE: 99.2 F

## 2023-01-31 DIAGNOSIS — Z34.90 EARLY STAGE OF PREGNANCY: Primary | ICD-10-CM

## 2023-01-31 NOTE — TELEPHONE ENCOUNTER
Dr Shavon Jones,    Patient would like to speak to you  She just found out that she is pregnant yesterday and has an appointment with you for 2/13 but wanted to see you sooner regarding her medications    Please call

## 2023-01-31 NOTE — ED NOTES
Patient came back from [address] [place] [address] [address]       Arlyn Xie RN  01/30/23 0780

## 2023-01-31 NOTE — ED PROVIDER NOTES
History  Chief Complaint   Patient presents with   • Abdominal Pain Pregnant     Did pos preg test today has been having abd cramps for past several days  Just wants to check if everything is ok, no bleeding now     78-year-old female, presents with lower abdominal cramping  Reports positive home pregnancy test today  Last menstrual period 12/24  Patient reports intermittent cramping pain, mostly in left lower abdomen  Modifying factors, denies any associated vaginal bleeding or discharge  History provided by:  Patient   used: No        Prior to Admission Medications   Prescriptions Last Dose Informant Patient Reported? Taking?    ARIPiprazole (ABILIFY) 10 mg tablet   Yes No   Sig: Take 10 mg by mouth every morning   acetaminophen (TYLENOL) 500 mg tablet   No No   Sig: Take 1 tablet (500 mg total) by mouth every 6 (six) hours as needed for mild pain   albuterol (Proventil HFA) 90 mcg/act inhaler   No No   Sig: Inhale 2 puffs every 6 (six) hours as needed for wheezing   benzonatate (TESSALON PERLES) 100 mg capsule   No No   Sig: Take 1 capsule (100 mg total) by mouth 3 (three) times a day as needed for cough   citalopram (CeleXA) 10 mg tablet   Yes No   Sig: Take 10 mg by mouth every morning    docusate sodium (COLACE) 100 mg capsule   No No   Sig: Take 1 capsule (100 mg total) by mouth 2 (two) times a day   ergocalciferol (VITAMIN D2) 50,000 units   No No   Sig: Take 1 capsule (50,000 Units total) by mouth once a week   Patient taking differently: Take 50,000 Units by mouth once a week On Monday   famotidine (PEPCID) 40 MG tablet   No No   Sig: Take 1 tablet (40 mg total) by mouth daily at bedtime   ferrous sulfate 324 (65 Fe) mg   No No   Sig: TAKE ONE TABLET BY MOUTH TWICE A DAY BEFORE MEALS   Patient taking differently: Take 324 mg by mouth daily before breakfast   lamoTRIgine (LaMICtal) 200 MG tablet   No No   Sig: Take 1 tablet (200 mg total) by mouth 2 (two) times a day Take with one 25 mg tablet for total dose of 225 mg    lamoTRIgine (LaMICtal) 25 mg tablet   No No   Sig: TAKE TWO TABLETS BY MOUTH TWICE A DAY   levETIRAcetam (KEPPRA) 750 mg tablet   No No   Sig: Take 2 tablets (1,500 mg total) by mouth every 12 (twelve) hours   levothyroxine 25 mcg tablet   No No   Sig: TAKE ONE TABLET BY MOUTH EVERY DAY IN THE EARLY MORNING   meloxicam (Mobic) 15 mg tablet   No No   Sig: Take 1 tablet (15 mg total) by mouth daily as needed for moderate pain   metFORMIN (GLUCOPHAGE-XR) 750 mg 24 hr tablet   No No   Sig: Take 1 tablet (750 mg total) by mouth daily with breakfast   naproxen (Naprosyn) 500 mg tablet   No No   Sig: Take 1 tablet (500 mg total) by mouth 2 (two) times a day with meals   Patient taking differently: Take 500 mg by mouth 2 (two) times a day as needed   omeprazole (PriLOSEC) 40 MG capsule   No No   Sig: Take 1 capsule (40 mg total) by mouth in the morning and 1 capsule (40 mg total) in the evening  Take before meals     ondansetron (ZOFRAN) 4 mg tablet   No No   Sig: Take 1 tablet (4 mg total) by mouth every 8 (eight) hours as needed for nausea or vomiting   sucralfate (CARAFATE) 1 g/10 mL suspension   No No   Sig: Take 10 mL (1 g total) by mouth 4 (four) times a day   traZODone (DESYREL) 50 mg tablet   Yes No   Si mg daily at bedtime      Facility-Administered Medications: None       Past Medical History:   Diagnosis Date   • Autism    • [place] esophagus    • CPAP (continuous positive airway pressure) dependence    • 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HCC)     not diagnosed as of 24-trying to R/O   • Depression    • Diabetes mellitus ([facility] )    • Disease of thyroid gland     hypo   • Dysphagia     solids and liquids   • Female infertility    • Fibromyalgia, primary    • Gastric ulcer    • History of bronchitis    • Iron deficiency anemia     infusion in 2022   • Irregular menses    • Miscarriage    • Morbid obesity with BMI of 50 0-59 9, adult ([facility] )    • Plantar fasciitis of left foot • Reflux esophagitis    • Seizures ([facility] [address] )     last one 7/13/22   • Sleep apnea     CPAP   • Wears glasses        Past Surgical History:   Procedure Laterality Date   • EGD      with Bx due to barretts esophagus   • EYE SURGERY Bilateral     lazy eye repair   • [place] BIOPSY OF LIP N/A 11/10/2022    Procedure: EXCISION BIOPSY SALVARY GLANDS LOWER LIP;  Surgeon: Reese Conway MD;  Location: [facility];  Service: ENT   • [place] HYSTEROSCOPY BX ENDOMETRIUM&/POLYPC W/WO D&C N/A 9/22/2021    Procedure: DILATATION AND CURETTAGE (D&C) WITH HYSTEROSCOPY;  Surgeon: Austen Suazo MD;  Location: [facility];  Service: Gynecology   • WISDOM TOOTH EXTRACTION         Family History   Problem Relation Age of Onset   • Bipolar disorder Mother    • Depression Mother    • Depression Father    • Diabetes Maternal Grandmother    • Thyroid disease Maternal Grandmother    • Hypertension Maternal Grandmother    • Heart disease Maternal Grandmother    • Diabetes Maternal Grandfather    • Diabetes Paternal Grandmother    • Breast cancer Paternal Grandmother    • Stroke Paternal Grandmother    • Diabetes Paternal Grandfather    • Breast cancer Maternal Aunt 35        bilateral   • Stomach cancer Maternal Aunt      I have reviewed and agree with the history as documented  E-Cigarette/Vaping   • E-Cigarette Use Never User      E-Cigarette/Vaping Substances   • Nicotine No    • THC No    • CBD No    • Flavoring No    • Other No    • Unknown No      Social History     Tobacco Use   • Smoking status: Never   • Smokeless tobacco: Never   Vaping Use   • Vaping Use: Never used   Substance Use Topics   • Alcohol use: Yes     Comment: occ   • Drug use: Not Currently     Types: Marijuana     Comment: about a couple times a week, quit June 2022       Review of Systems   Constitutional: Negative  Negative for fever  Respiratory: Negative  Cardiovascular: Negative  Gastrointestinal: Negative for nausea and vomiting     Genitourinary: Negative for dysuria and vaginal bleeding  Musculoskeletal: Negative  Skin: Negative  Neurological: Negative  Physical Exam  Physical Exam  Vitals and nursing note reviewed  Constitutional:       General: She is not in acute distress  HENT:      Head: Normocephalic and atraumatic  Eyes:      Extraocular Movements: Extraocular movements intact  Pupils: Pupils are equal, round, and reactive to light  Cardiovascular:      Rate and Rhythm: Normal rate  Pulmonary:      Effort: Pulmonary effort is normal    Abdominal:      Palpations: Abdomen is soft  Tenderness: There is no abdominal tenderness  Musculoskeletal:         General: Normal range of motion  Skin:     General: Skin is warm  Neurological:      General: No focal deficit present  Mental Status: She is alert and oriented to person, place, and time  Motor: No weakness           Vital Signs  ED Triage Vitals [01/30/23 2158]   Temperature Pulse Respirations Blood Pressure SpO2   99 2 °F (37 3 °C) 99 20 122/60 100 %      Temp Source Heart Rate Source Patient Position - Orthostatic VS BP Location FiO2 (%)   Tympanic Monitor Sitting Right arm --      Pain Score       6           Vitals:    01/30/23 2158   BP: 122/60   Pulse: 99   Patient Position - Orthostatic VS: Sitting         Visual Acuity      ED Medications  Medications - No data to display    Diagnostic Studies  Results Reviewed     Procedure Component Value Units Date/Time    CBC and differential [[id number]]     Lab Status: No result Specimen: Blood     Basic metabolic panel [[id number]]     Lab Status: No result Specimen: Blood     Quantitative hCG [[id number]]     Lab Status: No result Specimen: Blood     POCT pregnancy, urine [[id number]]     Lab Status: No result                  No orders to display              Procedures  Procedures         ED Course  ED Course as of 01/31/23 0104   Mon Jan 30, 2023 2239 Urine pregnancy positive, pelvic ultrasound ordered to evaluate for possible ectopic pregnancy  Tue Jan 31, 2023   0101 Patient comfortable, looks well and in no distress, reports no current pain  Lab and ultrasound results reviewed with patient's, ultrasound with no IUP  Discussed with patient possible early pregnancy, miscarriage, or ectopic pregnancy  Patient states she has a gynecologist she can call tomorrow for follow-up  Instructed to return to emergency department for any new concerning symptoms  Medical Decision Making  23-year-old female, presents with lower abdominal cramping pain, home positive pregnancy test   Differential diagnosis includes ectopic pregnancy, spontaneous miscarriage, early pregnancy among other diagnoses  Patient looks well on exam and in no distress, abdomen is soft and nontender  Labs ordered, will continue to monitor and reevaluate  I have reviewed test results and diagnosis with patient  Follow-up plan reviewed  Precautions for acute return for re-evaluation are reviewed  Opportunity to ask questions was provided  Patient verbalizes understanding  Abdominal pain during pregnancy in first trimester: acute illness or injury  Amount and/or Complexity of Data Reviewed  External Data Reviewed: notes  Labs: ordered  Decision-making details documented in ED Course  Radiology: ordered  Disposition  Final diagnoses:   None     ED Disposition     None      Follow-up Information    None         Patient's Medications   Discharge Prescriptions    No medications on file       No discharge procedures on file      PDMP Review     None          ED Provider  Electronically Signed by           Arianna Ashraf MD  01/31/23 4599

## 2023-01-31 NOTE — TELEPHONE ENCOUNTER
I am ok seeing her Monday 2/6 at 1pm if ok per pt and something that can be accommodated, per Connie Haynes- I am ok with another time on Monday afternoon but prefer 1pm  Thank you!

## 2023-01-31 NOTE — TELEPHONE ENCOUNTER
I'm ok seeing her still, as long as we can clarify to her that it isnt an official "prenatal visit" but one to discuss medication concerns- I can always discuss with Dr Tayla Maria the next day if we have more concerns  Thank you!

## 2023-02-01 ENCOUNTER — APPOINTMENT (OUTPATIENT)
Dept: LAB | Facility: HOSPITAL | Age: 33
End: 2023-02-01

## 2023-02-01 DIAGNOSIS — Z34.90 EARLY STAGE OF PREGNANCY: ICD-10-CM

## 2023-02-01 LAB — B-HCG SERPL-ACNC: 1616 MIU/ML (ref 0–11.6)

## 2023-02-02 ENCOUNTER — HOSPITAL ENCOUNTER (EMERGENCY)
Facility: HOSPITAL | Age: 33
Discharge: HOME | End: 2023-02-03
Attending: EMERGENCY MEDICINE

## 2023-02-02 DIAGNOSIS — Z34.91 FIRST TRIMESTER PREGNANCY: ICD-10-CM

## 2023-02-02 DIAGNOSIS — O20.0 THREATENED MISCARRIAGE: Primary | ICD-10-CM

## 2023-02-03 ENCOUNTER — TELEPHONE (OUTPATIENT)
Dept: NEUROLOGY | Facility: CLINIC | Age: 33
End: 2023-02-03

## 2023-02-03 VITALS
RESPIRATION RATE: 20 BRPM | BODY MASS INDEX: 56.2 KG/M2 | HEART RATE: 105 BPM | TEMPERATURE: 98.4 F | SYSTOLIC BLOOD PRESSURE: 134 MMHG | DIASTOLIC BLOOD PRESSURE: 68 MMHG | WEIGHT: 241.8 LBS | OXYGEN SATURATION: 99 %

## 2023-02-03 DIAGNOSIS — G40.909: ICD-10-CM

## 2023-02-03 DIAGNOSIS — O99.350: ICD-10-CM

## 2023-02-03 DIAGNOSIS — G40.909 NONINTRACTABLE EPILEPSY WITHOUT STATUS EPILEPTICUS, UNSPECIFIED EPILEPSY TYPE (HCC): Primary | ICD-10-CM

## 2023-02-03 LAB
ABO GROUP BLD: NORMAL
B-HCG SERPL-ACNC: 3181 MIU/ML (ref 0–11.6)
LAMOTRIGINE SERPL-MCNC: <1 UG/ML (ref 2–20)
LEVETIRACETAM SERPL-MCNC: <2 UG/ML (ref 10–40)
RH BLD: NEGATIVE

## 2023-02-03 RX ADMIN — HUMAN RHO(D) IMMUNE GLOBULIN 300 MCG: 300 INJECTION, SOLUTION INTRAMUSCULAR at 03:23

## 2023-02-03 NOTE — TELEPHONE ENCOUNTER
Levetiracetam level is undetectable  Please ask about compliance leading up to the collection  Please be very specific in the inquiry in terms of recent dosing and her recollection  All prior levels have been undetectable and she reported compliance previously  Lamotrigine level is pending  Chart review suggests she is 6 weeks pregnant and was in the ED yesterday  She missed her visit with Dr Brett Whyte on 2/1  The visit was rescheduled for 5/24  She should be seen in the office in the next month if possible  She has been seen at [facility] in the past and transition from me to Dr Brett Whyte was planned because I no longer go to [facility]  What is the current seizure frequency, when was the last seizure and have they been the same type as in the past? Have the recent seizures been triggered by stress? She will need to have levetiracetam and lamotrigine levels checked monthly during pregnancy because levels change significantly during pregnancy  Both levetiracetam and lamotrigine are considered safe (low risk of causing birth defects)

## 2023-02-03 NOTE — TELEPHONE ENCOUNTER
Spoke to patient  She has stopped taking both levetiracetam and lamotrigine at the recommendation of her psych provider  They advised her medication was NOT safe to take during pregnancy  Patient was unable to give me a date of when she had stopped medication, but stated "about a month" of no seizure medication  Denies recent seizure activity  Reports last event was in July 2022 and it was a similar event like all previous  Please advise how to proceed with medications

## 2023-02-03 NOTE — TELEPHONE ENCOUNTER
She should remain off the medications for now  It would be best to have an urgent visit to review the risk/benefit of restarting seizure medication in the setting of recent event freedom and some uncertainty regarding her diagnosis  I could see her for a virtual visit in the afternoon next week while I am in the EMU (any day but Monday)

## 2023-02-03 NOTE — ED PROVIDER NOTES
Final Diagnosis:  1  Threatened miscarriage    2  First trimester pregnancy        Chief Complaint   Patient presents with   • Vaginal Bleeding - Pregnant     Pt reports "feeling a trickling down leg and noticed blood" Pt reports no clots and minor bleeding from vaginal area  Pt 6 weeks preg  Pt has pmh of still birth and 2 miscarriages  Pt tearful in triage  Reports no cramping nor pain with bleeding       HPI  Last period was dec 24   Sexually active October and jan, but not in between  Pregnant by 6 weeks by LMP, no US performed  Enough bleeding to trickle, no clots  Not filling any pads  No symptoms of blood loss  2 miscarriages 1-2 mo and stillbirth 7 mo  Unsure etiology  Before pregnancy, some cramping  Today and yest w/ some mild cramping, "almost none at all"  3d w some nausea and vomiting  Currently set up to see OB early next month    EMS reports if relevant:  **    Chart review reveals :     Appointment on 02/01/2023   Component Date Value Ref Range Status   • HCG, Quant 02/01/2023 1,616 (H)  0 - 11 6 mIU/mL Final       - No language barrier    - History obtained from patient   - There are no limitations to the history obtained  - Previous charting underwent limited review with attention to last ED visits, labs, ekgs, and prior imaging  PMH:   has a past medical history of Autism, [place] esophagus, CPAP (continuous positive airway pressure) dependence, 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[address] ), Depression, Diabetes mellitus ([address] ), Disease of thyroid gland, Dysphagia, Female infertility, Fibromyalgia, primary, Gastric ulcer, History of bronchitis, Iron deficiency anemia, Irregular menses, Miscarriage, Morbid obesity with BMI of 50 0-59 9, adult ([address] ), Plantar fasciitis of left foot, Reflux esophagitis, Seizures ([address] ), Sleep apnea, and Wears glasses  PSH:   has a past surgical history that includes Eye surgery (Bilateral); EGD;  Royal tooth extraction; pr hysteroscopy bx endometrium&/polypc w/wo d&c (N/A, 9/22/2021); and pr biopsy of lip (N/A, 11/10/2022)  Social History:  Tobacco Use: Low Risk    • Smoking Tobacco Use: Never   • Smokeless Tobacco Use: Never   • Passive Exposure: Not on file     Alcohol Use: Not on file     Patient with no illicit use   Patient arrives with friend    ROS:  Pertinent positives/negatives: Harris Martin Some ROS may be present in the HPI and would take precedent over these standard questions asked below  Review of Systems   Gastrointestinal: Positive for abdominal pain and nausea  Genitourinary: Positive for vaginal bleeding  CONSTITUTIONAL:  No fatigue  No lethargy  No weakness  No unexpected weight loss  No appetite change  EYES:  No pain, erythema, or discharge  No loss of vision  No orbital trauma or pain  ENT:  No tinnitus or decreased hearing  No epistaxis/purulent rhinorrhea  No voice change, airway closing, trismus  CARDIOVASCULAR:  No chest pain  No skin mottling or pallor  No change in exertional capacity  RESPIRATORY:  No hemoptysis  No paroxysmal nocturnal dyspnea  No stridor  No audible wheezing  No production with cough  GASTROINTESTINAL:  Normal appetite  No vomiting, diarrhea  No pain  No bloating  No melena  No hematochezia  GENITOURINARY:  No frequency, urgency, nocturia  No hematuria or dysuria  No discharge  No sores/adenopathy  MUSCULOSKELETAL:  No arthralgias or myalgias that are new  No new deformity  INTEGUMENTARY:  No swelling  No unexpected contusions  No abrasions  No lymphangitis  NEUROLOGIC:  No meningismus  No new numbness of the extremities  No new focal weakness  No postural instability  PSYCHIATRIC:  No SI HI AVH  HEMATOLOGICAL:  No bleeding  No petechiae  No bruising  ALLERGIES:  No urticaria  No sudden abd cramping  No stridor      PE:     Physical exam highlights:   Physical Exam       Vitals:    02/02/23 2353 02/02/23 2354 02/03/23 0329   BP: 131/65  134/68   Pulse: (!) 113  105   Resp: 20  20   Temp: 98 4 °F (36 9 °C) TempSrc: Oral     SpO2:  98% 99%   Weight:  110 kg (241 lb 12 8 oz)      Vitals reviewed by me  Nursing note reviewed  Chaperone present for all sensitive exam   Pelvic w/ closed cervix and trace blood in vagina  Const: No acute distress  Alert  Nontoxic  Not diaphoretic  HEENT: External ears normal  No protrusion drainage swelling  Nose normal  No drainage/traumatic deformity  MMM  Mouth with baseline/symmetric movement  No trismus  Eyes: No squinting  No icterus  No tearing/swelling/drainage  Tracks through the room with normal EOM  Neck: ROM normal  No rigidity  No meningismus  Cards: Rate as per vitals   Compared to monitor sinus unless documented  Regular  Well perfused  Pulm: able to verbalize without additional effort  Effort and excursion normal  No distress  No audible wheezing/ stridor  Normal resp rate without retraction or change in work of breathing  Abd: No distension beyond baseline  No fluctuant wave  Patient without peritoneal pain with shifting/bumping the bed  MSK: ROM normal baseline  No deformity  No contractures from baseline  Skin: No new rashes visible  Well perfused  No wounds visualized on exposed skin  Neuro: Nonfocal  Baseline  CN grossly intact  Moving all four with coordination  Psych: Normal behavior and affect  A:  - Nursing note reviewed      Ddx and MDM  Considered diagnoses  Threatened miscarriage  Minimal discomfort - patient defers pelvic us/ I agree  o neg  Rhogam  hcg progressing appropriately    Decision rules used:                    Live decision making and test interpretation:       Wet reads:   US pelvis complete w transvaginal    (Results Pending)       Orders Placed This Encounter   Procedures   • US pelvis complete w transvaginal   • hCG, quantitative   • hCG, quantitative   • Ambulatory Referral to Obstetrics / Gynecology   • ABO/Rh     Labs Reviewed   HCG, QUANTITATIVE - Abnormal       Result Value Ref Range Status    HCG, Quant 3,181 (*) 0 - 11 6 mIU/mL Final    Narrative:      Expected Ranges:     Approximate               Approximate HCG  Gestation age          Concentration ( mIU/mL)  _____________          ______________________   Sima Vanessa                      HCG values  0 2-1                       5-50  1-2                           2-3                         100-5000  3-4                         500-89016  4-5                         1000-88342  5-6                         [id number]  6-8                         [id number]  8-12                        [id number]     ABO/RH    ABO Grouping O   Final    Rh Factor Negative   Final       *Each of these labs was reviewed  Particular standout labs will be noted in the ED Course    Final Diagnosis:  1  Threatened miscarriage    2  First trimester pregnancy        [employer] phone or in person consultation performed as follows: Followup outpatient discussed with patient as well as any following practitioners:     P:  - hospital tx includes   Medications   Rho(D) immune globulin (RHOGAM ULTRA-FILTERED PLUS) IM injection 300 mcg (300 mcg Intramuscular Given 2/3/23 0323)         - disposition  Time reflects when diagnosis was documented in both MDM as applicable and the Disposition within this note     Time User Action Codes Description Comment    2/3/2023  1:42 AM Adriana Bah [O20 0] Threatened miscarriage     2/3/2023  1:56 AM Rosa Cárdenas [Z34 [id number]] First trimester pregnancy       ED Disposition     ED Disposition   Discharge    Condition   Stable    Date/Time   Fri Feb 3, 2023  1:42 AM    Comment   Margoth Cook discharge to home/self care                 Follow-up Information     Follow up With Specialties Details Why Contact Info Additional Information    [facility] - [facility] and Gynecology   [address] [place] [address],[facility] [phone]  [facility] [place], [facility] [address] Jeremi ramos, [address] [facility],[address]          - patient will call their PCP to let them know they were in the emergency department  We discuss return precautions and patient is agreeable with plan and aformentioned disposition         - additional treatment intended, if consistent with primary provider:  - patient to follow with :      Discharge Medication List as of 2/3/2023  1:43 AM      CONTINUE these medications which have NOT CHANGED    Details   acetaminophen (TYLENOL) 500 mg tablet Take 1 tablet (500 mg total) by mouth every 6 (six) hours as needed for mild pain, Starting Wed 9/22/2021, No Print      albuterol (Proventil HFA) 90 mcg/act inhaler Inhale 2 puffs every 6 (six) hours as needed for wheezing, Starting Fri 1/20/2023, Normal      ARIPiprazole (ABILIFY) 10 mg tablet Take 10 mg by mouth every morning, Historical Med      benzonatate (TESSALON PERLES) 100 mg capsule Take 1 capsule (100 mg total) by mouth 3 (three) times a day as needed for cough, Starting Fri 1/20/2023, Normal      citalopram (CeleXA) 10 mg tablet Take 10 mg by mouth every morning , Historical Med      docusate sodium (COLACE) 100 mg capsule Take 1 capsule (100 mg total) by mouth 2 (two) times a day, Starting Wed 3/2/2022, Normal      ergocalciferol (VITAMIN D2) 50,000 units Take 1 capsule (50,000 Units total) by mouth once a week, Starting Fri 9/30/2022, Normal      famotidine (PEPCID) 40 MG tablet Take 1 tablet (40 mg total) by mouth daily at bedtime, Starting Wed 5/11/2022, Normal      ferrous sulfate 324 (65 Fe) mg TAKE ONE TABLET BY MOUTH TWICE A DAY BEFORE MEALS, Normal      !! lamoTRIgine (LaMICtal) 200 MG tablet Take 1 tablet (200 mg total) by mouth 2 (two) times a day Take with one 25 mg tablet for total dose of 225 mg , Starting u 11/11/2021, Normal      !! lamoTRIgine (LaMICtal) 25 mg tablet TAKE TWO TABLETS BY MOUTH TWICE A DAY, Normal      levETIRAcetam (KEPPRA) 750 mg tablet Take 2 tablets (1,500 mg total) by mouth every 12 (twelve) hours, Starting Tue 1/4/2022, Normal      levothyroxine 25 mcg tablet TAKE ONE TABLET BY MOUTH EVERY DAY IN THE EARLY MORNING, Normal      meloxicam (Mobic) 15 mg tablet Take 1 tablet (15 mg total) by mouth daily as needed for moderate pain, Starting Wed 9/21/2022, Normal      metFORMIN (GLUCOPHAGE-XR) 750 mg 24 hr tablet Take 1 tablet (750 mg total) by mouth daily with breakfast, Starting Tue 6/28/2022, Normal      naproxen (Naprosyn) 500 mg tablet Take 1 tablet (500 mg total) by mouth 2 (two) times a day with meals, Starting Tue 1/3/2023, Normal      omeprazole (PriLOSEC) 40 MG capsule Take 1 capsule (40 mg total) by mouth in the morning and 1 capsule (40 mg total) in the evening  Take before meals  , Starting Wed 5/11/2022, Normal      ondansetron (ZOFRAN) 4 mg tablet Take 1 tablet (4 mg total) by mouth every 8 (eight) hours as needed for nausea or vomiting, Starting Thu 3/31/2022, Normal      sucralfate (CARAFATE) 1 g/10 mL suspension Take 10 mL (1 g total) by mouth 4 (four) times a day, Starting Sat 1/14/2023, Normal      traZODone (DESYREL) 50 mg tablet 50 mg daily at bedtime, Starting Thu 9/1/2022, Historical Med       !! - Potential duplicate medications found  Please discuss with provider  Outpatient Discharge Orders   US pelvis complete w transvaginal   Standing Status: Future Standing Exp  Date: 02/03/27     hCG, quantitative   Standing Status: Future Standing Exp  Date: 02/03/24     Prior to Admission Medications   Prescriptions Last Dose Informant Patient Reported? Taking?    ARIPiprazole (ABILIFY) 10 mg tablet   Yes No   Sig: Take 10 mg by mouth every morning   acetaminophen (TYLENOL) 500 mg tablet   No No   Sig: Take 1 tablet (500 mg total) by mouth every 6 (six) hours as needed for mild pain   albuterol (Proventil HFA) 90 mcg/act inhaler   No No   Sig: Inhale 2 puffs every 6 (six) hours as needed for wheezing   benzonatate (TESSALON PERLES) 100 mg capsule   No No   Sig: Take 1 capsule (100 mg total) by mouth 3 (three) times a day as needed for cough   citalopram (CeleXA) 10 mg tablet   Yes No   Sig: Take 10 mg by mouth every morning    docusate sodium (COLACE) 100 mg capsule   No No   Sig: Take 1 capsule (100 mg total) by mouth 2 (two) times a day   ergocalciferol (VITAMIN D2) 50,000 units   No No   Sig: Take 1 capsule (50,000 Units total) by mouth once a week   Patient taking differently: Take 50,000 Units by mouth once a week On Monday   famotidine (PEPCID) 40 MG tablet   No No   Sig: Take 1 tablet (40 mg total) by mouth daily at bedtime   ferrous sulfate 324 (65 Fe) mg   No No   Sig: TAKE ONE TABLET BY MOUTH TWICE A DAY BEFORE MEALS   Patient taking differently: Take 324 mg by mouth daily before breakfast   lamoTRIgine (LaMICtal) 200 MG tablet   No No   Sig: Take 1 tablet (200 mg total) by mouth 2 (two) times a day Take with one 25 mg tablet for total dose of 225 mg    lamoTRIgine (LaMICtal) 25 mg tablet   No No   Sig: TAKE TWO TABLETS BY MOUTH TWICE A DAY   levETIRAcetam (KEPPRA) 750 mg tablet   No No   Sig: Take 2 tablets (1,500 mg total) by mouth every 12 (twelve) hours   levothyroxine 25 mcg tablet   No No   Sig: TAKE ONE TABLET BY MOUTH EVERY DAY IN THE EARLY MORNING   meloxicam (Mobic) 15 mg tablet   No No   Sig: Take 1 tablet (15 mg total) by mouth daily as needed for moderate pain   metFORMIN (GLUCOPHAGE-XR) 750 mg 24 hr tablet   No No   Sig: Take 1 tablet (750 mg total) by mouth daily with breakfast   naproxen (Naprosyn) 500 mg tablet   No No   Sig: Take 1 tablet (500 mg total) by mouth 2 (two) times a day with meals   Patient taking differently: Take 500 mg by mouth 2 (two) times a day as needed   omeprazole (PriLOSEC) 40 MG capsule   No No   Sig: Take 1 capsule (40 mg total) by mouth in the morning and 1 capsule (40 mg total) in the evening  Take before meals     ondansetron (ZOFRAN) 4 mg tablet   No No   Sig: Take 1 tablet (4 mg total) by mouth every 8 (eight) hours as needed for nausea or vomiting   sucralfate (CARAFATE) 1 g/10 mL suspension   No No   Sig: Take 10 mL (1 g total) by mouth 4 (four) times a day   traZODone (DESYREL) 50 mg tablet   Yes No   Si mg daily at bedtime      Facility-Administered Medications: None       Portions of the record may have been created with voice recognition software  Occasional wrong word or "sound a like" substitutions may have occurred due to the inherent limitations of voice recognition software  Read the chart carefully and recognize, using context, where substitutions have occurred      Electronically signed by:  MD Mani Yin MD  [phone]

## 2023-02-04 ENCOUNTER — APPOINTMENT (OUTPATIENT)
Dept: LAB | Facility: HOSPITAL | Age: 33
End: 2023-02-04
Attending: EMERGENCY MEDICINE

## 2023-02-04 DIAGNOSIS — O20.0 THREATENED MISCARRIAGE: ICD-10-CM

## 2023-02-04 LAB — B-HCG SERPL-ACNC: 5407 MIU/ML (ref 0–11.6)

## 2023-02-06 ENCOUNTER — OFFICE VISIT (OUTPATIENT)
Dept: FAMILY MEDICINE CLINIC | Facility: CLINIC | Age: 33
End: 2023-02-06

## 2023-02-06 VITALS
BODY MASS INDEX: 54.71 KG/M2 | TEMPERATURE: 98.1 F | WEIGHT: 236.4 LBS | DIASTOLIC BLOOD PRESSURE: 58 MMHG | HEIGHT: 55 IN | OXYGEN SATURATION: 100 % | SYSTOLIC BLOOD PRESSURE: 124 MMHG | HEART RATE: 84 BPM

## 2023-02-06 DIAGNOSIS — F41.8 DEPRESSION WITH ANXIETY: ICD-10-CM

## 2023-02-06 DIAGNOSIS — F31.9 BIPOLAR DEPRESSION (HCC): ICD-10-CM

## 2023-02-06 DIAGNOSIS — K21.9 GASTROESOPHAGEAL REFLUX DISEASE WITHOUT ESOPHAGITIS: ICD-10-CM

## 2023-02-06 DIAGNOSIS — E03.9 HYPOTHYROIDISM, UNSPECIFIED TYPE: ICD-10-CM

## 2023-02-06 DIAGNOSIS — E28.2 PCOS (POLYCYSTIC OVARIAN SYNDROME): ICD-10-CM

## 2023-02-06 DIAGNOSIS — D50.8 IRON DEFICIENCY ANEMIA SECONDARY TO INADEQUATE DIETARY IRON INTAKE: ICD-10-CM

## 2023-02-06 DIAGNOSIS — Z32.01 POSITIVE PREGNANCY TEST: Primary | ICD-10-CM

## 2023-02-06 DIAGNOSIS — G40.909 NONINTRACTABLE EPILEPSY WITHOUT STATUS EPILEPTICUS, UNSPECIFIED EPILEPSY TYPE (HCC): ICD-10-CM

## 2023-02-06 RX ORDER — FOLIC ACID 1 MG/1
4 TABLET ORAL DAILY
Qty: 360 TABLET | Refills: 3 | Status: SHIPPED | OUTPATIENT
Start: 2023-02-06 | End: 2023-02-07 | Stop reason: SDUPTHER

## 2023-02-06 RX ORDER — PRAZOSIN HYDROCHLORIDE 2 MG/1
CAPSULE ORAL
COMMUNITY
Start: 2023-01-12 | End: 2023-02-06

## 2023-02-06 RX ORDER — DOXYLAMINE SUCCINATE AND PYRIDOXINE HYDROCHLORIDE, DELAYED RELEASE TABLETS 10 MG/10 MG 10; 10 MG/1; MG/1
1 TABLET, DELAYED RELEASE ORAL 4 TIMES DAILY PRN
Qty: 30 TABLET | Refills: 1 | Status: SHIPPED | OUTPATIENT
Start: 2023-02-06

## 2023-02-06 RX ORDER — ARIPIPRAZOLE 5 MG/1
TABLET ORAL
COMMUNITY
Start: 2023-01-31 | End: 2023-02-06

## 2023-02-06 NOTE — PROGRESS NOTES
Assessment/Plan:     Diagnoses and all orders for this visit:    Positive pregnancy test  -     US OB < 14 weeks with transvaginal; Future  -     Start folic acid (FOLVITE) 1 mg tablet; Take 4 tablets (4 mg total) by mouth daily  -     Doxylamine-Pyridoxine 10-10 MG TBEC; Take 1 tablet by mouth 4 (four) times a day as needed (nausea, vomiting, morning sickness)  Reconciled medications  Ok to continue vit D supplements and albuterol PRN  Continue prenatal vitamins  As pt has been on AEDs and will likely restart this, pending neuro recs, will start on higher dose folate as recommended with AEDs  DC zofran, use doxylamine-vit B6 for nausea, vomiting, morning sickness  OK to continue colace for constipation  DC all NSAIDs and only use tylenol for pain  Nonintractable epilepsy without status epilepticus, unspecified epilepsy type [facility])  Discontinued by psychiatrist  Will be seeing neurologist tomorrow to see if should be restarted  Pt does have history of seizures when off AEDs so do recommend pt start at safest possible dose  Will await neurology recommendations  Gastroesophageal reflux disease without esophagitis  DC omeprazole  Ok to continue pepcid and carafate  PCOS (polycystic ovarian syndrome)  Discontinue metformin  No hx of diabetes  Hypothyroidism, unspecified type  Continue levothyroxine  Will need to check TSH and adjust dose as often require higher doses during pregnancy  Pt has OB intake appt next week at which time I will discuss with OB attending and order labs then along with initial OB labs  Bipolar depression ([address] )  Depression with anxiety  abilify discontinued by psychiatrist  Being weaned off celexa to transition to zoloft  Discussed with pt that if she is not having trouble sleeping without trazodone, definitely no need to restart  Concern for recurrence of bipolar depression without abiligy   Hopefully if pt is able to restart lamictal for anti-seizure, this could provide mood stabilization also and may serve dual purpose  Continue psychiatry follow up  Iron deficiency anemia secondary to inadequate dietary iron intake  Continue iron supplementation  Will check CBC along with other labs at initial OB intake  Subjective:      Patient ID: Dean Higgins is a 29 y o  female  HPI   Pt is here for medication reconciliation in light of recent pregnancy  Reports her psychiatrist has already discontinued abilify and is weaning pt off celexa to transition of zoloft  Discontinued trazodone  They also instructed pt to stop antiepileptics due to risk of fetal harm- off AEDs for a few weeks now but denies any seizures  Pt is taking prenatal supplements  Reports no marijuana use since January once she found out she is pregnant  Denies alcohol, tobacco and other drug use  The following portions of the patient's history were reviewed and updated as appropriate: allergies, current medications, past family history, past medical history, past social history, past surgical history, and problem list     Review of Systems   Constitutional: Negative for chills and fever  HENT: Negative for ear pain and sore throat  Eyes: Negative for pain and visual disturbance  Respiratory: Negative for cough, chest tightness and shortness of breath  Cardiovascular: Negative for chest pain and palpitations  Gastrointestinal: Negative for abdominal pain, constipation, diarrhea, nausea and vomiting  Genitourinary: Negative for dysuria, hematuria and menstrual problem  Musculoskeletal: Negative for arthralgias and back pain  Skin: Negative for color change and rash  Neurological: Negative for seizures and syncope  Psychiatric/Behavioral: Negative for dysphoric mood and suicidal ideas  All other systems reviewed and are negative          Objective:      /58 (BP Location: Left arm, Patient Position: Sitting, Cuff Size: Extra-Large)   Pulse 84   Temp 98 1 °F (36 7 °C) (Tympanic)   Ht 4' 0 7" (1 237 m)   Wt 107 kg (236 lb 6 4 oz)   LMP 12/24/2022 (Exact Date)   SpO2 100%   BMI 70 08 kg/m²          Physical Exam  Constitutional:       Appearance: Normal appearance  HENT:      Head: Normocephalic and atraumatic  Cardiovascular:      Rate and Rhythm: Normal rate  Pulmonary:      Effort: Pulmonary effort is normal  No respiratory distress  Skin:     General: Skin is warm and dry  Neurological:      General: No focal deficit present  Mental Status: She is alert and oriented to person, place, and time     Psychiatric:         Mood and Affect: Mood normal          Behavior: Behavior normal

## 2023-02-06 NOTE — TELEPHONE ENCOUNTER
Spoke to patient  Tuesday 130 for virtual visit  Added to your schedule  This is patients first time using [employer] for a virtual appt, asked if unable to connect you reach out to her via phone  [phone]

## 2023-02-07 ENCOUNTER — TELEMEDICINE (OUTPATIENT)
Dept: NEUROLOGY | Facility: CLINIC | Age: 33
End: 2023-02-07

## 2023-02-07 DIAGNOSIS — G40.909: ICD-10-CM

## 2023-02-07 DIAGNOSIS — Z32.01 POSITIVE PREGNANCY TEST: ICD-10-CM

## 2023-02-07 DIAGNOSIS — G40.909 NONINTRACTABLE EPILEPSY WITHOUT STATUS EPILEPTICUS, UNSPECIFIED EPILEPSY TYPE (HCC): Primary | ICD-10-CM

## 2023-02-07 DIAGNOSIS — O99.351: ICD-10-CM

## 2023-02-07 RX ORDER — LAMOTRIGINE 100 MG/1
TABLET ORAL
Qty: 150 TABLET | Refills: 5 | Status: SHIPPED | OUTPATIENT
Start: 2023-03-07

## 2023-02-07 RX ORDER — LEVETIRACETAM 750 MG/1
1500 TABLET ORAL EVERY 12 HOURS SCHEDULED
Qty: 120 TABLET | Refills: 5 | Status: SHIPPED | OUTPATIENT
Start: 2023-02-07

## 2023-02-07 RX ORDER — LAMOTRIGINE 25 MG/1
TABLET ORAL
Qty: 100 TABLET | Refills: 0 | Status: SHIPPED | OUTPATIENT
Start: 2023-02-07

## 2023-02-07 RX ORDER — FOLIC ACID 1 MG/1
1 TABLET ORAL DAILY
Qty: 90 TABLET | Refills: 1 | Status: SHIPPED | OUTPATIENT
Start: 2023-02-07 | End: 2023-05-08

## 2023-02-07 NOTE — PATIENT INSTRUCTIONS
Start levetiracetam 750 mg pills  Take 1 pill (750 mg) twice daily for one week and then increase to 2 pills (1500 mg) twice daily  Start lamotrigine as instructed below  Have levetiracetam and lamotrigine levels drawn in 2 weeks and then have both levels drawn every 4 weeks during pregnancy  Your doses may have to be adjusted based on the blood work  Go to ED or OB triage if you have a seizures to be evaluated (fetal nonstress test)  Let us know if there are seizures  Take 1-2 mg (1764-1463 mcg) of folic acid daily  I do not recommend more than 2 mg per day  Return to see Dr Lois Moore in the office as scheduled in May  Lamotrigine Instructions    Week 1:  25 mg nightly (start 25 mg pills)  Week 2:  25 mg twice daily  Week 3: 50 mg twice daily  Week 4:  75 mg twice daily  Week 5:  100 mg twice daily (change to 100 mg pills)  Week 6:  150 mg twice daily  Week 7: 200 mg twice daily  Week 8: 250 mg twice daily (continue on this dose)    Lamotrigine can cause a dangerous rash  Contact a physician immediately if you develop a rash  Pregnancy and Epilepsy    Pregnancy in women with epilepsy is a very important topic and should be discussed with your Neurologist, even if you don’t currently plan to get pregnant  Do women with epilepsy have problems getting pregnant? Women with epilepsy tend to have fewer children than other women  This may be partly personal choice, but research has indicated that women with epilepsy have a higher rate of menstrual cycle irregularities and other gynecological problems that may interfere with fertility  If you are having problems, it is important that you talk with your gynecologist/obstetrician and your neurologist to sort through these issues  Will I have a normal, healthy baby? There is better than a 95% chance that your baby will be perfectly healthy  Babies born to mothers without epilepsy have about a 2-3% risk of some type of birth defect     Babies born to women with epilepsy and who are taking seizure medications may overall have about a 4-8% chance of having birth defects (depending on which medication they take), which is still quite low  These risks are increased for some medications (described below) and for women who are taking multiple medications  Again, it is important to discuss your situation with your doctor  Proper prenatal care and prenatal vitamins reduce the risk of birth defects in all women  What is the most important thing I can do before becoming pregnant to reduce the risk of birth defects? As much as possible plan ahead for pregnancy  Discuss prenatal care and your medications with your neurologist and gynecologist/obstetrician before you get pregnant  Eat a good diet and get regular sleep  Avoid tobacco, alcohol, caffeine, and drugs such as marijuana or cocaine  Before getting pregnant, work with your doctor to be on the fewest number of medications, and on the lowest dose that is appropriate for you  Folic acid (also called folate) is a vitamin that helps reduce the chances of birth defects in all women, but especially those with epilepsy  It is available in most drug stores and pharmacies  Taking 1 mg to 4 mg of folic acid each day is recommended for all women with epilepsy who may become pregnant  Many obstetricians prescribe special prenatal vitamins for women to be taken during the time they are trying to become pregnant  Avoid common triggers of seizures, which include:  Missing medications  Poor or irregular sleep  Drugs and alcohol    Are some seizure medications more dangerous to the baby than others? Based on a nation-wide pregnancy database, some medications are not associated with a significantly increased risk of birth defects, whereas others do have a higher risk     As of the most recent update in 2014, there is the most information available for Lamotrigine (Lamictal), Levetiracetam (Keppra), and Carbamazepine (Tegretol)  Lamotrigine, Levetiracetam, and Carbamazepine do not appear to have a significantly higher risk of birth defects when compared to the background risk in all women (about 2-3%)  Valproate or valproic acid (Depakote) has the highest potential risk of birth defects (about 8-9%), specifically neural tube defects like spina bifida  Topiramate (Topamax) has a slightly increased risk (about 4 5%) of birth defects, specifically low birth weight and cleft palate  For many of the newer seizure medications (like Zonisamide, Gabapentin, Oxcarbazepine, etc ) we simply don’t have enough information to know their risk of birth defects  Although the true risk is not known, the risks are felt to still be quite low  Remember:  Babies born to women without epilepsy have about a 2% risk of birth defects  Taking folic acid before and during pregnancy can help reduce the risk of birth defects in all women  Most importantly: Remember, a vast majority of women taking seizure medications have normal healthy babies! Should I stop my seizure medications before I get pregnant? This is a complicated decision and is specific to each woman depending on the type and frequency of her seizures  Some women may have well controlled seizures for many years and could consider trying to come off medications  Other women may have frequent seizures and staying on medications may be the most appropriate decision  Sometimes, the risk of injury to the baby from a bigger seizure is more dangerous than the risk of birth defects from medications  Never change or stop a medication (especially a seizure medication) without first talking to your doctor/neurologist!    Will seizures during pregnancy harm my baby? Mild seizures, like complex partial seizures, do not usually affect the baby      More severe seizures like generalized tonic-clonic seizures can cause injury to the baby from falling, changes in blood oxygen concentrations, and decreased blood flow to the baby  Rarely, generalized tonic-clonic seizures can cause a miscarriage  For women who continue to have seizures, or are at a high risk of recurrent seizures, the benefit of the medication to prevent seizures is greater than the small potential risk of birth defects from the medication  In this situation, it is important to continue to take your seizure medications as recommended by your doctor  If you are having seizures while you are pregnant, make sure your neurologist is aware  Will I have more seizures during pregnancy? Most pregnant women do not see a change in the frequency of their seizures  However, up to 1/3 of women may have more frequent seizures during their pregnancy, especially during the first or third trimester  During pregnancy, the blood level of some seizure medications may decrease due to hormonal changes and increasing body size  As noted below, your doctor may need to monitor your blood more closely while pregnant and after you deliver your baby  Does having epilepsy require extra care while I am pregnant? Most women with epilepsy have a normal pregnancy  However, obstetricians consider pregnancies of woman with epilepsy to be “high” risk so early and continuous prenatal care is very important  The hormonal changes and changes in body size during pregnancy can affect the blood levels of some medications  Your neurologist may need to closely monitor your bloodwork and adjust the dose of your seizure medication to maintain the same blood level  After you deliver your baby, your doctor may need to watch blood levels and adjust your medications for a few months while the medication blood levels return to your pre-pregnancy level  If you are taking an enzyme inducing medication, your doctors may recommend that you get a dose of Vitamin K to reduce the risk of bleeding in you and your infant       Will epilepsy cause problems during pregnancy? Taking seizure medications is not likely to lead to a difficult pregnancy  Women with epilepsy are slightly more likely to have morning sickness, vaginal bleeding, and early or premature labor and delivery  For this reason, it is important to have regular and continued prenatal care with your obstetrician to identify any problems early  Can I have a normal vaginal delivery? Although there is a slight increased rate of caesarian delivery in women who have epilepsy, most mothers are able to deliver their babies vaginally  What if I have seizures when I deliver the baby? Surprisingly few women with epilepsy have seizures during delivery but it can happen  Mild seizures, such as complex partial seizures, do not usually affect the baby or delivery  Generalized convulsions can cause difficulties if you are too sleepy to participate after the convulsion  In the unlikely event that you have a convulsion during delivery, you might be given an intravenous (IV) medication to stop seizures and you might have to undergo a Caesarian section to protect the baby  Can I breastfeed my child? This too, is a complicated decision that depends on your own personal situation and wishes  You must weigh the advantage of breast feeding against the possible risks to the baby from medication  Most antiepileptic drugs pass into breast milk (in varying amounts depending on the medication), so the baby will get some exposure to the drug  It is not known whether antiepileptic drugs affect development  Remember:  the baby was exposed to the drug throughout the pregnancy while in the womb, which is a more important time for brain development  Exposure after this time seems to be less important  Again, the decision depends on your personal feelings on breastfeeding and how important it is to you    Some mothers find the nutritional and bonding benefits of breastfeeding to be more important  Other mothers find the unknown risk of medications on the child’s development to be more important  You can discuss your personal situation and preference for breast feeding with your doctor  Will I drop my baby if I have a seizure while breastfeeding? It is possible that you could drop your baby during breast feeding if you have a seizure  You should breast feed in a position that will not harm the baby if you have a seizure, such as while lying down or with the baby propped on a pillow  Consider pumping your breast milk and feeding your baby from the bottle    Will my baby have epilepsy? Overall, children born to mothers with epilepsy have about a 2% risk of having epilepsy  This is about twice the risk of having epilepsy in the general population (which is about 1%), but is still a low likelihood  Having a father with epilepsy only slightly increases the risk of the child having epilepsy  What should I remember as a care taker of the child when I have uncontrolled epilepsy? Childproof your home as much as possible  It is possible that you could drop your baby if you have a seizure while holding or feeding them  If you are nursing a baby, sit on the floor or bed with your back supported so the baby will not fall far if you should lose consciousness  Feed the baby while he or she is seated in an infant seat  Dress, change, and sponge bathe the baby on the floor  Move the baby around in a stroller or small crib  Keep a young baby in a playpen when you are alone, and a toddler in an indoor play yard, or childproof one room and use safety dye at the doors  When out of the house, use a bungee-type cord or restraint harness so your child cannot wander away if you have a seizure that affects your awareness  For safety, don’t have the baby sleep with you in bed  GET REGULAR SLEEP!   This can be easier said than done with a new baby, but try to have others give you breaks so that you can get regular sleep  Being sleep deprived can make it more likely that you will have a seizure  ALSO NOTE:  If you are a woman with epilepsy and are taking birth control, please visit the following website: www epilepsybirthcontrolregistry  org  If you are a woman with epilepsy and currently pregnant, please visit the following website: www [email or web address]  org/register/  Completing  these surveys will help the epilepsy community learn more about pregnancy and contraception in woman with epilepsy
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
## 2023-02-08 ENCOUNTER — PATIENT MESSAGE (OUTPATIENT)
Dept: FAMILY MEDICINE CLINIC | Facility: CLINIC | Age: 33
End: 2023-02-08

## 2023-02-09 ENCOUNTER — TELEPHONE (OUTPATIENT)
Dept: HEMATOLOGY ONCOLOGY | Facility: CLINIC | Age: 33
End: 2023-02-09

## 2023-02-09 ENCOUNTER — HOSPITAL ENCOUNTER (OUTPATIENT)
Dept: RADIOLOGY | Facility: HOSPITAL | Age: 33
Discharge: HOME | End: 2023-02-09

## 2023-02-09 ENCOUNTER — TELEPHONE (OUTPATIENT)
Dept: FAMILY MEDICINE CLINIC | Facility: CLINIC | Age: 33
End: 2023-02-09

## 2023-02-09 DIAGNOSIS — Z32.01 POSITIVE PREGNANCY TEST: ICD-10-CM

## 2023-02-09 NOTE — TELEPHONE ENCOUNTER
Pt completed PRA when in office for preg test and PRA was submitted  Pending Andres Hines # [id number]  See in appt desk pt will be receiving prenatal care thru [facility]  Ob Initial scheduled with them  Called pt to confirm which office she will be receiving prenatal care  She confirmed she will be staying with [facility]  Routing message to [employer] [employer] clerical so they have [address] since one is already started for patient       Removing pt from our PRA site as "transferred care"

## 2023-02-09 NOTE — PATIENT COMMUNICATION
Spoke with pt to confirm she received my msg regarding agreement with neurologist's recommendation of up to 2mg folate only   Pt will  the 1mg script from pharmacy which along with the [address] in her prenatal vitamin will add up to 1 8mg total

## 2023-02-10 ENCOUNTER — TELEPHONE (OUTPATIENT)
Dept: FAMILY MEDICINE CLINIC | Facility: CLINIC | Age: 33
End: 2023-02-10

## 2023-02-10 NOTE — TELEPHONE ENCOUNTER
PA submitted for Doxylamine-pyridoxine 10-10 mg DR tablets with key # [id number] on 02/10/2023   Thanks

## 2023-02-15 ENCOUNTER — TELEPHONE (OUTPATIENT)
Dept: OBGYN CLINIC | Facility: CLINIC | Age: 33
End: 2023-02-15

## 2023-02-15 DIAGNOSIS — E03.9 HYPOTHYROIDISM, UNSPECIFIED TYPE: ICD-10-CM

## 2023-02-15 RX ORDER — LEVOTHYROXINE SODIUM 0.03 MG/1
TABLET ORAL
Qty: 30 TABLET | Refills: 2 | Status: SHIPPED | OUTPATIENT
Start: 2023-02-15

## 2023-02-15 NOTE — TELEPHONE ENCOUNTER
Pt called c/o dizziness  Pt states she had a random one time 'dizzy spell'  NOB appt scheduled for 3/10  Denies any headaches, blurred vision/vision changes, fever, cramping, or any other symptoms  It happened only one time, she was dizzy and felt like she was going to pass out but it quickly went away  States she has been staying hydrated  Informed pt to monitor symptoms and continue hydration, if symptoms appear again or worsens, she should call our office back  Pt verbally understood and denies further questions or concerns

## 2023-02-16 ENCOUNTER — TELEPHONE (OUTPATIENT)
Dept: FAMILY MEDICINE CLINIC | Facility: CLINIC | Age: 33
End: 2023-02-16

## 2023-02-16 ENCOUNTER — CLINICAL SUPPORT (OUTPATIENT)
Dept: GENETICS | Facility: CLINIC | Age: 33
End: 2023-02-16

## 2023-02-16 ENCOUNTER — DOCUMENTATION (OUTPATIENT)
Dept: GENETICS | Facility: CLINIC | Age: 33
End: 2023-02-16

## 2023-02-16 DIAGNOSIS — Z80.3 FAMILY HISTORY OF MALIGNANT NEOPLASM OF FEMALE BREAST: Primary | ICD-10-CM

## 2023-02-16 DIAGNOSIS — Z91.89 AT HIGH RISK FOR BREAST CANCER: ICD-10-CM

## 2023-02-16 NOTE — LETTER
2023     Alesia Ba, [address] [facility] [address]    Patient: Alex Woodruff  YOB: 1990  Date of Visit: 2023      Dear Dr Royce Treadwell:    Thank you for referring Alex Woodruff to me for evaluation  Below are my notes for this consultation  If you have questions, please do not hesitate to call me  I look forward to following your patient along with you  Sincerely,        Tiffanie Vance GC        CC: No Recipients        Pre-Test Genetic Counseling Consult Note    Patient Name: Alex Woodruff   /Age: 1990/33 y o  Referring Provider: Alesia Ba DO    Date of Service: 2023  Genetic Counselor: Nelsy Weiner MS, [address] [place] [address]  Interpretation Services: None  Location: Telephone consult   Length of Visit: 61 minutes      Quang Gonzalez was referred to the [address] [place] [address] and Genetic Assessment Program due to her family history of breast, stomach and brain cancer among others  She also reports having Ashkenazi Anabaptist ancestry  She presents today to discuss the possibility of a hereditary cancer syndrome, options for genetic testing, and implications for her and her family       Cancer History and Treatment:     Personal History: No personal history of cancer    Screening Hx:     Breast:  Breast Imaging: Annual most recent 22  Breast Biopsy: None   Breast Density: Almost entirely fatty    Colon:  Colonoscopy: Most recent colonoscopy was 2017 reports a history of 4-5 colon polyps however no follow-up colonoscopy is scheduled at this time    Gynecologic:  Ovaries and Uterus intact     Skin:  No current screening    Reproductive History  Age at menarche: 16  Age at first live birth: Nulliparous  Menopause: Premenopausal  Hormone replacement: None     Medical and Surgical History  Pertinent surgical history:   Past Surgical History:   Procedure Laterality Date   • EGD      with Bx due to barretts esophagus   • EYE SURGERY Bilateral lazy eye repair   • [place] BIOPSY OF LIP N/A 11/10/2022    Procedure: EXCISION BIOPSY SALVARY GLANDS LOWER LIP;  Surgeon: Charlene Montemayor MD;  Location: [address] [place] [address];  Service: ENT   • AR HYSTEROSCOPY BX ENDOMETRIUM&/POLYPC W/WO D&C N/A 9/22/2021    Procedure: DILATATION AND CURETTAGE (D&C) WITH HYSTEROSCOPY;  Surgeon: Darian Ba MD;  Location: 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[facility] [address] )     not diagnosed as of 1/9/23-trying to R/O   • Depression    • Diabetes mellitus ([address] )    • Disease of thyroid gland     hypo   • Dysphagia     solids and liquids   • Female infertility    • Fibromyalgia, primary    • Gastric ulcer    • History of bronchitis    • Iron deficiency anemia     infusion in 11/2022   • Irregular menses    • Miscarriage    • Morbid obesity with BMI of 50 0-59 9, adult ([address] )    • Plantar fasciitis of left foot    • Reflux esophagitis    • Seizures ([address] )     last one 7/13/22   • Sleep apnea     CPAP   • Wears glasses        Other History:  Height:   Ht Readings from Last 1 Encounters:   02/06/23 4' 0 7" (1 237 m)     Weight:   Wt Readings from Last 1 Encounters:   02/06/23 107 kg (236 lb 6 4 oz)     Relevant Family History   Patient reports maternal Ashkenazi Roman Catholic ancestry  Please refer to the scanned pedigree in the Media Tab for a complete family history     *All history is reported as provided by the patient; records are not available for review, except where indicated  Assessment:  We discussed sporadic, familial and hereditary cancer  We also discussed the many factors that influence our risk for cancer such as age, environmental exposures, lifestyle choices and family history  We reviewed the indications suggestive of a hereditary predisposition to cancer      We reviewed that in the [address] [place] [address] population, the majority of inherited breast cancer susceptibility is explained by specific  mutations commonly seen in this population  There are two  mutations in the BRCA1 gene (BRCA1 - [address] - 0674insC) and one  mutation in the BRCA2 gene (BRCA2 - 6147delT)  Approximately 2 5% of individuals in the [address] [place] [address] population have at least one of these three mutations  There are also two  mutations in the CHEK2 gene (CHEK2-470T>C & CHEK2- 1283C>T)  Up to 2 8% of individuals in the [address] [place] [address] population have at least one of these two mutations  Even though these  mutations account for the majority of cancer susceptibility in the [address] [place] [address] population, there are  mutations in several other genes  In addition, it remains possible that there is a different mutation within one of these genes or in another gene not known to carry a  mutation  Appropriate genetic testing should be based not only on Ashkenazi Congregational ancestry but also on personal and family history  Genetic testing is indicated for [place] based on the following criteria:     Meets NCCN V2 2023 General Testing Criteria (Located on page CRIT-1 in the Genetic/Familial High Risk Assessment: Breast, Ovarian and Pancreatic Guideline): Individuals with Ashkenazi Congregational ancestry without additional risk factors  Galen Garcia reports a history of Congregational ancestry in her maternal relatives and she has a maternal great aunt (third-degree relative) with breast cancer in her 29's  Meets NCCN M6 2870 Testing Criteria for High-Penetrance Breast Cancer Susceptibility Genes: Family history of a paternal grandmother (SDR) with breast cancer at age 36       The risks, benefits, and limitations of genetic testing were reviewed with the patient, as well as genetic discrimination laws, and possible test results (positive, negative, variants of uncertain significance) and their clinical implications  If positive for a mutation, options for managing cancer risk including increased surveillance, chemoprevention, and in some cases prophylactic surgery were discussed  Dania Valerio was informed that if a hereditary cancer syndrome was identified in her, first degree relatives (parents, siblings, and children) have a chance of also inheriting the condition  Genetic testing would allow for predictive genetic testing in other relatives, who may also be at risk depending on their degree of relation  Billing:  Most individuals with Medicare pay $0 for hereditary cancer testing  If the cost of the testing exceeds $100, the lab will reach out to the patient via phone or e-mail  The patient will then have the option to proceed with the testing, cancel the testing, or elect the self-pay option of $250  Dania Valerio verbalized understanding  Plan: Patient decided to proceed with testing and provided consent  Summary:     Sample Collection:  A blood kit was mailed home to the patient    Genetic Testing Preformed: [facility] Cancer Panel + RNA (47 genes): APC, TAWANNA, AXIN2, BARD1, BMPR1A, BRCA1, BRCA2, BRIP1, CDH1, CDK4, CDKN2A, CHEK2, CTNNA1, DICER1, EPCAM, GREM1, HOXB13, KIT, MEN1, MLH1, MSH2, MSH3, MSH6, MUTYH, NBN, NF1, NTHL1, PALB2, PDGFRA, PMS2, POLD1, POLE, PTEN, RAD50, RAD51C, RAD51D, SDHA, SDHB, SDHC, SDHD, SMAD4, SMARCA4, STK11, TP53, TSC1, TSC2, VHL    Result Call Information:  I confirmed the patient's mobile number on file as the best number to call with results ([phone]  I confirmed with the patient that we can leave a voicemail on the provided numbers    Results take approximately 2-3 weeks to complete once test is started  We will contact [facility] once results are available  Additional recommendations for surveillance/medical management will be made pending genetic test results

## 2023-02-16 NOTE — PROGRESS NOTES
Pre-Test Genetic Counseling Consult Note    Patient Name: General Johnny   /Age: 1990/33 y o  Referring Provider: Efren Juares DO    Date of Service: 2023  Genetic Counselor: Janay Obrien MS, [address] [facility]  Interpretation Services: None  Location: Telephone consult   Length of Visit: 61 minutes      Dante Kuhn was referred to the [address] [place] [address] and Genetic Assessment Program due to her family history of breast, stomach and brain cancer among others  She also reports having Ashkenazi Synagogue ancestry  She presents today to discuss the possibility of a hereditary cancer syndrome, options for genetic testing, and implications for her and her family       Cancer History and Treatment:     Personal History: No personal history of cancer    Screening Hx:     Breast:  Breast Imaging: Annual most recent 22  Breast Biopsy: None   Breast Density: Almost entirely fatty    Colon:  Colonoscopy: Most recent colonoscopy was  reports a history of 4-5 colon polyps however no follow-up colonoscopy is scheduled at this time    Gynecologic:  Ovaries and Uterus intact     Skin:  No current screening    Reproductive History  Age at menarche: 16  Age at first live birth: Nulliparous  Menopause: Premenopausal  Hormone replacement: None     Medical and Surgical History  Pertinent surgical history:   Past Surgical History:   Procedure Laterality Date   • EGD      with Bx due to barretts esophagus   • EYE SURGERY Bilateral     lazy eye repair   • [place] BIOPSY OF LIP N/A 11/10/2022    Procedure: EXCISION BIOPSY SALVARY GLANDS LOWER LIP;  Surgeon: Reggie Bowman MD;  Location: [address] [place] [address];  Service: ENT   • [place] HYSTEROSCOPY BX ENDOMETRIUM&/POLYPC W/WO D&C N/A 2021    Procedure: DILATATION AND CURETTAGE (D&C) WITH HYSTEROSCOPY;  Surgeon: Chaz Sosa MD;  Location: [facility];  Service: Gynecology   • WISDOM TOOTH EXTRACTION        Pertinent medical history:  Past Medical History:   Diagnosis Date   • Autism    • [place] esophagus    • CPAP (continuous positive airway pressure) dependence    • 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[facility])     not diagnosed as of 1/9/23-trying to R/O   • Depression    • Diabetes mellitus ([facility] [address] )    • Disease of thyroid gland     hypo   • Dysphagia     solids and liquids   • Female infertility    • Fibromyalgia, primary    • Gastric ulcer    • History of bronchitis    • Iron deficiency anemia     infusion in 11/2022   • Irregular menses    • Miscarriage    • Morbid obesity with BMI of 50 0-59 9, adult (HCC)    • Plantar fasciitis of left foot    • Reflux esophagitis    • Seizures ([facility] [address] )     last one 7/13/22   • Sleep apnea     CPAP   • Wears glasses        Other History:  Height:   Ht Readings from Last 1 Encounters:   02/06/23 4' 0 7" (1 237 m)     Weight:   Wt Readings from Last 1 Encounters:   02/06/23 107 kg (236 lb 6 4 oz)     Relevant Family History   Patient reports maternal Ashkenazi Episcopalian ancestry  Please refer to the scanned pedigree in the Media Tab for a complete family history     *All history is reported as provided by the patient; records are not available for review, except where indicated  Assessment:  We discussed sporadic, familial and hereditary cancer  We also discussed the many factors that influence our risk for cancer such as age, environmental exposures, lifestyle choices and family history  We reviewed the indications suggestive of a hereditary predisposition to cancer  We reviewed that in the [address] [place] [address] population, the majority of inherited breast cancer susceptibility is explained by specific  mutations commonly seen in this population  There are two  mutations in the BRCA1 gene (BRCA1 - [address] 58 - 4780insC) and one  mutation in the BRCA2 gene (BRCA2 - 6147delT)  Approximately 2 5% of individuals in the [address] [place] [address] population have at least one of these three mutations    There are also two  mutations in the CHEK2 gene (CHEK2-470T>C & CHEK2- 1283C>T)  Up to 2 8% of individuals in the [address] [place] [address] population have at least one of these two mutations  Even though these  mutations account for the majority of cancer susceptibility in the [address] [place] [address] population, there are  mutations in several other genes  In addition, it remains possible that there is a different mutation within one of these genes or in another gene not known to carry a  mutation  Appropriate genetic testing should be based not only on Ashkenazi Yazdanism ancestry but also on personal and family history  Genetic testing is indicated for [place] based on the following criteria:     Meets NCCN V2 2023 General Testing Criteria (Located on page CRIT-1 in the Genetic/Familial High Risk Assessment: Breast, Ovarian and Pancreatic Guideline): Individuals with Ashkenazi Yazdanism ancestry without additional risk factors  Estee Vasquez reports a history of Yazdanism ancestry in her maternal relatives and she has a maternal great aunt (third-degree relative) with breast cancer in her 29's  Meets NCCN U5 6022 Testing Criteria for High-Penetrance Breast Cancer Susceptibility Genes: Family history of a paternal grandmother (SDR) with breast cancer at age 36  The risks, benefits, and limitations of genetic testing were reviewed with the patient, as well as genetic discrimination laws, and possible test results (positive, negative, variants of uncertain significance) and their clinical implications  If positive for a mutation, options for managing cancer risk including increased surveillance, chemoprevention, and in some cases prophylactic surgery were discussed  Estee Vasquez was informed that if a hereditary cancer syndrome was identified in her, first degree relatives (parents, siblings, and children) have a chance of also inheriting the condition   Genetic testing would allow for predictive genetic testing in other relatives, who may also be at risk depending on their degree of relation  Billing:  Most individuals with Medicare pay $0 for hereditary cancer testing  If the cost of the testing exceeds $100, the lab will reach out to the patient via phone or e-mail  The patient will then have the option to proceed with the testing, cancel the testing, or elect the self-pay option of $250  Susy Portal verbalized understanding  Plan: Patient decided to proceed with testing and provided consent  Summary:     Sample Collection:  A blood kit was mailed home to the patient    Genetic Testing Preformed: [facility] Cancer Panel + RNA (47 genes): APC, TAWANNA, AXIN2, BARD1, BMPR1A, BRCA1, BRCA2, BRIP1, CDH1, CDK4, CDKN2A, CHEK2, CTNNA1, DICER1, EPCAM, GREM1, HOXB13, KIT, MEN1, MLH1, MSH2, MSH3, MSH6, MUTYH, NBN, NF1, NTHL1, PALB2, PDGFRA, PMS2, POLD1, POLE, PTEN, RAD50, RAD51C, RAD51D, SDHA, SDHB, SDHC, SDHD, SMAD4, SMARCA4, STK11, TP53, TSC1, TSC2, VHL    Result Call Information:  I confirmed the patient's mobile number on file as the best number to call with results ([phone]  I confirmed with the patient that we can leave a voicemail on the provided numbers    Results take approximately 2-3 weeks to complete once test is started  We will contact [facility] once results are available  Additional recommendations for surveillance/medical management will be made pending genetic test results

## 2023-02-22 ENCOUNTER — HOSPITAL ENCOUNTER (EMERGENCY)
Facility: HOSPITAL | Age: 33
Discharge: HOME | End: 2023-02-22
Attending: INTERNAL MEDICINE

## 2023-02-22 ENCOUNTER — TELEPHONE (OUTPATIENT)
Dept: OBGYN CLINIC | Facility: CLINIC | Age: 33
End: 2023-02-22

## 2023-02-22 VITALS
OXYGEN SATURATION: 100 % | RESPIRATION RATE: 18 BRPM | TEMPERATURE: 98.6 F | HEART RATE: 87 BPM | SYSTOLIC BLOOD PRESSURE: 124 MMHG | DIASTOLIC BLOOD PRESSURE: 59 MMHG

## 2023-02-22 DIAGNOSIS — O21.9 NAUSEA AND VOMITING IN PREGNANCY: ICD-10-CM

## 2023-02-22 DIAGNOSIS — Z34.91 FIRST TRIMESTER PREGNANCY: Primary | ICD-10-CM

## 2023-02-22 LAB
ALBUMIN SERPL BCP-MCNC: 3.8 G/DL (ref 3.5–5)
ALP SERPL-CCNC: 68 U/L (ref 34–104)
ALT SERPL W P-5'-P-CCNC: 14 U/L (ref 7–52)
ANION GAP SERPL CALCULATED.3IONS-SCNC: 7 MMOL/L (ref 4–13)
AST SERPL W P-5'-P-CCNC: 14 U/L (ref 13–39)
BACTERIA UR QL AUTO: NORMAL /HPF
BASOPHILS # BLD AUTO: 0.12 THOUSANDS/ÂΜL (ref 0–0.1)
BASOPHILS NFR BLD AUTO: 2 % (ref 0–1)
BILIRUB SERPL-MCNC: 0.24 MG/DL (ref 0.2–1)
BILIRUB UR QL STRIP: NEGATIVE
BUN SERPL-MCNC: 10 MG/DL (ref 5–25)
CALCIUM SERPL-MCNC: 9.4 MG/DL (ref 8.4–10.2)
CHLORIDE SERPL-SCNC: 101 MMOL/L (ref 96–108)
CLARITY UR: CLEAR
CO2 SERPL-SCNC: 25 MMOL/L (ref 21–32)
COLOR UR: ABNORMAL
CREAT SERPL-MCNC: 0.51 MG/DL (ref 0.6–1.3)
EOSINOPHIL # BLD AUTO: 0.08 THOUSAND/ÂΜL (ref 0–0.61)
EOSINOPHIL NFR BLD AUTO: 1 % (ref 0–6)
ERYTHROCYTE [DISTWIDTH] IN BLOOD BY AUTOMATED COUNT: 13.2 % (ref 11.6–15.1)
GFR SERPL CREATININE-BSD FRML MDRD: 127 ML/MIN/1.73SQ M
GLUCOSE SERPL-MCNC: 92 MG/DL (ref 65–140)
GLUCOSE UR STRIP-MCNC: NEGATIVE MG/DL
HCT VFR BLD AUTO: 39 % (ref 34.8–46.1)
HGB BLD-MCNC: 12.4 G/DL (ref 11.5–15.4)
HGB UR QL STRIP.AUTO: NEGATIVE
IMM GRANULOCYTES # BLD AUTO: 0.02 THOUSAND/UL (ref 0–0.2)
IMM GRANULOCYTES NFR BLD AUTO: 0 % (ref 0–2)
KETONES UR STRIP-MCNC: NEGATIVE MG/DL
LEUKOCYTE ESTERASE UR QL STRIP: ABNORMAL
LIPASE SERPL-CCNC: 12 U/L (ref 11–82)
LYMPHOCYTES # BLD AUTO: 1.36 THOUSANDS/ÂΜL (ref 0.6–4.47)
LYMPHOCYTES NFR BLD AUTO: 19 % (ref 14–44)
MCH RBC QN AUTO: 29 PG (ref 26.8–34.3)
MCHC RBC AUTO-ENTMCNC: 31.8 G/DL (ref 31.4–37.4)
MCV RBC AUTO: 91 FL (ref 82–98)
MONOCYTES # BLD AUTO: 0.45 THOUSAND/ÂΜL (ref 0.17–1.22)
MONOCYTES NFR BLD AUTO: 6 % (ref 4–12)
NEUTROPHILS # BLD AUTO: 4.98 THOUSANDS/ÂΜL (ref 1.85–7.62)
NEUTS SEG NFR BLD AUTO: 72 % (ref 43–75)
NITRITE UR QL STRIP: NEGATIVE
NON-SQ EPI CELLS URNS QL MICRO: NORMAL /HPF
NRBC BLD AUTO-RTO: 0 /100 WBCS
PH UR STRIP.AUTO: 5.5 [PH]
PLATELET # BLD AUTO: 357 THOUSANDS/UL (ref 149–390)
PMV BLD AUTO: 8.6 FL (ref 8.9–12.7)
POTASSIUM SERPL-SCNC: 4 MMOL/L (ref 3.5–5.3)
PROT SERPL-MCNC: 7.5 G/DL (ref 6.4–8.4)
PROT UR STRIP-MCNC: NEGATIVE MG/DL
RBC # BLD AUTO: 4.27 MILLION/UL (ref 3.81–5.12)
RBC #/AREA URNS AUTO: NORMAL /HPF
SODIUM SERPL-SCNC: 133 MMOL/L (ref 135–147)
SP GR UR STRIP.AUTO: 1.01 (ref 1–1.03)
UROBILINOGEN UR STRIP-ACNC: <2 MG/DL
WBC # BLD AUTO: 7.01 THOUSAND/UL (ref 4.31–10.16)
WBC #/AREA URNS AUTO: NORMAL /HPF

## 2023-02-22 RX ORDER — ONDANSETRON 4 MG/1
4 TABLET, ORALLY DISINTEGRATING ORAL EVERY 8 HOURS PRN
Qty: 20 TABLET | Refills: 0 | Status: SHIPPED | OUTPATIENT
Start: 2023-02-22 | End: 2023-03-01

## 2023-02-22 RX ORDER — ONDANSETRON 2 MG/ML
4 INJECTION INTRAMUSCULAR; INTRAVENOUS ONCE
Status: COMPLETED | OUTPATIENT
Start: 2023-02-22 | End: 2023-02-22

## 2023-02-22 RX ADMIN — SODIUM CHLORIDE 1000 ML: 0.9 INJECTION, SOLUTION INTRAVENOUS at 17:19

## 2023-02-22 RX ADMIN — PYRIDOXINE HYDROCHLORIDE 50 MG: 100 INJECTION, SOLUTION INTRAMUSCULAR; INTRAVENOUS at 17:55

## 2023-02-22 RX ADMIN — ONDANSETRON 4 MG: 2 INJECTION INTRAMUSCULAR; INTRAVENOUS at 17:19

## 2023-02-22 NOTE — ED PROVIDER NOTES
History  Chief Complaint   Patient presents with   • Hyperemesis Gravidarum     Pt is 8 weeks preg has been vomiting for 8 weeks lost about 10 lbs over 8 weeks     41-year-old female comes in for evaluation of vomiting  Patient has a history of barrets and pud  She is approx 8 weeks pregnant  She thinks she has lost 10-12 lbs because of the vomiting  She did call her OB/GYN earlier in the pregnancy for vomiting and they had put her on some prescription medication she states she has been taking that without relief  She also had a threatened miscarriage with this pregnancy several weeks ago but denies any vaginal bleeding or cramping at this time  History provided by:  Patient   used: No    Vomiting  Severity:  Severe  Timing:  Constant  Quality:  Stomach contents  Progression:  Worsening  Chronicity:  New  Recent urination:  Decreased  Ineffective treatments:  Antiemetics  Associated symptoms: no abdominal pain, no arthralgias, no chills, no cough, no diarrhea, no fever and no sore throat    Risk factors: pregnant    Risk factors: no sick contacts, no suspect food intake and no travel to endemic areas        Prior to Admission Medications   Prescriptions Last Dose Informant Patient Reported? Taking?    Doxylamine-Pyridoxine 10-10 MG TBEC   No No   Sig: Take 1 tablet by mouth 4 (four) times a day as needed (nausea, vomiting, morning sickness)   Prenatal Vit-Iron Carbonyl-FA (prenatal multivitamin) TABS   Yes No   Sig: Take 1 tablet by mouth daily   acetaminophen (TYLENOL) 500 mg tablet   No No   Sig: Take 1 tablet (500 mg total) by mouth every 6 (six) hours as needed for mild pain   albuterol (Proventil HFA) 90 mcg/act inhaler   No No   Sig: Inhale 2 puffs every 6 (six) hours as needed for wheezing   citalopram (CeleXA) 10 mg tablet   Yes No   Sig: Take 10 mg by mouth every morning    docusate sodium (COLACE) 100 mg capsule   No No   Sig: Take 1 capsule (100 mg total) by mouth 2 (two) times a day   ergocalciferol (VITAMIN D2) 50,000 units   No No   Sig: Take 1 capsule (50,000 Units total) by mouth once a week   Patient taking differently: Take 50,000 Units by mouth once a week On Monday   famotidine (PEPCID) 40 MG tablet   No No   Sig: Take 1 tablet (40 mg total) by mouth daily at bedtime   ferrous sulfate 324 (65 Fe) mg   No No   Sig: TAKE ONE TABLET BY MOUTH TWICE A DAY BEFORE MEALS   Patient taking differently: Take 324 mg by mouth daily before breakfast   folic acid (FOLVITE) 1 mg tablet   No No   Sig: Take 1 tablet (1 mg total) by mouth daily   lamoTRIgine (LaMICtal) 100 mg tablet   No No   Si tab twice daily x 1 wk, then 1 5 tabs twice daily x 1 wk, then 2 tabs twice daily x 1 week, then take 2 5 tabs (250 mg) twice daily  Do not start before 2023     lamoTRIgine (LaMICtal) 25 mg tablet   No No   Si tab nightly x 1 wk, then 1 tab twice daily x 1 wk, then 2 tabs twice daily x 1 wk, then 3 tabs twice daily x 1 week, then begin 100 mg pills   levETIRAcetam (KEPPRA) 750 mg tablet   No No   Sig: Take 2 tablets (1,500 mg total) by mouth every 12 (twelve) hours   levothyroxine 25 mcg tablet   No No   Sig: TAKE ONE TABLET BY MOUTH EVERY MORNING   sucralfate (CARAFATE) 1 g/10 mL suspension   No No   Sig: Take 10 mL (1 g total) by mouth 4 (four) times a day      Facility-Administered Medications: None       Past Medical History:   Diagnosis Date   • Autism    • [place] esophagus    • CPAP (continuous positive airway pressure) dependence    • 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HCC)     not diagnosed as of 23-trying to R/O   • Depression    • Diabetes mellitus ([facility] [address] )    • Disease of thyroid gland     hypo   • Dysphagia     solids and liquids   • Female infertility    • Fibromyalgia, primary    • Gastric ulcer    • History of bronchitis    • Iron deficiency anemia     infusion in 2022   • Irregular menses    • Miscarriage    • Morbid obesity with BMI of 50 0-59 9, adult (HCC)    • Plantar fasciitis of left foot    • Reflux esophagitis    • Seizures ([address] )     last one 7/13/22   • Sleep apnea     CPAP   • Wears glasses        Past Surgical History:   Procedure Laterality Date   • EGD      with Bx due to barretts esophagus   • EYE SURGERY Bilateral     lazy eye repair   • UT BIOPSY OF LIP N/A 11/10/2022    Procedure: EXCISION BIOPSY SALVARY GLANDS LOWER LIP;  Surgeon: Jamel Wooten MD;  Location: [facility];  Service: ENT   • UT HYSTEROSCOPY BX ENDOMETRIUM&/POLYPC W/WO D&C N/A 9/22/2021    Procedure: DILATATION AND CURETTAGE (D&C) WITH HYSTEROSCOPY;  Surgeon: Dexter Bills MD;  Location: [facility];  Service: Gynecology   • WISDOM TOOTH EXTRACTION         Family History   Problem Relation Age of Onset   • Bipolar disorder Mother    • Depression Mother    • Depression Father    • Diabetes Maternal Grandmother    • Thyroid disease Maternal Grandmother    • Hypertension Maternal Grandmother    • Heart disease Maternal Grandmother    • Diabetes Maternal Grandfather    • Diabetes Paternal Grandmother    • Breast cancer Paternal Grandmother    • Stroke Paternal Grandmother    • Diabetes Paternal Grandfather    • Breast cancer Maternal Aunt 35        bilateral   • Stomach cancer Maternal Aunt      I have reviewed and agree with the history as documented  E-Cigarette/Vaping   • E-Cigarette Use Never User      E-Cigarette/Vaping Substances   • Nicotine No    • THC No    • CBD No    • Flavoring No    • Other No    • Unknown No      Social History     Tobacco Use   • Smoking status: Never   • Smokeless tobacco: Never   Vaping Use   • Vaping Use: Never used   Substance Use Topics   • Alcohol use: Not Currently     Comment: occ   • Drug use: Not Currently     Types: Marijuana     Comment: about a couple times a week, quit June 2022       Review of Systems   Constitutional: Negative for chills and fever  HENT: Negative for ear pain and sore throat  Eyes: Negative for pain and visual disturbance  Respiratory: Negative for cough and shortness of breath  Cardiovascular: Negative for chest pain and palpitations  Gastrointestinal: Positive for vomiting  Negative for abdominal pain and diarrhea  Genitourinary: Negative for dysuria and hematuria  Musculoskeletal: Negative for arthralgias and back pain  Skin: Negative for color change and rash  Neurological: Negative for seizures and syncope  All other systems reviewed and are negative  Physical Exam  Physical Exam  Vitals and nursing note reviewed  Constitutional:       Appearance: Normal appearance  She is well-developed  She is not toxic-appearing  HENT:      Head: Normocephalic and atraumatic  Right Ear: Tympanic membrane and external ear normal       Left Ear: Tympanic membrane and external ear normal       Nose: Nose normal  No nasal deformity or rhinorrhea  Mouth/Throat:      Dentition: Normal dentition  Pharynx: Uvula midline  Eyes:      General: Lids are normal          Right eye: No discharge  Left eye: No discharge  Conjunctiva/sclera: Conjunctivae normal       Pupils: Pupils are equal, round, and reactive to light  Neck:      Vascular: No carotid bruit or JVD  Trachea: Trachea normal    Cardiovascular:      Rate and Rhythm: Normal rate and regular rhythm  No extrasystoles are present  Chest Wall: PMI is not displaced  Pulses: Normal pulses  Pulmonary:      Effort: Pulmonary effort is normal  No accessory muscle usage or respiratory distress  Breath sounds: Normal breath sounds  No wheezing, rhonchi or rales  Abdominal:      General: Bowel sounds are normal       Palpations: Abdomen is soft  Abdomen is not rigid  There is no mass  Tenderness: There is no abdominal tenderness  There is no guarding or rebound  Musculoskeletal:      Right shoulder: No swelling, deformity or bony tenderness  Normal range of motion        Cervical back: Normal range of motion and neck supple  No deformity, tenderness or bony tenderness  Lymphadenopathy:      Cervical: No cervical adenopathy  Skin:     General: Skin is warm and dry  Findings: No rash  Neurological:      Mental Status: She is alert and oriented to person, place, and time  GCS: GCS eye subscore is 4  GCS verbal subscore is 5  GCS motor subscore is 6  Cranial Nerves: No cranial nerve deficit  Sensory: No sensory deficit  Deep Tendon Reflexes: Reflexes are normal and symmetric     Psychiatric:         Speech: Speech normal          Behavior: Behavior normal          Vital Signs  ED Triage Vitals [02/22/23 1556]   Temperature Pulse Respirations Blood Pressure SpO2   98 6 °F (37 °C) 91 18 115/59 100 %      Temp Source Heart Rate Source Patient Position - Orthostatic VS BP Location FiO2 (%)   Oral Monitor Sitting Right arm --      Pain Score       --           Vitals:    02/22/23 1556 02/22/23 1645 02/22/23 1815   BP: 115/59 121/65 124/59   Pulse: 91 89 87   Patient Position - Orthostatic VS: Sitting Sitting Sitting         Visual Acuity      ED Medications  Medications   sodium chloride 0 9 % bolus 1,000 mL (1,000 mL Intravenous New Bag 2/22/23 1719)   ondansetron (ZOFRAN) injection 4 mg (4 mg Intravenous Given 2/22/23 1719)   pyridoxine (VITAMIN B6) injection 50 mg (50 mg Intravenous Given 2/22/23 1755)       Diagnostic Studies  Results Reviewed     Procedure Component Value Units Date/Time    Urine Microscopic [[id number]] Collected: 02/22/23 1825    Lab Status: Final result Specimen: Urine, Clean Catch Updated: 02/22/23 1833     RBC, UA 1-2 /hpf      WBC, UA 1-2 /hpf      Epithelial Cells Occasional /hpf      Bacteria, UA None Seen /hpf      URINE COMMENT --    UA w Reflex to Microscopic w Reflex to Culture [[id number]]  (Abnormal) Collected: 02/22/23 1825    Lab Status: Final result Specimen: Urine, Clean Catch Updated: 02/22/23 1832     Color, UA Light Yellow     Clarity, UA Clear     Specific [place], UA 1 010     pH, UA 5 5     Leukocytes, UA Small     Nitrite, UA Negative     Protein, UA Negative mg/dl      Glucose, UA Negative mg/dl      Ketones, UA Negative mg/dl      Urobilinogen, UA <2 0 mg/dl      Bilirubin, UA Negative     Occult Blood, UA Negative     URINE COMMENT --    Urine culture [[id number]] Collected: 02/22/23 1825    Lab Status:  In process Specimen: Urine, Clean Catch Updated: 02/22/23 1832    Comprehensive metabolic panel [[id number]]  (Abnormal) Collected: 02/22/23 1609    Lab Status: Final result Specimen: Blood from Arm, Right Updated: 02/22/23 1636     Sodium 133 mmol/L      Potassium 4 0 mmol/L      Chloride 101 mmol/L      CO2 25 mmol/L      ANION GAP 7 mmol/L      BUN 10 mg/dL      Creatinine 0 51 mg/dL      Glucose 92 mg/dL      Calcium 9 4 mg/dL      AST 14 U/L      ALT 14 U/L      Alkaline Phosphatase 68 U/L      Total Protein 7 5 g/dL      Albumin 3 8 g/dL      Total Bilirubin 0 24 mg/dL      eGFR 127 ml/min/1 73sq m     Narrative:      [place] guidelines for Chronic Kidney Disease (CKD):   •  Stage 1 with normal or high GFR (GFR > 90 mL/min/1 73 square meters)  •  Stage 2 Mild CKD (GFR = 60-89 mL/min/1 73 square meters)  •  Stage 3A Moderate CKD (GFR = 45-59 mL/min/1 73 square meters)  •  Stage 3B Moderate CKD (GFR = 30-44 mL/min/1 73 square meters)  •  Stage 4 Severe CKD (GFR = 15-29 mL/min/1 73 square meters)  •  Stage 5 End Stage CKD (GFR <15 mL/min/1 73 square meters)  Note: GFR calculation is accurate only with a steady state creatinine    Lipase [[id number]]  (Normal) Collected: 02/22/23 1609    Lab Status: Final result Specimen: Blood from Arm, Right Updated: 02/22/23 1636     Lipase 12 u/L     CBC and differential [[id number]]  (Abnormal) Collected: 02/22/23 1609    Lab Status: Final result Specimen: Blood from Arm, Right Updated: 02/22/23 1623     WBC 7 01 Thousand/uL      RBC 4 27 Million/uL      Hemoglobin 12 4 g/dL      Hematocrit 39 0 %      MCV 91 fL      MCH 29 0 pg      MCHC 31 8 g/dL      RDW 13 2 %      MPV 8 6 fL      Platelets 893 Thousands/uL      nRBC 0 /100 WBCs      Neutrophils Relative 72 %      Immat GRANS % 0 %      Lymphocytes Relative 19 %      Monocytes Relative 6 %      Eosinophils Relative 1 %      Basophils Relative 2 %      Neutrophils Absolute 4 98 Thousands/µL      Immature Grans Absolute 0 02 Thousand/uL      Lymphocytes Absolute 1 36 Thousands/µL      Monocytes Absolute 0 45 Thousand/µL      Eosinophils Absolute 0 08 Thousand/µL      Basophils Absolute 0 12 Thousands/µL                  No orders to display              Procedures  Procedures         ED Course                                             Medical Decision Making  Differential diagnosis includes but is not limited to vomiting in pregnancy, hyperemesis gravidarum, gastritis, gastroenteritis, biliary colic, colitis,    Discussed results with patient most likely vomiting in early pregnancy may be hyperemesis but would allow OB to make that diagnosis  Discussed that her lab work is within normal limits and that bedside ultrasound looked good  I have no concerns for further imaging  Patient is comfortable with the plan and following up outpatient  First trimester pregnancy: acute illness or injury  Nausea and vomiting in pregnancy: acute illness or injury  Amount and/or Complexity of Data Reviewed  External Data Reviewed: notes  Details: Viewed notes of patient's call to her OB/GYN over the course of this pregnancy for nausea and vomiting  Labs: ordered  Decision-making details documented in ED Course  Radiology: ordered and independent interpretation performed  Details: Bedside ultrasound done which shows an single IUP with a heart rate in the 180s to 200s      Risk  Prescription drug management      Risk Details: Discussed with patient that she should continue her current medications and I would send in some Zofran but that she should follow-up with PCP/OB/GYN        Disposition  Final diagnoses:   First trimester pregnancy   Nausea and vomiting in pregnancy     Time reflects when diagnosis was documented in both MDM as applicable and the Disposition within this note     Time User Action Codes Description Comment    2/22/2023  6:44 PM Tata Acuña Add [Z34 91] First trimester pregnancy     2/22/2023  6:44 PM Tata Acuña Add [O21 9] Nausea and vomiting in pregnancy       ED Disposition     ED Disposition   Discharge    Condition   Stable    Date/Time   Wed Feb 22, 2023  6:44 PM    Comment   Margoth Rothmanyor discharge to home/self care  Follow-up Information     Follow up With Specialties Details Why Contact Info    Debora Farnsworth DO Family Medicine Schedule an appointment as soon as possible for a visit   [address]  [facility]  [address] [phone]            Patient's Medications   Discharge Prescriptions    ONDANSETRON (ZOFRAN-ODT) 4 MG DISINTEGRATING TABLET    Take 1 tablet (4 mg total) by mouth every 8 (eight) hours as needed for nausea or vomiting for up to 7 days       Start Date: 2/22/2023 End Date: 3/1/2023       Order Dose: 4 mg       Quantity: 20 tablet    Refills: 0       No discharge procedures on file      PDMP Review     None          ED Provider  Electronically Signed by           America Porras DO  02/22/23 6919

## 2023-02-22 NOTE — TELEPHONE ENCOUNTER
7 weeks pregnant c/o nausea and vomiting not able to keep anything down for the past few days has lost 12lbs , advised ED for fluid

## 2023-02-22 NOTE — MEDICAL STUDENT
MEDICAL STUDENT NOTE  FOR EDUCATIONAL PURPOSES ONLY    Ms General Turner is a  28 yo F PMHx s/f autism, fibromyalgia, [place] Esophagus, Cushing Syndrome, DM, hypothyroidism, gastric ulcer, seizures, presents with hyperemesis gravidarum  HPI:  -Pt reports that she discovered she was pregnant on   A few days prior, she began to have N/V that has progressively gotten worse during her pregnancy  She states that she can only keep water/gatorade down with occasional saline crackers  "The smell of food makes me sick and I'm not feeling good " She hasn't been able to eat meals and vomits about 3-4x a day sometimes awakening from sleep to vomit  There is no blood in the vomit   -Pt also states that around 5w she had cramping and vaginal bleeding  She was concerned for a miscarriage given she has had 3 miscarriages so far and no living children  U/s at that point showed an intrauterine pregnancy   -She states she currently takes pepcid, iron, VD3, women's 1x a day, Folic acid 1 mg but no B6  Review of Systems   Constitutional: Positive for appetite change (Dec appetite) and unexpected weight change (Lost 10lbs unintentionally pt reports due to vomiting)  Negative for fever  HENT: Negative for sore throat  Eyes: Negative for photophobia, pain and visual disturbance  Respiratory: Negative for cough and shortness of breath  Cardiovascular: Negative for chest pain and palpitations  Gastrointestinal: Positive for diarrhea, nausea and vomiting  Negative for abdominal pain and blood in stool  Genitourinary: Positive for frequency and urgency  Negative for hematuria  Neurological: Positive for headaches (Last headache prior to )  Negative for dizziness and weakness         History     Chief Complaint   Patient presents with   • Hyperemesis Gravidarum     Pt is 8 weeks preg has been vomiting for 8 weeks lost about 10 lbs over 8 weeks     Past Medical History:   Diagnosis Date   • Autism • [place] esophagus    • CPAP (continuous positive airway pressure) dependence    • 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HCC)     not diagnosed as of 1/9/23-trying to R/O   • Depression    • Diabetes mellitus ([facility] [address] )    • Disease of thyroid gland     hypo   • Dysphagia     solids and liquids   • Female infertility    • Fibromyalgia, primary    • Gastric ulcer    • History of bronchitis    • Iron deficiency anemia     infusion in 11/2022   • Irregular menses    • Miscarriage    • Morbid obesity with BMI of 50 0-59 9, adult (HCC)    • Plantar fasciitis of left foot    • Reflux esophagitis    • Seizures ([facility] [address] )     last one 7/13/22   • Sleep apnea     CPAP   • Wears glasses        Past Surgical History:   Procedure Laterality Date   • EGD      with Bx due to barretts esophagus   • EYE SURGERY Bilateral     lazy eye repair   • [place] BIOPSY OF LIP N/A 11/10/2022    Procedure: EXCISION BIOPSY SALVARY GLANDS LOWER LIP;  Surgeon: Todd Bhardwaj MD;  Location: [address] [place] [address];  Service: ENT   • MT HYSTEROSCOPY BX ENDOMETRIUM&/POLYPC W/WO D&C N/A 9/22/2021    Procedure: DILATATION AND CURETTAGE (D&C) WITH HYSTEROSCOPY;  Surgeon: Coral Montana MD;  Location: [facility];  Service: Gynecology   • WISDOM TOOTH EXTRACTION         Family History   Problem Relation Age of Onset   • Bipolar disorder Mother    • Depression Mother    • Depression Father    • Diabetes Maternal Grandmother    • Thyroid disease Maternal Grandmother    • Hypertension Maternal Grandmother    • Heart disease Maternal Grandmother    • Diabetes Maternal Grandfather    • Diabetes Paternal Grandmother    • Breast cancer Paternal Grandmother    • Stroke Paternal Grandmother    • Diabetes Paternal Grandfather    • Breast cancer Maternal Aunt 35        bilateral   • Stomach cancer Maternal Aunt        Social History     Tobacco Use   • Smoking status: Never   • Smokeless tobacco: Never   Vaping Use   • Vaping Use: Never used   Substance Use Topics   • Alcohol use: Not Currently Comment: occ   • Drug use: Not Currently     Types: Marijuana     Comment: about a couple times a week, quit June 2022       Physical Exam     ED Triage Vitals [02/22/23 1556]   Temperature Pulse Respirations Blood Pressure SpO2   98 6 °F (37 °C) 91 18 115/59 100 %      Temp Source Heart Rate Source Patient Position - Orthostatic VS BP Location FiO2 (%)   Oral Monitor Sitting Right arm --      Pain Score       --           Physical Exam  Constitutional:       General: She is not in acute distress  Appearance: Normal appearance  She is obese  She is not ill-appearing or toxic-appearing  HENT:      Head: Normocephalic  Nose: Nose normal       Mouth/Throat:      Mouth: Mucous membranes are dry  Pharynx: Oropharynx is clear  No oropharyngeal exudate or posterior oropharyngeal erythema  Eyes:      General: No scleral icterus  Right eye: No discharge  Left eye: No discharge  Extraocular Movements: Extraocular movements intact  Conjunctiva/sclera: Conjunctivae normal    Cardiovascular:      Rate and Rhythm: Normal rate and regular rhythm  Pulses: Normal pulses  Heart sounds: Normal heart sounds  No murmur heard  No friction rub  No gallop  Pulmonary:      Effort: Pulmonary effort is normal  No respiratory distress  Breath sounds: Normal breath sounds  No wheezing, rhonchi or rales  Abdominal:      General: Abdomen is flat  Bowel sounds are normal  There is no distension  Palpations: Abdomen is soft  Tenderness: There is no abdominal tenderness  There is no right CVA tenderness, left CVA tenderness, guarding or rebound  Skin:     General: Skin is warm and dry  Findings: Lesion (Cut on lateral L wrist, well healed and scab) present  Neurological:      Mental Status: She is alert and oriented to person, place, and time  Mental status is at baseline     Psychiatric:         Mood and Affect: Mood normal          Behavior: Behavior normal  Thought Content:  Thought content normal          Judgment: Judgment normal         Results Reviewed     Procedure Component Value Units Date/Time    Comprehensive metabolic panel [[id number]]  (Abnormal) Collected: 02/22/23 1609    Lab Status: Final result Specimen: Blood from Arm, Right Updated: 02/22/23 1636     Sodium 133 mmol/L      Potassium 4 0 mmol/L      Chloride 101 mmol/L      CO2 25 mmol/L      ANION GAP 7 mmol/L      BUN 10 mg/dL      Creatinine 0 51 mg/dL      Glucose 92 mg/dL      Calcium 9 4 mg/dL      AST 14 U/L      ALT 14 U/L      Alkaline Phosphatase 68 U/L      Total Protein 7 5 g/dL      Albumin 3 8 g/dL      Total Bilirubin 0 24 mg/dL      eGFR 127 ml/min/1 73sq m     Narrative:      [employer] Foundation guidelines for Chronic Kidney Disease (CKD):   •  Stage 1 with normal or high GFR (GFR > 90 mL/min/1 73 square meters)  •  Stage 2 Mild CKD (GFR = 60-89 mL/min/1 73 square meters)  •  Stage 3A Moderate CKD (GFR = 45-59 mL/min/1 73 square meters)  •  Stage 3B Moderate CKD (GFR = 30-44 mL/min/1 73 square meters)  •  Stage 4 Severe CKD (GFR = 15-29 mL/min/1 73 square meters)  •  Stage 5 End Stage CKD (GFR <15 mL/min/1 73 square meters)  Note: GFR calculation is accurate only with a steady state creatinine    Lipase [[id number]]  (Normal) Collected: 02/22/23 1609    Lab Status: Final result Specimen: Blood from Arm, Right Updated: 02/22/23 1636     Lipase 12 u/L     CBC and differential [[id number]]  (Abnormal) Collected: 02/22/23 1609    Lab Status: Final result Specimen: Blood from Arm, Right Updated: 02/22/23 1623     WBC 7 01 Thousand/uL      RBC 4 27 Million/uL      Hemoglobin 12 4 g/dL      Hematocrit 39 0 %      MCV 91 fL      MCH 29 0 pg      MCHC 31 8 g/dL      RDW 13 2 %      MPV 8 6 fL      Platelets 538 Thousands/uL      nRBC 0 /100 WBCs      Neutrophils Relative 72 %      Immat GRANS % 0 %      Lymphocytes Relative 19 %      Monocytes Relative 6 %      Eosinophils Relative 1 %      Basophils Relative 2 %      Neutrophils Absolute 4 98 Thousands/µL      Immature Grans Absolute 0 02 Thousand/uL      Lymphocytes Absolute 1 36 Thousands/µL      Monocytes Absolute 0 45 Thousand/µL      Eosinophils Absolute 0 08 Thousand/µL      Basophils Absolute 0 12 Thousands/µL         Ultrasound: [unofficial, performed by medical student]  Intrauterine pregnancy visualized  HR measured to be 201  ED Course     Ms Yancy Jean is a  28 yo F PMHx s/f autism, fibromyalgia, [place] Esophagus, Cushing Syndrome, DM, hypothyroidism, gastric ulcer, seizures, presents with hyperemesis gravidarum  1) Hyperemesis gravidarum  Pt reports being unable to consume meals and only consume water/gatorade due to vomiting  She is not currently taking vitamin B6 as part of her prenatal regimen     -F/u UA, if UA normal can discharge home   -IVF   -B6 for N/V

## 2023-02-23 LAB — BACTERIA UR CULT: NORMAL

## 2023-03-10 ENCOUNTER — INITIAL PRENATAL (OUTPATIENT)
Dept: OBGYN CLINIC | Facility: CLINIC | Age: 33
End: 2023-03-10

## 2023-03-10 VITALS
DIASTOLIC BLOOD PRESSURE: 68 MMHG | HEIGHT: 55 IN | BODY MASS INDEX: 53.69 KG/M2 | SYSTOLIC BLOOD PRESSURE: 110 MMHG | WEIGHT: 232 LBS

## 2023-03-10 DIAGNOSIS — R56.9 SEIZURES (HCC): ICD-10-CM

## 2023-03-10 DIAGNOSIS — O99.210 OBESITY IN PREGNANCY: ICD-10-CM

## 2023-03-10 DIAGNOSIS — Z3A.10 10 WEEKS GESTATION OF PREGNANCY: ICD-10-CM

## 2023-03-10 DIAGNOSIS — F41.8 DEPRESSION WITH ANXIETY: ICD-10-CM

## 2023-03-10 DIAGNOSIS — Z13.1 ENCOUNTER FOR SCREENING FOR DIABETES MELLITUS: ICD-10-CM

## 2023-03-10 DIAGNOSIS — F31.9 BIPOLAR DEPRESSION (HCC): ICD-10-CM

## 2023-03-10 DIAGNOSIS — E66.01 MORBID OBESITY (HCC): Chronic | ICD-10-CM

## 2023-03-10 DIAGNOSIS — G40.909 NONINTRACTABLE EPILEPSY WITHOUT STATUS EPILEPTICUS, UNSPECIFIED EPILEPSY TYPE (HCC): ICD-10-CM

## 2023-03-10 DIAGNOSIS — E03.9 HYPOTHYROIDISM, UNSPECIFIED TYPE: ICD-10-CM

## 2023-03-10 DIAGNOSIS — K59.09 CHRONIC CONSTIPATION: ICD-10-CM

## 2023-03-10 DIAGNOSIS — O21.9 NAUSEA/VOMITING IN PREGNANCY: Primary | ICD-10-CM

## 2023-03-10 DIAGNOSIS — E28.2 PCOS (POLYCYSTIC OVARIAN SYNDROME): ICD-10-CM

## 2023-03-10 DIAGNOSIS — O99.280 HYPOTHYROID IN PREGNANCY, ANTEPARTUM: ICD-10-CM

## 2023-03-10 DIAGNOSIS — D50.8 IRON DEFICIENCY ANEMIA SECONDARY TO INADEQUATE DIETARY IRON INTAKE: ICD-10-CM

## 2023-03-10 DIAGNOSIS — E03.9 HYPOTHYROID IN PREGNANCY, ANTEPARTUM: ICD-10-CM

## 2023-03-10 PROBLEM — Z3A.09 9 WEEKS GESTATION OF PREGNANCY: Status: ACTIVE | Noted: 2023-03-10

## 2023-03-10 PROBLEM — R79.89 TSH ELEVATION: Status: RESOLVED | Noted: 2020-12-01 | Resolved: 2023-03-10

## 2023-03-10 RX ORDER — ONDANSETRON 4 MG/1
4 TABLET, FILM COATED ORAL EVERY 8 HOURS PRN
Qty: 20 TABLET | Refills: 0 | Status: SHIPPED | OUTPATIENT
Start: 2023-03-10

## 2023-03-10 RX ORDER — DOCUSATE SODIUM 100 MG/1
100 CAPSULE, LIQUID FILLED ORAL 2 TIMES DAILY
Qty: 60 CAPSULE | Refills: 5 | Status: SHIPPED | OUTPATIENT
Start: 2023-03-10

## 2023-03-10 NOTE — PROGRESS NOTES
OB/GYN  Initial PRENATAL VISIT  Dave Leal  3/10/2023  7:11 PM  Dr Kem Ocasio MD      SUBJECTIVE  Patient is a [id number] at [address] by LMP/ 9w6d by ED US here for initial prenatal visit  This is an unplanned but desired pregnancy  FOB is not involved but patient is accompanied today by her best friend, Rui Home  She reports a good support system  She lives with [facility] and Arabella morin   Patient's mom is in [place] but calls everyday  Patient is currently doing better  She has struggled with nausea and vomiting and reports weight loss  She has not had recent vomiting with Zofran daily  She reports nausea midday  Her previous pregnancies were 2 SABs prior to 8 weeks and then an IUFD at "7 months"  She reports that the cause of the IUFD was due to lack of Rhogam  She was induced and had a vaginal delivery  She had bleeding at 5 weeks in this pregnancy and was given Rhogam  Her blood type is O negative    She denies hx of STD/STI, denies a hx of TB or close contacts with persons with TB  She denies an immediate family history of inheritable conditions such as physical or intellectual disabilities, birth defects, blood disorders, heart or neural tube defects  She does have a cousins with spina bifida and another with down syndrome  She has not had MRSA  She denies recent travel or travel planned in the near future  She denies use of nicotine, tobacco, alcohol or recreational drug use  She denies vaginal bleeding, cramping, leakage, abnormal discharge  Her PMH is significant for seizure disorder for which she is following with neurology  This pregnancy is complicated by BMI >82, questionable history of DM, seizure disorder, hypothyroidism, anxiety/depression, PCOS, GARIMA, and hx IUFD in the 3rd trimester possibly secondary to Rh incompatibility (patient is O negative)       OB History    Para Term  AB Living   4 1 0 1 2 0   SAB IAB Ectopic Multiple Live Births   2 0 0 0 0 # Outcome Date GA Lbr Lopez/2nd Weight Sex Delivery Anes PTL Lv   4 Current            3 SAB            2 SAB            1   28w0d    Vag-Spont   FD      Complications: ABO incompatibility in pregnancy      Obstetric Comments   Both SAB <2 months   Still birth at 7 months   Has had hypercoagulable workup in [place] which was negative      Past Medical History:   Diagnosis Date   • Autism    • [place] esophagus    • CPAP (continuous positive airway pressure) dependence    • 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HCC)     not diagnosed as of 23-trying to R/O   • Depression    • Diabetes mellitus ([facility] )    • Disease of thyroid gland     hypo   • Dysphagia     solids and liquids   • Female infertility    • Fibromyalgia, primary    • Gastric ulcer    • History of bronchitis    • Iron deficiency anemia     infusion in 2022   • Irregular menses    • Miscarriage    • Morbid obesity with BMI of 50 0-59 9, adult ([facility])    • Plantar fasciitis of left foot    • Reflux esophagitis    • Seizures ([facility] )     last one 22   • Sleep apnea     CPAP   • Wears glasses        Past Surgical History:   Procedure Laterality Date   • EGD      with Bx due to barretts esophagus   • EYE SURGERY Bilateral     lazy eye repair   • AK BIOPSY OF LIP N/A 11/10/2022    Procedure: EXCISION BIOPSY SALVARY GLANDS LOWER LIP;  Surgeon: Herb Odom MD;  Location: [facility];  Service: ENT   • AK HYSTEROSCOPY BX ENDOMETRIUM&/POLYPC W/WO D&C N/A 2021    Procedure: DILATATION AND CURETTAGE (D&C) WITH HYSTEROSCOPY;  Surgeon: Ronald Jiménez MD;  Location: [facility];  Service: Gynecology   • WISDOM TOOTH EXTRACTION         Social History     Socioeconomic History   • Marital status: Single     Spouse name: Not on file   • Number of children: Not on file   • Years of education: Not on file   • Highest education level: Not on file   Occupational History   • Not on file   Tobacco Use   • Smoking status: Never   • Smokeless tobacco: Never   Vaping Use • Vaping Use: Never used   Substance and Sexual Activity   • Alcohol use: Not Currently     Comment: occ   • Drug use: Not Currently     Types: Marijuana     Comment: about a couple times a week, quit 2022   • Sexual activity: Yes     Partners: Male     Birth control/protection: None     Comment: Trying to conceive for 2 years now   Other Topics Concern   • Not on file   Social History Narrative   • Not on file     Social Determinants of Health     Financial Resource Strain: Not on file   Food Insecurity: Not on file   Transportation Needs: Not on file   Physical Activity: Not on file   Stress: Not on file   Social Connections: Not on file   Intimate Partner Violence: Not on file   Housing Stability: Not on file       OBJECTIVE  Vitals:    03/10/23 1119   BP: 110/68     Physical Exam  Vitals reviewed  Exam conducted with a chaperone present  Constitutional:       General: She is not in acute distress  Appearance: She is obese  She is not ill-appearing, toxic-appearing or diaphoretic  Cardiovascular:      Rate and Rhythm: Normal rate  Pulmonary:      Effort: Pulmonary effort is normal  No respiratory distress  Abdominal:      Palpations: Abdomen is soft  Skin:     General: Skin is warm and dry  Neurological:      Mental Status: She is alert and oriented to person, place, and time  Mental status is at baseline  Psychiatric:         Mood and Affect: Mood normal          Behavior: Behavior normal          Thought Content: Thought content normal          Judgment: Judgment normal          FHT 170bpm by ultrasound                            ASSESSMENT AND PLAN    29 y o , , with /68 (BP Location: Left arm, Patient Position: Sitting)   Ht 4' 0 7" (1 237 m)   Wt 105 kg (232 lb)   LMP 2022 (Exact Date) , at 10w0d here for her prenatal H&P      Problem List Items Addressed This Visit        Unprioritized    Morbid obesity ([address] ) (Chronic)    Depression with anxiety     Continue Celexa         Nonintractable epilepsy without status epilepticus (HCC)    PCOS (polycystic ovarian syndrome)    Relevant Orders    Glucose, 1H PG    Bipolar depression (HCC)     Continue Celexa         Iron deficiency anemia secondary to inadequate dietary iron intake     CBC ordered with prenatal panel         Hypothyroid in pregnancy, antepartum     Continue Levothyroxine         10 weeks gestation of pregnancy     - Continue PNV  - Ultrasounds: Viable IUP on US today consistent with first trimester US on 2/9/23; MFM referral given  - Genetics: MFM referral given; Carrier screen ordered  - Labs: Prenatal panel ordered  - Tdap:  Will offer after 27 weeks gestation  - COVID: Vaccinated  - Delivery: TBD  - Contraception: TBD  - Breastfeeding: TBD   - RTO in 4 weeks         Relevant Orders    Prenatal Panel    PRENATAL CARRIER PANEL (CFVANTAGE, FRAGILE X, SMA)    Ambulatory Referral to Maternal Fetal Medicine    [facility] OB < 14 weeks single or first gestation level 1 (Completed)    Chronic constipation    Relevant Medications    docusate sodium (COLACE) 100 mg capsule    Nausea/vomiting in pregnancy - Primary    Relevant Medications    ondansetron (ZOFRAN) 4 mg tablet    Seizures ([address] )     Continue seizure ppx as dictated by neurology  Patient saw neurology on 2/7/23  Continue Keppra 750mg daily  Continue Lamictal titration  F/U medication levels as instructed by neurology  Continue Folic acid supplementation  Call immediately with any seizures (most recent seizure on 7/13/22)           Obesity in pregnancy     Early glucola ordered  Patient will need APFS         Relevant Orders    Glucose, 1H PG   Other Visit Diagnoses     Encounter for screening for diabetes mellitus        Relevant Orders    Glucose, 1H PG            Karen Abreu MD  3/10/2023  7:11 PM

## 2023-03-10 NOTE — ASSESSMENT & PLAN NOTE
Continue seizure ppx as dictated by neurology  Patient saw neurology on 2/7/23  Continue Keppra 750mg daily  Continue Lamictal titration  F/U medication levels as instructed by neurology  Continue Folic acid supplementation  Call immediately with any seizures (most recent seizure on 7/13/22)

## 2023-03-10 NOTE — ASSESSMENT & PLAN NOTE
- Continue PNV  - Ultrasounds: Viable IUP on US today consistent with first trimester US on 2/9/23; MFM referral given  - Genetics: MFM referral given; Carrier screen ordered  - Labs: Prenatal panel ordered  - Tdap:  Will offer after 27 weeks gestation  - COVID: Vaccinated  - Delivery: TBD  - Contraception: TBD  - Breastfeeding: TBD   - RTO in 4 weeks

## 2023-03-12 DIAGNOSIS — E55.9 VITAMIN D DEFICIENCY: ICD-10-CM

## 2023-03-12 RX ORDER — ERGOCALCIFEROL 1.25 MG/1
CAPSULE ORAL
Qty: 8 CAPSULE | Refills: 0 | OUTPATIENT
Start: 2023-03-12

## 2023-03-13 ENCOUNTER — OFFICE VISIT (OUTPATIENT)
Dept: PODIATRY | Facility: CLINIC | Age: 33
End: 2023-03-13

## 2023-03-13 ENCOUNTER — TELEPHONE (OUTPATIENT)
Dept: OBGYN CLINIC | Facility: CLINIC | Age: 33
End: 2023-03-13

## 2023-03-13 VITALS
DIASTOLIC BLOOD PRESSURE: 68 MMHG | WEIGHT: 232 LBS | HEIGHT: 55 IN | RESPIRATION RATE: 18 BRPM | SYSTOLIC BLOOD PRESSURE: 110 MMHG | BODY MASS INDEX: 53.69 KG/M2

## 2023-03-13 DIAGNOSIS — M79.672 LEFT FOOT PAIN: ICD-10-CM

## 2023-03-13 DIAGNOSIS — M21.42 ACQUIRED FLAT FOOT, LEFT: ICD-10-CM

## 2023-03-13 DIAGNOSIS — M72.2 PLANTAR FASCIITIS: Primary | ICD-10-CM

## 2023-03-13 DIAGNOSIS — M21.41 ACQUIRED FLAT FOOT, RIGHT: ICD-10-CM

## 2023-03-13 DIAGNOSIS — M21.962 ACQUIRED DEFORMITY OF LEFT FOOT: ICD-10-CM

## 2023-03-13 NOTE — PROGRESS NOTES
Assessment/Plan:  Planter fasciitis left foot  Acquired deformity foot bilateral   Acquired pes planus  Pain upon ambulation  Plan  Foot exam performed  Patient educated on condition  At this time she is pregnant  No x-rays to be taken  We will not use any medicinal treatments other than Tylenol as needed  Today patient's left arch bound in a LowDye arch strapping fashion  She is advised on aftercare  Physical therapy ordered  Return as needed       There are no diagnoses linked to this encounter  Subjective:      Allergies   Allergen Reactions   • Haloperidol Other (See Comments)     Jaw locks up   • Penicillins Hives   • Pollen Extract Allergic Rhinitis         Current Outpatient Medications:   •  acetaminophen (TYLENOL) 500 mg tablet, Take 1 tablet (500 mg total) by mouth every 6 (six) hours as needed for mild pain, Disp: , Rfl: 0  •  albuterol (Proventil HFA) 90 mcg/act inhaler, Inhale 2 puffs every 6 (six) hours as needed for wheezing, Disp: 18 g, Rfl: 3  •  citalopram (CeleXA) 10 mg tablet, Take 10 mg by mouth every morning , Disp: , Rfl:   •  docusate sodium (COLACE) 100 mg capsule, Take 1 capsule (100 mg total) by mouth 2 (two) times a day, Disp: 60 capsule, Rfl: 5  •  Doxylamine-Pyridoxine 10-10 MG TBEC, Take 1 tablet by mouth 4 (four) times a day as needed (nausea, vomiting, morning sickness), Disp: 30 tablet, Rfl: 1  •  ergocalciferol (VITAMIN D2) 50,000 units, Take 1 capsule (50,000 Units total) by mouth once a week (Patient taking differently: Take 50,000 Units by mouth once a week On Monday), Disp: 8 capsule, Rfl: 0  •  famotidine (PEPCID) 40 MG tablet, Take 1 tablet (40 mg total) by mouth daily at bedtime, Disp: 30 tablet, Rfl: 5  •  ferrous sulfate 324 (65 Fe) mg, TAKE ONE TABLET BY MOUTH TWICE A DAY BEFORE MEALS (Patient taking differently: Take 324 mg by mouth daily before breakfast), Disp: 60 tablet, Rfl: 2  •  folic acid (FOLVITE) 1 mg tablet, Take 1 tablet (1 mg total) by mouth daily, Disp: 90 tablet, Rfl: 1  •  lamoTRIgine (LaMICtal) 100 mg tablet, 1 tab twice daily x 1 wk, then 1 5 tabs twice daily x 1 wk, then 2 tabs twice daily x 1 week, then take 2 5 tabs (250 mg) twice daily   Do not start before March 7, 2023 , Disp: 150 tablet, Rfl: 5  •  lamoTRIgine (LaMICtal) 25 mg tablet, 1 tab nightly x 1 wk, then 1 tab twice daily x 1 wk, then 2 tabs twice daily x 1 wk, then 3 tabs twice daily x 1 week, then begin 100 mg pills, Disp: 100 tablet, Rfl: 0  •  levETIRAcetam (KEPPRA) 750 mg tablet, Take 2 tablets (1,500 mg total) by mouth every 12 (twelve) hours, Disp: 120 tablet, Rfl: 5  •  levothyroxine 25 mcg tablet, TAKE ONE TABLET BY MOUTH EVERY MORNING, Disp: 30 tablet, Rfl: 2  •  ondansetron (ZOFRAN) 4 mg tablet, Take 1 tablet (4 mg total) by mouth every 8 (eight) hours as needed for nausea or vomiting, Disp: 20 tablet, Rfl: 0  •  ondansetron (ZOFRAN-ODT) 4 mg disintegrating tablet, Take 1 tablet (4 mg total) by mouth every 8 (eight) hours as needed for nausea or vomiting for up to 7 days, Disp: 20 tablet, Rfl: 0  •  Prenatal Vit-Iron Carbonyl-FA (prenatal multivitamin) TABS, Take 1 tablet by mouth daily, Disp: , Rfl:   •  sucralfate (CARAFATE) 1 g/10 mL suspension, Take 10 mL (1 g total) by mouth 4 (four) times a day, Disp: 420 mL, Rfl: 0    Patient Active Problem List   Diagnosis   • Depression with anxiety   • Nonintractable epilepsy without status epilepticus ([facility] )   • History of irregular menstrual bleeding   • PCOS (polycystic ovarian syndrome)   • Endometrial polyp   • Infertility, female   • [place]'s esophagus   • Gastric ulcer   • GARIMA (obstructive sleep apnea)   • Female infertility   • Autism   • Bipolar depression ([facility] [address] )   • Morbid obesity (HCC)   • Class 3 severe obesity due to excess calories with serious comorbidity and body mass index (BMI) of 50 0 to 59 9 in [facility])   • Gastroesophageal reflux disease   • Excessive daytime sleepiness   • Iron deficiency anemia secondary to inadequate dietary iron intake   • Constipation   • Dysphagia   • Hypothyroid in pregnancy, antepartum   • 10 weeks gestation of pregnancy   • Chronic constipation   • Nausea/vomiting in pregnancy   • Seizures ([facility] [address] )   • Obesity in pregnancy          Patient ID: Obinna Magallon is a 29 y o  female  HPI    The following portions of the patient's history were reviewed and updated as appropriate:     family history includes Bipolar disorder in her mother; Breast cancer in her paternal grandmother; Breast cancer (age of onset: 28) in her maternal aunt; Depression in her father and mother; Diabetes in her maternal grandfather, maternal grandmother, paternal grandfather, and paternal grandmother; Heart disease in her maternal grandmother; Hypertension in her maternal grandmother; Stomach cancer in her maternal aunt; Stroke in her paternal grandmother; Thyroid disease in her maternal grandmother  reports that she has never smoked  She has never used smokeless tobacco  She reports that she does not currently use alcohol  She reports that she does not currently use drugs after having used the following drugs: Marijuana  Vitals:    03/13/23 1304   BP: 110/68   Resp: 18       Review of Systems      Objective:  Patient's shoes and socks removed  Foot ExamPhysical Exam  Vitals and nursing note reviewed  Constitutional:       Appearance: Normal appearance  Cardiovascular:      Rate and Rhythm: Normal rate and regular rhythm  Feet:      Comments: Patient is pronated in stance and gait  She has collapse of the medial arch  Pain with palpation left plantar fascia insertion  Negative evidence of rupture or mass  Skin:     Capillary Refill: Capillary refill takes less than 2 seconds  Neurological:      Mental Status: She is alert  Psychiatric:         Mood and Affect: Mood normal          Thought Content:  Thought content normal          Judgment: Judgment normal

## 2023-03-13 NOTE — TELEPHONE ENCOUNTER
Pt called and states this morning when using the bathroom and wiped she was spotting and noticed some small clots  She denies any intercourse recently  Not having any pain at this time  Pt advised to monitor the bleeding and if it gets any heavier or experiencing pain she needs to call the office

## 2023-03-14 ENCOUNTER — APPOINTMENT (OUTPATIENT)
Dept: LAB | Facility: HOSPITAL | Age: 33
End: 2023-03-14

## 2023-03-14 DIAGNOSIS — E03.9 HYPOTHYROIDISM, UNSPECIFIED TYPE: ICD-10-CM

## 2023-03-14 DIAGNOSIS — Z3A.10 10 WEEKS GESTATION OF PREGNANCY: ICD-10-CM

## 2023-03-14 DIAGNOSIS — O99.210 OBESITY IN PREGNANCY: ICD-10-CM

## 2023-03-14 DIAGNOSIS — Z13.1 ENCOUNTER FOR SCREENING FOR DIABETES MELLITUS: ICD-10-CM

## 2023-03-14 DIAGNOSIS — E28.2 PCOS (POLYCYSTIC OVARIAN SYNDROME): ICD-10-CM

## 2023-03-14 LAB
ABO GROUP BLD: NORMAL
BACTERIA UR QL AUTO: NORMAL /HPF
BASOPHILS # BLD AUTO: 0.05 THOUSANDS/ÂΜL (ref 0–0.1)
BASOPHILS NFR BLD AUTO: 1 % (ref 0–1)
BILIRUB UR QL STRIP: NEGATIVE
BLD GP AB SCN SERPL QL: NEGATIVE
CLARITY UR: CLEAR
COLOR UR: YELLOW
EOSINOPHIL # BLD AUTO: 0.06 THOUSAND/ÂΜL (ref 0–0.61)
EOSINOPHIL NFR BLD AUTO: 1 % (ref 0–6)
ERYTHROCYTE [DISTWIDTH] IN BLOOD BY AUTOMATED COUNT: 13.5 % (ref 11.6–15.1)
GLUCOSE 1H P 50 G GLC PO SERPL-MCNC: 89 MG/DL (ref 40–134)
GLUCOSE UR STRIP-MCNC: NEGATIVE MG/DL
HCT VFR BLD AUTO: 35.6 % (ref 34.8–46.1)
HGB BLD-MCNC: 11.4 G/DL (ref 11.5–15.4)
HGB UR QL STRIP.AUTO: NEGATIVE
IMM GRANULOCYTES # BLD AUTO: 0.01 THOUSAND/UL (ref 0–0.2)
IMM GRANULOCYTES NFR BLD AUTO: 0 % (ref 0–2)
KETONES UR STRIP-MCNC: NEGATIVE MG/DL
LEUKOCYTE ESTERASE UR QL STRIP: ABNORMAL
LYMPHOCYTES # BLD AUTO: 0.91 THOUSANDS/ÂΜL (ref 0.6–4.47)
LYMPHOCYTES NFR BLD AUTO: 18 % (ref 14–44)
MCH RBC QN AUTO: 29.1 PG (ref 26.8–34.3)
MCHC RBC AUTO-ENTMCNC: 32 G/DL (ref 31.4–37.4)
MCV RBC AUTO: 91 FL (ref 82–98)
MONOCYTES # BLD AUTO: 0.29 THOUSAND/ÂΜL (ref 0.17–1.22)
MONOCYTES NFR BLD AUTO: 6 % (ref 4–12)
NEUTROPHILS # BLD AUTO: 3.73 THOUSANDS/ÂΜL (ref 1.85–7.62)
NEUTS SEG NFR BLD AUTO: 74 % (ref 43–75)
NITRITE UR QL STRIP: NEGATIVE
NON-SQ EPI CELLS URNS QL MICRO: NORMAL /HPF
NRBC BLD AUTO-RTO: 0 /100 WBCS
PH UR STRIP.AUTO: 6.5 [PH]
PLATELET # BLD AUTO: 313 THOUSANDS/UL (ref 149–390)
PMV BLD AUTO: 8.9 FL (ref 8.9–12.7)
PROT UR STRIP-MCNC: NEGATIVE MG/DL
RBC # BLD AUTO: 3.92 MILLION/UL (ref 3.81–5.12)
RBC #/AREA URNS AUTO: NORMAL /HPF
RH BLD: NEGATIVE
SP GR UR STRIP.AUTO: 1.01 (ref 1–1.03)
SPECIMEN EXPIRATION DATE: NORMAL
TSH SERPL DL<=0.05 MIU/L-ACNC: 1.69 UIU/ML (ref 0.45–4.5)
UROBILINOGEN UR QL STRIP.AUTO: 0.2 E.U./DL
WBC # BLD AUTO: 5.05 THOUSAND/UL (ref 4.31–10.16)
WBC #/AREA URNS AUTO: NORMAL /HPF

## 2023-03-15 LAB
BACTERIA UR CULT: NORMAL
CFTR MUT ANL BLD/T: NORMAL
FMR1 GENE CGG RPT BLD/T QL: NORMAL
HBV SURFACE AG SER QL: NORMAL
HIV 1+2 AB+HIV1 P24 AG SERPL QL IA: NORMAL
HIV 2 AB SERPL QL IA: NORMAL
HIV1 AB SERPL QL IA: NORMAL
HIV1 P24 AG SERPL QL IA: NORMAL
RUBV IGG SERPL IA-ACNC: 14.9 IU/ML
SL AMB GENETIC COUNSELOR: NORMAL
TREPONEMA PALLIDUM IGG+IGM AB [PRESENCE] IN SERUM OR PLASMA BY IMMUNOASSAY: NORMAL

## 2023-03-19 ENCOUNTER — NURSE TRIAGE (OUTPATIENT)
Dept: OTHER | Facility: OTHER | Age: 33
End: 2023-03-19

## 2023-03-20 ENCOUNTER — HOSPITAL ENCOUNTER (EMERGENCY)
Facility: HOSPITAL | Age: 33
Discharge: HOME | End: 2023-03-20
Attending: EMERGENCY MEDICINE

## 2023-03-20 ENCOUNTER — NURSE TRIAGE (OUTPATIENT)
Dept: OTHER | Facility: OTHER | Age: 33
End: 2023-03-20

## 2023-03-20 VITALS
HEART RATE: 92 BPM | TEMPERATURE: 98.6 F | OXYGEN SATURATION: 99 % | DIASTOLIC BLOOD PRESSURE: 61 MMHG | SYSTOLIC BLOOD PRESSURE: 122 MMHG | RESPIRATION RATE: 16 BRPM

## 2023-03-20 DIAGNOSIS — O99.891 ASYMPTOMATIC BACTERIURIA DURING PREGNANCY: ICD-10-CM

## 2023-03-20 DIAGNOSIS — R82.71 ASYMPTOMATIC BACTERIURIA DURING PREGNANCY: ICD-10-CM

## 2023-03-20 DIAGNOSIS — K59.00 CONSTIPATION: Primary | ICD-10-CM

## 2023-03-20 DIAGNOSIS — O21.9 NAUSEA/VOMITING IN PREGNANCY: ICD-10-CM

## 2023-03-20 LAB
ALBUMIN SERPL BCP-MCNC: 3.7 G/DL (ref 3.5–5)
ALP SERPL-CCNC: 62 U/L (ref 34–104)
ALT SERPL W P-5'-P-CCNC: 14 U/L (ref 7–52)
ANION GAP SERPL CALCULATED.3IONS-SCNC: 7 MMOL/L (ref 4–13)
AST SERPL W P-5'-P-CCNC: 14 U/L (ref 13–39)
B-HCG SERPL-ACNC: ABNORMAL MIU/ML (ref 0–11.6)
BACTERIA UR QL AUTO: ABNORMAL /HPF
BASOPHILS # BLD AUTO: 0.09 THOUSANDS/ÂΜL (ref 0–0.1)
BASOPHILS NFR BLD AUTO: 1 % (ref 0–1)
BILIRUB SERPL-MCNC: 0.21 MG/DL (ref 0.2–1)
BILIRUB UR QL STRIP: NEGATIVE
BUN SERPL-MCNC: 8 MG/DL (ref 5–25)
CALCIUM SERPL-MCNC: 9.2 MG/DL (ref 8.4–10.2)
CHLORIDE SERPL-SCNC: 103 MMOL/L (ref 96–108)
CLARITY UR: ABNORMAL
CO2 SERPL-SCNC: 23 MMOL/L (ref 21–32)
COLOR UR: COLORLESS
CREAT SERPL-MCNC: 0.43 MG/DL (ref 0.6–1.3)
EOSINOPHIL # BLD AUTO: 0.09 THOUSAND/ÂΜL (ref 0–0.61)
EOSINOPHIL NFR BLD AUTO: 1 % (ref 0–6)
ERYTHROCYTE [DISTWIDTH] IN BLOOD BY AUTOMATED COUNT: 13.5 % (ref 11.6–15.1)
GFR SERPL CREATININE-BSD FRML MDRD: 135 ML/MIN/1.73SQ M
GLUCOSE SERPL-MCNC: 81 MG/DL (ref 65–140)
GLUCOSE UR STRIP-MCNC: NEGATIVE MG/DL
HCT VFR BLD AUTO: 37 % (ref 34.8–46.1)
HGB BLD-MCNC: 12 G/DL (ref 11.5–15.4)
HGB UR QL STRIP.AUTO: NEGATIVE
IMM GRANULOCYTES # BLD AUTO: 0.02 THOUSAND/UL (ref 0–0.2)
IMM GRANULOCYTES NFR BLD AUTO: 0 % (ref 0–2)
KETONES UR STRIP-MCNC: NEGATIVE MG/DL
LEUKOCYTE ESTERASE UR QL STRIP: ABNORMAL
LIPASE SERPL-CCNC: 10 U/L (ref 11–82)
LYMPHOCYTES # BLD AUTO: 1.27 THOUSANDS/ÂΜL (ref 0.6–4.47)
LYMPHOCYTES NFR BLD AUTO: 18 % (ref 14–44)
MCH RBC QN AUTO: 29.4 PG (ref 26.8–34.3)
MCHC RBC AUTO-ENTMCNC: 32.4 G/DL (ref 31.4–37.4)
MCV RBC AUTO: 91 FL (ref 82–98)
MONOCYTES # BLD AUTO: 0.39 THOUSAND/ÂΜL (ref 0.17–1.22)
MONOCYTES NFR BLD AUTO: 6 % (ref 4–12)
NEUTROPHILS # BLD AUTO: 5.13 THOUSANDS/ÂΜL (ref 1.85–7.62)
NEUTS SEG NFR BLD AUTO: 74 % (ref 43–75)
NITRITE UR QL STRIP: NEGATIVE
NON-SQ EPI CELLS URNS QL MICRO: ABNORMAL /HPF
NRBC BLD AUTO-RTO: 0 /100 WBCS
PH UR STRIP.AUTO: 7 [PH]
PLATELET # BLD AUTO: 328 THOUSANDS/UL (ref 149–390)
PMV BLD AUTO: 9.1 FL (ref 8.9–12.7)
POTASSIUM SERPL-SCNC: 3.6 MMOL/L (ref 3.5–5.3)
PROT SERPL-MCNC: 7.2 G/DL (ref 6.4–8.4)
PROT UR STRIP-MCNC: NEGATIVE MG/DL
RBC # BLD AUTO: 4.08 MILLION/UL (ref 3.81–5.12)
RBC #/AREA URNS AUTO: ABNORMAL /HPF
SODIUM SERPL-SCNC: 133 MMOL/L (ref 135–147)
SP GR UR STRIP.AUTO: 1.01 (ref 1–1.03)
UROBILINOGEN UR STRIP-ACNC: <2 MG/DL
WBC # BLD AUTO: 6.99 THOUSAND/UL (ref 4.31–10.16)
WBC #/AREA URNS AUTO: ABNORMAL /HPF

## 2023-03-20 RX ORDER — BISACODYL 10 MG
10 SUPPOSITORY, RECTAL RECTAL ONCE
Status: COMPLETED | OUTPATIENT
Start: 2023-03-20 | End: 2023-03-20

## 2023-03-20 RX ORDER — NITROFURANTOIN 25; 75 MG/1; MG/1
100 CAPSULE ORAL 2 TIMES DAILY
Qty: 9 CAPSULE | Refills: 0 | Status: SHIPPED | OUTPATIENT
Start: 2023-03-20 | End: 2023-03-25

## 2023-03-20 RX ORDER — ONDANSETRON 4 MG/1
4 TABLET, FILM COATED ORAL EVERY 8 HOURS PRN
Qty: 20 TABLET | Refills: 0 | Status: SHIPPED | OUTPATIENT
Start: 2023-03-20

## 2023-03-20 RX ORDER — NITROFURANTOIN 25; 75 MG/1; MG/1
100 CAPSULE ORAL ONCE
Status: COMPLETED | OUTPATIENT
Start: 2023-03-20 | End: 2023-03-20

## 2023-03-20 RX ADMIN — NITROFURANTOIN (MONOHYDRATE/MACROCRYSTALS) 100 MG: 75; 25 CAPSULE ORAL at 22:28

## 2023-03-20 RX ADMIN — BISACODYL 10 MG: 10 SUPPOSITORY RECTAL at 20:34

## 2023-03-20 RX ADMIN — SODIUM CHLORIDE 1000 ML: 0.9 INJECTION, SOLUTION INTRAVENOUS at 20:33

## 2023-03-20 NOTE — TELEPHONE ENCOUNTER
Patient reports mild nausea and dizziness around 9pm this evening  At 10pm she took a dose of zofran  Reports some mild abdominal pain, but denies any vaginal bleeding  Denies cough, congestion, fever, vomiting, or diarrhea  Notes she drinks about 3-4 16oz bottles of water daily  BS today was 112  Also notes a mild headache, but has not taken tylenol yet  Care advice given  Patient also advised to call back or f/u with office on Monday should symptoms worsen or persist  Patient verbalized understanding

## 2023-03-20 NOTE — TELEPHONE ENCOUNTER
Patient called in to report dizziness and lightheaded since Fito 3/19, RLQ pain for 3 days, new vaginal pain and feels like she is going to pass out  Denies weakness or vertigo  Hr=96; BS=92  Reached out to on call provider who advised patient to go to [facility] for evaluation  Patient lives in [place] and can't drive due to hx of seizures  On call did not think ambulance service would bring patient to [place]  Have patient go to [facility] in [place] for evaluation; if patient requires transport to [facility] will transport  Patient advised and will go to [facility] via private care or ambulance if she cannot get transport; placed on board  Reason for Disposition  • SEVERE dizziness (e g , unable to stand, requires support to walk, feels like passing out now)    Answer Assessment - Initial Assessment Questions  1  DESCRIPTION: "Describe your dizziness "     Feels like she is going to pass out  2  LIGHTHEADED: "Do you feel lightheaded?" (e g , somewhat faint, woozy, weak upon standing)      Yes; but denies weakness  3  VERTIGO: "Do you feel like either you or the room is spinning or tilting?" (i e  vertigo)      Denies  4  SEVERITY: "How bad is it?"  "Do you feel like you are going to faint?" "Can you stand and walk?"    - MILD: Feels slightly dizzy, but walking normally  - MODERATE: Feels very unsteady when walking, but not falling; interferes with normal activities (e g , school, work)   - SEVERE: Unable to walk without falling, or requires assistance to walk without falling; feels like passing out now  Moderate; laying down to get through dizziness  5  ONSET:  "When did the dizziness begin?"      Fito 3/19 stopped this AM and resumed   6  AGGRAVATING FACTORS: "Does anything make it worse?" (e g , standing, change in head position)      Denies  7   HEART RATE: "Can you tell me your heart rate?" "How many beats in 15 seconds?"  (Note: not all patients can do this)        24 in 15 seconds=96 bpm  8  CAUSE: "What do you think is causing the dizziness?"      First time feeling dizzy  9  RECURRENT SYMPTOM: "Have you had dizziness before?" If Yes, ask: "When was the last time?" "What happened that time?"     Denies  10  OTHER SYMPTOMS: "Do you have any other symptoms?" (e g , fever, chest pain, vomiting, diarrhea, bleeding)        Vaginal pain just started; RLQ for past three days; denies  bleeding/discharge; 2 hours after eating EA=051 and now is 92  11   PREGNANCY: "Is there any chance you are pregnant?" "When was your last menstrual period?"        11 weeks; JAKE=10/6/23    Protocols used: DIZZINESS Rhonda Kinds

## 2023-03-20 NOTE — TELEPHONE ENCOUNTER
Reason for Disposition  • [1] Headache AND [2] has not taken pain medications    Answer Assessment - Initial Assessment Questions  2  ONSET: "When did the headache start?" (Minutes, hours or days)       About 10pm  3  PATTERN: "Does the pain come and go, or has it been constant since it started?"      constant  4  SEVERITY: "How bad is the pain?" and "What does it keep you from doing?"  (e g , Scale 1-10; mild, moderate, or severe)    - MILD (1-3): doesn't interfere with normal activities     - MODERATE (4-7): interferes with normal activities or awakens from sleep     - SEVERE (8-10): excruciating pain, unable to do any normal activities         mild  8  HEAD INJURY: "Has there been any recent injury to the head?"       denies  9  OTHER SYMPTOMS: "Do you have any other symptoms?" (fever, stiff neck, eye pain, sore throat, cold symptoms)      Nausea, abd pain  10   PREGNANCY: "Is there any chance you are pregnant?" "When was your last menstrual period?"        12 weeks    Protocols used: HEADACHE-ADULT-

## 2023-03-20 NOTE — TELEPHONE ENCOUNTER
Spoke with pt and states she is feeling better this morning  Advised to call the office if any further questions or concerns

## 2023-03-20 NOTE — ED ATTENDING ATTESTATION
3/20/2023  ISharee MD, saw and evaluated the patient  I have discussed the patient with the resident/non-physician practitioner and agree with the resident's/non-physician practitioner's findings, Plan of Care, and MDM as documented in the resident's/non-physician practitioner's note, except where noted  All available labs and Radiology studies were reviewed  I was present for key portions of any procedure(s) performed by the resident/non-physician practitioner and I was immediately available to provide assistance  At this point I agree with the current assessment done in the Emergency Department  I have conducted an independent evaluation of this patient a history and physical is as follows:    27-year-old female at approximately 11 weeks gestation presented for evaluation of few days of constipation as well as noting a lower abdominal cramping sensation and sharp pain in the rectum/posterior vaginal canal   She denies any vaginal bleeding, urinary symptoms, fever, chills  She does have a history of hyperemesis gravidarum without any acute changes  Also has a history of seizure disorder on Lamictal and Keppra and history of anemia on iron supplement  She has been eating normally the last few days but no normal bowel movement  Did report passing very small, hard stools but not giving relief of her symptoms yet      Fetal heart tones normal     ED Course  ED Course as of 03/20/23 2131   Mon Mar 20, 2023   2036 HCG QUANTITATIVE(!): 412,108   2036 Sodium(!): 133         Critical Care Time  Procedures

## 2023-03-20 NOTE — TELEPHONE ENCOUNTER
Regarding: dizziness - pregnant  ----- Message from Jeff Teresa sent at 3/19/2023 10:36 PM EDT -----  "she is 12 weeks pregnant, she is high risk and she is feeling faint and dizzy and her blood sugar is 112"

## 2023-03-20 NOTE — TELEPHONE ENCOUNTER
Regardinwks pregnant/ Dizzy  ----- Message from Any Grady sent at 3/20/2023  5:31 PM EDT -----  " I'm 12 weeks pregnant, I called yesterday I feel a little dizzy I think I'm going to pass out   I feel like a wave in my stomach that comes and goes and my sugar is at 92 at the moment "

## 2023-03-21 ENCOUNTER — TELEPHONE (OUTPATIENT)
Dept: OBGYN CLINIC | Facility: CLINIC | Age: 33
End: 2023-03-21

## 2023-03-21 ENCOUNTER — EVALUATION (OUTPATIENT)
Dept: PHYSICAL THERAPY | Facility: CLINIC | Age: 33
End: 2023-03-21

## 2023-03-21 DIAGNOSIS — M79.672 LEFT FOOT PAIN: ICD-10-CM

## 2023-03-21 DIAGNOSIS — M72.2 PLANTAR FASCIITIS: Primary | ICD-10-CM

## 2023-03-21 NOTE — DISCHARGE INSTRUCTIONS
Recommend you call your OBs office tomorrow to discuss further management of constipation  You are being sent home with an antibiotic to treat the bacteria in your urine, take 1 pill twice daily for 5 days  Please come back to the emergency room if you develop chest pain, shortness of breath, abdominal pain, pelvic pain, vaginal bleeding, abnormal vaginal discharge

## 2023-03-21 NOTE — ED PROVIDER NOTES
History  Chief Complaint   Patient presents with   • Abdominal Pain Pregnant     Pt arrives via EMS  Pt is 11 weeks pregnant and c/o dizziness x 3 days and abdominal pain w/ cramping  Pt also states she is having vaginal pain that has been consistent throughout today  Denies any bleeding  Hx of two miscarriages  History provided by:  Patient and friend   72-year-old female  with past medical history of seizure disorder (on Lamictal and Keppra), anemia, hyperemesis gravidarum who presents to the emergency department for evaluation of constant posterior vaginal canal pain/pressure for the last couple of days  The pain/pressure feels like it is coming from her rectum  She has not had a bowel movement in 3 days apart from passing to very small, hard pellets earlier today  She is able to pass flatus  She has consistent first trimester nausea and has been using Zofran which is provided relief, she has no vomiting  She has been eating fruits, chicken nuggets, pizza, soup, saltine crackers, honey nut Cheerios  She is trying to drink four 16 ounce bottles of water a day  Her OB started her on 100 mg Dulcolax twice daily 5 days ago  Though she is also taking iron tablets  She denies any vaginal bleeding or abnormal vaginal discharge, she has not had any fevers or chills  She denies any recent sexual intercourse, vaginal penetration, denies anything in the vagina as of recent  She is able to urinate without difficulty, no dysuria or hematuria  No chest pain or shortness of breath  Her last seizure was 9 months ago and was precipitated by lack of medication being available at the pharmacy  Prior to Admission Medications   Prescriptions Last Dose Informant Patient Reported? Taking?    Doxylamine-Pyridoxine 10-10 MG TBEC   No No   Sig: Take 1 tablet by mouth 4 (four) times a day as needed (nausea, vomiting, morning sickness)   Prenatal Vit-Iron Carbonyl-FA (prenatal multivitamin) TABS   Yes No   Sig: Take 1 tablet by mouth daily   acetaminophen (TYLENOL) 500 mg tablet   No No   Sig: Take 1 tablet (500 mg total) by mouth every 6 (six) hours as needed for mild pain   albuterol (Proventil HFA) 90 mcg/act inhaler   No No   Sig: Inhale 2 puffs every 6 (six) hours as needed for wheezing   citalopram (CeleXA) 10 mg tablet   Yes No   Sig: Take 10 mg by mouth every morning    docusate sodium (COLACE) 100 mg capsule   No No   Sig: Take 1 capsule (100 mg total) by mouth 2 (two) times a day   ergocalciferol (VITAMIN D2) 50,000 units   No No   Sig: Take 1 capsule (50,000 Units total) by mouth once a week   Patient taking differently: Take 50,000 Units by mouth once a week On Monday   famotidine (PEPCID) 40 MG tablet   No No   Sig: Take 1 tablet (40 mg total) by mouth daily at bedtime   ferrous sulfate 324 (65 Fe) mg   No No   Sig: TAKE ONE TABLET BY MOUTH TWICE A DAY BEFORE MEALS   Patient taking differently: Take 324 mg by mouth daily before breakfast   folic acid (FOLVITE) 1 mg tablet   No No   Sig: Take 1 tablet (1 mg total) by mouth daily   lamoTRIgine (LaMICtal) 100 mg tablet   No No   Si tab twice daily x 1 wk, then 1 5 tabs twice daily x 1 wk, then 2 tabs twice daily x 1 week, then take 2 5 tabs (250 mg) twice daily  Do not start before 2023     lamoTRIgine (LaMICtal) 25 mg tablet   No No   Si tab nightly x 1 wk, then 1 tab twice daily x 1 wk, then 2 tabs twice daily x 1 wk, then 3 tabs twice daily x 1 week, then begin 100 mg pills   levETIRAcetam (KEPPRA) 750 mg tablet   No No   Sig: Take 2 tablets (1,500 mg total) by mouth every 12 (twelve) hours   levothyroxine 25 mcg tablet   No No   Sig: TAKE ONE TABLET BY MOUTH EVERY MORNING   ondansetron (ZOFRAN) 4 mg tablet   No No   Sig: Take 1 tablet (4 mg total) by mouth every 8 (eight) hours as needed for nausea or vomiting   ondansetron (ZOFRAN-ODT) 4 mg disintegrating tablet   No No   Sig: Take 1 tablet (4 mg total) by mouth every 8 (eight) hours as needed for nausea or vomiting for up to 7 days   sucralfate (CARAFATE) 1 g/10 mL suspension   No No   Sig: Take 10 mL (1 g total) by mouth 4 (four) times a day      Facility-Administered Medications: None       Past Medical History:   Diagnosis Date   • Autism    • [place] esophagus    • CPAP (continuous positive airway pressure) dependence    • 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HCC)     not diagnosed as of 1/9/23-trying to R/O   • Depression    • [facility] mellitus [facility] )    • Disease of thyroid gland     hypo   • Dysphagia     solids and liquids   • Female infertility    • Fibromyalgia, primary    • Gastric ulcer    • History of bronchitis    • Iron deficiency anemia     infusion in 11/2022   • Irregular menses    • Miscarriage    • Morbid obesity with BMI of 50 0-59 9, adult ([facility])    • Plantar fasciitis of left foot    • Reflux esophagitis    • Seizures ([facility] )     last one 7/13/22   • Sleep apnea     CPAP   • Wears glasses        Past Surgical History:   Procedure Laterality Date   • EGD      with Bx due to barretts esophagus   • EYE SURGERY Bilateral     lazy eye repair   • PA BIOPSY OF LIP N/A 11/10/2022    Procedure: EXCISION BIOPSY SALVARY GLANDS LOWER LIP;  Surgeon: Nathan Caldera MD;  Location: [facility];  Service: ENT   • PA HYSTEROSCOPY BX ENDOMETRIUM&/POLYPC W/WO D&C N/A 9/22/2021    Procedure: DILATATION AND CURETTAGE (D&C) WITH HYSTEROSCOPY;  Surgeon: Laci Denis MD;  Location: [facility];  Service: Gynecology   • WISDOM TOOTH EXTRACTION         Family History   Problem Relation Age of Onset   • Bipolar disorder Mother    • Depression Mother    • Depression Father    • Diabetes Maternal Grandmother    • Thyroid disease Maternal Grandmother    • Hypertension Maternal Grandmother    • Heart disease Maternal Grandmother    • Diabetes Maternal Grandfather    • Diabetes Paternal Grandmother    • Breast cancer Paternal Grandmother    • Stroke Paternal Grandmother    • Diabetes Paternal Grandfather    • Breast cancer Maternal Aunt 35        bilateral   • Stomach cancer Maternal Aunt      I have reviewed and agree with the history as documented  E-Cigarette/Vaping   • E-Cigarette Use Never User      E-Cigarette/Vaping Substances   • Nicotine No    • THC No    • CBD No    • Flavoring No    • Other No    • Unknown No      Social History     Tobacco Use   • Smoking status: Never   • Smokeless tobacco: Never   Vaping Use   • Vaping Use: Never used   Substance Use Topics   • Alcohol use: Not Currently     Comment: occ   • Drug use: Not Currently     Types: Marijuana     Comment: about a couple times a week, quit June 2022        Review of Systems   Constitutional: Negative for chills and fever  HENT: Negative for ear pain and sore throat  Eyes: Negative for visual disturbance  Respiratory: Negative for cough and shortness of breath  Cardiovascular: Negative for chest pain and leg swelling  Gastrointestinal: Positive for constipation and nausea  Negative for abdominal pain, blood in stool, diarrhea and vomiting  Genitourinary: Positive for pelvic pain and vaginal pain  Negative for difficulty urinating, dysuria, hematuria, vaginal bleeding and vaginal discharge  Musculoskeletal: Negative for neck pain and neck stiffness  Neurological: Negative for dizziness, syncope, weakness and light-headedness  All other systems reviewed and are negative  Physical Exam  ED Triage Vitals [03/20/23 1906]   Temperature Pulse Respirations Blood Pressure SpO2   98 6 °F (37 °C) 87 16 111/57 99 %      Temp Source Heart Rate Source Patient Position - Orthostatic VS BP Location FiO2 (%)   Oral Monitor -- -- --      Pain Score       6             Orthostatic Vital Signs  Vitals:    03/20/23 1906 03/20/23 2015   BP: 111/57 122/61   Pulse: 87 92       Physical Exam  Vitals and nursing note reviewed  Constitutional:       General: She is not in acute distress  Appearance: Normal appearance  She is well-developed   She is not ill-appearing, toxic-appearing or diaphoretic  HENT:      Head: Normocephalic and atraumatic  Nose: Nose normal    Eyes:      Extraocular Movements: Extraocular movements intact  Conjunctiva/sclera: Conjunctivae normal    Cardiovascular:      Rate and Rhythm: Normal rate and regular rhythm  Heart sounds: Normal heart sounds  No murmur heard  Pulmonary:      Effort: Pulmonary effort is normal  No respiratory distress  Breath sounds: Normal breath sounds  Abdominal:      General: There is no distension  Palpations: Abdomen is soft  Tenderness: There is no abdominal tenderness  There is no right CVA tenderness, left CVA tenderness, guarding or rebound  Musculoskeletal:         General: No swelling  Cervical back: Normal range of motion and neck supple  Right lower leg: No edema  Left lower leg: No edema  Skin:     General: Skin is warm and dry  Capillary Refill: Capillary refill takes less than 2 seconds  Coloration: Skin is not pale  Findings: No bruising or rash  Neurological:      General: No focal deficit present  Mental Status: She is alert and oriented to person, place, and time     Psychiatric:         Mood and Affect: Mood normal          ED Medications  Medications   bisacodyl (DULCOLAX) rectal suppository 10 mg (10 mg Rectal Given 3/20/23 2034)   sodium chloride 0 9 % bolus 1,000 mL (0 mL Intravenous Stopped 3/20/23 2133)   nitrofurantoin (MACROBID) extended-release capsule 100 mg (100 mg Oral Given 3/20/23 2228)       Diagnostic Studies  Results Reviewed     Procedure Component Value Units Date/Time    Urine Microscopic [[id number]]  (Abnormal) Collected: 03/20/23 1938    Lab Status: Final result Specimen: Urine, Clean Catch Updated: 03/20/23 2041     RBC, UA 4-10 /hpf      WBC, UA 4-10 /hpf      Epithelial Cells Occasional /hpf      Bacteria, UA Occasional /hpf     hCG, quantitative, pregnancy [[id number]]  (Abnormal) Collected: 03/20/23 1937    Lab Status: Final result Specimen: Blood from Arm, Right Updated: 03/20/23 2035     HCG, Quant 973,577 mIU/mL     Narrative:       Expected Ranges:     Approximate               Approximate HCG  Gestation age          Concentration ( mIU/mL)  _____________          ______________________   [place] Pa                      HCG values  0 2-1                       5-50  1-2                           2-3                         100-5000  3-4                         500-09482  4-5                         1000-12981  5-6                         [id number]  6-8                         [id number]  8-12                        [id number]      Comprehensive metabolic panel [[id number]]  (Abnormal) Collected: 03/20/23 1937    Lab Status: Final result Specimen: Blood from Arm, Right Updated: 03/20/23 2001     Sodium 133 mmol/L      Potassium 3 6 mmol/L      Chloride 103 mmol/L      CO2 23 mmol/L      ANION GAP 7 mmol/L      BUN 8 mg/dL      Creatinine 0 43 mg/dL      Glucose 81 mg/dL      Calcium 9 2 mg/dL      AST 14 U/L      ALT 14 U/L      Alkaline Phosphatase 62 U/L      Total Protein 7 2 g/dL      Albumin 3 7 g/dL      Total Bilirubin 0 21 mg/dL      eGFR 135 ml/min/1 73sq m     Narrative:      [place] guidelines for Chronic Kidney Disease (CKD):   •  Stage 1 with normal or high GFR (GFR > 90 mL/min/1 73 square meters)  •  Stage 2 Mild CKD (GFR = 60-89 mL/min/1 73 square meters)  •  Stage 3A Moderate CKD (GFR = 45-59 mL/min/1 73 square meters)  •  Stage 3B Moderate CKD (GFR = 30-44 mL/min/1 73 square meters)  •  Stage 4 Severe CKD (GFR = 15-29 mL/min/1 73 square meters)  •  Stage 5 End Stage CKD (GFR <15 mL/min/1 73 square meters)  Note: GFR calculation is accurate only with a steady state creatinine    Lipase [[id number]]  (Abnormal) Collected: 03/20/23 1937    Lab Status: Final result Specimen: Blood from Arm, Right Updated: 03/20/23 2001     Lipase 10 u/L     UA (URINE) with reflex to Scope [[id number]]  (Abnormal) Collected: 03/20/23 1938    Lab Status: Final result Specimen: Urine, Clean Catch Updated: 03/20/23 1946     Color, UA Colorless     Clarity, UA Turbid     Specific Gravity, UA 1 006     pH, UA 7 0     Leukocytes, UA Moderate     Nitrite, UA Negative     Protein, UA Negative mg/dl      Glucose, UA Negative mg/dl      Ketones, UA Negative mg/dl      Urobilinogen, UA <2 0 mg/dl      Bilirubin, UA Negative     Occult Blood, UA Negative    CBC and differential [[id number]] Collected: 03/20/23 1937    Lab Status: Final result Specimen: Blood from Arm, Right Updated: 03/20/23 1946     WBC 6 99 Thousand/uL      RBC 4 08 Million/uL      Hemoglobin 12 0 g/dL      Hematocrit 37 0 %      MCV 91 fL      MCH 29 4 pg      MCHC 32 4 g/dL      RDW 13 5 %      MPV 9 1 fL      Platelets 850 Thousands/uL      nRBC 0 /100 WBCs      Neutrophils Relative 74 %      Immat GRANS % 0 %      Lymphocytes Relative 18 %      Monocytes Relative 6 %      Eosinophils Relative 1 %      Basophils Relative 1 %      Neutrophils Absolute 5 13 Thousands/µL      Immature Grans Absolute 0 02 Thousand/uL      Lymphocytes Absolute 1 27 Thousands/µL      Monocytes Absolute 0 39 Thousand/µL      Eosinophils Absolute 0 09 Thousand/µL      Basophils Absolute 0 09 Thousands/µL                  No orders to display         Procedures  POC Pelvic US    Date/Time: 3/20/2023 8:59 PM  Performed by: Kristi Hansen MD  Authorized by:  Kristi Hansen MD     Patient location:  ED  Other Assisting Provider: No    Procedure details:     Exam Type:  Diagnostic    Indications: evaluate for IUP and pregnant with pelvic pain      Assessment for: confirm intrauterine pregnancy and evaluate fetal viability      Technique:  Transabdominal obstetric (HCG+) exam    Views obtained: uterus (transverse and sagittal)      Image quality: diagnostic      Image availability:  Images available in PACS  Uterine findings:     Intrauterine pregnancy: identified      Single gestation: identified      Fetal heart rate: identified      Fetal heart rate (bpm):  162  Interpretation:     Ultrasound impressions: normal      Pregnancy findings: intrauterine pregnancy (IUP)            ED Course  ED Course as of 03/21/23 0314   Mon Mar 20, 2023   2108 WBC, UA(!): 4-10   2109 Bacteria, UA: Occasional   2109 Epithelial Cells: Occasional  UA demonstrates leukocytosis, microscopic with some white cells, epithelial cells and occasional bacteria; unsure if this is a clean-catch, however will speak to patient about treatment for asymptomatic bacteriuria  Medical Decision Making   73-year-old female [id number] with past medical history of seizure disorder (on Lamictal and Keppra), anemia, hyperemesis gravidarum who presents to the emergency department for evaluation of constant posterior vaginal canal pain/pressure for the last couple of days  I suspect this is secondary to constipation, as patient has not had a bowel movement in 3 days, taking iron tablets  However, also considering UTI  I did consider causes for abdominal pain such as cholecystitis or appendicitis, however patient is not tender and has a normal abdominal exam   Do not suspect obstruction, as patient has been able to pass flatus, no vomiting  Here in the ED, patient is normotensive, afebrile, nontachycardic, normal work of breathing, SPO2 99% on room air  In the room, she is calm, smiling and not in any acute distress  She has a nontender, nondistended abdomen without rebound or guarding  Bedside ultrasound confirms viable IUP with fetal heart tones 162  She is slightly hyponatremic to 133, otherwise CBC and CMP are unremarkable  UA demonstrates leukocytosis, with occasional bacteria and epithelial cells  Patient did say this was a clean-catch, thus will treat for asymptomatic bacteriuria  Given first dose of antibiotics here    Opted to prescribe Sivan Lezama, as patient says she has taken before and tolerated well  Treatment here in the ED consists of 1 L of fluids and rectal suppository  On reevaluation, patient reports having a large bowel movement and feels significantly better  She no longer has pelvic and vaginal discomfort  She has first sandwich and something to drink, which I bring her  He tolerates diet well  Patient is sent home with antibiotic for 5 days  I also recommend she call her OB in the morning to discuss this ED visit, and get recommendations for future management of constipation  Discussion of strict return precautions  Patient discharged home in improved condition  Amount and/or Complexity of Data Reviewed  Labs: ordered  Decision-making details documented in ED Course  Risk  OTC drugs  Prescription drug management  Disposition  Final diagnoses:   Asymptomatic bacteriuria during pregnancy   Constipation     Time reflects when diagnosis was documented in both MDM as applicable and the Disposition within this note     Time User Action Codes Description Comment    3/20/2023 10:19 PM Robert Austin Add [W38 [id number],  R82 [id number]] Asymptomatic bacteriuria during pregnancy     3/20/2023 10:19 PM Robert Austin Add [K59 00] Constipation     3/20/2023 10:19 PM Jaki Oz [D47 [id number],  R82 71] Asymptomatic bacteriuria during pregnancy     3/20/2023 10:19 PM Robert Austin Modify [K59 00] Constipation       ED Disposition     ED Disposition   Discharge    Condition   Stable    Date/Time   Mon Mar 20, 2023 10:19 PM    Comment   Margoth Cook discharge to home/self care                 Follow-up Information     Follow up With Specialties Details Why Contact Info    Your OBGYN  Call in 1 day for re-evaluation           Discharge Medication List as of 3/20/2023 10:23 PM      START taking these medications    Details   nitrofurantoin (MACROBID) 100 mg capsule Take 1 capsule (100 mg total) by mouth 2 (two) times a day for 5 days, Starting Mon 3/20/2023, Until Sat 3/25/2023, Normal         CONTINUE these medications which have NOT CHANGED    Details   acetaminophen (TYLENOL) 500 mg tablet Take 1 tablet (500 mg total) by mouth every 6 (six) hours as needed for mild pain, Starting Wed 9/22/2021, No Print      albuterol (Proventil HFA) 90 mcg/act inhaler Inhale 2 puffs every 6 (six) hours as needed for wheezing, Starting Fri 1/20/2023, Normal      citalopram (CeleXA) 10 mg tablet Take 10 mg by mouth every morning , Historical Med      docusate sodium (COLACE) 100 mg capsule Take 1 capsule (100 mg total) by mouth 2 (two) times a day, Starting Fri 3/10/2023, Normal      Doxylamine-Pyridoxine 10-10 MG TBEC Take 1 tablet by mouth 4 (four) times a day as needed (nausea, vomiting, morning sickness), Starting Mon 2/6/2023, Normal      ergocalciferol (VITAMIN D2) 50,000 units Take 1 capsule (50,000 Units total) by mouth once a week, Starting Fri 9/30/2022, Normal      famotidine (PEPCID) 40 MG tablet Take 1 tablet (40 mg total) by mouth daily at bedtime, Starting Wed 5/11/2022, Normal      ferrous sulfate 324 (65 Fe) mg TAKE ONE TABLET BY MOUTH TWICE A DAY BEFORE MEALS, Normal      folic acid (FOLVITE) 1 mg tablet Take 1 tablet (1 mg total) by mouth daily, Starting Tue 2/7/2023, Until Mon 5/8/2023, Normal      !! lamoTRIgine (LaMICtal) 100 mg tablet 1 tab twice daily x 1 wk, then 1 5 tabs twice daily x 1 wk, then 2 tabs twice daily x 1 week, then take 2 5 tabs (250 mg) twice daily   Do not start before March 7, 2023 , Normal      !! lamoTRIgine (LaMICtal) 25 mg tablet 1 tab nightly x 1 wk, then 1 tab twice daily x 1 wk, then 2 tabs twice daily x 1 wk, then 3 tabs twice daily x 1 week, then begin 100 mg pills, Normal      levETIRAcetam (KEPPRA) 750 mg tablet Take 2 tablets (1,500 mg total) by mouth every 12 (twelve) hours, Starting Tue 2/7/2023, Normal      levothyroxine 25 mcg tablet TAKE ONE TABLET BY MOUTH EVERY MORNING, Normal      ondansetron (ZOFRAN) 4 mg tablet Take 1 tablet (4 mg total) by mouth every 8 (eight) hours as needed for nausea or vomiting, Starting Mon 3/20/2023, Normal      ondansetron (ZOFRAN-ODT) 4 mg disintegrating tablet Take 1 tablet (4 mg total) by mouth every 8 (eight) hours as needed for nausea or vomiting for up to 7 days, Starting Wed 2/22/2023, Until Wed 3/1/2023 at 2359, Normal      Prenatal Vit-Iron Carbonyl-FA (prenatal multivitamin) TABS Take 1 tablet by mouth daily, Historical Med      sucralfate (CARAFATE) 1 g/10 mL suspension Take 10 mL (1 g total) by mouth 4 (four) times a day, Starting Sat 1/14/2023, Normal       !! - Potential duplicate medications found  Please discuss with provider  No discharge procedures on file  PDMP Review     None           ED Provider  Attending physically available and evaluated Moris Keshia  I managed the patient along with the ED Attending      Electronically Signed by         Larry Griffiths MD  03/21/23 4997

## 2023-03-21 NOTE — PROGRESS NOTES
PT Evaluation   Today's date: 3/22/2023  Patient name: Geremias Jeff  : 1990  MRN: [id number]  Referring provider: Dashawn Mancini DPM  Dx:   Encounter Diagnosis     ICD-10-CM    1  Plantar fasciitis  M72 2 Ambulatory referral to Physical Therapy      2  Left foot pain  M79 672 Ambulatory referral to Physical Therapy            Assessment  Assessment details: Patient is a 29 y o  Female who presents with referring diagnosis of plantar fasciitis and left foot pain  Patient's greatest concern is worry over not knowing what's wrong, fear of not being able to keep active and future ill health (and wanting to prevent it)  Primary movement impairment diagnosis of movement coordination deficits (anterior rotation of left innominate) resulting in pathoanatomical symptoms of pain, restricted range of motion, rearfoot and mid foot hypomobility, muscular power deficits, and functional limitations  The aforementioned impairments have limited the patient's ability to walk usual distances and negotiate stairs without pain   No further referral appears necessary at this time based upon examination results    Patient education performed during today's session included: MET for anterior rotation of left innominate, bridges    Primary movement impairments:  1) Movement coordination deficits  2) Rear foot and mid foot hypomobility    Etiological factors include: none recalled by patient        Impairments: Abnormal coordination, Abnormal gait, Abnormal muscle tone, Abnormal or restricted ROM, Activity intolerance, Impaired balance, Impaired physical strength, Lacks appropriate HEP, Poor posture, Poor body mechanics, Pain with function, Weight-bearing intolerance, Abnormal movement and Abnormal muscle firing  Understanding of Dx/Px/POC: Good  Prognosis: Good   Positive prognostic factors: motivation for improvement, age   Negative prognostic factors: comorbid conditions    Patient verbalized understanding of POC  Please contact me if you have any questions or recommendations  Thank you for the referral and the opportunity to share in [address] [place] [address] care  Plan  Patient would benefit from: PT Eval and Skilled PT  Planned modality interventions: Biofeedback, Cryotherapy, TENS, Thermotherapy: Hydrocollator Packs and Traction  Planned therapy interventions: Abdominal trunk stabilization, ADL training, Balance, Balance/WB training, Body mechanics training, Coordination, Functional ROM exercises, Gait training, HEP, Joint mobilization, Manual therapy, Landeros taping, Motor coordination training, Neuromuscular re-education, Patient education, Strengthening, Stretching, Therapeutic activities, Therapeutic exercises, Therapeutic training and Activity modification  Frequency: 2x/wk  Duration in weeks: 10  Plan of Care beginning date: 3/21/2023  Plan of Care expiration date: 10 weeks - 5/31/2023  Treatment plan discussed with: Patient       Goals  Short Term Goals (5 weeks):    - Patient will be independent in basic HEP 2-3 weeks  - Patient will report >50% reduction in pain  - Patient will demonstrate >1/3 improvement in MMT grade as applicable  - Patient will demo full and pain free ROM about left ankle    Long Term Goals (10 weeks):  - Patient will be independent in a comprehensive home exercise program  - Patient FOTO score will improve by >10 points  - Patient will self-report >75% improvement in function  - Patient will demo equal gait sequencing bilaterally      Subjective    History of Present Illness  - Mechanism of injury: Patient reports to physical therapy with 2 month history of left foot pain from heel to middle of her foot  States pain is worse first thing in the morning, with initiating movement after long periods of rest, and with prolonged walking  Denies MEGHNA   States that she did go to urgent care, was given exercises which hurt sometimes and help other times  Foot support from podiatry helps intermittently  Feels like a cramping/twisting sensation  States she feels as though she is landing much harder on left side compared to right side  Denies B&B changes  Patient is pregnant  Patient does have fibromyalgia, although this doesn't feel like her usual exacerbations  Pain  - Current pain ratin/10  - At best pain ratin/10  - At worst pain ratin/10    Objective     Sensation  - Light touch: grossly intact and equal bilaterally    LE MMT  LEFT  RIGHT  -Ankle DF  3/5  4+/5  -Ankle PF  3/5  4+/5  -Ankle Inversion 4/5  4+/5  -Ankle Eversion 4/5  4+/5    -Great Toe Extension 4-/5  4+/5    Lumbar Spine Range of Motion  WFL and pain free in all planes    Ankle Range of Motion    LEFT   RIGHT  - DF  Min restriction  WFL  - PF  WFL   WFL  - Inversion WFL   WFL  - Eversion WFL   WFL    Palpation  LEFT  - Positive: third met space; mild tenderness over calcaneal tub  - Negative: medial tubercle, plantar fascia, met heads    Diagnostic Tests Performed  Supine to long sit test: anterior rotation of left innominate  LEFT  - Positive: squeeze/click test left foot    Mobility Assessment  - Hypomobility noted into L TC joint, mid foot     Functional Assessment  - Gait Assessment: antalgic gait with increased medial heel whip on left compared to right   Patient with 80% reduction in pain into heel following MET for anterior rotation of left innominate             **Pregnant**    Insurance:  AMA/CMS Eval/ Re-eval POC expires Toni Gonzalez #/ Referral # Total units  Start date  Expiration date Extension  Visit limitation? PT only or  PT+OT?  Co-Insurance   MCR: CMS 3/22/23 5/31/21  -- -- -- -- -- -- --                                                                Date 3/22/23        Visit Number IE        Manual         MET for anterior rotation of left innominate 5" x5, 2 rounds        Pubic shotgun 5" x5        Mobs to rear and mid foot Neuro Re-Ed         Tyrese Murphy Rev, HEP        Kirill         Ankle alphabet                                                                        TherEx         Innominate MET Rev, HEP        SLR         HR         Gastroc stretch                                    TherAct         Patient education PT POC, HEP, pt edu 10 min                                            Gait Training                                    Modalities         CP               Precautions:   Past Medical History:   Diagnosis Date   • Autism    • [place] esophagus    • CPAP (continuous positive airway pressure) dependence    • 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HCC)     not diagnosed as of 1/9/23-trying to R/O   • Depression    • Diabetes mellitus ([facility])    • Disease of thyroid gland     hypo   • Dysphagia     solids and liquids   • Female infertility    • Fibromyalgia, primary    • Gastric ulcer    • History of bronchitis    • Iron deficiency anemia     infusion in 11/2022   • Irregular menses    • Miscarriage    • Morbid obesity with BMI of 50 0-59 9, adult ([facility])    • Plantar fasciitis of left foot    • Reflux esophagitis    • Seizures ([facility])     last one 7/13/22   • Sleep apnea     CPAP   • Wears glasses

## 2023-03-21 NOTE — TELEPHONE ENCOUNTER
Spoke with pt and states feeling better today  Was given script for antibiotic and advised to take colace for constipation  Pt advised to keep her appt for 4/6/2023 and to call if any further questions or concerns or symptoms are not improving

## 2023-03-23 LAB
ATRIAL RATE: 88 BPM
P AXIS: 6 DEGREES
PR INTERVAL: 106 MS
QRS AXIS: 28 DEGREES
QRSD INTERVAL: 80 MS
QT INTERVAL: 348 MS
QTC INTERVAL: 421 MS
T WAVE AXIS: 4 DEGREES
VENTRICULAR RATE: 88 BPM

## 2023-03-24 ENCOUNTER — OFFICE VISIT (OUTPATIENT)
Dept: PHYSICAL THERAPY | Facility: CLINIC | Age: 33
End: 2023-03-24

## 2023-03-24 DIAGNOSIS — M72.2 PLANTAR FASCIITIS: Primary | ICD-10-CM

## 2023-03-24 DIAGNOSIS — M79.672 LEFT FOOT PAIN: ICD-10-CM

## 2023-03-24 LAB — FMR1 GENE CGG RPT BLD/T QL: NORMAL

## 2023-03-24 NOTE — PROGRESS NOTES
Daily Note     Today's date: 3/24/2023  Patient name: Jennifer Dalton  : 1990  MRN: [id number]  Referring provider: William Chamorro DPM  Dx:   Encounter Diagnosis     ICD-10-CM    1  Plantar fasciitis  M72 2       2  Left foot pain  M79 672           Start Time: 1230  Stop Time: 1305  Total time in clinic (min): 35 minutes    Subjective: Patient reports overall reduction in intensity of left foot pain since previous session  States pain with first step is significantly better  Reports compliance with HEP  Objective: See treatment diary below  Supine to long sit test: upslip of right innominate  Positive slump on left      Assessment: Tolerated treatment well  Patient did well with stability based program and emphasis on public alignment during today’s session  Patient’s pain into plantar aspect of foot abolished following sciatic nerve glides  Home exercise program updated to hold MET and emphasize bridges and nerve glides instead; handout provided, patient verbalized good understanding and agreement  Utilized ball distal to medial malleoli during heel raises to promote equal supination bilaterally, tolerated well  Patient will continue to benefit from skilled PT to improve functional mobility and activity tolerance  Plan: Continue per plan of care  **Pregnant**    Insurance:  Independence/CMS Eval/ Re-eval POC expires Bakari Peg #/ Referral # Total units  Start date  Expiration date Extension  Visit limitation? PT only or  PT+OT?  Co-Insurance   MCR: CMS 3/22/23 5/31/21  -- -- -- -- -- -- --                                                                Date 3/22/23 3/24/23       Visit Number IE 2       Manual         MET for anterior rotation of left innominate 5" x5, 2 rounds L LE LAD, intermittent 5 min       Pubic shotgun 5" x5        Mobs to rear and mid foot                  Neuro Re-Ed         Bridges Rev, HEP 3x10       Clamshells         Ankle alphabet         Ad sq  5" x30       Hip abd iso With belt, 5" x30       Sciatic nerve glides  Seated, 3x10 on left                                           TherEx         Innominate MET Rev, HEP D/C       SLR         HR  With ball distal to malleoli, 3x10       Gastroc stretch  Prostretch, 15" x5                                  TherAct         Patient education PT POC, HEP, pt edu 10 min                                            Gait Training                                    Modalities         CP               Precautions:   Past Medical History:   Diagnosis Date   • Autism    • [place] esophagus    • CPAP (continuous positive airway pressure) dependence    • 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[facility])     not diagnosed as of 1/9/23-trying to R/O   • Depression    • Diabetes mellitus ([facility] [address] )    • Disease of thyroid gland     hypo   • Dysphagia     solids and liquids   • Female infertility    • Fibromyalgia, primary    • Gastric ulcer    • History of bronchitis    • Iron deficiency anemia     infusion in 11/2022   • Irregular menses    • Miscarriage    • Morbid obesity with BMI of 50 0-59 9, adult ([facility])    • Plantar fasciitis of left foot    • Reflux esophagitis    • Seizures ([facility])     last one 7/13/22   • Sleep apnea     CPAP   • Wears glasses

## 2023-03-25 PROBLEM — O09.299: Status: ACTIVE | Noted: 2023-03-25

## 2023-03-25 NOTE — PATIENT INSTRUCTIONS
Thank you for choosing us for your  care today  If you have any questions about your ultrasound or care, please do not hesitate to contact us or your primary obstetrician  Some general instructions for your pregnancy are:    Exercise: Aim for 22 minutes per day (150 minutes per week) of regular exercise  Walking is great! Nutrition: Choose healthy sources of calcium, iron, and protein  Learn about Preeclampsia: preeclampsia is a common, serious high blood pressure complication in pregnancy  A blood pressure of 737WIPR (systolic or top number) or 50PNYO (diastolic or bottom number) is not normal and needs evaluation by your doctor  Aspirin is sometimes prescribed in early pregnancy to prevent preeclampsia in women with risk factors - ask your obstetrician if you should be on this medication  For more resources, visit:  [employer] fi  If you smoke, try to reduce how many cigarettes you smoke or try to quit completely  Do not vape  Other warning signs to watch out for in pregnancy or postpartum: chest pain, obstructed breathing or shortness of breath, seizures, thoughts of hurting yourself or your baby, bleeding, a painful or swollen leg, fever, or headache (see [facility] Warning Signs campaign)  If these happen call 911  Itching is also not normal in pregnancy and if you experience this, especially over your hands and feet, potentially worse at night, notify your doctors  Low dose aspirin, when started early in pregnancy, can reduce your risk of (prevent) preeclampsia  General dose recommendation is 81mg or 162mg daily  Side effects are generally none to mild, however, if you experience what you think may be an allergic reaction after starting aspirin, immediately stop it and notify your doctor    If you have asthma or acid reflux and notice an increase in symptom frequency or severity, consider stopping this medication, and notify your doctor  If you have had bariatric surgery, you may need a different dose of medication with or without acid-reducing medication  We advise routine discontinuation of this medication at 36 weeks    For more resources, visit:  https://mothertobaby org/fact-sheets/low-dose-aspirin/  [employer]  https://www highriskpregnancyinfo org/preeclampsia

## 2023-03-27 ENCOUNTER — APPOINTMENT (OUTPATIENT)
Dept: PHYSICAL THERAPY | Facility: CLINIC | Age: 33
End: 2023-03-27

## 2023-03-28 ENCOUNTER — APPOINTMENT (OUTPATIENT)
Dept: LAB | Facility: HOSPITAL | Age: 33
End: 2023-03-28

## 2023-03-28 ENCOUNTER — OFFICE VISIT (OUTPATIENT)
Dept: PHYSICAL THERAPY | Facility: CLINIC | Age: 33
End: 2023-03-28

## 2023-03-28 DIAGNOSIS — Z80.0 FAMILY HISTORY OF MALIGNANT NEOPLASM OF GASTROINTESTINAL TRACT: ICD-10-CM

## 2023-03-28 DIAGNOSIS — M79.672 LEFT FOOT PAIN: ICD-10-CM

## 2023-03-28 DIAGNOSIS — Z80.3 FAMILY HISTORY OF MALIGNANT NEOPLASM OF BREAST: ICD-10-CM

## 2023-03-28 DIAGNOSIS — O21.9 NAUSEA/VOMITING IN PREGNANCY: Primary | ICD-10-CM

## 2023-03-28 DIAGNOSIS — M72.2 PLANTAR FASCIITIS: Primary | ICD-10-CM

## 2023-03-28 RX ORDER — PROMETHAZINE HYDROCHLORIDE 12.5 MG/1
12.5 TABLET ORAL EVERY 6 HOURS PRN
Qty: 30 TABLET | Refills: 0 | Status: SHIPPED | OUTPATIENT
Start: 2023-03-28 | End: 2023-04-06

## 2023-03-28 NOTE — PROGRESS NOTES
"Daily Note     Today's date: 3/28/2023  Patient name: Aayush Christopher  : 1990  MRN: [id number]  Referring provider: James Mcnamara DPM  Dx:   Encounter Diagnosis     ICD-10-CM    1  Plantar fasciitis  M72 2       2  Left foot pain  M79 672           Start Time: 930  Stop Time: 1015  Total time in clinic (min): 45 minutes    Subjective: Patient reports sensation of ankle rolling since previous session  Reports ~80% improvement in pain and function since eval  States she missed yesterday's session due to morning sickness, which has improved today  Objective: See treatment diary below    Supine to long sit test: negative      Assessment: Tolerated treatment well  Patient did well with proximal stability work during today's session; able to progress to staggered bridges, although mild inc in foot pain noted  Patient with significant improvement in tendneress about 3rd web space since eval  Patient's pain into heel abolished following joint mobs, although mild return noted w/ heel raises  Initiated towel scrunches for intrinsic foot activation, which was shoaib well  Patient will continue to benefit from skilled PT to improve functional mobility and activity tolerance  Plan: Continue per plan of care  **Pregnant**    Insurance:  A/CMS Eval/ Re-eval POC expires Spencer Guardian #/ Referral # Total units  Start date  Expiration date Extension  Visit limitation? PT only or  PT+OT?  Co-Insurance   MCR: CMS 3/22/23 5/31/21  -- -- -- -- -- -- --                                                                Date 3/22/23 3/24/23 3/28/23      Visit Number IE 2 3      Manual         MET for anterior rotation of left innominate 5\" x5, 2 rounds L LE LAD, intermittent 5 min --      Pubic shotgun 5\" x5        Mobs to rear and mid foot   TC AP/PA gr II-III, 3x30    Subtalar inv/ev, gr II-III, 3x30    Mid-foot AP/PA, II-III, 3x30               Neuro Re-Ed         Bridges Rev, HEP 3x10 Regular x10    Staggered feet x15 " "each      Kirill         Ankle alphabet         Ad sq  5\" x30 5\" x30      Hip abd iso  With belt, 5\" x30 With belt, 5\" x30      Sciatic nerve glides  Seated, 3x10 on left Seated, 2x10 on left    Manual in supine, 3x10      Foot intrinsic strengthening   Towel scrunches, 3x10      SLS   5-10\" x10                        TherEx         Innominate MET Rev, HEP D/C       SLR         HR  With ball distal to malleoli, 3x10 With ball distal to malleoli, 3x10      Gastroc stretch  Prostretch, 15\" x5 Prostretch, 15\" x5                                 TherAct         Patient education PT POC, HEP, pt edu 10 min                                            Gait Training                                    Modalities         CP               Precautions:   Past Medical History:   Diagnosis Date   • Autism    • [place] esophagus    • CPAP (continuous positive airway pressure) dependence    • 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HCC)     not diagnosed as of 1/9/23-trying to R/O   • Depression    • Diabetes mellitus ([facility] [address] )    • Disease of thyroid gland     hypo   • Dysphagia     solids and liquids   • Female infertility    • Fibromyalgia, primary    • Gastric ulcer    • History of bronchitis    • Iron deficiency anemia     infusion in 11/2022   • Irregular menses    • Miscarriage    • Morbid obesity with BMI of 50 0-59 9, adult ([facility])    • Plantar fasciitis of left foot    • Reflux esophagitis    • Seizures ([address] )     last one 7/13/22   • Sleep apnea     CPAP   • Wears glasses                  "

## 2023-03-30 ENCOUNTER — NURSE TRIAGE (OUTPATIENT)
Dept: OTHER | Facility: OTHER | Age: 33
End: 2023-03-30

## 2023-03-30 ENCOUNTER — HOSPITAL ENCOUNTER (EMERGENCY)
Facility: HOSPITAL | Age: 33
Discharge: HOME | End: 2023-03-30
Attending: EMERGENCY MEDICINE

## 2023-03-30 ENCOUNTER — ROUTINE PRENATAL (OUTPATIENT)
Dept: PERINATAL CARE | Facility: CLINIC | Age: 33
End: 2023-03-30

## 2023-03-30 ENCOUNTER — APPOINTMENT (EMERGENCY)
Dept: ULTRASOUND IMAGING | Facility: HOSPITAL | Age: 33
End: 2023-03-30

## 2023-03-30 VITALS
HEIGHT: 56 IN | HEART RATE: 86 BPM | WEIGHT: 223 LBS | BODY MASS INDEX: 50.16 KG/M2 | SYSTOLIC BLOOD PRESSURE: 119 MMHG | DIASTOLIC BLOOD PRESSURE: 56 MMHG

## 2023-03-30 VITALS
DIASTOLIC BLOOD PRESSURE: 81 MMHG | SYSTOLIC BLOOD PRESSURE: 136 MMHG | RESPIRATION RATE: 20 BRPM | TEMPERATURE: 98.4 F | HEART RATE: 84 BPM | OXYGEN SATURATION: 100 %

## 2023-03-30 DIAGNOSIS — O99.210 MATERNAL MORBID OBESITY, ANTEPARTUM (HCC): ICD-10-CM

## 2023-03-30 DIAGNOSIS — E66.01 MATERNAL MORBID OBESITY, ANTEPARTUM (HCC): ICD-10-CM

## 2023-03-30 DIAGNOSIS — R62.52 SHORT STATURE: ICD-10-CM

## 2023-03-30 DIAGNOSIS — O46.90 VAGINAL BLEEDING IN PREGNANCY: Primary | ICD-10-CM

## 2023-03-30 DIAGNOSIS — O26.899 CRAMPING AFFECTING PREGNANCY, ANTEPARTUM: ICD-10-CM

## 2023-03-30 DIAGNOSIS — O09.299 HISTORY OF STILLBIRTH IN CURRENTLY PREGNANT PATIENT, UNSPECIFIED TRIMESTER: ICD-10-CM

## 2023-03-30 DIAGNOSIS — O20.0 THREATENED MISCARRIAGE: ICD-10-CM

## 2023-03-30 DIAGNOSIS — J20.9 ACUTE BRONCHITIS, UNSPECIFIED ORGANISM: ICD-10-CM

## 2023-03-30 DIAGNOSIS — O99.210 OBESITY IN PREGNANCY: Primary | ICD-10-CM

## 2023-03-30 DIAGNOSIS — Z3A.10 10 WEEKS GESTATION OF PREGNANCY: ICD-10-CM

## 2023-03-30 DIAGNOSIS — Z36.82 ENCOUNTER FOR ANTENATAL SCREENING FOR NUCHAL TRANSLUCENCY: ICD-10-CM

## 2023-03-30 DIAGNOSIS — R10.9 CRAMPING AFFECTING PREGNANCY, ANTEPARTUM: ICD-10-CM

## 2023-03-30 PROBLEM — O20.9 VAGINAL BLEEDING BEFORE 22 WEEKS GESTATION: Status: ACTIVE | Noted: 2023-03-30

## 2023-03-30 LAB
ABO GROUP BLD: NORMAL
ALBUMIN SERPL BCP-MCNC: 3.7 G/DL (ref 3.5–5)
ALP SERPL-CCNC: 65 U/L (ref 34–104)
ALT SERPL W P-5'-P-CCNC: 14 U/L (ref 7–52)
ANION GAP SERPL CALCULATED.3IONS-SCNC: 10 MMOL/L (ref 4–13)
APTT PPP: 25 SECONDS (ref 23–37)
AST SERPL W P-5'-P-CCNC: 13 U/L (ref 13–39)
B-HCG SERPL-ACNC: ABNORMAL MIU/ML (ref 0–11.6)
BACTERIA UR QL AUTO: ABNORMAL /HPF
BASOPHILS # BLD AUTO: 0.06 THOUSANDS/ÂΜL (ref 0–0.1)
BASOPHILS NFR BLD AUTO: 1 % (ref 0–1)
BILIRUB SERPL-MCNC: 0.24 MG/DL (ref 0.2–1)
BILIRUB UR QL STRIP: NEGATIVE
BLD GP AB SCN SERPL QL: NEGATIVE
BUN SERPL-MCNC: 7 MG/DL (ref 5–25)
C TRACH DNA SPEC QL NAA+PROBE: NEGATIVE
CALCIUM SERPL-MCNC: 9.4 MG/DL (ref 8.4–10.2)
CHLORIDE SERPL-SCNC: 101 MMOL/L (ref 96–108)
CLARITY UR: CLEAR
CO2 SERPL-SCNC: 22 MMOL/L (ref 21–32)
COLOR UR: COLORLESS
CREAT SERPL-MCNC: 0.48 MG/DL (ref 0.6–1.3)
EOSINOPHIL # BLD AUTO: 0.07 THOUSAND/ÂΜL (ref 0–0.61)
EOSINOPHIL NFR BLD AUTO: 1 % (ref 0–6)
ERYTHROCYTE [DISTWIDTH] IN BLOOD BY AUTOMATED COUNT: 13.8 % (ref 11.6–15.1)
GFR SERPL CREATININE-BSD FRML MDRD: 130 ML/MIN/1.73SQ M
GLUCOSE SERPL-MCNC: 94 MG/DL (ref 65–140)
GLUCOSE UR STRIP-MCNC: NEGATIVE MG/DL
HCT VFR BLD AUTO: 38.2 % (ref 34.8–46.1)
HGB BLD-MCNC: 12.5 G/DL (ref 11.5–15.4)
HGB UR QL STRIP.AUTO: ABNORMAL
HOLD SPECIMEN: NORMAL
IMM GRANULOCYTES # BLD AUTO: 0.02 THOUSAND/UL (ref 0–0.2)
IMM GRANULOCYTES NFR BLD AUTO: 0 % (ref 0–2)
INR PPP: 0.9 (ref 0.84–1.19)
KETONES UR STRIP-MCNC: ABNORMAL MG/DL
LEUKOCYTE ESTERASE UR QL STRIP: NEGATIVE
LIPASE SERPL-CCNC: 10 U/L (ref 11–82)
LYMPHOCYTES # BLD AUTO: 1.43 THOUSANDS/ÂΜL (ref 0.6–4.47)
LYMPHOCYTES NFR BLD AUTO: 20 % (ref 14–44)
MCH RBC QN AUTO: 29.6 PG (ref 26.8–34.3)
MCHC RBC AUTO-ENTMCNC: 32.7 G/DL (ref 31.4–37.4)
MCV RBC AUTO: 90 FL (ref 82–98)
MONOCYTES # BLD AUTO: 0.38 THOUSAND/ÂΜL (ref 0.17–1.22)
MONOCYTES NFR BLD AUTO: 5 % (ref 4–12)
MUCOUS THREADS UR QL AUTO: ABNORMAL
N GONORRHOEA DNA SPEC QL NAA+PROBE: NEGATIVE
NEUTROPHILS # BLD AUTO: 5.3 THOUSANDS/ÂΜL (ref 1.85–7.62)
NEUTS SEG NFR BLD AUTO: 73 % (ref 43–75)
NITRITE UR QL STRIP: NEGATIVE
NON-SQ EPI CELLS URNS QL MICRO: ABNORMAL /HPF
NRBC BLD AUTO-RTO: 0 /100 WBCS
PH UR STRIP.AUTO: 5.5 [PH]
PLATELET # BLD AUTO: 310 THOUSANDS/UL (ref 149–390)
PMV BLD AUTO: 8.8 FL (ref 8.9–12.7)
POTASSIUM SERPL-SCNC: 3.5 MMOL/L (ref 3.5–5.3)
PROT SERPL-MCNC: 7.3 G/DL (ref 6.4–8.4)
PROT UR STRIP-MCNC: NEGATIVE MG/DL
PROTHROMBIN TIME: 12.3 SECONDS (ref 11.6–14.5)
RBC # BLD AUTO: 4.23 MILLION/UL (ref 3.81–5.12)
RBC #/AREA URNS AUTO: ABNORMAL /HPF
RH BLD: NEGATIVE
SODIUM SERPL-SCNC: 133 MMOL/L (ref 135–147)
SP GR UR STRIP.AUTO: 1.01 (ref 1–1.03)
SPECIMEN EXPIRATION DATE: NORMAL
UROBILINOGEN UR STRIP-ACNC: <2 MG/DL
WBC # BLD AUTO: 7.26 THOUSAND/UL (ref 4.31–10.16)
WBC #/AREA URNS AUTO: ABNORMAL /HPF

## 2023-03-30 RX ORDER — ASPIRIN 81 MG/1
162 TABLET ORAL DAILY
Qty: 180 TABLET | Refills: 0 | Status: SHIPPED | OUTPATIENT
Start: 2023-03-30 | End: 2023-06-28

## 2023-03-30 RX ORDER — ALBUTEROL SULFATE 90 UG/1
2 AEROSOL, METERED RESPIRATORY (INHALATION) EVERY 6 HOURS PRN
Qty: 18 G | Refills: 3 | Status: SHIPPED | OUTPATIENT
Start: 2023-03-30

## 2023-03-30 RX ADMIN — HUMAN RHO(D) IMMUNE GLOBULIN 300 MCG: 300 INJECTION, SOLUTION INTRAMUSCULAR at 07:35

## 2023-03-30 NOTE — PROGRESS NOTES
Ultrasound Probe Disinfection    A transvaginal ultrasound was performed  Prior to use, disinfection was performed with High Level Disinfection Process (Trophon)  Probe serial number [id number] SREEDHAR was used        Kassandra Jimenez  03/30/23  2:13 PM

## 2023-03-30 NOTE — CONSULTS
"Consultation - Gynecology  Daxa Cook 29 y o  female MRN: [id number]  Unit/Bed#: ED-26 Encounter: [id number]      Consult to obstetrics / gynecology  Consult performed by: Mariela Kraft MD  Consult ordered by: Gurpreet Wu PA-C        * Vaginal bleeding before 22 weeks gestation  Assessment & Plan  Acute-onset vaginal bleeding, now resolving, at 12w6d  /81 (BP Location: Right arm)   Pulse 84   Temp 98 4 °F (36 9 °C) (Oral)   Resp 20   LMP 12/24/2022 (Exact Date)   SpO2 100%   - Hgb 12 5, UA WNL    Speculum exam with scant pink-tinged discharge, no active bleeding  Small polyp or fibroid at 12 o'clock on her cervix, not friable  SVE: 0/0/-5    TVUS  FINDINGS:   A single live intrauterine gestation is identified  Cardiac activity is present   Heart rate of 155 bpm    There is no significant subchorionic fluid collection  IMPRESSION:   Single live intrauterine gestation at 12 weeks 5 days (range +/- 6 days) based on CRL selected on the initial scan  Interval growth within expected range of normal  The JAKE remains 10/7/2023, as determined on the initial scan  Plan: Administer Rhogam, follow up at [facility] 120 appointment later today, follow-up with primary Ob on 4/6/23    D/w Dr Robert Jimenez      Chief Complaint   Patient presents with   • Vaginal Bleeding - Pregnant     Pt will be 13 weeks pregnant on Friday and c/o abdominal cramping, pain, and vaginal bleeding  Pt states she did not go thru any pads but was bleeding while going to the bathroom       History of Present Illness   Physician Requesting Consult: Daniel Cervantes MD  Reason for Consult / Principal Problem: Vaginal bleeding at 12w6d of pregnancy  Subspeciality: General ObGyn  HPI: Bienvenido Vera is a 29 y o  [id number] @13w8d female who presents with vaginal bleeding for 2 hours in the setting of known pregnancy  Pt has unfortunate obstetric history   Reports h/o 2 prior miscarriages at 8 weeks and an IUFD at \"7 months\"; she reports that " this was d/t Rh incompatibility (Pt is O Neg)  Per records, patient has had workup for hypercoagulability workup in [place], which was negative  Today, she presents after getting up to walk to her kitchen; she noted blood dripping down her leg  She left two small spots on the kitchen floor  She went to the bathroom and had blood on the toilet paper, her hands, and the toilet bowel  She did not pass large clots  She then wore a pad for several hours and is noted to have minimal blood on it  No soaking through it, not needing to change pads  She is still seeing red when she wipes when using the restroom  She has also been having dull, aching lower pelvic/abdominal cramping, however this has greatly improved  Review of Systems   Constitutional: Negative for chills and fever  HENT: Negative for sore throat  Eyes: Negative for visual disturbance  Respiratory: Negative for cough and shortness of breath  Cardiovascular: Negative for chest pain  Gastrointestinal: Negative for abdominal pain, constipation, diarrhea, nausea and vomiting  Genitourinary: Positive for vaginal bleeding  Negative for dysuria and hematuria  Skin: Negative for rash  Neurological: Negative for dizziness and headaches         Historical Information   Past Medical History:   Diagnosis Date   • Autism    • [place] esophagus    • CPAP (continuous positive airway pressure) dependence    • 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HCC)     not diagnosed as of 1/9/23-trying to R/O   • Depression    • Diabetes mellitus ([facility] [address] )    • Disease of thyroid gland     hypo   • Dysphagia     solids and liquids   • Female infertility    • Fibromyalgia, primary    • Gastric ulcer    • History of bronchitis    • Iron deficiency anemia     infusion in 11/2022   • Irregular menses    • Miscarriage    • Morbid obesity with BMI of 50 0-59 9, adult ([facility])    • Plantar fasciitis of left foot    • Reflux esophagitis    • Seizures ([facility] [address] )     last one 7/13/22   • Sleep apnea CPAP   • Wears glasses      Past Surgical History:   Procedure Laterality Date   • EGD      with Bx due to barretts esophagus   • EYE SURGERY Bilateral     lazy eye repair   • NE BIOPSY OF LIP N/A 11/10/2022    Procedure: EXCISION BIOPSY SALVARY GLANDS LOWER LIP;  Surgeon: Tanvi Sanders MD;  Location: [address] [place] [address];  Service: ENT   • NE HYSTEROSCOPY BX ENDOMETRIUM&/POLYPC W/WO D&C N/A 2021    Procedure: DILATATION AND CURETTAGE (D&C) WITH HYSTEROSCOPY;  Surgeon: Marii Monroe MD;  Location: [facility];  Service: Gynecology   • WISDOM TOOTH EXTRACTION       OB History    Para Term  AB Living   4 1 0 1 2 0   SAB IAB Ectopic Multiple Live Births   2 0 0 0 0      # Outcome Date GA Lbr Lopez/2nd Weight Sex Delivery Anes PTL Lv   4 Current            3 2019           2 2012           1   28w0d    Vag-Spont   FD      Complications: ABO incompatibility in pregnancy      Obstetric Comments   Both SAB <2 months   Still birth at 10 months   Has had hypercoagulable workup in [place] which was negative      Family History   Problem Relation Age of Onset   • Bipolar disorder Mother    • Depression Mother    • Depression Father    • Diabetes Maternal Grandmother    • Thyroid disease Maternal Grandmother    • Hypertension Maternal Grandmother    • Heart disease Maternal Grandmother    • Diabetes Maternal Grandfather    • Diabetes Paternal Grandmother    • Breast cancer Paternal Grandmother    • Stroke Paternal Grandmother    • Diabetes Paternal Grandfather    • Breast cancer Maternal Aunt 35        bilateral   • Stomach cancer Maternal Aunt      Social History   Social History     Substance and Sexual Activity   Alcohol Use Not Currently    Comment: occ     Social History     Substance and Sexual Activity   Drug Use Not Currently   • Types: Marijuana    Comment: about a couple times a week, quit 2022     Social History     Tobacco Use   Smoking Status Never   Smokeless Tobacco Never Meds/Allergies   No current facility-administered medications for this encounter  Allergies   Allergen Reactions   • Haloperidol Other (See Comments)     Jaw locks up   • Penicillins Hives   • Pollen Extract Allergic Rhinitis       Objective   Vitals: Blood pressure 136/81, pulse 84, temperature 98 4 °F (36 9 °C), temperature source Oral, resp  rate 20, last menstrual period 12/24/2022, SpO2 100 %, not currently breastfeeding  There is no height or weight on file to calculate BMI  No intake or output data in the 24 hours ending 03/30/23 0426    Invasive Devices     Peripheral Intravenous Line  Duration           Peripheral IV 03/30/23 Right Antecubital <1 day                Physical Exam  Constitutional:       Appearance: Normal appearance  Cardiovascular:      Rate and Rhythm: Normal rate and regular rhythm  Heart sounds: Normal heart sounds  No murmur heard  No friction rub  No gallop  Pulmonary:      Effort: Pulmonary effort is normal  No respiratory distress  Breath sounds: Normal breath sounds  No stridor  No wheezing, rhonchi or rales  Abdominal:      General: There is no distension  Palpations: Abdomen is soft  There is no mass  Tenderness: There is no abdominal tenderness  There is no guarding  Genitourinary:     Labia:         Right: No rash, tenderness or lesion  Left: No rash, tenderness or lesion  Vagina: No erythema, tenderness or lesions  Cervix: Lesion (Polypoid tissue at 12 o'clock, not friable, no bleeding after touch) present  No cervical motion tenderness or discharge  Musculoskeletal:         General: No swelling or tenderness  Skin:     Findings: No rash  Neurological:      Mental Status: She is alert       SVE: 0/0/-5            Lab Results:   Recent Results (from the past 24 hour(s))   CBC and differential    Collection Time: 03/30/23  1:27 AM   Result Value Ref Range    WBC 7 26 4 31 - 10 16 Thousand/uL    RBC 4 23 3 81 - 5 12 Million/uL    Hemoglobin 12 5 11 5 - 15 4 g/dL    Hematocrit 38 2 34 8 - 46 1 %    MCV 90 82 - 98 fL    MCH 29 6 26 8 - 34 3 pg    MCHC 32 7 31 4 - 37 4 g/dL    RDW 13 8 11 6 - 15 1 %    MPV 8 8 (L) 8 9 - 12 7 fL    Platelets 520 137 - 720 Thousands/uL    nRBC 0 /100 WBCs    Neutrophils Relative 73 43 - 75 %    Immat GRANS % 0 0 - 2 %    Lymphocytes Relative 20 14 - 44 %    Monocytes Relative 5 4 - 12 %    Eosinophils Relative 1 0 - 6 %    Basophils Relative 1 0 - 1 %    Neutrophils Absolute 5 30 1 85 - 7 62 Thousands/µL    Immature Grans Absolute 0 02 0 00 - 0 20 Thousand/uL    Lymphocytes Absolute 1 43 0 60 - 4 47 Thousands/µL    Monocytes Absolute 0 38 0 17 - 1 22 Thousand/µL    Eosinophils Absolute 0 07 0 00 - 0 61 Thousand/µL    Basophils Absolute 0 06 0 00 - 0 10 Thousands/µL   Comprehensive metabolic panel    Collection Time: 03/30/23  1:27 AM   Result Value Ref Range    Sodium 133 (L) 135 - 147 mmol/L    Potassium 3 5 3 5 - 5 3 mmol/L    Chloride 101 96 - 108 mmol/L    CO2 22 21 - 32 mmol/L    ANION GAP 10 4 - 13 mmol/L    BUN 7 5 - 25 mg/dL    Creatinine 0 48 (L) 0 60 - 1 30 mg/dL    Glucose 94 65 - 140 mg/dL    Calcium 9 4 8 4 - 10 2 mg/dL    AST 13 13 - 39 U/L    ALT 14 7 - 52 U/L    Alkaline Phosphatase 65 34 - 104 U/L    Total Protein 7 3 6 4 - 8 4 g/dL    Albumin 3 7 3 5 - 5 0 g/dL    Total Bilirubin 0 24 0 20 - 1 00 mg/dL    eGFR 130 ml/min/1 73sq m   Lipase    Collection Time: 03/30/23  1:27 AM   Result Value Ref Range    Lipase 10 (L) 11 - 82 u/L   hCG, quantitative, pregnancy    Collection Time: 03/30/23  1:27 AM   Result Value Ref Range    HCG, Quant 186,027 (H) 0 - 11 6 mIU/mL   Green / Black tube on hold    Collection Time: 03/30/23  1:30 AM   Result Value Ref Range    Extra Tube HOLD    UA w Reflex to Microscopic w Reflex to Culture    Collection Time: 03/30/23  2:23 AM    Specimen: Urine, Clean Catch   Result Value Ref Range    Color, UA Colorless     Clarity, UA Clear     Specific Gravity, UA 1 009 1 003 - 1 030    pH, UA 5 5 4 5, 5 0, 5 5, 6 0, 6 5, 7 0, 7 5, 8 0    Leukocytes, UA Negative Negative    Nitrite, UA Negative Negative    Protein, UA Negative Negative mg/dl    Glucose, UA Negative Negative mg/dl    Ketones, UA 10 (1+) (A) Negative mg/dl    Urobilinogen, UA <2 0 <2 0 mg/dl mg/dl    Bilirubin, UA Negative Negative    Occult Blood, UA Large (A) Negative    URINE COMMENT     Type and screen    Collection Time: 03/30/23  2:23 AM   Result Value Ref Range    ABO Grouping O     Rh Factor Negative     Antibody Screen Negative     Specimen Expiration Date [id number]    Urine Microscopic    Collection Time: 03/30/23  2:23 AM   Result Value Ref Range    RBC, UA None Seen None Seen, 1-2 /hpf    WBC, UA None Seen None Seen, 1-2 /hpf    Epithelial Cells Occasional None Seen, Occasional /hpf    Bacteria, UA Occasional None Seen, Occasional /hpf    MUCUS THREADS Occasional (A) None Seen    URINE COMMENT         Imaging:   TVUS 3/30  FINDINGS:   A single live intrauterine gestation is identified  Cardiac activity is present   Heart rate of 155 bpm    There is no significant subchorionic fluid collection  IMPRESSION:   Single live intrauterine gestation at 12 weeks 5 days (range +/- 6 days) based on CRL selected on the initial scan  Interval growth within expected range of normal  The JAKE remains 10/7/2023, as determined on the initial scan       Mya Larson MD  3/30/2023  4:26 AM

## 2023-03-30 NOTE — ED PROVIDER NOTES
"History  Chief Complaint   Patient presents with   • Vaginal Bleeding - Pregnant     Pt will be 13 weeks pregnant on Friday and c/o abdominal cramping, pain, and vaginal bleeding  Pt states she did not go thru any pads but was bleeding while going to the bathroom     Patient is a 71-year-old G4, P0 female approximately 13 weeks pregnant with a history of hypothyroidism, obese mellitus, 2 miscarriages and 1 stillbirth presents emerged department with vaginal bleeding for 2 hours  Patient also reports dull aching nonradiating lower abdominal cramping beginning with the current presentation of vaginal bleeding  Patient states that woke up this morning to go to the bathroom and she realized \"I did not get to the bathroom before I felt water going down my leg and realized it was not water  \"  Patient denies palliative factors or provocative factors of pressure to lower abdomen  Patient denies noneffective treatment  Patient denies AC/AP  Patient denies fevers, chills, nausea, vomiting, diarrhea, constipation, and urinary symptoms  Patient denies recent fall and recent trauma  Patient denies a contacts recent travel  Patient denies chest pain and shortness of breath  History provided by:  Patient   used: No        Prior to Admission Medications   Prescriptions Last Dose Informant Patient Reported? Taking?    Doxylamine-Pyridoxine 10-10 MG TBEC   No No   Sig: Take 1 tablet by mouth 4 (four) times a day as needed (nausea, vomiting, morning sickness)   Prenatal Vit-Iron Carbonyl-FA (prenatal multivitamin) TABS   Yes No   Sig: Take 1 tablet by mouth daily   acetaminophen (TYLENOL) 500 mg tablet   No No   Sig: Take 1 tablet (500 mg total) by mouth every 6 (six) hours as needed for mild pain   albuterol (Proventil HFA) 90 mcg/act inhaler   No No   Sig: Inhale 2 puffs every 6 (six) hours as needed for wheezing   citalopram (CeleXA) 10 mg tablet   Yes No   Sig: Take 10 mg by mouth every morning  " docusate sodium (COLACE) 100 mg capsule   No No   Sig: Take 1 capsule (100 mg total) by mouth 2 (two) times a day   ergocalciferol (VITAMIN D2) 50,000 units   No No   Sig: Take 1 capsule (50,000 Units total) by mouth once a week   Patient taking differently: Take 50,000 Units by mouth once a week On Monday   famotidine (PEPCID) 40 MG tablet   No No   Sig: Take 1 tablet (40 mg total) by mouth daily at bedtime   ferrous sulfate 324 (65 Fe) mg   No No   Sig: TAKE ONE TABLET BY MOUTH TWICE A DAY BEFORE MEALS   Patient taking differently: Take 324 mg by mouth daily before breakfast   folic acid (FOLVITE) 1 mg tablet   No No   Sig: Take 1 tablet (1 mg total) by mouth daily   lamoTRIgine (LaMICtal) 100 mg tablet   No No   Si tab twice daily x 1 wk, then 1 5 tabs twice daily x 1 wk, then 2 tabs twice daily x 1 week, then take 2 5 tabs (250 mg) twice daily  Do not start before 2023     lamoTRIgine (LaMICtal) 25 mg tablet   No No   Si tab nightly x 1 wk, then 1 tab twice daily x 1 wk, then 2 tabs twice daily x 1 wk, then 3 tabs twice daily x 1 week, then begin 100 mg pills   levETIRAcetam (KEPPRA) 750 mg tablet   No No   Sig: Take 2 tablets (1,500 mg total) by mouth every 12 (twelve) hours   levothyroxine 25 mcg tablet   No No   Sig: TAKE ONE TABLET BY MOUTH EVERY MORNING   ondansetron (ZOFRAN) 4 mg tablet   No No   Sig: Take 1 tablet (4 mg total) by mouth every 8 (eight) hours as needed for nausea or vomiting   ondansetron (ZOFRAN-ODT) 4 mg disintegrating tablet   No No   Sig: Take 1 tablet (4 mg total) by mouth every 8 (eight) hours as needed for nausea or vomiting for up to 7 days   promethazine (PHENERGAN) 12 5 MG tablet   No No   Sig: Take 1 tablet (12 5 mg total) by mouth every 6 (six) hours as needed for nausea or vomiting   sucralfate (CARAFATE) 1 g/10 mL suspension   No No   Sig: Take 10 mL (1 g total) by mouth 4 (four) times a day      RUST-Administered Medications: None       Past Medical History:   Diagnosis Date   • Autism    • [place] esophagus    • CPAP (continuous positive airway pressure) dependence    • 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HCC)     not diagnosed as of 1/9/23-trying to R/O   • Depression    • Diabetes mellitus ([facility] [address] )    • Disease of thyroid gland     hypo   • Dysphagia     solids and liquids   • Female infertility    • Fibromyalgia, primary    • Gastric ulcer    • History of bronchitis    • Iron deficiency anemia     infusion in 11/2022   • Irregular menses    • Miscarriage    • Morbid obesity with BMI of 50 0-59 9, adult ([facility])    • Plantar fasciitis of left foot    • Reflux esophagitis    • Seizures ([facility] [address] )     last one 7/13/22   • Sleep apnea     CPAP   • Wears glasses        Past Surgical History:   Procedure Laterality Date   • EGD      with Bx due to barretts esophagus   • EYE SURGERY Bilateral     lazy eye repair   • OH BIOPSY OF LIP N/A 11/10/2022    Procedure: EXCISION BIOPSY SALVARY GLANDS LOWER LIP;  Surgeon: Quyen Persaud MD;  Location: [address] [place] [address];  Service: ENT   • OH HYSTEROSCOPY BX ENDOMETRIUM&/POLYPC W/WO D&C N/A 9/22/2021    Procedure: DILATATION AND CURETTAGE (D&C) WITH HYSTEROSCOPY;  Surgeon: Diego Puga MD;  Location: [address] [place] [address];  Service: Gynecology   • WISDOM TOOTH EXTRACTION         Family History   Problem Relation Age of Onset   • Bipolar disorder Mother    • Depression Mother    • Depression Father    • Diabetes Maternal Grandmother    • Thyroid disease Maternal Grandmother    • Hypertension Maternal Grandmother    • Heart disease Maternal Grandmother    • Diabetes Maternal Grandfather    • Diabetes Paternal Grandmother    • Breast cancer Paternal Grandmother    • Stroke Paternal Grandmother    • Diabetes Paternal Grandfather    • Breast cancer Maternal Aunt 35        bilateral   • Stomach cancer Maternal Aunt      I have reviewed and agree with the history as documented      E-Cigarette/Vaping   • E-Cigarette Use Never User      E-Cigarette/Vaping Substances   • Nicotine No    • THC No    • CBD No    • Flavoring No    • Other No    • Unknown No      Social History     Tobacco Use   • Smoking status: Never   • Smokeless tobacco: Never   Vaping Use   • Vaping Use: Never used   Substance Use Topics   • Alcohol use: Not Currently     Comment: occ   • Drug use: Not Currently     Types: Marijuana     Comment: about a couple times a week, quit June 2022       Review of Systems   Constitutional: Negative for activity change, appetite change, chills and fever  HENT: Negative for congestion, postnasal drip, rhinorrhea, sinus pressure, sinus pain, sore throat and tinnitus  Eyes: Negative for photophobia and visual disturbance  Respiratory: Negative for cough, chest tightness and shortness of breath  Cardiovascular: Negative for chest pain and palpitations  Gastrointestinal: Positive for abdominal pain  Negative for constipation, diarrhea, nausea and vomiting  Genitourinary: Negative for difficulty urinating, dysuria, flank pain, frequency and urgency  Musculoskeletal: Negative for back pain, gait problem, neck pain and neck stiffness  Skin: Negative for pallor and rash  Allergic/Immunologic: Negative for environmental allergies and food allergies  Neurological: Negative for dizziness, weakness, numbness and headaches  Psychiatric/Behavioral: Negative for confusion  All other systems reviewed and are negative  Physical Exam  Physical Exam  Vitals and nursing note reviewed  Constitutional:       General: She is awake  Appearance: Normal appearance  She is well-developed  She is not ill-appearing, toxic-appearing or diaphoretic  Comments: /81 (BP Location: Right arm)   Pulse 84   Temp 98 4 °F (36 9 °C) (Oral)   Resp 20   LMP 12/24/2022 (Exact Date)   SpO2 100%      HENT:      Head: Normocephalic and atraumatic  Right Ear: Hearing and external ear normal  No decreased hearing noted  No drainage, swelling or tenderness   No mastoid tenderness  Left Ear: Hearing and external ear normal  No decreased hearing noted  No drainage, swelling or tenderness  No mastoid tenderness  Nose: Nose normal       Mouth/Throat:      Lips: Pink  Mouth: Mucous membranes are moist       Pharynx: Oropharynx is clear  Uvula midline  Eyes:      General: Lids are normal  Vision grossly intact  Right eye: No discharge  Left eye: No discharge  Extraocular Movements: Extraocular movements intact  Conjunctiva/sclera: Conjunctivae normal       Pupils: Pupils are equal, round, and reactive to light  Neck:      Vascular: No JVD  Trachea: Trachea and phonation normal  No tracheal tenderness or tracheal deviation  Cardiovascular:      Rate and Rhythm: Normal rate and regular rhythm  Pulses: Normal pulses  Radial pulses are 2+ on the right side and 2+ on the left side  Posterior tibial pulses are 2+ on the right side and 2+ on the left side  Heart sounds: Normal heart sounds  Pulmonary:      Effort: Pulmonary effort is normal       Breath sounds: Normal breath sounds  No stridor  No decreased breath sounds, wheezing, rhonchi or rales  Chest:      Chest wall: No tenderness  Abdominal:      General: Abdomen is flat  Bowel sounds are normal  There is no distension  Palpations: Abdomen is soft  Abdomen is not rigid  Tenderness: There is abdominal tenderness in the right lower quadrant, suprapubic area and left lower quadrant  There is no guarding or rebound  Musculoskeletal:         General: Normal range of motion  Cervical back: Full passive range of motion without pain, normal range of motion and neck supple  No rigidity  No spinous process tenderness or muscular tenderness  Normal range of motion  Lymphadenopathy:      Head:      Right side of head: No submental, submandibular, tonsillar, preauricular, posterior auricular or occipital adenopathy        Left side of head: No submental, submandibular, tonsillar, preauricular, posterior auricular or occipital adenopathy  Cervical: No cervical adenopathy  Right cervical: No superficial, deep or posterior cervical adenopathy  Left cervical: No superficial, deep or posterior cervical adenopathy  Skin:     General: Skin is warm  Capillary Refill: Capillary refill takes less than 2 seconds  Neurological:      General: No focal deficit present  Mental Status: She is alert and oriented to person, place, and time  GCS: GCS eye subscore is 4  GCS verbal subscore is 5  GCS motor subscore is 6  Sensory: No sensory deficit  Deep Tendon Reflexes: Reflexes are normal and symmetric  Reflex Scores:       Patellar reflexes are 2+ on the right side and 2+ on the left side  Psychiatric:         Mood and Affect: Mood normal          Speech: Speech normal          Behavior: Behavior normal  Behavior is cooperative  Thought Content: Thought content normal          Judgment: Judgment normal          Vital Signs  ED Triage Vitals [03/30/23 0121]   Temperature Pulse Respirations Blood Pressure SpO2   98 4 °F (36 9 °C) 84 20 136/81 100 %      Temp Source Heart Rate Source Patient Position - Orthostatic VS BP Location FiO2 (%)   Oral Monitor Sitting Right arm --      Pain Score       --           Vitals:    03/30/23 0121   BP: 136/81   Pulse: 84   Patient Position - Orthostatic VS: Sitting         Visual Acuity      ED Medications  Medications   Rho(D) immune globulin (RHOGAM ULTRA-FILTERED PLUS) IM injection 300 mcg (300 mcg Intramuscular Given 3/30/23 0735)       Diagnostic Studies  Results Reviewed     Procedure Component Value Units Date/Time    Chlamydia/GC amplified DNA by PCR [[id number]] Collected: 03/30/23 6132    Lab Status:  In process Updated: 03/30/23 0623    Protime-INR [[id number]]  (Normal) Collected: 03/30/23 0130    Lab Status: Final result Specimen: Blood from Arm, Right Updated: 03/30/23 0731 Protime 12 3 seconds      INR 0 90    APTT [[id number]]  (Normal) Collected: 03/30/23 0130    Lab Status: Final result Specimen: Blood from Arm, Right Updated: 03/30/23 0436     PTT 25 seconds     hCG, quantitative, pregnancy [[id number]]  (Abnormal) Collected: 03/30/23 0127    Lab Status: Final result Specimen: Blood from Arm, Right Updated: 03/30/23 0330     HCG, Quant 186,027 mIU/mL     Narrative:       Expected Ranges:     Approximate               Approximate HCG  Gestation age          Concentration ( mIU/mL)  _____________          ______________________   Leila Lui                      HCG values  0 2-1                       5-50  1-2                           2-3                         100-5000  3-4                         500-53992  4-5                         1000-05491  5-6                         [id number]  6-8                         [id number]  8-12                        [id number]      [place] draw [[id number]] Collected: 03/30/23 0130    Lab Status: Final result Specimen: Blood from Arm, Right Updated: 03/30/23 0301    Narrative: The following orders were created for panel order [place] draw  Procedure                               Abnormality         Status                     ---------                               -----------         ------                     Brianna [place] Top on QFZQ[[id number]]                           Final result               Green / Black tube on EAXJ[[id number]]                       Final result                 Please view results for these tests on the individual orders      Urine Microscopic [[id number]]  (Abnormal) Collected: 03/30/23 0223    Lab Status: Final result Specimen: Urine, Clean Catch Updated: 03/30/23 0242     RBC, UA None Seen /hpf      WBC, UA None Seen /hpf      Epithelial Cells Occasional /hpf      Bacteria, UA Occasional /hpf      MUCUS THREADS Occasional     URINE COMMENT --    [place] draw [[id number]] Collected: 03/30/23 0127    Lab Status: Final result Specimen: Blood from Arm, Right Updated: 03/30/23 0241    Narrative: The following orders were created for panel order [place] draw  Procedure                               Abnormality         Status                     ---------                               -----------         ------                     [facility] Iqbal [facility] on [facility][[id number]]                                 Final result                 Please view results for these tests on the individual orders  UA w Reflex to Microscopic w Reflex to Culture [[id number]]  (Abnormal) Collected: 03/30/23 0223    Lab Status: Final result Specimen: Urine, Clean Catch Updated: 03/30/23 0241     Color, UA Colorless     Clarity, UA Clear     Specific Gravity, UA 1 009     pH, UA 5 5     Leukocytes, UA Negative     Nitrite, UA Negative     Protein, UA Negative mg/dl      Glucose, UA Negative mg/dl      Ketones, UA 10 (1+) mg/dl      Urobilinogen, UA <2 0 mg/dl      Bilirubin, UA Negative     Occult Blood, UA Large     URINE COMMENT --    Urine culture [[id number]] Collected: 03/30/23 0223    Lab Status:  In process Specimen: Urine, Clean Catch Updated: 03/30/23 0241    Comprehensive metabolic panel [[id number]]  (Abnormal) Collected: 03/30/23 0127    Lab Status: Final result Specimen: Blood from Arm, Right Updated: 03/30/23 0209     Sodium 133 mmol/L      Potassium 3 5 mmol/L      Chloride 101 mmol/L      CO2 22 mmol/L      ANION GAP 10 mmol/L      BUN 7 mg/dL      Creatinine 0 48 mg/dL      Glucose 94 mg/dL      Calcium 9 4 mg/dL      AST 13 U/L      ALT 14 U/L      Alkaline Phosphatase 65 U/L      Total Protein 7 3 g/dL      Albumin 3 7 g/dL      Total Bilirubin 0 24 mg/dL      eGFR 130 ml/min/1 73sq m     Narrative:      [place] guidelines for Chronic Kidney Disease (CKD):   •  Stage 1 with normal or high GFR (GFR > 90 mL/min/1 73 square meters)  •  Stage 2 Mild CKD (GFR = 60-89 mL/min/1 73 square meters)  •  Stage 3A Moderate CKD (GFR = 45-59 mL/min/1 73 square meters)  •  Stage 3B Moderate CKD (GFR = 30-44 mL/min/1 73 square meters)  •  Stage 4 Severe CKD (GFR = 15-29 mL/min/1 73 square meters)  •  Stage 5 End Stage CKD (GFR <15 mL/min/1 73 square meters)  Note: GFR calculation is accurate only with a steady state creatinine    Lipase [[id number]]  (Abnormal) Collected: 03/30/23 0127    Lab Status: Final result Specimen: Blood from Arm, Right Updated: 03/30/23 0209     Lipase 10 u/L     CBC and differential [[id number]]  (Abnormal) Collected: 03/30/23 0127    Lab Status: Final result Specimen: Blood from Arm, Right Updated: 03/30/23 0138     WBC 7 26 Thousand/uL      RBC 4 23 Million/uL      Hemoglobin 12 5 g/dL      Hematocrit 38 2 %      MCV 90 fL      MCH 29 6 pg      MCHC 32 7 g/dL      RDW 13 8 %      MPV 8 8 fL      Platelets 746 Thousands/uL      nRBC 0 /100 WBCs      Neutrophils Relative 73 %      Immat GRANS % 0 %      Lymphocytes Relative 20 %      Monocytes Relative 5 %      Eosinophils Relative 1 %      Basophils Relative 1 %      Neutrophils Absolute 5 30 Thousands/µL      Immature Grans Absolute 0 02 Thousand/uL      Lymphocytes Absolute 1 43 Thousands/µL      Monocytes Absolute 0 38 Thousand/µL      Eosinophils Absolute 0 07 Thousand/µL      Basophils Absolute 0 06 Thousands/µL                  US OB Follow up with Transvaginal   ED Interpretation by Paresh Estrada PA-C (03/30 0629)   Kassidy Flynn MD  [phone] 3/30/2023     Narrative & Impression  FIRST TRIMESTER OBSTETRIC ULTRASOUND, COMPLETE     INDICATION:  LMP is 12/24/2022           COMPARISON: Comparison is made to a prior study dated 2/9/2023, at which time the gestational age was established with an JAKE of 10/7/2023      TECHNIQUE:   Transabdominal ultrasound of the pelvis was performed  Additional transvaginal imaging was then performed to better assess the gestation, myometrial/endometrial architecture and ovarian parenchymal detail    The study includes volumetric   sweeps and traditional still imaging technique       FINDINGS:     A single live intrauterine gestation is identified  Cardiac activity is present  Heart rate of 155 bpm      YOLK SAC:  Not visualized  MEAN GESTATIONAL SAC SIZE: 60 mm  MEAN CROWN RUMP LENGTH:  68 mm = 13 weeks 1 day ( range 12 weeks 4 days - 13 weeks 5 days)  FETAL ANATOMY:  Appropriate for gestational age  AMNIOTIC FLUID/SAC SHAPE:  Within expected normal ra   nge  PLACENTA:  The placenta is appropriate for gestational age      There is no significant subchorionic fluid collection      UTERUS/ADNEXA:   The uterus and ovaries are within normal limits  The cervix remains closed  No free fluid present      IMPRESSION:     Single live intrauterine gestation at 12 weeks 5 days (range +/- 6 days) based on CRL selected on the initial scan      Interval growth within expected range of normal  The JAKE remains 10/7/2023, as determined on the initial scan         Workstation performed: [id number]           Final Result by Radha Quinn MD ( [id number])      Single live intrauterine gestation at 12 weeks 5 days (range +/- 6 days) based on CRL selected on the initial scan  Interval growth within expected range of normal  The JAKE remains 10/7/2023, as determined on the initial scan  Workstation performed: [id number]                    Procedures  Procedures         ED Course  ED Course as of 23 0752   Thu Mar 30, 2023   0157 Vaginal bleeding this evening; lower abdominal pain and passage of clots; 0040 tonight  LMP 23;  12 weeks gestation   one still birth and two miscarriages   0159 Rhogam given at 5 weeks gestation for vaginal bleeding; O-   0403 Tiger text to Dr Michelle Sung, obgyn resident   0089 Ultrasound in room     4240 Blood product    0629 US OB Follow up with Transvaginal                                             Medical Decision Making  Patient is a 36-year-old G4, P0 female approximately 15 "weeks pregnant with a history of hypothyroidism, obese mellitus, 2 miscarriages and 1 stillbirth presents emerged department with vaginal bleeding for 2 hours  Patient also reports dull aching nonradiating lower abdominal cramping beginning with the current presentation of vaginal bleeding  Patient hemodynamically stable and afebrile, patient denied offered vaginal exam, chaperoned by myself  Normal H&H, normal PT/INR; [id number] quantitative hcg  Patient O-, discussed patient case with OB/GYN resident with recommendation to deliver RhoGAM   Patient did report having RhoGAM at 5 weeks pregnancy, however given patient vaginal bleeding symptomatology OB/GYN resident had recommended RhoGAM again at this time  Ultrasound transvaginal-impression-\"  Single live intrauterine gestation at 12 weeks 5 days (range +/- 6 days) based on CRL selected on the initial scan  Urinalysis not indicative of urinary tract infection  Patient will follow-up as an outpatient OB/GYN as scheduled this week  Follow-up with ECP in the emergency department should symptoms persist or exacerbate  Patient demonstrated verbal  Clinical laboratory and imaging findings, discharge instructions, follow-up and verbalized agreement with patient current treatment plan  Amount and/or Complexity of Data Reviewed  Labs: ordered  Radiology: ordered  Decision-making details documented in ED Course  ECG/medicine tests:  Decision-making details documented in ED Course  Risk  Prescription drug management            Disposition  Final diagnoses:   Vaginal bleeding in pregnancy   Threatened miscarriage     Time reflects when diagnosis was documented in both MDM as applicable and the Disposition within this note     Time User Action Codes Description Comment    3/30/2023  4:33 AM [employer] Add [O46 [id number]] Vaginal bleeding in pregnancy     3/30/2023  6:30 AM [employer] Add [O20 [id number]] Threatened miscarriage       ED Disposition     ED Disposition   Discharge " Condition   Stable    Date/Time   Thu Mar 30, 2023  6:30 AM    Comment   Margoth Rothmanyor discharge to home/self care                 Follow-up Information     Follow up With Specialties Details Why Contact Info Additional [address] [facility], [address]   [address] [facility],Fourth Floor Emergency Department Emergency Medicine   [address] [facility] [address] [facility] Emergency Department, [address], [place], [place], [address] [facility] Obstetrics and Gynecology   [address]  [phone]  [facility], [address] [facility], [address] [place], [place], [address] [facility]          Discharge Medication List as of 3/30/2023  6:31 AM      CONTINUE these medications which have NOT CHANGED    Details   acetaminophen (TYLENOL) 500 mg tablet Take 1 tablet (500 mg total) by mouth every 6 (six) hours as needed for mild pain, Starting Wed 9/22/2021, No Print      albuterol (Proventil HFA) 90 mcg/act inhaler Inhale 2 puffs every 6 (six) hours as needed for wheezing, Starting Fri 1/20/2023, Normal      citalopram (CeleXA) 10 mg tablet Take 10 mg by mouth every morning , Historical Med      docusate sodium (COLACE) 100 mg capsule Take 1 capsule (100 mg total) by mouth 2 (two) times a day, Starting Fri 3/10/2023, Normal      Doxylamine-Pyridoxine 10-10 MG TBEC Take 1 tablet by mouth 4 (four) times a day as needed (nausea, vomiting, morning sickness), Starting Mon 2/6/2023, Normal      ergocalciferol (VITAMIN D2) 50,000 units Take 1 capsule (50,000 Units total) by mouth once a week, Starting Fri 9/30/2022, Normal      famotidine (PEPCID) 40 MG tablet Take 1 tablet (40 mg total) by mouth daily at bedtime, Starting Wed 5/11/2022, Normal      ferrous sulfate 324 (65 Fe) mg TAKE ONE TABLET BY MOUTH TWICE A DAY BEFORE MEALS, Normal      folic acid (FOLVITE) 1 mg tablet Take 1 tablet (1 mg total) by mouth daily, Starting Tue 2/7/2023, Until Mon 5/8/2023, Normal      !! lamoTRIgine (LaMICtal) 100 mg tablet 1 tab twice daily x 1 wk, then 1 5 tabs twice daily x 1 wk, then 2 tabs twice daily x 1 week, then take 2 5 tabs (250 mg) twice daily  Do not start before March 7, 2023 , Normal      !! lamoTRIgine (LaMICtal) 25 mg tablet 1 tab nightly x 1 wk, then 1 tab twice daily x 1 wk, then 2 tabs twice daily x 1 wk, then 3 tabs twice daily x 1 week, then begin 100 mg pills, Normal      levETIRAcetam (KEPPRA) 750 mg tablet Take 2 tablets (1,500 mg total) by mouth every 12 (twelve) hours, Starting Tue 2/7/2023, Normal      levothyroxine 25 mcg tablet TAKE ONE TABLET BY MOUTH EVERY MORNING, Normal      ondansetron (ZOFRAN) 4 mg tablet Take 1 tablet (4 mg total) by mouth every 8 (eight) hours as needed for nausea or vomiting, Starting Mon 3/20/2023, Normal      ondansetron (ZOFRAN-ODT) 4 mg disintegrating tablet Take 1 tablet (4 mg total) by mouth every 8 (eight) hours as needed for nausea or vomiting for up to 7 days, Starting Wed 2/22/2023, Until Wed 3/1/2023 at 2359, Normal      Prenatal Vit-Iron Carbonyl-FA (prenatal multivitamin) TABS Take 1 tablet by mouth daily, Historical Med      promethazine (PHENERGAN) 12 5 MG tablet Take 1 tablet (12 5 mg total) by mouth every 6 (six) hours as needed for nausea or vomiting, Starting Tue 3/28/2023, Normal      sucralfate (CARAFATE) 1 g/10 mL suspension Take 10 mL (1 g total) by mouth 4 (four) times a day, Starting Sat 1/14/2023, Normal       !! - Potential duplicate medications found  Please discuss with provider  No discharge procedures on file      PDMP Review     None          ED Provider  Electronically Signed by           Jeffery Yates PA-C  03/30/23 [id number]

## 2023-03-30 NOTE — TELEPHONE ENCOUNTER
"Regardin weeks, cramping bleeding  ----- Message from [facility] sent at 3/30/2023 12:54 AM EDT -----  \" I am 14 weeks pregnant, I am cramping really bad and bleeding through my pad  \"    "

## 2023-03-30 NOTE — Clinical Note
Sylvester Lamb was seen and treated in our emergency department on 3/30/2023  Diagnosis:     Sascha Arm    She may return on this date: 03/31/2023         If you have any questions or concerns, please don't hesitate to call        Carlos Rodriguez PA-C    ______________________________           _______________          _______________  Hospital Representative                              Date                                Time

## 2023-03-30 NOTE — DISCHARGE INSTRUCTIONS
Eva William MD  [phone] 3/30/2023      Narrative & Impression  FIRST TRIMESTER OBSTETRIC ULTRASOUND, COMPLETE     INDICATION:  LMP is 12/24/2022           COMPARISON: Comparison is made to a prior study dated 2/9/2023, at which time the gestational age was established with an JAKE of 10/7/2023  TECHNIQUE:   Transabdominal ultrasound of the pelvis was performed  Additional transvaginal imaging was then performed to better assess the gestation, myometrial/endometrial architecture and ovarian parenchymal detail  The study includes volumetric   sweeps and traditional still imaging technique  FINDINGS:     A single live intrauterine gestation is identified  Cardiac activity is present  Heart rate of 155 bpm      YOLK SAC:  Not visualized  MEAN GESTATIONAL SAC SIZE: 60 mm  MEAN CROWN RUMP LENGTH:  68 mm = 13 weeks 1 day ( range 12 weeks 4 days - 13 weeks 5 days)  FETAL ANATOMY:  Appropriate for gestational age  AMNIOTIC FLUID/SAC SHAPE:  Within expected normal range  PLACENTA:  The placenta is appropriate for gestational age  There is no significant subchorionic fluid collection  UTERUS/ADNEXA:   The uterus and ovaries are within normal limits  The cervix remains closed  No free fluid present  IMPRESSION:     Single live intrauterine gestation at 12 weeks 5 days (range +/- 6 days) based on CRL selected on the initial scan  Interval growth within expected range of normal  The JAKE remains 10/7/2023, as determined on the initial scan          Workstation performed: [id number]

## 2023-03-30 NOTE — PROGRESS NOTES
"Patient chose to have [facility] Non-invasive Prenatal Screen with fetal sex  Patient given brochure and is aware [facility] will contact their insurance and coordinate coverage  Patient made aware she will need to respond to text message or e-mail from [employer] within 2 business days or testing will be run through insurance  Patient informed text message will come from area code  \"415\"  Provided [employer] # [phone] and web site : [email or web address]  \"[employer] your test online\" card with barcode and test tube ID provided to patient  Reviewed [facility]'s web site states 5-7 business days for results via their portal    [place] message will be sent to patient when MFM receives results /provider reviews  2 vials of blood drawn from right arm by A  Prabhu RN  Patient tolerated blood draw without difficulty  Specimens labeled with patient identifiers (name, date of birth, specimen collection date), order and specimen were verified with patient, packed and sent via [employer]  Copy of lab order scanned to Epic media  [facility] will have results in approximately 7-10 business days and will call patient or notify via [address]  Patient aware viewing lab result online will reveal fetal sex if ordered  Patient verbalized understanding of all instructions and no questions at this time    "

## 2023-03-30 NOTE — ASSESSMENT & PLAN NOTE
Acute-onset vaginal bleeding, now resolving, at 12w6d  /81 (BP Location: Right arm)   Pulse 84   Temp 98 4 °F (36 9 °C) (Oral)   Resp 20   LMP 12/24/2022 (Exact Date)   SpO2 100%   - Hgb 12 5, UA WNL    Speculum exam with scant pink-tinged discharge, no active bleeding  Small polyp or fibroid at 12 o'clock on her cervix, not friable  SVE: 0/0/-5    TVUS  FINDINGS:   A single live intrauterine gestation is identified  Cardiac activity is present   Heart rate of 155 bpm    There is no significant subchorionic fluid collection  IMPRESSION:   Single live intrauterine gestation at 12 weeks 5 days (range +/- 6 days) based on CRL selected on the initial scan  Interval growth within expected range of normal  The JAKE remains 10/7/2023, as determined on the initial scan       Plan: Administer Rhogam, follow up at [facility] 120 appointment later today, follow-up with primary Ob on 4/6/23

## 2023-03-30 NOTE — TELEPHONE ENCOUNTER
Noted- pt seen and discharged from ED  Received Rhogam in ED as documented  Pt with f/u appt in MFM today, and f/u appt in office as previously scheduled 4/6

## 2023-03-30 NOTE — LETTER
"Date: 3/30/2023    Raj Zavala MD  [address]  [address] [place] [address]    Patient: Fatmata Oakes   YOB: 1990   Date of Visit: 3/30/2023   Gestational age 13w1d   [place] of this communication: Routine though please note recs for anesthesia consultation, genetics evaluation for short stature       Dear Stephanie Ballard,    This patient was seen recently in our  office  Please see ultrasound report under \"OB Procedures\" tab  Please don't hesitate to contact our office with any concerns or questions        Sincerely,      Renny Mo MD  Attending Physician, Tasha         "

## 2023-03-31 ENCOUNTER — OFFICE VISIT (OUTPATIENT)
Dept: PHYSICAL THERAPY | Facility: CLINIC | Age: 33
End: 2023-03-31

## 2023-03-31 DIAGNOSIS — M79.672 LEFT FOOT PAIN: ICD-10-CM

## 2023-03-31 DIAGNOSIS — M72.2 PLANTAR FASCIITIS: Primary | ICD-10-CM

## 2023-03-31 LAB — BACTERIA UR CULT: NORMAL

## 2023-03-31 NOTE — PROGRESS NOTES
"Daily Note     Today's date: 3/31/2023  Patient name: Karishma Nagel  : 1990  MRN: [id number]  Referring provider: Emy Allen DPM  Dx:   Encounter Diagnosis     ICD-10-CM    1  Plantar fasciitis  M72 2       2  Left foot pain  M79 672           Start Time: 930  Stop Time: 1015  Total time in clinic (min): 45 minutes    Subjective: Patient reports she was in the ER last night for spotting bleeding likely from a fibroid in uterus or a spot on cervix  Ultra sound of baby showed all okay  Objective: See treatment diary below      Assessment: Tolerated treatment well  Patient would benefit from continued PT in order increase tissue extensibility of plantar fascia and improve ankle mobility  Did well with self roll out of gastroc complex with tennis ball  Was positive in forward flexion and supine to sit for posteriorly rotated left innominate which responded well to anterior thrust        Plan: Continue per plan of care  **Pregnant**    Insurance:  [place]/CMS Eval/ Re-eval POC expires [place] #/ Referral # Total units  Start date  Expiration date Extension  Visit limitation? PT only or  PT+OT?  Co-Insurance   MCR: CMS 3/22/23 5/31/21  -- -- -- -- -- -- --                                                                Date 3/22/23 3/24/23 3/28/23 3/31/23     Visit Number IE 2 3 4     Manual         MET for anterior rotation of left innominate 5\" x5, 2 rounds L LE LAD, intermittent 5 min -- Gr V anterior innominate thrust, left with patient permission     Pubic shotgun 5\" x5        Mobs to rear and mid foot   TC AP/PA gr II-III, 3x30    Subtalar inv/ev, gr II-III, 3x30    Mid-foot AP/PA, II-III, 3x30 TC AP/PA gr II-III, 3x30    Subtalar inv/ev, gr II-III, 3x30    Mid-foot AP/PA, II-III, 3x30              Neuro Re-Ed         Bridges Rev, HEP 3x10 Regular x10    Staggered feet x15 each Regular x10    Staggered feet x15 each     Clamshells         Ankle alphabet         Ad sq  5\" x30 5\" x30 5\" x 30  " "   Hip abd iso  With belt, 5\" x30 With belt, 5\" x30      Sciatic nerve glides  Seated, 3x10 on left Seated, 2x10 on left    Manual in supine, 3x10 Seated, 2x10 on left       Foot intrinsic strengthening   Towel scrunches, 3x10 Towel scrunches, 3x10     SLS   5-10\" x10                        TherEx         Innominate MET Rev, HEP D/C       SLR         HR  With ball distal to malleoli, 3x10 With ball distal to malleoli, 3x10      Gastroc stretch  Prostretch, 15\" x5 Prostretch, 15\" x5                                 TherAct         Patient education PT POC, HEP, pt edu 10 min                                            Gait Training                                    Modalities         CP               Precautions:   Past Medical History:   Diagnosis Date   • Autism    • [place] esophagus    • CPAP (continuous positive airway pressure) dependence    • 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HCC)     not diagnosed as of 1/9/23-trying to R/O   • Depression    • Diabetes mellitus (HCC)    • Disease of thyroid gland     hypo   • Dysphagia     solids and liquids   • Female infertility    • Fibromyalgia, primary    • Gastric ulcer    • History of bronchitis    • Iron deficiency anemia     infusion in 11/2022   • Irregular menses    • Miscarriage    • Morbid obesity with BMI of 50 0-59 9, adult ([facility])    • Plantar fasciitis of left foot    • Reflux esophagitis    • Seizures ([facility] [address] )     last one 7/13/22   • Sleep apnea     CPAP   • Wears glasses                    "

## 2023-04-01 LAB
Lab: NORMAL
MISCELLANEOUS LAB TEST RESULT: NORMAL

## 2023-04-02 PROBLEM — R62.52 SHORT STATURE: Status: ACTIVE | Noted: 2023-04-02

## 2023-04-02 NOTE — PROGRESS NOTES
"[address] [address]: Ms Jessica Chavez was seen today for nuchal translucency ultrasound  See ultrasound report under \"OB Procedures\" tab  Review of Systems   Constitutional: Negative for chills, fever and unexpected weight change  HENT: Negative for congestion, dental problem, facial swelling and sore throat  Eyes: Negative for visual disturbance  Respiratory: Negative for cough and shortness of breath  Cardiovascular: Negative for chest pain and palpitations  Gastrointestinal: Positive for vomiting  Negative for diarrhea  Endocrine: Negative for polydipsia  Genitourinary: Positive for vaginal bleeding  Negative for dysuria  Musculoskeletal: Negative for back pain and joint swelling  Skin: Negative for rash and wound  Allergic/Immunologic: Negative for immunocompromised state  Neurological: Negative for seizures and headaches  Hematological: Does not bruise/bleed easily  Psychiatric/Behavioral: Negative for hallucinations and suicidal ideas  Physical Exam  Constitutional:       General: She is not in acute distress  Appearance: Normal appearance  She is obese  She is not ill-appearing, toxic-appearing or diaphoretic  HENT:      Head: Normocephalic and atraumatic  Nose: No congestion or rhinorrhea  Eyes:      General: No scleral icterus  Right eye: No discharge  Left eye: No discharge  Extraocular Movements: Extraocular movements intact  Conjunctiva/sclera: Conjunctivae normal    Pulmonary:      Effort: Pulmonary effort is normal  No respiratory distress  Musculoskeletal:      Cervical back: Normal range of motion  Skin:     Coloration: Skin is not jaundiced or pale  Findings: No erythema, lesion or rash  Neurological:      General: No focal deficit present  Mental Status: She is alert and oriented to person, place, and time     Psychiatric:         Mood and Affect: Mood normal          Behavior: Behavior normal  " Please don't hesitate to contact our office with any concerns or questions    Scarlet Chopra MD

## 2023-04-03 ENCOUNTER — OFFICE VISIT (OUTPATIENT)
Dept: PHYSICAL THERAPY | Facility: CLINIC | Age: 33
End: 2023-04-03

## 2023-04-03 DIAGNOSIS — M79.672 LEFT FOOT PAIN: ICD-10-CM

## 2023-04-03 DIAGNOSIS — M72.2 PLANTAR FASCIITIS: Primary | ICD-10-CM

## 2023-04-03 NOTE — PROGRESS NOTES
"Daily Note     Today's date: 4/3/2023  Patient name: Freya Gómez  : 1990  MRN: [id number]  Referring provider: Maggie Reddy DPM  Dx:   Encounter Diagnosis     ICD-10-CM    1  Plantar fasciitis  M72 2       2  Left foot pain  M79 672           Start Time: 1110  Stop Time: 1150  Total time in clinic (min): 40 minutes    Subjective: Patient reported some pain in mid left plantar fascia and some spotting      Objective: See treatment diary below      Assessment: Tolerated treatment well  Patient would benefit from continued PT in order to build interossi  Patient compliant with HEP  Reviewed sciatic nerve glides to ensure proper execution  Did well with additional interossi strengthening exercises  Plan: Continue per plan of care  **Pregnant**    Insurance:  [place]/CMS Eval/ Re-eval POC expires [place] #/ Referral # Total units  Start date  Expiration date Extension  Visit limitation? PT only or  PT+OT?  Co-Insurance   MCR: CMS 3/22/23 5/31/21  -- -- -- -- -- -- --                                                                Date 3/22/23 3/24/23 3/28/23 3/31/23 4/3/23    Visit Number IE 2 3 4 5    Manual         MET for anterior rotation of left innominate 5\" x5, 2 rounds L LE LAD, intermittent 5 min -- Gr V anterior innominate thrust, left with patient permission graston to plantar fascia    STM to left gastroc complex and ach    Pubic shotgun 5\" x5        Mobs to rear and mid foot   TC AP/PA gr II-III, 3x30    Subtalar inv/ev, gr II-III, 3x30    Mid-foot AP/PA, II-III, 3x30 TC AP/PA gr II-III, 3x30    Subtalar inv/ev, gr II-III, 3x30    Mid-foot AP/PA, II-III, 3x30 TC AP/PA gr II-III, 3x30    Subtalar inv/ev, gr II-III, 3x30    Mid-foot AP/PA, II-III, 3x30             Neuro Re-Ed         Bridges Rev, HEP 3x10 Regular x10    Staggered feet x15 each Regular x10    Staggered feet x15 each Regular x10    Staggered feet x15 each    Clamshells         Ankle alphabet         Ad sq  5\" x30 5\" x30 " "5\" x 30 5\" x 30    Hip abd iso  With belt, 5\" x30 With belt, 5\" x30      Sciatic nerve glides  Seated, 3x10 on left Seated, 2x10 on left    Manual in supine, 3x10 Seated, 2x10 on left   REV    Foot intrinsic strengthening   Towel scrunches, 3x10 Towel scrunches, 3x10 Towel scrunchies 3*10    Barefoot arch lifts 2*10    Barefoot single toe touch down from inside to outside, outside to inside 1*10 ea    SLS   5-10\" x10                        TherEx         Innominate MET Rev, HEP D/C       SLR         HR  With ball distal to malleoli, 3x10 With ball distal to malleoli, 3x10  With ball distal to malleoli 3*10    Gastroc stretch  Prostretch, 15\" x5 Prostretch, 15\" x5  prostrertch 15\" x 5                               TherAct         Patient education PT POC, HEP, pt edu 10 min                                            Gait Training                                    Modalities         CP               Precautions:   Past Medical History:   Diagnosis Date   • Autism    • [place] esophagus    • CPAP (continuous positive airway pressure) dependence    • 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HCC)     not diagnosed as of 1/9/23-trying to R/O   • Depression    • Diabetes mellitus ([facility] [address] )    • Disease of thyroid gland     hypo   • Dysphagia     solids and liquids   • Female infertility    • Fibromyalgia, primary    • Gastric ulcer    • History of bronchitis    • Iron deficiency anemia     infusion in 11/2022   • Irregular menses    • Miscarriage    • Morbid obesity with BMI of 50 0-59 9, adult ([facility])    • Plantar fasciitis of left foot    • Reflux esophagitis    • Seizures ([facility] [address] )     last one 7/13/22   • Sleep apnea     CPAP   • Wears glasses                      "

## 2023-04-04 ENCOUNTER — OFFICE VISIT (OUTPATIENT)
Dept: URGENT CARE | Facility: CLINIC | Age: 33
End: 2023-04-04

## 2023-04-04 ENCOUNTER — APPOINTMENT (OUTPATIENT)
Dept: RADIOLOGY | Facility: CLINIC | Age: 33
End: 2023-04-04

## 2023-04-04 ENCOUNTER — OFFICE VISIT (OUTPATIENT)
Dept: PHYSICAL THERAPY | Facility: CLINIC | Age: 33
End: 2023-04-04

## 2023-04-04 VITALS
BODY MASS INDEX: 51.61 KG/M2 | OXYGEN SATURATION: 100 % | HEART RATE: 79 BPM | TEMPERATURE: 99.9 F | HEIGHT: 55 IN | DIASTOLIC BLOOD PRESSURE: 65 MMHG | SYSTOLIC BLOOD PRESSURE: 109 MMHG | RESPIRATION RATE: 18 BRPM | WEIGHT: 223 LBS

## 2023-04-04 DIAGNOSIS — S99.912A INJURY OF LEFT ANKLE, INITIAL ENCOUNTER: Primary | ICD-10-CM

## 2023-04-04 DIAGNOSIS — M72.2 PLANTAR FASCIITIS: Primary | ICD-10-CM

## 2023-04-04 DIAGNOSIS — M79.672 LEFT FOOT PAIN: ICD-10-CM

## 2023-04-04 DIAGNOSIS — S99.912A INJURY OF LEFT ANKLE, INITIAL ENCOUNTER: ICD-10-CM

## 2023-04-04 NOTE — PROGRESS NOTES
[facility] [facility]        NAME: Luz Soriano is a 29 y o  female  : 1990    MRN: [id number]  DATE: 2023  TIME: 10:02 AM    Assessment and Plan   Injury of left ankle, initial encounter [S99 912A]  1  Injury of left ankle, initial encounter  XR ankle 3+ vw left            Patient Instructions   Patient Instructions   Xray appears negative for any fracture  Will follow up with radiologist report when available  Recommend elevating body part, icing the area every 2 hours for 20-30 minutes, take Ibuprofen every 6-8 hours to reduce inflammation  If not improving over the next week, follow up with PCP or orthopedics  Follow up with PCP in 3-5 days  Proceed to  ER if symptoms worsen  Chief Complaint     Chief Complaint   Patient presents with   • Ankle Injury     Pt states she took her dog out this morning and tripped down a few stairs, rolling Lt ankle  Minor swelling, no bruising  Iced for pain  History of Present Illness       The patient is a 62-year-old female presenting today for an ankle injury  She was taking her dog out this morning and tripped down a few stairs, rolling her left ankle  She has minor swelling but no ecchymosis  So far has iced for pain  2/10 pain  Review of Systems   Review of Systems   Constitutional: Negative for fatigue and fever  Cardiovascular: Negative for chest pain and palpitations  Gastrointestinal: Negative for vomiting  Musculoskeletal: Positive for arthralgias and joint swelling  Negative for myalgias  Skin: Negative for color change and pallor  Neurological: Negative for headaches           Current Medications       Current Outpatient Medications:   •  acetaminophen (TYLENOL) 500 mg tablet, Take 1 tablet (500 mg total) by mouth every 6 (six) hours as needed for mild pain, Disp: , Rfl: 0  •  albuterol (Proventil HFA) 90 mcg/act inhaler, Inhale 2 puffs every 6 (six) hours as needed for wheezing, Disp: 18 g, Rfl: 3  • aspirin (ECOTRIN LOW STRENGTH) 81 mg EC tablet, Take 2 tablets (162 mg total) by mouth daily Stop at 36 weeks  , Disp: 180 tablet, Rfl: 0  •  docusate sodium (COLACE) 100 mg capsule, Take 1 capsule (100 mg total) by mouth 2 (two) times a day, Disp: 60 capsule, Rfl: 5  •  famotidine (PEPCID) 40 MG tablet, Take 1 tablet (40 mg total) by mouth daily at bedtime, Disp: 30 tablet, Rfl: 5  •  ferrous sulfate 324 (65 Fe) mg, TAKE ONE TABLET BY MOUTH TWICE A DAY BEFORE MEALS (Patient taking differently: Take 324 mg by mouth daily before breakfast), Disp: 60 tablet, Rfl: 2  •  folic acid (FOLVITE) 1 mg tablet, Take 1 tablet (1 mg total) by mouth daily, Disp: 90 tablet, Rfl: 1  •  levothyroxine 25 mcg tablet, TAKE ONE TABLET BY MOUTH EVERY MORNING, Disp: 30 tablet, Rfl: 2  •  Prenatal Vit-Iron Carbonyl-FA (prenatal multivitamin) TABS, Take 1 tablet by mouth daily, Disp: , Rfl:   •  promethazine (PHENERGAN) 12 5 MG tablet, Take 1 tablet (12 5 mg total) by mouth every 6 (six) hours as needed for nausea or vomiting, Disp: 30 tablet, Rfl: 0  •  sucralfate (CARAFATE) 1 g/10 mL suspension, Take 10 mL (1 g total) by mouth 4 (four) times a day, Disp: 420 mL, Rfl: 0  •  citalopram (CeleXA) 10 mg tablet, Take 10 mg by mouth every morning  (Patient not taking: Reported on 3/30/2023), Disp: , Rfl:   •  Doxylamine-Pyridoxine 10-10 MG TBEC, Take 1 tablet by mouth 4 (four) times a day as needed (nausea, vomiting, morning sickness) (Patient not taking: Reported on 3/30/2023), Disp: 30 tablet, Rfl: 1  •  ergocalciferol (VITAMIN D2) 50,000 units, Take 1 capsule (50,000 Units total) by mouth once a week (Patient not taking: Reported on 4/4/2023), Disp: 8 capsule, Rfl: 0  •  lamoTRIgine (LaMICtal) 100 mg tablet, 1 tab twice daily x 1 wk, then 1 5 tabs twice daily x 1 wk, then 2 tabs twice daily x 1 week, then take 2 5 tabs (250 mg) twice daily  Do not start before March 7, 2023   (Patient not taking: Reported on 3/30/2023), Disp: 150 tablet, Rfl: 5  •  lamoTRIgine (LaMICtal) 25 mg tablet, 1 tab nightly x 1 wk, then 1 tab twice daily x 1 wk, then 2 tabs twice daily x 1 wk, then 3 tabs twice daily x 1 week, then begin 100 mg pills (Patient not taking: Reported on 3/30/2023), Disp: 100 tablet, Rfl: 0  •  levETIRAcetam (KEPPRA) 750 mg tablet, Take 2 tablets (1,500 mg total) by mouth every 12 (twelve) hours (Patient not taking: Reported on 3/30/2023), Disp: 120 tablet, Rfl: 5  •  ondansetron (ZOFRAN) 4 mg tablet, Take 1 tablet (4 mg total) by mouth every 8 (eight) hours as needed for nausea or vomiting (Patient not taking: Reported on 4/4/2023), Disp: 20 tablet, Rfl: 0  •  ondansetron (ZOFRAN-ODT) 4 mg disintegrating tablet, Take 1 tablet (4 mg total) by mouth every 8 (eight) hours as needed for nausea or vomiting for up to 7 days, Disp: 20 tablet, Rfl: 0    Current Allergies     Allergies as of 04/04/2023 - Reviewed 04/04/2023   Allergen Reaction Noted   • Haloperidol Other (See Comments) 02/04/2021   • Penicillins Hives 05/22/2020   • Pollen extract Allergic Rhinitis 05/28/2021            The following portions of the patient's history were reviewed and updated as appropriate: allergies, current medications, past family history, past medical history, past social history, past surgical history and problem list      Past Medical History:   Diagnosis Date   • Autism    • [place] esophagus    • CPAP (continuous positive airway pressure) dependence    • 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[facility] [address] )     not diagnosed as of 1/9/23-trying to R/O   • Depression    • Diabetes mellitus ([facility] [address] )    • Disease of thyroid gland     hypo   • Dysphagia     solids and liquids   • Female infertility    • Fibromyalgia, primary    • Gastric ulcer    • History of bronchitis    • Iron deficiency anemia     infusion in 11/2022   • Irregular menses    • Miscarriage    • Morbid obesity with BMI of 50 0-59 9, adult ([facility] [address] )    • Plantar fasciitis of left foot    • Reflux esophagitis    • Seizures ([facility] [address] )     last one "7/13/22   • Sleep apnea     CPAP   • Wears glasses        Past Surgical History:   Procedure Laterality Date   • EGD      with Bx due to barretts esophagus   • EYE SURGERY Bilateral     lazy eye repair   • VT BIOPSY OF LIP N/A 11/10/2022    Procedure: EXCISION BIOPSY SALVARY GLANDS LOWER LIP;  Surgeon: Jesus Johnson MD;  Location: [facility];  Service: ENT   • VT HYSTEROSCOPY BX ENDOMETRIUM&/POLYPC W/WO D&C N/A 9/22/2021    Procedure: DILATATION AND CURETTAGE (D&C) WITH HYSTEROSCOPY;  Surgeon: Tisha Celeste MD;  Location: [facility];  Service: Gynecology   • WISDOM TOOTH EXTRACTION         Family History   Problem Relation Age of Onset   • Bipolar disorder Mother    • Depression Mother    • Depression Father    • Diabetes Maternal Grandmother    • Thyroid disease Maternal Grandmother    • Hypertension Maternal Grandmother    • Heart disease Maternal Grandmother    • Diabetes Maternal Grandfather    • Diabetes Paternal Grandmother    • Breast cancer Paternal Grandmother    • Stroke Paternal Grandmother    • Diabetes Paternal Grandfather    • Breast cancer Maternal Aunt 35        bilateral   • Stomach cancer Maternal Aunt          Medications have been verified  Objective   /65   Pulse 79   Temp 99 9 °F (37 7 °C) (Temporal)   Resp 18   Ht 4' 7\" (1 397 m)   Wt 101 kg (223 lb)   LMP 12/24/2022 (Exact Date)   SpO2 100%   BMI 51 83 kg/m²        Physical Exam     Physical Exam  Vitals and nursing note reviewed  Constitutional:       General: She is not in acute distress  Appearance: Normal appearance  She is normal weight  She is not ill-appearing, toxic-appearing or diaphoretic  Cardiovascular:      Rate and Rhythm: Normal rate and regular rhythm  Pulmonary:      Effort: Pulmonary effort is normal       Breath sounds: Normal breath sounds  Musculoskeletal:         General: Swelling and tenderness (lateral mal ) present  Normal range of motion  Skin:     General: Skin is warm        " Capillary Refill: Capillary refill takes less than 2 seconds  Neurological:      Mental Status: She is alert

## 2023-04-04 NOTE — PROGRESS NOTES
"Daily Note     Today's date: 2023  Patient name: Maryrose Romberg  : 1990  MRN: [id number]  Referring provider: Melania Coker DPM  Dx:   Encounter Diagnosis     ICD-10-CM    1  Plantar fasciitis  M72 2       2  Left foot pain  M79 672           Start Time: 0800  Stop Time: 0815  Total time in clinic (min): 15 minutes    Subjective: Patient states that she tripped over her dog this morning, rolled her ankle, and fell down a few stairs  She states that she was able to ambulate immediately post, but has been experiencing an increase in pain about her ankle since  Objective: See treatment diary below  [place] Ankle Rules:  - Able to ambulate immediately after injury? YES  - TTP navicular: NEGATIVE  - TTP 5th met: POSITIVE  - TTP medial malleolus: NEGATIVE  - TTP lateral malleolus: POSITIVE    (+) anterior drawer  (+) talar tilt  (+) tuning fork over lateral malleolus  (-) bruising, swelling      Assessment: Due to positive [place] ankle rules (tenderness over lateral malleolus, 5th met) and positive diagnostic testing, patient was referred to Urgent Care for additional imaging of her right ankle  Patient encouraged to limit weightbearing as a result and ice as needed until she is evaluated  Patient verbalized good understanding/agreement with aforementioned education  Plan: Continue per plan of care  Follow up after MD clearance for fx  **Pregnant**    Insurance:  A/CMS Eval/ Re-eval POC expires [facility] #/ Referral # Total units  Start date  Expiration date Extension  Visit limitation? PT only or  PT+OT?  Co-Insurance   MCR: CMS 3/22/23 5/31/21  -- -- -- -- -- -- --                                                                Date 3/22/23 3/24/23 3/28/23 3/31/23 4/3/23 4/4/23   Visit Number IE 2 3 4 5 6   Manual         MET for anterior rotation of left innominate 5\" x5, 2 rounds L LE LAD, intermittent 5 min -- Gr V anterior innominate thrust, left with patient permission nighat to " "plantar fascia    STM to left gastroc complex and ach    Pubic shotgun 5\" x5        Mobs to rear and mid foot   TC AP/PA gr II-III, 3x30    Subtalar inv/ev, gr II-III, 3x30    Mid-foot AP/PA, II-III, 3x30 TC AP/PA gr II-III, 3x30    Subtalar inv/ev, gr II-III, 3x30    Mid-foot AP/PA, II-III, 3x30 TC AP/PA gr II-III, 3x30    Subtalar inv/ev, gr II-III, 3x30    Mid-foot AP/PA, II-III, 3x30             Neuro Re-Ed         Bridges Rev, HEP 3x10 Regular x10    Staggered feet x15 each Regular x10    Staggered feet x15 each Regular x10    Staggered feet x15 each    Clamshells         Ankle alphabet         Ad sq  5\" x30 5\" x30 5\" x 30 5\" x 30    Hip abd iso  With belt, 5\" x30 With belt, 5\" x30      Sciatic nerve glides  Seated, 3x10 on left Seated, 2x10 on left    Manual in supine, 3x10 Seated, 2x10 on left   REV    Foot intrinsic strengthening   Towel scrunches, 3x10 Towel scrunches, 3x10 Towel scrunchies 3*10    Barefoot arch lifts 2*10    Barefoot single toe touch down from inside to outside, outside to inside 1*10 ea    SLS   5-10\" x10                        TherEx         Innominate MET Rev, HEP D/C       SLR         HR  With ball distal to malleoli, 3x10 With ball distal to malleoli, 3x10  With ball distal to malleoli 3*10    Gastroc stretch  Prostretch, 15\" x5 Prostretch, 15\" x5  prostrertch 15\" x 5                               TherAct         Patient education PT POC, HEP, pt edu 10 min     R ankle exam, [place] ankle rules 15 min                                       Gait Training                                    Modalities         CP               Precautions:   Past Medical History:   Diagnosis Date   • Autism    • [place] esophagus    • CPAP (continuous positive airway pressure) dependence    • 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HCC)     not diagnosed as of 1/9/23-trying to R/O   • Depression    • Diabetes mellitus ([facility] [address] )    • Disease of thyroid gland     hypo   • Dysphagia     solids and liquids   • Female infertility    • " Fibromyalgia, primary    • Gastric ulcer    • History of bronchitis    • Iron deficiency anemia     infusion in 11/2022   • Irregular menses    • Miscarriage    • Morbid obesity with BMI of 50 0-59 9, adult [facility])    • Plantar fasciitis of left foot    • Reflux esophagitis    • Seizures ([facility] [address] )     last one 7/13/22   • Sleep apnea     CPAP   • Wears glasses

## 2023-04-06 ENCOUNTER — INITIAL PRENATAL (OUTPATIENT)
Dept: OBGYN CLINIC | Facility: CLINIC | Age: 33
End: 2023-04-06

## 2023-04-06 ENCOUNTER — TELEPHONE (OUTPATIENT)
Dept: GENETICS | Facility: CLINIC | Age: 33
End: 2023-04-06

## 2023-04-06 VITALS — DIASTOLIC BLOOD PRESSURE: 74 MMHG | WEIGHT: 227.6 LBS | SYSTOLIC BLOOD PRESSURE: 100 MMHG | BODY MASS INDEX: 52.9 KG/M2

## 2023-04-06 DIAGNOSIS — Z12.4 ENCOUNTER FOR PAP SMEAR OF CERVIX WITH HPV DNA COTESTING: ICD-10-CM

## 2023-04-06 DIAGNOSIS — O21.9 NAUSEA AND VOMITING OF PREGNANCY, ANTEPARTUM: ICD-10-CM

## 2023-04-06 DIAGNOSIS — Z3A.13 13 WEEKS GESTATION OF PREGNANCY: ICD-10-CM

## 2023-04-06 DIAGNOSIS — Z34.91 FIRST TRIMESTER PREGNANCY: Primary | ICD-10-CM

## 2023-04-06 RX ORDER — METOCLOPRAMIDE 10 MG/1
10 TABLET ORAL 4 TIMES DAILY PRN
Qty: 60 TABLET | Refills: 1 | Status: SHIPPED | OUTPATIENT
Start: 2023-04-06

## 2023-04-06 NOTE — PROGRESS NOTES
Patient reports no fm, no bleeding, loss of fluid, edema, dom violence, or smoking  shoaib pnv urine trace protein negative glucose, patient has had severe nausea vomiting could not go to ER due to work responsibilities but has been having some blood streaking in her vomiting and unable to keep any significant food or fluid down  Reviewed with patient small frequent meals trying either stomach settle or preggie pops or ice pops to settle down stomach, discussed reflux medication that is safe in pregnancy patient has tried Zofran and Compazine without much success will give Reglan to try  Patient desired to discuss what to expect with possible  as this was mentioned by Dr Sandra Taveras in evaluating her body habitus possible skeletal dysplasia  We reviewed her questions encouraged her to call if her nausea vomiting is not improving  I will contact Dr Sandra Taveras regarding what testing was recommended regarding skeletal dysplasia  Patient also has significant fatigue and is sometimes not taking her seizure medications due to the significant fatigue that makes her tired all day long  Encouraged her to contact her neurology physician to adjust regimen if possible  OB part 2 done today with physical exam and Pap smear culture done previously return in 2 to 4 weeks or sooner as needed    All questions answered

## 2023-04-06 NOTE — TELEPHONE ENCOUNTER
Post-Test Genetic Counseling Consult Note    Today I spoke with Lamberto Aliceabury over the phone to review the results of her genetic test for hereditary cancer  Lamberto Jenkins met previously with Aaron Hyatt on 2/16/23 for pre-test counseling  A copy of this consult note and genetic test result will be shared with the patient  SUMMARY:    Test(s): [employer] Cancer Panel + RNA (47 genes): APC, TAWANNA, AXIN2, BARD1, BMPR1A, BRCA1, BRCA2, BRIP1, CDH1, CDK4, CDKN2A, CHEK2, CTNNA1, DICER1, EPCAM, GREM1, HOXB13, KIT, MEN1, MLH1, MSH2, MSH3, MSH6, MUTYH, NBN, NF1, NTHL1, PALB2, PDGFRA, PMS2, POLD1, POLE, PTEN, RAD50, RAD51C, RAD51D, SDHA, SDHB, SDHC, SDHD, SMAD4, SMARCA4, STK11, TP53, TSC1, TSC2, VHL     Result: 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); heterozygous; uncertain significance     Assessment:  A variant of uncertain significance (VUS) means that a change was identified in a specific gene but it cannot be determined whether the variant is associated with an increased risk of cancer or is a harmless genetic change  The significance of the STK11 variant is currently not known and therefore this test result cannot be used to help determine Margoth cancer risks  It is possible that the variant was seen in only a handful of individuals, or there may be conflicting or incomplete information in the medical literature about the variant and its association with hereditary cancer  We discussed that Margoth's STK11 variant is not classified as pathogenic at this time  Pathogenic variants in the STK11 gene cause a condition called Peutz-Jegher Syndrome  Individuals with Peutz-Jegher syndrome have an increased lifetime risk for breast cancer, colon cancer, and gynecologic cancers  We also discussed that individuals with Peutz-Jegher syndrome have characteristic hamartomatous GI polyps  Polyps can cause significant complications such as GI bleeding with secondary anemia    Individuals may also have mucocutaneous pigmentation of the lips, nose, fingers, and toes  [facility] mentioned that her great-aunt on her grandmother's side aunt have had oval-shaped brown/blue moles and freckles  [facility] also endorsed black/blue dark spots on her tongue  She reports a history of anemia but there is no report of polyps on her most recent endoscopy  Patient reports a few polyps were identified on her most recent colonoscopy at [address] [place] [address] in [place]  Once those records become available we will upload them to her chart  Risks and Testing for Family Members:  Genetic testing for this variant is not recommended for relatives who wish to determine their cancer risks for purposes of determining medical management  The presence or absence of this variant in a relative is not clinically meaningful unless the variant is reclassified in the future  The laboratory will continue to accumulate information on this variant and will reclassify it as either a positive or negative genetic test result when they are confident that they have adequate information  As updated information is obtained, we will notify [facility] the updated information  It is important to note that the majority of variants of uncertain significance are reclassified as likely benign or benign as additional information about the variant becomes available  Despite this result, Margoth's first-degree relatives may be at increased risk for the cancers based on the family history  We recommend they discuss screening and management recommendations with their healthcare providers  If [facility] has any affected family members with a cancer diagnosis, especially at a young age, they may still consider genetic testing  Relatives who wish to pursue genetic testing can reach out to the [facility] [id number] to schedule an appointment or visit www [facility] org to identify a local genetic counselor  Risk Based on Family History:   The significance of this variant is currently not known and therefore this test result cannot be used to help determine Margoth cancer risks  Rather her personal medical history and family history of cancer are the most important factors used to estimate her risk for developing certain cancers and to direct her medical management  Margoth's risk of breast cancer may still be increased based on her personal risk factors and family history  Despite a negative test result, we are able to run risk models to better estimate Margoth's lifetime risk of developing breast cancer  I ran three risk models based on the information Chasity Morales provided during our pre-test counseling session:    Levoner-Josemanuel model: Estimated lifetime risk, to age 80, for breast cancer is 17 2% compared to approximately 11 1% general population risk     Kaden tables: Estimated lifetime risk, to age 78, for breast cancer is 9 8%    Although these risk models do not predict a significantly increased lifetime risk, we cannot eliminate the possibility of an increased risk for breast cancer for Margoth given her family history  I also reminded Chasity Morales that there is still a background risk for any woman to develop breast cancer over her lifetime (12-13%), regardless of family history  Additional Information:  A healthy lifestyle will improve overall health and reduce risk for illness  Eating a healthy diet and exercising for 4 hours per week is recommended  Both diet and exercise have been shown to help maintain a healthy weight  Postmenopausal women who are overweight are at higher risk for breast cancer  Moderate to heavy alcohol use can increase the risk for some cancers  Smoking cigarettes can also increase risk for breast, lung, prostate, pancreatic and other cancers  Plan:   There are no additional recommendations based on Margoth's result   she should continue cancer screening and medical management as clinically indicated and as determined appropriate by her healthcare providers  VUS Result: Amaury Duran was strongly encouraged to contact us regarding any changes in her personal or family history of cancer as these changes could alter our recommendation regarding genetic testing and/or cancer screening  Amaury Duran was also encouraged to follow up with us on an annual basis as variant classifications are subject to change

## 2023-04-21 ENCOUNTER — OFFICE VISIT (OUTPATIENT)
Dept: FAMILY MEDICINE CLINIC | Facility: CLINIC | Age: 33
End: 2023-04-21

## 2023-04-21 VITALS
OXYGEN SATURATION: 100 % | HEART RATE: 116 BPM | WEIGHT: 227.1 LBS | TEMPERATURE: 98.4 F | BODY MASS INDEX: 52.56 KG/M2 | HEIGHT: 55 IN | SYSTOLIC BLOOD PRESSURE: 110 MMHG | DIASTOLIC BLOOD PRESSURE: 56 MMHG | RESPIRATION RATE: 16 BRPM

## 2023-04-21 DIAGNOSIS — J06.9 UPPER RESPIRATORY TRACT INFECTION, UNSPECIFIED TYPE: Primary | ICD-10-CM

## 2023-04-21 RX ORDER — LANOLIN 100 %
OINTMENT (GRAM) TOPICAL AS NEEDED
Qty: 454 G | Refills: 2 | Status: CANCELLED | OUTPATIENT
Start: 2023-04-21

## 2023-04-21 RX ORDER — DOCUSATE SODIUM 100 MG/1
100 CAPSULE, LIQUID FILLED ORAL 2 TIMES DAILY
Qty: 30 CAPSULE | Refills: 2 | Status: CANCELLED | OUTPATIENT
Start: 2023-04-21

## 2023-04-21 RX ORDER — DIAPER,BRIEF,INFANT-TODD,DISP
EACH MISCELLANEOUS 4 TIMES DAILY PRN
Qty: 30 G | Refills: 0 | Status: CANCELLED | OUTPATIENT
Start: 2023-04-21

## 2023-04-21 RX ORDER — FLUTICASONE PROPIONATE 50 MCG
1 SPRAY, SUSPENSION (ML) NASAL DAILY
Qty: 9.9 ML | Refills: 0 | Status: SHIPPED | OUTPATIENT
Start: 2023-04-21

## 2023-04-21 NOTE — PROGRESS NOTES
Name: Marla Libman      : 1990      MRN: [id number]  Encounter Provider: Gilford Pikes, DO  Encounter Date: 2023   Encounter department: [place] [address]  Upper respiratory tract infection, unspecified type   -Pressure pain started yesterday, Nasal discharge, Top of head HA, Fever - 100 2F, Small sore throat, eating and drinking okay but Food taste funny since becoming pregnant (Due Oct 4th)  - Nephew sick 102F strep negative  - Exam shows tenderness over sinuses, and uncomfortable but nontoxic patient  - Given pregnancy, through shared decision making the choice is made for conservative management at this time but patient is told that if symptoms worsen or do not improve she may need antibiotics (although not PCNs due to allergy and caution with others due to pregnancy)  -     fluticasone (FLONASE) 50 mcg/act nasal spray; 1 spray into each nostril daily           Subjective      29 yr old pregnant female presenting with a pressure pain started yesterday, Nasal discharge, Top of head HA, Fever - 100 2F, Small sore throat, eating and drinking okay but Food taste funny since becoming pregnant (Due Oct 4th)  Her nephew has been sick with a  102F temperature and has been tested strep negative  Review of Systems   Constitutional: Positive for fatigue and fever  Negative for chills  HENT: Positive for congestion, rhinorrhea and sore throat  Negative for ear pain, hearing loss and trouble swallowing  Eyes: Negative for pain and visual disturbance  Respiratory: Negative for cough and shortness of breath  Cardiovascular: Negative for chest pain  Gastrointestinal: Negative for constipation, diarrhea, nausea and vomiting  Endocrine: Negative for polyuria  Genitourinary: Negative for difficulty urinating and dysuria  Musculoskeletal: Negative for arthralgias and myalgias  Neurological: Positive for headaches   Negative for dizziness and light-headedness  Current Outpatient Medications on File Prior to Visit   Medication Sig   • acetaminophen (TYLENOL) 500 mg tablet Take 1 tablet (500 mg total) by mouth every 6 (six) hours as needed for mild pain   • albuterol (Proventil HFA) 90 mcg/act inhaler Inhale 2 puffs every 6 (six) hours as needed for wheezing   • aspirin (ECOTRIN LOW STRENGTH) 81 mg EC tablet Take 2 tablets (162 mg total) by mouth daily Stop at 36 weeks     • docusate sodium (COLACE) 100 mg capsule Take 1 capsule (100 mg total) by mouth 2 (two) times a day   • famotidine (PEPCID) 40 MG tablet Take 1 tablet (40 mg total) by mouth daily at bedtime   • folic acid (FOLVITE) 1 mg tablet Take 1 tablet (1 mg total) by mouth daily   • levETIRAcetam (KEPPRA) 750 mg tablet Take 1 tablet (750 mg total) by mouth 2 (two) times a day   • levothyroxine 25 mcg tablet TAKE ONE TABLET BY MOUTH EVERY MORNING   • metoclopramide (Reglan) 10 mg tablet Take 1 tablet (10 mg total) by mouth 4 (four) times a day as needed (nausea/vomitting)   • metroNIDAZOLE (FLAGYL) 500 mg tablet Take 1 tablet (500 mg total) by mouth every 12 (twelve) hours for 7 days   • ondansetron (ZOFRAN) 4 mg tablet Take 1 tablet (4 mg total) by mouth every 8 (eight) hours as needed for nausea or vomiting   • Prenatal Vit-Iron Carbonyl-FA (prenatal multivitamin) TABS Take 1 tablet by mouth daily   • Pyridoxine HCl (vitamin B-6) 50 MG TABS Take 0 5 tablets (25 mg total) by mouth daily at bedtime   • doxylamine (UNISOM) 25 MG tablet Take 1 tablet (25 mg total) by mouth daily at bedtime as needed for sleep (Patient not taking: Reported on 4/21/2023)   • ergocalciferol (VITAMIN D2) 50,000 units Take 1 capsule (50,000 Units total) by mouth once a week (Patient not taking: Reported on 4/4/2023)   • ferrous sulfate 324 (65 Fe) mg TAKE ONE TABLET BY MOUTH TWICE A DAY BEFORE MEALS (Patient taking differently: Take 324 mg by mouth daily before breakfast)       Objective     /56 (BP Location: "Left arm, Patient Position: Sitting, Cuff Size: Large)   Pulse (!) 116   Temp 98 4 °F (36 9 °C)   Resp 16   Ht 4' 7\" (1 397 m)   Wt 103 kg (227 lb 1 6 oz)   LMP 12/24/2022 (Exact Date)   SpO2 100%   BMI 52 78 kg/m²     Physical Exam  Constitutional:       General: She is not in acute distress  Appearance: She is not toxic-appearing  HENT:      Head: Normocephalic and atraumatic  Right Ear: Tympanic membrane normal       Left Ear: Tympanic membrane normal       Nose: Congestion present  Right Sinus: Maxillary sinus tenderness and frontal sinus tenderness present  Mouth/Throat:      Mouth: Mucous membranes are moist       Pharynx: No oropharyngeal exudate  Eyes:      Pupils: Pupils are equal, round, and reactive to light  Cardiovascular:      Rate and Rhythm: Normal rate and regular rhythm  Pulmonary:      Effort: Pulmonary effort is normal    Abdominal:      Palpations: Abdomen is soft  Tenderness: There is no abdominal tenderness  Musculoskeletal:         General: No deformity  Cervical back: Normal range of motion  Right lower leg: No edema  Left lower leg: No edema  Lymphadenopathy:      Cervical: No cervical adenopathy  Skin:     General: Skin is warm and dry  Neurological:      Mental Status: She is alert and oriented to person, place, and time     Psychiatric:         Mood and Affect: Mood normal          Behavior: Behavior normal        Dicie Oppenheim, DO    "

## 2023-04-24 ENCOUNTER — OFFICE VISIT (OUTPATIENT)
Dept: FAMILY MEDICINE CLINIC | Facility: CLINIC | Age: 33
End: 2023-04-24

## 2023-04-24 VITALS
TEMPERATURE: 97.8 F | DIASTOLIC BLOOD PRESSURE: 54 MMHG | HEART RATE: 100 BPM | SYSTOLIC BLOOD PRESSURE: 102 MMHG | WEIGHT: 226.25 LBS | RESPIRATION RATE: 21 BRPM | OXYGEN SATURATION: 97 % | HEIGHT: 55 IN | BODY MASS INDEX: 52.36 KG/M2

## 2023-04-24 DIAGNOSIS — J06.9 UPPER RESPIRATORY TRACT INFECTION, UNSPECIFIED TYPE: ICD-10-CM

## 2023-04-24 DIAGNOSIS — N39.0 URINARY TRACT INFECTION WITHOUT HEMATURIA, SITE UNSPECIFIED: ICD-10-CM

## 2023-04-24 DIAGNOSIS — O99.210 OBESITY IN PREGNANCY: Primary | ICD-10-CM

## 2023-04-24 LAB
SL AMB  POCT GLUCOSE, UA: ABNORMAL
SL AMB LEUKOCYTE ESTERASE,UA: 500
SL AMB POCT BILIRUBIN,UA: ABNORMAL
SL AMB POCT BLOOD,UA: 250
SL AMB POCT CLARITY,UA: ABNORMAL
SL AMB POCT COLOR,UA: ABNORMAL
SL AMB POCT KETONES,UA: 15
SL AMB POCT NITRITE,UA: ABNORMAL
SL AMB POCT PH,UA: 5
SL AMB POCT SPECIFIC GRAVITY,UA: 1.02
SL AMB POCT URINE PROTEIN: ABNORMAL
SL AMB POCT UROBILINOGEN: 1

## 2023-04-24 RX ORDER — CEPHALEXIN 500 MG/1
500 CAPSULE ORAL EVERY 12 HOURS SCHEDULED
Qty: 14 CAPSULE | Refills: 0 | Status: SHIPPED | OUTPATIENT
Start: 2023-04-24 | End: 2023-05-01

## 2023-04-24 NOTE — PROGRESS NOTES
Name: Shilpi Liu      : 1990      MRN: [id number]  Encounter Provider: Geovanna Abbott DO  Encounter Date: 2023   Encounter department: [facility]     1  Obesity in pregnancy  2  Upper respiratory tract infection, unspecified type  3  Urinary tract infection without hematuria, site unspecified  -  Urinalysis and urine culture from earlier this month patient was told that she did not need treatment as she was asymptomatic     - She then received a phone call from her OB/GYN asking why she had not been treated and directing her to seek care  Continues to have no symptoms    - Her URI from last week continued to be present but improved with Flonase and humidifier in her BiPAP  -     POCT urine dip -  positive for blood and leukocytes  -     Urine culture        -  cephalexin (KEFLEX) 500 mg capsule; Take 1 capsule (500 mg total) by mouth every        12 (twelve) hours for 7 days    Penicillin allergic, Keflex selected patient informed of 1 in 10 chance of overlap in allergy  Given precautions and directed to seek treatment if allergic symptoms develop           Subjective      HPI   Urinalysis and urine culture from earlier this month patient was told that she did not need treatment as she was asymptomatic  She then received a phone call from her OB/GYN asking why she had not been treated and directing her to seek care  Continues to have no symptoms  Her URI from last week continued to be present but improved with Flonase and humidifier in her BiPAP  Review of Systems   Constitutional: Negative for chills, fatigue and fever  HENT: Positive for congestion  Negative for ear pain, hearing loss, rhinorrhea, sore throat and trouble swallowing  Eyes: Negative for pain and visual disturbance  Respiratory: Negative for cough and shortness of breath  Cardiovascular: Negative for chest pain     Gastrointestinal: Negative for constipation, diarrhea, nausea and vomiting  Endocrine: Negative for polyuria  Genitourinary: Negative for difficulty urinating, dysuria, frequency and urgency  Musculoskeletal: Negative for arthralgias and myalgias  Neurological: Negative for dizziness, light-headedness and headaches  Current Outpatient Medications on File Prior to Visit   Medication Sig   • acetaminophen (TYLENOL) 500 mg tablet Take 1 tablet (500 mg total) by mouth every 6 (six) hours as needed for mild pain   • albuterol (Proventil HFA) 90 mcg/act inhaler Inhale 2 puffs every 6 (six) hours as needed for wheezing   • aspirin (ECOTRIN LOW STRENGTH) 81 mg EC tablet Take 2 tablets (162 mg total) by mouth daily Stop at 36 weeks     • docusate sodium (COLACE) 100 mg capsule Take 1 capsule (100 mg total) by mouth 2 (two) times a day   • famotidine (PEPCID) 40 MG tablet Take 1 tablet (40 mg total) by mouth daily at bedtime   • ferrous sulfate 324 (65 Fe) mg TAKE ONE TABLET BY MOUTH TWICE A DAY BEFORE MEALS (Patient taking differently: Take 324 mg by mouth daily before breakfast)   • fluticasone (FLONASE) 50 mcg/act nasal spray 1 spray into each nostril daily   • folic acid (FOLVITE) 1 mg tablet Take 1 tablet (1 mg total) by mouth daily   • levETIRAcetam (KEPPRA) 750 mg tablet Take 1 tablet (750 mg total) by mouth 2 (two) times a day   • levothyroxine 25 mcg tablet TAKE ONE TABLET BY MOUTH EVERY MORNING   • metoclopramide (Reglan) 10 mg tablet Take 1 tablet (10 mg total) by mouth 4 (four) times a day as needed (nausea/vomitting)   • metroNIDAZOLE (FLAGYL) 500 mg tablet Take 1 tablet (500 mg total) by mouth every 12 (twelve) hours for 7 days   • ondansetron (ZOFRAN) 4 mg tablet Take 1 tablet (4 mg total) by mouth every 8 (eight) hours as needed for nausea or vomiting   • Prenatal Vit-Iron Carbonyl-FA (prenatal multivitamin) TABS Take 1 tablet by mouth daily   • Pyridoxine HCl (vitamin B-6) 50 MG TABS Take 0 5 tablets (25 mg total) by mouth daily at bedtime   • "doxylamine (UNISOM) 25 MG tablet Take 1 tablet (25 mg total) by mouth daily at bedtime as needed for sleep (Patient not taking: Reported on 4/21/2023)   • ergocalciferol (VITAMIN D2) 50,000 units Take 1 capsule (50,000 Units total) by mouth once a week (Patient not taking: Reported on 4/4/2023)       Objective     /54 (BP Location: Left arm, Patient Position: Sitting, Cuff Size: Large)   Pulse 100   Temp 97 8 °F (36 6 °C) (Temporal)   Resp 21   Ht 4' 7\" (1 397 m)   Wt 103 kg (226 lb 4 oz)   LMP 12/24/2022 (Exact Date)   SpO2 97%   BMI 52 59 kg/m²     Physical Exam  Constitutional:       General: She is not in acute distress  Appearance: She is not toxic-appearing  HENT:      Head: Normocephalic and atraumatic  Mouth/Throat:      Mouth: Mucous membranes are moist       Pharynx: No oropharyngeal exudate or posterior oropharyngeal erythema  Eyes:      Pupils: Pupils are equal, round, and reactive to light  Cardiovascular:      Rate and Rhythm: Normal rate and regular rhythm  Heart sounds: Normal heart sounds  Pulmonary:      Effort: Pulmonary effort is normal       Breath sounds: Normal breath sounds  Abdominal:      Palpations: Abdomen is soft  Tenderness: There is no abdominal tenderness  Musculoskeletal:         General: No deformity  Cervical back: Normal range of motion  Right lower leg: No edema  Left lower leg: No edema  Skin:     General: Skin is warm and dry  Neurological:      Mental Status: She is alert and oriented to person, place, and time     Psychiatric:         Mood and Affect: Mood normal          Behavior: Behavior normal        Myra Ramal, DO  "

## 2023-04-25 ENCOUNTER — OFFICE VISIT (OUTPATIENT)
Dept: PHYSICAL THERAPY | Facility: CLINIC | Age: 33
End: 2023-04-25

## 2023-04-25 DIAGNOSIS — M72.2 PLANTAR FASCIITIS: Primary | ICD-10-CM

## 2023-04-25 DIAGNOSIS — M79.672 LEFT FOOT PAIN: ICD-10-CM

## 2023-04-25 LAB
BACTERIA UR CULT: NORMAL
Lab: NO GROWTH

## 2023-04-25 NOTE — PROGRESS NOTES
Re-Evaluation     Today's date: 2023  Patient name: Karishma Nagel  : 1990  MRN: [id number]  Referring provider: Emy Allen DPM  Dx:   Encounter Diagnosis     ICD-10-CM    1  Plantar fasciitis  M72 2       2  Left foot pain  M79 672           Start Time: 1415  Stop Time: 1445  Total time in clinic (min): 30 minutes    Subjective: Patient denies pain into foot since previous session  States her walking tolerance has improved significantly, only experiencing pulling into foot with walking for a few hours  States that she has ordered heel cups, which she is expecting to be delivered soon  States she no longer experiences pain first thing in morning      Goals  Short Term Goals (5 weeks):  - ALL MET  - Patient will be independent in basic HEP 2-3 weeks  - Patient will report >50% reduction in pain  - Patient will demonstrate >1/3 improvement in MMT grade as applicable  - Patient will demo full and pain free ROM about left ankle    Long Term Goals (10 weeks):  - Patient will be independent in a comprehensive home exercise program - PROGRESSED  - Patient FOTO score will improve by >10 points - PROGRESSED  - Patient will self-report >75% improvement in function - MET  - Patient will demo equal gait sequencing bilaterally - MET    Objective: See treatment diary below    LE MMT  LEFT    RIGHT  -Ankle DF  3/5 (23: 4/5)  4+/5  -Ankle PF  3/5 (23: 4/5)  4+/5  -Ankle Inversion 4/5 (23: 4/5)  4+/5  -Ankle Eversion 4/5 (23: 4/5)  4+/5    -Great Toe Extension 4-/5 (23: 4/5)  4+/5     Lumbar Spine Range of Motion  WFL and pain free in all planes    Ankle Range of Motion    LEFT     RIGHT  - DF  Min restriction (23: WFL)  WFL  - PF  WFL     WFL  - Inversion WFL     WFL  - Eversion WFL   W  FL    Palpation  LEFT  - Positive: third met space; mild tenderness over calcaneal tub  - Negative: medial tubercle, plantar fascia, met heads    (23: mild tenderness noted over calcaneal tubercle, all others negative for tenderness)    Diagnostic Tests Performed  Supine to long sit test: anterior rotation of left innominate  LEFT  - Positive: squeeze/click test left foot    (9/54/29: negative click test, negative supine to long sit)    Mobility Assessment  - Hypomobility noted into L TC joint, mid foot     (4/25/23: WFL)    Functional Assessment  - Gait Assessment: antalgic gait with increased medial heel whip on left compared to right   Patient with 80% reduction in pain into heel following MET for anterior rotation of left innominate    (4/25/23: equal step length and normalized gait speed, mild increased medial heel whip on left compared to right)      Assessment: Patient has made gains in strength, range of motion, and functional mobility since starting physical therapy  She has demonstrated normalized joint mobility about left foot as well as tenderness about plantar aspect of foot  She has experienced improvement in tolerance to ambulation and other usual activities  Patient will continue with physical therapy for 3 additional visits to ensure continued absolution of symptoms and independence with home exercise program  PT POC and HEP were updated during today's session and patient expressed no questions/concerns with updates made to plan  Plan: Continue per plan of care  **Pregnant**    Insurance:  [place]/CMS Eval/ Re-eval POC expires Raquel Jose #/ Referral # Total units  Start date  Expiration date Extension  Visit limitation? PT only or  PT+OT?  Co-Insurance   MCR: CMS 3/22/23 5/31/21  -- -- -- -- -- -- --                                                                Date 4/3/23 4/4/23 4/10/23 4/17/23 4/18/23 4/25/23   Visit Number [phone]   Manual         MET for anterior rotation of left innominate graston to plantar fascia    STM to left gastroc complex and ach  STM plantar fascia, 3 min      Pubic shotgun         Mobs to rear and mid foot TC AP/PA gr II-III, 3x30    Subtalar "inv/ev, gr II-III, 3x30    Mid-foot AP/PA, II-III, 3x30  Distal fib sustained glide, 3x15\"    TC AP/PA gr II-III, 3x30    Mid foot AP/PA, gr II-III, 3x30 Distal fib sustained glide, 3x15\"    TC AP/PA gr II-III, 3x30    Mid foot AP/PA, gr II-III, 3x30 TC AP/PA gr II-III, 3x30    Mid foot AP/PA, gr II-III, 3x30       Calcaneal fat pad offloading taping w/ leukotape, 5 min w/ edu Calcaneal fat pad offloading taping w/ leukotape, 5 min Calcaneal fat pad offloading taping w/ leukotape, 5 min    Neuro Re-Ed         Bridges Regular x10    Staggered feet x15 each  Regular, x15    Staggered feet, x15 each      Lateral stepping     On foam, 3 ft, 10 laps On foam, 6 feet, 10 laps   Backward step downs      6\" 3x10 each   Ad sq 5\" x 30        Hip abd iso         Sciatic nerve glides REV        Foot intrinsic strengthening Towel scrunchies 3*10    Barefoot arch lifts 2*10    Barefoot single toe touch down from inside to outside, outside to inside 1*10 ea  Towel scrunches, 5\" x30    Great toe lifts, x30    2nd-5th toe extension, x30 Towel scrunches, 5\" x30    Great toe lifts, x30    2nd-5th toe extension, x30 Towel scrunches, 5\" x30    Great toe lifts, x30    2nd-5th toe extension, x30 Towel scrunches, 5\" x30    Great toe lifts, x30    2nd-5th toe extension, x30   SLS   5-10\" 2x10 5-10\" 2x10 5-10\" 2x10 5-10\" 2x10   Bosu step   Forward, 2x10  Lateral, 2x10 Forward, 3x10  Lateral, 3x10 Forward, 3x10  Lateral, 3x10 Forward, 3x10  Lateral, 3x10   Tandem walking      On foam, 6 week, 10 laps   TherEx         Innominate MET         SLR         HR With ball distal to malleoli 3*10  With ball distal to malleoli, 3x10 Standing 3x10 On leg press, 85# bilat, 3x10 On leg press, 85# bilat, 3x15   Gastroc stretch prostrertch 15\" x 5                                   TherAct         Patient education  R ankle exam, [place] ankle rules 15 min                                           Gait Training                                    Modalities     " CP               Precautions:   Past Medical History:   Diagnosis Date   • Autism    • [place] esophagus    • CPAP (continuous positive airway pressure) dependence    • 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HCC)     not diagnosed as of 1/9/23-trying to R/O   • Depression    • Diabetes mellitus ([facility] )    • Disease of thyroid gland     hypo   • Dysphagia     solids and liquids   • Female infertility    • Fibromyalgia, primary    • Gastric ulcer    • History of bronchitis    • Iron deficiency anemia     infusion in 11/2022   • Irregular menses    • Miscarriage    • Morbid obesity with BMI of 50 0-59 9, adult ([facility])    • Plantar fasciitis of left foot    • Reflux esophagitis    • Seizures ([facility] )     last one 7/13/22   • Sleep apnea     CPAP   • Wears glasses

## 2023-04-27 ENCOUNTER — OFFICE VISIT (OUTPATIENT)
Dept: PHYSICAL THERAPY | Facility: CLINIC | Age: 33
End: 2023-04-27

## 2023-04-27 DIAGNOSIS — M72.2 PLANTAR FASCIITIS: Primary | ICD-10-CM

## 2023-04-27 DIAGNOSIS — M79.672 LEFT FOOT PAIN: ICD-10-CM

## 2023-04-27 NOTE — PROGRESS NOTES
"Discharge Note     Today's date: 2023  Patient name: Arlene Gill  : 1990  MRN: [id number]   Referring provider: Ameya Guzman DPM  Dx:   Encounter Diagnosis     ICD-10-CM    1  Plantar fasciitis  M72 2       2  Left foot pain  M79 672           Start Time: 1415  Stop Time: 1445  Total time in clinic (min): 30 minutes    Subjective: Patient denies pain since previous session  States she received heel cups this morning but hasn't gotten to try them yet  Objective: See treatment diary below      Assessment: Per progress note in previous session, patient has made significant gains in strength, range of motion, and functional mobility since starting physical therapy  She remains pain free while participation without restriction in daily usual activities  As the patient has met all previously set goals, they are being discharged from physical therapy episode in favor of independent home exercise program  HEP was updated and reviewed during today's session and patient expressed no questions/concerns with discharge  Plan: Discharge from physical therapy episode  **Pregnant**    Insurance:  A/CMS Eval/ Re-eval POC expires Dylan Pinzon #/ Referral # Total units  Start date  Expiration date Extension  Visit limitation? PT only or  PT+OT?  Co-Insurance   MCR: CMS 3/22/23 5/31/21  -- -- -- -- -- -- --                                                                Date 4/4/23 4/10/23 4/17/23 4/18/23 4/25/23 4/27/23   Visit Number [phone] 11   Manual         MET for anterior rotation of left innominate  STM plantar fascia, 3 min       Pubic shotgun         Mobs to rear and mid foot  Distal fib sustained glide, 3x15\"    TC AP/PA gr II-III, 3x30    Mid foot AP/PA, gr II-III, 3x30 Distal fib sustained glide, 3x15\"    TC AP/PA gr II-III, 3x30    Mid foot AP/PA, gr II-III, 3x30 TC AP/PA gr II-III, 3x30    Mid foot AP/PA, gr II-III, 3x30       Calcaneal fat pad offloading taping w/ leukotape, 5 min w/ " "edu Calcaneal fat pad offloading taping w/ leukotape, 5 min Calcaneal fat pad offloading taping w/ leukotape, 5 min     Neuro Re-Ed         Bridges  Regular, x15    Staggered feet, x15 each    rev   Lateral stepping    On foam, 3 ft, 10 laps On foam, 6 feet, 10 laps Flat ground: 15 feet, 4 laps   Backward step downs     6\" 3x10 each    Ad sq         Hip abd iso         Sciatic nerve glides         Foot intrinsic strengthening  Towel scrunches, 5\" x30    Great toe lifts, x30    2nd-5th toe extension, x30 Towel scrunches, 5\" x30    Great toe lifts, x30    2nd-5th toe extension, x30 Towel scrunches, 5\" x30    Great toe lifts, x30    2nd-5th toe extension, x30 Towel scrunches, 5\" x30    Great toe lifts, x30    2nd-5th toe extension, x30 Towel scrunches, 5\" x30    Great toe lifts, x30    2nd-5th toe extension, x30   SLS  5-10\" 2x10 5-10\" 2x10 5-10\" 2x10 5-10\" 2x10 5-10\" 2x10   Bosu step  Forward, 2x10  Lateral, 2x10 Forward, 3x10  Lateral, 3x10 Forward, 3x10  Lateral, 3x10 Forward, 3x10  Lateral, 3x10 Forward, 3x10  Lateral, 3x10   Tandem walking     On foam, 6 week, 10 laps Flat ground: 15 feet, 4 laps   TherEx         Innominate MET         SLR         HR  With ball distal to malleoli, 3x10 Standing 3x10 On leg press, 85# bilat, 3x10 On leg press, 85# bilat, 3x15 Standing: 3x10   Gastroc stretch      rev                              TherAct         Patient education R ankle exam, [place] ankle rules 15 min                                            Gait Training                                    Modalities         CP               Precautions:   Past Medical History:   Diagnosis Date   • Autism    • [place] esophagus    • CPAP (continuous positive airway pressure) dependence    • 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HCC)     not diagnosed as of 1/9/23-trying to R/O   • Depression    • Diabetes mellitus (HCC)    • Disease of thyroid gland     hypo   • Dysphagia     solids and liquids   • Female infertility    • Fibromyalgia, primary    • " Gastric ulcer    • History of bronchitis    • Iron deficiency anemia     infusion in 11/2022   • Irregular menses    • Miscarriage    • Morbid obesity with BMI of 50 0-59 9, adult [facility])    • Plantar fasciitis of left foot    • Reflux esophagitis    • Seizures ([address] )     last one 7/13/22   • Sleep apnea     CPAP   • Wears glasses

## 2023-04-28 ENCOUNTER — ULTRASOUND (OUTPATIENT)
Dept: PERINATAL CARE | Facility: CLINIC | Age: 33
End: 2023-04-28

## 2023-04-28 VITALS
HEIGHT: 55 IN | DIASTOLIC BLOOD PRESSURE: 58 MMHG | BODY MASS INDEX: 52.44 KG/M2 | HEART RATE: 118 BPM | WEIGHT: 226.6 LBS | SYSTOLIC BLOOD PRESSURE: 106 MMHG

## 2023-04-28 DIAGNOSIS — Z36.3 ENCOUNTER FOR ANTENATAL SCREENING FOR MALFORMATION: ICD-10-CM

## 2023-04-28 DIAGNOSIS — Z36.89 ENCOUNTER FOR ULTRASOUND TO CHECK FETAL GROWTH: ICD-10-CM

## 2023-04-28 DIAGNOSIS — O09.299 HISTORY OF STILLBIRTH IN CURRENTLY PREGNANT PATIENT, UNSPECIFIED TRIMESTER: Primary | ICD-10-CM

## 2023-04-28 DIAGNOSIS — Z3A.17 17 WEEKS GESTATION OF PREGNANCY: ICD-10-CM

## 2023-04-28 DIAGNOSIS — Z82.79 FAMILY HISTORY OF CONGENITAL HEART DEFECT: ICD-10-CM

## 2023-04-28 NOTE — LETTER
"2023    Enma N[place], [address] [facility]  [address] [id number]    Patient: Curtis Kuhn   YOB: 1990   Date of Visit: 2023   Gestational age 13w0d   Austinclarita Montalvo of this communication: Routine       Dear Dr Lukasz Tirado,    This patient was seen recently in our  office  The content of my evaluation today is in the ultrasound report under \"OB Procedures\" tab  Please don't hesitate to contact our office with any concerns or questions       Sincerely,      Blossom Wright MD  Attending Physician, Tasha        "

## 2023-04-28 NOTE — PATIENT INSTRUCTIONS
Thank you for choosing us for your  care today  If you have any questions about your ultrasound or care, please do not hesitate to contact us or your primary obstetrician  Some general instructions for your pregnancy are:    Protect against coronavirus: get vaccinated - pregnant women are increased risk of severe COVID  Notify your primary care doctor if you have any symptoms  Exercise: Aim for 22 minutes per day (150 minutes per week) of regular exercise  Walking is great! Nutrition: aim for calcium-rich and iron-rich foods as well as healthy sources of protein  Learn about Preeclampsia: preeclampsia is a common, serious high blood pressure complication in pregnancy  A blood pressure of 024RAYY (systolic or top number) or 91OUGP (diastolic or bottom number) is not normal and needs evaluation by your doctor  Aspirin is sometimes prescribed in early pregnancy to prevent preeclampsia in women with risk factors - ask your obstetrician if you should be on this medication  If you smoke, try to reduce how many cigarettes you smoke or try to quit completely  Do not vape  Other warning signs to watch out for in pregnancy or postpartum: chest pain, obstructed breathing or shortness of breath, seizures, thoughts of hurting yourself or your baby, bleeding, a painful or swollen leg, fever, or headache (see [facility] POST-BIRTH Warning Signs campaign)  If these happen call 911  Itching is also not normal in pregnancy and if you experience this, especially over your hands and feet, potentially worse at night, notify your doctors

## 2023-04-28 NOTE — PROGRESS NOTES
"[address]: Ms Lasha Hernandez was seen today for growth and early anatomy  See ultrasound report under \"OB Procedures\" tab  The time spent on this established patient on the encounter date included 10 minutes previsit service time reviewing records and precharting, 15 minutes face-to-face service time counseling regarding results and coordinating care, and  8 minutes charting, totalling 33 minutes    Please don't hesitate to contact our office with any concerns or questions   -Keily Abarca MD      "

## 2023-05-04 ENCOUNTER — ROUTINE PRENATAL (OUTPATIENT)
Dept: OBGYN CLINIC | Facility: CLINIC | Age: 33
End: 2023-05-04

## 2023-05-04 VITALS — BODY MASS INDEX: 52.29 KG/M2 | SYSTOLIC BLOOD PRESSURE: 100 MMHG | DIASTOLIC BLOOD PRESSURE: 70 MMHG | WEIGHT: 225 LBS

## 2023-05-04 DIAGNOSIS — Z34.82 PRENATAL CARE, SUBSEQUENT PREGNANCY, SECOND TRIMESTER: Primary | ICD-10-CM

## 2023-05-04 DIAGNOSIS — Z36.89 ENCOUNTER FOR OTHER SPECIFIED ANTENATAL SCREENING: ICD-10-CM

## 2023-05-04 DIAGNOSIS — R62.52 SHORT STATURE: ICD-10-CM

## 2023-05-04 NOTE — PROGRESS NOTES
Andrew Medina is a 29 y o  [id number] at 77K7J complicated by maternal obesity  Reports no fetal at this time  She is doing well  Denies LOF/Bleeding/Cramping  Denies n/v/Ha, edema  Denies tobacco or ETOH use  Denies DV      23 early anatomy scan  Normal fetal anatomy  It was recommend that patient have a genetic referral for skeletal dysplasia given short stature  Visit Vitals  /70   Wt 102 kg (225 lb)   LMP 2022 (Exact Date)   BMI 52 29 kg/m²   OB Status Pregnant   Smoking Status Never   BSA 1 84 m²       Urine neg/neg      Diagnoses and all orders for this visit:    Prenatal care, subsequent pregnancy, second trimester    Encounter for other specified  screening  -     Alpha fetoprotein, maternal; Future    Short stature  -     Ambulatory Referral to Genetics; Future      Referral for genetics provided today  RTO in 4 weeks for PNV or sooner if needed

## 2023-05-05 ENCOUNTER — TELEPHONE (OUTPATIENT)
Dept: NEUROLOGY | Facility: CLINIC | Age: 33
End: 2023-05-05

## 2023-05-05 NOTE — TELEPHONE ENCOUNTER
Called and spoke to patient  Patient confirmed upcoming apt with Dr Remi Kocher on 5/24/23 @ 8 am    Appt card mailed out with physician's name, location, date and time of appt

## 2023-05-08 DIAGNOSIS — E03.9 HYPOTHYROIDISM, UNSPECIFIED TYPE: ICD-10-CM

## 2023-05-08 RX ORDER — LEVOTHYROXINE SODIUM 0.03 MG/1
TABLET ORAL
Qty: 30 TABLET | Refills: 2 | Status: SHIPPED | OUTPATIENT
Start: 2023-05-08

## 2023-05-10 ENCOUNTER — OFFICE VISIT (OUTPATIENT)
Dept: FAMILY MEDICINE CLINIC | Facility: CLINIC | Age: 33
End: 2023-05-10

## 2023-05-10 VITALS
RESPIRATION RATE: 21 BRPM | BODY MASS INDEX: 52.81 KG/M2 | SYSTOLIC BLOOD PRESSURE: 112 MMHG | WEIGHT: 228.19 LBS | TEMPERATURE: 97.7 F | HEART RATE: 104 BPM | HEIGHT: 55 IN | OXYGEN SATURATION: 95 % | DIASTOLIC BLOOD PRESSURE: 56 MMHG

## 2023-05-10 DIAGNOSIS — B37.0 ORAL CANDIDIASIS: Primary | ICD-10-CM

## 2023-05-10 RX ORDER — CLOTRIMAZOLE 10 MG/1
10 LOZENGE ORAL; TOPICAL
Status: CANCELLED | OUTPATIENT
Start: 2023-05-10

## 2023-05-10 NOTE — PROGRESS NOTES
Assessment/Plan:     Diagnoses and all orders for this visit:    Oral candidiasis  -     nystatin (MYCOSTATIN) 500,000 units/5 mL suspension; Apply 5 mL (500,000 Units total) to the mouth or throat 4 (four) times a day  Pt did show a photo of her tongue with white plaques which is consistent with thrush though exam is largely normal as pt reports she brushed her tongue this morning  Will tx with nystatin swish and spit  If no resolution in 2 weeks, can tx further  Discussed with pt that recent abx and pregnancy were likely triggers as they can cause immunosuppression  No dysphagia or other red flags concerning for esophageal involvement         Subjective:      Patient ID: Yael Chavira is a 29 y o  female  HPI   Pt is here for concerns for oral thrush  Onset about a week ago  Pt recently treated with antibiotics- macrobid for UTI and flagyl for vaginitis prior to onset  Reports pain and bleeding over tongue after scraping but otherwise no dysphagia  Some change in taste  The following portions of the patient's history were reviewed and updated as appropriate: allergies, current medications, past family history, past medical history, past social history, past surgical history, and problem list         Review of Systems   Constitutional: Negative for chills and fever  HENT: Negative for ear pain, sore throat and trouble swallowing  Eyes: Negative for pain and visual disturbance  Respiratory: Negative for cough, chest tightness and shortness of breath  Cardiovascular: Negative for chest pain and palpitations  Gastrointestinal: Negative for abdominal pain, constipation, diarrhea, nausea and vomiting  Genitourinary: Negative for dysuria, hematuria and menstrual problem  Musculoskeletal: Negative for arthralgias and back pain  Skin: Negative for color change and rash  Neurological: Negative for seizures and syncope  Psychiatric/Behavioral: Negative for dysphoric mood and suicidal ideas  "  All other systems reviewed and are negative  Current Outpatient Medications:   •  nystatin (MYCOSTATIN) 500,000 units/5 mL suspension, Apply 5 mL (500,000 Units total) to the mouth or throat 4 (four) times a day, Disp: 473 mL, Rfl: 1  •  acetaminophen (TYLENOL) 500 mg tablet, Take 1 tablet (500 mg total) by mouth every 6 (six) hours as needed for mild pain, Disp: , Rfl: 0  •  albuterol (Proventil HFA) 90 mcg/act inhaler, Inhale 2 puffs every 6 (six) hours as needed for wheezing, Disp: 18 g, Rfl: 3  •  aspirin (ECOTRIN LOW STRENGTH) 81 mg EC tablet, Take 2 tablets (162 mg total) by mouth daily Stop at 36 weeks  , Disp: 180 tablet, Rfl: 0  •  docusate sodium (COLACE) 100 mg capsule, Take 1 capsule (100 mg total) by mouth 2 (two) times a day, Disp: 60 capsule, Rfl: 5  •  famotidine (PEPCID) 40 MG tablet, Take 1 tablet (40 mg total) by mouth daily at bedtime, Disp: 30 tablet, Rfl: 5  •  fluticasone (FLONASE) 50 mcg/act nasal spray, 1 spray into each nostril daily, Disp: 9 9 mL, Rfl: 0  •  folic acid (FOLVITE) 1 mg tablet, Take 1 tablet (1 mg total) by mouth daily, Disp: 90 tablet, Rfl: 1  •  levETIRAcetam (KEPPRA) 750 mg tablet, Take 1 tablet (750 mg total) by mouth 2 (two) times a day, Disp: 180 tablet, Rfl: 1  •  levothyroxine 25 mcg tablet, TAKE ONE TABLET BY MOUTH EVERY DAY IN THE MORNING, Disp: 30 tablet, Rfl: 2  •  ondansetron (ZOFRAN) 4 mg tablet, Take 1 tablet (4 mg total) by mouth every 8 (eight) hours as needed for nausea or vomiting, Disp: 30 tablet, Rfl: 2  •  Prenatal Vit-Iron Carbonyl-FA (prenatal multivitamin) TABS, Take 1 tablet by mouth daily, Disp: , Rfl:   •  Pyridoxine HCl (vitamin B-6) 50 MG TABS, Take 0 5 tablets (25 mg total) by mouth daily at bedtime, Disp: 30 tablet, Rfl: 0    Objective:      /56 (BP Location: Left arm, Patient Position: Sitting, Cuff Size: Standard)   Pulse 104   Temp 97 7 °F (36 5 °C) (Temporal)   Resp 21   Ht 4' 7\" (1 397 m)   Wt 104 kg (228 lb 3 oz)   " LMP 12/24/2022 (Exact Date)   SpO2 95%   BMI 53 04 kg/m²          Physical Exam  Constitutional:       General: She is not in acute distress  Appearance: Normal appearance  HENT:      Head: Normocephalic and atraumatic  Mouth/Throat:      Mouth: Mucous membranes are moist  No oral lesions  Palate: No lesions  Pharynx: Oropharynx is clear  Tonsils: No tonsillar exudate or tonsillar abscesses  Comments: No lesions in oropharynx and oral cavity  Mild white discoloration noted over tongue but no plaques   Eyes:      Extraocular Movements: Extraocular movements intact  Conjunctiva/sclera: Conjunctivae normal    Cardiovascular:      Rate and Rhythm: Normal rate  Pulmonary:      Effort: Pulmonary effort is normal  No respiratory distress  Skin:     General: Skin is warm and dry  Neurological:      General: No focal deficit present  Mental Status: She is alert and oriented to person, place, and time     Psychiatric:         Mood and Affect: Mood normal          Behavior: Behavior normal

## 2023-05-11 ENCOUNTER — PROCEDURE VISIT (OUTPATIENT)
Dept: OBGYN CLINIC | Facility: CLINIC | Age: 33
End: 2023-05-11

## 2023-05-11 VITALS — BODY MASS INDEX: 53.13 KG/M2 | WEIGHT: 228.6 LBS | SYSTOLIC BLOOD PRESSURE: 112 MMHG | DIASTOLIC BLOOD PRESSURE: 76 MMHG

## 2023-05-11 DIAGNOSIS — R87.811 ASCUS WITH POSITIVE HIGH RISK HUMAN PAPILLOMAVIRUS OF VAGINA: Primary | ICD-10-CM

## 2023-05-11 DIAGNOSIS — R87.620 ASCUS WITH POSITIVE HIGH RISK HUMAN PAPILLOMAVIRUS OF VAGINA: Primary | ICD-10-CM
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
## 2023-05-13 DIAGNOSIS — K21.9 GASTROESOPHAGEAL REFLUX DISEASE WITHOUT ESOPHAGITIS: ICD-10-CM

## 2023-05-13 DIAGNOSIS — Z87.19 HISTORY OF BARRETT'S ESOPHAGUS: ICD-10-CM

## 2023-05-13 RX ORDER — FAMOTIDINE 40 MG/1
TABLET, FILM COATED ORAL
Qty: 30 TABLET | Refills: 5 | Status: SHIPPED | OUTPATIENT
Start: 2023-05-13

## 2023-05-23 ENCOUNTER — ANESTHESIA (OUTPATIENT)
Dept: LABOR AND DELIVERY | Facility: HOSPITAL | Age: 33
End: 2023-05-23

## 2023-05-23 ENCOUNTER — ANESTHESIA EVENT (OUTPATIENT)
Dept: LABOR AND DELIVERY | Facility: HOSPITAL | Age: 33
End: 2023-05-23

## 2023-05-23 ENCOUNTER — NURSE TRIAGE (OUTPATIENT)
Dept: OTHER | Facility: OTHER | Age: 33
End: 2023-05-23

## 2023-05-23 ENCOUNTER — HOSPITAL ENCOUNTER (OUTPATIENT)
Facility: HOSPITAL | Age: 33
Setting detail: OBSERVATION
Discharge: HOME | End: 2023-05-24
Attending: OBSTETRICS & GYNECOLOGY | Admitting: OBSTETRICS & GYNECOLOGY

## 2023-05-23 DIAGNOSIS — O26.872 CERVICAL SHORTENING IN SECOND TRIMESTER: ICD-10-CM

## 2023-05-23 DIAGNOSIS — Z3A.20 20 WEEKS GESTATION OF PREGNANCY: Primary | ICD-10-CM

## 2023-05-23 DIAGNOSIS — O34.30 CERVICAL CERCLAGE SUTURE PRESENT, ANTEPARTUM: ICD-10-CM

## 2023-05-23 PROBLEM — O26.879 CERVICAL SHORTENING: Status: ACTIVE | Noted: 2023-05-23

## 2023-05-23 PROBLEM — N84.0 ENDOMETRIAL POLYP: Status: RESOLVED | Noted: 2021-02-08 | Resolved: 2023-05-23

## 2023-05-23 LAB
ABO GROUP BLD: NORMAL
ALBUMIN SERPL BCP-MCNC: 3.4 G/DL (ref 3.5–5)
ALP SERPL-CCNC: 73 U/L (ref 34–104)
ALT SERPL W P-5'-P-CCNC: 18 U/L (ref 7–52)
ANION GAP SERPL CALCULATED.3IONS-SCNC: 7 MMOL/L (ref 4–13)
AST SERPL W P-5'-P-CCNC: 15 U/L (ref 13–39)
BILIRUB SERPL-MCNC: 0.3 MG/DL (ref 0.2–1)
BLD GP AB SCN SERPL QL: NEGATIVE
BUN SERPL-MCNC: 6 MG/DL (ref 5–25)
CALCIUM ALBUM COR SERPL-MCNC: 9.3 MG/DL (ref 8.3–10.1)
CALCIUM SERPL-MCNC: 8.8 MG/DL (ref 8.4–10.2)
CHLORIDE SERPL-SCNC: 105 MMOL/L (ref 96–108)
CO2 SERPL-SCNC: 23 MMOL/L (ref 21–32)
CREAT SERPL-MCNC: 0.38 MG/DL (ref 0.6–1.3)
ERYTHROCYTE [DISTWIDTH] IN BLOOD BY AUTOMATED COUNT: 14.4 % (ref 11.6–15.1)
FIBRINOGEN PPP-MCNC: 646 MG/DL (ref 227–495)
GFR SERPL CREATININE-BSD FRML MDRD: 140 ML/MIN/1.73SQ M
GLUCOSE P FAST SERPL-MCNC: 85 MG/DL (ref 65–99)
GLUCOSE SERPL-MCNC: 85 MG/DL (ref 65–140)
HCT VFR BLD AUTO: 33.5 % (ref 34.8–46.1)
HGB BLD-MCNC: 10.9 G/DL (ref 11.5–15.4)
MCH RBC QN AUTO: 29.5 PG (ref 26.8–34.3)
MCHC RBC AUTO-ENTMCNC: 32.5 G/DL (ref 31.4–37.4)
MCV RBC AUTO: 91 FL (ref 82–98)
PLATELET # BLD AUTO: 326 THOUSANDS/UL (ref 149–390)
PMV BLD AUTO: 8.7 FL (ref 8.9–12.7)
POTASSIUM SERPL-SCNC: 3.3 MMOL/L (ref 3.5–5.3)
PROT SERPL-MCNC: 6.7 G/DL (ref 6.4–8.4)
RBC # BLD AUTO: 3.69 MILLION/UL (ref 3.81–5.12)
RH BLD: NEGATIVE
SODIUM SERPL-SCNC: 135 MMOL/L (ref 135–147)
SPECIMEN EXPIRATION DATE: NORMAL
TREPONEMA PALLIDUM IGG+IGM AB [PRESENCE] IN SERUM OR PLASMA BY IMMUNOASSAY: NORMAL
WBC # BLD AUTO: 7.5 THOUSAND/UL (ref 4.31–10.16)

## 2023-05-23 RX ORDER — INDOMETHACIN 25 MG/1
50 CAPSULE ORAL ONCE
Status: DISCONTINUED | OUTPATIENT
Start: 2023-05-23 | End: 2023-05-23

## 2023-05-23 RX ORDER — LEVOTHYROXINE SODIUM 0.03 MG/1
25 TABLET ORAL
Status: DISCONTINUED | OUTPATIENT
Start: 2023-05-24 | End: 2023-05-24 | Stop reason: HOSPADM

## 2023-05-23 RX ORDER — METRONIDAZOLE 500 MG/1
500 TABLET ORAL EVERY 12 HOURS SCHEDULED
Status: DISCONTINUED | OUTPATIENT
Start: 2023-05-23 | End: 2023-05-24 | Stop reason: HOSPADM

## 2023-05-23 RX ORDER — INDOMETHACIN 25 MG/1
50 CAPSULE ORAL EVERY 6 HOURS
Status: DISCONTINUED | OUTPATIENT
Start: 2023-05-23 | End: 2023-05-23

## 2023-05-23 RX ORDER — FLUTICASONE PROPIONATE 50 MCG
1 SPRAY, SUSPENSION (ML) NASAL DAILY
Status: DISCONTINUED | OUTPATIENT
Start: 2023-05-24 | End: 2023-05-24 | Stop reason: HOSPADM

## 2023-05-23 RX ORDER — PANTOPRAZOLE SODIUM 40 MG/1
40 TABLET, DELAYED RELEASE ORAL DAILY
Status: DISCONTINUED | OUTPATIENT
Start: 2023-05-24 | End: 2023-05-24 | Stop reason: HOSPADM

## 2023-05-23 RX ORDER — ONDANSETRON 2 MG/ML
4 INJECTION INTRAMUSCULAR; INTRAVENOUS EVERY 6 HOURS PRN
Status: DISCONTINUED | OUTPATIENT
Start: 2023-05-23 | End: 2023-05-24 | Stop reason: HOSPADM

## 2023-05-23 RX ORDER — BUPIVACAINE HYDROCHLORIDE 7.5 MG/ML
INJECTION, SOLUTION INTRASPINAL AS NEEDED
Status: DISCONTINUED | OUTPATIENT
Start: 2023-05-23 | End: 2023-05-23

## 2023-05-23 RX ORDER — CEFAZOLIN SODIUM 2 G/50ML
2000 SOLUTION INTRAVENOUS ONCE
Status: COMPLETED | OUTPATIENT
Start: 2023-05-23 | End: 2023-05-23

## 2023-05-23 RX ORDER — ACETAMINOPHEN 325 MG/1
650 TABLET ORAL EVERY 6 HOURS PRN
Status: DISCONTINUED | OUTPATIENT
Start: 2023-05-23 | End: 2023-05-24 | Stop reason: HOSPADM

## 2023-05-23 RX ORDER — DOCUSATE SODIUM 100 MG/1
100 CAPSULE, LIQUID FILLED ORAL 2 TIMES DAILY
Status: DISCONTINUED | OUTPATIENT
Start: 2023-05-23 | End: 2023-05-24 | Stop reason: HOSPADM

## 2023-05-23 RX ORDER — ONDANSETRON 2 MG/ML
4 INJECTION INTRAMUSCULAR; INTRAVENOUS EVERY 4 HOURS PRN
Status: DISCONTINUED | OUTPATIENT
Start: 2023-05-23 | End: 2023-05-24 | Stop reason: HOSPADM

## 2023-05-23 RX ORDER — ACETAMINOPHEN 325 MG/1
488 TABLET ORAL EVERY 6 HOURS PRN
Status: DISCONTINUED | OUTPATIENT
Start: 2023-05-23 | End: 2023-05-23

## 2023-05-23 RX ORDER — FAMOTIDINE 20 MG/1
20 TABLET, FILM COATED ORAL 2 TIMES DAILY
Status: DISCONTINUED | OUTPATIENT
Start: 2023-05-23 | End: 2023-05-24 | Stop reason: HOSPADM

## 2023-05-23 RX ORDER — SODIUM CHLORIDE, SODIUM LACTATE, POTASSIUM CHLORIDE, CALCIUM CHLORIDE 600; 310; 30; 20 MG/100ML; MG/100ML; MG/100ML; MG/100ML
125 INJECTION, SOLUTION INTRAVENOUS CONTINUOUS
Status: DISCONTINUED | OUTPATIENT
Start: 2023-05-23 | End: 2023-05-24 | Stop reason: HOSPADM

## 2023-05-23 RX ORDER — INDOMETHACIN 25 MG/1
25 CAPSULE ORAL EVERY 6 HOURS
Status: DISCONTINUED | OUTPATIENT
Start: 2023-05-23 | End: 2023-05-24 | Stop reason: HOSPADM

## 2023-05-23 RX ADMIN — PHENYLEPHRINE HYDROCHLORIDE 50 MCG/MIN: 10 INJECTION INTRAVENOUS at 15:21

## 2023-05-23 RX ADMIN — ONDANSETRON 4 MG: 2 INJECTION INTRAMUSCULAR; INTRAVENOUS at 09:00

## 2023-05-23 RX ADMIN — INDOMETHACIN 25 MG: 25 CAPSULE ORAL at 16:54

## 2023-05-23 RX ADMIN — SODIUM CHLORIDE, SODIUM LACTATE, POTASSIUM CHLORIDE, AND CALCIUM CHLORIDE 1000 ML: .6; .31; .03; .02 INJECTION, SOLUTION INTRAVENOUS at 08:57

## 2023-05-23 RX ADMIN — SODIUM CHLORIDE, SODIUM LACTATE, POTASSIUM CHLORIDE, AND CALCIUM CHLORIDE 125 ML/HR: .6; .31; .03; .02 INJECTION, SOLUTION INTRAVENOUS at 10:27

## 2023-05-23 RX ADMIN — ONDANSETRON 4 MG: 2 INJECTION INTRAMUSCULAR; INTRAVENOUS at 18:48

## 2023-05-23 RX ADMIN — ACETAMINOPHEN 650 MG: 325 TABLET ORAL at 20:04

## 2023-05-23 RX ADMIN — METRONIDAZOLE 500 MG: 500 TABLET ORAL at 21:53

## 2023-05-23 RX ADMIN — BUPIVACAINE HYDROCHLORIDE IN DEXTROSE 1.4 ML: 7.5 INJECTION, SOLUTION SUBARACHNOID at 15:20

## 2023-05-23 RX ADMIN — INDOMETHACIN 50 MG: 25 CAPSULE ORAL at 09:25

## 2023-05-23 RX ADMIN — FAMOTIDINE 20 MG: 20 TABLET, FILM COATED ORAL at 09:25

## 2023-05-23 RX ADMIN — METRONIDAZOLE 500 MG: 500 TABLET ORAL at 10:15

## 2023-05-23 RX ADMIN — LEVETIRACETAM 750 MG: 250 TABLET, FILM COATED ORAL at 21:51

## 2023-05-23 RX ADMIN — INDOMETHACIN 25 MG: 25 CAPSULE ORAL at 21:53

## 2023-05-23 RX ADMIN — CEFAZOLIN SODIUM 3000 MG: 2 SOLUTION INTRAVENOUS at 15:23

## 2023-05-23 RX ADMIN — Medication 200 MG: at 21:49

## 2023-05-23 NOTE — TELEPHONE ENCOUNTER
"Regarding: mucus plug  ----- Message from [facility] sent at 5/23/2023  5:49 AM EDT -----  \"I just lost my mucus plug   What do I do?\"    "

## 2023-05-23 NOTE — PROCEDURES
Yoav Jalenkera [id number] at 20w4d with an JAKE of 10/6/2023, by Ultrasound, was seen at [address] for the following procedure(s): $Procedure Type: US - Transvaginal]      Ultrasound Other  Fetal Presentation: Vertex  Cervical Length: 0 62  Funnel: Yes  Debris: No  Placenta Previa: No  Vasa Previa: No         Short cervix w/ associated cramping this AM    Ashley Roberts DO  05/23/23  8:35 AM

## 2023-05-23 NOTE — PROGRESS NOTES
L&D Triage Note - OB/GYN  Cherie Cook 29 y o  female MRN: [id number]  Unit/Bed#:  TRIAGE 4-01 Encounter: [id number]      Assessment:  29 y o  [id number] at 20w4d presents to triage with uterine cramping     Plan:  1  Uterine cramping  - likely secondary to dehydration from chronic morning sickness   - will r/o labor and rupture with :   - KOH/wet mount:   - Nitrazine:   - cervical length:  - FHT:       ______________________________________________________________________      Chief Complaint: uterine cramping     Subjective:  29 y o  [id number] at 20w4d presents to triage with 5 hours of uterine cramping  She woke up this morning around 3am and used the restroom  She states she lost her mucous plug at that time and has had uterine cramping every since  Cramping is not new for her this pregnancy but the loss of mucus plug concerned her  She states that she has had morning sickness throughout this entire pregnancy so far and has been unable to keep much food or fluids down  The last time she ate was last night and she had a few chicken nuggets and macoroni and the last fluids she drank was a few sips of water at 3am  She states that if she attempts to drink larger volumes of fluid, she will usually vomit  She vomits on a daily basis  She denies VB, LOF, uterine contractions  ROS:   Review of Systems   Constitutional: Negative for chills and fever  Eyes: Negative for visual disturbance  Respiratory: Negative for cough and shortness of breath  Cardiovascular: Negative for chest pain and palpitations  Gastrointestinal: Positive for vomiting  Negative for abdominal pain  Genitourinary: Positive for vaginal discharge  Negative for difficulty urinating, dysuria, frequency, hematuria, pelvic pain and vaginal bleeding  Uterine cramping     Musculoskeletal: Negative for arthralgias and back pain  Skin: Negative for color change and rash  Neurological: Negative for dizziness and headaches     All other systems reviewed and are negative  Objective: There were no vitals filed for this visit  Physical Exam  Constitutional:       General: She is not in acute distress  Appearance: She is obese  She is not toxic-appearing  Genitourinary:      Vulva normal       No vaginal discharge  HENT:      Head: Normocephalic and atraumatic  Nose: No congestion or rhinorrhea  Eyes:      General: No scleral icterus  Extraocular Movements: Extraocular movements intact  Conjunctiva/sclera: Conjunctivae normal    Cardiovascular:      Rate and Rhythm: Normal rate and regular rhythm  Heart sounds: No murmur heard  No friction rub  No gallop  Pulmonary:      Effort: Pulmonary effort is normal       Breath sounds: Normal breath sounds  No wheezing, rhonchi or rales  Musculoskeletal:         General: No tenderness  Cervical back: Neck supple  Right lower leg: No edema  Left lower leg: No edema  Neurological:      General: No focal deficit present  Skin:     General: Skin is warm and dry  Psychiatric:         Mood and Affect: Mood normal          Behavior: Behavior normal    Exam conducted with a chaperone present            SVE: {gen number 9-70:515247} / {Effacement :42451} / {SL IP OB Station:76959}  SSE: ***   FHT:  *** / {FHR Variability:31915} / ***, ***  [place]: q***    Wet mount/KOH: ***  Rupture workup: Nitrazine ***, *** [place], *** Pooling    TAUS  JOE: ***  Vertex ***  TVUS:    Cervical length: ***   Vertex ***   *** funneling, *** dynamic changes      Nicholas Tariq DO 5/23/2023 7:22 AM

## 2023-05-23 NOTE — CONSULTS
"Consult: CORAL Garcia 29 y o  female MRN: [id number]  Unit/Bed#: -01 Encounter: [id number]      Assessment/Plan:    29 y o   at 22w2d who presented with cramping and a short cervix    * Cervical shortening  Assessment & Plan  · Cervical Length: 2 98 cm on 3/30 at early anatomy scan  · Cervical length 0 62 cm today with associated cramping and loss of mucous plug early this AM  · She has not had further cramping since coming to triage  · Will start Indocin 50mg loading dose, followed by 25mg q6 x48 hrs  · Observation - antepartum unit  · NPO in case of cerclage placement    History of stillbirth in currently pregnant patient, unspecified trimester  Assessment & Plan  Late to care in G1 pregnancy - 28w demise due to \"fluid around the heart/trisomy\"      20 weeks gestation of pregnancy  Assessment & Plan  She did have an episode of bleeding early in the pregnancy and received RhoGAM, was likely due to a cervical polyp      Morbid obesity ([facility] [address] )  Assessment & Plan  BMI 53 on admission, patient is 4'7\"  Tocometry technically difficult to assess due to body habitus  Has a genetics referral to assess for skeletal dysplasia    Nonintractable epilepsy without status epilepticus [facility])  Assessment & Plan  Continue levatiracetem              Discussed with Dr Hector Rowe and Dr Dave Carter:  29 y o  [id number] at 20w4d presents to triage with 5 hours of uterine cramping  She woke up this morning around 3am and used the restroom  She states she lost her mucous plug at that time and has had uterine cramping every since  Cramping is not new for her this pregnancy but the loss of mucus plug concerned her  She states that she has had morning sickness throughout this entire pregnancy so far and has been unable to keep much food or fluids down   The last time she ate was last night and she had a few chicken nuggets and macoroni and the last fluids she drank was a few sips of water at 3am  She states that if " she attempts to drink larger volumes of fluid, she will usually vomit  She vomits on a daily basis  She denies VB, LOF, uterine contractions       ROS:   Review of Systems   Constitutional: Negative for chills and fever  Eyes: Negative for visual disturbance  Respiratory: Negative for cough and shortness of breath  Cardiovascular: Negative for chest pain and palpitations  Gastrointestinal: Positive for vomiting  Negative for abdominal pain  Genitourinary: Positive for vaginal discharge  Negative for difficulty urinating, dysuria, frequency, hematuria, pelvic pain and vaginal bleeding  Uterine cramping     Musculoskeletal: Negative for arthralgias and back pain  Skin: Negative for color change and rash  Neurological: Negative for dizziness and headaches  All other systems reviewed and are negative        Patient Active Problem List   Diagnosis   • Depression with anxiety   • Nonintractable epilepsy without status epilepticus ([place] [address] )   • History of irregular menstrual bleeding   • PCOS (polycystic ovarian syndrome)   • Infertility, female   • [place]'s esophagus   • Gastric ulcer   • GARIMA (obstructive sleep apnea)   • Female infertility   • Autism   • Bipolar depression ([place] [address] )   • Morbid obesity ([place] [address] )   • Class 3 severe obesity due to excess calories with serious comorbidity and body mass index (BMI) of 50 0 to 59 9 in [facility])   • Gastroesophageal reflux disease   • Excessive daytime sleepiness   • Iron deficiency anemia secondary to inadequate dietary iron intake   • Constipation   • Dysphagia   • Hypothyroid in pregnancy, antepartum   • 20 weeks gestation of pregnancy   • Chronic constipation   • Nausea/vomiting in pregnancy   • Seizures ([facility] )   • Obesity in pregnancy   • History of stillbirth in currently pregnant patient, unspecified trimester   • Vaginal bleeding before 22 weeks gestation   • Maternal morbid obesity, antepartum ([facility] )   • Short stature   • Family history of congenital heart defect   • Cervical shortening         OB History    Para Term  AB Living   4 1 0 1 2 0   SAB IAB Ectopic Multiple Live Births   2 0 0 0 0      # Outcome Date GA Lbr Lopez/2nd Weight Sex Delivery Anes PTL Lv   4 Current            3 2019           2 2012           1   28w0d    Vag-Spont   FD      Complications: ABO incompatibility in pregnancy      Obstetric Comments   Both SAB <2 months   Still birth at 7 months   Has had hypercoagulable workup in [place] which was negative        Past Medical History:   Diagnosis Date   • Autism    • [place] esophagus    • CPAP (continuous positive airway pressure) dependence    • 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HCC)     not diagnosed as of 23-trying to R/O   • Depression    • Diabetes mellitus ([facility] [address] )    • Disease of thyroid gland     hypo   • Dysphagia     solids and liquids   • Female infertility    • Fibromyalgia, primary    • Gastric ulcer    • History of bronchitis    • Iron deficiency anemia     infusion in 2022   • Irregular menses    • Miscarriage    • Morbid obesity with BMI of 50 0-59 9, adult ([facility])    • Plantar fasciitis of left foot    • Reflux esophagitis    • Seizures ([facility] [address] )     last one 22   • Sleep apnea     CPAP   • Wears glasses        Past Surgical History:   Procedure Laterality Date   • EGD      with Bx due to barretts esophagus   • EYE SURGERY Bilateral     lazy eye repair   • VT BIOPSY OF LIP N/A 11/10/2022    Procedure: EXCISION BIOPSY SALVARY GLANDS LOWER LIP;  Surgeon: Noel Apgar, MD;  Location: [facility];  Service: ENT   • VT HYSTEROSCOPY BX ENDOMETRIUM&/POLYPC W/WO D&C N/A 2021    Procedure: DILATATION AND CURETTAGE (D&C) WITH HYSTEROSCOPY;  Surgeon: Honey Rose MD;  Location: [facility];  Service: Gynecology   • WISDOM TOOTH EXTRACTION         Social History     Tobacco Use   • Smoking status: Never   • Smokeless tobacco: Never   Substance Use Topics   • Alcohol use: Not Currently     Comment: occ       Allergies Allergen Reactions   • Haloperidol Other (See Comments)     Jaw locks up   • Penicillins Hives   • Pollen Extract Allergic Rhinitis       Medications Prior to Admission   Medication   • aspirin (ECOTRIN LOW STRENGTH) 81 mg EC tablet   • famotidine (PEPCID) 40 MG tablet   • levETIRAcetam (KEPPRA) 750 mg tablet   • levothyroxine 25 mcg tablet   • nystatin (MYCOSTATIN) 500,000 units/5 mL suspension   • Prenatal Vit-Iron Carbonyl-FA (prenatal multivitamin) TABS   • acetaminophen (TYLENOL) 500 mg tablet   • albuterol (Proventil HFA) 90 mcg/act inhaler   • docusate sodium (COLACE) 100 mg capsule   • fluticasone (FLONASE) 50 mcg/act nasal spray   • folic acid (FOLVITE) 1 mg tablet   • ondansetron (ZOFRAN) 4 mg tablet   • Pyridoxine HCl (vitamin B-6) 50 MG TABS           OBJECTIVE:  Vitals:  Temp:  [98 2 °F (36 8 °C)-98 4 °F (36 9 °C)] 98 2 °F (36 8 °C)  HR:  [] 86  Resp:  [18-20] 20  BP: (100-102)/(55-58) 100/58  Body mass index is 53 13 kg/m²       Physical Exam:  Constitutional: AAOx3  CV: +S1, +S2, no murmurs appreciated  Resp: No respiratory distress  Abdominal: Gravid uterus  Psychiatric: Behavioral normal  SVE: Cervical Dilation: 1  Cervical Effacement: 79  Fetal Station: -2  Presentation: Vertex  Method: Manual  OB Examiner: Dr Devorah Weston, Dr Tang Primes: FHT+ 152 BPM    TOCO:   Contraction Frequency (minutes): 0  Contraction Quality: Not applicableIrritable at first, now flat      Prenatal Labs:   Blood Type:   Lab Results   Component Value Date/Time    ABO Grouping O 05/23/2023 08:36 AM     , D (Rh type):   Lab Results   Component Value Date/Time    Rh Factor Negative 05/23/2023 08:36 AM     , HCT/HGB:   Lab Results   Component Value Date/Time    Hematocrit 33 5 (L) 05/23/2023 08:36 AM    Hemoglobin 10 9 (L) 05/23/2023 08:36 AM      , Platelets:   Lab Results   Component Value Date/Time    Platelets 838 77/54/4468 08:36 AM   , Rubella:   Lab Results   Component Value Date/Time    Rubella IgG Quant 14 9 "03/14/2023 02:01 PM        , VDRL/RPR:   Lab Results   Component Value Date/Time    RPR Non-Reactive 07/29/2022 08:56 AM      , Hep B:   Lab Results   Component Value Date/Time    Hepatitis B Surface Ag Non-reactive 03/14/2023 02:01 PM     , HIV:   Lab Results   Component Value Date/Time    HIV-1/HIV-2 Ab Non-Reactive 07/29/2022 08:56 AM       Tarun Cleary DO  OB/GYN PGY-3  5/23/2023  9:55 AM        Portions of the record may have been created with voice recognition software  Occasional wrong word or \"sound a like\" substitutions may have occurred due to the inherent limitations of voice recognition software    Read the chart carefully and recognize, using context, where substitutions have occurred    "

## 2023-05-23 NOTE — ANESTHESIA PROCEDURE NOTES
Spinal Block    Patient location during procedure: OR  Start time: 5/23/2023 3:20 PM  Reason for block: primary anesthetic  Staffing  Performed: CRNA   Anesthesiologist: Lisa Cisneros MD  Resident/CRNA: Po Bell CRNA  Preanesthetic Checklist  Completed: patient identified, IV checked, site marked, risks and benefits discussed, surgical consent, monitors and equipment checked, pre-op evaluation and timeout performed  Spinal Block  Patient position: sitting  Prep: ChloraPrep  Patient monitoring: heart rate, cardiac monitor, continuous pulse ox and frequent blood pressure checks  Approach: midline  Location: L3-4  Injection technique: single-shot  Needle  Needle type: Pencan  Needle gauge: 25 G  Needle length: 10 cm  Assessment  Sensory level: T4  Injection Assessment:  negative aspiration for heme, no paresthesia on injection and positive aspiration for clear CSF    Post-procedure:  site cleaned

## 2023-05-23 NOTE — ASSESSMENT & PLAN NOTE
-Patient with history of 2 1st trimester miscarriages, and stillbirth at 29 weeks  -Presenting with loss of mucus plug and cramping  -Visibly dilated to 1cm on speculum exam; CL 0 62  -Monitor for PTL vs advanced dilation

## 2023-05-23 NOTE — TELEPHONE ENCOUNTER
"  Reason for Disposition  • MILD-MODERATE vaginal bleeding (e g , small to medium clots; like mild menstrual period) (Exception: Single episode of faint spotting when wiping, or slight spotting after intercourse or pelvic exam)    Answer Assessment - Initial Assessment Questions  1  ONSET: \"When did this bleeding start? \"         Huge globs brown mucous with tinges of blood with lower abdominal cramping    Hx of 3  deliveries      2  DESCRIPTION: \"Describe the bleeding that you are having  \" \"How much bleeding is there? \"     - SPOTTING: spotting, or pinkish / brownish mucous discharge; does not fill panti-liner or pad     - MILD:  less than 1 pad / hour; less than patient's usual menstrual bleeding    - MODERATE: 1-2 pads / hour; 1 menstrual cup every 6 hours; small-medium blood clots (e g , pea, grape, small coin)    - SEVERE: soaking 2 or more pads/hour for 2 or more hours; 1 menstrual cup every 2 hours; bleeding not contained by pads or continuous red blood from vagina; large blood clots (e g , golf ball, large coin)        Some mucous    3  ABDOMINAL PAIN SEVERITY: If present, ask: \"How bad is it? \"  (e g , Scale 1-10; mild, moderate, or severe)    - MILD (1-3): doesn't interfere with normal activities, abdomen soft and not tender to touch     - MODERATE (4-7): interferes with normal activities or awakens from sleep, tender to touch     - SEVERE (8-10): excruciating pain, doubled over, unable to do any normal activities       Mild period cramp     4  PREGNANCY: \"Do you know how many weeks or months pregnant you are?\"        20 weeks     5  JAKE: \"What date are you expecting to deliver? \"         10 4 2023     6  FETAL MOVEMENT: \"Has the baby's movement decreased or changed significantly from normal?\"       Unsure    7  HEMODYNAMIC STATUS: \"Are you weak or feeling lightheaded? \" If Yes, ask: \"Can you stand and walk normally? \"        Denies    8   OTHER SYMPTOMS: \"What other symptoms are you having with the " "bleeding? \" (e g , leaking fluid from vagina, contractions)       Yes cramping brown discharge    Protocols used: PREGNANCY - VAGINAL BLEEDING GREATER THAN 20 WEEKS EGA-ADULT-    "

## 2023-05-23 NOTE — TELEPHONE ENCOUNTER
Patient called 20 weeks with bleeding , lost huge globs of brown mucous and lower abdominal and back cramping  Sent to [facility] for [facility]  TT sent to on call OB and Charge RN

## 2023-05-23 NOTE — ASSESSMENT & PLAN NOTE
· Cervical length 0 62 cm 5/23  · Indocin 50mg loading dose, followed by 25mg q6 x48 hrs  · S/p overnight observation, no bleeding or cramping  · Plan for repeat transvaginal ultrasound this morning

## 2023-05-23 NOTE — ANESTHESIA POSTPROCEDURE EVALUATION
Post-Op Assessment Note    CV Status:  Stable  Pain Score: 0    Pain management: adequate     Mental Status:  Alert and awake   Hydration Status:  Euvolemic   PONV Controlled:  Controlled   Airway Patency:  Patent      Post Op Vitals Reviewed: Yes      Staff: CRNA         No notable events documented      BP   125/70   Temp      Pulse  89   Resp   16   SpO2   100%

## 2023-05-23 NOTE — H&P
H&P Exam - Obstetrics   Neelima Cook 29 y o  female MRN: [id number]  Unit/Bed#: LD TRIAGE  Encounter: [id number]      ASSESSMENT:  29 y  o yo [id number] at 20w4d weeks gestation who is being admitted for a shortened cervix  EFW: 8oz on  US on   VTX by transabdominal ultrasound    PLAN:    * Cervical shortening  Assessment & Plan  -Patient with history of 2 1st trimester miscarriages, and stillbirth at 29 weeks  -Presenting with loss of mucus plug and cramping  -Visibly dilated to 1cm on speculum exam; CL 0 62  -Monitor for PTL vs advanced dilation       Speculum exam done by Dr Lorie Abrams, was called to bedside for TVUS due to funnel seen  Please see MFM Consult note by myself for full plan    History of Present Illness     Chief Complaint: Loss of Mucus Plug     HPI:  Janiya Tovar is a 29 y o   female with an JAKE of 10/6/2023, by Ultrasound at 20w4d weeks gestation who is being admitted for cervical insufficiency  Originally presented to triage for loss of mucus plug and cervical cramping  She woke up this morning around 5am and used the restroom  She states she lost her mucous plug at that time and has had uterine cramping every since  Cramping is not new for her this pregnancy but the loss of mucus plug concerned her  She states that she has had morning sickness throughout this entire pregnancy so far and has been unable to keep much food or fluids down  The last time she ate was last night and she had a few chicken nuggets and macoroni and the last fluids she drank was a few sips of water at 3am  She states that if she attempts to drink larger volumes of fluid, she will usually vomit  She vomits on a daily basis  She denies VB, LOF, uterine contractions  This pregnancy she has lost 11lbs from pre- weight  She has a prior history of 1 stillborn birth at 35 weeks and 2 miscarriages       Contractions: none  Loss of fluid: none  Vaginal bleeding: none  Fetal movement: none    She is CFW patient  PREGNANCY COMPLICATIONS:   1) Cervical insufficiency     OB History    Para Term  AB Living   4 1 0 1 2 0   SAB IAB Ectopic Multiple Live Births   2 0 0 0 0      # Outcome Date GA Lbr Lopez/2nd Weight Sex Delivery Anes PTL Lv   4 Current            3 SAB            2 SAB            1   28w0d    Vag-Spont   FD      Complications: ABO incompatibility in pregnancy      Obstetric Comments   Both SAB <2 months   Still birth at 7 months   Has had hypercoagulable workup in [place] which was negative        Baby complications/comments: none    Review of Systems   Constitutional: Negative for chills and fever  Eyes: Negative for visual disturbance  Respiratory: Negative for cough and shortness of breath  Cardiovascular: Negative for chest pain and palpitations  Gastrointestinal: Positive for nausea and vomiting  Negative for abdominal pain  Genitourinary: Negative for difficulty urinating, dysuria, frequency, hematuria, vaginal bleeding and vaginal discharge  Musculoskeletal: Negative for arthralgias and back pain  Skin: Negative for color change and rash  Neurological: Negative for dizziness and headaches  All other systems reviewed and are negative          Historical Information   Past Medical History:   Diagnosis Date   • Autism    • [place] esophagus    • CPAP (continuous positive airway pressure) dependence    • 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HCC)     not diagnosed as of 23-trying to R/O   • Depression    • Diabetes mellitus ([address] )    • Disease of thyroid gland     hypo   • Dysphagia     solids and liquids   • Female infertility    • Fibromyalgia, primary    • Gastric ulcer    • History of bronchitis    • Iron deficiency anemia     infusion in 2022   • Irregular menses    • Miscarriage    • Morbid obesity with BMI of 50 0-59 9, adult ([facility])    • Plantar fasciitis of left foot    • Reflux esophagitis    • Seizures ([address] )     last one 22   • Sleep apnea     CPAP • Wears glasses      Past Surgical History:   Procedure Laterality Date   • EGD      with Bx due to barretts esophagus   • EYE SURGERY Bilateral     lazy eye repair   • OH BIOPSY OF LIP N/A 11/10/2022    Procedure: EXCISION BIOPSY SALVARY GLANDS LOWER LIP;  Surgeon: Alirio Kessler MD;  Location: [address] [place] [address];  Service: ENT   • OH HYSTEROSCOPY BX ENDOMETRIUM&/POLYPC W/WO D&C N/A 9/22/2021    Procedure: DILATATION AND CURETTAGE (D&C) WITH HYSTEROSCOPY;  Surgeon: Gabriella Gunderson MD;  Location: [facility];  Service: Gynecology   • WISDOM TOOTH EXTRACTION       Social History   Social History     Substance and Sexual Activity   Alcohol Use Not Currently    Comment: occ     Social History     Substance and Sexual Activity   Drug Use Not Currently   • Types: Marijuana    Comment: about a couple times a week, quit June 2022     Social History     Tobacco Use   Smoking Status Never   Smokeless Tobacco Never         Meds/Allergies      Medications Prior to Admission   Medication   • aspirin (ECOTRIN LOW STRENGTH) 81 mg EC tablet   • famotidine (PEPCID) 40 MG tablet   • levETIRAcetam (KEPPRA) 750 mg tablet   • levothyroxine 25 mcg tablet   • nystatin (MYCOSTATIN) 500,000 units/5 mL suspension   • Prenatal Vit-Iron Carbonyl-FA (prenatal multivitamin) TABS   • acetaminophen (TYLENOL) 500 mg tablet   • albuterol (Proventil HFA) 90 mcg/act inhaler   • docusate sodium (COLACE) 100 mg capsule   • fluticasone (FLONASE) 50 mcg/act nasal spray   • folic acid (FOLVITE) 1 mg tablet   • ondansetron (ZOFRAN) 4 mg tablet   • Pyridoxine HCl (vitamin B-6) 50 MG TABS        Allergies   Allergen Reactions   • Haloperidol Other (See Comments)     Jaw locks up   • Penicillins Hives   • Pollen Extract Allergic Rhinitis       OBJECTIVE:    Vitals: Blood pressure 102/55, pulse (!) 108, temperature 98 4 °F (36 9 °C), temperature source Oral, resp  rate 18, last menstrual period 12/24/2022, not currently breastfeeding  There is no height or weight on file to calculate BMI  Physical Exam  Exam conducted with a chaperone present  Constitutional:       General: She is not in acute distress  Appearance: She is obese  She is not toxic-appearing  HENT:      Head: Normocephalic and atraumatic  Nose: Nose normal       Mouth/Throat:      Mouth: Mucous membranes are moist    Eyes:      Extraocular Movements: Extraocular movements intact  Pupils: Pupils are equal, round, and reactive to light  Cardiovascular:      Rate and Rhythm: Normal rate  Pulmonary:      Effort: Pulmonary effort is normal    Genitourinary:     General: Normal vulva  Vagina: Vaginal discharge present  Musculoskeletal:         General: No swelling  Cervical back: Neck supple  Right lower leg: No edema  Left lower leg: No edema  Skin:     General: Skin is warm and dry  Neurological:      General: No focal deficit present  Mental Status: She is alert and oriented to person, place, and time     Psychiatric:         Mood and Affect: Mood normal          Behavior: Behavior normal            [place]: none  Nitrazine: negative    Cervix:       Fetal heart rate:        [place]:        GBS: unknown     Prenatal Labs:   Blood Type:   Lab Results   Component Value Date/Time    ABO Grouping O 04/11/2023 02:06 PM     , D (Rh type):   Lab Results   Component Value Date/Time    Rh Factor Negative 04/11/2023 02:06 PM     , Antibody Screen: No results found for: ANTIBODYSCR , HCT/HGB:   Lab Results   Component Value Date/Time    Hematocrit 33 5 (L) 05/23/2023 08:36 AM    Hemoglobin 10 9 (L) 05/23/2023 08:36 AM      , MCV:   Lab Results   Component Value Date/Time    MCV 91 05/23/2023 08:36 AM      , Platelets:   Lab Results   Component Value Date/Time    Platelets 256 95/44/1725 08:36 AM      , 1 hour Glucola:   Lab Results   Component Value Date/Time    Glucose 89 03/14/2023 02:01 PM    , Rubella:   Lab Results   Component Value Date/Time    Rubella IgG Quant 14 9 03/14/2023 02:01 PM        , VDRL/RPR:   Lab Results   Component Value Date/Time    RPR Non-Reactive 07/29/2022 08:56 AM      , Urine Culture/Screen:   Lab Results   Component Value Date/Time    Urine Culture 20,000-29,000 cfu/ml 04/11/2023 11:26 AM       , Urine Drug Screen:   Lab Results   Component Value Date/Time    Barbiturate Ur Negative 08/03/2021 07:45 PM    Benzodiazepine Urine Negative 08/03/2021 07:45 PM    THC Urine Positive (A) 08/03/2021 07:45 PM    Cocaine Urine Negative 08/03/2021 07:45 PM    Methadone Urine Negative 08/03/2021 07:45 PM    Opiate Urine Negative 08/03/2021 07:45 PM    PCP Ur Negative 08/03/2021 07:45 PM   , Hep B:   Lab Results   Component Value Date/Time    Hepatitis B Surface Ag Non-reactive 03/14/2023 02:01 PM     HIV:     Lab Results   Component Value Date/Time    N gonorrhoeae, DNA Probe Negative 03/30/2023 06:23 AM         Trenton Riddle  5/23/2023  9:23 AM

## 2023-05-23 NOTE — PLAN OF CARE
Problem: PAIN - ADULT  Goal: Verbalizes/displays adequate comfort level or baseline comfort level  Description: Interventions:  - Encourage patient to monitor pain and request assistance  - Assess pain using appropriate pain scale   0-10   - Administer analgesics based on type and severity of pain and evaluate response  - Implement non-pharmacological measures as appropriate and evaluate response  - Consider cultural and social influences on pain and pain management  - Notify physician/advanced practitioner if interventions unsuccessful or patient reports new pain  Outcome: Progressing     Problem: INFECTION - ADULT  Goal: Absence or prevention of progression during hospitalization  Description: INTERVENTIONS:  - Assess and monitor for signs and symptoms of infection  - Monitor lab/diagnostic results  - Monitor all insertion sites, i e  indwelling lines  - [place] appropriate cooling/warming therapies per order  - Administer medications as ordered  - Instruct and encourage patient and family to use good hand hygiene technique  - Identify and instruct in appropriate isolation precautions for identified infection/condition  Outcome: Progressing  Goal: Absence of fever/infection during neutropenic period  Description: INTERVENTIONS:  - Monitor WBC    Outcome: Progressing     Problem: SAFETY ADULT  Goal: Patient will remain free of falls  Description: INTERVENTIONS:  - Educate patient/family on patient safety including physical limitations  - Instruct patient to call for assistance with activity   - Consult OT/PT to assist with strengthening/mobility   - Keep Call bell within reach  - Keep bed low and locked with side rails adjusted as appropriate  - Keep care items and personal belongings within reach  - Initiate and maintain comfort rounds  - Make Fall Risk Sign visible to staff  - Apply yellow socks and bracelet for high fall risk patients  - Consider moving patient to room near nurses station  Outcome: Progressing  Goal: Maintain or return to baseline ADL function  Description: INTERVENTIONS:  -  Assess patient's ability to carry out ADLs; assess patient's baseline for ADL function and identify physical deficits which impact ability to perform ADLs (bathing, care of mouth/teeth, toileting, grooming, dressing, etc )  - Assess/evaluate cause of self-care deficits   - Assess range of motion  - Assess patient's mobility; develop plan if impaired  - Assess patient's need for assistive devices and provide as appropriate  - Encourage maximum independence but intervene and supervise when necessary  - Involve family in performance of ADLs  - Assess for home care needs following discharge   - Consider OT consult to assist with ADL evaluation and planning for discharge  - Provide patient education as appropriate  Outcome: Progressing  Goal: Maintains/Returns to pre admission functional level  Description: INTERVENTIONS:  - Perform BMAT or MOVE assessment daily    - Set and communicate daily mobility goal to care team and patient/family/caregiver  - Collaborate with rehabilitation services on mobility goals if consulted  - Out of bed for toileting  - Record patient progress and toleration of activity level   Outcome: Progressing     Problem: Knowledge Deficit  Goal: Patient/family/caregiver demonstrates understanding of disease process, treatment plan, medications, and discharge instructions  Description: Complete learning assessment and assess knowledge base    Interventions:  - Provide teaching at level of understanding  - Provide teaching via preferred learning methods  Outcome: Progressing     Problem: DISCHARGE PLANNING  Goal: Discharge to home or other facility with appropriate resources  Description: INTERVENTIONS:  - Identify barriers to discharge w/patient and caregiver  - Arrange for needed discharge resources and transportation as appropriate  - Identify discharge learning needs (meds, wound care, etc )  - Arrange for interpretive services to assist at discharge as needed  - Refer to Case Management Department for coordinating discharge planning if the patient needs post-hospital services based on physician/advanced practitioner order or complex needs related to functional status, cognitive ability, or social support system  Outcome: Progressing     Problem: ANTEPARTUM  Goal: Maintain pregnancy as long as maternal and/or fetal condition is stable  Description: INTERVENTIONS:  - Maternal surveillance  Vs q shift  - Fetal surveillance  None ordered at this time  - Monitor uterine activity  Continuous toco  - Medications as ordered   Indocin q 6hr  - Bedrest w/ brp  Outcome: Progressing

## 2023-05-23 NOTE — ASSESSMENT & PLAN NOTE
"BMI 53 on admission, patient is 4'7\"  Tocometry technically difficult to assess due to body habitus  Has a genetics referral to assess for skeletal dysplasia  "

## 2023-05-23 NOTE — OP NOTE
OPERATIVE REPORT  PATIENT NAME: Marjorie Brice    :  1990  MRN: [id number]  Pt Location: [facility] L&D OR ROOM 02    SURGERY DATE: 2023    Surgeon(s) and Role:     * Graham Swain MD - Primary     * Trenton Riddle DO - Assisting    Preop Diagnosis:  Incompetence of cervix [N88 3]    Post-Op Diagnosis Codes:     * Incompetence of cervix [N88 3]    Procedure(s) (LRB):  CERCLAGE CERVICAL (N/A)    Specimen(s):  * No specimens in log *    Surgical QBL: <10cc    Anesthesia Type: Spinal    Operative Indications:  Margoth Alcantarr 52-year-old -1-2-0 at 20 weeks and 4 days who came in with cramping, loss of mucous plug and was diagnosed with a short cervix of 0 62 cm  Cramping resolved while she was in triage, and she was observed for further signs of  labor which there were none  She was started on Indocin, and was consented for cerclage placement  After informed consent of risks, benefits, alternatives, she decided to proceed with cerclage placement  Operative Findings:  Preoperative fetal heart tones were 152 BPM, postoperative tones were 203 BPM    Complications: None     Procedure and Technique:  After discussion of risks/benefits/alternatives, Michelle Granados gave informed consent for cerclage placement  She was taken to the operating room, where a time-out was performed  Preoperative cefazolin and indomethacin were given  Regional anesthesia was administered  She was positioned in dorsal lithotomy position with her feet in yellowfin stirrups  Sequential compression devices were utilized  A chlorhexedine vaginal prep was performed, and she was draped in the usual sterile fashion  Her bladder had been immediately emptied prior to proceeding to the operating room  Two Briesky retractors were used to visualize the cervix  The cervix was grasped with ring forceps, anteriorly and posteriorly   A single pursestring suture was placed circumferentially in the cervical stroma with #5 Ethibond suture, taking care to avoid cervical vessels at 3 and 9 o'clock  A series of 12 knots were tied at 12 o'clock   Digital exam revealed a closed cervix after cerclage placement  The cervix was hemostatic following placement  All instruments were removed, with all counts correct post-procedure  Fetal heart motion was confirmed pre-and post-operatively  She tolerated the procedure well without complications and was taken to PACU in stable condition        Patient Disposition:  PACU     SIGNATURE: Cathy Johnson DO  DATE: May 23, 2023  TIME: 4:08 PM

## 2023-05-23 NOTE — ANESTHESIA PREPROCEDURE EVALUATION
Procedure:  CERCLAGE CERVICAL (Cervix)    Relevant Problems   ANESTHESIA (within normal limits)      ENDO   (+) Hypothyroid in pregnancy, antepartum      GI/HEPATIC   (+) Dysphagia   (+) Gastric ulcer   (+) Gastroesophageal reflux disease      GYN   (+) 20 weeks gestation of pregnancy      HEMATOLOGY   (+) Iron deficiency anemia secondary to inadequate dietary iron intake      NEURO/PSYCH   (+) Depression with anxiety   (+) History of irregular menstrual bleeding   (+) Nonintractable epilepsy without status epilepticus (HCC)   (+) Seizures (HCC)      PULMONARY   (+) GARIMA (obstructive sleep apnea)        Physical Exam    Airway    Mallampati score: II  TM Distance: >3 FB  Neck ROM: full     Dental   No notable dental hx     Cardiovascular      Pulmonary      Other Findings        Anesthesia Plan  ASA Score- 3     Anesthesia Type- spinal with ASA Monitors  Additional Monitors:   Airway Plan:           Plan Factors-Exercise tolerance (METS): >4 METS  Chart reviewed  EKG reviewed  Existing labs reviewed  Patient summary reviewed  Patient is not a current smoker  Induction-     Postoperative Plan-     Informed Consent- Anesthetic plan and risks discussed with patient  I personally reviewed this patient with the CRNA  Discussed and agreed on the Anesthesia Plan with the CRNA  Donte Montelongo

## 2023-05-23 NOTE — ASSESSMENT & PLAN NOTE
She did have an episode of bleeding early in the pregnancy and received RhoGAM, was likely due to a cervical polyp

## 2023-05-24 ENCOUNTER — TELEPHONE (OUTPATIENT)
Dept: NEUROLOGY | Facility: CLINIC | Age: 33
End: 2023-05-24

## 2023-05-24 ENCOUNTER — TELEPHONE (OUTPATIENT)
Facility: HOSPITAL | Age: 33
End: 2023-05-24

## 2023-05-24 VITALS
SYSTOLIC BLOOD PRESSURE: 103 MMHG | RESPIRATION RATE: 20 BRPM | HEART RATE: 97 BPM | WEIGHT: 228.6 LBS | OXYGEN SATURATION: 98 % | TEMPERATURE: 98.4 F | DIASTOLIC BLOOD PRESSURE: 50 MMHG | HEIGHT: 55 IN | BODY MASS INDEX: 52.9 KG/M2

## 2023-05-24 PROBLEM — O99.282 HYPOTHYROIDISM DURING PREGNANCY IN SECOND TRIMESTER: Status: ACTIVE | Noted: 2023-05-24

## 2023-05-24 PROBLEM — E03.9 HYPOTHYROIDISM DURING PREGNANCY IN SECOND TRIMESTER: Status: ACTIVE | Noted: 2023-05-24

## 2023-05-24 PROBLEM — O09.292 HISTORY OF STILLBIRTH IN CURRENTLY PREGNANT PATIENT, SECOND TRIMESTER: Status: ACTIVE | Noted: 2023-05-24

## 2023-05-24 RX ORDER — METRONIDAZOLE 500 MG/1
500 TABLET ORAL EVERY 12 HOURS SCHEDULED
Qty: 12 TABLET | Refills: 0 | Status: SHIPPED | OUTPATIENT
Start: 2023-05-24 | End: 2023-05-30

## 2023-05-24 RX ORDER — INDOMETHACIN 25 MG/1
25 CAPSULE ORAL EVERY 6 HOURS
Qty: 4 CAPSULE | Refills: 0 | Status: SHIPPED | OUTPATIENT
Start: 2023-05-24 | End: 2023-06-09

## 2023-05-24 RX ORDER — PROGESTERONE 200 MG/1
1 CAPSULE ORAL
Qty: 135 CAPSULE | Refills: 0 | Status: SHIPPED | OUTPATIENT
Start: 2023-05-24 | End: 2023-06-09

## 2023-05-24 RX ADMIN — DOCUSATE SODIUM 100 MG: 100 CAPSULE, LIQUID FILLED ORAL at 09:59

## 2023-05-24 RX ADMIN — LEVETIRACETAM 500 MG: 250 TABLET, FILM COATED ORAL at 10:57

## 2023-05-24 RX ADMIN — INDOMETHACIN 25 MG: 25 CAPSULE ORAL at 04:10

## 2023-05-24 RX ADMIN — FAMOTIDINE 20 MG: 20 TABLET, FILM COATED ORAL at 04:15

## 2023-05-24 RX ADMIN — LEVETIRACETAM 250 MG: 250 TABLET, FILM COATED ORAL at 09:59

## 2023-05-24 RX ADMIN — LEVOTHYROXINE SODIUM 25 MCG: 25 TABLET ORAL at 06:02

## 2023-05-24 RX ADMIN — ONDANSETRON 4 MG: 2 INJECTION INTRAMUSCULAR; INTRAVENOUS at 08:34

## 2023-05-24 RX ADMIN — Medication 1 TABLET: at 09:58

## 2023-05-24 RX ADMIN — INDOMETHACIN 25 MG: 25 CAPSULE ORAL at 10:57

## 2023-05-24 RX ADMIN — METRONIDAZOLE 500 MG: 500 TABLET ORAL at 09:59

## 2023-05-24 RX ADMIN — PANTOPRAZOLE SODIUM 40 MG: 40 TABLET, DELAYED RELEASE ORAL at 09:59

## 2023-05-24 NOTE — TELEPHONE ENCOUNTER
NAYELYM with PT 5/24 regarding her appt tomorrow  While pre-charting, I noticed PT was still in hospital recovery from procedure  Reminded PT of appt tomorrow and asked her to call office if she is not able to make appt

## 2023-05-24 NOTE — PROGRESS NOTES
Please refer to the [facility] ultrasound report in Ob Procedures for additional information regarding today's visit

## 2023-05-24 NOTE — PROGRESS NOTES
"[facility] Progress note   Leanna Rodriguez 29 y o  female MRN: [id number]  Unit/Bed#: -01 Encounter: [id number]    Assessment/Plan:    Ms Jose Fields is a 29 y o   at [place] [address] Day: 2, admitted with shortened cervix now postop day 1 status post Arevalo cerclage placement  Cervical shortening  Assessment & Plan  · Cervical length 0 62 cm   · Indocin 50mg loading dose, followed by 25mg q6 x48 hrs  · S/p overnight observation, no bleeding or cramping  · Plan for repeat transvaginal ultrasound this morning    History of stillbirth in currently pregnant patient, unspecified trimester  Assessment & Plan  Late to care in G1 pregnancy - 28w demise due to \"fluid around the heart/trisomy\"      20 weeks gestation of pregnancy  Assessment & Plan  She did have an episode of bleeding early in the pregnancy and received RhoGAM, was likely due to a cervical polyp      Morbid obesity ([facility] [address] )  Assessment & Plan  BMI 53 on admission, patient is 4'7\"  Tocometry technically difficult to assess due to body habitus  Has a genetics referral to assess for skeletal dysplasia    Nonintractable epilepsy without status epilepticus [facility])  Assessment & Plan  Continue levatiracetem     * Cervical cerclage suture present, antepartum  Assessment & Plan  Exam indicated Arevalo cerclage placed May 23           Subjective:    Leanna Rodriguez had no current complaints  Overnight events: none  She is not in pain  She is tolerating PO, and denies nausea or vomiting  Patient denies fever, chills, chest pain, shortness of breath, or calf tenderness  She was able to urinate appropriately and had a bowel movement overnight      Objective:  /59 (BP Location: Left arm)   Pulse 95   Temp 98 6 °F (37 °C) (Oral)   Resp 20   Ht 4' 7\" (1 397 m)   Wt 104 kg (228 lb 9 6 oz)   LMP 2022 (Exact Date)   SpO2 99%   BMI 53 13 kg/m²       Lab Results   Component Value Date    HCT 33 5 (L) 2023    HGB 10 9 (L) 2023    " MCV 91 05/23/2023     05/23/2023    WBC 7 50 05/23/2023       Lab Results   Component Value Date    BUN 6 05/23/2023    CALCIUM 8 8 05/23/2023     05/23/2023    CO2 23 05/23/2023    CREATININE 0 38 (L) 05/23/2023    K 3 3 (L) 05/23/2023         TOCO:   Contraction Frequency (minutes): 0  Contraction Quality: Not applicable      General: Well appearing, no distress  Respiratory: Unlabored breathing  Cardiovascular: Regular rate  Abdomen: Soft, gravid, nontender  Fundal Height: Appropriate for gestational age  SVE: Cervical Dilation: 1  Cervical Effacement: 79  Fetal Station: -2  Presentation: Vertex  Method: Manual  OB Examiner: Dr Nano Madsen Peasant  Extremities: Warm and well perfused  Non tender        Jennifer Roberts DO  5/24/2023  6:22 AM

## 2023-05-24 NOTE — PLAN OF CARE
Problem: PAIN - ADULT  Goal: Verbalizes/displays adequate comfort level or baseline comfort level  Description: Interventions:  - Encourage patient to monitor pain and request assistance  - Assess pain using appropriate pain scale   0-10   - Administer analgesics based on type and severity of pain and evaluate response  - Implement non-pharmacological measures as appropriate and evaluate response  - Consider cultural and social influences on pain and pain management  - Notify physician/advanced practitioner if interventions unsuccessful or patient reports new pain  Outcome: Adequate for Discharge     Problem: INFECTION - ADULT  Goal: Absence or prevention of progression during hospitalization  Description: INTERVENTIONS:  - Assess and monitor for signs and symptoms of infection  - Monitor lab/diagnostic results  - Monitor all insertion sites, i e  indwelling lines  - [place] appropriate cooling/warming therapies per order  - Administer medications as ordered  - Instruct and encourage patient and family to use good hand hygiene technique  - Identify and instruct in appropriate isolation precautions for identified infection/condition  Outcome: Adequate for Discharge  Goal: Absence of fever/infection during neutropenic period  Description: INTERVENTIONS:  - Monitor WBC    Outcome: Adequate for Discharge     Problem: SAFETY ADULT  Goal: Patient will remain free of falls  Description: INTERVENTIONS:  - Educate patient/family on patient safety including physical limitations  - Instruct patient to call for assistance with activity   - Consult OT/PT to assist with strengthening/mobility   - Keep Call bell within reach  - Keep bed low and locked with side rails adjusted as appropriate  - Keep care items and personal belongings within reach  - Initiate and maintain comfort rounds  - Make Fall Risk Sign visible to staff  - Apply yellow socks and bracelet for high fall risk patients  - Consider moving patient to room near nurses station  Outcome: Adequate for Discharge  Goal: Maintain or return to baseline ADL function  Description: INTERVENTIONS:  -  Assess patient's ability to carry out ADLs; assess patient's baseline for ADL function and identify physical deficits which impact ability to perform ADLs (bathing, care of mouth/teeth, toileting, grooming, dressing, etc )  - Assess/evaluate cause of self-care deficits   - Assess range of motion  - Assess patient's mobility; develop plan if impaired  - Assess patient's need for assistive devices and provide as appropriate  - Encourage maximum independence but intervene and supervise when necessary  - Involve family in performance of ADLs  - Assess for home care needs following discharge   - Consider OT consult to assist with ADL evaluation and planning for discharge  - Provide patient education as appropriate  Outcome: Adequate for Discharge  Goal: Maintains/Returns to pre admission functional level  Description: INTERVENTIONS:  - Perform BMAT or MOVE assessment daily    - Set and communicate daily mobility goal to care team and patient/family/caregiver  - Collaborate with rehabilitation services on mobility goals if consulted  - Out of bed for toileting  - Record patient progress and toleration of activity level   Outcome: Adequate for Discharge     Problem: Knowledge Deficit  Goal: Patient/family/caregiver demonstrates understanding of disease process, treatment plan, medications, and discharge instructions  Description: Complete learning assessment and assess knowledge base    Interventions:  - Provide teaching at level of understanding  - Provide teaching via preferred learning methods  Outcome: Adequate for Discharge     Problem: DISCHARGE PLANNING  Goal: Discharge to home or other facility with appropriate resources  Description: INTERVENTIONS:  - Identify barriers to discharge w/patient and caregiver  - Arrange for needed discharge resources and transportation as appropriate  - Identify discharge learning needs (meds, wound care, etc )  - Arrange for interpretive services to assist at discharge as needed  - Refer to Case Management Department for coordinating discharge planning if the patient needs post-hospital services based on physician/advanced practitioner order or complex needs related to functional status, cognitive ability, or social support system  Outcome: Adequate for Discharge     Problem: ANTEPARTUM  Goal: Maintain pregnancy as long as maternal and/or fetal condition is stable  Description: INTERVENTIONS:  - Maternal surveillance  Vs q shift  - Fetal surveillance  None ordered at this time  - Monitor uterine activity  Continuous toco  - Medications as ordered   Indocin q 6hr  - Bedrest w/ brp  Outcome: Adequate for Discharge

## 2023-05-24 NOTE — TELEPHONE ENCOUNTER
Patient admitted to hospital cx appt spoke to patient - patient stated she will call back to reschedule

## 2023-05-24 NOTE — PLAN OF CARE
Problem: PAIN - ADULT  Goal: Verbalizes/displays adequate comfort level or baseline comfort level  Description: Interventions:  - Encourage patient to monitor pain and request assistance  - Assess pain using appropriate pain scale   0-10   - Administer analgesics based on type and severity of pain and evaluate response  - Implement non-pharmacological measures as appropriate and evaluate response  - Consider cultural and social influences on pain and pain management  - Notify physician/advanced practitioner if interventions unsuccessful or patient reports new pain  Outcome: Progressing     Problem: INFECTION - ADULT  Goal: Absence or prevention of progression during hospitalization  Description: INTERVENTIONS:  - Assess and monitor for signs and symptoms of infection  - Monitor lab/diagnostic results  - Monitor all insertion sites, i e  indwelling lines  - [place] appropriate cooling/warming therapies per order  - Administer medications as ordered  - Instruct and encourage patient and family to use good hand hygiene technique  - Identify and instruct in appropriate isolation precautions for identified infection/condition  Outcome: Progressing  Goal: Absence of fever/infection during neutropenic period  Description: INTERVENTIONS:  - Monitor WBC    Outcome: Progressing     Problem: SAFETY ADULT  Goal: Patient will remain free of falls  Description: INTERVENTIONS:  - Educate patient/family on patient safety including physical limitations  - Instruct patient to call for assistance with activity   - Consult OT/PT to assist with strengthening/mobility   - Keep Call bell within reach  - Keep bed low and locked with side rails adjusted as appropriate  - Keep care items and personal belongings within reach  - Initiate and maintain comfort rounds  - Make Fall Risk Sign visible to staff  - Apply yellow socks and bracelet for high fall risk patients  - Consider moving patient to room near nurses station  Outcome: Progressing  Goal: Maintain or return to baseline ADL function  Description: INTERVENTIONS:  -  Assess patient's ability to carry out ADLs; assess patient's baseline for ADL function and identify physical deficits which impact ability to perform ADLs (bathing, care of mouth/teeth, toileting, grooming, dressing, etc )  - Assess/evaluate cause of self-care deficits   - Assess range of motion  - Assess patient's mobility; develop plan if impaired  - Assess patient's need for assistive devices and provide as appropriate  - Encourage maximum independence but intervene and supervise when necessary  - Involve family in performance of ADLs  - Assess for home care needs following discharge   - Consider OT consult to assist with ADL evaluation and planning for discharge  - Provide patient education as appropriate  Outcome: Progressing  Goal: Maintains/Returns to pre admission functional level  Description: INTERVENTIONS:  - Perform BMAT or MOVE assessment daily    - Set and communicate daily mobility goal to care team and patient/family/caregiver  - Collaborate with rehabilitation services on mobility goals if consulted  - Out of bed for toileting  - Record patient progress and toleration of activity level   Outcome: Progressing     Problem: Knowledge Deficit  Goal: Patient/family/caregiver demonstrates understanding of disease process, treatment plan, medications, and discharge instructions  Description: Complete learning assessment and assess knowledge base    Interventions:  - Provide teaching at level of understanding  - Provide teaching via preferred learning methods  Outcome: Progressing     Problem: DISCHARGE PLANNING  Goal: Discharge to home or other facility with appropriate resources  Description: INTERVENTIONS:  - Identify barriers to discharge w/patient and caregiver  - Arrange for needed discharge resources and transportation as appropriate  - Identify discharge learning needs (meds, wound care, etc )  - Arrange for interpretive services to assist at discharge as needed  - Refer to Case Management Department for coordinating discharge planning if the patient needs post-hospital services based on physician/advanced practitioner order or complex needs related to functional status, cognitive ability, or social support system  Outcome: Progressing     Problem: ANTEPARTUM  Goal: Maintain pregnancy as long as maternal and/or fetal condition is stable  Description: INTERVENTIONS:  - Maternal surveillance  Vs q shift  - Fetal surveillance  None ordered at this time  - Monitor uterine activity  Continuous toco  - Medications as ordered   Indocin q 6hr  - Bedrest w/ brp  Outcome: Progressing

## 2023-05-24 NOTE — QUICK NOTE
30 y/o  with shortened cervix pod 1 s/p taylor cerclage  Patient a little anxious about situation but otherwise feeling well  Some nausea  Set for discharge and follow up has mfm tomorrow and ob office   Patient aware to call with any concerns  Plan as per m

## 2023-05-24 NOTE — DISCHARGE INSTRUCTIONS
Cervical Cerclage   WHAT YOU NEED TO KNOW:   Cervical cerclage, or cervical stitch, is a procedure to close your cervix during pregnancy  Cerclage may help prevent premature delivery of your baby  The closure may be removed around week 40 of pregnancy  The procedure is usually done through the vagina but can be done through the abdomen  DISCHARGE INSTRUCTIONS:   Call your local emergency number (911 in the [address]) if:   Blood soaks through your bandage  You feel something bulge out from your vagina  You have clear fluid coming from your vagina  You have contractions  You have lower abdominal or back pain  Call your doctor or obstetrician if:   You have a fever or chills  Your incision area is red, swollen, or draining pus  You have nausea or are vomiting  You have pus or a foul-smelling odor coming from your vagina  You have trouble urinating  You have vaginal bleeding  You have questions or concerns about your condition or care  Medicines:   Medicines  may be given to help decrease pain, soften your bowel movements, or prevent infection  Take your medicine as directed  Contact your healthcare provider if you think your medicine is not helping or if you have side effects  Tell your provider if you are allergic to any medicine  Keep a list of the medicines, vitamins, and herbs you take  Include the amounts, and when and why you take them  Bring the list or the pill bottles to follow-up visits  Carry your medicine list with you in case of an emergency  Self-care:   Rest as directed  You may need to rest in bed for 2 days  This promotes blood flow to the baby and may help prevent or decrease contractions  Do not place anything in your vagina  Examples include a douche or a tampon  Do not have sex until your healthcare provider says it is okay  Care for your incision area  Carefully wash the area with soap and water   Dry the area and put on a new, clean sanitary pad or bandage as directed  Change your pad or bandage when it gets wet or dirty  Check the area for signs of infection, such as redness, swelling, or pus  Prevent constipation:      Eat a variety of healthy foods  Healthy foods include fruits, vegetables, whole-grain breads, low-fat dairy products, beans, lean meats, and fish  Drink liquids as directed  Prune juice and water are good liquids to drink  Ask how much liquid to drink each day and which liquids are best for you  Follow up with your doctor or obstetrician as directed:  Write down your questions so you remember to ask them during your visits  © Copyright [employer] [place] 2022 Information is for End User's use only and may not be sold, redistributed or otherwise used for commercial purposes  The above information is an  only  It is not intended as medical advice for individual conditions or treatments  Talk to your doctor, nurse or pharmacist before following any medical regimen to see if it is safe and effective for you

## 2023-05-25 ENCOUNTER — ROUTINE PRENATAL (OUTPATIENT)
Facility: HOSPITAL | Age: 33
End: 2023-05-25

## 2023-05-25 VITALS
SYSTOLIC BLOOD PRESSURE: 102 MMHG | BODY MASS INDEX: 51.7 KG/M2 | DIASTOLIC BLOOD PRESSURE: 52 MMHG | HEART RATE: 89 BPM | WEIGHT: 223.4 LBS | HEIGHT: 55 IN

## 2023-05-25 DIAGNOSIS — O99.282 HYPOTHYROIDISM DURING PREGNANCY IN SECOND TRIMESTER: ICD-10-CM

## 2023-05-25 DIAGNOSIS — O26.872 CERVICAL SHORTENING, SECOND TRIMESTER: ICD-10-CM

## 2023-05-25 DIAGNOSIS — O99.212 MATERNAL MORBID OBESITY IN SECOND TRIMESTER, ANTEPARTUM (HCC): Primary | ICD-10-CM

## 2023-05-25 DIAGNOSIS — E66.01 MATERNAL MORBID OBESITY IN SECOND TRIMESTER, ANTEPARTUM (HCC): Primary | ICD-10-CM

## 2023-05-25 DIAGNOSIS — E03.9 HYPOTHYROIDISM DURING PREGNANCY IN SECOND TRIMESTER: ICD-10-CM

## 2023-05-25 DIAGNOSIS — O09.292 HISTORY OF STILLBIRTH IN CURRENTLY PREGNANT PATIENT, SECOND TRIMESTER: ICD-10-CM

## 2023-05-25 DIAGNOSIS — Z3A.20 20 WEEKS GESTATION OF PREGNANCY: ICD-10-CM

## 2023-05-25 DIAGNOSIS — O35.2XX0 PREVIOUS CHILD WITH CONGENITAL ANOMALY, CURRENTLY PREGNANT, ANTEPARTUM, SINGLE OR UNSPECIFIED FETUS: ICD-10-CM

## 2023-05-25 NOTE — LETTER
May 25, 2023     Anjelica Crawford MD  [address]  [address]  [address] [place] [address]    Patient: Kalpesh Lau   YOB: 1990   Date of Visit: 5/25/2023       Dear Dr Acacia Perez: Thank you for referring Kalpesh Lau to me for evaluation  Below are my notes for this consultation  If you have questions, please do not hesitate to call me  I look forward to following your patient along with you           Sincerely,        Milton Nichols MD        CC: No Recipients    Milton Nichols MD  5/24/2023  9:37 AM  Sign when Signing Visit  Please refer to the [facility] ultrasound report in Ob Procedures for additional information regarding today's visit

## 2023-05-28 ENCOUNTER — NURSE TRIAGE (OUTPATIENT)
Dept: OTHER | Facility: OTHER | Age: 33
End: 2023-05-28

## 2023-05-28 ENCOUNTER — HOSPITAL ENCOUNTER (OUTPATIENT)
Facility: HOSPITAL | Age: 33
Discharge: HOME | End: 2023-05-28
Attending: OBSTETRICS & GYNECOLOGY | Admitting: OBSTETRICS & GYNECOLOGY

## 2023-05-28 VITALS
TEMPERATURE: 98.9 F | SYSTOLIC BLOOD PRESSURE: 116 MMHG | HEART RATE: 109 BPM | RESPIRATION RATE: 20 BRPM | DIASTOLIC BLOOD PRESSURE: 64 MMHG

## 2023-05-28 RX ORDER — FERROUS SULFATE 325(65) MG
325 TABLET ORAL
COMMUNITY

## 2023-05-28 NOTE — PROGRESS NOTES
L&D Triage Note - OB/GYN  Gilberto Hurtado 29 y o  female MRN: [id number]  Unit/Bed#: LD TRIAGE 2 Encounter: [id number]      ASSESSMENT:    Gilberto Hurtado is a 29 y o   at 21w2d with cerclage placed last week, presenting to triage with sensation of object in the vagina and mild cramping  Cerclage intact on speculum exam     PLAN:    1) Cramping  -On speculum, cerclage appears intact without cervical dilation  -SVE: stitch is palpable, cervical os intact  -Tones 162  -CL: ranges on imaging between 0 7 -1 6cm, similar to previous images  -Discharged with strict return precautions  2) Continue routine prenatal care  3) Discharge from [facility] triage with  labor precautions    - Reviewed rupture of membranes, false vs true labor, decreased fetal movement, and vaginal bleeding   - Pt to call provider with any concerns and follow up at her next scheduled prenatal appointment    - Case discussed with Dr Geoff Arellano:    Gilberto Hurtado 29 y o  [id number] at [address] [place] [address] with an Estimated Date of Delivery: 10/6/23 presenting to triage with complaint of sensation of a tampon or something stuck in the vagina and mild cramping  This is a new sensation that began this morning  She has been taking her progesterone suppository       Her current obstetrical history is significant for short cervix, cerclage    Her past obstetrical history is significant for 2 SAB and 1 fetal demise at 28 weeks      OBJECTIVE:    Vitals:    23 1418   BP: 116/64   Pulse: (!) 109   Resp: 20   Temp: 98 9 °F (37 2 °C)       ROS:  Constitutional: Negative  Respiratory: Negative  Cardiovascular: Negative    Gastrointestinal: Negative    General Physical Exam:  General: in no apparent distress  Cardiovascular: No murmurs  Lungs: non-labored breathing  Abdomen: abdomen is soft without significant tenderness, masses, organomegaly or guarding  Lower extremeties: nontender    Cervical Exam  Speculum: Cervical os appears closed with stitch of cerclage intact at 12 o'clock, not on tension    SVE: cerclage intact, cervical os does not feel dilated on gentle palpation    FHT:   Tones 162    TOCO:    no ctx    Lab Results   Component Value Date    HCT 33 5 (L) 05/23/2023    HGB 10 9 (L) 05/23/2023     05/23/2023    WBC 7 50 05/23/2023     Lab Results   Component Value Date    ALT 18 05/23/2023    AST 15 05/23/2023    BUN 6 05/23/2023     05/23/2023    CO2 23 05/23/2023    CREATININE 0 38 (L) 05/23/2023    K 3 3 (L) 05/23/2023     Imaging:         TVUS   - Cervical length    - 1 2cm    - 1 5cm    -  7cm    Ann Humphrey MD,  OBGYN PGY-1  5/28/2023 2:29 PM

## 2023-05-28 NOTE — TELEPHONE ENCOUNTER
"Regarding: Post cervical cerclage discomfort  ----- Message from Madhav Cook sent at 5/28/2023 11:43 AM EDT -----  Kayley Kumar had a cervical cerclage on Tuesday and now it feels like there's something stuck down there like a tampon   I am also having occasional mild cramps and I want to make sure it's normal \"    "

## 2023-05-28 NOTE — TELEPHONE ENCOUNTER
"  Reason for Disposition  • [1] Caller has URGENT question AND [2] triager unable to answer question    Answer Assessment - Initial Assessment Questions  1  SYMPTOM: \"What's the main symptom you're concerned about? \" (e g , pain, fever, vomiting)      Patient stated that she had cervical cerclage on Tuesday; she said this morning she said that it feels like there is a tampon stuck inside her/something stuck inside her  Cramping started this morning as well, feels it in her lower abdomen like a menstrual cramp  2  ONSET: \"When did symptoms  start? \"      This morning  3  SURGERY: \"What surgery was performed? \"      Cervical cerclage  4  DATE of SURGERY: \"When was surgery performed? \"       5/23  5  ANESTHESIA: \" What type of anesthesia did you have? \" (e g , general, spinal, epidural, local)      epidural  6  PAIN: \"Is there any pain? \" If Yes, ask: \"How bad is it? \"  (Scale 1-10; or mild, moderate, severe)      3/10, intermittent  7  FEVER: \"Do you have a fever? \" If Yes, ask: \"What is your temperature, how was it measured, and when did it start? \"      Denies  8  VOMITING: \"Is there any vomiting? \" If yes, ask: \"How many times? \"      Denies  9  BLEEDING: \"Is there any bleeding? \" If Yes, ask: \"How much? \" and \"Where? \"      Denies  10  OTHER SYMPTOMS: \"Do you have any other symptoms? \" (e g , drainage from wound, painful urination, constipation)        Denies vaginal bleeding, discharge  Denies leaking fluid  Patient is 21w2d    Patient has felt the baby move, felt him move twice      Protocols used: POST-OP SYMPTOMS AND QUESTIONS-ADULT-    "

## 2023-05-28 NOTE — TELEPHONE ENCOUNTER
TC on call provider patient's symptoms and concerns  On call wanted patient evaluated at L&D  Patient agreeable  TC FAHAD L&D Charge RN patient information and ETA

## 2023-05-28 NOTE — PROCEDURES
kera Reed [id number] at 21w2d with an JAKE of 10/6/2023, by Ultrasound, was seen at [address] for the following procedure(s): $Procedure Type: US - Transvaginal]                   Ultrasound Other  Cervical Length: 1 5  Funnel: Yes         Ultrasound Probe Disinfection    A transvaginal ultrasound was performed     Prior to use, disinfection was performed with High Level Disinfection Process (Trophon)      Mena Moraes MD  05/28/23  3:04 PM

## 2023-06-01 ENCOUNTER — NURSE TRIAGE (OUTPATIENT)
Dept: OTHER | Facility: OTHER | Age: 33
End: 2023-06-01

## 2023-06-01 ENCOUNTER — ROUTINE PRENATAL (OUTPATIENT)
Dept: PERINATAL CARE | Facility: OTHER | Age: 33
End: 2023-06-01

## 2023-06-01 VITALS
WEIGHT: 220.2 LBS | HEART RATE: 107 BPM | DIASTOLIC BLOOD PRESSURE: 60 MMHG | BODY MASS INDEX: 50.96 KG/M2 | HEIGHT: 55 IN | SYSTOLIC BLOOD PRESSURE: 120 MMHG

## 2023-06-01 DIAGNOSIS — Z3A.21 21 WEEKS GESTATION OF PREGNANCY: ICD-10-CM

## 2023-06-01 DIAGNOSIS — O35.2XX0 PREVIOUS CHILD WITH CONGENITAL ANOMALY, CURRENTLY PREGNANT, ANTEPARTUM, SINGLE OR UNSPECIFIED FETUS: Primary | ICD-10-CM

## 2023-06-01 NOTE — PROGRESS NOTES
The patient was seen today for an ultrasound  Please see ultrasound report (located under Ob Procedures) for additional details  Thank you very much for allowing us to participate in the care of this very nice patient  Should you have any questions, please do not hesitate to contact me  Guille Montero MD [address] [place]  Attending Physician, Tasha

## 2023-06-02 DIAGNOSIS — O21.9 NAUSEA/VOMITING IN PREGNANCY: ICD-10-CM

## 2023-06-02 NOTE — TELEPHONE ENCOUNTER
TC on call provider patient's symptoms and concerns  On call stated that she I reviewed patient's ultrasound, and while they did not look specifically at her cervix today, everything looked good  Recommend she rest and watch symptoms but if continued pressure or any other concerns she should call back and be seen at [facility]   Patient Agreeable, advised to rest and call back to be sent to L&D if symptoms persist or worsen

## 2023-06-02 NOTE — TELEPHONE ENCOUNTER
"Regardinwks 6days pregnant/ A lot of mucus discharge  ----- Message from Candelario Capps sent at 2023  8:06 PM EDT -----  \"Tomorrow I'll be 22 weeks pregnant  Last week I had a cervical cerclage, today I experienced a lot of mucus discharge  It just stopped, but it was quite a bit  \"    "

## 2023-06-02 NOTE — TELEPHONE ENCOUNTER
"  Reason for Disposition  • [1] Intermittent lower abdominal pain AND [2] present > 24 hours    Answer Assessment - Initial Assessment Questions  1  DISCHARGE: \"Describe the discharge  \" (e g , white, yellow, green, gray, foamy, cottage cheese-like)      Mucous-y white discharge, a lot of it  Patient stated she had it during the day today  Was jelly-like  2  ODOR: \"Is there a bad odor? \"      Denies  3  ONSET: \"When did the discharge begin? \"      Today  4  RASH: \"Is there a rash in that area? \" If Yes, ask: \"Describe it  \" (e g , redness, blisters, sores, bumps)     Denies  5  ABDOMINAL PAIN: Jonathon Basque you having any abdominal pain? \" If Yes, ask: \"What does it feel like? \" (e g , crampy, dull, intermittent, constant)       Denies  6  ABDOMINAL PAIN SEVERITY: If present, ask: \"How bad is it? \"  (e g , Scale 1-10; mild, moderate, or severe)    - MILD (1-3): doesn't interfere with normal activities, abdomen soft and not tender to touch     - MODERATE (4-7): interferes with normal activities or awakens from sleep, tender to touch     - SEVERE (8-10): excruciating pain, doubled over, unable to do any normal activities     Pressure in lower abdomen, feels heavy  No cramping  Sometimes it is painful, is intermittent and is mild-moderate  This pain started today as well  Patient stated she had an ultrasound today  7  CAUSE: \"What do you think is causing the discharge? \"      Unsure  8  OTHER SYMPTOMS: \"Do you have any other symptoms? \" (e g , fever, itching, vaginal bleeding, pain with urination)      [place] bleeding  9  JAKE: \"What date are you expecting to deliver? \"       Oct 6th  10   PREGNANCY: \"How many weeks pregnant are you?\"        21w6d    Protocols used: PREGNANCY - VAGINAL DISCHARGE-ADULT-AH    "

## 2023-06-03 ENCOUNTER — NURSE TRIAGE (OUTPATIENT)
Dept: OTHER | Facility: OTHER | Age: 33
End: 2023-06-03

## 2023-06-03 DIAGNOSIS — O21.9 NAUSEA/VOMITING IN PREGNANCY: Primary | ICD-10-CM

## 2023-06-03 RX ORDER — ONDANSETRON 4 MG/1
4 TABLET, FILM COATED ORAL EVERY 8 HOURS PRN
Qty: 30 TABLET | Refills: 0 | Status: ON HOLD | OUTPATIENT
Start: 2023-06-03 | End: 2023-06-05 | Stop reason: SDUPTHER

## 2023-06-03 NOTE — TELEPHONE ENCOUNTER
Gestation:22W1D  JAKE: 10/6/23  [id number]    c/o severe vomiting (>7/day) since Friday, with severe nausea  Unable to keep liquids down  Zofran medication ran out on Friday 6/2  Patient also reports fatigue  Zofran request noted as pending from 6/2/23  No additional symptoms  On call provider contacted and informed of patient’s concerns and status  Provider approved Zofran medication    Pharmacy verified     Patient informed of provider’s recommendation  Verbalized understanding and agreeable with disposition   No further questions

## 2023-06-03 NOTE — TELEPHONE ENCOUNTER
"  Reason for Disposition  • [1] SEVERE vomiting (e g , 6 or more times/day) AND [2] present > 8 hours    Answer Assessment - Initial Assessment Questions  1  VOMITING SEVERITY: \"How many times have you vomited in the past 24 hours? \"      - MILD:  1 - 2 times/day     - MODERATE: 3 - 5 times/day, decreased oral intake without significant weight loss or symptoms of dehydration     - SEVERE: 6 or more times/day, vomits everything or nearly everything, with significant weight loss, symptoms of dehydration     Severe (x7)   2  ONSET: \"When did the vomiting begin? \"      6/2  3  FLUIDS: \"What fluids or food have you vomited up today? \" \"Are you able to keep any liquids down? \"     Unable to take fluids   4  TREATMENT: \"What have you been doing so far to treat this? \"      Pepcid  5  DEHYDRATION: \"When was the last time you urinated? \" \"Are you feeling lightheaded? \" \"Weight loss? \"     Last urine 1910, denies lightheaded   6  PREGNANCY: \"How many weeks pregnant are you? \" \"How has the pregnancy been going?\"    22W1D [id number]  9  JAKE: \"What date are you expecting to deliver? \"     10/6/23   8  MEDICATIONS: \"What medications are you taking? \" (e g , prenatal vitamins, iron)      Prenatal, Folic, Iron , Pepcid   9  OTHER SYMPTOMS: \"Do you have any other symptoms? \"  Fatigue    Protocols used: PREGNANCY - MORNING SICKNESS (NAUSEA AND VOMITING OF PREGNANCY)-ADULT-AH    "

## 2023-06-03 NOTE — TELEPHONE ENCOUNTER
"Regardin weeks  nauseous issues  ----- Message from Rubie Babinski sent at 6/3/2023  7:14 PM EDT -----  \"I am 22 weeks pregnant I am very miserable and I am not able to keep anything down not even water   I am very nauseous I wanted to see if I could Zofran called in for me\"    "

## 2023-06-04 ENCOUNTER — ANESTHESIA EVENT (INPATIENT)
Dept: ANESTHESIOLOGY | Facility: HOSPITAL | Age: 33
End: 2023-06-04
Payer: MEDICARE

## 2023-06-04 ENCOUNTER — HOSPITAL ENCOUNTER (INPATIENT)
Facility: HOSPITAL | Age: 33
LOS: 5 days | Discharge: HOME | End: 2023-06-09
Attending: OBSTETRICS & GYNECOLOGY | Admitting: OBSTETRICS & GYNECOLOGY
Payer: MEDICARE

## 2023-06-04 ENCOUNTER — ANESTHESIA (INPATIENT)
Dept: ANESTHESIOLOGY | Facility: HOSPITAL | Age: 33
End: 2023-06-04
Payer: MEDICARE

## 2023-06-04 DIAGNOSIS — Z3A.22 22 WEEKS GESTATION OF PREGNANCY: ICD-10-CM

## 2023-06-04 DIAGNOSIS — O99.351: ICD-10-CM

## 2023-06-04 DIAGNOSIS — O20.9 VAGINAL BLEEDING BEFORE 22 WEEKS GESTATION: ICD-10-CM

## 2023-06-04 DIAGNOSIS — O26.872 CERVICAL SHORTENING IN SECOND TRIMESTER: ICD-10-CM

## 2023-06-04 DIAGNOSIS — G40.909 NONINTRACTABLE EPILEPSY WITHOUT STATUS EPILEPTICUS, UNSPECIFIED EPILEPSY TYPE (HCC): ICD-10-CM

## 2023-06-04 DIAGNOSIS — O60.02 PRETERM LABOR IN SECOND TRIMESTER WITHOUT DELIVERY: ICD-10-CM

## 2023-06-04 DIAGNOSIS — G40.909: ICD-10-CM

## 2023-06-04 LAB
ABO GROUP BLD: NORMAL
ALBUMIN SERPL BCP-MCNC: 3.3 G/DL (ref 3.5–5)
ALP SERPL-CCNC: 82 U/L (ref 34–104)
ALT SERPL W P-5'-P-CCNC: 27 U/L (ref 7–52)
ANION GAP SERPL CALCULATED.3IONS-SCNC: 9 MMOL/L (ref 4–13)
AST SERPL W P-5'-P-CCNC: 20 U/L (ref 13–39)
BILIRUB SERPL-MCNC: 0.28 MG/DL (ref 0.2–1)
BLD GP AB SCN SERPL QL: NEGATIVE
BUN SERPL-MCNC: 7 MG/DL (ref 5–25)
CALCIUM ALBUM COR SERPL-MCNC: 9.8 MG/DL (ref 8.3–10.1)
CALCIUM SERPL-MCNC: 9.2 MG/DL (ref 8.4–10.2)
CHLORIDE SERPL-SCNC: 104 MMOL/L (ref 96–108)
CO2 SERPL-SCNC: 22 MMOL/L (ref 21–32)
CREAT SERPL-MCNC: 0.41 MG/DL (ref 0.6–1.3)
ERYTHROCYTE [DISTWIDTH] IN BLOOD BY AUTOMATED COUNT: 14.5 % (ref 11.6–15.1)
GFR SERPL CREATININE-BSD FRML MDRD: 137 ML/MIN/1.73SQ M
GLUCOSE SERPL-MCNC: 102 MG/DL (ref 65–140)
GLUCOSE SERPL-MCNC: 99 MG/DL (ref 65–140)
HCT VFR BLD AUTO: 32.3 % (ref 34.8–46.1)
HGB BLD-MCNC: 10.7 G/DL (ref 11.5–15.4)
MCH RBC QN AUTO: 30 PG (ref 26.8–34.3)
MCHC RBC AUTO-ENTMCNC: 33.1 G/DL (ref 31.4–37.4)
MCV RBC AUTO: 91 FL (ref 82–98)
PLATELET # BLD AUTO: 349 THOUSANDS/UL (ref 149–390)
PMV BLD AUTO: 8.8 FL (ref 8.9–12.7)
POTASSIUM SERPL-SCNC: 3.4 MMOL/L (ref 3.5–5.3)
PROT SERPL-MCNC: 6.7 G/DL (ref 6.4–8.4)
RBC # BLD AUTO: 3.57 MILLION/UL (ref 3.81–5.12)
RH BLD: NEGATIVE
SODIUM SERPL-SCNC: 135 MMOL/L (ref 135–147)
SPECIMEN EXPIRATION DATE: NORMAL
WBC # BLD AUTO: 8.78 THOUSAND/UL (ref 4.31–10.16)

## 2023-06-04 PROCEDURE — 99232 SBSQ HOSP IP/OBS MODERATE 35: CPT

## 2023-06-04 PROCEDURE — 80053 COMPREHEN METABOLIC PANEL: CPT | Performed by: OBSTETRICS & GYNECOLOGY

## 2023-06-04 PROCEDURE — 80177 DRUG SCRN QUAN LEVETIRACETAM: CPT | Performed by: OBSTETRICS & GYNECOLOGY

## 2023-06-04 PROCEDURE — 86901 BLOOD TYPING SEROLOGIC RH(D): CPT | Performed by: OBSTETRICS & GYNECOLOGY

## 2023-06-04 PROCEDURE — 99222 1ST HOSP IP/OBS MODERATE 55: CPT | Performed by: OBSTETRICS & GYNECOLOGY

## 2023-06-04 PROCEDURE — 99214 OFFICE O/P EST MOD 30 MIN: CPT

## 2023-06-04 PROCEDURE — 93005 ELECTROCARDIOGRAM TRACING: CPT

## 2023-06-04 PROCEDURE — 86850 RBC ANTIBODY SCREEN: CPT | Performed by: OBSTETRICS & GYNECOLOGY

## 2023-06-04 PROCEDURE — 85027 COMPLETE CBC AUTOMATED: CPT | Performed by: OBSTETRICS & GYNECOLOGY

## 2023-06-04 PROCEDURE — 86780 TREPONEMA PALLIDUM: CPT | Performed by: OBSTETRICS & GYNECOLOGY

## 2023-06-04 PROCEDURE — 86900 BLOOD TYPING SEROLOGIC ABO: CPT | Performed by: OBSTETRICS & GYNECOLOGY

## 2023-06-04 PROCEDURE — 82948 REAGENT STRIP/BLOOD GLUCOSE: CPT

## 2023-06-04 PROCEDURE — 4A1HXCZ MONITORING OF PRODUCTS OF CONCEPTION, CARDIAC RATE, EXTERNAL APPROACH: ICD-10-PCS | Performed by: STUDENT IN AN ORGANIZED HEALTH CARE EDUCATION/TRAINING PROGRAM

## 2023-06-04 RX ORDER — BUPIVACAINE HYDROCHLORIDE 2.5 MG/ML
30 INJECTION, SOLUTION EPIDURAL; INFILTRATION; INTRACAUDAL ONCE AS NEEDED
Status: DISCONTINUED | OUTPATIENT
Start: 2023-06-04 | End: 2023-06-08

## 2023-06-04 RX ORDER — ONDANSETRON 2 MG/ML
4 INJECTION INTRAMUSCULAR; INTRAVENOUS EVERY 4 HOURS PRN
Status: DISCONTINUED | OUTPATIENT
Start: 2023-06-04 | End: 2023-06-08

## 2023-06-04 RX ORDER — CEFAZOLIN SODIUM 2 G/50ML
2000 SOLUTION INTRAVENOUS ONCE
Status: COMPLETED | OUTPATIENT
Start: 2023-06-04 | End: 2023-06-04

## 2023-06-04 RX ORDER — BETAMETHASONE SODIUM PHOSPHATE AND BETAMETHASONE ACETATE 3; 3 MG/ML; MG/ML
12 INJECTION, SUSPENSION INTRA-ARTICULAR; INTRALESIONAL; INTRAMUSCULAR; SOFT TISSUE EVERY 24 HOURS
Status: COMPLETED | OUTPATIENT
Start: 2023-06-04 | End: 2023-06-05

## 2023-06-04 RX ORDER — LIDOCAINE HYDROCHLORIDE AND EPINEPHRINE 15; 5 MG/ML; UG/ML
INJECTION, SOLUTION EPIDURAL AS NEEDED
Status: DISCONTINUED | OUTPATIENT
Start: 2023-06-04 | End: 2023-06-07 | Stop reason: HOSPADM

## 2023-06-04 RX ORDER — SODIUM CHLORIDE, SODIUM LACTATE, POTASSIUM CHLORIDE, CALCIUM CHLORIDE 600; 310; 30; 20 MG/100ML; MG/100ML; MG/100ML; MG/100ML
125 INJECTION, SOLUTION INTRAVENOUS CONTINUOUS
Status: DISCONTINUED | OUTPATIENT
Start: 2023-06-04 | End: 2023-06-05

## 2023-06-04 RX ORDER — MAGNESIUM SULFATE HEPTAHYDRATE 40 MG/ML
1 INJECTION, SOLUTION INTRAVENOUS CONTINUOUS
Status: DISCONTINUED | OUTPATIENT
Start: 2023-06-04 | End: 2023-06-05

## 2023-06-04 RX ORDER — MAGNESIUM SULFATE HEPTAHYDRATE 40 MG/ML
2 INJECTION, SOLUTION INTRAVENOUS ONCE
Status: COMPLETED | OUTPATIENT
Start: 2023-06-04 | End: 2023-06-04

## 2023-06-04 RX ORDER — METOCLOPRAMIDE HYDROCHLORIDE 5 MG/ML
5 INJECTION INTRAMUSCULAR; INTRAVENOUS EVERY 6 HOURS PRN
Status: DISCONTINUED | OUTPATIENT
Start: 2023-06-04 | End: 2023-06-08

## 2023-06-04 RX ORDER — CEFAZOLIN SODIUM 1 G/50ML
1000 SOLUTION INTRAVENOUS EVERY 8 HOURS
Status: DISCONTINUED | OUTPATIENT
Start: 2023-06-05 | End: 2023-06-05

## 2023-06-04 RX ORDER — DIPHENHYDRAMINE HYDROCHLORIDE 50 MG/ML
25 INJECTION INTRAMUSCULAR; INTRAVENOUS EVERY 6 HOURS PRN
Status: DISCONTINUED | OUTPATIENT
Start: 2023-06-04 | End: 2023-06-08

## 2023-06-04 RX ORDER — ONDANSETRON 2 MG/ML
4 INJECTION INTRAMUSCULAR; INTRAVENOUS EVERY 6 HOURS PRN
Status: DISCONTINUED | OUTPATIENT
Start: 2023-06-04 | End: 2023-06-06

## 2023-06-04 RX ORDER — SODIUM CHLORIDE, SODIUM LACTATE, POTASSIUM CHLORIDE, CALCIUM CHLORIDE 600; 310; 30; 20 MG/100ML; MG/100ML; MG/100ML; MG/100ML
999 INJECTION, SOLUTION INTRAVENOUS CONTINUOUS
Status: DISCONTINUED | OUTPATIENT
Start: 2023-06-04 | End: 2023-06-04

## 2023-06-04 RX ORDER — LEVOTHYROXINE SODIUM 0.03 MG/1
25 TABLET ORAL
Status: DISCONTINUED | OUTPATIENT
Start: 2023-06-05 | End: 2023-06-09 | Stop reason: HOSPADM

## 2023-06-04 RX ORDER — MAGNESIUM SULFATE HEPTAHYDRATE 40 MG/ML
4 INJECTION, SOLUTION INTRAVENOUS ONCE
Status: COMPLETED | OUTPATIENT
Start: 2023-06-04 | End: 2023-06-04

## 2023-06-04 RX ADMIN — MAGNESIUM SULFATE HEPTAHYDRATE 2 G: 40 INJECTION, SOLUTION INTRAVENOUS at 22:07

## 2023-06-04 RX ADMIN — ONDANSETRON 4 MG: 2 INJECTION INTRAMUSCULAR; INTRAVENOUS at 22:06

## 2023-06-04 RX ADMIN — MAGNESIUM SULFATE HEPTAHYDRATE 4 G: 40 INJECTION, SOLUTION INTRAVENOUS at 21:39

## 2023-06-04 RX ADMIN — LIDOCAINE HYDROCHLORIDE AND EPINEPHRINE 5 ML: 15; 5 INJECTION, SOLUTION EPIDURAL at 22:33

## 2023-06-04 RX ADMIN — SODIUM CHLORIDE, SODIUM LACTATE, POTASSIUM CHLORIDE, AND CALCIUM CHLORIDE 125 ML/HR: .6; .31; .03; .02 INJECTION, SOLUTION INTRAVENOUS at 21:17

## 2023-06-04 RX ADMIN — SODIUM CHLORIDE, SODIUM LACTATE, POTASSIUM CHLORIDE, AND CALCIUM CHLORIDE 999 ML/HR: .6; .31; .03; .02 INJECTION, SOLUTION INTRAVENOUS at 20:33

## 2023-06-04 RX ADMIN — CEFAZOLIN SODIUM 2000 MG: 2 SOLUTION INTRAVENOUS at 21:57

## 2023-06-04 RX ADMIN — MAGNESIUM SULFATE IN WATER 1 G/HR: 40 INJECTION, SOLUTION INTRAVENOUS at 22:21

## 2023-06-04 RX ADMIN — SODIUM CHLORIDE, SODIUM LACTATE, POTASSIUM CHLORIDE, AND CALCIUM CHLORIDE 1000 ML: .6; .31; .03; .02 INJECTION, SOLUTION INTRAVENOUS at 23:55

## 2023-06-04 RX ADMIN — ROPIVACAINE HYDROCHLORIDE: 2 INJECTION, SOLUTION EPIDURAL; INFILTRATION at 22:38

## 2023-06-04 RX ADMIN — SODIUM CHLORIDE, SODIUM LACTATE, POTASSIUM CHLORIDE, AND CALCIUM CHLORIDE 1000 ML: .6; .31; .03; .02 INJECTION, SOLUTION INTRAVENOUS at 23:38

## 2023-06-04 RX ADMIN — BETAMETHASONE SODIUM PHOSPHATE AND BETAMETHASONE ACETATE 12 MG: 3; 3 INJECTION, SUSPENSION INTRA-ARTICULAR; INTRALESIONAL; INTRAMUSCULAR at 21:24

## 2023-06-05 LAB
AMPHETAMINES SERPL QL SCN: NEGATIVE
BARBITURATES UR QL: NEGATIVE
BENZODIAZ UR QL: NEGATIVE
COCAINE UR QL: NEGATIVE
HOLD SPECIMEN: NORMAL
METHADONE UR QL: NEGATIVE
OPIATES UR QL SCN: NEGATIVE
OXYCODONE+OXYMORPHONE UR QL SCN: NEGATIVE
PCP UR QL: NEGATIVE
THC UR QL: NEGATIVE
TREPONEMA PALLIDUM IGG+IGM AB [PRESENCE] IN SERUM OR PLASMA BY IMMUNOASSAY: NORMAL

## 2023-06-05 PROCEDURE — 59871 REMOVE CERCLAGE SUTURE: CPT | Performed by: OBSTETRICS & GYNECOLOGY

## 2023-06-05 PROCEDURE — NC001 PR NO CHARGE: Performed by: OBSTETRICS & GYNECOLOGY

## 2023-06-05 PROCEDURE — 99232 SBSQ HOSP IP/OBS MODERATE 35: CPT | Performed by: OBSTETRICS & GYNECOLOGY

## 2023-06-05 PROCEDURE — 80307 DRUG TEST PRSMV CHEM ANLYZR: CPT | Performed by: OBSTETRICS & GYNECOLOGY

## 2023-06-05 PROCEDURE — 0UCC7ZZ EXTIRPATION OF MATTER FROM CERVIX, VIA NATURAL OR ARTIFICIAL OPENING: ICD-10-PCS | Performed by: OBSTETRICS & GYNECOLOGY

## 2023-06-05 RX ORDER — INDOMETHACIN 25 MG/1
25 CAPSULE ORAL EVERY 6 HOURS
Status: DISPENSED | OUTPATIENT
Start: 2023-06-05 | End: 2023-06-06

## 2023-06-05 RX ORDER — FAMOTIDINE 20 MG/1
20 TABLET, FILM COATED ORAL 2 TIMES DAILY
Status: DISCONTINUED | OUTPATIENT
Start: 2023-06-05 | End: 2023-06-05

## 2023-06-05 RX ORDER — INDOMETHACIN 25 MG/1
50 CAPSULE ORAL ONCE
Status: COMPLETED | OUTPATIENT
Start: 2023-06-05 | End: 2023-06-05

## 2023-06-05 RX ORDER — ONDANSETRON 4 MG/1
TABLET, FILM COATED ORAL
Qty: 30 TABLET | Refills: 2 | Status: SHIPPED | OUTPATIENT
Start: 2023-06-05

## 2023-06-05 RX ORDER — FAMOTIDINE 20 MG/1
20 TABLET, FILM COATED ORAL 2 TIMES DAILY
Status: DISCONTINUED | OUTPATIENT
Start: 2023-06-05 | End: 2023-06-09 | Stop reason: HOSPADM

## 2023-06-05 RX ORDER — SODIUM CHLORIDE, SODIUM LACTATE, POTASSIUM CHLORIDE, CALCIUM CHLORIDE 600; 310; 30; 20 MG/100ML; MG/100ML; MG/100ML; MG/100ML
100 INJECTION, SOLUTION INTRAVENOUS CONTINUOUS
Status: DISCONTINUED | OUTPATIENT
Start: 2023-06-05 | End: 2023-06-06

## 2023-06-05 RX ADMIN — SODIUM CHLORIDE, SODIUM LACTATE, POTASSIUM CHLORIDE, AND CALCIUM CHLORIDE 100 ML/HR: .6; .31; .03; .02 INJECTION, SOLUTION INTRAVENOUS at 14:15

## 2023-06-05 RX ADMIN — NYSTATIN 500000 UNITS: 100000 SUSPENSION ORAL at 23:12

## 2023-06-05 RX ADMIN — LEVETIRACETAM 750 MG: 100 INJECTION, SOLUTION INTRAVENOUS at 00:26

## 2023-06-05 RX ADMIN — ONDANSETRON 4 MG: 2 INJECTION INTRAMUSCULAR; INTRAVENOUS at 10:43

## 2023-06-05 RX ADMIN — INDOMETHACIN 25 MG: 25 CAPSULE ORAL at 16:11

## 2023-06-05 RX ADMIN — FAMOTIDINE 20 MG: 20 TABLET ORAL at 23:11

## 2023-06-05 RX ADMIN — LEVETIRACETAM 750 MG: 250 TABLET, FILM COATED ORAL at 08:48

## 2023-06-05 RX ADMIN — INDOMETHACIN 25 MG: 25 CAPSULE ORAL at 22:31

## 2023-06-05 RX ADMIN — BETAMETHASONE SODIUM PHOSPHATE AND BETAMETHASONE ACETATE 12 MG: 3; 3 INJECTION, SUSPENSION INTRA-ARTICULAR; INTRALESIONAL; INTRAMUSCULAR at 22:31

## 2023-06-05 RX ADMIN — LEVETIRACETAM 750 MG: 250 TABLET, FILM COATED ORAL at 18:03

## 2023-06-05 RX ADMIN — INDOMETHACIN 50 MG: 25 CAPSULE ORAL at 10:07

## 2023-06-05 RX ADMIN — FAMOTIDINE 20 MG: 20 TABLET ORAL at 03:59

## 2023-06-05 RX ADMIN — ONDANSETRON 4 MG: 2 INJECTION INTRAMUSCULAR; INTRAVENOUS at 19:13

## 2023-06-05 RX ADMIN — METOCLOPRAMIDE 5 MG: 5 INJECTION, SOLUTION INTRAMUSCULAR; INTRAVENOUS at 01:52

## 2023-06-05 RX ADMIN — FAMOTIDINE 20 MG: 20 TABLET ORAL at 14:05

## 2023-06-05 RX ADMIN — ROPIVACAINE HYDROCHLORIDE: 2 INJECTION, SOLUTION EPIDURAL; INFILTRATION at 08:49

## 2023-06-05 RX ADMIN — LEVOTHYROXINE SODIUM 25 MCG: 25 TABLET ORAL at 06:22

## 2023-06-05 RX ADMIN — CEFAZOLIN SODIUM 1000 MG: 1 SOLUTION INTRAVENOUS at 04:53

## 2023-06-05 RX ADMIN — ONDANSETRON 4 MG: 2 INJECTION INTRAMUSCULAR; INTRAVENOUS at 04:52

## 2023-06-05 RX ADMIN — SODIUM CHLORIDE, SODIUM LACTATE, POTASSIUM CHLORIDE, AND CALCIUM CHLORIDE 100 ML/HR: .6; .31; .03; .02 INJECTION, SOLUTION INTRAVENOUS at 14:02

## 2023-06-05 NOTE — QUICK NOTE
"Progress Note - OB  Margoth Cook 29 y o  female MRN: [id number]  Unit/Bed#: LD  Encounter: [id number]      Sakina Ford has mild cramping, is overall more comfortable    /61   Pulse 105   Temp 98 2 °F (36 8 °C) (Oral)   Resp (!) 24   Ht 4' 7\" (1 397 m)   LMP 2022 (Exact Date)   SpO2 98%   BMI 51 18 kg/m²     Physical Exam:   General: AAOx3  No acute distress  Extremities: No edema or calf pain  FHT:  Baseline Rate: 135 bpm  Variability: Moderate 6-25 bpm  Accelerations: 10 x 10 (<32 weeks)  Decelerations: None  FHR Category: Category I  TOCO:   Contraction Frequency (minutes): x2 (pt report with button)  Contraction Duration (seconds): 60  Contraction Quality: Not applicable    Lab Results   Component Value Date    HCT 32 3 (L) 2023    HGB 10 7 (L) 2023     2023    WBC 8 78 2023     Lab Results   Component Value Date    ALT 27 2023    AST 20 2023    BUN 7 2023     2023    CO2 22 2023    CREATININE 0 41 (L) 2023    K 3 4 (L) 2023       A/P: Sakina Ford is a 29 y o   at 22w3d admitted with  labor and cervical insufficiency with removal of cerclage  Currently in guarded condition   Hospital Day: 2   1) EFW 434g on  vtx  2) getting ancef, btm, and magnesium  3) epilepsy with suspected partial seizure last evening has had n/v possible difficulty keeping keppra in  -keppra level pending  -continue keppra  4) levothyroxine for hypothyroid  5) Encouraged ambulation as tolerated, DVT PPx: SCDs  6) Dispo: plan as per mfm    "

## 2023-06-05 NOTE — PROGRESS NOTES
"Pt's HR observed to be elevated to the 130s-150s after laying down post epidural  Pt also reported new onset SOB and appeared suddenly lethargic and had a hard time keeping her eyes open, stating \"the light is hurting me\"  EKG order requested  While setting up EKG, pt stopped responding verbally and arms and legs began to shake  Throughout, pt was able to follow simple commands like open your eyes and open you're mouth, etc, but could not verbalize responses  A rapid response was called and labs drawn  Pt began to regain more consciousness and HR stabilized to the low 100s  Will closely monitor    "

## 2023-06-05 NOTE — RAPID RESPONSE
Rapid Response Note  James Adair 29 y o  female MRN: [id number]  Unit/Bed#: -01 Encounter: [id number]    Rapid Response Notification(s):   Response called date/time:  6/4/2023 10:59 PM  Response team arrival date/time:  6/4/2023 11:05 PM  Response end date/time:  6/4/2023 11:15 PM  [facility]  Rapid response location:  [facility] unit  Primary reason for rapid response call:  Acute change in neuro status and new onset of seizures    Rapid Response Intervention(s):   Airway:  None  Breathing:  Oxygen  Circulation:  None  Fluids administered:  None  Medications administered:  None       Assessment:   · DDx includes partial seizure vs  Atypical vagal syncope    Plan:   · Check iSTAT to r/o electrolyte abnormalities  · Check keppra level as patient has been suffering w/ hyperemesis and may not have been absorbing oral keppra  · If subtherapeutic would recommend loading keppra   · Give 750mg IV keppra right now and consider Neuro consult if repeat events  Rapid Response Outcome:   Transfer:  Remain on floor  Code Status: Level 1 (Full Code)      Family notified of transfer:   Family member contacted: n/a     Background/Situation:   James Adair is a 29 y o  female who is admitted for pre-term labor at 22wks and has a hx of partial seizures on Keppra  Pt had epidural placed and subsequently got lightheaded and had whole body shaking episode w/ lightheadedness but was able to follow simple commands and answer questions  Review of Systems   Constitutional: Negative for chills  Respiratory: Negative for chest tightness  Gastrointestinal: Negative for abdominal distention  Musculoskeletal: Negative for back pain  Neurological: Positive for dizziness, tremors and light-headedness  Negative for seizures, speech difficulty, weakness and numbness  Psychiatric/Behavioral: Negative for confusion         Objective:   Vitals:    06/05/23 9344 06/05/23 0132 06/05/23 4039 06/05/23 8580 "  BP: (!) 91/49 (!) 88/49 94/54 (!) 86/48   Pulse: 104 (!) 111 (!) 106 102   Resp:       Temp:       TempSrc:       SpO2:       Height:         Physical Exam  Constitutional:       General: She is not in acute distress  HENT:      Head: Normocephalic and atraumatic  Cardiovascular:      Rate and Rhythm: Regular rhythm  Tachycardia present  Pulmonary:      Effort: Pulmonary effort is normal  No accessory muscle usage or respiratory distress  Abdominal:      General: Abdomen is protuberant  There is no distension  Skin:     General: Skin is warm and dry  Capillary Refill: Capillary refill takes less than 2 seconds  Neurological:      General: No focal deficit present  Mental Status: She is oriented to person, place, and time  She is lethargic  GCS: GCS eye subscore is 4  GCS verbal subscore is 5  GCS motor subscore is 6  Psychiatric:         Behavior: Behavior is cooperative  Portions of the record may have been created with voice recognition software  Occasional wrong word or \"sound a like\" substitutions may have occurred due to the inherent limitations of voice recognition software  Read the chart carefully and recognize, using context, where substitutions have occurred      Patricia Brooks PA-C    "

## 2023-06-05 NOTE — ANESTHESIA POSTPROCEDURE EVALUATION
Post-Op Assessment Note    CV Status:  Stable       Mental Status:  Alert and awake      Post Op Vitals Reviewed: Yes      Staff: CRNA     Post-op block assessment: no complications      No notable events documented  Epidural catheter removed, tip intact  No complications       BP      Temp      Pulse     Resp     SpO2

## 2023-06-05 NOTE — OB LABOR/OXYTOCIN SAFETY PROGRESS
Labor Progress Note - Pat Flank Joey 29 y o  female MRN: [id number]    Unit/Bed#: -01 Encounter: [id number]       Contraction Frequency (minutes): irritabilty  Contraction Quality: Mild  Tachysystole: No   Cervical Dilation: 4-5        Cervical Effacement: 70  Fetal Station: Floating  Baseline Rate: 145 bpm  Fetal Heart Rate: 148 BPM  FHR Category: Category I               Vital Signs:   Vitals:    06/05/23 0242   BP: (!) 85/47   Pulse: (!) 111   Resp:    Temp:    SpO2:        Notes/comments:   Called to bedside as patient thought she may have broken her water, sterile speculum exam still reveals same exam with membranes at level of cervical os        Sharan Mccoy DO 6/5/2023 3:22 AM

## 2023-06-05 NOTE — ASSESSMENT & PLAN NOTE
Previously on Keppra 750mg BID; likely had seizure on 6/4 after epidural placement   Keppra level drawn on admission was subtherapeutic  S/p Neurology consultation 6/7 with recommendation for Keppra 1000mg BID IV dosing  Will follow up with Neurology outpatient

## 2023-06-05 NOTE — DISCHARGE SUMMARY
Obstetrics Discharge Summary  Brendan Wilson 29 y o  female MRN: [id number]  Unit/Bed#:   Encounter: [id number]    Admission Date: 2023     Discharge Date: 2023    OBGYN Practice: [facility]    Admitting Diagnoses:   Pregnancy at 22w5d   Epilepsy, on Keppra  Truncal obesity with BMI 51  Hypothyroidism   labor  Cervical cerclage placement and removal during this pregnancy  Infertility  Depression  Chronic constipation  Short stature  History of stillbirth     Discharge Diagnoses:       Procedures:   Cerclage removal 23   spontaneous vaginal delivery    Anesthesia: epidural    Hospital course: Brendan Wilson is now a 29 y o  [id number] who was initially admitted at 22w2d for  labor with bulging amniotic membranes and an exam indicated cervical cerclage in place  She was given an epidural and the cerclage removed on 2023  Shortly after her epidural was placed, she had seizure like activity and a rapid response was called  Magnesium sulphate was administered for fetal neuroprotection; Ancef for GBS prophylaxis and a full course of Betamethasone was administered for fetal lung maturation  On hospital day 2 her cervical dilation was stable, the amniotic membranes had retracted into the cervical os and she showed no further signs of progression in  labor  Her epidural was discontinued and subsequently pulled and she was transferred to the antepartum floor  On hospital day 4 Neurology was consulted for their input regarding her seizure disorder  Her Keppra was increased from 750 PO BID to 1000mg IV BID while inpatient  Also on hospital day 4 she reported increasing pelvic pressure  Repeat vaginal exam again showed prolapsing membranes but stable cervical dilation and she was transferred to the labor floor  Magnesium was restarted for fetal neuroprotection  Several hours later she was noted to be hypotensive and persistently tachycardic   With concern for sepsis secondary to chorioamnionitis, resuscitation was started per protocol  Labs were collected including blood and urine cultures  She received Ancef followed by Clindamycin as well as Gentamycin for prophylaxis  After discussion with Maternal Fetal medicine, we recommended proceding with induction of labor after confirming the fetus was still in cephalic presentation  She was given a second epidural and Pitocin started for induction  She delivered a viable male  on 2023 at 2349, with APGARS 8 and 8 at 1 and 5 min, weighing 1 lb 5 2 oz  There was noted to be spontaneous delivery of placenta  There was noted to be one cotyledon missing which was obtained via bimanual exam and there was organized clot across part of the placenta consistent with placental abruption  Bimanual exam was performed to obtain missing bit of placenta, there was a thin endometrial stripe noted on TAUS  Her post-delivery course was uncomplicated  She received Clindamycin and Gentamycin for 12 hours postpartum  She remained afebrile and hr WBC was normal  All of her vitals normalized  Her postpartum pain was well controlled with oral analgesics  On day of discharge, she was ambulating and able to reasonably perform all ADLs  She was voiding and had appropriate bowel function  Pain was well controlled  She was discharged home on postpartum day #2 without complications  Patient was instructed to follow up with her OBGYN as an outpatient and was given appropriate warnings to call provider if she develops signs of infection or uncontrolled pain  Complications: none apparent    Condition at discharge: good     Provisions for Follow-Up Care:  Please see after visit summary for information related to follow-up care and any pertinent home health orders  Disposition: Home    Planned Readmission: No    Discharge Medications:   Please see AVS for a complete list of discharge medications      Discharge instructions : Please see AVS for complete discharge instructions

## 2023-06-05 NOTE — PROGRESS NOTES
Rapid response called due to change in patient orientation 1798    Concern for seizure - takes Keppra 750mg BID typically  Epidural confirmed to be correctly placed by Dr Rashid Mirza  /63, low concern for preeclampsia  Stat CMP sent - due to hyperemesis concern for electrolyte abnormalities  Working diagnosis: partial grandmal seizure    Please see further documentation from rapid response team

## 2023-06-05 NOTE — H&P
[address] [facility] 29 y o  female MRN: [id number]  Unit/Bed#: -01 Encounter: [id number]      Assessment/Plan:    29 y o   at 22w2d admitted for  labor     SVE: Cervical Dilation: 3  Cervical Effacement: 79  Fetal Station: -2  Presentation: Vertex  Method: Manual  OB Examiner: Kal    Cervical cerclage suture present, antepartum  Assessment & Plan  Will need to be removed due to  labor/bulging bag    * 22 weeks gestation of pregnancy  Assessment & Plan  · Admit to OB/GYN service  · Follow up CBC, RPR, Blood Type  · EFW: 434g last scan on   · VTX by transabdominal ultrasound   · Expectant management  · Patient reports she feels as if her due date is wrong as her last few scans show to be measuring ahead  Reviewed that these EDDs are all consistent with her first trimester sono, and there was some variation with her LMP, but she is correctly dated by   · NICU consult to assess if they feel comfortable w/ resus at extreme prematurity, if so she will be offered betamethasone, magnesium, and Ancef (penicillin allergy)  · Patient was counseled on need for classical  delivery and that it would be okay to desire full resuscitation of baby if she had a vaginal delivery but declined classical     She was counseled on the increased maternal morbidity risks associated with a classical  including infection, bleeding, injury to surrounding organs, increased risk for placenta accreta spectrum, increased scar tissue, potential need for postpartum hysterectomy             Patient of: [facility]   This patient will be an INPATIENT  and I certify the anticipated length of stay is >2 Midnights  Discussed with Dr Edda Deleon and Ezequiel      SUBJECTIVE:    Chief Complaint: came in by squad for strong cramping    HPI: Umang Salinas is a 29 y o  [id number] with an JAKE of 10/6/2023, by Ultrasound at 22w2d who is being admitted for  labor    Cramping - comes and goes, worse with dehydration  Feeling great since cerclage otherwise  Most intense cramping - 6 PM   Passing large mucus, last used vaginal progesterone 10 PM last night  Feeling good FM, FHT 150s    Pregnancy Plan:  Pregnancy: Hugh Osdoreen  Fetal sex:  Male      Patient Active Problem List   Diagnosis   • Depression with anxiety   • Nonintractable epilepsy without status epilepticus ([facility] [address] )   • History of irregular menstrual bleeding   • PCOS (polycystic ovarian syndrome)   • Infertility, female   • [place]'s esophagus   • Gastric ulcer   • GARIMA (obstructive sleep apnea)   • Female infertility   • Autism   • Bipolar depression ([facility] [address] )   • Morbid obesity (HCC)   • Class 3 severe obesity due to excess calories with serious comorbidity and body mass index (BMI) of 50 0 to 59 9 in [facility])   • Gastroesophageal reflux disease   • Excessive daytime sleepiness   • Iron deficiency anemia secondary to inadequate dietary iron intake   • Constipation   • Dysphagia   • Hypothyroid in pregnancy, antepartum   • 22 weeks gestation of pregnancy   • Chronic constipation   • Nausea/vomiting in pregnancy   • Seizures ([facility] [address] )   • Obesity in pregnancy   • History of stillbirth in currently pregnant patient, unspecified trimester   • Vaginal bleeding before 22 weeks gestation   • Maternal morbid obesity, antepartum ([facility] [address] )   • Short stature   • Family history of congenital heart defect   • Cervical shortening   • Cervical cerclage suture present, antepartum   • Hypothyroidism during pregnancy in second trimester   • History of stillbirth in currently pregnant patient, second trimester   • Previous child with congenital anomaly, currently pregnant, antepartum       OB History    Para Term  AB Living   4 1 0 1 2 0   SAB IAB Ectopic Multiple Live Births   2 0 0 0 0      # Outcome Date GA Lbr Lopez/2nd Weight Sex Delivery Anes PTL Lv   4 Current            3 2019           2 2012           1   28w0d    Vag-Spont FD      Complications: ABO incompatibility in pregnancy      Obstetric Comments   Both SAB <2 months   Still birth at 7 months   Has had hypercoagulable workup in [place] which was negative        Past Medical History:   Diagnosis Date   • Autism    • [place] esophagus    • CPAP (continuous positive airway pressure) dependence    • 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HCC)     not diagnosed as of 1/9/23-trying to R/O   • Depression    • Diabetes mellitus ([address] )    • Disease of thyroid gland     hypo   • Dysphagia     solids and liquids   • Female infertility    • Fibromyalgia, primary    • Gastric ulcer    • History of bronchitis    • Iron deficiency anemia     infusion in 11/2022   • Irregular menses    • Miscarriage    • Morbid obesity with BMI of 50 0-59 9, adult ([facility])    • Plantar fasciitis of left foot    • Reflux esophagitis    • Seizures ([address] )     last one 7/13/22   • Sleep apnea     CPAP   • Wears glasses        Past Surgical History:   Procedure Laterality Date   • EGD      with Bx due to barretts esophagus   • EYE SURGERY Bilateral     lazy eye repair   • UT BIOPSY OF LIP N/A 11/10/2022    Procedure: EXCISION BIOPSY SALVARY GLANDS LOWER LIP;  Surgeon: Marina Bautista MD;  Location: [address] [place] [address];  Service: ENT   • UT CERCLAGE UTERINE CERVIX NONOBSTETRICAL N/A 5/23/2023    Procedure: CERCLAGE CERVICAL;  Surgeon: Olga Zhu MD;  Location: [facility];  Service: Obstetrics   • UT HYSTEROSCOPY BX ENDOMETRIUM&/POLYPC W/WO D&C N/A 9/22/2021    Procedure: DILATATION AND CURETTAGE (D&C) WITH HYSTEROSCOPY;  Surgeon: Trell Martinez MD;  Location: [facility];  Service: Gynecology   • WISDOM TOOTH EXTRACTION         Social History     Tobacco Use   • Smoking status: Never   • Smokeless tobacco: Never   Substance Use Topics   • Alcohol use: Not Currently     Comment: occ       Allergies   Allergen Reactions   • Haloperidol Other (See Comments)     Jaw locks up   • Penicillins Hives   • Pollen Extract Allergic Rhinitis       Medications "Prior to Admission   Medication   • acetaminophen (TYLENOL) 500 mg tablet   • albuterol (Proventil HFA) 90 mcg/act inhaler   • aspirin (ECOTRIN LOW STRENGTH) 81 mg EC tablet   • docusate sodium (COLACE) 100 mg capsule   • famotidine (PEPCID) 40 MG tablet   • ferrous sulfate 325 (65 Fe) mg tablet   • fluticasone (FLONASE) 50 mcg/act nasal spray   • folic acid (FOLVITE) 1 mg tablet   • indomethacin (INDOCIN) 25 mg capsule   • levETIRAcetam (KEPPRA) 750 mg tablet   • levothyroxine 25 mcg tablet   • nystatin (MYCOSTATIN) 500,000 units/5 mL suspension   • ondansetron (ZOFRAN) 4 mg tablet   • ondansetron (ZOFRAN) 4 mg tablet   • Polyethylene Glycol 3350 (MIRALAX PO)   • Prenatal Vit-Iron Carbonyl-FA (prenatal multivitamin) TABS   • Progesterone 200 MG CAPS           OBJECTIVE:  Vitals:  Temp:  [98 3 °F (36 8 °C)] 98 3 °F (36 8 °C)  HR:  [122] 122  Resp:  [24] 24  BP: (137)/(73) 137/73  Body mass index is 51 18 kg/m²  Physical Exam:  General: Well appearing  Respiratory: Unlabored breathing  Cardiovascular: Regular rate  Abdomen: Soft, gravid, nontender  Fundal Height: Appropriate for gestational age  Extremities: Warm and well perfused  Non tender    Psychiatric: Behavioral normal        FHT:   150s via doppler    TOCO:    Limited by BMI      Prenatal Labs:   Blood Type:   Lab Results   Component Value Date/Time    ABO Grouping O 05/23/2023 08:36 AM     , D (Rh type):   Lab Results   Component Value Date/Time    Rh Factor Negative 05/23/2023 08:36 AM     , HCT/HGB:   Lab Results   Component Value Date/Time    Hematocrit 32 3 (L) 06/04/2023 10:18 PM    Hemoglobin 10 7 (L) 06/04/2023 10:18 PM      , Platelets:   Lab Results   Component Value Date/Time    Platelets 530 47/08/0943 10:18 PM      , Varicella: No results found for: \"VARICELLAIGG\"    , Rubella:   Lab Results   Component Value Date/Time    Rubella IgG Quant 14 9 03/14/2023 02:01 PM        , Hep B:   Lab Results   Component Value Date/Time    Hepatitis B " "Surface Ag Non-reactive 03/14/2023 02:01 PM     , HIV:   Lab Results   Component Value Date/Time    HIV-1/HIV-2 Ab Non-Reactive 07/29/2022 08:56 AM     , Chlamydia: No results found for: \"EXTCHLAMYDIA\"  , Gonorrhea:   Lab Results   Component Value Date/Time    N gonorrhoeae, DNA Probe Negative 03/30/2023 06:23 AM           Darío Blackman, DO  OB/GYN PGY-3  6/4/2023  10:52 PM        Portions of the record may have been created with voice recognition software  Occasional wrong word or \"sound a like\" substitutions may have occurred due to the inherent limitations of voice recognition software    Read the chart carefully and recognize, using context, where substitutions have occurred            "

## 2023-06-05 NOTE — ANESTHESIA PROCEDURE NOTES
Epidural Block    Patient location during procedure: OB  Start time: 6/4/2023 10:33 PM  Reason for block: procedure for pain and at surgeon's request  Staffing  Performed by: Ghassan James MD  Authorized by: Ghassan James MD    Preanesthetic Checklist  Completed: patient identified, IV checked, site marked, risks and benefits discussed, surgical consent, monitors and equipment checked, pre-op evaluation and timeout performed  Epidural  Patient position: sitting  Prep: ChloraPrep  Patient monitoring: cardiac monitor and frequent blood pressure checks  Approach: midline  Location: lumbar  Injection technique: GRACE saline  Needle  Needle type: Tuohy   Needle gauge: 18 G  Catheter type: side hole  Catheter size: 20 G  Catheter at skin depth: 12 cm  Catheter securement method: stabilization device  Test dose: negative  Assessment  Number of attempts: 1negative aspiration for CSF, negative aspiration for heme and no paresthesia on injection  patient tolerated the procedure well with no immediate complications

## 2023-06-05 NOTE — CONSULTS
NICU Prenatal Consult   Coco Camilo 29 y o  female MRN: [id number]  Unit/Bed#:   Encounter: [id number]    Consulting Physician: Vidhya Vallejo MD      A NICU Prenatal Consult has been requested by ob team due to expected  birth  Coco Camilo is a 29 y o  [id number], at 22w3d GA       Prenatal Care:Yes, description Cervical cerclage    Admitted with discomfort (possible contractions), and cervical dilation   She mentioned that some US dated the pregnancy at 23+ weeks GA    Prenatal Labs:  Lab Results   Component Value Date/Time    ABO Grouping O 2023 10:18 PM    Hepatitis B Surface Ag Non-reactive 2023 02:01 PM    Hepatitis C Ab Non-reactive 2022 08:56 AM    HIV-1/HIV-2 Ab Non-Reactive 2022 08:56 AM    Rh Factor Negative 2023 10:18 PM    RPR Non-Reactive 2022 08:56 AM    Rubella IgG Quant 14 9 2023 02:01 PM       Unknown labs at this time:     Pregnancy complications:   Patient Active Problem List   Diagnosis   • Depression with anxiety   • Nonintractable epilepsy without status epilepticus ([facility] )   • History of irregular menstrual bleeding   • PCOS (polycystic ovarian syndrome)   • Infertility, female   • [place]'s esophagus   • Gastric ulcer   • GARIMA (obstructive sleep apnea)   • Female infertility   • Autism   • Bipolar depression ([facility] )   • Morbid obesity (HCC)   • Class 3 severe obesity due to excess calories with serious comorbidity and body mass index (BMI) of 50 0 to 59 9 in [facility])   • Gastroesophageal reflux disease   • Excessive daytime sleepiness   • Iron deficiency anemia secondary to inadequate dietary iron intake   • Constipation   • Dysphagia   • Hypothyroid in pregnancy, antepartum   • 22 weeks gestation of pregnancy   • Chronic constipation   • Nausea/vomiting in pregnancy   • Seizures ([facility] )   • Obesity in pregnancy   • History of stillbirth in currently pregnant patient, unspecified trimester   • Vaginal bleeding before 22 weeks gestation   • Maternal morbid obesity, antepartum ([facility] )   • Short stature   • Family history of congenital heart defect   • Cervical shortening   • Cervical cerclage suture present, antepartum   • Hypothyroidism during pregnancy in second trimester   • History of stillbirth in currently pregnant patient, second trimester   • Previous child with congenital anomaly, currently pregnant, antepartum     Maternal medical history:   Past Medical History:   Diagnosis Date   • Autism    • [place] esophagus    • CPAP (continuous positive airway pressure) dependence    • 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[facility])     not diagnosed as of 1/9/23-trying to R/O   • Depression    • Diabetes mellitus ([place] [address] )    • Disease of thyroid gland     hypo   • Dysphagia     solids and liquids   • Female infertility    • Fibromyalgia, primary    • Gastric ulcer    • History of bronchitis    • Iron deficiency anemia     infusion in 11/2022   • Irregular menses    • Miscarriage    • Morbid obesity with BMI of 50 0-59 9, adult ([facility])    • Plantar fasciitis of left foot    • Reflux esophagitis    • Seizures ([place] [address] )     last one 7/13/22   • Sleep apnea     CPAP   • Wears glasses      Medications at home:   Medications Prior to Admission   Medication   • aspirin (ECOTRIN LOW STRENGTH) 81 mg EC tablet   • famotidine (PEPCID) 40 MG tablet   • ferrous sulfate 325 (65 Fe) mg tablet   • fluticasone (FLONASE) 50 mcg/act nasal spray   • folic acid (FOLVITE) 1 mg tablet   • levETIRAcetam (KEPPRA) 750 mg tablet   • levothyroxine 25 mcg tablet   • nystatin (MYCOSTATIN) 500,000 units/5 mL suspension   • Polyethylene Glycol 3350 (MIRALAX PO)   • Prenatal Vit-Iron Carbonyl-FA (prenatal multivitamin) TABS   • Progesterone 200 MG CAPS   • acetaminophen (TYLENOL) 500 mg tablet   • albuterol (Proventil HFA) 90 mcg/act inhaler   • docusate sodium (COLACE) 100 mg capsule   • indomethacin (INDOCIN) 25 mg capsule     Maternal social history:  Social History     Tobacco Use   • Smoking status: Never   • Smokeless tobacco: Never   Substance Use Topics   • Alcohol use: Not Currently     Comment: occ     Maternal  medications:  steroids: Betamethasone  Other medications: Ancef  Mg sulfate      I spoke with John Winston today regarding the upcoming birth of her baby  We discussed the baby's possible medical problems and the specialized care needed to address these problems  This discussion included, but was not limited to: Attendance of physician/RAYMOND/ nursing, and/or respiratory therapist at the delivery and delivery room management , Possibility of comfort care, and the parents would like full medical intervention at this moment, Possible need for IV access, umbilical lines, and/or PICC lines, Possible need for prolonged parenteral nutrition, Potential need for transfusion of blood products, Risk of bronchopulmonary dysplasia, Risk of feedings problems and the potential need for IV fluids and gavage tube feeds, Risk of hypoglycemia requiring dextrose infusion, Risk of hypothermia and need for radiant warmer and/or isolette, Risk of immature cardiorespiratory control and need for monitoring and/or caffeine, Risk of intraventricular hemorrhage and possible need for brain ultrasound, Risk of jaundice requiring phototherapy, Risk of necrotizing enterocolitis and advantages of expressed breast milk, Risk of prolonged patent ductus arteriosus and possible need for pharmacologic or surgical treatment , Risk of respiratory distress and possible need for support (oxygen, CPAP, intubation, and/or surfactant), Risk of retinopathy of prematurity and possible need for ophthalmologic exams , Risk of sepsis and possible need for lab tests and IV antibiotics and Survival and neurodevelopmental outcomes for babies born at 22 weeks       All of Margoth's questions were answered to the best of my ability, and she was encouraged to contact us with any further questions        Thank you for including us in the care of [facility] GUSTAVO  Please reach out to the on-call Neonatologist via [employer] for any further questions that may arise  I spent 40 minutes in consultation with [facility] GUSTAVO, of which 60% was in direct communication      Vaishnavi Chaney MD  - Medicine

## 2023-06-05 NOTE — OB LABOR/OXYTOCIN SAFETY PROGRESS
Labor Progress Note - Margoth Cook 29 y o  female MRN: [id number]    Unit/Bed#: -01 Encounter: [id number]       Contraction Frequency (minutes): irregular per pt and palpation  Contraction Quality: Moderate  Tachysystole: No   Cervical Dilation: 4-5        Cervical Effacement: 70  Fetal Station: Floating  Baseline Rate: 145 bpm  Fetal Heart Rate: 150 BPM  FHR Category: Category I               Vital Signs:   Vitals:    06/05/23 0132   BP: (!) 88/49   Pulse: (!) 111   Resp:    Temp:    SpO2:        Notes/comments:   Called to bedside for increasing pressure  Gentle sterile vaginal exam revealed cervical exam as above  We will try to minimize checks to decrease infection risk and promote latency          Clearence Levels, DO 6/5/2023 2:00 AM

## 2023-06-05 NOTE — PLAN OF CARE
Problem: Knowledge Deficit  Goal: Verbalizes understanding of labor plan  Description: Assess patient/family/caregiver's baseline knowledge level and ability to understand information  Provide education via patient/family/caregiver's preferred learning method at appropriate level of understanding  1  Provide teaching at level of understanding  2  Provide teaching via preferred learning method(s)  Outcome: Progressing     Problem: Labor & Delivery  Goal: Manages discomfort  Description: Assess and monitor for signs and symptoms of discomfort  Assess patient's pain level regularly and per hospital policy  Administer medications as ordered  Support use of nonpharmacological methods to help control pain such as distraction, imagery, relaxation, and application of heat and cold  Collaborate with interdisciplinary team and patient to determine appropriate pain management plan  1  Include patient in decisions related to comfort  2  Offer non-pharmacological pain management interventions  3  Report ineffective pain management to physician  Outcome: Progressing  Goal: Patient vital signs are stable  Description: 1  Assess vital signs - vaginal delivery    Outcome: Progressing     Problem: ANTEPARTUM  Goal: Maintain pregnancy as long as maternal and/or fetal condition is stable  Description: INTERVENTIONS:  - Maternal surveillance  - Fetal surveillance  - Monitor uterine activity  - Medications as ordered  - Bedrest  Outcome: Progressing

## 2023-06-05 NOTE — CONSULTS
Consultation - Maternal Fetal Medicine   [facility] GUSTAVO 29 y o  female MRN: [id number]  Unit/Bed#: -01 Encounter: [id number]  Admit date: 2023  Today's date: 23    Assessment/Plan   Ms Eugenio Flynn is a 29y o  year-old  at [facility] Day: 1, admitted for  labor in the periviable period in the setting of a physical exam indicated cerclage in place   labor in setting of periviability and physical exam indicated cerclage in place:  [address] [facility] [place] feels very well counseled already by our neonatology colleagues regarding the prognosis of a 22-week and 2-day   At this point the fetal heart tracing remains reassuring  She desires full medical intervention on behalf of the baby after discussion with them  She is well aware of the high rate of mortality as well as long-term morbidity in surviving neonates at this gestational age  Chief resident Dr Eulogio Montiel and I also counseled her regarding the risks of a classical  delivery at this gestational age and its associated morbidities including bleeding, infection, damage to surrounding structures, higher risk of abnormal placentation in future pregnancies, risk of hysterectomy due to postpartum hemorrhage, and need for scheduled  delivery in the future and a contraindication to future vaginal delivery  At this time the fetus is cephalic  She is scheduled to go to the operating room for epidural placement at which time her cerclage will be removed given  labor in the setting of a cerclage due to the risks of damage to her cervix as well as uncontrolled bleeding if she were to continue laboring with a cerclage in place  At this point after counseling with both our obstetric team and  team, she desires full medical intervention for the baby as well as a classical  if she were to require one   She understands that given the extreme prematurity it is also an acceptable option to desire intervention when the baby is born but decline a  delivery  We reminded her that this can be an ongoing dialogue and she is appreciative and feels fully informed at this time  Plan:  - To OR for cerclage removal with primary OB team  - Betamethasone, magnesium for neuroprotection and penicillin started  - Recommend starting indomethacin 50 mg loading dose followed by 25 mg q 6 hours for 48 hours of treatment through betamethasone course  - Continuous EFM at this time which is Category 1 with plan for  including classical for fetal distress per patient's verbal desire  Consents to be signed prior to OR    Please do not hesitate to reach out to MFM with any additional questions or concerns  Eliana Nieves MD  Attending Physician, Tasha      Chief Complaint   Patient presents with   • Abdominal Cramping       Physician Requesting Consult: Vidhya Vallejo MD  Reason for Consult / Principal Problem:  labor  Subspeciality: Perinatology    Dear Dr Oscar Lentz,    Thank you for referring patient Coco Camilo for Maternal-Fetal Medicine consultation regarding Coco Camilo  HPI: As you know, Ms Rod Camara is a 29y o  year-old [id number] with an JAKE of Estimated Date of Delivery: 10/6/23 at 22w2d, Hospital Day: 1, admitted for  labor in the periviable period with physical exam indicated cerclage in place  Her obstetrical history is significant for a 28 week fetal demise  Carey Oppenheim is here today with her friend Naresh Aide  She states that she has been having off-and-on cramping and earlier this evening whether having dinner when she was sitting up she had significant pain and cramping  She had pinkish tinge spotting with wiping  Other obstetric review of symptoms:  Contractions: painful cramping especially when sitting up  Leakage of fluid: None  Bleeding: pink spots when wiping x 2 today  Fetal movement: present  Review of Systems   Constitutional: Negative for chills, fever and unexpected weight change  HENT: Negative for congestion, dental problem, facial swelling and sore throat  Eyes: Negative for visual disturbance  Respiratory: Negative for cough and shortness of breath  Cardiovascular: Negative for chest pain and palpitations  Gastrointestinal: Negative for diarrhea and vomiting  Endocrine: Negative for polydipsia  Genitourinary: Negative for dysuria and vaginal bleeding  Musculoskeletal: Negative for back pain and joint swelling  Skin: Negative for rash and wound  Allergic/Immunologic: Negative for immunocompromised state  Neurological: Negative for seizures and headaches  Hematological: Does not bruise/bleed easily  Psychiatric/Behavioral: Negative for hallucinations and suicidal ideas  Other history is as follows:    Historical Information   OB History    Para Term  AB Living   4 1 0 1 2 0   SAB IAB Ectopic Multiple Live Births   2 0 0 0 0      # Outcome Date GA Lbr Lopez/2nd Weight Sex Delivery Anes PTL Lv   4 Current            3 SAB            2 2012           1   28w0d    Vag-Spont   FD      Complications: ABO incompatibility in pregnancy      Obstetric Comments   Both SAB <2 months   Still birth at 7 months   Has had hypercoagulable workup in [place] which was negative      Gynecologic history: Patient's last menstrual period was 2022 (exact date)       Past Medical History:   Diagnosis Date   • Autism    • [place] esophagus    • CPAP (continuous positive airway pressure) dependence    • 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HCC)     not diagnosed as of 23-trying to R/O   • Depression    • Diabetes mellitus ([facility] )    • Disease of thyroid gland     hypo   • Dysphagia     solids and liquids   • Female infertility    • Fibromyalgia, primary    • Gastric ulcer    • History of bronchitis    • Iron deficiency anemia     infusion in 2022   • Irregular menses • Miscarriage    • Morbid obesity with BMI of 50 0-59 9, adult ([facility])    • Plantar fasciitis of left foot    • Reflux esophagitis    • Seizures ([address] )     last one 7/13/22   • Sleep apnea     CPAP   • Wears glasses      Past Surgical History:   Procedure Laterality Date   • EGD      with Bx due to barretts esophagus   • EYE SURGERY Bilateral     lazy eye repair   • CA BIOPSY OF LIP N/A 11/10/2022    Procedure: [address] GLANDS LOWER LIP;  Surgeon: Marina Bautista MD;  Location: [address] [place] [address];  Service: ENT   • CA CERCLAGE UTERINE CERVIX NONOBSTETRICAL N/A 5/23/2023    Procedure: CERCLAGE CERVICAL;  Surgeon: Olga Zhu MD;  Location: [facility] ;  Service: Obstetrics   • CA HYSTEROSCOPY BX ENDOMETRIUM&/POLYPC W/WO D&C N/A 9/22/2021    Procedure: DILATATION AND CURETTAGE (D&C) WITH HYSTEROSCOPY;  Surgeon: Trell Martinez MD;  Location: [facility];  Service: Gynecology   • WISDOM TOOTH EXTRACTION       Social History   Social History     Substance and Sexual Activity   Alcohol Use Not Currently    Comment: occ     Social History     Substance and Sexual Activity   Drug Use Not Currently   • Types: Marijuana    Comment: about a couple times a week, quit June 2022     Social History     Tobacco Use   Smoking Status Never   Smokeless Tobacco Never     Family History: non-contributory    Meds/Allergies   Prior to Admission Medications   Prescriptions Last Dose Informant Patient Reported? Taking?    Polyethylene Glycol 3350 (MIRALAX PO)  Self Yes No   Sig: Take by mouth   Prenatal Vit-Iron Carbonyl-FA (prenatal multivitamin) TABS  Self Yes No   Sig: Take 1 tablet by mouth daily   Progesterone 200 MG CAPS   No No   Sig: Insert 1 tablet into the vagina daily at bedtime For remainder of pregnancy   acetaminophen (TYLENOL) 500 mg tablet  Self No No   Sig: Take 1 tablet (500 mg total) by mouth every 6 (six) hours as needed for mild pain   albuterol (Proventil HFA) 90 mcg/act inhaler  Self No No   Sig: Inhale 2 puffs every 6 (six) hours as needed for wheezing   aspirin (ECOTRIN LOW STRENGTH) 81 mg EC tablet  Self No No   Sig: Take 2 tablets (162 mg total) by mouth daily Stop at 36 weeks  Patient taking differently: Take 81 mg by mouth daily BID as per patient Stop at 36 weeks     docusate sodium (COLACE) 100 mg capsule  Self No No   Sig: Take 1 capsule (100 mg total) by mouth 2 (two) times a day   Patient not taking: Reported on 2023   famotidine (PEPCID) 40 MG tablet   No No   Sig: TAKE ONE TABLET BY MOUTH EVERY DAY AT BEDTIME (GENERIC FOR PEPCID)   ferrous sulfate 325 (65 Fe) mg tablet  Self Yes No   Sig: Take 325 mg by mouth daily with breakfast   fluticasone (FLONASE) 50 mcg/act nasal spray  Self No No   Si spray into each nostril daily   folic acid (FOLVITE) 1 mg tablet  Self No No   Sig: Take 1 tablet (1 mg total) by mouth daily   indomethacin (INDOCIN) 25 mg capsule   No No   Sig: Take 1 capsule (25 mg total) by mouth every 6 (six) hours for 1 day   Patient not taking: Reported on 2023   levETIRAcetam (KEPPRA) 750 mg tablet  Self No No   Sig: Take 1 tablet (750 mg total) by mouth 2 (two) times a day   levothyroxine 25 mcg tablet   No No   Sig: TAKE ONE TABLET BY MOUTH EVERY DAY IN THE MORNING   nystatin (MYCOSTATIN) 500,000 units/5 mL suspension   No No   Sig: Apply 5 mL (500,000 Units total) to the mouth or throat 4 (four) times a day   ondansetron (ZOFRAN) 4 mg tablet  Self No No   Sig: Take 1 tablet (4 mg total) by mouth every 8 (eight) hours as needed for nausea or vomiting   ondansetron (ZOFRAN) 4 mg tablet   No No   Sig: Take 1 tablet (4 mg total) by mouth every 8 (eight) hours as needed for nausea or vomiting      Facility-Administered Medications: None     Medication Administration - last 24 hours from 2023 to 2023       Date/Time Order Dose Route Action Action by     2023 EDT lactated ringers infusion 0 mL/hr Intravenous Stopped Cindy Cuevas RN     2023 EDT lactated ringers infusion 999 mL/hr Intravenous Arjun [place] Ibrahima, NUNO     06/04/2023 2117 EDT lactated ringers infusion 125 mL/hr Intravenous Arjun [place] Ibrahima, NUNO     06/04/2023 2206 EDT ondansetron (ZOFRAN) injection 4 mg 4 mg Intravenous Given Shahrzad Alexandra, NUNO     06/04/2023 2139 EDT magnesium sulfate 4 g/100 mL IVPB (premix) 4 g 4 g Intravenous Arjun [place] Ibrahima, NUNO     06/04/2023 2207 EDT magnesium sulfate 2 g/50 mL IVPB (premix) 2 g 2 g Intravenous Arjun [place] Ibrahima, NUNO     06/04/2023 2124 EDT betamethasone acetate-betamethasone sodium phosphate (CELESTONE) injection 12 mg 12 mg Intramuscular Given Shahrzad Alexandra RN     06/04/2023 2157 EDT ceFAZolin (ANCEF) IVPB (premix in dextrose) 2,000 mg 50 mL 2,000 mg Intravenous [place] Ibrahima, NUNO        Current Facility-Administered Medications   Medication Dose Route Frequency   • betamethasone acetate-betamethasone sodium phosphate (CELESTONE) injection 12 mg  12 mg Intramuscular Q24H   • bupivacaine (PF) (MARCAINE) 0 25 % injection 30 mL  30 mL Infiltration Once PRN   • ceFAZolin (ANCEF) IVPB (premix in dextrose) 2,000 mg 50 mL  2,000 mg Intravenous Once    Followed by   • [START ON 6/5/2023] ceFAZolin (ANCEF) IVPB (premix in dextrose) 1,000 mg 50 mL  1,000 mg Intravenous Q8H   • diphenhydrAMINE (BENADRYL) injection 25 mg  25 mg Intravenous Q6H PRN   • lactated ringers bolus 1,000 mL  1,000 mL Intravenous Once   • lactated ringers infusion  125 mL/hr Intravenous Continuous   • magnesium sulfate 2 g/50 mL IVPB (premix) 2 g  2 g Intravenous Once   • magnesium sulfate 20 g/500 mL infusion (premix)  1 g/hr Intravenous Continuous   • metoclopramide (REGLAN) injection 5 mg  5 mg Intravenous Q6H PRN   • ondansetron (ZOFRAN) injection 4 mg  4 mg Intravenous Q6H PRN   • ondansetron (ZOFRAN) injection 4 mg  4 mg Intravenous Q4H PRN   • ropivacaine 0 2% PCEA   Epidural Continuous     Allergies   Allergen Reactions   • Haloperidol Other (See "Comments)     Jaw locks up   • Penicillins Hives   • Pollen Extract Allergic Rhinitis       Objective    No data found  Vitals: Last menstrual period 12/24/2022, not currently breastfeeding  There is no height or weight on file to calculate BMI  Physical Exam  Constitutional:       General: She is not in acute distress  Appearance: Normal appearance  She is not ill-appearing, toxic-appearing or diaphoretic  HENT:      Head: Normocephalic and atraumatic  Nose: No congestion or rhinorrhea  Eyes:      General: No scleral icterus  Right eye: No discharge  Left eye: No discharge  Extraocular Movements: Extraocular movements intact  Conjunctiva/sclera: Conjunctivae normal    Pulmonary:      Effort: Pulmonary effort is normal  No respiratory distress  Musculoskeletal:      Cervical back: Normal range of motion  Skin:     Coloration: Skin is not jaundiced or pale  Findings: No erythema, lesion or rash  Neurological:      General: No focal deficit present  Mental Status: She is alert and oriented to person, place, and time  Psychiatric:         Mood and Affect: Mood normal          Behavior: Behavior normal         Pelvic exam by Dr Cassie Miller demonstrated bulging membranes, 3 cm visibly dilated  Prenatal Labs: Blood Type: No results found for: \"ABO\"      Fetal data: Continuous EFM: Reactive and reassuring for gestational age with no decelerations  145/moderate/+10x10/no decels  TOCO difficult to trace contractions that patient is feeling due to habitus  MFM ultrasound report key findings: Growth from 5/25/23 date at 20w6d gestational age   [facility] Nikolas Abreu MD  6/4/2023  10:08 PM          "

## 2023-06-05 NOTE — ANESTHESIA PREPROCEDURE EVALUATION
Procedure:  LABOR ANALGESIA    Relevant Problems   ANESTHESIA (within normal limits)      CARDIO (within normal limits)      ENDO   (+) Hypothyroid in pregnancy, antepartum   (+) Hypothyroidism during pregnancy in second trimester      GI/HEPATIC   (+) Dysphagia   (+) Gastric ulcer   (+) Gastroesophageal reflux disease      /RENAL (within normal limits)      GYN   (+) 22 weeks gestation of pregnancy      HEMATOLOGY   (+) Iron deficiency anemia secondary to inadequate dietary iron intake      MUSCULOSKELETAL (within normal limits)      NEURO/PSYCH   (+) Depression with anxiety   (+) Nonintractable epilepsy without status epilepticus (HCC)   (+) Seizures (HCC)      PULMONARY   (+) GARIMA (obstructive sleep apnea)      Digestive   (+) [place]'s esophagus      Endocrine   (+) PCOS (polycystic ovarian syndrome)      Other   (+) Autism   (+) Bipolar depression (HCC)   (+) Cervical shortening   (+) Class 3 severe obesity due to excess calories with serious comorbidity and body mass index (BMI) of 50 0 to 59 9 in adult ([address] )   (+) History of stillbirth in currently pregnant patient, second trimester   (+) History of stillbirth in currently pregnant patient, unspecified trimester   (+) Maternal morbid obesity, antepartum ([address] )   (+) Morbid obesity ([address] )   (+) Obesity in pregnancy   (+) Previous child with congenital anomaly, currently pregnant, antepartum   (+) Short stature   (+) Vaginal bleeding before 22 weeks gestation        Physical Exam    Airway    Mallampati score: II  TM Distance: >3 FB  Neck ROM: full     Dental   No notable dental hx     Cardiovascular  Rhythm: regular, Rate: normal, Cardiovascular exam normal    Pulmonary  Pulmonary exam normal Breath sounds clear to auscultation,     Other Findings        Anesthesia Plan  ASA Score- 3     Anesthesia Type- epidural with ASA Monitors  Additional Monitors:   Airway Plan:           Plan Factors-Exercise tolerance (METS): >4 METS  Chart reviewed   EKG reviewed  Imaging results reviewed  Existing labs reviewed  Patient summary reviewed  Induction-     Postoperative Plan-     Informed Consent- Anesthetic plan and risks discussed with patient  I personally reviewed this patient with the CRNA  Discussed and agreed on the Anesthesia Plan with the CRNA  Mary Oleary Recent labs personally reviewed:  Lab Results   Component Value Date    HGB 10 9 (L) 05/23/2023     05/23/2023    WBC 7 50 05/23/2023     Lab Results   Component Value Date    BUN 6 05/23/2023    CREATININE 0 38 (L) 05/23/2023    K 3 3 (L) 05/23/2023     Lab Results   Component Value Date    PTT 25 03/30/2023      Lab Results   Component Value Date    INR 0 90 03/30/2023       Blood type O    Lab Results   Component Value Date    HGBA1C 5 8 (H) 07/29/2022       I, Vel Vera MD, have personally seen and evaluated the patient prior to anesthetic care  I have reviewed the pre-anesthetic record, and other medical records if appropriate to the anesthetic care  If a CRNA is involved in the case, I have reviewed the CRNA assessment, if present, and agree  Risks/benefits and alternatives discussed with patient including possible PONV, sore throat, and possibility of rare anesthetic and surgical emergencies  Addendum: Repeating procedure, no changes from above      [facility]

## 2023-06-05 NOTE — PROGRESS NOTES
Progress Note - Maternal Fetal Medicine   Margoth Cook 29 y o  female MRN: [id number]  Unit/Bed#: LD  Encounter: [id number]  Admit date: 2023  Today's date: 23    Assessment/Plan     Ms Elie Michel is a 29 y o   at [address] [address] Day: 2, admitted with  labor and cervical insufficiency s/p removal of physical exam indicated cerclage  By issue:    * 22 weeks gestation of pregnancy  Assessment & Plan  · EFW: 434g last scan on   · VTX by transabdominal ultrasound on admission  · Since Neonatology would offer resuscitation, patient is receiving magnesium sulphate for fetal neuroproctection, Ancef for GBS prophylaxis and Betamethasone for fetal lung maturation  · She received an epidural  Apply SCDs for VTE prophylaxis  · Patient was counseled on need for classical  delivery and that it would be okay to desire full resuscitation of baby if she had a vaginal delivery but declined classical   She was counseled on the increased maternal morbidity risks associated with a classical  including infection, bleeding, injury to surrounding organs, increased risk for placenta accreta spectrum, increased scar tissue, potential need for postpartum hysterectomy    Cervical cerclage suture present, antepartum  Assessment & Plan  Removed this morning     Nonintractable epilepsy without status epilepticus ([address] )  Assessment & Plan  Continue Keppra 750mg BID    Hypothyroidism during pregnancy in second trimester  Assessment & Plan  Continue Levothyroxine 25mcg daily           Subjective    Nii Zhou has intermittent rectal pressure this morning but it otherwise comfortable with her epidural  She offered no additional complaints            Objective      Patient Vitals for the past 24 hrs:   BP Temp Temp src Pulse Resp SpO2 Height   23 0627 102/52 -- -- 101 -- -- --   23 0557 (!) 101/49 -- -- 103 -- -- --   23 0542 (!) 87/44 -- -- (!) 115 -- -- --   23 7269 (!) 97/48 -- -- (!) 109 -- -- --   06/05/23 0512 (!) 100/49 -- -- (!) 108 -- -- --   06/05/23 0442 (!) 86/47 -- -- (!) 110 -- -- --   06/05/23 0427 90/50 -- -- (!) 106 -- -- --   06/05/23 0412 (!) 88/49 -- -- 105 -- -- --   06/05/23 0400 -- 98 °F (36 7 °C) Oral -- -- 98 % --   06/05/23 0357 (!) 83/51 -- -- (!) 110 -- -- --   06/05/23 0342 (!) 89/51 -- -- (!) 106 -- -- --   06/05/23 0327 91/52 -- -- (!) 109 -- -- --   06/05/23 0312 91/53 -- -- 105 -- -- --   06/05/23 0257 (!) 89/51 -- -- 103 -- -- --   06/05/23 0242 (!) 85/47 -- -- (!) 111 -- -- --   06/05/23 0227 (!) 92/43 -- -- (!) 106 -- -- --   06/05/23 0208 (!) 86/48 -- -- 102 -- -- --   06/05/23 0200 -- 98 1 °F (36 7 °C) -- -- -- 98 % --   06/05/23 0146 94/54 -- -- (!) 106 -- -- --   06/05/23 0132 (!) 88/49 -- -- (!) 111 -- -- --   06/05/23 0116 (!) 91/49 -- -- 104 -- -- --   06/05/23 0101 101/56 -- -- (!) 107 -- -- --   06/05/23 0047 100/59 -- -- (!) 107 -- -- --   06/05/23 0031 92/53 -- -- (!) 106 -- -- --   06/05/23 0023 -- -- -- (!) 111 -- 97 % --   06/05/23 0018 -- -- -- (!) 106 -- 100 % --   06/05/23 0016 125/57 -- -- (!) 107 -- -- --   06/05/23 0013 -- -- -- 105 -- 100 % --   06/05/23 0008 -- -- -- (!) 107 -- 100 % --   06/05/23 0003 -- -- -- 105 -- 100 % --   06/05/23 0001 109/60 -- -- (!) 111 -- 100 % --   06/04/23 2358 -- -- -- (!) 112 -- -- --   06/04/23 2353 -- -- -- (!) 111 -- 100 % --   06/04/23 2348 -- -- -- 105 -- 100 % --   06/04/23 2347 110/55 -- -- 100 -- -- --   06/04/23 2343 -- -- -- 104 -- 100 % --   06/04/23 2338 -- -- -- 103 -- 100 % --   06/04/23 2333 -- -- -- 103 -- 100 % --   06/04/23 2328 -- -- -- 105 -- 100 % --   06/04/23 2326 122/59 -- -- 104 -- 100 % --   06/04/23 2323 -- -- -- 104 -- -- --   06/04/23 2320 126/60 -- -- (!) 112 -- -- --   06/04/23 2318 -- -- -- (!) 109 -- 100 % --   06/04/23 2317 126/60 -- -- (!) 113 -- -- --   06/04/23 2314 -- -- -- -- -- 100 % --   06/04/23 2313 -- -- -- (!) 121 -- 100 % --   06/04/23 2312 "131/60 -- -- (!) 121 -- -- --   06/04/23 2308 -- -- -- (!) 113 -- 100 % --   06/04/23 2305 127/62 -- -- (!) 115 -- -- --   06/04/23 2303 -- -- -- (!) 129 -- 100 % --   06/04/23 2302 132/63 -- -- (!) 129 -- -- --   06/04/23 2300 126/60 -- -- (!) 146 -- -- --   06/04/23 2258 -- -- -- (!) 151 -- 100 % --   06/04/23 2257 122/57 -- -- (!) 126 -- -- --   06/04/23 2256 122/57 -- -- (!) 126 -- -- --   06/04/23 2253 -- -- -- (!) 166 -- 100 % --   06/04/23 2252 129/58 -- -- (!) 148 -- -- --   06/04/23 2248 -- -- -- (!) 143 -- 98 % --   06/04/23 2246 110/53 -- -- (!) 146 -- -- --   06/04/23 2243 -- -- -- (!) 142 -- 99 % --   06/04/23 2241 124/63 -- -- (!) 133 -- -- --   06/04/23 2238 137/73 98 3 °F (36 8 °C) Oral (!) 122 (!) 24 98 % 4' 7\" (1 397 m)   06/04/23 2236 -- -- -- (!) 120 -- -- --   06/04/23 2233 -- -- -- (!) 122 -- 98 % --   06/04/23 2230 130/78 -- -- (!) 122 -- 100 % --   06/04/23 2228 -- -- -- (!) 129 -- -- --       Physical Exam  Vitals reviewed  Constitutional:       Appearance: She is obese  Comments: Uncomfortable appearing   Pulmonary:      Effort: Pulmonary effort is normal  No respiratory distress  Abdominal:      General: There is no distension  Tenderness: There is no abdominal tenderness  There is no guarding  Musculoskeletal:         General: No tenderness  Skin:     General: Skin is warm and dry  Neurological:      Mental Status: She is alert and oriented to person, place, and time     Psychiatric:      Comments: Appears anxious         I/O       06/03 0701  06/04 0700 06/04 0701 06/05 0700    I V   0    Total Intake  0    Urine  1900    Total Output  1900    Net  -1900                Invasive Devices     Peripheral Intravenous Line  Duration           Peripheral IV 06/04/23 Left;Ventral (anterior) Wrist <1 day    Peripheral IV 06/04/23 Right;Ventral (anterior) Hand <1 day    Peripheral IV 06/04/23 Right;Ventral (anterior) Wrist <1 day          Epidural Line  Duration           " Epidural Catheter 06/04/23 <1 day          Drain  Duration           Urethral Catheter Non-latex 16 Fr  <1 day                Results from last 7 days   Lab Units 06/04/23  2218   HEMOGLOBIN g/dL 10 7*   MCV fL 91   PLATELETS Thousands/uL 349   WBC Thousand/uL 8 78     Results from last 7 days   Lab Units 06/04/23  2218   BUN mg/dL 7   CHLORIDE mmol/L 104   CO2 mmol/L 22   CREATININE mg/dL 0 41*   EGFR ml/min/1 73sq m 137   POTASSIUM mmol/L 3 4*     Results from last 7 days   Lab Units 06/04/23  2218   ALT U/L 27   AST U/L 20     Results from last 7 days   Lab Units 06/04/23  2218   PLATELETS Thousands/uL 349     Results from last 7 days   Lab Units 06/04/23  2205   POC GLUCOSE mg/dl 99                       Brief review of pertinent history:  Past Medical History:   Diagnosis Date   • Autism    • [place] esophagus    • CPAP (continuous positive airway pressure) dependence    • 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HCC)     not diagnosed as of 1/9/23-trying to R/O   • Depression    • Diabetes mellitus ([facility] )    • Disease of thyroid gland     hypo   • Dysphagia     solids and liquids   • Female infertility    • Fibromyalgia, primary    • Gastric ulcer    • History of bronchitis    • Iron deficiency anemia     infusion in 11/2022   • Irregular menses    • Miscarriage    • Morbid obesity with BMI of 50 0-59 9, adult ([facility] )    • Plantar fasciitis of left foot    • Reflux esophagitis    • Seizures ([facility] )     last one 7/13/22   • Sleep apnea     CPAP   • Wears glasses      Past Surgical History:   Procedure Laterality Date   • EGD      with Bx due to barretts esophagus   • EYE SURGERY Bilateral     lazy eye repair   • AL BIOPSY OF LIP N/A 11/10/2022    Procedure: EXCISION BIOPSY SALVARY GLANDS LOWER LIP;  Surgeon: Ceferino Gardner MD;  Location: [address] [place] [address];  Service: ENT   • AL CERCLAGE UTERINE CERVIX NONOBSTETRICAL N/A 5/23/2023    Procedure: CERCLAGE CERVICAL;  Surgeon: Angie Salinas MD;  Location: [facility];  Service: Obstetrics   • AL HYSTEROSCOPY BX ENDOMETRIUM&/POLYPC W/WO D&C N/A 2021    Procedure: DILATATION AND CURETTAGE (D&C) WITH HYSTEROSCOPY;  Surgeon: Jed Graves MD;  Location: [address] [place] [address];  Service: Gynecology   • WISDOM TOOTH EXTRACTION       OB History    Para Term  AB Living   4 1 0 1 2 0   SAB IAB Ectopic Multiple Live Births   2 0 0 0 0      # Outcome Date GA Lbr Lopez/2nd Weight Sex Delivery Anes PTL Lv   4 Current            3 2019           2 2012           1   28w0d    Vag-Spont   FD      Complications: ABO incompatibility in pregnancy      Obstetric Comments   Both SAB <2 months   Still birth at 7 months   Has had hypercoagulable workup in [place] which was negative        Fetal data:  Nonstress test: date 23 Time: 0320 - 0444  Baseline: 135  Variability: moderate  Accelerations: present, 15x15  Decelerations: variability  Contractions: not detectable  Assessment: reactive  Plan: continuous toco with intermittent fetal heart rate monitoring      MEDS:   Medication Administration - last 24 hours from 2023 0659 to 2023 8286       Date/Time Order Dose Route Action Action by     2023 2115 EDT lactated ringers infusion 0 mL/hr Intravenous Stopped Александр Faustin RN     2023 EDT lactated ringers infusion 999 mL/hr Intravenous New Bag Александр Faustin RN     2023 2225 EDT lactated ringers infusion 999 mL/hr Intravenous Rate/Dose Change Александр Faustin RN     2023 2117 EDT lactated ringers infusion 125 mL/hr Intravenous 3 Herminia Rashid RN     2023 7158 EDT ondansetron (ZOFRAN) injection 4 mg 4 mg Intravenous Given Elvia Murillo RN     2023 2206 EDT ondansetron (ZOFRAN) injection 4 mg 4 mg Intravenous Given Александр Faustin RN     2023 0128 EDT lactated ringers bolus 1,000 mL 0 mL Intravenous Stopped Elvia Murillo RN     2023 2355 EDT lactated ringers bolus 1,000 mL 1,000 mL Intravenous New Bag Elvia Murillo RN 06/04/2023 2338 EDT lactated ringers bolus 1,000 mL 1,000 mL Intravenous [facility]     06/04/2023 2159 EDT magnesium sulfate 4 g/100 mL IVPB (premix) 4 g 0 g Intravenous Stopped Mariela Zuñiga RN     06/04/2023 2139 EDT magnesium sulfate 4 g/100 mL IVPB (premix) 4 g 4 g Intravenous [address] Darryl Stafford RN     06/04/2023 2219 EDT magnesium sulfate 2 g/50 mL IVPB (premix) 2 g 0 g Intravenous Stopped Mariela Zuñiga RN     06/04/2023 2207 EDT magnesium sulfate 2 g/50 mL IVPB (premix) 2 g 2 g Intravenous [address] Mariela Zuñiga RN     06/04/2023 2221 EDT magnesium sulfate 20 g/500 mL infusion (premix) 1 g/hr Intravenous [address] Mariela Zuñiga RN     06/04/2023 2124 EDT betamethasone acetate-betamethasone sodium phosphate (CELESTONE) injection 12 mg 12 mg Intramuscular Given Mariela Zuñiga RN     06/04/2023 2254 EDT ceFAZolin (ANCEF) IVPB (premix in dextrose) 2,000 mg 50 mL 0 mg Intravenous Stopped Mariela Zuñiga RN     06/04/2023 2157 EDT ceFAZolin (ANCEF) IVPB (premix in dextrose) 2,000 mg 50 mL 2,000 mg Intravenous [address] Mariela Zuñiga RN     06/05/2023 4763 EDT ceFAZolin (ANCEF) IVPB (premix in dextrose) 1,000 mg 50 mL 1,000 mg Intravenous [facility]     06/04/2023 2238 EDT ropivacaine 0 2% PCEA -- Epidural New Bag Mariela Zuñiga RN     06/05/2023 7725 EDT metoclopramide (REGLAN) injection 5 mg 5 mg Intravenous Given Yolanda Srinivasan RN     06/05/2023 0050 EDT levETIRAcetam (KEPPRA) 750 mg in sodium chloride 0 9 % 100 mL IVPB 0 mg Intravenous Stopped Yolanda Srinivasan RN     06/05/2023 0026 EDT levETIRAcetam (KEPPRA) 750 mg in sodium chloride 0 9 % 100 mL IVPB 750 mg Intravenous [address] oYlanda Srinivasan [facility]     06/05/2023 0746 EDT levothyroxine tablet 25 mcg 25 mcg Oral Given Yolanda Srinivasan RN     06/05/2023 6072 EDT famotidine (PEPCID) tablet 20 mg 20 mg Oral Given Yolanda Srinivasan RN     06/05/2023 6258 EDT nystatin (MYCOSTATIN) oral suspension 500,000 Units 0 Units Swish & Swallow Hold Heather Nicolas RN        Current Facility-Administered Medications   Medication Dose Route Frequency   • betamethasone acetate-betamethasone sodium phosphate (CELESTONE) injection 12 mg  12 mg Intramuscular Q24H   • bupivacaine (PF) (MARCAINE) 0 25 % injection 30 mL  30 mL Infiltration Once PRN   • ceFAZolin (ANCEF) IVPB (premix in dextrose) 1,000 mg 50 mL  1,000 mg Intravenous Q8H   • diphenhydrAMINE (BENADRYL) injection 25 mg  25 mg Intravenous Q6H PRN   • famotidine (PEPCID) tablet 20 mg  20 mg Oral BID   • lactated ringers infusion  125 mL/hr Intravenous Continuous   • levETIRAcetam (KEPPRA) tablet 750 mg  750 mg Oral BID   • levothyroxine tablet 25 mcg  25 mcg Oral Early Morning   • magnesium sulfate 20 g/500 mL infusion (premix)  1 g/hr Intravenous Continuous   • metoclopramide (REGLAN) injection 5 mg  5 mg Intravenous Q6H PRN   • nystatin (MYCOSTATIN) oral suspension 500,000 Units  500,000 Units Swish & Swallow 4x Daily   • ondansetron (ZOFRAN) injection 4 mg  4 mg Intravenous Q6H PRN   • ondansetron (ZOFRAN) injection 4 mg  4 mg Intravenous Q4H PRN   • ropivacaine 0 2% PCEA   Epidural Continuous     Facility-Administered Medications Ordered in Other Encounters   Medication Dose Route Frequency   • lidocaine-epinephrine (XYLOCAINE-MPF/EPINEPHRINE) 1 5 %-1:200,000 injection   Epidural PRN            Veronica Castaneda MD  OBGYN, PGY-3  6/5/2023  6:59 AM

## 2023-06-05 NOTE — QUICK NOTE
Sherlyn Lindquist was having some increasing sensation of fluid leaking since epidural has been turned off  Speculum exam shows unchanged exam and membranes are no longer bulging as they were on admission  Discussed that her water does not seem to be broken, and this is likely due to membranes weeping as well as gel from multiple exams  Given that she is stable, we will allow her to eat dinner and move her upstairs to antepartum unit overnight  She may have an NST every shift and as needed for pelvic pressure/cramping  Discussed that each day we can keep her pregnant will be beneficial for fetal outcomes  She will get her second dose of betamethasone arben      D/w Dr John Turner

## 2023-06-05 NOTE — UTILIZATION REVIEW
Initial Clinical Review    Admission: Date/Time/Statement:   Admission Orders (From admission, onward)     Ordered        23  Inpatient Admission  Once                      Orders Placed This Encounter   Procedures   • Inpatient Admission     Standing Status:   Standing     Number of Occurrences:   1     Order Specific Question:   Level of Care     Answer:   Med Surg [16]     Order Specific Question:   Estimated length of stay     Answer:   More than 2 Midnights     Order Specific Question:   Certification     Answer:   I certify that inpatient services are medically necessary for this patient for a duration of greater than two midnights  See H&P and MD Progress Notes for additional information about the patient's course of treatment  ED Arrival Information     Patient not seen in ED                     Chief Complaint   Patient presents with   • Abdominal Cramping       Initial Presentation: 29 y o  female PMH partial SZs on Keppra 750mg BID ,  at 22w2d Inpatient admission due to  labor, anesthesia Pre OP eval ASA 3  Reports Cramping - comes and goes, worse with dehydration  Feeling great since cerclage otherwise  Most intense cramping - 6 PM  Passing large mucus, last used vaginal progesterone 10 PM last night  Feels due date incorrect; last few scans show to be measuring ahead  EXAM  SVE cervical dilation 3/70/-2; VTX presentation on manual exam & US;   Continuous monitoring; remove cervical cerclage due to  labor w bulging bag; consult MFM, expectant management; continuous fetal monitoring, BTM x2, indomethacin loading w Q 6hr dose for total 48 HR of RX  MFM   at 22w2d w  labor in setting of Periviability w Cerclage in place  PLAN OR for cerclage removal, patient desires full medical intervention incl classical CS if required  BTM x2, MAG for neuro protection; PCN   Indo; cont fetal monitoring  CRITICAL CARE RAPID RESPONSE EVENT  Received epidural had acute change of mental status:  became lightheaded w whole body shaking; answering simple commands  Obtain iSTAT check electrolytes; check Keppra level if sub therapeutic rec Keppra load  Give IV Keppra 750 mg IV now consider Neuro consult if repeat events  Date:    Day 2:   OB MD:  PROCEDURE    Cerclage  visualized with Graves speculum  It was elevated with a ring forcep and suture was cut  It was removed in 1 piece without complication  Cervix was no longer noted to have membranes ballooning out, but visible just within the os and was noted to be about 2 5 cm dilated on sterile speculum exam  OB MD  Contraction Frequency (minutes): irregular per pt and palpation  Contraction Quality: Moderate  Cervical Dilation: 4-5  Cervical Effacement: 70  Fetal Station: Floating; Called to bedside for increasing pressure  Gentle sterile vaginal exam revealed cervical exam as above  We will try to minimize checks to decrease infection risk and promote latency  OB MD  Tachysystole: No   Cervical Dilation: 4-5  Cervical Effacement: 79  Fetal Station: Floating  Called to bedside as patient thought she may have broken her water, sterile speculum exam still reveals same exam with membranes at level of cervical os   MFM Attending MD  22w3d, since Neonatology would offer resuscitation; pt receiving MAG Sulphate, Ancef & BTM  Patient counseled on need for classical CS & that it would be OK to desire full resuscitation if she had a Vaginal delivery but declined Classical CS  Fetal data:  Nonstress test: date 23 Time: 0320 - 0444  Baseline: 135  Variability: moderate  Accelerations: present, 15x15  Decelerations: variability  Contractions: not detectable  Assessment: reactive  Plan: continuous toco with intermittent fetal heart rate monitoring  OB ATTENDING MD  22w3d admit with  labor and cervical insufficiency with removal of cerclage     Currently in guarded condition   EFW 434g on  vtx,  getting ancef, btm, and "magnesium  epilepsy with suspected partial seizure last evening has had n/v possible difficulty keeping keppra in  keppra level pending  continue keppra  levothyroxine for hypothyroid  levothyroxine for hypothyroid  Follow MFM plan   Triage Vitals   Temperature Pulse Respirations Blood Pressure SpO2   06/04/23 2238 06/04/23 2228 06/04/23 2238 06/04/23 2230 06/04/23 2230   98 3 °F (36 8 °C) (!) 129 (!) 24 130/78 100 %      Temp Source Heart Rate Source Patient Position - Orthostatic VS BP Location FiO2 (%)   06/04/23 2238 06/04/23 2238 06/04/23 2238 06/04/23 2238 --   Oral Monitor Lying Left arm       Pain Score       06/04/23 2238       5          Wt Readings from Last 1 Encounters:   06/01/23 99 9 kg (220 lb 3 2 oz)     Additional Vital Signs:   Date/Time Temp Pulse Resp BP SpO2 O2 Device Patient Position - Orthostatic VS   06/05/23 0900 -- 105 -- 105/61 -- -- --   06/05/23 0815 98 2 °F (36 8 °C) 100 -- 88/46 Abnormal  -- -- --   06/05/23 3978 -- -- -- -- -- -- --   Comment rows:   OBSERV: contraction button given, pt reporting cotractions \"Every few minutes\" not tracing on monitor   at 06/05/23 0812   06/05/23 0800 -- -- -- 95/51 -- -- --   06/05/23 0745 -- -- -- 104/58 -- -- --   06/05/23 0730 -- -- -- 102/58 -- -- --   06/05/23 0657 -- 101 -- 94/55 -- -- --   06/05/23 0642 -- 106 Abnormal  -- 118/55 -- -- --   06/05/23 0627 -- 101 -- 102/52 -- -- --   06/05/23 0557 -- 103 -- 101/49 Abnormal  -- -- --   06/05/23 0542 -- 115 Abnormal  -- 87/44 Abnormal  -- -- --   06/05/23 0527 -- 109 Abnormal  -- 97/48 Abnormal  -- -- --   06/05/23 0512 -- 108 Abnormal  -- 100/49 Abnormal  -- -- --   06/05/23 0442 -- 110 Abnormal  -- 86/47 Abnormal  -- -- --   Comment rows:   OBSERV: pt  complained of feeling leaking; Dr Eleanor Lawrence made aware at 06/05/23 0315 06/05/23 0312 -- 105 -- 91/53 -- -- --   06/05/23 0257 -- 103 -- 89/51 Abnormal  -- -- --   06/05/23 0242 -- 111 Abnormal  -- 85/47 Abnormal  -- -- --   06/05/23 0227 -- " 106 Abnormal  -- 92/43 Abnormal  -- -- --   06/05/23 0208 -- 102 -- 86/48 Abnormal  -- -- --   06/05/23 0200 98 1 °F (36 7 °C) -- -- -- 98 % None (Room air) --   06/05/23 0146 -- 106 Abnormal  -- 94/54 -- -- --   06/05/23 0132 -- 111 Abnormal  -- 88/49 Abnormal  -- -- --   06/05/23 0116 -- 104 -- 91/49 Abnormal  -- -- --   06/05/23 0101 -- 107 Abnormal  -- 101/56 -- -- --   06/05/23 0049 -- -- -- -- -- -- --   Comment rows:   OBSERV: cerclage removed at this time at 06/05/23 0049 06/05/23 0047 -- 107 Abnormal  -- 100/59 -- -- --   06/05/23 0031 -- 106 Abnormal  -- 92/53 -- -- --   06/05/23 0023 -- 111 Abnormal  -- -- 97 % -- --   06/05/23 0018 -- 106 Abnormal  -- -- 100 % -- --   06/05/23 0016 -- 107 Abnormal  -- 125/57 -- -- --   06/05/23 0013 -- 105 -- -- 100 % -- --   06/05/23 0008 -- 107 Abnormal  -- -- 100 % -- --   06/05/23 0003 -- 105 -- -- 100 % -- --   06/05/23 0001 -- 111 Abnormal  -- 109/60 100 % None (Room air) --   06/04/23 2358 -- 112 Abnormal  -- -- -- -- --   06/04/23 2353 -- 111 Abnormal  -- -- 100 % -- --   06/04/23 2256 -- 126 Abnormal  -- 122/57 -- -- --   Comment rows:   OBSERV: ekg attemted at 06/04/23 2256 06/04/23 2255 -- -- -- -- -- -- --   Comment rows:   OBSERV: dr Hoffmann Repress at bedside at 06/04/23 2255 06/04/23 2253 -- 166 Abnormal  -- -- 100 % -- --   06/04/23 2252 -- 148 Abnormal  -- 129/58 -- -- --   06/04/23 2248 -- 143 Abnormal  -- -- 98 % -- --   06/04/23 2246 -- 146 Abnormal  -- 110/53 -- -- --   06/04/23 2243 -- 142 Abnormal  -- -- 99 % -- --   06/04/23 2241 -- 133 Abnormal  -- 124/63 -- -- --   06/04/23 2238 98 3 °F (36 8 °C) 122 Abnormal  24 Abnormal  137/73 98 % -- Lying   06/04/23 2236 -- 120 Abnormal  -- -- -- -- --   06/04/23 2233 -- 122 Abnormal  -- -- 98 % -- --   06/04/23 2230 -- 122 Abnormal  -- 130/78 100 % None (Room air) --   06/04/23 2228 -- 129 Abnormal  -- -- -- -- --   06/04/23 2227 -- -- -- -- -- -- --   Comment rows:   OBSERV: sitting on edge of bed for "epidural at 06/04/23 2227 06/04/23 2107 -- -- -- -- -- -- --   Comment rows:   OBSERV: dr Bob Gardner at bedside discussing resuscitation at 06/04/23 2107 06/04/23 2020 -- -- -- -- -- -- --   Comment rows:   OBSERV: dr Prieto Fisher at 06/04/23 2020     Weights (last 14 days)    Date/Time Height   06/04/23 2238 4' 7\" (1 397 m)       Pertinent Labs/Diagnostic Test Results:   No orders to display         Results from last 7 days   Lab Units 06/04/23  2218   HEMATOCRIT % 32 3*   HEMOGLOBIN g/dL 10 7*   PLATELETS Thousands/uL 349   WBC Thousand/uL 8 78         Results from last 7 days   Lab Units 06/04/23  2218   ANION GAP mmol/L 9   BUN mg/dL 7   CALCIUM mg/dL 9 2   CHLORIDE mmol/L 104   CO2 mmol/L 22   CREATININE mg/dL 0 41*   EGFR ml/min/1 73sq m 137   POTASSIUM mmol/L 3 4*   SODIUM mmol/L 135     Results from last 7 days   Lab Units 06/04/23  2218   ALBUMIN g/dL 3 3*   ALK PHOS U/L 82   ALT U/L 27   AST U/L 20   TOTAL BILIRUBIN mg/dL 0 28   TOTAL PROTEIN g/dL 6 7     Results from last 7 days   Lab Units 06/04/23  2205   POC GLUCOSE mg/dl 99     Results from last 7 days   Lab Units 06/04/23  2218   GLUCOSE RANDOM mg/dL 102             No results found for: \"BETA-HYDROXYBUTYRATE\"         Results from last 7 days   Lab Units 06/05/23  0620   AMPH/METH  Negative   BARBITURATE UR  Negative   BENZODIAZEPINE UR  Negative   COCAINE UR  Negative   METHADONE URINE  Negative   OPIATE UR  Negative   PCP UR  Negative   THC UR  Negative         ED Treatment:   Medication Administration - No Administrations Displayed (No Start Event Found)     None        Past Medical History:   Diagnosis Date   • Autism    • [place] esophagus    • CPAP (continuous positive airway pressure) dependence    • 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HCC)     not diagnosed as of 1/9/23-trying to R/O   • Depression    • Diabetes mellitus ([facility] [address] )    • Disease of thyroid gland     hypo   • Dysphagia     solids and liquids   • Female infertility    • Fibromyalgia, primary    • Gastric " ulcer    • History of bronchitis    • Iron deficiency anemia     infusion in 11/2022   • Irregular menses    • Miscarriage    • Morbid obesity with BMI of 50 0-59 9, adult [facility])    • Plantar fasciitis of left foot    • Reflux esophagitis    • Seizures (HCC)     last one 7/13/22   • Sleep apnea     CPAP   • Wears glasses      Present on Admission:  • Cervical cerclage suture present, antepartum  • Nonintractable epilepsy without status epilepticus ([facility] [address] )  • Hypothyroidism during pregnancy in second trimester      Admitting Diagnosis: Vaginal bleeding during pregnancy, antepartum [O46 90]  22 weeks gestation of pregnancy [Z3A 22]  Age/Sex: 29 y o  female  Admission Orders:  Continuous fetal monitoring  IV MAG  BTM  Strict bedrest  auscultate breath sounds, clonus checks; vitals, pulse oximetry, reflexes Q 2HR while on IV MAG     Scheduled Medications:  betamethasone acetate-betamethasone sodium phosphate, 12 mg, Intramuscular, Q24H  famotidine, 20 mg, Oral, BID  indomethacin, 50 mg, Oral, Once   Followed by  indomethacin, 25 mg, Oral, Q6H  levETIRAcetam, 750 mg, Oral, BID  levothyroxine, 25 mcg, Oral, Early Morning  nystatin, 500,000 Units, Swish & Swallow, 4x Daily  IV=    ceFAZolin (ANCEF) IVPB (premix in dextrose) 2,000 mg 50 mL  Dose: 2,000 mg  Freq: Once Route: IV  Last Dose: Stopped (06/04/23 2254)  Start: 06/04/23 2130 End: 06/04/23 2254 2157 6/4/2023 2254         Followed by  ceFAZolin (ANCEF) IVPB (premix in dextrose) 1,000 mg 50 mL  Dose: 1,000 mg  Freq: Every 8 hours Route: IV    6/5/2023 0453      IV Bolus= levETIRAcetam (KEPPRA) 750 mg in sodium chloride 0 9 % 100 mL IVPB 6/25x1  Dose: 750 mg  Freq: Once Route: IV     IV=   magnesium sulfate 4 g/100 mL IVPB (premix) 4 g  Dose: 4 g  Freq: Once Route: IV  Last Dose: Stopped (06/04/23 2159)  Start: 06/04/23 2115 End: 06/04/23 2159 2139 6/4 2159         Followed by  magnesium sulfate 2 g/50 mL IVPB (premix) 2 g  Dose: 2 g  Freq:  Once Route: IV 6/4/2023 2207      Continuous IV Infusions:  lactated ringers, 100 mL/hr, Intravenous, Continuous  ropivacaine 0 2%, , Epidural, Continuous  magnesium sulfate 20 g/500 mL infusion (premix) 6/4 2033 & DC 6/5 0953  Rate: 25 mL/hr Dose: 1 g/hr  Freq: Continuous Route: IV    PRN Meds:  bupivacaine (PF), 30 mL, Infiltration, Once PRN  diphenhydrAMINE, 25 mg, Intravenous, Q6H PRN  metoclopramide, 5 mg, Intravenous, Q6H PRN  ondansetron, 4 mg, Intravenous, Q6H PRN  ondansetron, 4 mg, Intravenous, Q4H PRN        IP CONSULT TO ANESTHESIOLOGY  IP CONSULT TO NEONATOLOGY  IP CONSULT TO PERINATOLOGY    Network Utilization Review Department  ATTENTION: Please call with any questions or concerns to [phone] and carefully listen to the prompts so that you are directed to the right person  All voicemails are confidential   [facility] Stacy all requests for admission clinical reviews, approved or denied determinations and any other requests to dedicated fax number below belonging to the campus where the patient is receiving treatment   List of dedicated fax numbers for the Facilities:  [address] [place] [address] DENIALS (Administrative/Medical Necessity) [phone]   [address] (Maternity/NICU/Pediatrics) [phone]   [address] [phone]   [facility] [address] [phone]   [address] [address] Toya Galeas 28 [phone]   1550 [facility] 134 815 [facility] [phone]

## 2023-06-05 NOTE — PROCEDURES
Procedures      Patient was comfortable with epidural   Cerclage was visualized with Graves speculum  It was elevated with a ring forcep and suture was cut  It was removed in 1 piece without complication  Cervix was no longer noted to have membranes ballooning out, but visible just within the os and was noted to be about 2 5 cm dilated on sterile speculum exam     Dr Serg Rodriguez was present and involved in the entire procedure    Marita Meigs [address] Gynecology PGY-3  6/5/2023  12:55 AM

## 2023-06-06 PROBLEM — O99.212 OBESITY AFFECTING PREGNANCY IN SECOND TRIMESTER: Status: ACTIVE | Noted: 2021-08-25

## 2023-06-06 PROBLEM — O34.30 CERVICAL CERCLAGE SUTURE PRESENT, ANTEPARTUM: Status: RESOLVED | Noted: 2023-05-23 | Resolved: 2023-06-06

## 2023-06-06 LAB
ATRIAL RATE: 102 BPM
P AXIS: 71 DEGREES
PR INTERVAL: 118 MS
QRS AXIS: 31 DEGREES
QRSD INTERVAL: 82 MS
QT INTERVAL: 350 MS
QTC INTERVAL: 456 MS
T WAVE AXIS: 7 DEGREES
VENTRICULAR RATE: 102 BPM

## 2023-06-06 PROCEDURE — NC001 PR NO CHARGE: Performed by: OBSTETRICS & GYNECOLOGY

## 2023-06-06 PROCEDURE — 93010 ELECTROCARDIOGRAM REPORT: CPT | Performed by: INTERNAL MEDICINE

## 2023-06-06 PROCEDURE — 99232 SBSQ HOSP IP/OBS MODERATE 35: CPT | Performed by: OBSTETRICS & GYNECOLOGY

## 2023-06-06 PROCEDURE — 99233 SBSQ HOSP IP/OBS HIGH 50: CPT | Performed by: STUDENT IN AN ORGANIZED HEALTH CARE EDUCATION/TRAINING PROGRAM

## 2023-06-06 RX ORDER — SENNOSIDES 8.6 MG
1 TABLET ORAL
Status: DISCONTINUED | OUTPATIENT
Start: 2023-06-06 | End: 2023-06-08

## 2023-06-06 RX ORDER — DOCUSATE SODIUM 100 MG/1
100 CAPSULE, LIQUID FILLED ORAL 2 TIMES DAILY
Status: DISCONTINUED | OUTPATIENT
Start: 2023-06-06 | End: 2023-06-09 | Stop reason: HOSPADM

## 2023-06-06 RX ORDER — PHENAZOPYRIDINE HYDROCHLORIDE 100 MG/1
100 TABLET, FILM COATED ORAL
Status: DISCONTINUED | OUTPATIENT
Start: 2023-06-06 | End: 2023-06-08

## 2023-06-06 RX ORDER — POLYETHYLENE GLYCOL 3350 17 G/17G
17 POWDER, FOR SOLUTION ORAL DAILY PRN
Status: DISCONTINUED | OUTPATIENT
Start: 2023-06-06 | End: 2023-06-08

## 2023-06-06 RX ADMIN — ONDANSETRON 4 MG: 2 INJECTION INTRAMUSCULAR; INTRAVENOUS at 05:21

## 2023-06-06 RX ADMIN — LEVOTHYROXINE SODIUM 25 MCG: 25 TABLET ORAL at 05:10

## 2023-06-06 RX ADMIN — LEVETIRACETAM 750 MG: 250 TABLET, FILM COATED ORAL at 09:41

## 2023-06-06 RX ADMIN — INDOMETHACIN 25 MG: 25 CAPSULE ORAL at 16:18

## 2023-06-06 RX ADMIN — INDOMETHACIN 25 MG: 25 CAPSULE ORAL at 05:10

## 2023-06-06 RX ADMIN — PHENAZOPYRIDINE 100 MG: 100 TABLET ORAL at 18:32

## 2023-06-06 RX ADMIN — LEVETIRACETAM 750 MG: 250 TABLET, FILM COATED ORAL at 18:13

## 2023-06-06 RX ADMIN — DOCUSATE SODIUM 100 MG: 100 CAPSULE, LIQUID FILLED ORAL at 18:13

## 2023-06-06 RX ADMIN — SENNOSIDES 8.6 MG: 8.6 TABLET, FILM COATED ORAL at 23:00

## 2023-06-06 RX ADMIN — ONDANSETRON 4 MG: 2 INJECTION INTRAMUSCULAR; INTRAVENOUS at 19:45

## 2023-06-06 RX ADMIN — ONDANSETRON 4 MG: 2 INJECTION INTRAMUSCULAR; INTRAVENOUS at 11:49

## 2023-06-06 RX ADMIN — SODIUM CHLORIDE, SODIUM LACTATE, POTASSIUM CHLORIDE, AND CALCIUM CHLORIDE 500 ML: .6; .31; .03; .02 INJECTION, SOLUTION INTRAVENOUS at 17:51

## 2023-06-06 RX ADMIN — DOCUSATE SODIUM 100 MG: 100 CAPSULE, LIQUID FILLED ORAL at 09:40

## 2023-06-06 RX ADMIN — INDOMETHACIN 25 MG: 25 CAPSULE ORAL at 10:06

## 2023-06-06 RX ADMIN — FAMOTIDINE 20 MG: 20 TABLET ORAL at 18:13

## 2023-06-06 RX ADMIN — FAMOTIDINE 20 MG: 20 TABLET ORAL at 09:40

## 2023-06-06 RX ADMIN — NYSTATIN 500000 UNITS: 100000 SUSPENSION ORAL at 12:58

## 2023-06-06 RX ADMIN — NYSTATIN 500000 UNITS: 100000 SUSPENSION ORAL at 23:00

## 2023-06-06 RX ADMIN — NYSTATIN 500000 UNITS: 100000 SUSPENSION ORAL at 09:40

## 2023-06-06 RX ADMIN — NYSTATIN 500000 UNITS: 100000 SUSPENSION ORAL at 18:13

## 2023-06-06 NOTE — PROGRESS NOTES
Progress Note - OB/GYN   Babak Kaufman 29 y o  female MRN: [id number]  Unit/Bed#: -01 Encounter: [id number]    Assessment/ Plan:  29 y o   at 22 weeks and 4 days admitted in the setting of concern for  labor following cervical cerclage removal- she is s/p BTM and magnesium and is continuing to get indocin for tocolysis  She is currently without signs of  labor or infection  Sherlyn Lindquist has made it very clear she would like everything done for her baby if delivered- she appears to have a good understanding about  risks of an extreme premature  and has been offered resuscitative efforts by NICU  I reviewed in the indications for  section in the setting of concern for fetal distress that may result in fetal demise necessitating emergent delivery as well as fetal malpresentation if she did continue to dilate and went in to  labor- after clarifying Sherlyn Lindquist would desire a  section in these instances to help  survival  We reviewed that  section would be a classical  section necessitating future  sections and earlier delivery if she becomes pregnant in the future  I reviewed risks of  section including bleeding, infection, injury to surrounding structures including the bladder, bowel, ureters, nerves/ vessels of pelvis, wound healing complications and VTE- we discussed that her centripetal obesity will make it more difficult to emergently get to her fetus and also increased of the above risks  Sherlyn Lindquist had an opportunity to ask questions which were answered to the best of my ability  Subjective/Objective     Subjective:   Patient seen and assessed with Mother at bedside- denies any cramping, contractions, LOF or VB  Endorses fetal movement  Is understandably tearful about her situation  Denies any other complaints this afternoon       Objective:     Vitals: Temp:  [97 9 °F (36 6 °C)-98 5 °F (36 9 °C)] 98 5 °F (36 9 °C)  HR:  [] 101  Resp:  [16-20] 18  BP: ()/(51-64) 92/51       Intake/Output Summary (Last 24 hours) at 6/6/2023 1656  Last data filed at 6/6/2023 0636  Gross per 24 hour   Intake --   Output 900 ml   Net -900 ml         Physical Exam:   General: NAD, alert, oriented  Cardio: Regular rate  Resp: nonlabored breathing  Abdomen: Soft, obese , no tenderness, gravid uterus   Lower Extremities: Non-tender, no palpable cords, SCDs in place    Medications:  Current Facility-Administered Medications   Medication Dose Route Frequency   • bupivacaine (PF) (MARCAINE) 0 25 % injection 30 mL  30 mL Infiltration Once PRN   • diphenhydrAMINE (BENADRYL) injection 25 mg  25 mg Intravenous Q6H PRN   • docusate sodium (COLACE) capsule 100 mg  100 mg Oral BID   • famotidine (PEPCID) tablet 20 mg  20 mg Oral BID   • indomethacin (INDOCIN) capsule 25 mg  25 mg Oral Q6H   • levETIRAcetam (KEPPRA) tablet 750 mg  750 mg Oral BID   • levothyroxine tablet 25 mcg  25 mcg Oral Early Morning   • metoclopramide (REGLAN) injection 5 mg  5 mg Intravenous Q6H PRN   • nystatin (MYCOSTATIN) oral suspension 500,000 Units  500,000 Units Swish & Swallow 4x Daily   • ondansetron (ZOFRAN) injection 4 mg  4 mg Intravenous Q4H PRN   • polyethylene glycol (MIRALAX) packet 17 g  17 g Oral Daily PRN   • senna (SENOKOT) tablet 8 6 mg  1 tablet Oral HS     Facility-Administered Medications Ordered in Other Encounters   Medication Dose Route Frequency   • lidocaine-epinephrine (XYLOCAINE-MPF/EPINEPHRINE) 1 5 %-1:200,000 injection   Epidural PRN       Labs:   No results found for this or any previous visit (from the past 12 hour(s))        Nyla Aw [facility]  6/6/2023  4:56 PM

## 2023-06-06 NOTE — QUICK NOTE
Mom Jagdeep Eubanks, 29 y o  [id number] at 22 weeks and 4 days admitted in the setting of concern for  labor following cervical cerclage removal- she is s/p BTM and magnesium and is continuing to get indocin for tocolysis had questions about survival by various gestational ages from 20 weeks upwards  I showed her the NICHD prematurity calculator table for different gestations  Dr Darrick Chaney had given a a full consult on 2023  All her questions were answered
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
## 2023-06-06 NOTE — UTILIZATION REVIEW
Continued Stay Review    Date: 2023                         Current Patient Class: Inpatient     Current Level of Care: L&D     HPI:33 y o  female initially admitted on     Assessment/Plan:   MFM  Admit w [address] labor now 22w4d   s/p Cerclage removal   Clarification of Margoth's wishes regarding C section delivery & NICU consult with NICU offered full resuscitation  typically OB , MFM team offered interventions not typically performed prior to 23w incl steroids, MAG & CS  Following data reviewed    reviewed dating criteria:  1  LMP 23 corresponds to [facility] of 23 (23w3d today)  2  US 23: no IUP identified  3  US 23: CRL 5w6d corresponds to [facility] of 10/6/23 (22w4d today)  4  US 3/10/23: charted as 10w0d c/w first trimester US in ED  If 10w0d CRL used alone (without LMP data point and without 23 US), this would correspond to [facility] of 3/6/23  If this was used as her first ultrasound (disregarding her 23 scan),u her EDC would be by LMP therefore 23 would be used  However, since the biometry from the 23 and the 3/10/23 ultrasounds are identical, using EDC by the first ultrasound seems most appropriate  5  She did have two ultrasounds on 3/30/23 (radiology [facility] c/w 12w5d which alone corresponds to [facility] of 10/7/23, and MFM c/w 13w0d which alone corresponds to [facility] of 10/5/23), and these support 10/6/23   6  EDC of 10/6/23 corresponds to 22w4d today  Requested her OB clarify wishes regarding  delivery  She has significant truncally-concentrated obesity and this could make  delivery challenging   COnt daily eval   Fetal data:   Nonstress test: date 23 Time: 30 - 0607  Baseline: 135  Variability: moderate  Accelerations: present, 10x10  Decelerations: occasional variable decelerations  Contractions: absent  Assessment: reactive  Plan: continue TID and PRN  Vital Signs:   23 1430 -- -- -- -- -- -- -- --   Comment rows:   OBSERV: unable to do nst "at this time  dr Leo Culp is talking with the patient and then social work would like to speak to her  will do nst when SW is finished with the consult  at 06/06/23 1430   06/06/23 1225 98 2 °F (36 8 °C) 102 20 94/54 -- -- -- --   06/06/23 1000 -- -- -- -- -- -- -- --   Comment rows:   OBSERV: according to the patient the baby is active  denies any s/s of labor  at 06/06/23 1000   06/06/23 0801 -- -- -- -- -- -- -- --   Comment rows:   OBSERV: patient is sound asleep   at 06/06/23 0801   06/06/23 0800 98 2 °F (36 8 °C) 98 20 106/64 99 % -- -- --   06/06/23 0510 97 9 °F (36 6 °C) 104 18 101/56 100 % -- -- Lying         Pertinent Labs/Diagnostic Results:       Results from last 7 days   Lab Units 06/04/23  2218   HEMATOCRIT % 32 3*   HEMOGLOBIN g/dL 10 7*   PLATELETS Thousands/uL 349   WBC Thousand/uL 8 78         Results from last 7 days   Lab Units 06/04/23  2218   ANION GAP mmol/L 9   BUN mg/dL 7   CALCIUM mg/dL 9 2   CHLORIDE mmol/L 104   CO2 mmol/L 22   CREATININE mg/dL 0 41*   EGFR ml/min/1 73sq m 137   POTASSIUM mmol/L 3 4*   SODIUM mmol/L 135     Results from last 7 days   Lab Units 06/04/23  2218   ALBUMIN g/dL 3 3*   ALK PHOS U/L 82   ALT U/L 27   AST U/L 20   TOTAL BILIRUBIN mg/dL 0 28   TOTAL PROTEIN g/dL 6 7     Results from last 7 days   Lab Units 06/04/23  2205   POC GLUCOSE mg/dl 99     Results from last 7 days   Lab Units 06/04/23  2218   GLUCOSE RANDOM mg/dL 102             No results found for: \"BETA-HYDROXYBUTYRATE\"           Results from last 7 days   Lab Units 06/05/23  0620   AMPH/METH  Negative   BARBITURATE UR  Negative   BENZODIAZEPINE UR  Negative   COCAINE UR  Negative   METHADONE URINE  Negative   OPIATE UR  Negative   PCP UR  Negative   THC UR  Negative                 Medications:   Scheduled Medications:  docusate sodium, 100 mg, Oral, BID  famotidine, 20 mg, Oral, BID  indomethacin, 25 mg, Oral, Q6H  levETIRAcetam, 750 mg, Oral, BID  levothyroxine, 25 mcg, Oral, Early " Morning  nystatin, 500,000 Units, Swish & Swallow, 4x Daily  senna, 1 tablet, Oral, HS    Continuous IV Infusions:     PRN Meds:  bupivacaine (PF), 30 mL, Infiltration, Once PRN  diphenhydrAMINE, 25 mg, Intravenous, Q6H PRN  metoclopramide, 5 mg, Intravenous, Q6H PRN  ondansetron, 4 mg, Intravenous, Q4H PRN  polyethylene glycol, 17 g, Oral, Daily PRN        Discharge Plan: D    Network Utilization Review Department  ATTENTION: Please call with any questions or concerns to [phone] and carefully listen to the prompts so that you are directed to the right person  All voicemails are confidential   Cordell Haas all requests for admission clinical reviews, approved or denied determinations and any other requests to dedicated fax number below belonging to the campus where the patient is receiving treatment   List of dedicated fax numbers for the Facilities:  [address] DENIALS (Administrative/Medical Necessity) [phone]   [address] (Maternity/NICU/Pediatrics) [phone]   3 Tila Foss [phone]   [facility] 77 [phone]   [address] [place] [address] [place] 28 [phone] 1550 [facility] 134 815 [facility] [phone]

## 2023-06-06 NOTE — PLAN OF CARE
Problem: Knowledge Deficit  Goal: Patient/family/caregiver demonstrates understanding of disease process, treatment plan, medications, and discharge instructions  Description: Complete learning assessment and assess knowledge base    Interventions:  - Provide teaching at level of understanding  - Provide teaching via preferred learning methods  Outcome: Progressing     Problem: ANTEPARTUM  Goal: Maintain pregnancy as long as maternal and/or fetal condition is stable  Description: INTERVENTIONS:  - Maternal surveillance  - Fetal surveillance  - Monitor uterine activity  - Medications as ordered  - Bedrest  Outcome: Progressing     Problem: PAIN - ADULT  Goal: Verbalizes/displays adequate comfort level or baseline comfort level  Description: Interventions:  - Encourage patient to monitor pain and request assistance  - Assess pain using appropriate pain scale  - Administer analgesics based on type and severity of pain and evaluate response  - Implement non-pharmacological measures as appropriate and evaluate response  - Consider cultural and social influences on pain and pain management  - Notify physician/advanced practitioner if interventions unsuccessful or patient reports new pain  Outcome: Progressing     Problem: INFECTION - ADULT  Goal: Absence or prevention of progression during hospitalization  Description: INTERVENTIONS:  - Assess and monitor for signs and symptoms of infection  - Monitor lab/diagnostic results  - Monitor all insertion sites, i e  indwelling lines, tubes, and drains  - Monitor endotracheal if appropriate and nasal secretions for changes in amount and color  - [place] appropriate cooling/warming therapies per order  - Administer medications as ordered  - Instruct and encourage patient and family to use good hand hygiene technique  - Identify and instruct in appropriate isolation precautions for identified infection/condition  Outcome: Progressing  Goal: Absence of fever/infection during neutropenic period  Description: INTERVENTIONS:  - Monitor WBC    Outcome: Progressing     Problem: SAFETY ADULT  Goal: Patient will remain free of falls  Description: INTERVENTIONS:  - Educate patient/family on patient safety including physical limitations  - Instruct patient to call for assistance with activity   - Consult OT/PT to assist with strengthening/mobility   - Keep Call bell within reach  - Keep bed low and locked with side rails adjusted as appropriate  - Keep care items and personal belongings within reach  - Initiate and maintain comfort rounds  - Make Fall Risk Sign visible to staff  - Apply yellow socks and bracelet for high fall risk patients  - Consider moving patient to room near nurses station  Outcome: Progressing  Goal: Maintain or return to baseline ADL function  Description: INTERVENTIONS:  -  Assess patient's ability to carry out ADLs; assess patient's baseline for ADL function and identify physical deficits which impact ability to perform ADLs (bathing, care of mouth/teeth, toileting, grooming, dressing, etc )  - Assess/evaluate cause of self-care deficits   - Assess range of motion  - Assess patient's mobility; develop plan if impaired  - Assess patient's need for assistive devices and provide as appropriate  - Encourage maximum independence but intervene and supervise when necessary  - Involve family in performance of ADLs  - Assess for home care needs following discharge   - Consider OT consult to assist with ADL evaluation and planning for discharge  - Provide patient education as appropriate  Outcome: Progressing  Goal: Maintains/Returns to pre admission functional level  Description: INTERVENTIONS:  - Perform BMAT or MOVE assessment daily    - Set and communicate daily mobility goal to care team and patient/family/caregiver     - Collaborate with rehabilitation services on mobility goals if consulted  - Out of bed for toileting  - Record patient progress and toleration of activity level   Outcome: Progressing     Problem: DISCHARGE PLANNING  Goal: Discharge to home or other facility with appropriate resources  Description: INTERVENTIONS:  - Identify barriers to discharge w/patient and caregiver  - Arrange for needed discharge resources and transportation as appropriate  - Identify discharge learning needs (meds, wound care, etc )  - Arrange for interpretive services to assist at discharge as needed  - Refer to Case Management Department for coordinating discharge planning if the patient needs post-hospital services based on physician/advanced practitioner order or complex needs related to functional status, cognitive ability, or social support system  Outcome: Progressing

## 2023-06-06 NOTE — PROGRESS NOTES
Progress Note - Maternal Fetal Medicine   Margoth Cook 29 y o  female MRN: [id number]  Unit/Bed#: -01 Encounter: [id number]  Admit date: 2023  Today's date: 23    Assessment/Plan     Ms Eugenio Flynn is a 29 y o   at 22w4d, Hospital Day: 3, admitted with  labor, s/p cerclage  Stable  By issue:    *  labor in second trimester without delivery  Assessment & Plan  · No evidence of further progression at this time   · S/p epidural    22 weeks gestation of pregnancy  Assessment & Plan  · EFW: 434g last scan on   · VTX by transabdominal ultrasound on admission  · Patient was counseled on need for classical  delivery and that it would be okay to desire full resuscitation of baby if she had a vaginal delivery but declined classical   She was counseled on the increased maternal morbidity risks associated with a classical  including infection, bleeding, injury to surrounding organs, increased risk for placenta accreta spectrum, increased scar tissue, potential need for postpartum hysterectomy  · Since Neonatology would offer resuscitation, patient is s/p magnesium sulphate for fetal neuroprotection and Ancef for GBS prophylaxis  S/p Betamethasone for fetal lung maturation -  Continue Indomethacin until 24hrs after last dose of Betamethasone  Nonintractable epilepsy without status epilepticus ([address] )  Assessment & Plan  Continue Keppra 750mg BID    Obesity affecting pregnancy in second trimester  Assessment & Plan  SCDs for VTE prophylaxis    Hypothyroidism during pregnancy in second trimester  Assessment & Plan  Continue Levothyroxine 25mcg daily    Cervical cerclage suture present, antepartum-resolved as of 2023  Assessment & Plan  Removed 23           Chetan Teresasilvio Arnold has no complaints this morning  She reports her contractions and pelvic pressure have resolved  She is no longer noting vaginal discharge   She ambulates to the commode at bedside  She tolerated dinner last night without nausea or vomiting  She is feeling fetal movement and denies loss of fluid  Objective      Patient Vitals for the past 24 hrs:   BP Temp Temp src Pulse Resp SpO2   06/06/23 0510 101/56 97 9 °F (36 6 °C) Oral 104 18 100 %   06/05/23 2310 94/51 98 5 °F (36 9 °C) Oral 98 16 97 %   06/05/23 2106 99/54 98 5 °F (36 9 °C) Oral 101 18 98 %   06/05/23 1911 111/59 98 3 °F (36 8 °C) Oral 93 20 --   06/05/23 1618 106/56 98 7 °F (37 1 °C) Oral 98 -- --   06/05/23 1549 111/56 -- -- 103 -- --   06/05/23 1449 99/58 -- -- 104 -- --   06/05/23 1419 112/59 -- -- (!) 109 -- --   06/05/23 1349 116/61 -- -- (!) 106 -- --   06/05/23 1320 (!) 88/49 -- -- 96 -- --   06/05/23 1249 (!) 106/49 -- -- 102 -- --   06/05/23 1218 118/61 -- -- (!) 111 -- --   06/05/23 1149 113/58 -- -- 100 -- --   06/05/23 1130 142/68 -- -- -- -- --   06/05/23 1119 -- -- -- 101 -- --   06/05/23 1042 119/60 -- -- 103 -- --   06/05/23 1012 118/59 -- -- 105 -- --   06/05/23 1011 118/59 -- -- 105 -- --   06/05/23 1000 118/58 -- -- -- -- --   06/05/23 0959 118/58 -- -- 101 -- --   06/05/23 0942 118/57 -- -- (!) 109 -- --   06/05/23 0930 117/61 -- -- -- -- --   06/05/23 0927 -- -- -- (!) 107 -- --   06/05/23 0912 105/51 -- -- 103 -- --   06/05/23 0900 105/61 -- -- 105 -- --       Physical Exam  Vitals reviewed  Constitutional:       General: She is not in acute distress  Appearance: She is obese  She is not toxic-appearing  Pulmonary:      Effort: Pulmonary effort is normal  No respiratory distress  Abdominal:      Palpations: Abdomen is soft  Tenderness: There is no abdominal tenderness  There is no guarding  Musculoskeletal:         General: No tenderness  Skin:     General: Skin is warm and dry  Neurological:      Mental Status: She is alert and oriented to person, place, and time     Psychiatric:         Behavior: Behavior normal          I/O       06/04 0701 06/05 0700 06/05 0701 06/06 0700    I V  0     Total Intake 0     Urine 1900 2300    Total Output 1900 2300    Net -1900 -2300          Unmeasured Urine Occurrence  2 x          Invasive Devices     Peripheral Intravenous Line  Duration           Peripheral IV 06/04/23 Left;Ventral (anterior) Wrist 1 day    Peripheral IV 06/04/23 Right;Ventral (anterior) Hand 1 day    Peripheral IV 06/04/23 Right;Ventral (anterior) Wrist 1 day                Results from last 7 days   Lab Units 06/04/23  2218   HEMOGLOBIN g/dL 10 7*   MCV fL 91   PLATELETS Thousands/uL 349   WBC Thousand/uL 8 78     Results from last 7 days   Lab Units 06/04/23  2218   BUN mg/dL 7   CHLORIDE mmol/L 104   CO2 mmol/L 22   CREATININE mg/dL 0 41*   EGFR ml/min/1 73sq m 137   POTASSIUM mmol/L 3 4*     Results from last 7 days   Lab Units 06/04/23  2218   ALT U/L 27   AST U/L 20     Results from last 7 days   Lab Units 06/04/23  2218   PLATELETS Thousands/uL 349     Results from last 7 days   Lab Units 06/04/23  2205   POC GLUCOSE mg/dl 99                       Brief review of pertinent history:  Past Medical History:   Diagnosis Date   • Autism    • [place] esophagus    • CPAP (continuous positive airway pressure) dependence    • 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HCC)     not diagnosed as of 1/9/23-trying to R/O   • Depression    • Diabetes mellitus ([address] )    • Disease of thyroid gland     hypo   • Dysphagia     solids and liquids   • Female infertility    • Fibromyalgia, primary    • Gastric ulcer    • History of bronchitis    • Iron deficiency anemia     infusion in 11/2022   • Irregular menses    • Miscarriage    • Morbid obesity with BMI of 50 0-59 9, adult ([address] )    • Plantar fasciitis of left foot    • Reflux esophagitis    • Seizures ([address] )     last one 7/13/22   • Sleep apnea     CPAP   • Wears glasses      Past Surgical History:   Procedure Laterality Date   • EGD      with Bx due to barretts esophagus   • EYE SURGERY Bilateral     lazy eye repair   • IN BIOPSY OF LIP N/A 11/10/2022 Procedure: EXCISION BIOPSY SALVARY GLANDS LOWER LIP;  Surgeon: Radha Church MD;  Location: [address] [place] [address];  Service: ENT   • ID CERCLAGE UTERINE CERVIX NONOBSTETRICAL N/A 2023    Procedure: CERCLAGE CERVICAL;  Surgeon: Del Will MD;  Location: [facility] ;  Service: Obstetrics   • ID HYSTEROSCOPY BX ENDOMETRIUM&/POLYPC W/WO D&C N/A 2021    Procedure: DILATATION AND CURETTAGE (D&C) WITH HYSTEROSCOPY;  Surgeon: Veleta Denver, MD;  Location: [facility] OR;  Service: Gynecology   • WISDOM TOOTH EXTRACTION       OB History    Para Term  AB Living   4 1 0 1 2 0   SAB IAB Ectopic Multiple Live Births   2 0 0 0 0      # Outcome Date GA Lbr Lopez/2nd Weight Sex Delivery Anes PTL Lv   4             3 SAB            2 2012           1   28w0d    Vag-Spont   FD      Complications: ABO incompatibility in pregnancy      Obstetric Comments   Both SAB <2 months   Still birth at 7 months   Has had hypercoagulable workup in [place] which was negative        Fetal data:  Nonstress test: date 23 Time: 530 - 607  Baseline: 135  Variability: moderate  Accelerations: present, 10x10  Decelerations: occasional variable decelerations  Contractions: absent  Assessment: reactive  Plan: continue TID and PRN    MEDS:   Medication Administration - last 24 hours from 2023 0857 to 2023 0857       Date/Time Order Dose Route Action Action by     2023 1030 EDT lactated ringers infusion 0 mL/hr Intravenous Stopped [employer] NUNO Toro     2023 0521 EDT ondansetron (ZOFRAN) injection 4 mg 4 mg Intravenous Given Carrie Stallworth RN     2023 0959 EDT magnesium sulfate 20 g/500 mL infusion (premix) 0 g/hr Intravenous [employer], RN     2023 2231 EDT betamethasone acetate-betamethasone sodium phosphate (CELESTONE) injection 12 mg 12 mg Intramuscular Given Carrie Stallworth RN     2023 0157 EDT ceFAZolin (ANCEF) IVPB (premix in dextrose) 1,000 mg 50 mL 0 mg Intravenous Stopped Carrie Stallworth RN     06/05/2023 1505 EDT ropivacaine 0 2% PCEA -- Epidural Stopped [employer], RN     06/06/2023 0612 EDT ondansetron (ZOFRAN) injection 4 mg -- Intravenous Canceled Entry Carrie Stallworth RN     06/05/2023 1913 EDT ondansetron (ZOFRAN) injection 4 mg 4 mg Intravenous Given Roz Heart RN     06/05/2023 1043 EDT ondansetron (ZOFRAN) injection 4 mg 4 mg Intravenous Given Sylvetser Toro RN     06/05/2023 1803 EDT levETIRAcetam (KEPPRA) tablet 750 mg 750 mg Oral Given [employer], RN     06/06/2023 0510 EDT levothyroxine tablet 25 mcg 25 mcg Oral Given Carrie Stallworth RN     06/05/2023 2311 EDT famotidine (PEPCID) tablet 20 mg 20 mg Oral Given Carrie Stallworth RN     06/05/2023 2131 EDT famotidine (PEPCID) tablet 20 mg 20 mg Oral Refused Carrie Stallworth RN     06/05/2023 1405 EDT famotidine (PEPCID) tablet 20 mg 20 mg [facility]     06/05/2023 0914 EDT famotidine (PEPCID) tablet 20 mg 20 mg Oral Refused [facility], RN     06/05/2023 2312 EDT nystatin (MYCOSTATIN) oral suspension 500,000 Units 500,000 Units Swish & Swallow Given Carrie Kee, NUNO     06/05/2023 1818 EDT nystatin (MYCOSTATIN) oral suspension 500,000 Units 500,000 Units Swish & Swallow Refused Britto Cortez RN     06/05/2023 1224 EDT nystatin (MYCOSTATIN) oral suspension 500,000 Units 500,000 Units Swish & Swallow Refused Britto Cortez, RN     06/05/2023 1007 EDT indomethacin (INDOCIN) capsule 50 mg 50 mg Oral Given Zorano Cortez, RN     06/06/2023 0510 EDT indomethacin (INDOCIN) capsule 25 mg 25 mg Oral Given Carrie Stallworth RN     06/05/2023 2231 EDT indomethacin (INDOCIN) capsule 25 mg 25 mg Oral Given Carrie Stallworth RN     06/05/2023 1611 EDT indomethacin (INDOCIN) capsule 25 mg 25 mg Oral Given [employer], RN     06/06/2023 5770 EDT lactated ringers infusion 0 mL/hr Intravenous Stopped Carrie Stallworth RN     06/05/2023 1415 EDT lactated ringers infusion 100 mL/hr Intravenous [employer], RN     06/05/2023 1402 EDT lactated ringers infusion 100 mL/hr Intravenous [employer], RN     06/05/2023 1031 EDT lactated ringers infusion 100 mL/hr Intravenous Rate/Dose Change Patric Roy, NUNO        Current Facility-Administered Medications   Medication Dose Route Frequency   • bupivacaine (PF) (MARCAINE) 0 25 % injection 30 mL  30 mL Infiltration Once PRN   • diphenhydrAMINE (BENADRYL) injection 25 mg  25 mg Intravenous Q6H PRN   • docusate sodium (COLACE) capsule 100 mg  100 mg Oral BID   • famotidine (PEPCID) tablet 20 mg  20 mg Oral BID   • indomethacin (INDOCIN) capsule 25 mg  25 mg Oral Q6H   • levETIRAcetam (KEPPRA) tablet 750 mg  750 mg Oral BID   • levothyroxine tablet 25 mcg  25 mcg Oral Early Morning   • metoclopramide (REGLAN) injection 5 mg  5 mg Intravenous Q6H PRN   • nystatin (MYCOSTATIN) oral suspension 500,000 Units  500,000 Units Swish & Swallow 4x Daily   • ondansetron (ZOFRAN) injection 4 mg  4 mg Intravenous Q4H PRN   • polyethylene glycol (MIRALAX) packet 17 g  17 g Oral Daily PRN   • senna (SENOKOT) tablet 8 6 mg  1 tablet Oral HS     Facility-Administered Medications Ordered in Other Encounters   Medication Dose Route Frequency   • lidocaine-epinephrine (XYLOCAINE-MPF/EPINEPHRINE) 1 5 %-1:200,000 injection   Epidural PRN            Jaylen Donaldson MD  OBGYN, PGY-3  6/6/2023  8:57 AM

## 2023-06-07 LAB
ABO GROUP BLD: NORMAL
ALBUMIN SERPL BCP-MCNC: 3.2 G/DL (ref 3.5–5)
ALP SERPL-CCNC: 66 U/L (ref 34–104)
ALT SERPL W P-5'-P-CCNC: 38 U/L (ref 7–52)
ANION GAP SERPL CALCULATED.3IONS-SCNC: 10 MMOL/L (ref 4–13)
AST SERPL W P-5'-P-CCNC: 23 U/L (ref 13–39)
BASOPHILS # BLD AUTO: 0.06 THOUSANDS/ÂΜL (ref 0–0.1)
BASOPHILS NFR BLD AUTO: 1 % (ref 0–1)
BILIRUB SERPL-MCNC: 0.23 MG/DL (ref 0.2–1)
BILIRUB UR QL STRIP: NEGATIVE
BLD GP AB SCN SERPL QL: NEGATIVE
BUN SERPL-MCNC: 5 MG/DL (ref 5–25)
CALCIUM ALBUM COR SERPL-MCNC: 8.9 MG/DL (ref 8.3–10.1)
CALCIUM SERPL-MCNC: 8.3 MG/DL (ref 8.4–10.2)
CHLORIDE SERPL-SCNC: 105 MMOL/L (ref 96–108)
CLARITY UR: CLEAR
CO2 SERPL-SCNC: 23 MMOL/L (ref 21–32)
COLOR UR: COLORLESS
CREAT SERPL-MCNC: 0.39 MG/DL (ref 0.6–1.3)
EOSINOPHIL # BLD AUTO: 0.01 THOUSAND/ÂΜL (ref 0–0.61)
EOSINOPHIL NFR BLD AUTO: 0 % (ref 0–6)
ERYTHROCYTE [DISTWIDTH] IN BLOOD BY AUTOMATED COUNT: 14.8 % (ref 11.6–15.1)
GFR SERPL CREATININE-BSD FRML MDRD: 139 ML/MIN/1.73SQ M
GLUCOSE SERPL-MCNC: 79 MG/DL (ref 65–140)
GLUCOSE UR STRIP-MCNC: NEGATIVE MG/DL
HCT VFR BLD AUTO: 31.7 % (ref 34.8–46.1)
HGB BLD-MCNC: 10.1 G/DL (ref 11.5–15.4)
HGB UR QL STRIP.AUTO: NEGATIVE
IMM GRANULOCYTES # BLD AUTO: 0.14 THOUSAND/UL (ref 0–0.2)
IMM GRANULOCYTES NFR BLD AUTO: 2 % (ref 0–2)
KETONES UR STRIP-MCNC: ABNORMAL MG/DL
LACTATE SERPL-SCNC: 1.1 MMOL/L (ref 0.5–2)
LEUKOCYTE ESTERASE UR QL STRIP: NEGATIVE
LEVETIRACETAM SERPL-MCNC: <2 UG/ML (ref 10–40)
LYMPHOCYTES # BLD AUTO: 1.58 THOUSANDS/ÂΜL (ref 0.6–4.47)
LYMPHOCYTES NFR BLD AUTO: 17 % (ref 14–44)
MCH RBC QN AUTO: 29.9 PG (ref 26.8–34.3)
MCHC RBC AUTO-ENTMCNC: 31.9 G/DL (ref 31.4–37.4)
MCV RBC AUTO: 94 FL (ref 82–98)
MONOCYTES # BLD AUTO: 0.55 THOUSAND/ÂΜL (ref 0.17–1.22)
MONOCYTES NFR BLD AUTO: 6 % (ref 4–12)
NEUTROPHILS # BLD AUTO: 7.14 THOUSANDS/ÂΜL (ref 1.85–7.62)
NEUTS SEG NFR BLD AUTO: 74 % (ref 43–75)
NITRITE UR QL STRIP: NEGATIVE
NRBC BLD AUTO-RTO: 0 /100 WBCS
PH UR STRIP.AUTO: 6 [PH]
PLATELET # BLD AUTO: 306 THOUSANDS/UL (ref 149–390)
PMV BLD AUTO: 9.1 FL (ref 8.9–12.7)
POTASSIUM SERPL-SCNC: 3.2 MMOL/L (ref 3.5–5.3)
PROCALCITONIN SERPL-MCNC: <0.05 NG/ML
PROT SERPL-MCNC: 6.2 G/DL (ref 6.4–8.4)
PROT UR STRIP-MCNC: NEGATIVE MG/DL
RBC # BLD AUTO: 3.38 MILLION/UL (ref 3.81–5.12)
RH BLD: NEGATIVE
SODIUM SERPL-SCNC: 138 MMOL/L (ref 135–147)
SP GR UR STRIP.AUTO: 1.01 (ref 1–1.03)
SPECIMEN EXPIRATION DATE: NORMAL
UROBILINOGEN UR STRIP-ACNC: <2 MG/DL
WBC # BLD AUTO: 9.48 THOUSAND/UL (ref 4.31–10.16)

## 2023-06-07 PROCEDURE — 80053 COMPREHEN METABOLIC PANEL: CPT | Performed by: OBSTETRICS & GYNECOLOGY

## 2023-06-07 PROCEDURE — NC001 PR NO CHARGE: Performed by: OBSTETRICS & GYNECOLOGY

## 2023-06-07 PROCEDURE — 83605 ASSAY OF LACTIC ACID: CPT | Performed by: OBSTETRICS & GYNECOLOGY

## 2023-06-07 PROCEDURE — 59025 FETAL NON-STRESS TEST: CPT | Performed by: OBSTETRICS & GYNECOLOGY

## 2023-06-07 PROCEDURE — 85025 COMPLETE CBC W/AUTO DIFF WBC: CPT | Performed by: OBSTETRICS & GYNECOLOGY

## 2023-06-07 PROCEDURE — 99233 SBSQ HOSP IP/OBS HIGH 50: CPT | Performed by: OBSTETRICS & GYNECOLOGY

## 2023-06-07 PROCEDURE — 87086 URINE CULTURE/COLONY COUNT: CPT | Performed by: OBSTETRICS & GYNECOLOGY

## 2023-06-07 PROCEDURE — NC001 PR NO CHARGE: Performed by: STUDENT IN AN ORGANIZED HEALTH CARE EDUCATION/TRAINING PROGRAM

## 2023-06-07 PROCEDURE — 86901 BLOOD TYPING SEROLOGIC RH(D): CPT | Performed by: OBSTETRICS & GYNECOLOGY

## 2023-06-07 PROCEDURE — 3E033VJ INTRODUCTION OF OTHER HORMONE INTO PERIPHERAL VEIN, PERCUTANEOUS APPROACH: ICD-10-PCS | Performed by: STUDENT IN AN ORGANIZED HEALTH CARE EDUCATION/TRAINING PROGRAM

## 2023-06-07 PROCEDURE — 86900 BLOOD TYPING SEROLOGIC ABO: CPT | Performed by: OBSTETRICS & GYNECOLOGY

## 2023-06-07 PROCEDURE — 87040 BLOOD CULTURE FOR BACTERIA: CPT | Performed by: OBSTETRICS & GYNECOLOGY

## 2023-06-07 PROCEDURE — 86850 RBC ANTIBODY SCREEN: CPT | Performed by: OBSTETRICS & GYNECOLOGY

## 2023-06-07 PROCEDURE — 84145 PROCALCITONIN (PCT): CPT | Performed by: OBSTETRICS & GYNECOLOGY

## 2023-06-07 PROCEDURE — 99233 SBSQ HOSP IP/OBS HIGH 50: CPT | Performed by: STUDENT IN AN ORGANIZED HEALTH CARE EDUCATION/TRAINING PROGRAM

## 2023-06-07 PROCEDURE — 81003 URINALYSIS AUTO W/O SCOPE: CPT | Performed by: OBSTETRICS & GYNECOLOGY

## 2023-06-07 PROCEDURE — 99232 SBSQ HOSP IP/OBS MODERATE 35: CPT | Performed by: OBSTETRICS & GYNECOLOGY

## 2023-06-07 RX ORDER — SODIUM CHLORIDE, SODIUM LACTATE, POTASSIUM CHLORIDE, CALCIUM CHLORIDE 600; 310; 30; 20 MG/100ML; MG/100ML; MG/100ML; MG/100ML
125 INJECTION, SOLUTION INTRAVENOUS CONTINUOUS
Status: DISCONTINUED | OUTPATIENT
Start: 2023-06-07 | End: 2023-06-08

## 2023-06-07 RX ORDER — TERBUTALINE SULFATE 1 MG/ML
0.25 INJECTION, SOLUTION SUBCUTANEOUS ONCE AS NEEDED
Status: DISCONTINUED | OUTPATIENT
Start: 2023-06-07 | End: 2023-06-08

## 2023-06-07 RX ORDER — MAGNESIUM SULFATE HEPTAHYDRATE 40 MG/ML
4 INJECTION, SOLUTION INTRAVENOUS ONCE
Status: COMPLETED | OUTPATIENT
Start: 2023-06-07 | End: 2023-06-07

## 2023-06-07 RX ORDER — LORAZEPAM 2 MG/ML
2 INJECTION INTRAMUSCULAR ONCE
Status: DISCONTINUED | OUTPATIENT
Start: 2023-06-07 | End: 2023-06-09 | Stop reason: HOSPADM

## 2023-06-07 RX ORDER — MAGNESIUM SULFATE HEPTAHYDRATE 40 MG/ML
2 INJECTION, SOLUTION INTRAVENOUS CONTINUOUS
Status: DISCONTINUED | OUTPATIENT
Start: 2023-06-07 | End: 2023-06-08

## 2023-06-07 RX ORDER — CALCIUM GLUCONATE 94 MG/ML
1 INJECTION, SOLUTION INTRAVENOUS ONCE AS NEEDED
Status: DISCONTINUED | OUTPATIENT
Start: 2023-06-07 | End: 2023-06-08

## 2023-06-07 RX ORDER — CARBOPROST TROMETHAMINE 250 UG/ML
INJECTION, SOLUTION INTRAMUSCULAR
Status: DISPENSED
Start: 2023-06-07 | End: 2023-06-08

## 2023-06-07 RX ORDER — OXYTOCIN/RINGER'S LACTATE 30/500 ML
1-30 PLASTIC BAG, INJECTION (ML) INTRAVENOUS
Status: DISCONTINUED | OUTPATIENT
Start: 2023-06-07 | End: 2023-06-08

## 2023-06-07 RX ORDER — CEFAZOLIN SODIUM 2 G/50ML
2000 SOLUTION INTRAVENOUS EVERY 8 HOURS
Status: DISCONTINUED | OUTPATIENT
Start: 2023-06-07 | End: 2023-06-07

## 2023-06-07 RX ORDER — ACETAMINOPHEN 325 MG/1
650 TABLET ORAL EVERY 6 HOURS PRN
Status: DISCONTINUED | OUTPATIENT
Start: 2023-06-07 | End: 2023-06-08

## 2023-06-07 RX ORDER — MAGNESIUM SULFATE HEPTAHYDRATE 40 MG/ML
2 INJECTION, SOLUTION INTRAVENOUS ONCE
Status: COMPLETED | OUTPATIENT
Start: 2023-06-07 | End: 2023-06-07

## 2023-06-07 RX ORDER — CLINDAMYCIN PHOSPHATE 900 MG/50ML
900 INJECTION INTRAVENOUS EVERY 8 HOURS
Status: DISCONTINUED | OUTPATIENT
Start: 2023-06-07 | End: 2023-06-08

## 2023-06-07 RX ADMIN — SODIUM CHLORIDE, SODIUM LACTATE, POTASSIUM CHLORIDE, AND CALCIUM CHLORIDE 1000 ML: .6; .31; .03; .02 INJECTION, SOLUTION INTRAVENOUS at 12:37

## 2023-06-07 RX ADMIN — Medication 2 MILLI-UNITS/MIN: at 15:54

## 2023-06-07 RX ADMIN — ONDANSETRON 4 MG: 2 INJECTION INTRAMUSCULAR; INTRAVENOUS at 22:56

## 2023-06-07 RX ADMIN — LIDOCAINE HYDROCHLORIDE AND EPINEPHRINE 5 ML: 15; 5 INJECTION, SOLUTION EPIDURAL at 14:50

## 2023-06-07 RX ADMIN — SODIUM CHLORIDE, SODIUM LACTATE, POTASSIUM CHLORIDE, AND CALCIUM CHLORIDE 1000 ML: .6; .31; .03; .02 INJECTION, SOLUTION INTRAVENOUS at 13:45

## 2023-06-07 RX ADMIN — FAMOTIDINE 20 MG: 20 TABLET ORAL at 07:27

## 2023-06-07 RX ADMIN — ACETAMINOPHEN 650 MG: 325 TABLET ORAL at 17:17

## 2023-06-07 RX ADMIN — FAMOTIDINE 20 MG: 20 TABLET ORAL at 17:29

## 2023-06-07 RX ADMIN — SODIUM CHLORIDE, SODIUM LACTATE, POTASSIUM CHLORIDE, AND CALCIUM CHLORIDE 125 ML/HR: .6; .31; .03; .02 INJECTION, SOLUTION INTRAVENOUS at 11:24

## 2023-06-07 RX ADMIN — LEVOTHYROXINE SODIUM 25 MCG: 25 TABLET ORAL at 06:00

## 2023-06-07 RX ADMIN — DIPHENHYDRAMINE HYDROCHLORIDE 25 MG: 50 INJECTION, SOLUTION INTRAMUSCULAR; INTRAVENOUS at 21:50

## 2023-06-07 RX ADMIN — GENTAMICIN SULFATE 328 MG: 40 INJECTION, SOLUTION INTRAMUSCULAR; INTRAVENOUS at 15:04

## 2023-06-07 RX ADMIN — CEFAZOLIN SODIUM 2000 MG: 2 SOLUTION INTRAVENOUS at 13:00

## 2023-06-07 RX ADMIN — LEVETIRACETAM 1000 MG: 100 INJECTION, SOLUTION INTRAVENOUS at 10:17

## 2023-06-07 RX ADMIN — MAGNESIUM SULFATE HEPTAHYDRATE 2 G: 40 INJECTION, SOLUTION INTRAVENOUS at 11:52

## 2023-06-07 RX ADMIN — ONDANSETRON 4 MG: 2 INJECTION INTRAMUSCULAR; INTRAVENOUS at 11:36

## 2023-06-07 RX ADMIN — ONDANSETRON 4 MG: 2 INJECTION INTRAMUSCULAR; INTRAVENOUS at 16:57

## 2023-06-07 RX ADMIN — MAGNESIUM SULFATE IN WATER 2 G/HR: 40 INJECTION, SOLUTION INTRAVENOUS at 12:03

## 2023-06-07 RX ADMIN — SODIUM CHLORIDE, SODIUM LACTATE, POTASSIUM CHLORIDE, AND CALCIUM CHLORIDE 925 ML/HR: .6; .31; .03; .02 INJECTION, SOLUTION INTRAVENOUS at 20:24

## 2023-06-07 RX ADMIN — MAGNESIUM SULFATE IN WATER 2 G/HR: 40 INJECTION, SOLUTION INTRAVENOUS at 22:03

## 2023-06-07 RX ADMIN — LEVETIRACETAM 1000 MG: 100 INJECTION, SOLUTION INTRAVENOUS at 22:03

## 2023-06-07 RX ADMIN — ACETAMINOPHEN 650 MG: 325 TABLET ORAL at 07:28

## 2023-06-07 RX ADMIN — ROPIVACAINE HYDROCHLORIDE: 2 INJECTION, SOLUTION EPIDURAL; INFILTRATION at 14:55

## 2023-06-07 RX ADMIN — SODIUM CHLORIDE, SODIUM LACTATE, POTASSIUM CHLORIDE, AND CALCIUM CHLORIDE 1000 ML: .6; .31; .03; .02 INJECTION, SOLUTION INTRAVENOUS at 12:41

## 2023-06-07 RX ADMIN — MAGNESIUM SULFATE HEPTAHYDRATE 4 G: 40 INJECTION, SOLUTION INTRAVENOUS at 11:24

## 2023-06-07 RX ADMIN — CLINDAMYCIN PHOSPHATE 900 MG: 900 INJECTION, SOLUTION INTRAVENOUS at 21:15

## 2023-06-07 NOTE — PROGRESS NOTES
Patient seen at bedside this morning around 10:40 AM with complaints of cramping and possible increase of discharge, fluids  She is status post betamethasone and magnesium for 24 hours-descent has been discontinued as she is past 48 hours reports fetal movement and otherwise is without complaint  Vaginal exam completed at bedside notable for only membranes noted past os, cervix seen  No pooling or bleeding noted  On gentle cervical exam feels approximately 4 to 5 cm dilated no fetal parts palpated only membrane  Transabdominal ultrasound at bedside confirming cephalic presentation  Reviewed that this is a change from prior exam, although not examined by me, dictated as membranes had retracted into the office in our past office  Case reviewed with maternal-fetal medicine-recommend patient be brought down to LDR and reinitiated on magnesium for fetal neuro protection  Reviewed with charge nurse and NICU who plans to come and set up anticipating delivery  Reviewed with patient that delivery may be imminent or remains stable-but we will be ready for what ever may occur  All questions and concerns answered to the best my ability      Isis Chamorro MD  06/07/23

## 2023-06-07 NOTE — SEPSIS NOTE
Sepsis Note   Viva Res 29 y o  female MRN: [id number]  Unit/Bed#: -01 Encounter: [id number]      Body mass index is 51 13 kg/m²  Wt Readings from Last 1 Encounters:   06/06/23 99 8 kg (220 lb)     IBW (Ideal Body Weight): 34 kg    Ideal body weight: 42 4 kg (93 lb 7 oz)  Adjusted ideal body weight: 65 3 kg (144 lb 1 oz)     Patient was evaluated due to hypotension 70/30s on vital signs with tachycardia in the 100s  She states she feels weak  Her pelvic pressure and contractions are not worsening  Sterile vaginal exam was not performed at this time  Fetal heart tracing shows a reactive NST with baseline of 145-150bpm  She is already of magnesium fetal neuroprotection  At this time I am concerned for sepsis secondary to an intra-amniotic infection  On exam she did not have significant abdominal tenderness  We have started weight based IV fluid resuscitation per protocol  CBC with diff, blood culture, CMP, procal and urine culture from straight cath collected and pending  Restart antibiotics for GBS prophylaxis  Strict Is+Os  Vital signs q15 minutes to monitor response to fluid resuscitation  Plan of care discussed with patient and her mother  Dr Anna Sterling is aware  Petty Aguila MD  PGY-III, OB/GYN  6/7/2023, 1:16 PM

## 2023-06-07 NOTE — PROGRESS NOTES
I presented to patient bedside following maternal fetal medicine recommendations of inducing delivery secondary to concerns for developing sepsis and chorioamnionitis- recommendations reviewed again with Mae Ochoa and her Mother at bedside- we reviewed starting pitocin and she is now s/p epidural and comfortable  They are understanding and agree to proceed with delivery  At bedside cephalic presentation was confirmed- we reviewed risks of this changing at any time  NICU is aware of plans for delivery  We reviewed with concern for chorioamnionitis her increased risk of postpartum hemorrhage and her risk for retained placenta given  delivery necessitating the need for D&C  Risks of D&C including bleeding, infection and uterine perforation following delivery reviewed  All questions and concerns addressed to the best of my ability  Appreciate [facility] recommendations and considerations       Jasmin Hoffmann MD  23

## 2023-06-07 NOTE — CONSULTS
NEUROLOGY RESIDENCY CONSULT NOTE     Name: Bradley Felix   Age & Sex: 29 y o  female   MRN: [id number]  Unit/Bed#: -01   Encounter: [id number]  Length of Stay: 3    ASSESSMENT & PLAN     Nonintractable epilepsy without status epilepticus ([facility] [address] )  Assessment & Plan  - Pt has history of epilepsy  - Pt had a reported seizure like activity while undergoing epidural on  and a rapid response was called  - Pt on Keppra 750mg BID PTA, will increase to 1000mg BID  Given that the patient has had periodic emesis and issues tolerating PO medications, we will start IV keppra  When her emesis improves it can be switched to PO keppra  IV Keppra to PO Keppra has a 1:1 conversion    - Pt to follow up with epilepsy as an out patient          Bradley Felix will need follow up in in 6 weeks with epilepsy AP or Attending  She will not require outpatient neurological testing  Disposition pending: No additional recommendations from neurology  Neurology will sign off, please call us with any questions or concerns  Thank you  SUBJECTIVE     Reason for Consult / Principal Problem: Seizure like episode  Hx and PE limited by: None    HPI: Bradley Felix is a 29 y o   female who presents with currently 22 weeks pregnant with a past medical history of epilepsy, fibromyalgia, and obesity who is currently admitted for  labor  On  the patient underwent epidural and during it she had seizure like activity that consisted of full body shaking that was associated with lightheadedness but was able to follow simple commands and answer question  Denies lightheadedness, dizziness, syncope, headache, vision changes, diaphoresis, chest pain, palpitations, SOB, nausea, vomiting, abdominal pain or lower extremity edema  Review of Systems  A 12 point ROS was completed  Other than the above mentioned complaints, all remaining systems were negative      Inpatient consult to Neurology  Consult performed by: Stephon Bills, DO  Consult ordered by: Chelo Melgar MD          Historical Information   Past Medical History:   Diagnosis Date   • Autism    • [place] esophagus    • CPAP (continuous positive airway pressure) dependence    • 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[facility])     not diagnosed as of 1/9/23-trying to R/O   • Depression    • Diabetes mellitus ([facility] [address] )    • Disease of thyroid gland     hypo   • Dysphagia     solids and liquids   • Female infertility    • Fibromyalgia, primary    • Gastric ulcer    • History of bronchitis    • Iron deficiency anemia     infusion in 11/2022   • Irregular menses    • Miscarriage    • Morbid obesity with BMI of 50 0-59 9, adult ([facility])    • Plantar fasciitis of left foot    • Reflux esophagitis    • Seizures ([facility] [address] )     last one 7/13/22   • Sleep apnea     CPAP   • Wears glasses      Past Surgical History:   Procedure Laterality Date   • EGD      with Bx due to barretts esophagus   • EYE SURGERY Bilateral     lazy eye repair   • WI BIOPSY OF LIP N/A 11/10/2022    Procedure: EXCISION BIOPSY SALVARY GLANDS LOWER LIP;  Surgeon: Damion Ponce MD;  Location: [address] [place] [address];  Service: ENT   • WI CERCLAGE UTERINE CERVIX NONOBSTETRICAL N/A 5/23/2023    Procedure: CERCLAGE CERVICAL;  Surgeon: Sherlyn Hendricks MD;  Location: [facility];  Service: Obstetrics   • WI HYSTEROSCOPY BX ENDOMETRIUM&/POLYPC W/WO D&C N/A 9/22/2021    Procedure: DILATATION AND CURETTAGE (D&C) WITH HYSTEROSCOPY;  Surgeon: Paul Frazier MD;  Location: [facility];  Service: Gynecology   • WISDOM TOOTH EXTRACTION       Social History   Social History     Substance and Sexual Activity   Alcohol Use Not Currently    Comment: occ     Social History     Substance and Sexual Activity   Drug Use Not Currently   • Types: Marijuana    Comment: about a couple times a week, quit June 2022     E-Cigarette/Vaping   • E-Cigarette Use Never User      E-Cigarette/Vaping Substances   • Nicotine No    • THC No    • CBD No    • Flavoring No    • Other No    • Unknown No Social History     Tobacco Use   Smoking Status Never   Smokeless Tobacco Never     Family History:   Family History   Problem Relation Age of Onset   • Bipolar disorder Mother    • Depression Mother    • Depression Father    • Diabetes Maternal Grandmother    • Thyroid disease Maternal Grandmother    • Hypertension Maternal Grandmother    • Heart disease Maternal Grandmother    • Diabetes Maternal Grandfather    • Diabetes Paternal Grandmother    • Breast cancer Paternal Grandmother    • Stroke Paternal Grandmother    • Diabetes Paternal Grandfather    • Breast cancer Maternal Aunt 31        bilateral   • Stomach cancer Maternal Aunt      Meds/Allergies   all current active meds have been reviewed  Allergies   Allergen Reactions   • Haloperidol Other (See Comments)     Jaw locks up   • Penicillins Hives   • Pollen Extract Allergic Rhinitis       Review of previous medical records was completed  OBJECTIVE     Patient ID: Shawna Garcia is a 29 y o  female  Vitals:   Vitals:    23 1232 23 1257 23 1312 23 1327   BP: (!) 72/35  126/61 (!) 94/44   BP Location:       Pulse: 104  104 100   Resp:       Temp:  98 5 °F (36 9 °C)     TempSrc:  Axillary     SpO2:       Weight:       Height:          Body mass index is 51 13 kg/m²  Intake/Output Summary (Last 24 hours) at 2023 1353  Last data filed at 2023 1346  Gross per 24 hour   Intake 1787 5 ml   Output 1225 ml   Net 562 5 ml       Temperature:   Temp (24hrs), Av 5 °F (36 9 °C), Min:98 4 °F (36 9 °C), Max:98 6 °F (37 °C)    Temperature: 98 5 °F (36 9 °C)    Invasive Devices: Invasive Devices     Peripheral Intravenous Line  Duration           Peripheral IV 23 Right;Ventral (anterior) Hand 2 days    Peripheral IV 23 Right;Ventral (anterior) Wrist 2 days    Peripheral IV 23 Left;Ventral (anterior) Hand <1 day                Physical Exam:  Constitutional: Alert  Not in acute distress   Not ill-appearing, toxic-appearing or diaphoretic  HENT: Normocephalic and atraumatic  Nose and Ears normal     Eyes: No scleral icterus  No discharge  Neck: Neck Supple  ROM normal    Cardiovascular: Distal extremities warm without palpable edema or tenderness, no observed significant swelling  Pulmonary:  Pulmonary effort is normal  Not in respiratory distress   Abdominal: Abdomen is flat and not distended   Musculoskeletal: No swelling or deformity  Skin: Warm and dry   Psychiatric:  Normal behavior and appropriate affect      Neurological Exam  Mental Status  Awake, alert and oriented to person, place and time  Recent and remote memory are intact  Speech is normal  Language is fluent with no aphasia  Attention and concentration are normal     Cranial Nerves  CN II: Visual fields full to confrontation  CN III, IV, VI: Extraocular movements intact bilaterally  Normal lids and orbits bilaterally  Pupils equal round and reactive to light bilaterally  CN V: Facial sensation is normal   CN VII: Full and symmetric facial movement  CN VIII: Hearing is normal   CN IX, X: Palate elevates symmetrically  CN XI: Shoulder shrug strength is normal   CN XII: Tongue midline without atrophy or fasciculations  Motor  Normal muscle bulk throughout  No fasciculations present  Normal muscle tone  No abnormal involuntary movements  Strength is 5/5 in all four extremities except as noted  Sensory  Light touch is normal in upper and lower extremities  Reflexes  Deep tendon reflexes are 2+ and symmetric except as noted  Coordination  Right: Finger-to-nose normal Left: Finger-to-nose normal     Gait    Unable to be assessed due to the patient's current medical status  LABORATORY DATA     Labs: I have personally reviewed pertinent reports      Results from last 7 days   Lab Units 06/07/23  1251 06/04/23  3738   EOS PCT % 0  --    HEMATOCRIT % 31 7* 32 3*   HEMOGLOBIN g/dL 10 1* 10 7*   MONOS PCT % 6  --    NEUTROS PCT % 74  -- PLATELETS Thousands/uL 306 349   WBC Thousand/uL 9 48 8 78      Results from last 7 days   Lab Units 06/07/23  1253 06/04/23  2218   ALK PHOS U/L 66 82   ALT U/L 38 27   AST U/L 23 20   BUN mg/dL 5 7   CALCIUM mg/dL 8 3* 9 2   CHLORIDE mmol/L 105 104   CO2 mmol/L 23 22   CREATININE mg/dL 0 39* 0 41*   POTASSIUM mmol/L 3 2* 3 4*                  Results from last 7 days   Lab Units 06/07/23  1251   LACTIC ACID mmol/L 1 1           IMAGING & DIAGNOSTIC TESTING     Radiology Results: I have personally reviewed pertinent reports  No orders to display       Other Diagnostic Testing: I have personally reviewed pertinent reports        ACTIVE MEDICATIONS     Current Facility-Administered Medications   Medication Dose Route Frequency   • acetaminophen (TYLENOL) tablet 650 mg  650 mg Oral Q6H PRN   • bupivacaine (PF) (MARCAINE) 0 25 % injection 30 mL  30 mL Infiltration Once PRN   • calcium gluconate 10 % injection 1 g  1 g Intravenous Once PRN   • ceFAZolin (ANCEF) IVPB (premix in dextrose) 2,000 mg 50 mL  2,000 mg Intravenous Q8H   • diphenhydrAMINE (BENADRYL) injection 25 mg  25 mg Intravenous Q6H PRN   • docusate sodium (COLACE) capsule 100 mg  100 mg Oral BID   • famotidine (PEPCID) tablet 20 mg  20 mg Oral BID   • lactated ringers bolus 1,000 mL  1,000 mL Intravenous Once   • lactated ringers infusion  125 mL/hr Intravenous Continuous   • levETIRAcetam (KEPPRA) 1,000 mg in sodium chloride 0 9 % 100 mL IVPB  1,000 mg Intravenous Q12H [facility]   • levothyroxine tablet 25 mcg  25 mcg Oral Early Morning   • magnesium sulfate 20 g/500 mL infusion (premix)  2 g/hr Intravenous Continuous   • metoclopramide (REGLAN) injection 5 mg  5 mg Intravenous Q6H PRN   • nystatin (MYCOSTATIN) oral suspension 500,000 Units  500,000 Units Swish & Swallow 4x Daily   • ondansetron (ZOFRAN) injection 4 mg  4 mg Intravenous Q4H PRN   • phenazopyridine (PYRIDIUM) tablet 100 mg  100 mg Oral TID With Meals   • polyethylene glycol (MIRALAX) packet 17 g  17 g Oral Daily PRN   • senna (SENOKOT) tablet 8 6 mg  1 tablet Oral HS     Facility-Administered Medications Ordered in Other Encounters   Medication Dose Route Frequency   • lidocaine-epinephrine (XYLOCAINE-MPF/EPINEPHRINE) 1 5 %-1:200,000 injection   Epidural PRN       Prior to Admission medications    Medication Sig Start Date End Date Taking? Authorizing Provider   aspirin (ECOTRIN LOW STRENGTH) 81 mg EC tablet Take 2 tablets (162 mg total) by mouth daily Stop at 36 weeks  Patient taking differently: Take 81 mg by mouth daily BID as per patient Stop at 36 weeks   3/30/23 6/28/23 Yes Siobhan Rossi MD   famotidine (PEPCID) 40 MG tablet TAKE ONE TABLET BY MOUTH EVERY DAY AT BEDTIME (GENERIC FOR PEPCID) 5/13/23  Yes Jimmy Bourne MD   ferrous sulfate 325 (65 Fe) mg tablet Take 325 mg by mouth daily with breakfast   Yes Historical Provider, MD   fluticasone (FLONASE) 50 mcg/act nasal spray 1 spray into each nostril daily 4/21/23  Yes Lar[place],    folic acid (FOLVITE) 1 mg tablet Take 1 tablet (1 mg total) by mouth daily 2/7/23 7/26/23 Yes Janet Yoo MD   levETIRAcetam (KEPPRA) 750 mg tablet Take 1 tablet (750 mg total) by mouth 2 (two) times a day 4/14/23  Yes Janet Yoo MD   levothyroxine 25 mcg tablet TAKE ONE TABLET BY MOUTH EVERY DAY IN THE MORNING 5/8/23  Yes Rolando Common, DO   nystatin (MYCOSTATIN) 500,000 units/5 mL suspension Apply 5 mL (500,000 Units total) to the mouth or throat 4 (four) times a day 5/10/23  Yes Rolando Common, DO   Polyethylene Glycol 3350 (MIRALAX PO) Take by mouth   Yes Historical Provider, MD   Prenatal Vit-Iron Carbonyl-FA (prenatal multivitamin) TABS Take 1 tablet by mouth daily   Yes Historical Provider, MD   Progesterone 200 MG CAPS Insert 1 tablet into the vagina daily at bedtime For remainder of pregnancy 5/24/23 10/6/23 Yes Niles Dumont,    acetaminophen (TYLENOL) 500 mg tablet Take 1 tablet (500 mg total) by mouth every 6 (six) hours as needed for mild pain 9/22/21   Nancy Grimm MD   albuterol (Proventil HFA) 90 mcg/act inhaler Inhale 2 puffs every 6 (six) hours as needed for wheezing 3/30/23   Delphnie Alonzo MD   docusate sodium (COLACE) 100 mg capsule Take 1 capsule (100 mg total) by mouth 2 (two) times a day  Patient not taking: Reported on 5/25/2023 3/10/23   Diego Gonzalez MD   indomethacin (INDOCIN) 25 mg capsule Take 1 capsule (25 mg total) by mouth every 6 (six) hours for 1 day  Patient not taking: Reported on 6/1/2023 5/24/23 5/25/23  Sintia Todd DO   ondansetron (ZOFRAN) 4 mg tablet TAKE ONE TABLET BY MOUTH EVERY 8 HOURS AS NEEDED FOR NAUSEA AND VOMITING 6/5/23   Marva Acuna MD       CODE STATUS & ADVANCED DIRECTIVES     Code Status: Level 1 - Full Code  Advance Directive and Living Will:      Power of : Yes  POLST:        VTE Pharmacologic Prophylaxis:  Per primary team  VTE Mechanical Prophylaxis: Per primary team    ==  DO Jareth Davila [facility] Neurology Residency, PGY-3

## 2023-06-07 NOTE — ASSESSMENT & PLAN NOTE
- Pt has history of epilepsy  - Pt had a reported seizure like activity while undergoing epidural on 6/4 and a rapid response was called  - Pt on Keppra 750mg BID PTA, will increase to 1000mg BID  Given that the patient has had periodic emesis and issues tolerating PO medications, we will start IV keppra  When her emesis improves it can be switched to PO keppra   IV Keppra to PO Keppra has a 1:1 conversion    - Pt to follow up with epilepsy as an out patient

## 2023-06-07 NOTE — PLAN OF CARE
Problem: Knowledge Deficit  Goal: Patient/family/caregiver demonstrates understanding of disease process, treatment plan, medications, and discharge instructions  Description: Complete learning assessment and assess knowledge base    Interventions:  - Provide teaching at level of understanding  - Provide teaching via preferred learning methods  Outcome: Progressing     Problem: ANTEPARTUM  Goal: Maintain pregnancy as long as maternal and/or fetal condition is stable  Description: INTERVENTIONS:  - Maternal surveillance  - Fetal surveillance  - Monitor uterine activity  - Medications as ordered  - Bedrest  Outcome: Progressing     Problem: PAIN - ADULT  Goal: Verbalizes/displays adequate comfort level or baseline comfort level  Description: Interventions:  - Encourage patient to monitor pain and request assistance  - Assess pain using appropriate pain scale  - Administer analgesics based on type and severity of pain and evaluate response  - Implement non-pharmacological measures as appropriate and evaluate response  - Consider cultural and social influences on pain and pain management  - Notify physician/advanced practitioner if interventions unsuccessful or patient reports new pain  Outcome: Progressing     Problem: INFECTION - ADULT  Goal: Absence or prevention of progression during hospitalization  Description: INTERVENTIONS:  - Assess and monitor for signs and symptoms of infection  - Monitor lab/diagnostic results  - Monitor all insertion sites, i e  indwelling lines, tubes, and drains  - Monitor endotracheal if appropriate and nasal secretions for changes in amount and color  - [place] appropriate cooling/warming therapies per order  - Administer medications as ordered  - Instruct and encourage patient and family to use good hand hygiene technique  - Identify and instruct in appropriate isolation precautions for identified infection/condition  Outcome: Progressing  Goal: Absence of fever/infection during neutropenic period  Description: INTERVENTIONS:  - Monitor WBC    Outcome: Progressing     Problem: SAFETY ADULT  Goal: Patient will remain free of falls  Description: INTERVENTIONS:  - Educate patient/family on patient safety including physical limitations  - Instruct patient to call for assistance with activity   - Consult OT/PT to assist with strengthening/mobility   - Keep Call bell within reach  - Keep bed low and locked with side rails adjusted as appropriate  - Keep care items and personal belongings within reach  - Initiate and maintain comfort rounds  - Make Fall Risk Sign visible to staff  - Apply yellow socks and bracelet for high fall risk patients  - Consider moving patient to room near nurses station  Outcome: Progressing  Goal: Maintain or return to baseline ADL function  Description: INTERVENTIONS:  -  Assess patient's ability to carry out ADLs; assess patient's baseline for ADL function and identify physical deficits which impact ability to perform ADLs (bathing, care of mouth/teeth, toileting, grooming, dressing, etc )  - Assess/evaluate cause of self-care deficits   - Assess range of motion  - Assess patient's mobility; develop plan if impaired  - Assess patient's need for assistive devices and provide as appropriate  - Encourage maximum independence but intervene and supervise when necessary  - Involve family in performance of ADLs  - Assess for home care needs following discharge   - Consider OT consult to assist with ADL evaluation and planning for discharge  - Provide patient education as appropriate  Outcome: Progressing  Goal: Maintains/Returns to pre admission functional level  Description: INTERVENTIONS:  - Perform BMAT or MOVE assessment daily    - Set and communicate daily mobility goal to care team and patient/family/caregiver     - Collaborate with rehabilitation services on mobility goals if consulted  - Out of bed for toileting  - Record patient progress and toleration of activity level   Outcome: Progressing     Problem: DISCHARGE PLANNING  Goal: Discharge to home or other facility with appropriate resources  Description: INTERVENTIONS:  - Identify barriers to discharge w/patient and caregiver  - Arrange for needed discharge resources and transportation as appropriate  - Identify discharge learning needs (meds, wound care, etc )  - Arrange for interpretive services to assist at discharge as needed  - Refer to Case Management Department for coordinating discharge planning if the patient needs post-hospital services based on physician/advanced practitioner order or complex needs related to functional status, cognitive ability, or social support system  Outcome: Progressing

## 2023-06-07 NOTE — PROGRESS NOTES
Progress Note - Maternal Fetal Medicine   Margoth Cook 29 y o  female MRN: [id number]  Unit/Bed#: -01 Encounter: [id number]  Admit date: 2023  Today's date: 23    Assessment/Plan   Ms Tamera Gentile is a 29 y o   at 22w5d, Hospital Day: 4, admitted with  labor which has since stalled out  By issue:    *  labor in second trimester without delivery  Assessment & Plan  · No evidence of further progression at this time   · S/p epidural    22 weeks gestation of pregnancy  Assessment & Plan  · EFW: 434g last scan on   · VTX by transabdominal ultrasound on admission  · Patient was counseled on need for classical  delivery and that it would be okay to desire full resuscitation of baby if she had a vaginal delivery but declined classical   She was counseled on the increased maternal morbidity risks associated with a classical  including infection, bleeding, injury to surrounding organs, increased risk for placenta accreta spectrum, increased scar tissue, potential need for postpartum hysterectomy  She would desire a classical  section if indicated at this point  · Since Neonatology would offer resuscitation, patient is s/p magnesium sulphate for fetal neuroprotection and Ancef for GBS prophylaxis  S/p Betamethasone for fetal lung maturation -  S/p Indomethacin for tocolysis    Nonintractable epilepsy without status epilepticus ([facility] [address] )  Assessment & Plan  · Continue Keppra 750mg BID   Keppra level drawn on admission is still pending  · Due to concerns for possible seizure activity on  night after epidural placement, will get Neurology consultation today    Obesity affecting pregnancy in second trimester  Assessment & Plan  SCDs for VTE prophylaxis    Hypothyroidism during pregnancy in second trimester  Assessment & Plan  Continue Levothyroxine 25mcg daily    Cervical cerclage suture present, antepartum-resolved as of 2023  Assessment & "Plan  Removed 6/5/23           Subjective    Margoth reports back pain this morning at her former epidural site  She is amendable to trying a heating pad  She is not having pelvic pressure or contractions  Fetal movement is present and normal  She offered no other complaints this morning on review of systems  Objective      Patient Vitals for the past 24 hrs:   BP Temp Temp src Pulse Resp SpO2 Height Weight   06/07/23 0003 (!) 86/47 98 6 °F (37 °C) Oral 104 18 100 % -- --   06/06/23 2020 -- 98 6 °F (37 °C) Oral -- -- -- 4' 7\" (1 397 m) 99 8 kg (220 lb)   06/06/23 1559 92/51 98 5 °F (36 9 °C) Oral 101 18 100 % -- --   06/06/23 1225 94/54 98 2 °F (36 8 °C) Oral 102 20 -- -- --   06/06/23 0800 106/64 98 2 °F (36 8 °C) Oral 98 20 99 % -- --       Physical Exam  Vitals reviewed  Constitutional:       General: She is not in acute distress  Appearance: She is obese  She is not ill-appearing or toxic-appearing  Pulmonary:      Effort: Pulmonary effort is normal  No respiratory distress  Abdominal:      Palpations: Abdomen is soft  Tenderness: There is no abdominal tenderness  There is no guarding  Musculoskeletal:         General: No tenderness  Skin:     General: Skin is warm and dry  Neurological:      Mental Status: She is alert and oriented to person, place, and time     Psychiatric:         Behavior: Behavior normal          I/O       06/05 0701 06/06 0700 06/06 0701  06/07 0700    IV Piggyback  500    Total Intake(mL/kg)  500 (5)    Urine (mL/kg/hr) 2400 400 (0 2)    Stool  0    Total Output 2400 400    Net -2400 +100          Unmeasured Urine Occurrence 2 x     Unmeasured Stool Occurrence  1 x          Invasive Devices     Peripheral Intravenous Line  Duration           Peripheral IV 06/04/23 Right;Ventral (anterior) Hand 2 days    Peripheral IV 06/04/23 Right;Ventral (anterior) Wrist 2 days                Results from last 7 days   Lab Units 06/04/23  1198   HEMOGLOBIN g/dL 10 7*   MCV fL " 91   PLATELETS Thousands/uL 349   WBC Thousand/uL 8 78     Results from last 7 days   Lab Units 06/04/23  2218   BUN mg/dL 7   CHLORIDE mmol/L 104   CO2 mmol/L 22   CREATININE mg/dL 0 41*   EGFR ml/min/1 73sq m 137   POTASSIUM mmol/L 3 4*     Results from last 7 days   Lab Units 06/04/23  2218   ALT U/L 27   AST U/L 20     Results from last 7 days   Lab Units 06/04/23  2218   PLATELETS Thousands/uL 349     Results from last 7 days   Lab Units 06/04/23  2205   POC GLUCOSE mg/dl 99                       Brief review of pertinent history:  Past Medical History:   Diagnosis Date   • Autism    • [place] esophagus    • CPAP (continuous positive airway pressure) dependence    • 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HCC)     not diagnosed as of 1/9/23-trying to R/O   • Depression    • Diabetes mellitus (HCC)    • Disease of thyroid gland     hypo   • Dysphagia     solids and liquids   • Female infertility    • Fibromyalgia, primary    • Gastric ulcer    • History of bronchitis    • Iron deficiency anemia     infusion in 11/2022   • Irregular menses    • Miscarriage    • Morbid obesity with BMI of 50 0-59 9, adult ([facility])    • Plantar fasciitis of left foot    • Reflux esophagitis    • Seizures ([address] )     last one 7/13/22   • Sleep apnea     CPAP   • Wears glasses      Past Surgical History:   Procedure Laterality Date   • EGD      with Bx due to barretts esophagus   • EYE SURGERY Bilateral     lazy eye repair   • IN BIOPSY OF LIP N/A 11/10/2022    Procedure: EXCISION BIOPSY SALVARY GLANDS LOWER LIP;  Surgeon: Estephania Cobos MD;  Location: [address] [place] [address];  Service: ENT   • IN CERCLAGE UTERINE CERVIX NONOBSTETRICAL N/A 5/23/2023    Procedure: CERCLAGE CERVICAL;  Surgeon: Joe Delgado MD;  Location: [facility];  Service: Obstetrics   • IN HYSTEROSCOPY BX ENDOMETRIUM&/POLYPC W/WO D&C N/A 9/22/2021    Procedure: DILATATION AND CURETTAGE (D&C) WITH HYSTEROSCOPY;  Surgeon: John Luong MD;  Location: [facility];  Service: Gynecology   • WISDOM TOOTH EXTRACTION       OB History    Para Term  AB Living   4 1 0 1 2 0   SAB IAB Ectopic Multiple Live Births   2 0 0 0 0      # Outcome Date GA Lbr Lopez/2nd Weight Sex Delivery Anes PTL Lv   4             3 2019           2 2012           1   28w0d    Vag-Spont   FD      Complications: ABO incompatibility in pregnancy      Obstetric Comments   Both SAB <2 months   Still birth at 7 months   Has had hypercoagulable workup in [place] which was negative        Fetal data:  Last Nonstress test: date 23 Time: 2232 - 2300  Baseline: 145  Variability: moderate  Accelerations: present, 10x10  Decelerations: variable x 1  Contractions: absent  Assessment: reactive  Plan: continue TID and PRN      MEDS:   Medication Administration - last 24 hours from 2023 0649 to 2023 0081       Date/Time Order Dose Route Action Action by     2023 1945 EDT ondansetron (ZOFRAN) injection 4 mg 4 mg Intravenous Given [place] Etsuardo, RN     2023 1149 EDT ondansetron (ZOFRAN) injection 4 mg 4 mg Intravenous Given Package Concierge, RN     2023 1813 EDT levETIRAcetam (KEPPRA) tablet 750 mg 750 mg Oral Given Package Concierge, RN     2023 8307 EDT levETIRAcetam (KEPPRA) tablet 750 mg 750 mg Oral Given Package Concierge, RN     2023 1813 EDT famotidine (PEPCID) tablet 20 mg 20 mg Oral Given Package Concierge, RN     2023 0940 EDT famotidine (PEPCID) tablet 20 mg 20 mg Oral Given Will Maddox, NUNO     2023 2300 EDT nystatin (MYCOSTATIN) oral suspension 500,000 Units 500,000 Units Swish & Swallow Given [place] NUNO Marte     2023 1813 EDT nystatin (MYCOSTATIN) oral suspension 500,000 Units 500,000 Units Swish & Swallow Given Package Concierge, RN     2023 1258 EDT nystatin (MYCOSTATIN) oral suspension 500,000 Units 500,000 Units Swish & Swallow Given Package Concierge, RN     2023 0940 EDT nystatin (MYCOSTATIN) oral suspension 500,000 Units 500,000 Units Swish & Swallow Given Will Schafer, NUNO     06/06/2023 2223 EDT indomethacin (INDOCIN) capsule 25 mg -- Oral Canceled Entry Bella Apo, RN     06/06/2023 1618 EDT indomethacin (INDOCIN) capsule 25 mg 25 mg Oral Given Major El Rito, RN     06/06/2023 1006 EDT indomethacin (INDOCIN) capsule 25 mg 25 mg Oral Given Major El Rito, RN     06/06/2023 1813 EDT docusate sodium (COLACE) capsule 100 mg 100 mg Oral Given Major El Rito, RN     06/06/2023 0940 EDT docusate sodium (COLACE) capsule 100 mg 100 mg Oral Given Major El Rito, RN     06/06/2023 2300 EDT senna (SENOKOT) tablet 8 6 mg 8 6 mg Oral Given Bella Apo, RN     06/06/2023 1832 EDT phenazopyridine (PYRIDIUM) tablet 100 mg 100 mg Oral Given Major El Rito, RN     06/06/2023 1850 EDT lactated ringers bolus 500 mL 0 mL Intravenous Stopped Will Schafer RN     06/06/2023 1751 EDT lactated ringers bolus 500 mL 500 mL Intravenous New Bag Will Schafer, NUNO        Current Facility-Administered Medications   Medication Dose Route Frequency   • acetaminophen (TYLENOL) tablet 650 mg  650 mg Oral Q6H PRN   • bupivacaine (PF) (MARCAINE) 0 25 % injection 30 mL  30 mL Infiltration Once PRN   • diphenhydrAMINE (BENADRYL) injection 25 mg  25 mg Intravenous Q6H PRN   • docusate sodium (COLACE) capsule 100 mg  100 mg Oral BID   • famotidine (PEPCID) tablet 20 mg  20 mg Oral BID   • levETIRAcetam (KEPPRA) tablet 750 mg  750 mg Oral BID   • levothyroxine tablet 25 mcg  25 mcg Oral Early Morning   • metoclopramide (REGLAN) injection 5 mg  5 mg Intravenous Q6H PRN   • nystatin (MYCOSTATIN) oral suspension 500,000 Units  500,000 Units Swish & Swallow 4x Daily   • ondansetron (ZOFRAN) injection 4 mg  4 mg Intravenous Q4H PRN   • phenazopyridine (PYRIDIUM) tablet 100 mg  100 mg Oral TID With Meals   • polyethylene glycol (MIRALAX) packet 17 g  17 g Oral Daily PRN   • senna (SENOKOT) tablet 8 6 mg  1 tablet Oral HS     Facility-Administered Medications Ordered in Other Encounters   Medication Dose Route Frequency   • lidocaine-epinephrine (XYLOCAINE-MPF/EPINEPHRINE) 1 5 %-1:200,000 injection   Epidural PRN            June Alexandra MD  OBGYN, PGY-3  6/7/2023  6:49 AM

## 2023-06-07 NOTE — PROGRESS NOTES
I was updated on this patient this afternoon after episode of severe hypotension, 41H systolic over 60Y diastolic  I evaluated her in room 203  At the bedside she was in right lateral decubitus position and resting though somewhat uncomfortably  She endorses weakness and abdominal pain  Her skin is warm  Her abdomen is newly tender to palpation, especially at the fundus, but throughout (diffusely)  This represents a change from prior  Reviewed her lab work which shows a normal lactate of 1 1 and a CBC demonstrating absence of bands and absence of leukemia or thrombocytopenia  My impression is that she is now developing systemic signs of infection  Most likely source is exposed membranes as she has been 4 and a half centimeters dilated for several days  Her hypotension, followed is being treated currently with IV fluids and responding well, is concerning, and she has medical comorbidities that increase her risk of peripartum morbidity  My clinical impression is chorioamnionitis secondary to advanced cervical dilatation  I recommend proceeding with delivery despite periviable gestational age  Gloria Jeff voiced understanding though was tearful  Her mother was present throughout the counseling today  Her obstetrician ([facility]) is being notified  I recommend bringing anesthesia to the bedside for consideration of epidural placement (replacement)  NICU will be notified of the status change  I supervised a bedside ultrasound by Dr Jorge A Amezquita which demonstrated a cephalic fetus, therefore attempt at vaginal delivery is reasonable  I advise treatment with ampicillin and gentamicin, and adding clindamycin if   Given potential for sudden malpresentation of a small fetus, even if vertex now, I advise having nitroglycerin at the bedside as a uterine relaxant, understanding that this will likely cause hypotension      Galdino Guerrero MD  Attending Physician, Maternal-Fetal Medicine    [address] [facility]  06/07/23  2:43 PM

## 2023-06-07 NOTE — PLAN OF CARE
Problem: Knowledge Deficit  Goal: Patient/family/caregiver demonstrates understanding of disease process, treatment plan, medications, and discharge instructions  Description: Complete learning assessment and assess knowledge base    Interventions:  - Provide teaching at level of understanding  - Provide teaching via preferred learning methods  Outcome: Progressing     Problem: ANTEPARTUM  Goal: Maintain pregnancy as long as maternal and/or fetal condition is stable  Description: INTERVENTIONS:  - Maternal surveillance  - Fetal surveillance  - Monitor uterine activity  - Medications as ordered  - Bedrest  Outcome: Progressing     Problem: PAIN - ADULT  Goal: Verbalizes/displays adequate comfort level or baseline comfort level  Description: Interventions:  - Encourage patient to monitor pain and request assistance  - Assess pain using appropriate pain scale  - Administer analgesics based on type and severity of pain and evaluate response  - Implement non-pharmacological measures as appropriate and evaluate response  - Consider cultural and social influences on pain and pain management  - Notify physician/advanced practitioner if interventions unsuccessful or patient reports new pain  Outcome: Progressing     Problem: INFECTION - ADULT  Goal: Absence or prevention of progression during hospitalization  Description: INTERVENTIONS:  - Assess and monitor for signs and symptoms of infection  - Monitor lab/diagnostic results  - Monitor all insertion sites, i e  indwelling lines, tubes, and drains  - Monitor endotracheal if appropriate and nasal secretions for changes in amount and color  - [place] appropriate cooling/warming therapies per order  - Administer medications as ordered  - Instruct and encourage patient and family to use good hand hygiene technique  - Identify and instruct in appropriate isolation precautions for identified infection/condition  Outcome: Progressing  Goal: Absence of fever/infection during neutropenic period  Description: INTERVENTIONS:  - Monitor WBC    Outcome: Progressing     Problem: SAFETY ADULT  Goal: Patient will remain free of falls  Description: INTERVENTIONS:  - Educate patient/family on patient safety including physical limitations  - Instruct patient to call for assistance with activity   - Consult OT/PT to assist with strengthening/mobility   - Keep Call bell within reach  - Keep bed low and locked with side rails adjusted as appropriate  - Keep care items and personal belongings within reach  - Initiate and maintain comfort rounds  - Make Fall Risk Sign visible to staff  - Apply yellow socks and bracelet for high fall risk patients  - Consider moving patient to room near nurses station  Outcome: Progressing  Goal: Maintain or return to baseline ADL function  Description: INTERVENTIONS:  -  Assess patient's ability to carry out ADLs; assess patient's baseline for ADL function and identify physical deficits which impact ability to perform ADLs (bathing, care of mouth/teeth, toileting, grooming, dressing, etc )  - Assess/evaluate cause of self-care deficits   - Assess range of motion  - Assess patient's mobility; develop plan if impaired  - Assess patient's need for assistive devices and provide as appropriate  - Encourage maximum independence but intervene and supervise when necessary  - Involve family in performance of ADLs  - Assess for home care needs following discharge   - Consider OT consult to assist with ADL evaluation and planning for discharge  - Provide patient education as appropriate  Outcome: Progressing  Goal: Maintains/Returns to pre admission functional level  Description: INTERVENTIONS:  - Perform BMAT or MOVE assessment daily    - Set and communicate daily mobility goal to care team and patient/family/caregiver     - Collaborate with rehabilitation services on mobility goals if consulted  - Out of bed for toileting  - Record patient progress and toleration of activity level   Outcome: Progressing     Problem: DISCHARGE PLANNING  Goal: Discharge to home or other facility with appropriate resources  Description: INTERVENTIONS:  - Identify barriers to discharge w/patient and caregiver  - Arrange for needed discharge resources and transportation as appropriate  - Identify discharge learning needs (meds, wound care, etc )  - Arrange for interpretive services to assist at discharge as needed  - Refer to Case Management Department for coordinating discharge planning if the patient needs post-hospital services based on physician/advanced practitioner order or complex needs related to functional status, cognitive ability, or social support system  Outcome: Progressing

## 2023-06-07 NOTE — OB LABOR/OXYTOCIN SAFETY PROGRESS
Labor Progress Note - Margoth Cook 29 y o  female MRN: [id number]    Unit/Bed#: -01 Encounter: [id number]    Dose (maritza-units/min) Oxytocin: 10 maritza-units/min  Contraction Frequency (minutes): occasional (palpable at times)  Contraction Quality: Mild  Tachysystole: No   Cervical Dilation: 5        Cervical Effacement: 80  Fetal Station: Floating  Baseline Rate: 145 bpm  Fetal Heart Rate: 142 BPM  FHR Category: Category II               Vital Signs:   Vitals:    23 1849   BP: (!) 85/49   Pulse: (!) 106   Resp:    Temp:    SpO2:        Notes/comments:     Called to bedside for difficulty finding tones  Easily found via [facility] - still [place] presentation  Patient's mother was concerned about at what point we need to for  delivery    I reiterated that a  delivery would be extremely morbid for Margoth and we only need to go for  delivery for fetal indications at this point        June Reyes DO 2023 7:35 PM

## 2023-06-07 NOTE — ANESTHESIA PROCEDURE NOTES
Epidural Block    Reason for block: procedure for pain and at surgeon's request  Staffing  Performed by: Otilia Moralez MD  Authorized by: Climmie Alpers, MD    Preanesthetic Checklist  Completed: patient identified, IV checked, site marked, risks and benefits discussed, surgical consent, monitors and equipment checked, pre-op evaluation and timeout performed  Epidural  Patient position: sitting  Prep: ChloraPrep  Patient monitoring: cardiac monitor and frequent blood pressure checks  Approach: midline  Location: lumbar  Injection technique: GRACE air  Needle  Needle type: Tuohy   Needle gauge: 18 G  Catheter type: side hole  Catheter size: 20 G  Test dose: negative  Assessment  Number of attempts: 1negative aspiration for CSF, negative aspiration for heme and no paresthesia on injection  patient tolerated the procedure well with no immediate complications
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
## 2023-06-07 NOTE — UTILIZATION REVIEW
Continued Stay Review    Date: 6/7/2023                          Current Patient Class: inpatient    Current Level of Care: L&D  HPI:33 y o  female initially admitted on 6/4/2023    Assessment/Plan:   M  AM NOTE   22w5d No further evidence of progression of labor at this time; stable vital signs, fetal moitorng w normal HR baseline; normal variability for GA w absence of decels; completed BTM  Due to concerns for possible seizure activity on Sunday night after epidural placement, will get Neurology consultation today  Given signif decreased physical activity & HX of class 3 obesity , she is at risk for development of venous thromboembolism; consideration for VTE w unfractionated Heparin; cont Inpatient management    M  AM Note  Vaginal exam completed at bedside notable for only membranes noted past os, cervix seen  No pooling or bleeding noted  On gentle cervical exam feels approximately 4 to 5 cm dilated no fetal parts palpated only membrane  Transabdominal ultrasound confirming cephalic presentation  Reviewed that this is a change from prior exam, although not examined by me, dictated as membranes had retracted into the OS  Case reviewed with maternal-fetal medicine-recommend patient be brought down to LDR and reinitiated on magnesium for fetal neuro protection  Reviewed with patient that delivery may be imminent or remains stable-but we will be ready for what ever may occur    MF  Provider PM Note  Concern for sepsis; eval due to Hypotension w tachycardia; pt states she feels weak;   pelvic pressure and contractions  not worsening  Sterile vaginal exam  not performed at this time  Fetal heart tracing  reactive NST with baseline of 145-150bpm  S/P magnesium fetal neuroprotection  Concern for  sepsis secondary to an intra-amniotic infection  Start IVF resuscitation; CBCD, [address], CMP, pro belinda & urine CX from straight cath; restarted antibx for GBS; strict I/O freq vitals   MFM Attending aware     Vital Signs: "  Date/Time Temp Pulse Resp BP MAP (mmHg) SpO2 O2 Device Cardiac (WDL) Patient Position - Orthostatic VS   06/07/23 1443 -- 121 Abnormal  -- 131/70 -- -- -- -- --   06/07/23 1416 -- -- -- -- -- -- -- -- --   Comment rows:   OBSERV: dr Maximus Torres at bedside at this time at 06/07/23 1416   06/07/23 1412 98 5 °F (36 9 °C) 101 20 102/50 -- -- -- -- --   06/07/23 1406 -- 101 -- -- -- 100 % None (Room air) -- --   06/07/23 1405 98 3 °F (36 8 °C) -- -- -- -- -- -- -- --   06/07/23 1357 -- 102 -- 132/78 -- -- -- -- --   06/07/23 1341 -- 103 -- 85/51 Abnormal  -- -- -- -- --   06/07/23 1327 -- 100 -- 94/44 Abnormal  -- -- -- -- --   06/07/23 1312 -- 104 -- 126/61 -- -- -- -- --   06/07/23 1257 98 5 °F (36 9 °C) -- -- -- -- -- -- -- --   06/07/23 1232 -- 104 -- 72/35 Abnormal  -- -- -- -- --   06/07/23 1201 -- 106 Abnormal  -- 76/41 Abnormal  -- -- -- -- --   06/07/23 1130 98 5 °F (36 9 °C) 100 18 77/41 Abnormal  -- -- -- -- Lying   06/07/23 1030 -- -- -- -- -- -- -- -- --   Comment rows:   OBSERV: Decision made by MD to move pt to L&D to monitor labor status d/t bulging membranes and cramping  See MD note  Unknown Supriya, RN CC notified of plan  at 06/07/23 1030   06/07/23 0955 -- -- -- -- -- -- -- -- --   Comment rows:   OBSERV: MD will be in to evaluate pt shortly  Status updated given via TT to MD  Pt aware MD will be in  at 06/07/23 0955 06/07/23 0933 -- -- -- -- -- -- -- -- --   Comment rows:   OBSERV: Pt called RN to bedside c/o \"feeling wet\" and \"cramping\"  Vaginal discharge noted, no obvious LOF, and bed dry   Pt emptied baldder on bedpan  Blue chux pad placed under pt and toco applied  Plan to monitor contractions and contact MD discussed  Pt agrees w/ plan  at 06/07/23 0933 06/07/23 0812 98 4 °F (36 9 °C) 91 20 86/50 Abnormal   -- 100 % -- WDL --   BP: RN at bedside and aware at 06/07/23 0812   Comment rows:   OBSERV: Plan of care for this morning reviewed   Pt reports positive FM, denies LOF, vaginal bleeding and/or " "cramping  Pt advised to alert RN with any change in status   at 06/07/23 0812   06/07/23 0003 98 6 °F (37 °C) 104 18 86/47 Abnormal  -- 100 % --       Fetal data:  Last Nonstress test: date 06/06/23 Time: 2232 - 2300  Baseline: 145  Variability: moderate  Accelerations: present, 10x10  Decelerations: variable x 1  Contractions: absent  Assessment: reactive  Plan: continue TID and PRN  Pertinent Labs/Diagnostic Results:       Results from last 7 days   Lab Units 06/07/23  1251 06/04/23  2218   HEMATOCRIT % 31 7* 32 3*   HEMOGLOBIN g/dL 10 1* 10 7*   NEUTROS ABS Thousands/µL 7 14  --    PLATELETS Thousands/uL 306 349   WBC Thousand/uL 9 48 8 78         Results from last 7 days   Lab Units 06/07/23  1253 06/04/23  2218   ANION GAP mmol/L 10 9   BUN mg/dL 5 7   CALCIUM mg/dL 8 3* 9 2   CHLORIDE mmol/L 105 104   CO2 mmol/L 23 22   CREATININE mg/dL 0 39* 0 41*   EGFR ml/min/1 73sq m 139 137   POTASSIUM mmol/L 3 2* 3 4*   SODIUM mmol/L 138 135     Results from last 7 days   Lab Units 06/07/23  1253 06/04/23  2218   ALBUMIN g/dL 3 2* 3 3*   ALK PHOS U/L 66 82   ALT U/L 38 27   AST U/L 23 20   TOTAL BILIRUBIN mg/dL 0 23 0 28   TOTAL PROTEIN g/dL 6 2* 6 7     Results from last 7 days   Lab Units 06/04/23  2205   POC GLUCOSE mg/dl 99     Results from last 7 days   Lab Units 06/07/23  1253 06/04/23  2218   GLUCOSE RANDOM mg/dL 79 102             No results found for: \"BETA-HYDROXYBUTYRATE\"                                   Results from last 7 days   Lab Units 06/07/23  1251   PROCALCITONIN ng/ml <0 05     Results from last 7 days   Lab Units 06/07/23  1251   LACTIC ACID mmol/L 1 1                                                 Results from last 7 days   Lab Units 06/07/23  1320   BILIRUBIN UA  Negative   BLOOD UA  Negative   CLARITY UA  Clear   COLOR UA  Colorless   GLUCOSE UA mg/dl Negative   KETONES UA mg/dl 10 (1+)*   LEUKOCYTES UA  Negative   NITRITE UA  Negative   PH UA  6 0   PROTEIN UA mg/dl Negative   SPEC GRAV UA  " 1 007   UROBILINOGEN UA (BE) mg/dl <2 0             Results from last 7 days   Lab Units 06/05/23  0620   AMPH/METH  Negative   BARBITURATE UR  Negative   BENZODIAZEPINE UR  Negative   COCAINE UR  Negative   METHADONE URINE  Negative   OPIATE UR  Negative   PCP UR  Negative   THC UR  Negative       Medications:   Scheduled Medications:  cefazolin, 2,000 mg, Intravenous, Q8H  docusate sodium, 100 mg, Oral, BID  famotidine, 20 mg, Oral, BID  gentamicin, 5 mg/kg (Adjusted), Intravenous, Q24H  levETIRAcetam, 1,000 mg, Intravenous, Q12H [address]  levothyroxine, 25 mcg, Oral, Early Morning  nystatin, 500,000 Units, Swish & Swallow, 4x Daily  phenazopyridine, 100 mg, Oral, TID With Meals  senna, 1 tablet, Oral, HS    Continuous IV Infusions:  lactated ringers, 125 mL/hr, Intravenous, Continuous  magnesium sulfate, 2 g/hr, Intravenous, Continuous  oxytocin, 1-30 maritza-units/min, Intravenous, Titrated      PRN Meds:  acetaminophen, 650 mg, Oral, Q6H PRN  bupivacaine (PF), 30 mL, Infiltration, Once PRN  calcium gluconate, 1 g, Intravenous, Once PRN  diphenhydrAMINE, 25 mg, Intravenous, Q6H PRN  metoclopramide, 5 mg, Intravenous, Q6H PRN  ondansetron, 4 mg, Intravenous, Q4H PRN  polyethylene glycol, 17 g, Oral, Daily PRN        Discharge Plan: [facility]    Network Utilization Review Department  ATTENTION: Please call with any questions or concerns to [phone] and carefully listen to the prompts so that you are directed to the right person  All voicemails are confidential   Kayla Bach all requests for admission clinical reviews, approved or denied determinations and any other requests to dedicated fax number below belonging to the campus where the patient is receiving treatment   List of dedicated fax numbers for the Facilities:  [address] DENIALS (Administrative/Medical Necessity) [phone]   [address] (Maternity/NICU/Pediatrics) [phone]   [facility] Tila Foss [phone] Lorelei Stokes 77 [phone]   [address] [place] [address] Toya Galeas  [facility] 310 [facility] 134 818 [facility] [phone]

## 2023-06-08 PROBLEM — Z67.91 RH NEGATIVE STATE IN ANTEPARTUM PERIOD: Status: ACTIVE | Noted: 2023-06-08

## 2023-06-08 PROBLEM — O41.1220 CHORIOAMNIONITIS IN SECOND TRIMESTER: Status: ACTIVE | Noted: 2023-06-08

## 2023-06-08 PROBLEM — O26.899 RH NEGATIVE STATE IN ANTEPARTUM PERIOD: Status: ACTIVE | Noted: 2023-06-08

## 2023-06-08 LAB
ABO GROUP BLD: NORMAL
ANION GAP SERPL CALCULATED.3IONS-SCNC: 6 MMOL/L (ref 4–13)
BACTERIA UR CULT: NORMAL
BASE EXCESS BLDCOV CALC-SCNC: 0.1 MMOL/L (ref 1–9)
BASOPHILS # BLD AUTO: 0.04 THOUSANDS/ÂΜL (ref 0–0.1)
BASOPHILS NFR BLD AUTO: 1 % (ref 0–1)
BLD GP AB SCN SERPL QL: NEGATIVE
BUN SERPL-MCNC: 3 MG/DL (ref 5–25)
CALCIUM SERPL-MCNC: 7.7 MG/DL (ref 8.4–10.2)
CHLORIDE SERPL-SCNC: 105 MMOL/L (ref 96–108)
CO2 SERPL-SCNC: 25 MMOL/L (ref 21–32)
CREAT SERPL-MCNC: 0.4 MG/DL (ref 0.6–1.3)
EOSINOPHIL # BLD AUTO: 0.03 THOUSAND/ÂΜL (ref 0–0.61)
EOSINOPHIL NFR BLD AUTO: 0 % (ref 0–6)
ERYTHROCYTE [DISTWIDTH] IN BLOOD BY AUTOMATED COUNT: 14.6 % (ref 11.6–15.1)
FETAL CELL SCN BLD QL ROSETTE: NEGATIVE
GFR SERPL CREATININE-BSD FRML MDRD: 138 ML/MIN/1.73SQ M
GLUCOSE SERPL-MCNC: 82 MG/DL (ref 65–140)
HCO3 BLDCOV-SCNC: 23.2 MMOL/L (ref 12.2–28.6)
HCT VFR BLD AUTO: 28.8 % (ref 34.8–46.1)
HGB BLD-MCNC: 9.3 G/DL (ref 11.5–15.4)
IMM GRANULOCYTES # BLD AUTO: 0.05 THOUSAND/UL (ref 0–0.2)
IMM GRANULOCYTES NFR BLD AUTO: 1 % (ref 0–2)
LYMPHOCYTES # BLD AUTO: 0.99 THOUSANDS/ÂΜL (ref 0.6–4.47)
LYMPHOCYTES NFR BLD AUTO: 13 % (ref 14–44)
MCH RBC QN AUTO: 30 PG (ref 26.8–34.3)
MCHC RBC AUTO-ENTMCNC: 32.3 G/DL (ref 31.4–37.4)
MCV RBC AUTO: 93 FL (ref 82–98)
MONOCYTES # BLD AUTO: 0.46 THOUSAND/ÂΜL (ref 0.17–1.22)
MONOCYTES NFR BLD AUTO: 6 % (ref 4–12)
NEUTROPHILS # BLD AUTO: 6.03 THOUSANDS/ÂΜL (ref 1.85–7.62)
NEUTS SEG NFR BLD AUTO: 79 % (ref 43–75)
NRBC BLD AUTO-RTO: 0 /100 WBCS
OXYHGB MFR BLDCOV: 87.4 %
PCO2 BLDCOV: 33.3 MM HG (ref 27–43)
PH BLDCOV: 7.46 [PH] (ref 7.19–7.49)
PLATELET # BLD AUTO: 248 THOUSANDS/UL (ref 149–390)
PMV BLD AUTO: 8.9 FL (ref 8.9–12.7)
PO2 BLDCOV: 41.3 MM HG (ref 15–45)
POTASSIUM SERPL-SCNC: 3.5 MMOL/L (ref 3.5–5.3)
RBC # BLD AUTO: 3.1 MILLION/UL (ref 3.81–5.12)
RH BLD: NEGATIVE
SAO2 % BLDCOV: 16.9 ML/DL
SODIUM SERPL-SCNC: 136 MMOL/L (ref 135–147)
WBC # BLD AUTO: 7.6 THOUSAND/UL (ref 4.31–10.16)

## 2023-06-08 PROCEDURE — 10D17Z9 MANUAL EXTRACTION OF PRODUCTS OF CONCEPTION, RETAINED, VIA NATURAL OR ARTIFICIAL OPENING: ICD-10-PCS | Performed by: STUDENT IN AN ORGANIZED HEALTH CARE EDUCATION/TRAINING PROGRAM

## 2023-06-08 PROCEDURE — 85461 HEMOGLOBIN FETAL: CPT | Performed by: OBSTETRICS & GYNECOLOGY

## 2023-06-08 PROCEDURE — 85025 COMPLETE CBC W/AUTO DIFF WBC: CPT | Performed by: OBSTETRICS & GYNECOLOGY

## 2023-06-08 PROCEDURE — 86901 BLOOD TYPING SEROLOGIC RH(D): CPT | Performed by: OBSTETRICS & GYNECOLOGY

## 2023-06-08 PROCEDURE — 82805 BLOOD GASES W/O2 SATURATION: CPT | Performed by: OBSTETRICS & GYNECOLOGY

## 2023-06-08 PROCEDURE — NC001 PR NO CHARGE: Performed by: STUDENT IN AN ORGANIZED HEALTH CARE EDUCATION/TRAINING PROGRAM

## 2023-06-08 PROCEDURE — 59400 OBSTETRICAL CARE: CPT | Performed by: STUDENT IN AN ORGANIZED HEALTH CARE EDUCATION/TRAINING PROGRAM

## 2023-06-08 PROCEDURE — 88307 TISSUE EXAM BY PATHOLOGIST: CPT | Performed by: PATHOLOGY

## 2023-06-08 PROCEDURE — 86900 BLOOD TYPING SEROLOGIC ABO: CPT | Performed by: OBSTETRICS & GYNECOLOGY

## 2023-06-08 PROCEDURE — 80048 BASIC METABOLIC PNL TOTAL CA: CPT | Performed by: OBSTETRICS & GYNECOLOGY

## 2023-06-08 PROCEDURE — 86850 RBC ANTIBODY SCREEN: CPT | Performed by: OBSTETRICS & GYNECOLOGY

## 2023-06-08 RX ORDER — ACETAMINOPHEN 325 MG/1
650 TABLET ORAL EVERY 4 HOURS PRN
Status: DISCONTINUED | OUTPATIENT
Start: 2023-06-08 | End: 2023-06-09 | Stop reason: HOSPADM

## 2023-06-08 RX ORDER — SIMETHICONE 80 MG
80 TABLET,CHEWABLE ORAL 4 TIMES DAILY PRN
Status: DISCONTINUED | OUTPATIENT
Start: 2023-06-08 | End: 2023-06-09 | Stop reason: HOSPADM

## 2023-06-08 RX ORDER — ONDANSETRON 2 MG/ML
4 INJECTION INTRAMUSCULAR; INTRAVENOUS EVERY 8 HOURS PRN
Status: DISCONTINUED | OUTPATIENT
Start: 2023-06-08 | End: 2023-06-09 | Stop reason: HOSPADM

## 2023-06-08 RX ORDER — DIPHENHYDRAMINE HCL 25 MG
25 TABLET ORAL EVERY 6 HOURS PRN
Status: DISCONTINUED | OUTPATIENT
Start: 2023-06-08 | End: 2023-06-09 | Stop reason: HOSPADM

## 2023-06-08 RX ORDER — OXYTOCIN/RINGER'S LACTATE 30/500 ML
250 PLASTIC BAG, INJECTION (ML) INTRAVENOUS ONCE
Status: COMPLETED | OUTPATIENT
Start: 2023-06-08 | End: 2023-06-08

## 2023-06-08 RX ORDER — DOCUSATE SODIUM 100 MG/1
100 CAPSULE, LIQUID FILLED ORAL 2 TIMES DAILY
Status: DISCONTINUED | OUTPATIENT
Start: 2023-06-08 | End: 2023-06-09 | Stop reason: HOSPADM

## 2023-06-08 RX ORDER — OXYTOCIN/RINGER'S LACTATE 30/500 ML
PLASTIC BAG, INJECTION (ML) INTRAVENOUS
Status: COMPLETED
Start: 2023-06-08 | End: 2023-06-08

## 2023-06-08 RX ORDER — DIAPER,BRIEF,INFANT-TODD,DISP
1 EACH MISCELLANEOUS DAILY PRN
Status: DISCONTINUED | OUTPATIENT
Start: 2023-06-08 | End: 2023-06-09 | Stop reason: HOSPADM

## 2023-06-08 RX ORDER — IBUPROFEN 600 MG/1
600 TABLET ORAL EVERY 6 HOURS
Status: DISCONTINUED | OUTPATIENT
Start: 2023-06-08 | End: 2023-06-09 | Stop reason: HOSPADM

## 2023-06-08 RX ORDER — METHYLERGONOVINE MALEATE 0.2 MG/ML
0.2 INJECTION INTRAVENOUS ONCE
Status: COMPLETED | OUTPATIENT
Start: 2023-06-08 | End: 2023-06-08

## 2023-06-08 RX ORDER — CALCIUM CARBONATE 500 MG/1
1000 TABLET, CHEWABLE ORAL DAILY PRN
Status: DISCONTINUED | OUTPATIENT
Start: 2023-06-08 | End: 2023-06-09 | Stop reason: HOSPADM

## 2023-06-08 RX ADMIN — FAMOTIDINE 20 MG: 20 TABLET ORAL at 19:05

## 2023-06-08 RX ADMIN — DOCUSATE SODIUM 100 MG: 100 CAPSULE, LIQUID FILLED ORAL at 09:22

## 2023-06-08 RX ADMIN — ONDANSETRON 4 MG: 2 INJECTION INTRAMUSCULAR; INTRAVENOUS at 21:26

## 2023-06-08 RX ADMIN — FAMOTIDINE 20 MG: 20 TABLET ORAL at 09:23

## 2023-06-08 RX ADMIN — NYSTATIN 500000 UNITS: 100000 SUSPENSION ORAL at 10:52

## 2023-06-08 RX ADMIN — DOCUSATE SODIUM 100 MG: 100 CAPSULE, LIQUID FILLED ORAL at 19:05

## 2023-06-08 RX ADMIN — IBUPROFEN 600 MG: 600 TABLET ORAL at 19:06

## 2023-06-08 RX ADMIN — IBUPROFEN 600 MG: 600 TABLET ORAL at 06:27

## 2023-06-08 RX ADMIN — ACETAMINOPHEN 650 MG: 325 TABLET ORAL at 00:57

## 2023-06-08 RX ADMIN — Medication 250 MILLI-UNITS/MIN: at 00:45

## 2023-06-08 RX ADMIN — LEVETIRACETAM 1000 MG: 100 INJECTION, SOLUTION INTRAVENOUS at 09:43

## 2023-06-08 RX ADMIN — LEVETIRACETAM 1000 MG: 100 INJECTION, SOLUTION INTRAVENOUS at 21:35

## 2023-06-08 RX ADMIN — NYSTATIN 500000 UNITS: 100000 SUSPENSION ORAL at 13:40

## 2023-06-08 RX ADMIN — DOCUSATE SODIUM 100 MG: 100 CAPSULE, LIQUID FILLED ORAL at 09:23

## 2023-06-08 RX ADMIN — IBUPROFEN 600 MG: 600 TABLET ORAL at 13:40

## 2023-06-08 RX ADMIN — METHYLERGONOVINE MALEATE 0.2 MG: 0.2 INJECTION, SOLUTION INTRAMUSCULAR; INTRAVENOUS at 00:10

## 2023-06-08 RX ADMIN — NYSTATIN 500000 UNITS: 100000 SUSPENSION ORAL at 19:04

## 2023-06-08 RX ADMIN — DOCUSATE SODIUM 100 MG: 100 CAPSULE, LIQUID FILLED ORAL at 18:30

## 2023-06-08 RX ADMIN — CLINDAMYCIN PHOSPHATE 900 MG: 900 INJECTION, SOLUTION INTRAVENOUS at 06:28

## 2023-06-08 RX ADMIN — LEVOTHYROXINE SODIUM 25 MCG: 25 TABLET ORAL at 06:27

## 2023-06-08 RX ADMIN — GENTAMICIN SULFATE 328 MG: 40 INJECTION, SOLUTION INTRAMUSCULAR; INTRAVENOUS at 15:15

## 2023-06-08 RX ADMIN — CLINDAMYCIN PHOSPHATE 900 MG: 900 INJECTION, SOLUTION INTRAVENOUS at 13:41

## 2023-06-08 NOTE — PROGRESS NOTES
Yonathanvincent Helms and her mother expressed frustration with length of IOL and are wondering when we would go for  delivery  Reiterated all of the risks with classical in setting of BMI51 are significant, and we will try our best to optimize vaginal delivery  I did reiterate options for full resus vs comfort care if vaginal delivery is not progressing as opposed to classical   Margoth's mom was frustrated with this as she would like everyone to remain positive

## 2023-06-08 NOTE — PLAN OF CARE
Problem: PAIN - ADULT  Goal: Verbalizes/displays adequate comfort level or baseline comfort level  Description: Interventions:  - Encourage patient to monitor pain and request assistance  - Assess pain using appropriate pain scale  - Administer analgesics based on type and severity of pain and evaluate response  - Implement non-pharmacological measures as appropriate and evaluate response  - Consider cultural and social influences on pain and pain management  - Notify physician/advanced practitioner if interventions unsuccessful or patient reports new pain  Outcome: Progressing     Problem: INFECTION - ADULT  Goal: Absence or prevention of progression during hospitalization  Description: INTERVENTIONS:  - Assess and monitor for signs and symptoms of infection  - Monitor lab/diagnostic results  - Monitor all insertion sites, i e  indwelling lines, tubes, and drains  - Monitor endotracheal if appropriate and nasal secretions for changes in amount and color  - [place] appropriate cooling/warming therapies per order  - Administer medications as ordered  - Instruct and encourage patient and family to use good hand hygiene technique  - Identify and instruct in appropriate isolation precautions for identified infection/condition  Outcome: Progressing  Goal: Absence of fever/infection during neutropenic period  Description: INTERVENTIONS:  - Monitor WBC    Outcome: Progressing     Problem: SAFETY ADULT  Goal: Patient will remain free of falls  Description: INTERVENTIONS:  - Educate patient/family on patient safety including physical limitations  - Instruct patient to call for assistance with activity   - Consult OT/PT to assist with strengthening/mobility   - Keep Call bell within reach  - Keep bed low and locked with side rails adjusted as appropriate  - Keep care items and personal belongings within reach  - Initiate and maintain comfort rounds  - Make Fall Risk Sign visible to staff  - Apply yellow socks and bracelet for high fall risk patients  - Consider moving patient to room near nurses station  Outcome: Progressing  Goal: Maintain or return to baseline ADL function  Description: INTERVENTIONS:  -  Assess patient's ability to carry out ADLs; assess patient's baseline for ADL function and identify physical deficits which impact ability to perform ADLs (bathing, care of mouth/teeth, toileting, grooming, dressing, etc )  - Assess/evaluate cause of self-care deficits   - Assess range of motion  - Assess patient's mobility; develop plan if impaired  - Assess patient's need for assistive devices and provide as appropriate  - Encourage maximum independence but intervene and supervise when necessary  - Involve family in performance of ADLs  - Assess for home care needs following discharge   - Consider OT consult to assist with ADL evaluation and planning for discharge  - Provide patient education as appropriate  Outcome: Progressing  Goal: Maintains/Returns to pre admission functional level  Description: INTERVENTIONS:  - Perform BMAT or MOVE assessment daily    - Set and communicate daily mobility goal to care team and patient/family/caregiver     - Collaborate with rehabilitation services on mobility goals if consulted  - Out of bed for toileting  - Record patient progress and toleration of activity level   Outcome: Progressing     Problem: DISCHARGE PLANNING  Goal: Discharge to home or other facility with appropriate resources  Description: INTERVENTIONS:  - Identify barriers to discharge w/patient and caregiver  - Arrange for needed discharge resources and transportation as appropriate  - Identify discharge learning needs (meds, wound care, etc )  - Arrange for interpretive services to assist at discharge as needed  - Refer to Case Management Department for coordinating discharge planning if the patient needs post-hospital services based on physician/advanced practitioner order or complex needs related to functional status, cognitive ability, or social support system  Outcome: Progressing

## 2023-06-08 NOTE — ASSESSMENT & PLAN NOTE
With concern for sepsis postparum  Abdominal pain and tenderness has resolved   Remains afebrile, vitals all WNL  S/p Clindamycin and Gentamycin   Blood cultures no growth   WBC WNL

## 2023-06-08 NOTE — LACTATION NOTE
This note was copied from a baby's chart  CONSULT - LACTATION  Baby Boy (Margoth) Joey 1 days male MRN: [id number]    [facility] Room / Bed: NICU 24/NICU 24 Encounter: [id number]    Maternal Information     MOTHER:  Margoth Cook  Maternal Age: 29 y o    OB History: # 1 - Date: None, Sex: None, Weight: None, GA: 28w0d, Delivery: Vaginal, Spontaneous, Apgar1: None, Apgar5: None, Living: Fetal Demise, Birth Comments: None    # 2 - Date: 2012, Sex: None, Weight: None, GA: None, Delivery: None, Apgar1: None, Apgar5: None, Living: None, Birth Comments: None    # 3 - Date: 2019, Sex: None, Weight: None, GA: None, Delivery: None, Apgar1: None, Apgar5: None, Living: None, Birth Comments: None    # 4 - Date: 06/07/23, Sex: Male, Weight: 600 g (1 lb 5 2 oz), GA: 22w5d, Delivery: Vaginal, Spontaneous, Apgar1: 8, Apgar5: 8, Living: Living, Birth Comments: None   Previouse breast reduction surgery? No    Lactation history:   Has patient previously breast fed:      How long had patient previously breast fed:     Previous breast feeding complications:       Past Surgical History:   Procedure Laterality Date   • EGD      with Bx due to barretts esophagus   • EYE SURGERY Bilateral     lazy eye repair   • ND BIOPSY OF LIP N/A 11/10/2022    Procedure: EXCISION BIOPSY SALVARY GLANDS LOWER LIP;  Surgeon: Fernando Hooks MD;  Location: [address] [place] [address];  Service: ENT   • ND CERCLAGE UTERINE CERVIX NONOBSTETRICAL N/A 5/23/2023    Procedure: CERCLAGE CERVICAL;  Surgeon: Theresa Tuttle MD;  Location: [facility] ;  Service: Obstetrics   • ND HYSTEROSCOPY BX ENDOMETRIUM&/POLYPC W/WO D&C N/A 9/22/2021    Procedure: DILATATION AND CURETTAGE (D&C) WITH HYSTEROSCOPY;  Surgeon: David Spaulding MD;  Location: [facility];  Service: Gynecology   • WISDOM TOOTH EXTRACTION          Birth information:  YOB: 2022   Time of birth: 11:49 PM   Sex: male   Delivery type: Vaginal, Spontaneous   Birth Weight: 600 g (1 lb 5 2 oz)   Percent of Weight Change: 0%     Gestational Age: 20w7d   [unfilled]    Assessment     Breast and nipple assessment: large symetrrical breats with large, dark areolas, flat large nipples  Upon palpation, fibrous areolas    [place] Assessment: no clinical assessment    Feeding assessment: no clinical assessment  LATCH:  Latch: Audible Swallowing:     Type of Nipple:     Comfort (Breast/Nipple):     Hold (Positioning):     LATCH Score:            Feeding recommendations:  pump every 2-3 hours and supplement with expressed colostrum via syringe  Mom was set up with pump almost 12 hrs after birth  Demonstration and teach back of hand expression, multi-user pump and hand pump  Education on lanolin use, shells in between pumping sessions  Enc  Hand expression and enc  Hand pump after multi-user pump  Mom expressed droplets of colostrum    RSB/DC and handouts provided    NICU pumping log reviewed    Mom kyms yvonnee pump - order sent to     Enc  To call lactation    Mom provided with and discussed RBS, Hand expression/2nd night handout and increase supply for NICU baby  Reviewed pumping log and expectations for pumping output in the first week  Reviewed cycle pumping and appropriate pump settings, as well as pumping for 10-15 min 8-12 times per day  Enc Mom to discussed putting baby to the breast with the NICU team when baby is medically stable to do so  Enc her to call for lactation support as needed throughout her stay  Instructions given on pumping  Discussed when to start, frequency, different pumps available versus manual expression  Discussed hygiene of hands and supplies as well as assembly, placement of flanges, size of flanged, preparing the breast and cycles and suction settings on pump  Demonstrated use of hand pump  Discussed labeling of milk, storage, and preparation of stored milk      Pumping:   - When pumping, begin in stimulation mode (high cycle, low vacuum) until milk begins to express  Change pump to expression mode (low cycle, high vacuum)  Use hands on pumping techniques to assist with milk transfer  When milk stops expressing, change back to stimulation mode  When milk begins to flow, change to expression mode  You make cycle pump up to three times in a pumping session  Information on hand expression given  Discussed benefits of knowing how to manually express breast including stimulating milk supply, softening nipple for latch and evacuating breast in the event of engorgement  Milk Supply:   - Allow for non-nutritive suck at the breast to stimulate supply   - Allow for skin to skin during and after each breastfeeding session   - Use massage, heat, and hand expression prior to feedings to assist with deep latch   - Increase pumping sessions and pump after every feeding    Information on hand expression given  Discussed benefits of knowing how to manually express breast including stimulating milk supply, softening nipple for latch and evacuating breast in the event of engorgement  Mom is encouraged to place baby skin to skin for feedings  Skin to skin education provided for baby placement on mother's chest, baby only in diaper, blankets below shoulders on baby's back  Skin to skin is encouraged to continue at home for feedings and between feedings  Worked on positioning infant up at chest level and starting to feed infant with nose arriving at the nipple  Then, using areolar compression to achieve a deep latch that is comfortable and exchanges optimum amounts of milk  - Start feedings on breast that last feeding ended   - allow no more than 3 hours between breast feeding sessions   - time between feedings is counted from the beginning of the first feed to the beginning of the next feeding session    Reviewed early signs of hunger, including tensing of hands and shoulders - no need to wait for open eyes    Crying is a late hunger sign  If baby is crying, soothe baby first and then attempt to latch  Reviewed normal sucking patterns: transition from stimulation to nutritive to release or non-nutritive  The goal is to see and hear lots of swallowing  Reviewed normal nursing pattern: infant could latch on one breast up to 30 minutes or until releases on own  Signs of satiation is open hand with fingers that do not grab your finger  Discussed difference in sensation of non-nutritive v nutritive sucking    Met with mother  Provided mother with Ready, Set, Baby booklet  Discussed Skin to Skin contact an benefits to mom and baby  Talked about the delay of the first bath until baby has adjusted  Spoke about the benefits of rooming in  Feeding on cue and what that means for recognizing infant's hunger  Avoidance of pacifiers for the first month discussed  Talked about exclusive breastfeeding for the first 6 months  Positioning and latch reviewed as well as showing images of other feeding positions  Discussed the properties of a good latch in any position  Reviewed hand/manual expression  Discussed s/s that baby is getting enough milk and some s/s that breastfeeding dyad may need further help  Gave information on common concerns, what to expect the first few weeks after delivery, preparing for other caregivers, and how partners can help  Resources for support also provided  Encouraged parents to call for assistance, questions, and concerns about breastfeeding  Extension provided  Provided education on growth spurts, when to introduce bottles; paced bottle feeding, and non-nutritive suck at the breast  Provided education on Signs of satiation  Encouraged to call lactation to observe a latch prior to discharge for reassurance  Encouraged to call baby and me with any questions and closely monitor output        Kymberly Lamar 6/8/2023 10:16 AM

## 2023-06-08 NOTE — PLAN OF CARE
Problem: PAIN - ADULT  Goal: Verbalizes/displays adequate comfort level or baseline comfort level  Description: Interventions:  - Encourage patient to monitor pain and request assistance  - Assess pain using appropriate pain scale  - Administer analgesics based on type and severity of pain and evaluate response  - Implement non-pharmacological measures as appropriate and evaluate response  - Consider cultural and social influences on pain and pain management  - Notify physician/advanced practitioner if interventions unsuccessful or patient reports new pain  Outcome: Progressing     Problem: INFECTION - ADULT  Goal: Absence or prevention of progression during hospitalization  Description: INTERVENTIONS:  - Assess and monitor for signs and symptoms of infection  - Monitor lab/diagnostic results  - Monitor all insertion sites, i e  indwelling lines, tubes, and drains  - Monitor endotracheal if appropriate and nasal secretions for changes in amount and color  - [place] appropriate cooling/warming therapies per order  - Administer medications as ordered  - Instruct and encourage patient and family to use good hand hygiene technique  - Identify and instruct in appropriate isolation precautions for identified infection/condition  Outcome: Progressing  Goal: Absence of fever/infection during neutropenic period  Description: INTERVENTIONS:  - Monitor WBC    Outcome: Progressing     Problem: SAFETY ADULT  Goal: Patient will remain free of falls  Description: INTERVENTIONS:  - Educate patient/family on patient safety including physical limitations  - Instruct patient to call for assistance with activity   - Consult OT/PT to assist with strengthening/mobility   - Keep Call bell within reach  - Keep bed low and locked with side rails adjusted as appropriate  - Keep care items and personal belongings within reach  - Initiate and maintain comfort rounds  - Make Fall Risk Sign visible to staff  - Apply yellow socks and bracelet for high fall risk patients  - Consider moving patient to room near nurses station  Outcome: Progressing  Goal: Maintain or return to baseline ADL function  Description: INTERVENTIONS:  -  Assess patient's ability to carry out ADLs; assess patient's baseline for ADL function and identify physical deficits which impact ability to perform ADLs (bathing, care of mouth/teeth, toileting, grooming, dressing, etc )  - Assess/evaluate cause of self-care deficits   - Assess range of motion  - Assess patient's mobility; develop plan if impaired  - Assess patient's need for assistive devices and provide as appropriate  - Encourage maximum independence but intervene and supervise when necessary  - Involve family in performance of ADLs  - Assess for home care needs following discharge   - Consider OT consult to assist with ADL evaluation and planning for discharge  - Provide patient education as appropriate  Outcome: Progressing  Goal: Maintains/Returns to pre admission functional level  Description: INTERVENTIONS:  - Perform BMAT or MOVE assessment daily    - Set and communicate daily mobility goal to care team and patient/family/caregiver     - Collaborate with rehabilitation services on mobility goals if consulted  - Out of bed for toileting  - Record patient progress and toleration of activity level   Outcome: Progressing     Problem: DISCHARGE PLANNING  Goal: Discharge to home or other facility with appropriate resources  Description: INTERVENTIONS:  - Identify barriers to discharge w/patient and caregiver  - Arrange for needed discharge resources and transportation as appropriate  - Identify discharge learning needs (meds, wound care, etc )  - Arrange for interpretive services to assist at discharge as needed  - Refer to Case Management Department for coordinating discharge planning if the patient needs post-hospital services based on physician/advanced practitioner order or complex needs related to functional status, cognitive ability, or social support system  Outcome: Progressing

## 2023-06-08 NOTE — L&D DELIVERY NOTE
Vaginal Delivery Summary - OB/GYN   Soledad Evans 29 y o  female MRN: [id number]  Unit/Bed#: -01 Encounter: [id number]    Pre-delivery Diagnosis:   Pregnancy at 22w5d   Epilepsy, on Keppra  Truncal obesity with BMI 51  Hypothyroidism   labor  Cervical cerclage placement and removal during this pregnancy  Infertility  Depression  Chronic constipation  Short stature  History of stillbirth    Post-delivery Diagnosis: same, delivered    Procedure: Spontaneous Vaginal Delivery     Attending Physician: Dr Kayleigh Oden  Resident Physician: Dr Claudia Santamaria    Anesthesia: Epidural    QBL: 114VI    Complications: none apparent    Specimens:   1  Arterial and venous cord gases  2  Cord blood  3  Segment of umbilical cord  4  Placenta to pathology     Findings:  1  Viable male on 2023 at 2349, with APGARS 8 and 8 at 1 and 5 min, weighing 1 lb 5 2 oz  2  Spontaneous delivery of placenta  There was noted to be one cotyledon missing which was obtained via bimanual exam and there was organized clot across part of the placenta consistent with placental abruption  3  No lacerations  4  Bimanual exam was performed to obtain missing bit of placenta, there was a thin endometrial stripe noted on TAUS    Gases:  Umbilical Cord Venous Blood Gas:  Results from last 7 days   Lab Units 23  0017   BASE EXC COV mmol/L 0 1*   HCO3 COV mmol/L 23 2   O2 CT CD VB mL/dL 16 9   O2 HGB, VENOUS CORD % 87 4   PCO2 COV mm HG 33 3   PH COV  3 379     Umbilical Cord Arterial Blood Gas: Insufficient sample        Brief history and labor course:  Soledad Evans is a 29 y o  [id number] at who was initially admitted at 22w2d for  labor with bulging amniotic membranes and an exam indicated cervical cerclage in place  She was given an epidural and the cerclage removed on 2023  Shortly after her epidural was placed, she had seizure like activity and a rapid response was called   Magnesium sulphate was administered for fetal neuroprotection; Ancef for GBS prophylaxis and a full course of Betamethasone was administered for fetal lung maturation  On hospital day 2 her cervical dilation was stable, the amniotic membranes had retracted into the cervical os and she showed no further signs of progression in  labor  Her epidural was discontinued and subsequently pulled and she was transferred to the antepartum floor  On hospital day 4 Neurology was consulted for their input regarding her seizure disorder  Her Keppra was increased from 750 PO BID to 1000mg IV BID while inpatient       Also on hospital day 4 she reported increasing pelvic pressure  Repeat vaginal exam again showed prolapsing membranes but stable cervical dilation and she was transferred to the labor floor  Magnesium was restarted for fetal neuroprotection  Several hours later she was noted to be hypotensive and persistently tachycardic  With concern for sepsis secondary to chorioamnionitis, resuscitation was started per protocol  Labs were collected including blood and urine cultures  She received Ancef followed by Clindamycin as well as Gentamycin for prophylaxis  After discussion with Maternal Fetal medicine, we recommended proceding with induction of labor after confirming the fetus was still in cephalic presentation  She was given a second epidural and Pitocin started for induction    Description of delivery:  After pushing 4 minutes, patient delivered a viable male , wt pending as mother is doing skin to skin bonding  The fetal vertex delivered direct OA position spontaneously  There was no nuchal cord  The shoulders delivered compound with maternal expulsive forces and the assistance of gentle downward traction  The remainder of the fetus delivered spontaneously  Upon delivery, the cord was doubly clamped and cut  Delayed cord clamping was achieved  The infant was noted to cry spontaneously and was moving all extremities appropriately   There was no evidence for injury  Awaiting nurse resuscitators evaluated the   Arterial and venous cord blood gases and cord blood was collected for analysis  These were promptly sent to the lab  In the immediate post-partum, active management of the 3rd stage of labor was performed with massage, the administration of 30 units of IV pitocin, and gentle traction on the umbilical cord  The placenta delivered spontaneously and was noted to have a battledore inserted 3 vessel cord  The placenta was sent to pathology  The placenta had clot adherent to it  The vagina, cervix, perineum, and rectum were inspected and there was noted to be no lacerations  Some brisk vaginal bleeding noted and she was given 0 2 of Methergine  Several bimanual exams were performed to pull out the remaining cotyledons of placenta  This was done under ultrasound guidance and fundus was firm afterwards  At the conclusion of the procedure, all needle, sponge, and instrument counts were noted to be correct  Dr Renee Grace was present and participated in all key portions of the case  Disposition:  The patient and the  both tolerated the procedure well  Mom is recovering in her labor room while the  was taken immediately to the  ICU for further resuscitation      Joseph Loja 89, DO  OB/GYN PGY-3  2023  1:18 AM

## 2023-06-08 NOTE — ANESTHESIA POSTPROCEDURE EVALUATION
Post-Op Assessment Note    No notable events documented  Epidural removed without incident, tip intact  Pt OOB ambulating without problems

## 2023-06-08 NOTE — PROGRESS NOTES
Obstetrics Progress Note  Dena Edmonds 29 y o  female MRN: [id number]  Unit/Bed#: -01 Encounter: [id number]    Assessment/Plan:  Postpartum Day #1 s/p  following induction of labor at 22w5d gestation  Patient was admitted with  labor and cervical insufficiency and developed chorioamnionitis and sepsis  Currently stable  Baby in NICU  By issue:  *  (spontaneous vaginal delivery)  Assessment & Plan  Continue routine post partum care    Chorioamnionitis in second trimester  Assessment & Plan  With concern for sepsis yesterday  Abdominal pain and tenderness improved this morning  Hypotension improved but still intermittently tachycardic  Remains afebrile  Current antibiotic regimen: Clindamycin IV 900mg Q8h and Gentamycin 5mg/kg IV once daily  Blood cultures are pending - consider continuing antibiotics until at least 24hrs postpartum or until blood cultures result   Repeat CBC with differential and BMP ordered for today    Nonintractable epilepsy without status epilepticus ([facility] [address] )  Assessment & Plan  Previously on Keppra 750mg BID; likely had seizure on  after epidural placement  Keppra level drawn on admission was subtherapeutic  S/p Neurology consultation  with recommendation for Keppra 1000mg BID IV dosing  Plan to follow up with Neurology today regarding plan for discharge    Rh negative state in antepartum period  Assessment & Plan   is Rh positive  Rhogam is ordered    Obesity affecting pregnancy in second trimester  Assessment & Plan  SCDs for VTE prophylaxis while in bed  Encourage ambulation  Consider chemoprophylaxis with Lovenox    Hypothyroidism during pregnancy in second trimester  Assessment & Plan  Continue Levothyroxine 25mcg daily    Cervical cerclage suture present, antepartum-resolved as of 2023  Assessment & Plan  Removed 23    Continue inpatient care  Subjective/Objective   Chief Complaint:   Post delivery     Subjective:   Pain: none   Tolerating PO: yes  Voiding: yes  Flatus: yes  Ambulating: yes  Chest pain: no  Shortness of breath: no  Leg pain: no  Lochia: within normal limits  Objective:   Vitals:   Temp:  [98 °F (36 7 °C)-99 5 °F (37 5 °C)] 98 2 °F (36 8 °C)  HR:  [] 95  Resp:  [18-20] 20  BP: ()/(35-78) 102/57       Intake/Output Summary (Last 24 hours) at 6/8/2023 0756  Last data filed at 6/8/2023 0401  Gross per 24 hour   Intake 3287 5 ml   Output 5770 ml   Net -2482 5 ml       Lab Results   Component Value Date    HCT 31 7 (L) 06/07/2023    HGB 10 1 (L) 06/07/2023    MCV 94 06/07/2023     06/07/2023    WBC 9 48 06/07/2023       Physical Exam:   General: alert and oriented x3, in no apparent distress  Cardiovascular: mild tachycardia  Pulmonary: normal effort, no dsitress  Abdomen: Soft, still mildly tender, no rebound or guarding  Uterine fundus unable to be palpated  Extremities: Non tender     Petty Pak MD  PGY-III, OBGYN  6/8/2023, 7:56 AM

## 2023-06-08 NOTE — OB LABOR/OXYTOCIN SAFETY PROGRESS
Oxytocin Safety Progress Check Note - Yonathan Cook 29 y o  female MRN: [id number]    Unit/Bed#: -01 Encounter: [id number]    Dose (maritza-units/min) Oxytocin: 14 maritza-units/min  Contraction Frequency (minutes): occasional  Contraction Quality: Moderate  Tachysystole: No   Cervical Dilation: 8        Cervical Effacement: 100  Fetal Station: -3  Baseline Rate: 140 bpm  Fetal Heart Rate: 155 BPM  FHR Category: Category I               Vital Signs:   Vitals:    06/07/23 2220   BP: (!) 89/54   Pulse: 105   Resp:    Temp:    SpO2: 95%       Notes/comments:   No cervix is felt, bulging bag without a presenting fetal part noted  Rescan for vertex presentation  She is starting to feel increased pressure  Dr Sylvester Gonzales notified - currently in [address] [place] [address]  6/7/2023 10:47 PM

## 2023-06-08 NOTE — UTILIZATION REVIEW
Continued Stay Review  Date: 2023                        Current Patient Class: inpatient   Current Level of Care: L&D     Assessment/Plan:       Viable male on 2023 at 2349, with APGARS 8 and 8 at 1 and 5 min, weighing 1 lb 5 2 oz   Spontaneous delivery of placenta  There was noted to be one cotyledon missing which was obtained via bimanual exam and there was organized clot across part of the placenta consistent with placental abruption     2023  Postpartum Day #1 s/p     Ambulating; Pain and bleeding wnl, concern for Sepsis on , Abdominal pain and tenderness improved this morning  Hypotension improved but still intermittently tachycardic afebrile  Cont  Clindamycin IV 900mg Q8h and Gentamycin 5mg/kg IV once daily  Blood cultures are pending - consider continuing antibiotics until at least 24hrs postpartum or until blood cultures result    Repeat CBC with differential and BMP ordered for today  Appreciate neuro recommendations regarding Keppra 1000mg BID IV dosing, with follow up today  Vital Signs:   Temp:  [98 °F (36 7 °C)-99 5 °F (37 5 °C)] 98 2 °F (36 8 °C)  HR:  [] 95  Resp:  [18-20] 20  BP: ()/(35-78) 102/57     Pertinent Labs/Diagnostic Results:       Results from last 7 days   Lab Units 23  1251 23   HEMATOCRIT % 28 8* 31 7* 32 3*   HEMOGLOBIN g/dL 9 3* 10 1* 10 7*   NEUTROS ABS Thousands/µL 6 03 7 14  --    PLATELETS Thousands/uL 248 306 349   WBC Thousand/uL 7 60 9 48 8 78         Results from last 7 days   Lab Units 23  0819 23  1253 238   ANION GAP mmol/L 6 10 9   BUN mg/dL 3* 5 7   CALCIUM mg/dL 7 7* 8 3* 9 2   CHLORIDE mmol/L 105 105 104   CO2 mmol/L 25 23 22   CREATININE mg/dL 0 40* 0 39* 0 41*   EGFR ml/min/1 73sq m 138 139 137   POTASSIUM mmol/L 3 5 3 2* 3 4*   SODIUM mmol/L 136 138 135     Results from last 7 days   Lab Units 23  1253 23  2218   ALBUMIN g/dL 3 2* 3 3*   ALK PHOS U/L 66 82   ALT U/L 38 27   AST U/L 23 20   TOTAL BILIRUBIN mg/dL 0 23 0 28   TOTAL PROTEIN g/dL 6 2* 6 7     Results from last 7 days   Lab Units 06/04/23  2205   POC GLUCOSE mg/dl 99     Results from last 7 days   Lab Units 06/08/23  0819 06/07/23  1253 06/04/23  2218   GLUCOSE RANDOM mg/dL 82 79 102           Results from last 7 days   Lab Units 06/07/23  1251   PROCALCITONIN ng/ml <0 05     Results from last 7 days   Lab Units 06/07/23  1251   LACTIC ACID mmol/L 1 1           Results from last 7 days   Lab Units 06/07/23  1320   BILIRUBIN UA  Negative   BLOOD UA  Negative   CLARITY UA  Clear   COLOR UA  Colorless   GLUCOSE UA mg/dl Negative   KETONES UA mg/dl 10 (1+)*   LEUKOCYTES UA  Negative   NITRITE UA  Negative   PH UA  6 0   PROTEIN UA mg/dl Negative   SPEC GRAV UA  1 007   UROBILINOGEN UA (BE) mg/dl <2 0             Results from last 7 days   Lab Units 06/05/23  0620   AMPH/METH  Negative   BARBITURATE UR  Negative   BENZODIAZEPINE UR  Negative   COCAINE UR  Negative   METHADONE URINE  Negative   OPIATE UR  Negative   PCP UR  Negative   THC UR  Negative                     Results from last 7 days   Lab Units 06/07/23  1252 06/07/23  1251   BLOOD CULTURE  Received in Microbiology Lab  Culture in Progress  Received in Microbiology Lab  Culture in Progress  Medications:   Scheduled Medications:  clindamycin, 900 mg, Intravenous, Q8H  docusate sodium, 100 mg, Oral, BID  docusate sodium, 100 mg, Oral, BID  famotidine, 20 mg, Oral, BID  gentamicin, 5 mg/kg (Adjusted), Intravenous, Q24H  ibuprofen, 600 mg, Oral, Q6H  levETIRAcetam, 1,000 mg, Intravenous, Q12H [address]  levothyroxine, 25 mcg, Oral, Early Morning  LORazepam, 2 mg, Intravenous, Once  nystatin, 500,000 Units, Swish & Swallow, 4x Daily  Rho(D) immune globulin, 300 mcg, Intramuscular, Once  Continuous IV Infusions:     PRN Meds:  acetaminophen, 650 mg, Oral, Q4H PRN  benzocaine-menthol-lanolin-aloe, 1 application  , Topical, Q6H PRN  calcium carbonate, 1,000 mg, Oral, Daily PRN  diphenhydrAMINE, 25 mg, Oral, Q6H PRN  hydrocortisone, 1 application  , Topical, Daily PRN  ondansetron, 4 mg, Intravenous, Q8H PRN  simethicone, 80 mg, Oral, 4x Daily PRN  witch hazel-glycerin, 1 pad , Topical, Q4H PRN    Discharge Plan: TBD    Network Utilization Review Department  ATTENTION: Please call with any questions or concerns to [phone] and carefully listen to the prompts so that you are directed to the right person  All voicemails are confidential   Alessandra Sigala all requests for admission clinical reviews, approved or denied determinations and any other requests to dedicated fax number below belonging to the campus where the patient is receiving treatment   List of dedicated fax numbers for the Facilities:  [address] DENIALS (Administrative/Medical Necessity) [phone]   [address] (Maternity/NICU/Pediatrics) [phone]   [address] Tila Foss [phone]   [facility]  [phone]   [address] [address] [facility] [place] [place] [address] [address] [phone]   [address] Terrence Alexander [facility] [place] [address] 815 [facility] [phone]

## 2023-06-08 NOTE — DISCHARGE INSTRUCTIONS
Self Care After Delivery   AMBULATORY CARE:   The postpartum period is the period of time from delivery to about 6 weeks  During this time you may experience many physical and emotional changes  It is important to understand what is normal and when you need to call your healthcare provider  It is also important to know how to care for yourself during this time  Call your local emergency number (911 in the [address] [place] [address],[address]) for any of the following: You see or hear things that are not there, or have thoughts of harming yourself or your baby  You soak through 1 pad in 15 minutes, have blurry vision, clammy or pale skin, and feel faint  You faint or lose consciousness  You have trouble breathing  You cough up blood  Your  incision comes apart  Seek care immediately if:   Your heart is beating faster than usual      You have a bad headache or changes in your vision  Your perineal tear, episiotomy site, or  incision is red, swollen, bleeding, or draining pus  You have severe abdominal pain  Call your doctor or obstetrician if:   Your leg is painful, red, and larger than usual      You soak through 1 or more pads in an hour, or pass blood clots larger than a quarter from your vagina  You have a fever  You have new or worsening pain in your abdomen or vagina  You continue to have depression 1 to 2 weeks after you deliver  You have trouble sleeping  You have foul-smelling discharge from your vagina  You have pain or burning when you urinate  You do not have a bowel movement for 3 days or more  You have nausea or are vomiting  You have hard lumps or red streaks over your breasts  You have cracked nipples or bleed from your nipples  You have questions or concerns about your condition or care  Physical changes:  The following are normal changes after you give birth:  Pain in the area between your anus and vagina     Breast pain Constipation or hemorrhoids     Hot or cold flashes     Vaginal bleeding or discharge     Mild to moderate abdominal cramping     Difficulty controlling bowel movements or urine     Emotional changes: A drop in hormone levels after you deliver may cause changes in your emotions  You may feel irritable, sad, or anxious  You may cry easily or for no reason  You may also feel depressed  Depression that continues can be a sign of postpartum depression, a condition that can be treated  Treatment may include talk therapy, medicines, or both  Healthcare providers will ask how you are feeling and if you have any depression  These talks can happen during appointments for your medical care and for your baby's care, such as well child visits  Providers can help you find ways to care for yourself and your baby  Talk to your providers about the following:  When emotional changes or depression started, and if it is getting worse over time     Problems you are having with daily activities, sleep, or caring for your baby     If anything makes you feel worse, or makes you feel better     Feeling that you are not bonding with your baby the way you want     Any problems your baby has with sleeping or feeding     Your baby is fussy or cries a lot     Support you have from friends, family, or others     Breast care for breastfeeding mothers: You may have sore breasts for 3 to 6 days after you give birth  This happens as your milk begins to fill your breasts  You may also have sore breasts if you do not breastfeed frequently  Do the following to care for your breasts:  Apply a moist, warm, compress to your breast as directed  This may help soothe your breasts  Make sure the washcloth is not too hot before you apply it to your breast      Nurse your baby or pump your milk frequently  This may prevent clogged milk ducts  Ask your healthcare provider how often to nurse or pump  Massage your breasts as directed   This may help increase your milk flow  Gently rub your breasts in a circular motion before you breastfeed  You may need to gently squeeze your breast or nipple to help release milk  You can also use a breast pump to help release milk from your breast      Wash your breasts with warm water only  Do not put soap on your nipples  Soap may cause your nipples to become dry  Apply lanolin cream to your nipples as directed  Lanolin cream may add moisture to your skin and prevent nipple dryness  Always wash off lanolin cream with warm water before you breastfeed  Place pads in your bra  Your nipples may leak milk when you are not breastfeeding  You can place pads inside of your bra to help prevent leaking onto your clothing  Ask your healthcare provider where to purchase bra pads  Get breastfeeding support if needed  Healthcare providers can answer questions about breastfeeding and provide you with support  Ask your healthcare provider who you can contact if you need breastfeeding support  Breast care for non-breastfeeding mothers: Milk will fill your breasts even if you bottle feed your baby  Do the following to help stop your milk from filling your breasts and causing pain:  Wear a bra with support at all times  A sports bra or a tight-fitting bra will help stop your milk from coming in  Apply ice on each breast for 15 to 20 minutes every hour or as directed  Use an ice pack, or put crushed ice in a plastic bag  Cover it with a towel before you apply it to your breast  Ice helps your milk ducts shrink  Keep your breasts away from warm water  Warm water will make it easier for milk to fill your breasts  Stand with your breasts away from warm water in the shower  Limit how much you touch your breasts  This will prevent them from filling with milk  Perineum care: Your perineum is the area between your rectum and vagina  It is normal to have swelling and pain in this area after you give birth   If you had an episiotomy, your healthcare provider may give you special instructions  Clean your perineum after you use the bathroom  This may prevent infection and help with healing  Use a spray bottle with warm water to clean your perineum  You may also gently spray warm water against your perineum when you urinate  Always wipe front to back  Take a sitz bath as directed  A sitz bath may help relieve swelling and pain  Fill your bath tub or bucket with water up to your hips and sit in the water  Use cold water for 2 days after you deliver  Then use warm water  Ask your healthcare provider for more information about a sitz bath  Apply ice packs for the first 24 hours or as directed  Use a plastic glove filled with ice or buy an ice pack  Wrap the ice pack or plastic glove in a small towel or wash cloth  Place the ice pack on your perineum for 20 minutes at a time  Sit on a donut-shaped pillow  This may relieve pressure on your perineum when you sit  Use wipes that contain medicine or take pills as directed  Your healthcare provider may tell you to use witch hazel pads  You can place witch hazel pads in the refrigerator before you apply them to your perineum  Your provider may also tell you to take NSAIDs  Ask him or her how often to take pills or use the wipes  Do not go swimming or take tub baths for 4 to 6 weeks or as directed  This will help prevent an infection in your vagina or uterus  Bowel and bladder care: It may take 3 to 5 days to have a bowel movement after you deliver your baby  You can do the following to prevent or manage constipation, and get control of your bowel or bladder:  Take stool softeners as directed  A stool softener is medicine that will make your bowel movements softer  This may prevent or relieve constipation  A stool softener may also make bowel movements less painful  Drink plenty of liquids  Ask how much liquid to drink each day and which liquids are best for you  Liquids may help prevent constipation  Eat foods high in fiber  Examples include fruits, vegetables, grains, beans, and lentils  Ask your healthcare provider how much fiber you need each day  Fiber may prevent constipation  Do Kegel exercises as directed  Kegel exercises will help strengthen the muscles that control bowel movements and urination  Ask your healthcare provider for more information on Kegel exercises  Apply cold compresses or medicine to hemorrhoids as directed  This may relieve swelling and pain  Your healthcare provider may tell you to apply ice or wipes that contain medicine to your hemorrhoids  He or she may also tell you to use a sitz bath  Ask your provider for more information on how to manage hemorrhoids  Nutrition: Good nutrition is important in the postpartum period  It will help you return to a healthy weight, increase your energy levels, and prevent constipation  It will also help you get enough nutrients and calories if you are going to breastfeed your baby  Eat a variety of healthy foods  Healthy foods include fruits, vegetables, whole-grain breads, low-fat dairy products, beans, lean meats, and fish  You may need 500 to 700 extra calories each day if you breastfeed your baby  You may also need extra protein  Limit foods with added sugar and high amounts of fat  These foods are high in calories and low in healthy nutrients  Read food labels so you know how much sugar and fat is in the food you want to eat  Drink 8 to 10 glasses of water per day  Water will help you make plenty of milk for your baby  It will also help prevent constipation  Drink a glass of water every time you breastfeed your baby  Take vitamins as directed  Ask your healthcare provider what vitamins you need  Limit caffeine and alcohol if you are breastfeeding  Caffeine and alcohol can get into your breast milk  Caffeine and alcohol can make your baby fussy   They can also interfere with your baby's sleep  Ask your healthcare provider if you can drink alcohol or caffeine  Rest and sleep: You may feel very tired in the postpartum period  Enough sleep will help you heal and give you energy to care for your baby  The following may help you get sleep and rest:  Nap when your baby naps  Your baby may nap several times during the day  Get rest during this time  Limit visitors  Many people may want to see you and your baby  Ask friends or family to visit on different days  This will give you time to rest      Do not plan too much for one day  Put off household chores so that you have time to rest  Gradually do more each day  Ask for help from family, friends, or neighbors  Ask them to help you with laundry, cleaning, or errands  Also ask someone to watch the baby while you take a nap or relax  Ask your partner to help with the care of your baby  Pump some of your breast milk so your partner can feed your baby during the night  Exercise after delivery: Wait until your healthcare provider says it is okay to exercise  Exercise can help you lose weight, increase your energy levels, and manage your mood  It can also prevent constipation and blood clots  Start with gentle exercises such as walking  Do more as you have more energy  You may need to avoid abdominal exercises for 1 to 2 weeks after you deliver  Talk to your healthcare provider about an exercise plan that is right for you  Sexual activity after delivery:   Do not have sex until your healthcare provider says it is okay  You may need to wait 4 to 6 weeks before you have sex  This may prevent infection and allow time to heal      Your menstrual cycle may begin as soon as 3 weeks after you deliver  Your period may be delayed if you breastfeed your baby  You can become pregnant before you get your first postpartum period  Talk to your healthcare provider about birth control that is right for you   Some types of birth control are not safe during breastfeeding  For support and more information: Join a support group for new mothers  Ask for help from family and friends with chores, errands, and care of your baby  Office of [place], [facility]  [address], [address] [facility] [place] [address]  [address], [address] [facility] [place] [facility] [address]  Phone: [phone]  Web Address: www womenshealth gov  March of [facility] Postpartum [address] [facility] , [address] [facility]  [address] [facility] , [address] [place] [address]  Web Address: [employer] be  [employer]/pregnancy/postpartum-care  aspx  Follow up with your doctor or obstetrician as directed: You will need to follow up within 2 to 6 weeks of delivery  Write down your questions so you remember to ask them at your visits  © Copyright [address] Information is for End User's use only and may not be sold, redistributed or otherwise used for commercial purposes  All illustrations and images included in CareNotes® are the copyrighted property of A D A M , Inc  or [address] [place], [place] [address]darin   The above information is an  only  It is not intended as medical advice for individual conditions or treatments  Talk to your doctor, nurse or pharmacist before following any medical regimen to see if it is safe and effective for you

## 2023-06-08 NOTE — PLAN OF CARE
Problem: PAIN - ADULT  Goal: Verbalizes/displays adequate comfort level or baseline comfort level  Description: Interventions:  - Encourage patient to monitor pain and request assistance  - Assess pain using appropriate pain scale  - Administer analgesics based on type and severity of pain and evaluate response  - Implement non-pharmacological measures as appropriate and evaluate response  - Consider cultural and social influences on pain and pain management  - Notify physician/advanced practitioner if interventions unsuccessful or patient reports new pain  Outcome: Progressing     Problem: INFECTION - ADULT  Goal: Absence or prevention of progression during hospitalization  Description: INTERVENTIONS:  - Assess and monitor for signs and symptoms of infection  - Monitor lab/diagnostic results  - Monitor all insertion sites, i e  indwelling lines, tubes, and drains  - Monitor endotracheal if appropriate and nasal secretions for changes in amount and color  - [place] appropriate cooling/warming therapies per order  - Administer medications as ordered  - Instruct and encourage patient and family to use good hand hygiene technique  - Identify and instruct in appropriate isolation precautions for identified infection/condition  Outcome: Progressing  Goal: Absence of fever/infection during neutropenic period  Description: INTERVENTIONS:  - Monitor WBC    Outcome: Progressing     Problem: SAFETY ADULT  Goal: Patient will remain free of falls  Description: INTERVENTIONS:  - Educate patient/family on patient safety including physical limitations  - Instruct patient to call for assistance with activity   - Consult OT/PT to assist with strengthening/mobility   - Keep Call bell within reach  - Keep bed low and locked with side rails adjusted as appropriate  - Keep care items and personal belongings within reach  - Initiate and maintain comfort rounds  - Make Fall Risk Sign visible to staff  - Apply yellow socks and bracelet for high fall risk patients  - Consider moving patient to room near nurses station  Outcome: Progressing  Goal: Maintain or return to baseline ADL function  Description: INTERVENTIONS:  -  Assess patient's ability to carry out ADLs; assess patient's baseline for ADL function and identify physical deficits which impact ability to perform ADLs (bathing, care of mouth/teeth, toileting, grooming, dressing, etc )  - Assess/evaluate cause of self-care deficits   - Assess range of motion  - Assess patient's mobility; develop plan if impaired  - Assess patient's need for assistive devices and provide as appropriate  - Encourage maximum independence but intervene and supervise when necessary  - Involve family in performance of ADLs  - Assess for home care needs following discharge   - Consider OT consult to assist with ADL evaluation and planning for discharge  - Provide patient education as appropriate  Outcome: Progressing  Goal: Maintains/Returns to pre admission functional level  Description: INTERVENTIONS:  - Perform BMAT or MOVE assessment daily    - Set and communicate daily mobility goal to care team and patient/family/caregiver     - Collaborate with rehabilitation services on mobility goals if consulted  - Out of bed for toileting  - Record patient progress and toleration of activity level   Outcome: Progressing     Problem: DISCHARGE PLANNING  Goal: Discharge to home or other facility with appropriate resources  Description: INTERVENTIONS:  - Identify barriers to discharge w/patient and caregiver  - Arrange for needed discharge resources and transportation as appropriate  - Identify discharge learning needs (meds, wound care, etc )  - Arrange for interpretive services to assist at discharge as needed  - Refer to Case Management Department for coordinating discharge planning if the patient needs post-hospital services based on physician/advanced practitioner order or complex needs related to functional status, cognitive ability, or social support system  Outcome: Progressing

## 2023-06-09 VITALS
HEIGHT: 55 IN | HEART RATE: 88 BPM | BODY MASS INDEX: 50.91 KG/M2 | DIASTOLIC BLOOD PRESSURE: 72 MMHG | OXYGEN SATURATION: 98 % | TEMPERATURE: 98.8 F | RESPIRATION RATE: 18 BRPM | WEIGHT: 220 LBS | SYSTOLIC BLOOD PRESSURE: 119 MMHG

## 2023-06-09 LAB
DME PARACHUTE DELIVERY DATE ACTUAL: NORMAL
DME PARACHUTE DELIVERY DATE REQUESTED: NORMAL
DME PARACHUTE ITEM DESCRIPTION: NORMAL
DME PARACHUTE ORDER STATUS: NORMAL
DME PARACHUTE SUPPLIER NAME: NORMAL
DME PARACHUTE SUPPLIER PHONE: NORMAL

## 2023-06-09 PROCEDURE — 99024 POSTOP FOLLOW-UP VISIT: CPT | Performed by: STUDENT IN AN ORGANIZED HEALTH CARE EDUCATION/TRAINING PROGRAM

## 2023-06-09 RX ORDER — LEVETIRACETAM 1000 MG/1
1000 TABLET ORAL 2 TIMES DAILY
Qty: 60 TABLET | Refills: 3 | Status: SHIPPED | OUTPATIENT
Start: 2023-06-09

## 2023-06-09 RX ORDER — IBUPROFEN 600 MG/1
600 TABLET ORAL EVERY 6 HOURS
Qty: 30 TABLET | Refills: 0
Start: 2023-06-09

## 2023-06-09 RX ORDER — CALCIUM CARBONATE 500 MG/1
1000 TABLET, CHEWABLE ORAL DAILY PRN
Refills: 0 | Status: CANCELLED
Start: 2023-06-09

## 2023-06-09 RX ORDER — DIAPER,BRIEF,INFANT-TODD,DISP
1 EACH MISCELLANEOUS DAILY PRN
Qty: 30 G | Refills: 0
Start: 2023-06-09

## 2023-06-09 RX ADMIN — DOCUSATE SODIUM 100 MG: 100 CAPSULE, LIQUID FILLED ORAL at 10:00

## 2023-06-09 RX ADMIN — LEVETIRACETAM 1000 MG: 100 INJECTION, SOLUTION INTRAVENOUS at 10:00

## 2023-06-09 RX ADMIN — LEVOTHYROXINE SODIUM 25 MCG: 25 TABLET ORAL at 06:01

## 2023-06-09 RX ADMIN — FAMOTIDINE 20 MG: 20 TABLET ORAL at 18:18

## 2023-06-09 RX ADMIN — HUMAN RHO(D) IMMUNE GLOBULIN 300 MCG: 300 INJECTION, SOLUTION INTRAMUSCULAR at 18:04

## 2023-06-09 RX ADMIN — FAMOTIDINE 20 MG: 20 TABLET ORAL at 10:00

## 2023-06-09 RX ADMIN — DOCUSATE SODIUM 100 MG: 100 CAPSULE, LIQUID FILLED ORAL at 18:18

## 2023-06-09 RX ADMIN — NYSTATIN 500000 UNITS: 100000 SUSPENSION ORAL at 10:00

## 2023-06-09 RX ADMIN — IBUPROFEN 600 MG: 600 TABLET ORAL at 10:00

## 2023-06-09 NOTE — ADDENDUM NOTE
Addendum  created 06/09/23 0846 by Reid Ye CRNA    Intraprocedure Event edited, LDA properties accepted

## 2023-06-09 NOTE — CASE MANAGEMENT
Case Management Discharge Planning Note    Patient name Blease Race  Location /-83 MRN [id number]  : 1990 Date 2023       Current Admission Date: 2023  Current Admission Diagnosis: (spontaneous vaginal delivery)   Patient Active Problem List    Diagnosis Date Noted   • Chorioamnionitis in second trimester 2023   • Rh negative state in antepartum period 2023   • Previous child with congenital anomaly, currently pregnant, antepartum 2023   • Hypothyroidism during pregnancy in second trimester 2023   • History of stillbirth in currently pregnant patient, second trimester 2023   • Cervical shortening 2023   • Family history of congenital heart defect 2023   • Short stature 2023   • Vaginal bleeding before 22 weeks gestation 2023   • Maternal morbid obesity, antepartum ([address] ) 2023   • History of stillbirth in currently pregnant patient, unspecified trimester 2023   •  (spontaneous vaginal delivery) 03/10/2023   • Chronic constipation 03/10/2023   • Nausea/vomiting in pregnancy 03/10/2023   • Obesity in pregnancy 03/10/2023   • Seizures ([address] )    • Hypothyroid in pregnancy, antepartum 10/24/2022   • Constipation 2022   • Dysphagia 2022   • Iron deficiency anemia secondary to inadequate dietary iron intake 2022   • Excessive daytime sleepiness 02/15/2022   • Gastroesophageal reflux disease 2021   • Morbid obesity ([address] ) 2021   • Obesity affecting pregnancy in second trimester 2021   • Bipolar depression ([address] ) 2021   • [place]'s esophagus    • Gastric ulcer    • GARIMA (obstructive sleep apnea)    • Female infertility    • Autism    • Infertility, female 2021   • PCOS (polycystic ovarian syndrome) 2021   • History of irregular menstrual bleeding 01/15/2021   • Depression with anxiety 2020   • Nonintractable epilepsy without status epilepticus ([address] ) 2020 LOS (days): 5  Geometric Mean LOS (GMLOS) (days):   Days to GMLOS:     OBJECTIVE:  Risk of Unplanned Readmission Score: 16 13         Current admission status: Inpatient   Preferred Pharmacy:   [facility] [address] [place] [facility], [address] [facility] [address], [address] [address]  Phone: [phone] Fax: [phone]  [facility], [address] [place], [place] [address]  Phone: [phone] Fax: [phone]    Primary Care Provider: Aldo Bernardo DO    Primary Insurance: MEDICARE  Secondary Insurance: [facility] [address] [facility]    DISCHARGE DETAILS:  CM sent referral via parachute to [employer] for Zomee breast pump  [employer] liaison will deliver to the bedside today

## 2023-06-09 NOTE — PLAN OF CARE
Problem: PAIN - ADULT  Goal: Verbalizes/displays adequate comfort level or baseline comfort level  Description: Interventions:  - Encourage patient to monitor pain and request assistance  - Assess pain using appropriate pain scale  - Administer analgesics based on type and severity of pain and evaluate response  - Implement non-pharmacological measures as appropriate and evaluate response  - Consider cultural and social influences on pain and pain management  - Notify physician/advanced practitioner if interventions unsuccessful or patient reports new pain  Outcome: Progressing     Problem: INFECTION - ADULT  Goal: Absence or prevention of progression during hospitalization  Description: INTERVENTIONS:  - Assess and monitor for signs and symptoms of infection  - Monitor lab/diagnostic results  - Monitor all insertion sites, i e  indwelling lines, tubes, and drains  - Monitor endotracheal if appropriate and nasal secretions for changes in amount and color  - [place] appropriate cooling/warming therapies per order  - Administer medications as ordered  - Instruct and encourage patient and family to use good hand hygiene technique  - Identify and instruct in appropriate isolation precautions for identified infection/condition  Outcome: Progressing  Goal: Absence of fever/infection during neutropenic period  Description: INTERVENTIONS:  - Monitor WBC    Outcome: Progressing     Problem: SAFETY ADULT  Goal: Patient will remain free of falls  Description: INTERVENTIONS:  - Educate patient/family on patient safety including physical limitations  - Instruct patient to call for assistance with activity   - Consult OT/PT to assist with strengthening/mobility   - Keep Call bell within reach  - Keep bed low and locked with side rails adjusted as appropriate  - Keep care items and personal belongings within reach  - Initiate and maintain comfort rounds  - Make Fall Risk Sign visible to staff  - Apply yellow socks and bracelet for high fall risk patients  - Consider moving patient to room near nurses station  Outcome: Progressing  Goal: Maintain or return to baseline ADL function  Description: INTERVENTIONS:  -  Assess patient's ability to carry out ADLs; assess patient's baseline for ADL function and identify physical deficits which impact ability to perform ADLs (bathing, care of mouth/teeth, toileting, grooming, dressing, etc )  - Assess/evaluate cause of self-care deficits   - Assess range of motion  - Assess patient's mobility; develop plan if impaired  - Assess patient's need for assistive devices and provide as appropriate  - Encourage maximum independence but intervene and supervise when necessary  - Involve family in performance of ADLs  - Assess for home care needs following discharge   - Consider OT consult to assist with ADL evaluation and planning for discharge  - Provide patient education as appropriate  Outcome: Progressing  Goal: Maintains/Returns to pre admission functional level  Description: INTERVENTIONS:  - Perform BMAT or MOVE assessment daily    - Set and communicate daily mobility goal to care team and patient/family/caregiver     - Collaborate with rehabilitation services on mobility goals if consulted  - Out of bed for toileting  - Record patient progress and toleration of activity level   Outcome: Progressing     Problem: DISCHARGE PLANNING  Goal: Discharge to home or other facility with appropriate resources  Description: INTERVENTIONS:  - Identify barriers to discharge w/patient and caregiver  - Arrange for needed discharge resources and transportation as appropriate  - Identify discharge learning needs (meds, wound care, etc )  - Arrange for interpretive services to assist at discharge as needed  - Refer to Case Management Department for coordinating discharge planning if the patient needs post-hospital services based on physician/advanced practitioner order or complex needs related to functional status, cognitive ability, or social support system  Outcome: Progressing

## 2023-06-09 NOTE — PLAN OF CARE
Problem: PAIN - ADULT  Goal: Verbalizes/displays adequate comfort level or baseline comfort level  Description: Interventions:  - Encourage patient to monitor pain and request assistance  - Assess pain using appropriate pain scale  - Administer analgesics based on type and severity of pain and evaluate response  - Implement non-pharmacological measures as appropriate and evaluate response  - Consider cultural and social influences on pain and pain management  - Notify physician/advanced practitioner if interventions unsuccessful or patient reports new pain  6/9/2023 1812 by Chris Johnson RN  Outcome: Completed  6/9/2023 1811 by Chris Johnson RN  Outcome: Progressing     Problem: INFECTION - ADULT  Goal: Absence or prevention of progression during hospitalization  Description: INTERVENTIONS:  - Assess and monitor for signs and symptoms of infection  - Monitor lab/diagnostic results  - Monitor all insertion sites, i e  indwelling lines, tubes, and drains  - Monitor endotracheal if appropriate and nasal secretions for changes in amount and color  - [place] appropriate cooling/warming therapies per order  - Administer medications as ordered  - Instruct and encourage patient and family to use good hand hygiene technique  - Identify and instruct in appropriate isolation precautions for identified infection/condition  6/9/2023 1812 by Chris Johnson RN  Outcome: Completed  6/9/2023 1811 by Chris Johnson RN  Outcome: Progressing  Goal: Absence of fever/infection during neutropenic period  Description: INTERVENTIONS:  - Monitor WBC    6/9/2023 1812 by Chris Johnson RN  Outcome: Completed  6/9/2023 1811 by Chris Johnson RN  Outcome: Progressing     Problem: SAFETY ADULT  Goal: Patient will remain free of falls  Description: INTERVENTIONS:  - Educate patient/family on patient safety including physical limitations  - Instruct patient to call for assistance with activity   - Consult OT/PT to assist with strengthening/mobility   - Keep Call bell within reach  - Keep bed low and locked with side rails adjusted as appropriate  - Keep care items and personal belongings within reach  - Initiate and maintain comfort rounds  - Make Fall Risk Sign visible to staff  - Apply yellow socks and bracelet for high fall risk patients  - Consider moving patient to room near nurses station  6/9/2023 1812 by Tania Clark RN  Outcome: Completed  6/9/2023 1811 by Tania Clark RN  Outcome: Progressing  Goal: Maintain or return to baseline ADL function  Description: INTERVENTIONS:  -  Assess patient's ability to carry out ADLs; assess patient's baseline for ADL function and identify physical deficits which impact ability to perform ADLs (bathing, care of mouth/teeth, toileting, grooming, dressing, etc )  - Assess/evaluate cause of self-care deficits   - Assess range of motion  - Assess patient's mobility; develop plan if impaired  - Assess patient's need for assistive devices and provide as appropriate  - Encourage maximum independence but intervene and supervise when necessary  - Involve family in performance of ADLs  - Assess for home care needs following discharge   - Consider OT consult to assist with ADL evaluation and planning for discharge  - Provide patient education as appropriate  6/9/2023 1812 by Tania Clark RN  Outcome: Completed  6/9/2023 1811 by Tania Clark RN  Outcome: Progressing  Goal: Maintains/Returns to pre admission functional level  Description: INTERVENTIONS:  - Perform BMAT or MOVE assessment daily    - Set and communicate daily mobility goal to care team and patient/family/caregiver     - Collaborate with rehabilitation services on mobility goals if consulted  - Out of bed for toileting  - Record patient progress and toleration of activity level   6/9/2023 1812 by Tania Clark RN  Outcome: Completed  6/9/2023 1811 by Tania Clark RN  Outcome: Progressing     Problem: DISCHARGE PLANNING  Goal: Discharge to home or other facility with appropriate resources  Description: INTERVENTIONS:  - Identify barriers to discharge w/patient and caregiver  - Arrange for needed discharge resources and transportation as appropriate  - Identify discharge learning needs (meds, wound care, etc )  - Arrange for interpretive services to assist at discharge as needed  - Refer to Case Management Department for coordinating discharge planning if the patient needs post-hospital services based on physician/advanced practitioner order or complex needs related to functional status, cognitive ability, or social support system  6/9/2023 1812 by Shannon Pillai RN  Outcome: Completed  6/9/2023 1811 by Shannon Pillai, RN  Outcome: Progressing

## 2023-06-09 NOTE — PROGRESS NOTES
Progress Note - OB/GYN  Ena Roman 29 y o  female MRN: [id number]  Unit/Bed#:  319-01 Encounter: [id number]    Assessment and Plan   Postpartum Day #2 s/p  following induction of labor at 22w5d gestation  Patient was admitted with  labor and cervical insufficiency and developed chorioamnionitis and sepsis  Currently stable  Baby in NICU  By issue:      Rh negative state in antepartum period  Assessment & Plan   is Rh positive  Rhogam is ordered    Chorioamnionitis in second trimester  Assessment & Plan  With concern for sepsis postparum  Abdominal pain and tenderness has resolved   Remains afebrile, vitals all WNL  S/p Clindamycin and Gentamycin   Blood cultures no growth   WBC WNL    Hypothyroidism during pregnancy in second trimester  Assessment & Plan  Continue Levothyroxine 25mcg daily    Obesity affecting pregnancy in second trimester  Assessment & Plan  SCDs for VTE prophylaxis while in bed  Encourage ambulation  Consider chemoprophylaxis with Lovenox    Nonintractable epilepsy without status epilepticus ([facility] [address] )  Assessment & Plan  Previously on Keppra 750mg BID; likely had seizure on  after epidural placement  Keppra level drawn on admission was subtherapeutic  S/p Neurology consultation  with recommendation for Keppra 1000mg BID IV dosing  Will follow up with Neurology outpatient     *  (spontaneous vaginal delivery)  Assessment & Plan  Continue routine post partum care    Cervical cerclage suture present, antepartum-resolved as of 2023  Assessment & Plan  Removed 23      Disposition   Anticipate discharge home today on PPD# 2      Subjective/Objective     Chief Complaint: Postpartum State     Subjective:    Ena Roman is PPD #2  She is feeling well today and denies any abdominal pain or tenderness  She is ambulating well, voiding appropriately, lochia minimal  Pumping       Vitals:   BP 98/52 (BP Location: Right arm)   Pulse 84   Temp 97 9 °F (36 6 °C) "(Oral)   Resp 20   Ht 4' 7\" (1 397 m)   Wt 99 8 kg (220 lb)   LMP 12/24/2022 (Exact Date)   SpO2 98%   Breastfeeding Yes   BMI 51 13 kg/m²     No intake or output data in the 24 hours ending 06/09/23 0816    Invasive Devices     Peripheral Intravenous Line  Duration           Peripheral IV 06/08/23 Dorsal (posterior); Right Hand <1 day          Epidural Line  Duration           Epidural Catheter 06/07/23 1 day                Physical Exam:   GEN: Yolis Peraza appears well, alert and oriented x 3, pleasant and cooperative   RESP:  Breathing comfortably on room air  ABDOMEN: soft, no tenderness  EXTREMITIES: SCDs on, non tenderness, 1+ pitting edema       Labs:     ALT   Date Value Ref Range Status   06/07/2023 38 7 - 52 U/L Final     Comment:     Specimen collection should occur prior to Sulfasalazine administration due to the potential for falsely depressed results  06/04/2023 27 7 - 52 U/L Final     Comment:     Specimen collection should occur prior to Sulfasalazine administration due to the potential for falsely depressed results        AST   Date Value Ref Range Status   06/07/2023 23 13 - 39 U/L Final   06/04/2023 20 13 - 39 U/L Final     Creatinine   Date Value Ref Range Status   06/08/2023 0 40 (L) 0 60 - 1 30 mg/dL Final     Comment:     Standardized to IDMS reference method   06/07/2023 0 39 (L) 0 60 - 1 30 mg/dL Final     Comment:     Standardized to IDMS reference method     Hemoglobin   Date Value Ref Range Status   06/08/2023 9 3 (L) 11 5 - 15 4 g/dL Final   06/07/2023 10 1 (L) 11 5 - 15 4 g/dL Final     Platelets   Date Value Ref Range Status   06/08/2023 248 149 - 390 Thousands/uL Final   06/07/2023 306 149 - 390 Thousands/uL Final     WBC   Date Value Ref Range Status   06/08/2023 7 60 4 31 - 10 16 Thousand/uL Final   06/07/2023 9 48 4 31 - 10 16 Thousand/uL Final          Dario Ariza MD  6/9/2023  8:16 AM                       "

## 2023-06-09 NOTE — DISCHARGE INSTR - ACTIVITY
Pumping for NICU baby Feeding Plan  2  Use breast pump, manual pump, and latch assist to prasad nipple  3  Use massage, warmth, hand expression to stimulate breasts  4  Use U shape hold to bring nipple to center of tunnel  5  Cycle the pump from fast to slow cycle and high and low vacuum to assist with milk transfer  6  Use hands on pumping techniques to assist with milk transfer  7  Pump for a maximum time of 20 min  8  During the last few minutes of the pumping session, use hand pump to assist with milk transfer  9  Use hand expression in between pumping sessions to increase milk supply 6 months down the road  10 Pump in the NICU when visiting the baby to stimulate breasts and have expressed milk for next feed  11  Use Supple cups or Breast shells to prasad the nipple between feeds

## 2023-06-09 NOTE — CASE MANAGEMENT
Case Management Discharge Planning Note    Patient name Amparo Alberto  Location /-06 MRN [id number]  : 1990 Date 2023       Current Admission Date: 2023  Current Admission Diagnosis: (spontaneous vaginal delivery)   Patient Active Problem List    Diagnosis Date Noted   • Chorioamnionitis in second trimester 2023   • Rh negative state in antepartum period 2023   • Previous child with congenital anomaly, currently pregnant, antepartum 2023   • Hypothyroidism during pregnancy in second trimester 2023   • History of stillbirth in currently pregnant patient, second trimester 2023   • Cervical shortening 2023   • Family history of congenital heart defect 2023   • Short stature 2023   • Vaginal bleeding before 22 weeks gestation 2023   • Maternal morbid obesity, antepartum ([address] ) 2023   • History of stillbirth in currently pregnant patient, unspecified trimester 2023   •  (spontaneous vaginal delivery) 03/10/2023   • Chronic constipation 03/10/2023   • Nausea/vomiting in pregnancy 03/10/2023   • Obesity in pregnancy 03/10/2023   • Seizures ([address] )    • Hypothyroid in pregnancy, antepartum 10/24/2022   • Constipation 2022   • Dysphagia 2022   • Iron deficiency anemia secondary to inadequate dietary iron intake 2022   • Excessive daytime sleepiness 02/15/2022   • Gastroesophageal reflux disease 2021   • Morbid obesity ([address] ) 2021   • Obesity affecting pregnancy in second trimester 2021   • Bipolar depression ([address] ) 2021   • [place]'s esophagus    • Gastric ulcer    • GARIMA (obstructive sleep apnea)    • Female infertility    • Autism    • Infertility, female 2021   • PCOS (polycystic ovarian syndrome) 2021   • History of irregular menstrual bleeding 01/15/2021   • Depression with anxiety 2020   • Nonintractable epilepsy without status epilepticus ([address] ) 2020 LOS (days): 5  Geometric Mean LOS (GMLOS) (days):   Days to GMLOS:     OBJECTIVE:  Risk of Unplanned Readmission Score: 16 13         Current admission status: Inpatient   Preferred Pharmacy:   [id number] Janelle Townsend, [address] [facility] [address] [facility],  [address] [id number]  Phone: [phone] Fax: [phone] Josette Yoon, [address] [facility] [address] [place] [id number]  Phone: [phone] Fax: [phone]    Primary Care Provider: Luella Carrel, DO    Primary Insurance: MEDICARE  Secondary Insurance: [address] [place] [address] [facility]    DISCHARGE DETAILS:  CM met with MOB to introduce CM services, complete assessment, and provide CM contact info      MOB had Mother present and verbalized agreement with personal interview with them present      MOB reported the following:     Assessment:  • Consult reason: Mom wants zomee  • Gestational Age at Birth: 21 Weeks + 4 Days  • MOB Name (& age if teen): Sylvie Sports, 27 yo    • FOB Name (& age if teen MOB): MOB did not want to disclose  • Other Legal Guardian(s) for Baby: None    • Other Children: None     • Housing Plan/Lives with: Lives with friend and friend 5year old son  Apartment mitesh ends 8/31/23  • Insurance Coverage/Plan for Baby: MOB to apply for MA coverage for baby  • Support System: Family, Community / and Therapist/Counselor/Psychatrist  • Care Items: MGM assisting with baby supplies  • Method of Feeding: Breast Feeding  • Breast Pump: Zomee-delivered to bedside   • Government Assistance Programs: Already established  •  Arrangements: MOB  • Current Employment/Schooling: MOB unemployed  • Mental Health History and/or Treatment: PTSD, MDD, DARCY  Follows with [facility] in [place] weekly     • Substance Use History and/or Treatment: Reports previous recreational use of marijuana  MOB denies having medical marijuana card   MOB reports that she has not used since aware she was pregnant  MOB denies any other SA Hx       • Urine Drug Screen Results: Negative   • Children & Youth History: None  • Current Legal Issues: N/A  • Domestic/Intimate Partner Violence History: Denies  • NICU Resources: Provided resources at bedside     Discharge Plan:  • Pediatrician: TBD    • Prenatal/ Care: [facility]   • Follow-Up Appointments Needed/Scheduled: TBD  • Medications/DME/Other Referrals: Breast pump delivered   • Transportation Plan: Family has a vehicle (reports MGM has vehicle and roommate/friend has vehicle)     CM spoke with MOB and MGM at the bedside  CM introduced self and role  CM provided to MOB at the bedside resources for shelters and programs to assist with safe housing  MOB stated her apartment building was bought out  MOB has not received an eviction notice yet  MOB stated her lease is out on 2023  MOB stated her plan is to move to [place] with MGM once her Baby Boy is stable for drive  MGM stated Baby boy will be provided for  MGM is obtaining Baby supplies for Baby Boy  MOB stated she has a mini refrigerator at home that family bought to keep breast milk in sterile environment  MOB aware that [facility] liaison will deliver breast pump to bedside today  MOB aware breast pump approved by insurance  CM provided MOB [employer] questionnaire to fill out  MOB updated CM will forward completed form to [employer] to assist with supplies/transport  MGM is leaving tomorrow to return to [place] due to work  CM answered all questions/concerns at this time  MOB aware CM will continue to assist during Baby Boy hospitalization  [facility] delivered Zomee breast pump to MOB in NICU  RICHARD emailed  [address] form to [email or web address]

## 2023-06-09 NOTE — LACTATION NOTE
Discharge Lactation: mom states she is trying to keep up with pumping every 2-3 hrs  Mom states she is only getting drops  Encouragement and reassurance provided  Enc  To use shells between pumping sessions  Mom states flanges feel ok and hasn't seen any everting of the nipple during pumping  Enc  To U shape of breast to get nipple deep into the flange  Reviewed pumping    Enc  To call lactation for help in nicu  Mom provided with and discussed RBS, Hand expression/2nd night handout and increase supply for NICU baby  Reviewed pumping log and expectations for pumping output in the first week  Reviewed cycle pumping and appropriate pump settings, as well as pumping for 10-15 min 8-12 times per day  Enc Mom to discussed putting baby to the breast with the NICU team when baby is medically stable to do so  Enc her to call for lactation support as needed throughout her stay  Education on alternative feeding methods  Encouraged to call lactation for additional assistance with feedings  Mom's nipple everts with stimulation  With nipple compression, short shank noted  Education on ways to elongate the nipple: Hand expression, Latch assist, breast shells, supple cups, manual and electric pump stimulation  Pumping for NICU baby Feeding Plan  2  Use breast pump, manual pump, and latch assist to prasad nipple  3  Use massage, warmth, hand expression to stimulate breasts  4  Use U shape hold to bring nipple to center of tunnel  5  Cycle the pump from fast to slow cycle and high and low vacuum to assist with milk transfer  6  Use hands on pumping techniques to assist with milk transfer  7  Pump for a maximum time of 20 min  8  During the last few minutes of the pumping session, use hand pump to assist with milk transfer  9  Use hand expression in between pumping sessions to increase milk supply 6 months down the road     10 Pump in the NICU when visiting the baby to stimulate breasts and have expressed milk for next feed  11  Use Supple cups or Breast shells to prasad the nipple between feeds  Provided education on growth spurts, when to introduce bottles; paced bottle feeding, and non-nutritive suck at the breast  Provided education on Signs of satiation  Encouraged to call lactation to observe a latch prior to discharge for reassurance  Encouraged to call baby and me with any questions and closely monitor output

## 2023-06-11 LAB
BACTERIA BLD CULT: NORMAL
BACTERIA BLD CULT: NORMAL

## 2023-06-12 ENCOUNTER — TELEPHONE (OUTPATIENT)
Dept: OBGYN CLINIC | Facility: CLINIC | Age: 33
End: 2023-06-12

## 2023-06-12 DIAGNOSIS — Z78.9 NEED FOR FOLLOW-UP BY SOCIAL WORKER: Primary | ICD-10-CM

## 2023-06-12 LAB
BACTERIA BLD CULT: NORMAL
BACTERIA BLD CULT: NORMAL

## 2023-06-12 NOTE — PROGRESS NOTES
Post-Partum Checkup Visit    Mukesh Greenberg is a 29 y o  [id number] female     Assessment:  Delivered a viable male  (1lb 5 2oz) via  at 22w5d on 23 (patient now 8 days from )  She was admitted for PTL and cervical insufficiency (cerclage was removed on 23) and she developed chorioamnionitis and sepsis and therefore required induction of labor  She is physically doing ok with exception of appetite today  Plan:      Problem List Items Addressed This Visit        Unprioritized    Bipolar depression ([address] )    Hypothyroid in pregnancy, antepartum     (spontaneous vaginal delivery)     Breastfeeding: Continue pumping  Depression score: EPDS 27  Activity: Restricted until 6 weeks postpartum  Baby: Baby currently in NICU on maximum support (RDS + recent concern for spontaneous intestinal perforation, not requiring surgical intervention at this time)  Epilepsy: Continue Keppra 1000mg BID; Neurology follow up scheduled for 7/3/23  Hypothyroidism: Continue levothyroxine  Hx Poor pregnancy outcomes: Would recommend MFM pre-conception counseling and possible genetic testing prior to any subsequent pregnancy, patient declines  Social: Housing instability and transportation difficult (recent seizure)  Nausea/ anorexia: Differential diagnosis includes psych related restriction, anxiety induced anorexia/ nausea, continue nausea from hormones of pregnancy, or other GI related illness  Zofran refilled  We discussed possibility of appetite stimulant  Unfortunately, many of the usual appetite stimulants have not been well studied in breast feeding and/or result in decreased lactation and therefore should be avoided for this patient  We discussed the possibility of adding a TCA given that she has depression symptoms as well as anorexia  She will discuss this option with her psychiatrist tomorrow  EDIT: Patient's NP with psych called the office this afternoon   We discussed the prescription of a TCA for [facility] which he agrees would be a good option  She will start this prescription today  Contraception: Requested sterilization; MA 31 signed; Discussed with pt LARC methods of contraception including intrauterine device, Nexplanon, and Depo Provera in addition to surgical sterilization  Discussed the risks, benefits, and alternatives to each  Discussed that the Nexplanon has been proven to be the most effective form of contraception; pt desires permanence  Discussed that Mirena IUD has excellent bleeding profile that she would be unable to achieve with surgical sterilization alone; pt is uninterested in bleeding profile  We discussed that these options are safe with her seizure medications  Discussed that surgical sterilization is a PERMANENT and IRREVERSIBLE surgical procedure; pt demonstrated understanding and continued desire to proceed  Patient is okay if she does not have any more children, even if she dates a different partner or loses her child  She is aware that scheduling will not be until she is medically cleared by neurology and from a postpartum standpoint >6 week postpartum  For now, she plans abstinence and [place] sterilization form was signed today  She is aware that she can change her mind at any time  RTO 2 weeks for postpartum appointment and PRN         Nausea/vomiting in pregnancy    Seizures ([address] )    History of stillbirth in currently pregnant patient, unspecified trimester    Maternal morbid obesity, antepartum ([address] )    Chorioamnionitis in second trimester    Rh negative state in antepartum period   Other Visit Diagnoses     Appetite loss    -  Primary    Cold intolerance        Relevant Orders    Anemia Panel w/Reflex, OB    TSH, 3rd generation with Free T4 reflex          Subjective/Objective     Subjective:   Pain: no  Tolerating Oral Intake: She reports having no appetite  She states that her mom keeps telling her that she has to eat but she states that she feels sick with eating   She is able to "eat maybe one or two bites per meal  She is drinking ensure and water  She reports feeling disgusted with food, nauseous and full  She is unsure if it is due to anxiety or if it is residual from how sick she was during her pregnancy  Voiding: yes  Flatus: yes  Bowel Movement: yes  Ambulating: yes  Breastfeeding: Using breast pump  Chest Pain: no  Shortness of Breath: no  Leg Pain/Discomfort: no  Lochia: none    She reports that she is coping as well as she can  She is going to see her psychiatrist tomorrow  She reports that she is hesitant to start medications because she wants to be as safe as possible with pumping  She reports that she has good days and bad days  She is currently tearful  She requests tubal sterilization at today's appointment  She states that she does not want to go through this again  She says that 4 times is \"way more than enough\"  She states that her family disagrees with her decision and that previously she was declined tubal sterilization in [place] when she asked  She states that her baby wasn't planned and that she was working on fertility treatment and a friend was going to carry for her  She is still interested in this possibility  She does not wish to carry another child  She additionally was working on fostering to adopt prior to this pregnancy and had a home inspection  She states that she \"shelia go through this again\"  She declines OCPs and Depo  She does not want an IUD as her sister had to have it surgically removed  She is not interested in the nexplanon  She states that the definitely wants to do [surgery]  Objective:   Vitals:  Vitals:    06/15/23 1131   BP: 108/70       Physical Exam:  Physical Exam  Vitals reviewed  Exam conducted with a chaperone present  Constitutional:       General: She is not in acute distress  Appearance: She is not ill-appearing, toxic-appearing or diaphoretic  Cardiovascular:      Rate and Rhythm: Normal rate     Pulmonary:      " Effort: Pulmonary effort is normal  No respiratory distress  Abdominal:      Palpations: Abdomen is soft  Skin:     General: Skin is warm and dry  Neurological:      Mental Status: She is alert and oriented to person, place, and time  Mental status is at baseline  Psychiatric:         Attention and Perception: Attention and perception normal          Mood and Affect: Mood is depressed  Affect is tearful  Speech: Speech normal          Behavior: Behavior is withdrawn  Behavior is cooperative  Thought Content:  Thought content normal          Cognition and Memory: Cognition and memory normal          Judgment: Judgment normal            OB Hx:  OB History    Para Term  AB Living   4 2 0 2 2 1   SAB IAB Ectopic Multiple Live Births   2 0 0 0 1      # Outcome Date GA Lbr Lopez/2nd Weight Sex Delivery Anes PTL Lv   4  23 22w5d  600 g (1 lb 5 2 oz) M Vag-Spont EPI Y BHUPINDER      Complications:  labor in second trimester      Name: Debora Dinh ([address] [place] [address])      Анна Ser: 8  Apgar5: 8   3 2019           2 2012           1   28w0d    Vag-Spont   FD      Complications: ABO incompatibility in pregnancy      Obstetric Comments   Both SAB <2 months   Still birth at 10 months   Has had hypercoagulable workup in [place] which was negative      Medical Hx  Past Medical History:   Diagnosis Date   • Abnormal Pap smear of cervix    • Anemia    • Asthma    • Autism    • [place] esophagus    • CPAP (continuous positive airway pressure) dependence    • 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HCC)     not diagnosed as of 23-trying to R/O   • Depression    • Diabetes mellitus ([facility] )    • Disease of thyroid gland     hypo   • Dysphagia     solids and liquids   • Female infertility    • Fibromyalgia, primary    • Gastric ulcer    • History of bronchitis    • Hypothyroidism    • Iron deficiency anemia     infusion in 2022   • Irregular menses    • Miscarriage    • Morbid obesity with BMI of 50  0-59 9, adult [facility])    • Plantar fasciitis of left foot    • Polycystic ovary syndrome    • Reflux esophagitis    • Seizures ([address] )     last one 7/13/22   • Sleep apnea     CPAP   • Wears glasses      Surgical Hx  Past Surgical History:   Procedure Laterality Date   • EGD      with Bx due to barretts esophagus   • EYE SURGERY Bilateral     lazy eye repair   • TN BIOPSY OF LIP N/A 11/10/2022    Procedure: EXCISION BIOPSY SALVARY GLANDS LOWER LIP;  Surgeon: Joaquin Bee MD;  Location: [address] [place] [address];  Service: ENT   • TN CERCLAGE UTERINE CERVIX NONOBSTETRICAL N/A 5/23/2023    Procedure: CERCLAGE CERVICAL;  Surgeon: Selena Alston MD;  Location: [facility];  Service: Obstetrics   • TN HYSTEROSCOPY BX ENDOMETRIUM&/POLYPC W/WO D&C N/A 9/22/2021    Procedure: DILATATION AND CURETTAGE (D&C) WITH HYSTEROSCOPY;  Surgeon: Melody Gibson MD;  Location: [facility];  Service: Gynecology   • WISDOM TOOTH EXTRACTION       Family Hx  Family History   Problem Relation Age of Onset   • Bipolar disorder Mother    • Depression Mother    • Depression Father    • Diabetes Maternal Grandmother    • Thyroid disease Maternal Grandmother    • Hypertension Maternal Grandmother    • Heart disease Maternal Grandmother    • Diabetes Maternal Grandfather    • Diabetes Paternal Grandmother    • Breast cancer Paternal Grandmother    • Stroke Paternal Grandmother    • Diabetes Paternal Grandfather    • Breast cancer Maternal Aunt 35        bilateral   • Stomach cancer Maternal Aunt      Social Hx  Social History     Socioeconomic History   • Marital status: Single     Spouse name: Not on file   • Number of children: Not on file   • Years of education: Not on file   • Highest education level: Not on file   Occupational History   • Not on file   Tobacco Use   • Smoking status: Never   • Smokeless tobacco: Never   Vaping Use   • Vaping Use: Never used   Substance and Sexual Activity   • Alcohol use: Never     Comment: occ   • Drug use: Not Currently Types: Marijuana     Comment: about a couple times a week, quit June 2022   • Sexual activity: Not Currently     Partners: Male     Birth control/protection: Abstinence     Comment: Trying to conceive for 2 years now   Other Topics Concern   • Not on file   Social History Narrative   • Not on file     Social Determinants of Health     Financial Resource Strain: Low Risk  (6/29/2021)    Overall Financial Resource Strain (CARDIA)    • Difficulty of Paying Living Expenses: Not very hard   Food Insecurity: No Food Insecurity (6/6/2023)    Hunger Vital Sign    • Worried About Running Out of Food in the Last Year: Never true    • Ran Out of Food in the Last Year: Never true   Transportation Needs: No Transportation Needs (6/29/2021)    PRAPARE - Transportation    • Lack of Transportation (Medical): No    • Lack of Transportation (Non-Medical): No   Physical Activity: Not on file   Stress: Not on file   Social Connections: Not on file   Intimate Partner Violence: Not on file   Housing Stability: Low Risk  (6/6/2023)    Housing Stability Vital Sign    • Unable to Pay for Housing in the Last Year: No    • Number of Places Lived in the Last Year: 1    • Unstable Housing in the Last Year: No     Allergies  Allergies   Allergen Reactions   • Haloperidol Other (See Comments)     Jaw locks up   • Penicillins Hives   • Pollen Extract Allergic Rhinitis       Lara Boland MD  OB/GYN  6/23/2023  8:55 AM

## 2023-06-12 NOTE — TELEPHONE ENCOUNTER
Garfield Zhou MD  P [facility] Maria A Harris has delivered at 23 weeks gestation- please make sure she has a follow up appointment next week some time for high risk of postpartum depression and history of sepsis in hospital      Thank you!    Tawny Mills MD

## 2023-06-14 PROCEDURE — 88307 TISSUE EXAM BY PATHOLOGIST: CPT | Performed by: PATHOLOGY

## 2023-06-15 ENCOUNTER — POSTPARTUM VISIT (OUTPATIENT)
Dept: OBGYN CLINIC | Facility: CLINIC | Age: 33
End: 2023-06-15

## 2023-06-15 VITALS
SYSTOLIC BLOOD PRESSURE: 108 MMHG | DIASTOLIC BLOOD PRESSURE: 70 MMHG | BODY MASS INDEX: 48.37 KG/M2 | WEIGHT: 209 LBS | HEIGHT: 55 IN

## 2023-06-15 DIAGNOSIS — R63.0 APPETITE LOSS: Primary | ICD-10-CM

## 2023-06-15 DIAGNOSIS — E66.01 MATERNAL MORBID OBESITY, ANTEPARTUM (HCC): ICD-10-CM

## 2023-06-15 DIAGNOSIS — F31.9 BIPOLAR DEPRESSION (HCC): ICD-10-CM

## 2023-06-15 DIAGNOSIS — Z67.91 RH NEGATIVE STATE IN ANTEPARTUM PERIOD: ICD-10-CM

## 2023-06-15 DIAGNOSIS — O26.899 RH NEGATIVE STATE IN ANTEPARTUM PERIOD: ICD-10-CM

## 2023-06-15 DIAGNOSIS — E03.9 HYPOTHYROID IN PREGNANCY, ANTEPARTUM: ICD-10-CM

## 2023-06-15 DIAGNOSIS — O41.1220 CHORIOAMNIONITIS IN SECOND TRIMESTER, SINGLE OR UNSPECIFIED FETUS: ICD-10-CM

## 2023-06-15 DIAGNOSIS — O99.210 MATERNAL MORBID OBESITY, ANTEPARTUM (HCC): ICD-10-CM

## 2023-06-15 DIAGNOSIS — R56.9 SEIZURES (HCC): ICD-10-CM

## 2023-06-15 DIAGNOSIS — O09.299 HISTORY OF STILLBIRTH IN CURRENTLY PREGNANT PATIENT, UNSPECIFIED TRIMESTER: ICD-10-CM

## 2023-06-15 DIAGNOSIS — O21.9 NAUSEA/VOMITING IN PREGNANCY: ICD-10-CM

## 2023-06-15 DIAGNOSIS — O99.280 HYPOTHYROID IN PREGNANCY, ANTEPARTUM: ICD-10-CM

## 2023-06-15 DIAGNOSIS — R68.89 COLD INTOLERANCE: ICD-10-CM

## 2023-06-15 PROCEDURE — 99024 POSTOP FOLLOW-UP VISIT: CPT | Performed by: OBSTETRICS & GYNECOLOGY

## 2023-06-19 ENCOUNTER — TELEPHONE (OUTPATIENT)
Dept: NEUROLOGY | Facility: CLINIC | Age: 33
End: 2023-06-19

## 2023-06-19 NOTE — CASE MANAGEMENT
Case Management Progress Note    Patient name Mario Patterson  Location /-14 MRN [id number]  : 1990 Date 2023       LOS (days): 5  Geometric Mean LOS (GMLOS) (days):   Days to GMLOS:        OBJECTIVE:        Current admission status: Inpatient  Preferred Pharmacy:   [id number] [place], [address],  [address] [address]  Phone: [phone] Fax: [phone] Dutta Tadeo, [address] [address]  Phone: [phone] Fax: [phone]    Primary Care Provider: Ace Hamilton DO    Primary Insurance: MEDICARE  Secondary Insurance: [address] [place]    PROGRESS NOTE:       sent email to Neville Frazier requesting Uber/Lyft gift card for MOB to assist with transport to NICU to visit baby

## 2023-06-23 PROBLEM — O26.879 CERVICAL SHORTENING: Status: RESOLVED | Noted: 2023-05-23 | Resolved: 2023-06-23

## 2023-06-23 PROBLEM — Z30.2 REQUEST FOR STERILIZATION: Status: ACTIVE | Noted: 2023-06-23

## 2023-06-23 NOTE — ASSESSMENT & PLAN NOTE
Breastfeeding: Continue pumping  Depression score: EPDS 27  Activity: Restricted until 6 weeks postpartum  Baby: Baby currently in NICU on maximum support (RDS + recent concern for spontaneous intestinal perforation, not requiring surgical intervention at this time)  Epilepsy: Continue Keppra 1000mg BID; Neurology follow up scheduled for 7/3/23  Hypothyroidism: Continue levothyroxine  Hx Poor pregnancy outcomes: Would recommend MFM pre-conception counseling and possible genetic testing prior to any subsequent pregnancy, patient declines  Social: Housing instability and transportation difficult (recent seizure)  Nausea/ anorexia: Differential diagnosis includes psych related restriction, anxiety induced anorexia/ nausea, continue nausea from hormones of pregnancy, or other GI related illness  Zofran refilled  We discussed possibility of appetite stimulant  Unfortunately, many of the usual appetite stimulants have not been well studied in breast feeding and/or result in decreased lactation and therefore should be avoided for this patient  We discussed the possibility of adding a TCA given that she has depression symptoms as well as anorexia  She will discuss this option with her psychiatrist tomorrow  EDIT: Patient's NP with psych called the office this afternoon  We discussed the prescription of a TCA for [address] [place] [address] which he agrees would be a good option  She will start this prescription today  Contraception: Requested sterilization; MA 31 signed; Discussed with pt LARC methods of contraception including intrauterine device, Nexplanon, and Depo Provera in addition to surgical sterilization  Discussed the risks, benefits, and alternatives to each  Discussed that the Nexplanon has been proven to be the most effective form of contraception; pt desires permanence   Discussed that Mirena IUD has excellent bleeding profile that she would be unable to achieve with surgical sterilization alone; pt is uninterested in bleeding profile  We discussed that these options are safe with her seizure medications  Discussed that surgical sterilization is a PERMANENT and IRREVERSIBLE surgical procedure; pt demonstrated understanding and continued desire to proceed  Patient is okay if she does not have any more children, even if she dates a different partner or loses her child  She is aware that scheduling will not be until she is medically cleared by neurology and from a postpartum standpoint >6 week postpartum  For now, she plans abstinence and [place] sterilization form was signed today  She is aware that she can change her mind at any time     RTO 2 weeks for postpartum appointment and PRN

## 2023-06-28 ENCOUNTER — TELEPHONE (OUTPATIENT)
Dept: CASE MANAGEMENT | Facility: HOSPITAL | Age: 33
End: 2023-06-28

## 2023-06-28 NOTE — TELEPHONE ENCOUNTER
CM sent in basket message to [employer] to refer MOB to PHP with request for virtual program pending availability d/t desire to stay with baby in NICU

## 2023-06-30 NOTE — TELEPHONE ENCOUNTER
Patient called in and was wiondering if upcoming appt could be done virtually? She is 2 hrs away in a hospital where her child is in nicu and doesn't know the time frame that she will be back home to make another appt  If appt can be done virtually plea reach back out to patient  Thank You!

## 2023-07-01 ENCOUNTER — TELEPHONE (OUTPATIENT)
Dept: PSYCHOLOGY | Facility: CLINIC | Age: 33
End: 2023-07-01

## 2023-07-05 DIAGNOSIS — O99.351: ICD-10-CM

## 2023-07-05 DIAGNOSIS — G40.909: ICD-10-CM

## 2023-07-05 DIAGNOSIS — G40.909 NONINTRACTABLE EPILEPSY WITHOUT STATUS EPILEPTICUS, UNSPECIFIED EPILEPSY TYPE (HCC): ICD-10-CM

## 2023-07-05 RX ORDER — LEVETIRACETAM 1000 MG/1
1000 TABLET ORAL 2 TIMES DAILY
Qty: 60 TABLET | Refills: 3 | Status: SHIPPED | OUTPATIENT
Start: 2023-07-05

## 2023-07-05 NOTE — TELEPHONE ENCOUNTER
The patient is stating that she  the incorrect medication [facility], I informed the patient that at discharge she was suppose to be taking the keppra 1000 mg tablets BID. The patient is requesting to have the correct medication (Keppra 1000 mg tablets) sent to her [facility] pharmacy. Medication pended    The patient also states that she has been taking the medication and she has took the medication this morning Keppra 1500 mg.      Please advise

## 2023-07-05 NOTE — TELEPHONE ENCOUNTER
Please advise    The patient also states that she has been taking the medication keppra 1500 mg tablets and she has took the medication this morning Keppra 1500 mg. Please advise    Do the patient need to take a table in the PM and start the keppra 1000 mg tomorrow or take the 1000 mg tablets this afternoon.

## 2023-07-06 ENCOUNTER — TELEPHONE (OUTPATIENT)
Dept: NEUROLOGY | Facility: CLINIC | Age: 33
End: 2023-07-06

## 2023-07-06 NOTE — TELEPHONE ENCOUNTER
Recd vm 7/5 taken off 7/6   this is shop [address] [place] [address] in [place], phone number [phone]. I'm calling concerning patient Claudia SMITH date of birth [birth year]. 1. Doctor Heriberto Sierra sending a prescription this morning for the keppra 1000 mg. Just a couple of questions about that. It looks like she takes amitriptyline 10 milligrams from a different doctor. So we wanted to make sure that you were aware of that and are aware of the potential interaction with that. And then also her insurance records show that  she did not have it filled here with us but the briviact she had that filled on June 17th for 120 tablets. So I'm just wondering, is she switching from the briviact  to the keppra? If you could, please give us a call back  cb [phone].

## 2023-07-07 NOTE — TELEPHONE ENCOUNTER
Cesar Duran MD  to Sherice Hoffman, RN • Neurology [address] Team 1        7/6/23 12:16 PM  Looks like I have never seen the patient but she is patient of epilepsy team and may still remain at [facility] so we have to fill. Briviact is similar to keppra, newer version.  I don't have problem with elavil but she should be on same version as previously prescribed by her epileptologist.

## 2023-07-07 NOTE — TELEPHONE ENCOUNTER
Spoke to pharmacy, briviact was filled on 6/17/2023 per insurance. Spoke to patient, Christine Pinto is being prescribed by OB/GYN and psychiatry as an appetite stimulant. She is currently taking, but will follow up with other care team providers for an alternate medication.

## 2023-07-07 NOTE — TELEPHONE ENCOUNTER
I see no evidence that Briviact has been prescribed (no record in [facility] or in [address] [place] [address]).   Okay to take amitriptyline with levetiracetam.

## 2023-07-07 NOTE — TELEPHONE ENCOUNTER
The story regarding brivaracetam is very likely not accurate. Amitriptyline stimulates appetite. Briviact would never be prescribed for appetite. Brivaracetam is a controlled substance and would be listed in the PDMP. I think someone is reading the insurance records incorrectly. Can the patient read off the label of the medication she believes is brivaracetam or send a photo of the label?

## 2023-07-07 NOTE — TELEPHONE ENCOUNTER
- please advise. Pharmacy wanting to verify levetiracetam is okay to take with amitriptyline and briviact. Unsure why original message was sent to , but wanted your clearance.

## 2023-07-08 LAB
BASE EXCESS BLDA CALC-SCNC: -3 MMOL/L (ref -2–3)
CA-I BLD-SCNC: 1.24 MMOL/L (ref 1.12–1.32)
GLUCOSE SERPL-MCNC: 99 MG/DL (ref 65–140)
HCO3 BLDA-SCNC: 21.1 MMOL/L (ref 24–30)
HCT VFR BLD CALC: 28 % (ref 34.8–46.1)
HGB BLDA-MCNC: 9.5 G/DL (ref 11.5–15.4)
PCO2 BLD: 22 MMOL/L (ref 21–32)
PCO2 BLD: 31.6 MM HG (ref 42–50)
PH BLD: 7.43 [PH] (ref 7.3–7.4)
PO2 BLD: 40 MM HG (ref 35–45)
POTASSIUM BLD-SCNC: 3.5 MMOL/L (ref 3.5–5.3)
SAO2 % BLD FROM PO2: 78 % (ref 60–85)
SODIUM BLD-SCNC: 136 MMOL/L (ref 136–145)
SPECIMEN SOURCE: ABNORMAL

## 2023-07-10 ENCOUNTER — TELEPHONE (OUTPATIENT)
Dept: NEUROLOGY | Facility: CLINIC | Age: 33
End: 2023-07-10

## 2023-07-10 NOTE — TELEPHONE ENCOUNTER
Mychart message received from patient. Not taking briviact and now prescriptions for it either. Call returned to pharmacy. Left message advising of the same.

## 2023-07-10 NOTE — TELEPHONE ENCOUNTER
Patient would like to schedule an appointment to be seen as soon as possible. Patient is looking to be seen either Monday or Tuesday.

## 2023-07-10 NOTE — TELEPHONE ENCOUNTER
Patient had grand mar seizure. The first one was 6/4 after having her son and the last one was 2 days ago. In between thosde days she had focal seizure. This is a new symptom.

## 2023-07-11 ENCOUNTER — TELEPHONE (OUTPATIENT)
Dept: FAMILY MEDICINE CLINIC | Facility: CLINIC | Age: 33
End: 2023-07-11

## 2023-07-11 NOTE — TELEPHONE ENCOUNTER
Patient called and requested Neurological occupational therapy referral.    Patient stated that she had a baby a few days ago (baby in in NICU),  she has autism and she is getting overwhelmed. Please call to advise whether appt is needed.

## 2023-07-11 NOTE — TELEPHONE ENCOUNTER
Call placed to patient, [phone]. Partial grandmal when she went in to labor on 2023. On 2023 patient had another grandmal with loc, also experiencing breakthrough focal seizures in between. Denies missed doses of medication, or other possible triggers to events (although son remains in NICU).  - this is a previous patient of . She was scheduled to see you on 7/3/2023 but had to cancel due to  son being in [facility]. I have rescheduled her for 2023 due to recent increase in seizures. Mostly sending this as an FYI that I have re-added her back to your schedule. Medications confirmed:  levetiracetam 1000mg BID      Irineo/Merle - please see add on.

## 2023-07-11 NOTE — TELEPHONE ENCOUNTER
Pt was last scheduled with Dr. Mirza Alegria pt needs a nurse call to triage new concerns. Pt needs to be seen Monday or Tuesday as her son in the NICU in [facility]. Pt lives in [place].  Please assist.

## 2023-07-12 ENCOUNTER — APPOINTMENT (OUTPATIENT)
Dept: LAB | Facility: HOSPITAL | Age: 33
End: 2023-07-12
Payer: MEDICARE

## 2023-07-12 DIAGNOSIS — Z36.89 ENCOUNTER FOR OTHER SPECIFIED ANTENATAL SCREENING: ICD-10-CM

## 2023-07-12 DIAGNOSIS — R68.89 COLD INTOLERANCE: ICD-10-CM

## 2023-07-12 DIAGNOSIS — D50.8 IRON DEFICIENCY ANEMIA SECONDARY TO INADEQUATE DIETARY IRON INTAKE: Primary | ICD-10-CM

## 2023-07-12 LAB
BASOPHILS # BLD AUTO: 0.07 THOUSANDS/ÂΜL (ref 0–0.1)
BASOPHILS NFR BLD AUTO: 1 % (ref 0–1)
EOSINOPHIL # BLD AUTO: 0.04 THOUSAND/ÂΜL (ref 0–0.61)
EOSINOPHIL NFR BLD AUTO: 1 % (ref 0–6)
ERYTHROCYTE [DISTWIDTH] IN BLOOD BY AUTOMATED COUNT: 13 % (ref 11.6–15.1)
FERRITIN SERPL-MCNC: 19 NG/ML (ref 11–307)
HCT VFR BLD AUTO: 36.7 % (ref 34.8–46.1)
HGB BLD-MCNC: 11.9 G/DL (ref 11.5–15.4)
IMM GRANULOCYTES # BLD AUTO: 0.01 THOUSAND/UL (ref 0–0.2)
IMM GRANULOCYTES NFR BLD AUTO: 0 % (ref 0–2)
IRON SATN MFR SERPL: 13 % (ref 15–50)
IRON SERPL-MCNC: 36 UG/DL (ref 50–170)
LYMPHOCYTES # BLD AUTO: 1.12 THOUSANDS/ÂΜL (ref 0.6–4.47)
LYMPHOCYTES NFR BLD AUTO: 16 % (ref 14–44)
MCH RBC QN AUTO: 29.5 PG (ref 26.8–34.3)
MCHC RBC AUTO-ENTMCNC: 32.4 G/DL (ref 31.4–37.4)
MCV RBC AUTO: 91 FL (ref 82–98)
MONOCYTES # BLD AUTO: 0.39 THOUSAND/ÂΜL (ref 0.17–1.22)
MONOCYTES NFR BLD AUTO: 6 % (ref 4–12)
NEUTROPHILS # BLD AUTO: 5.18 THOUSANDS/ÂΜL (ref 1.85–7.62)
NEUTS SEG NFR BLD AUTO: 76 % (ref 43–75)
NRBC BLD AUTO-RTO: 0 /100 WBCS
PLATELET # BLD AUTO: 345 THOUSANDS/UL (ref 149–390)
PMV BLD AUTO: 8.8 FL (ref 8.9–12.7)
RBC # BLD AUTO: 4.04 MILLION/UL (ref 3.81–5.12)
TIBC SERPL-MCNC: 288 UG/DL (ref 250–450)
TSH SERPL DL<=0.05 MIU/L-ACNC: 2.04 UIU/ML (ref 0.45–4.5)
WBC # BLD AUTO: 6.81 THOUSAND/UL (ref 4.31–10.16)

## 2023-07-12 PROCEDURE — 83540 ASSAY OF IRON: CPT

## 2023-07-12 PROCEDURE — 84443 ASSAY THYROID STIM HORMONE: CPT

## 2023-07-12 PROCEDURE — 85025 COMPLETE CBC W/AUTO DIFF WBC: CPT

## 2023-07-12 PROCEDURE — 36415 COLL VENOUS BLD VENIPUNCTURE: CPT

## 2023-07-12 PROCEDURE — 83550 IRON BINDING TEST: CPT

## 2023-07-12 PROCEDURE — 82728 ASSAY OF FERRITIN: CPT

## 2023-07-13 ENCOUNTER — TELEPHONE (OUTPATIENT)
Dept: PSYCHOLOGY | Facility: CLINIC | Age: 33
End: 2023-07-13

## 2023-07-13 ENCOUNTER — OFFICE VISIT (OUTPATIENT)
Dept: NEUROLOGY | Facility: CLINIC | Age: 33
End: 2023-07-13
Payer: MEDICARE

## 2023-07-13 VITALS
SYSTOLIC BLOOD PRESSURE: 110 MMHG | OXYGEN SATURATION: 98 % | TEMPERATURE: 97.6 F | DIASTOLIC BLOOD PRESSURE: 86 MMHG | HEART RATE: 86 BPM | HEIGHT: 55 IN | WEIGHT: 205 LBS | BODY MASS INDEX: 47.44 KG/M2

## 2023-07-13 DIAGNOSIS — G40.409 OTHER GENERALIZED EPILEPSY, NOT INTRACTABLE, WITHOUT STATUS EPILEPTICUS (HCC): Primary | ICD-10-CM

## 2023-07-13 DIAGNOSIS — G40.909 EPILEPSY (HCC): ICD-10-CM

## 2023-07-13 PROCEDURE — 99214 OFFICE O/P EST MOD 30 MIN: CPT | Performed by: PSYCHIATRY & NEUROLOGY

## 2023-07-13 RX ORDER — AMITRIPTYLINE HYDROCHLORIDE 10 MG/1
TABLET, FILM COATED ORAL
COMMUNITY
Start: 2023-06-20

## 2023-07-13 RX ORDER — LAMOTRIGINE 100 MG/1
100 TABLET ORAL 2 TIMES DAILY
Qty: 60 TABLET | Refills: 3 | Status: SHIPPED | OUTPATIENT
Start: 2023-07-13

## 2023-07-13 RX ORDER — LAMOTRIGINE 25 MG/1
TABLET ORAL
Qty: 50 TABLET | Refills: 0 | Status: SHIPPED | OUTPATIENT
Start: 2023-07-13

## 2023-07-13 NOTE — PROGRESS NOTES
Return NeuroOutpatient Note        Lizy Rendon  [id number]  29 y.o.  1990       Nonintractable epilepsy without status epilepticus, unspeci        History obtained from:  Patient     HPI/Subjective:    Lizy Rendon is a 30 yo F with PMH of seizures presents as f/u. She is former patient of Dr. Erica Burkitt. We are seeing her for the first time. Patient has h/o seizures since 2012 but officially dx in 2017. Patient was well controlled on lamictal and keppra however at time of recent pregnancy, she wasn't able to tolerate them. Patient had very complicated delivery and had to be transferred to [facility]. Currently, she's on keppra 1000mg bid. Previously, she was on lamictal upto 225mg bid. Around time of delivery and since, she had about 7 seizures including some generalized and one partial. There has been excessive stress in her life as her son was not going to make it the first few days but he has. He's still in NICU and on ventilator. He will need PDA surgery.            Past Medical History:   Diagnosis Date   • Abnormal Pap smear of cervix    • Anemia    • Asthma    • Autism    • [place] esophagus    • CPAP (continuous positive airway pressure) dependence    • 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HCC)     not diagnosed as of 1/9/23-trying to R/O   • Depression    • Diabetes mellitus ([address])    • Disease of thyroid gland     hypo   • Dysphagia     solids and liquids   • Female infertility    • Fibromyalgia, primary    • Gastric ulcer    • History of bronchitis    • Hypothyroidism    • Iron deficiency anemia     infusion in 11/2022   • Irregular menses    • Miscarriage    • Morbid obesity with BMI of 50.0-59.9, adult (HCC)    • Plantar fasciitis of left foot    • Polycystic ovary syndrome    • Reflux esophagitis    • Seizures ([address])     last one 7/13/22   • Sleep apnea     CPAP   • Wears glasses      Social History     Socioeconomic History   • Marital status: Single     Spouse name: Not on file   • Number of children: Not on file   • Years of education: Not on file   • Highest education level: Not on file   Occupational History   • Not on file   Tobacco Use   • Smoking status: Never   • Smokeless tobacco: Never   Vaping Use   • Vaping Use: Never used   Substance and Sexual Activity   • Alcohol use: Never     Comment: occ   • Drug use: Not Currently     Types: Marijuana     Comment: about a couple times a week, quit June 2022   • Sexual activity: Not Currently     Partners: Male     Birth control/protection: Abstinence     Comment: Trying to conceive for 2 years now   Other Topics Concern   • Not on file   Social History Narrative   • Not on file     Social Determinants of Health     Financial Resource Strain: Low Risk  (6/29/2021)    Overall Financial Resource Strain (CARDIA)    • Difficulty of Paying Living Expenses: Not very hard   Food Insecurity: No Food Insecurity (6/6/2023)    Hunger Vital Sign    • Worried About Running Out of Food in the Last Year: Never true    • Ran Out of Food in the Last Year: Never true   Transportation Needs: No Transportation Needs (6/29/2021)    PRAPARE - Transportation    • Lack of Transportation (Medical): No    • Lack of Transportation (Non-Medical):  No   Physical Activity: Not on file   Stress: Not on file   Social Connections: Not on file   Intimate Partner Violence: Not on file   Housing Stability: Low Risk  (6/6/2023)    Housing Stability Vital Sign    • Unable to Pay for Housing in the Last Year: No    • Number of Places Lived in the Last Year: 1    • Unstable Housing in the Last Year: No     Family History   Problem Relation Age of Onset   • Bipolar disorder Mother    • Depression Mother    • Depression Father    • Diabetes Maternal Grandmother    • Thyroid disease Maternal Grandmother    • Hypertension Maternal Grandmother    • Heart disease Maternal Grandmother    • Diabetes Maternal Grandfather    • Diabetes Paternal Grandmother    • Breast cancer Paternal Grandmother    • Stroke Paternal Grandmother    • Diabetes Paternal Grandfather    • Breast cancer Maternal Aunt 35        bilateral   • Stomach cancer Maternal Aunt      Allergies   Allergen Reactions   • Haloperidol Other (See Comments)     Jaw locks up   • Penicillins Hives   • Pollen Extract Allergic Rhinitis     Current Outpatient Medications on File Prior to Visit   Medication Sig Dispense Refill   • albuterol (Proventil HFA) 90 mcg/act inhaler Inhale 2 puffs every 6 (six) hours as needed for wheezing 18 g 3   • amitriptyline (ELAVIL) 10 mg tablet      • famotidine (PEPCID) 40 MG tablet TAKE ONE TABLET BY MOUTH EVERY DAY AT BEDTIME (GENERIC FOR PEPCID) 30 tablet 5   • ferrous sulfate 325 (65 Fe) mg tablet Take 325 mg by mouth daily with breakfast     • fluticasone (FLONASE) 50 mcg/act nasal spray 1 spray into each nostril daily (Patient taking differently: 1 spray into each nostril if needed) 9.9 mL 0   • levETIRAcetam (KEPPRA) 1000 MG tablet Take 1 tablet (1,000 mg total) by mouth 2 (two) times a day 60 tablet 3   • levothyroxine 25 mcg tablet TAKE ONE TABLET BY MOUTH EVERY DAY IN THE MORNING 30 tablet 2   • acetaminophen (TYLENOL) 500 mg tablet Take 1 tablet (500 mg total) by mouth every 6 (six) hours as needed for mild pain  0   • folic acid (FOLVITE) 1 mg tablet Take 1 tablet (1 mg total) by mouth daily (Patient not taking: Reported on 7/13/2023) 90 tablet 1   • hydrocortisone 1 % cream Apply 1 application.  topically daily as needed for irritation 30 g 0   • ibuprofen (MOTRIN) 600 mg tablet Take 1 tablet (600 mg total) by mouth every 6 (six) hours (Patient not taking: Reported on 6/15/2023) 30 tablet 0   • nystatin (MYCOSTATIN) 500,000 units/5 mL suspension Apply 5 mL (500,000 Units total) to the mouth or throat 4 (four) times a day (Patient not taking: Reported on 6/15/2023) 473 mL 1   • ondansetron (ZOFRAN) 4 mg tablet TAKE ONE TABLET BY MOUTH EVERY 8 HOURS AS NEEDED FOR NAUSEA AND VOMITING (Patient not taking: Reported on 6/15/2023) 30 tablet 2   • Polyethylene Glycol 3350 (MIRALAX PO) Take by mouth (Patient not taking: Reported on 6/15/2023)     • Prenatal Vit-Iron Carbonyl-FA (prenatal multivitamin) TABS Take 1 tablet by mouth daily (Patient not taking: Reported on 7/13/2023)     • witch hazel-glycerin (TUCKS) topical pad Apply 1 pad. topically every 4 (four) hours as needed for irritation (Patient not taking: Reported on 6/15/2023)  0     No current facility-administered medications on file prior to visit. Review of Systems   Refer to positive review of systems in HPI.    Review of Systems    Constitutional- No fever  Eyes- No visual change  ENT- Hearing normal  CV- No chest pain  Resp- No Shortness of breath  GI- No diarrhea  - Bladder normal  MS- No Arthritis   Skin- No rash  Psych- No depression  Endo- No DM  Heme- No nodes    Vitals:    07/13/23 1035   BP: 110/86   BP Location: Right arm   Patient Position: Sitting   Cuff Size: Standard   Pulse: 86   Temp: 97.6 °F (36.4 °C)   TempSrc: Tympanic   SpO2: 98%   Weight: 93 kg (205 lb)   Height: 4' 7" (1.397 m)       PHYSICAL EXAM:  Appearance: No Acute Distress  Ophthalmoscopic: Disc Flat, Normal fundus  Mental status:  Orientation: Awake, Alert, and Orientedx3  Memory: Registation 3/3 Recall 3/3  Attention: normal  Knowledge: good  Language: No aphasia  Speech: No dysarthria  Cranial Nerves:  2 No Visual Defect on Confrontation, Pupils round, equal, reactive to light  3,4,6 Extraocular Movements Intact, no nystagmus  5 Facial Sensation Intact  7 No facial asymmetry  8 Intact hearing  9,10 Palate symmetric, normal gag  11 Good shoulder shrug  12 Tongue Midline  Gait: Stable  Coordination: No ataxia with finger to nose testing, and heel to shin  Sensory: Intact, Symmetric to pinprick, light touch, vibration, and joint position  Muscle Tone: Normal              Muscle exam:  Arm Right Left Leg Right Left   Deltoid 5/5 5/5 Iliopsoas 5/5 5/5   Biceps 5/5 5/5 Quads 5/5 5/5   Triceps 5/5 5/5 Hamstrings 5/5 5/5   Wrist Extension 5/5 5/5 Ankle Dorsi Flexion 5/5 5/5   Wrist Flexion 5/5 5/5 Ankle Plantar Flexion 5/5 5/5   Interossei 5/5 5/5 Ankle Eversion 5/5 5/5   APB 5/5 5/5 Ankle Inversion 5/5 5/5       Reflexes   RJ BJ TJ KJ AJ Plantars Montilla's   Right 2+ 2+ 2+ 2+ 2+ Downgoing Not present   Left 2+ 2+ 2+ 2+ 2+ Downgoing Not present     Personal review of  Labs:                    Diagnoses and all orders for this visit:      1. Other generalized epilepsy, not intractable, without status epilepticus (HCC)  lamoTRIgine (LaMICtal) 25 mg tablet    lamoTRIgine (LaMICtal) 100 mg tablet      2. Epilepsy [facility])            Patient is currently going through a very difficult time as her newly delivered son who is still in NICU for 4 weeks. Patient was previously well controlled with combination of lamictal and keppra. Will restart her on lamictal upto 100mg bid slowly and then adjust as needed. She is to resume keppra 1000mg bid.                Total time of encounter:  30 min  More than 50% of the time was used in counseling and/or coordination of care  Extent of counseling and/or coordination of care        Jarod Wilkinson MD  [facility]  [address]  [phone]

## 2023-07-13 NOTE — TELEPHONE ENCOUNTER
Spoke to patient. She is currently at [facility], with her infant daughter. She will be ok for virtual PHP as long as she is in [place].   Pt was in agreement and understanding that we cannot see her virtually if she is in [place] at any time during program.  Has been scheduled to start [address] [facility] on 7/18/23

## 2023-07-14 DIAGNOSIS — F84.0 AUTISM: Primary | ICD-10-CM

## 2023-07-17 ENCOUNTER — POSTPARTUM VISIT (OUTPATIENT)
Dept: OBGYN CLINIC | Facility: CLINIC | Age: 33
End: 2023-07-17

## 2023-07-17 VITALS — SYSTOLIC BLOOD PRESSURE: 108 MMHG | BODY MASS INDEX: 47.88 KG/M2 | WEIGHT: 206 LBS | DIASTOLIC BLOOD PRESSURE: 70 MMHG

## 2023-07-17 PROCEDURE — 99024 POSTOP FOLLOW-UP VISIT: CPT | Performed by: NURSE PRACTITIONER

## 2023-07-17 NOTE — PROGRESS NOTES
Subjective     Mahesh Orellana is a 29 y.o. female who presents for a postpartum visit. She is 6 weeks postpartum following a spontaneous vaginal delivery on 6/7/23. I have fully reviewed the prenatal and intrapartum course. The delivery was at 25 5/7 gestational weeks. Outcome: spontaneous vaginal delivery complicated by PTL and cervical insufficiency. She developed chorioamnionitis and sepsis. Anesthesia: epidural.     Postpartum course has been complicated by PPD. She is living at the [facility].     Haley Payne is currently in the NICU at [facility]. He had a stoma placed to remove NEC and having PDA issues. Baby is feeding by breast milk through NG tube. Bleeding initially stopped the first week after delivery. She started bleeding again on Jul 11th and it is tapering off now. I explained this was most likely her first menses. Bowel function is normal. Bladder function is normal.     Patient is not sexually active. Contraception method is Desires permanent sterilzation. Consent was signed with Dr. Josh Burch . Postpartum depression screening: positive - EPDS 25. She had a telephone visit with psychiatry on 7/13/23 and is scheduled to start 5 x weekly virtual therapy. They will discuss a medication regime with her. The following portions of the patient's history were reviewed and updated as appropriate: allergies, current medications, past family history, past medical history, past social history, past surgical history and problem list.    Review of Systems  Pertinent items are noted in HPI. .    Objective     /70 (BP Location: Right arm, Patient Position: Sitting, Cuff Size: Standard)   Wt 93.4 kg (206 lb)   LMP 12/24/2022 (Exact Date)   Breastfeeding Yes   BMI 47.88 kg/m²      General:  alert and oriented, in no acute distress    Breasts:  inspection negative, no nipple discharge or bleeding, no masses or nodularity palpable   Lungs: clear to auscultation bilaterally   Heart:  regular rate and rhythm, S1, S2 normal, no murmur, click, rub or gallop   Abdomen: soft, non-tender; bowel sounds normal; no masses,  no organomegaly    Vulva:  normal   Vagina: normal vagina, no discharge, exudate, lesion, or erythema   Cervix:  multiparous appearance   Corpus: normal size, contour, position, consistency, mobility, non-tender   Adnexa:  normal adnexa   Rectal Exam: Not performed. Assessment/Plan     6 weeks postpartum exam. Pap smear not done at today's visit. 1. Contraception: abstinence. Consent signed with Dr. Jareth Li for sterilization onn 6/15/23. 2. Follow-up w/ Dr. Jareth Li to arrange date for surgery.

## 2023-07-24 ENCOUNTER — EVALUATION (OUTPATIENT)
Facility: CLINIC | Age: 33
End: 2023-07-24
Payer: MEDICARE

## 2023-07-24 DIAGNOSIS — R56.9 SEIZURES (HCC): Primary | ICD-10-CM

## 2023-07-24 PROCEDURE — 97166 OT EVAL MOD COMPLEX 45 MIN: CPT

## 2023-07-24 NOTE — PROGRESS NOTES
OCCUPATIONAL THERAPY INITIAL EVALUATION    Today's Date: 2023  Patient Name: Fide Maher  : 1990  MRN: [id number]  Referring Provider: Vicky Osborne DO  Dx: Seizures ([address]) [R56.9]    SKILLED ANALYSIS:  Pt is a R hand dominant 29year old female presenting to OP OT with concerns with her sensory processing and hand strength/coordination due to her autism. Pt currently requires assistance with child rearing. Pt is demonstrating deficits in the following domains: strength, coordination, sensory processing. Deficits are noted based on the following assessments: Sensory Profile, Sleep profile, 9 hole peg test, functional dexterity and JACKY. Recommend OP OT 1x/wk for 8-12 weeks with focus on aforementioned deficits to maximize functional recovery s/p CVA and improve QOL. Findings and recommendations discussed with pt, and they are in agreement. Pt reports she can only come in during     Educated pt on charges of insurance, acknowledge understanding, and are in agreement. PLAN OF CARE START: 2023  PLAN OF CARE END: 2023  FREQUENCY: 1-2 times per week   PRECAUTIONS PSYCH, Autism, seizures     Subjective    Mechanism of Injury  Pt has hx of autism and has had recent seizures since giving birth to her baby who is in NICU. Pt reports having decreased sensory processing to hold baby and seeking OT services. Occupational Profile    Pt reports her son is in NICU in [place], [place]. Pt reports difficulty with sensory difficulties with pumping. She needs to be able to change diaper, breast feed, difficulty pumping. Pt reports increased anxiety and having panic attacks. Pt reports she wants to stay at home. Pt enjoys reading - murder mystery. Pt will be primary caregiver for her son. Pt lives with best friend in an apartment. Pt reports difficulties with dropping items; pt reports residing with best firend however is residing at [facility] in [place], [place] near NICU.  Pt reports difficulty falling asleep.  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HCC)     not diagnosed as of 23-trying to R/O   • Depression    • Diabetes mellitus ([address])    • Disease of thyroid gland     hypo   • Dysphagia     solids and liquids   • Female infertility    • Fibromyalgia, primary    • Gastric ulcer    • History of bronchitis    • Hypothyroidism    • Iron deficiency anemia     infusion in 2022   • Irregular menses    • Miscarriage    • Morbid obesity with BMI of 50.0-59.9, adult (HCC)    • Plantar fasciitis of left foot    • Polycystic ovary syndrome    • Reflux esophagitis    • Seizures ([address])     last one 22   • Sleep apnea     CPAP   • Wears glasses        Past Surgical Hx:   Past Surgical History:   Procedure Laterality Date   • EGD      with Bx due to barretts esophagus   • EYE SURGERY Bilateral     lazy eye repair   • VA BIOPSY OF LIP N/A 11/10/2022    Procedure: EXCISION BIOPSY SALVARY GLANDS LOWER LIP;  Surgeon: Greg Pastor MD;  Location: [facility];  Service: ENT   • VA CERCLAGE UTERINE CERVIX NONOBSTETRICAL N/A 2023    Procedure: CERCLAGE CERVICAL;  Surgeon: Gina Wesley MD;  Location[facility];  Service: Obstetrics   • VA HYSTEROSCOPY BX ENDOMETRIUM&/POLYPC W/WO D&C N/A 2021    Procedure: DILATATION AND CURETTAGE (D&C) WITH HYSTEROSCOPY;  Surgeon: Peterson Galeas MD;  Location[facility];  Service: Gynecology   • WISDOM TOOTH EXTRACTION          Pain:  Location:     [place]    With Activity:  0    Objective    Upper Extremities:  Pt is R hand dominant                JACKY: RUE: 26lb LUE: 26lb  The age norm is approximately 68 lbs, indicating decreased  strength. 2 point pinch: RUE: 4, LUE: 4.5  3 point pinch: RUE: 7, LUE: 7  Lateral pinch: RUE: 5, LUE: 5.5                  NINE-HOLE PEG TEST: assesses dexterity/fine motor coordination   R hand: 33.85 seconds   L hand: 38.36 seconds    Pt demonstrates decreased [place] compared to norms for age/sex (17.47 seconds)     Functional Dexterity Test: assesses patient's ability to use the hand for daily tasks requiring a 3-jaw swati prehension between the fingers and the thumb   R UE: 36.82  L UE: 39.40  Pt demonstrates decreased dexterity compared to norms for age/sex (23.1 seconds)    Sensory Profile   Quadrant 1: low registration 44/75- much more than most people  Quadrant 2: sensation seeking  33/75 much less than most people  quadrant 3 sensory sensitivity 69/75 much more than most people  Quadrant 4 Sensation Avoiding 69/75 much more than most people     Functional Cognition:    Bowers Sleep Quality Index   What time do you go to bed? 1-2AM  Taken to fall asleep 20-30 minutes  Gotten up in the morning?  4-6 AM  Hours of sleep per night 4-5 hours per night   PLEASE SEE ATTACHED         Trail making Part A and Part B:   Part A: 24.22 seconds with 1 self-corrected error  Part B: 1 minute 32.03 seconds  with independence         GOALS:   Short Term Goals:  Pt will increase UE  strength by 3-4 lbs to complete care taking tasks    Pt will increase FMC by 3-4 seconds on 9 hole peg to complete  IADLs   Pt will demo with G understanding and carryover of sleep hygeine strategies  Pt will demo with G carryover of compensatory for sensory feedback by completing sensory sensitivity 55/75 on Sensory profile       Long Term Goals: 8-12 weeks  Pt will increase UE  strength by 10 lbs (26) to complete care taking tasks    Pt will increase FMC by 5-6 seconds on 9 hole peg to complete  IADLs   Pt will demo with G understanding and carryover of sleep hygeine strategies  Pt will demo with G carryover of compensatory for sensory feedback by completing sensory sensitivity 50/75 on Sensory profile       OTHER PLANNED THERAPY INTERVENTIONS:   Supine, seated, and in stance neuro re-ed  Tricep AG  NMES/FES  FMC/prehension  Timed Trials  Manual tx  Hand to target  Sensory re-ed  Seated functional reach: crossing midline  Supine place and hold  WBearing strategies   Closed chain activities  Open chain activities  Internal and external memory aides  Multimatrix for saccades/ visual clutter/attention  Hypersensitivity strategies education  Multi-modal environment  Sustained/alternating/divided attention  Tracking tube  Oculomotor control:  saccades, con/divergence  Conv./div.  Dynamic tasks  Work stations with timed transitions  Temporal Awareness  Memory and mental manipulation  Auditory processing with immediate recall  Memory retention with immediate and delayed recall  Edu on cog/vision apps

## 2023-07-28 ENCOUNTER — OFFICE VISIT (OUTPATIENT)
Facility: CLINIC | Age: 33
End: 2023-07-28
Payer: MEDICARE

## 2023-07-28 DIAGNOSIS — R56.9 SEIZURES (HCC): Primary | ICD-10-CM

## 2023-07-28 PROCEDURE — 97530 THERAPEUTIC ACTIVITIES: CPT

## 2023-07-28 NOTE — PROGRESS NOTES
Daily Note     Today's date: 2023  Patient name: Nery Lantigua  : 1990  MRN: [id number]  Referring provider: Kandice Morrell, DO  Dx:   Encounter Diagnosis   Name Primary? • Seizures ([address]) Yes                PLAN OF CARE END: 2023    Subjective: " I feel good with this"      Objective: See treatment below. TA  Educated and provided with HEP for theraputty and provided with tan theraputty. Pt performed each exercise with good carryover and feels confident with newly learned HEP. Educated patient on compensatory strategies for decreasing auditory stimuli ie. Head phones/ear buds/ear loops, provided information. Educated on grounding techniques and utilizing personal items to assist with grounding during stressful situations, educated on use of diaphragmatic breathing, use of weighted blanket, educated on DPPT technique - demonstrated on pt and recommending to complete 2-3 times per day with pt and friend in understanding and agreement. Provided with information for performing DPPT technique. Educated pt and friend on joint compression post DPPT technique. Provided with business card if pt has any concerns to email OT. Assessment: Tolerated treatment well. Pt demonstrating in session decreased hand strength and decreased knowledge of compensatory strategies for decreasing stimuli. Pt would benefit from continued OT services to address sensory management strategies and hand strenth/coordination. Plan: Continued skilled OT per POC.

## 2023-08-14 ENCOUNTER — TELEPHONE (OUTPATIENT)
Dept: HEMATOLOGY ONCOLOGY | Facility: MEDICAL CENTER | Age: 33
End: 2023-08-14

## 2023-08-14 ENCOUNTER — APPOINTMENT (OUTPATIENT)
Facility: CLINIC | Age: 33
End: 2023-08-14
Payer: MEDICARE

## 2023-08-18 ENCOUNTER — PATIENT MESSAGE (OUTPATIENT)
Dept: FAMILY MEDICINE CLINIC | Facility: CLINIC | Age: 33
End: 2023-08-18

## 2023-08-18 ENCOUNTER — TELEPHONE (OUTPATIENT)
Dept: FAMILY MEDICINE CLINIC | Facility: CLINIC | Age: 33
End: 2023-08-18

## 2023-08-18 NOTE — TELEPHONE ENCOUNTER
Patient called requesting lab work to determine whether she has dwarfism    Stated that she was going to have this test while pregnant and went into pre term labor. Patient stated this test is needed to determine whether her son has the genetics for it as well.      Please call and inform whether this can be placed for patient

## 2023-08-21 ENCOUNTER — OFFICE VISIT (OUTPATIENT)
Facility: CLINIC | Age: 33
End: 2023-08-21
Payer: MEDICARE

## 2023-08-21 ENCOUNTER — TELEPHONE (OUTPATIENT)
Dept: OBGYN CLINIC | Facility: CLINIC | Age: 33
End: 2023-08-21

## 2023-08-21 DIAGNOSIS — R56.9 SEIZURES (HCC): Primary | ICD-10-CM

## 2023-08-21 PROCEDURE — 97530 THERAPEUTIC ACTIVITIES: CPT

## 2023-08-21 NOTE — TELEPHONE ENCOUNTER
Pt called and said she wants to be seen for post partum depression. I offered her tomorrow 8/22 at 7:30 pt refused, and Wednesday and also refused.   Pt says she needs either a Monday or Friday appointment [facility]

## 2023-08-22 NOTE — TELEPHONE ENCOUNTER
Patient has a scheduled appointment for 09/07/2023 with Dr. Amaya Flores for tubal discussion , informed we willl make this her postpartum visit with contraception discussion of tubal ligation . Patient agrees .  COSTA PABON

## 2023-08-28 ENCOUNTER — APPOINTMENT (OUTPATIENT)
Facility: CLINIC | Age: 33
End: 2023-08-28
Payer: MEDICARE

## 2023-09-12 ENCOUNTER — TELEPHONE (OUTPATIENT)
Dept: HEMATOLOGY ONCOLOGY | Facility: CLINIC | Age: 33
End: 2023-09-12

## 2023-09-12 DIAGNOSIS — D50.8 IRON DEFICIENCY ANEMIA SECONDARY TO INADEQUATE DIETARY IRON INTAKE: Primary | ICD-10-CM

## 2023-09-12 DIAGNOSIS — E66.01 MORBID OBESITY (HCC): ICD-10-CM

## 2023-09-12 NOTE — TELEPHONE ENCOUNTER
SPOKE WITH PATIENT REGARDING HAVING LABS DRAWN PRIOR TO APPOINTMENT, PATIENT NEEDED TO R/S APPOINTMENT, PT R/S TO 12/1 AT 0840 AM WITH DR. Juli Lara, PT VERBALIZED UNDERSTANDING, PT AWARE TO HAVE LABS DRAWN PRIOR TO APPOINTMENT.

## 2023-09-13 ENCOUNTER — TELEPHONE (OUTPATIENT)
Age: 33
End: 2023-09-13

## 2023-09-13 NOTE — TELEPHONE ENCOUNTER
Patients GI provider:  Dr. Viviane Mi    Number to return call: [phone]    Reason for call: Pt calling stating she is moving to [facility] and is requesting referral to [facility] in [place], [place]. [facility] phone # [phone].     Scheduled procedure/appointment date if applicable: N/A

## 2023-09-14 DIAGNOSIS — K22.719 BARRETT'S ESOPHAGUS WITH DYSPLASIA: Primary | ICD-10-CM

## 2023-10-16 ENCOUNTER — EVALUATION (OUTPATIENT)
Facility: CLINIC | Age: 33
End: 2023-10-16
Payer: MEDICARE

## 2023-10-16 ENCOUNTER — OFFICE VISIT (OUTPATIENT)
Dept: GASTROENTEROLOGY | Facility: CLINIC | Age: 33
End: 2023-10-16
Payer: MEDICARE

## 2023-10-16 VITALS
DIASTOLIC BLOOD PRESSURE: 73 MMHG | HEIGHT: 55 IN | OXYGEN SATURATION: 98 % | SYSTOLIC BLOOD PRESSURE: 109 MMHG | HEART RATE: 85 BPM | BODY MASS INDEX: 51.84 KG/M2 | WEIGHT: 224 LBS

## 2023-10-16 DIAGNOSIS — R11.2 NAUSEA AND VOMITING, UNSPECIFIED VOMITING TYPE: ICD-10-CM

## 2023-10-16 DIAGNOSIS — K44.9 HIATAL HERNIA: ICD-10-CM

## 2023-10-16 DIAGNOSIS — R56.9 SEIZURES (HCC): Primary | ICD-10-CM

## 2023-10-16 DIAGNOSIS — K21.00 GASTROESOPHAGEAL REFLUX DISEASE WITH ESOPHAGITIS WITHOUT HEMORRHAGE: Primary | ICD-10-CM

## 2023-10-16 DIAGNOSIS — R13.19 ESOPHAGEAL DYSPHAGIA: ICD-10-CM

## 2023-10-16 PROCEDURE — 97112 NEUROMUSCULAR REEDUCATION: CPT

## 2023-10-16 PROCEDURE — 97530 THERAPEUTIC ACTIVITIES: CPT

## 2023-10-16 PROCEDURE — 99213 OFFICE O/P EST LOW 20 MIN: CPT | Performed by: PHYSICIAN ASSISTANT

## 2023-10-16 RX ORDER — SERTRALINE HYDROCHLORIDE 20 MG/ML
25 SOLUTION ORAL DAILY
COMMUNITY

## 2023-10-16 RX ORDER — FERROUS SULFATE 324(65)MG
324 TABLET, DELAYED RELEASE (ENTERIC COATED) ORAL
COMMUNITY
Start: 2023-08-31

## 2023-10-16 RX ORDER — LEVETIRACETAM 500 MG/1
TABLET ORAL
COMMUNITY
Start: 2023-09-03

## 2023-10-16 RX ORDER — ESOMEPRAZOLE MAGNESIUM 40 MG/1
40 CAPSULE, DELAYED RELEASE ORAL
Qty: 60 CAPSULE | Refills: 3 | Status: SHIPPED | OUTPATIENT
Start: 2023-10-16

## 2023-10-16 NOTE — ASSESSMENT & PLAN NOTE
Patient has previously had EGD and manometry prior to recent pregnancy and delivery; see previous work-up. Question if she may have developed worsening of hiatal hernia in the setting of recent pregnancy and severe obesity. Gastroparesis is also in the differential, it appears she did swallow most of the prescribed meal at the time of the gastric emptying study but this was technically limited.   May also benefit from different PPI, her dysphagia appears to represent globus sensation which I suspect would be related to undertreated GERD based on the results of her previous manometry and pH testing, and the fact that she is only on an H2 blocker at this time    -We will start esomeprazole 40 mg twice daily    -Advised patient regarding dietary and lifestyle modification strategies for the mitigation of GERD    -We will check an upper GI series    -If substantial hiatal hernia is evidenced on upper GI series we may consider referral for GERD correcting procedures pending her response to maximizing PPI therapy    -Follow-up in a few months, consider repeating gastric emptying study pending outcome of upper GI series and maximization of her acid reducing therapy

## 2023-10-16 NOTE — LETTER
2023    Fer Castorena, [address]    Patient: Emily Patel   YOB: 1990   Date of Visit: 10/16/2023     Encounter Diagnosis     ICD-10-CM    1. Seizures ([address])  R56.9           Dear Dr. Inrgid Gage: Thank you for your recent referral of Emily Patel. Please review the attached evaluation summary from [facility] recent visit. Please verify that you agree with the plan of care by signing the attached order. If you have any questions or concerns, please do not hesitate to call. I sincerely appreciate the opportunity to share in the care of one of your patients and hope to have another opportunity to work with you in the near future. Sincerely,    Sharee Baumgarten, OT      Referring Provider:     I certify that I have read the below Plan of Care and certify the need for these services furnished under this plan of treatment while under my care. Fer Castorena DO  [facility] [place] [address]  [address] [place] [address] EVALUATION    Today's Date: 10/16/2023  Patient Name: Emily Patel  : 1990  MRN: [id number]  Referring Provider: Fer Castorena DO  Dx: Seizures ([address]) [R56.9]    SKILLED ANALYSIS:  Pt presents to re-evaluation on [unfilled] status post sensory issues and seizues. As per interview, pt reports improvements in sensory issues ie. Holding baby has been improved. Pt reports psychologically it has been hard to bound with baby. Pt reports having a recent seizure. Pt reports having [facility] deficits and hand strength deficits. Pt reports impairments in [facility], dexterity to do baby's diaper and still difficult performing hand writing. Pt reports handwriting has been sloppy and dropping items. Pt reports she has been doing theraputty HEP and wrist/forearm strengthening at home. Post assessments, pt demonstrating improvements in RUE strength .  Pt continues to demonstrate impairments in [facility], dexterity, hand . Pt did not achieve goals due to lack of attendance however pt is more compliant of therapy in upcoming weeks. Pt would benefit from continued OT services focusing on impairments for 8-12 more weeks. Pt educated on progress/therapy process and pt in understanding and agreement of recommendations. Recommending to continue HEP     PLEASE COMPLETE [address]  Educated pt on charges of insurance, acknowledge understanding, and are in agreement. PLAN OF CARE START: 10/16/2023  PLAN OF CARE END: 1/16/2024  FREQUENCY: 1-2 times per week   PRECAUTIONS PSYCH, Autism, seizures     Subjective    Mechanism of Injury  Pt has hx of autism and has had recent seizures since giving birth to her baby who is in NICU. Pt reports having decreased sensory processing to hold baby and seeking OT services. Occupational Profile    Pt reports her son is in NICU in [facility]. Pt reports difficulty with sensory difficulties with pumping. She needs to be able to change diaper, breast feed, difficulty pumping. Pt reports increased anxiety and having panic attacks. Pt reports she wants to stay at home. Pt enjoys reading - murder mystery. Pt will be primary caregiver for her son. Pt lives with best friend in an apartment. Pt reports difficulties with dropping items; pt reports residing with [facility] however is residing at [facility] in [facility] near [facility]. Pt reports difficulty falling asleep.  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[address])     not diagnosed as of 1/9/23-trying to R/O   • Depression    • Diabetes mellitus ([address])    • Disease of thyroid gland     hypo   • Dysphagia     solids and liquids   • Female infertility    • Fibromyalgia, primary    • Gastric ulcer    • History of bronchitis    • Hypothyroidism    • Iron deficiency anemia     infusion in 2022   • Irregular menses    • Miscarriage    • Morbid obesity with BMI of 50.0-59.9, adult (HCC)    • Plantar fasciitis of left foot    • Polycystic ovary syndrome    • Reflux esophagitis    • Seizures ([address])     last one 22   • Sleep apnea     CPAP   • Wears glasses        Past Surgical Hx:   Past Surgical History:   Procedure Laterality Date   • EGD      with Bx due to barretts esophagus   • EYE SURGERY Bilateral     lazy eye repair   • KS BIOPSY OF LIP N/A 11/10/2022    Procedure: [address] GLANDS LOWER LIP;  Surgeon: Jorge Roberson MD;  Location: [facility];  Service: ENT   • KS CERCLAGE UTERINE CERVIX NONOBSTETRICAL N/A 2023    Procedure: CERCLAGE CERVICAL;  Surgeon: Mau Carver MD;  Location: [facility];  Service: Obstetrics   • KS HYSTEROSCOPY BX ENDOMETRIUM&/POLYPC W/WO D&C N/A 2021    Procedure: DILATATION AND CURETTAGE (D&C) WITH HYSTEROSCOPY;  Surgeon: Jason Abarca MD;  Location: [facility];  Service: Gynecology   • WISDOM TOOTH EXTRACTION          Pain:  Location:     [place]    With Activity:  0    Objective    Upper Extremities:  Pt is R hand dominant                JACKY: RUE: 28 previous 26lb LUE:14 previous 26lb  The age norm is approximately 68 lbs, indicating decreased  strength.                 2 point pinch: RUE: 6, LUE: 3.75  3 point pinch: RUE: 8, LUE: 4.5  Lateral pinch: RUE: 6.5, LUE:6                   NINE-HOLE PEG TEST: assesses dexterity/fine motor coordination   R hand: 36.45 (previous 33.85 seconds )  L hand: 41.98 seconds (previous 38.36 seconds )Pt demonstrates decreased [place] compared to norms for age/sex (17.47 seconds)     Functional Dexterity Test: assesses patient's ability to use the hand for daily tasks requiring a 3-jaw swati prehension between the fingers and the thumb   R UE: 43.64 seconds (previous 36.82)  L UE: 53.18 seconds (previous 39.40)  Pt demonstrates decreased dexterity compared to norms for age/sex (23.1 seconds)    Sensory Profile   Quadrant 1: low registration 44/75- much more than most people  Quadrant 2: sensation seeking  33/75 much less than most people  quadrant 3 sensory sensitivity 69/75 much more than most people  Quadrant 4 Sensation Avoiding 69/75 much more than most people     Functional Cognition:              GOALS:   Short Term Goals:  Pt will increase UE  strength by 3-4 lbs to complete care taking tasks    Pt will increase FMC by 3-4 seconds on 9 hole peg to complete  to complete child rearing tasks.    Pt will increase dexterity by 3-4 seconds on functional dexterity to bathe baby  Pt will demo with G understanding and carryover of sleep hygeine strategies  Pt will demo with G carryover of compensatory for sensory feedback by completing sensory sensitivity 55/75 on Sensory profile       Long Term Goals: 8-12 weeks  Pt will increase UE  strength by 10 lbs (26) to complete care taking tasks    Pt will increase FMC by 5-6 seconds on 9 hole peg to complete child rearing tasks  Pt will increase dexterity by 5-6 seconds on functional dexterity to bathe baby  Pt will demo with G understanding and carryover of sleep hygeine strategies  Pt will demo with G carryover of compensatory for sensory feedback by completing sensory sensitivity 50/75 on Sensory profile     EDUCATED on stress management and cognitive distortion, strategies      OTHER PLANNED THERAPY INTERVENTIONS:   Supine, seated, and in stance neuro re-ed  Tricep AG  NMES/FES  FMC/prehension  Timed Trials  Manual tx  Hand to target  Sensory re-ed  Seated functional reach: crossing midline  Supine place and hold  WBearing strategies   Closed chain activities  Open chain activities  Internal and external memory aides  Multimatrix for saccades/ visual clutter/attention  Hypersensitivity strategies education  Multi-modal environment  Sustained/alternating/divided attention  Tracking tube  Oculomotor control:  saccades, con/divergence  Conv./div.  Dynamic tasks  Work stations with timed transitions  Temporal Awareness  Memory and mental manipulation  Auditory processing with immediate recall  Memory retention with immediate and delayed recall  Edu on cog/vision apps

## 2023-10-16 NOTE — PROGRESS NOTES
OCCUPATIONAL THERAPY INITIAL EVALUATION    Today's Date: 10/16/2023  Patient Name: Diana Pena  : 1990  MRN: [id number]  Referring Provider: Angeles Nielsen DO  Dx: Seizures ([address]) [R56.9]    SKILLED ANALYSIS:  Pt presents to re-evaluation on [unfilled] status post sensory issues and seizues. As per interview, pt reports improvements in sensory issues ie. Holding baby has been improved. Pt reports psychologically it has been hard to bound with baby. Pt reports having a recent seizure. Pt reports having [place] deficits and hand strength deficits. Pt reports impairments in [facility], dexterity to do baby's diaper and still difficult performing hand writing. Pt reports handwriting has been sloppy and dropping items. Pt reports she has been doing theraputty HEP and wrist/forearm strengthening at home. Post assessments, pt demonstrating improvements in RUE strength . Pt continues to demonstrate impairments in [place], dexterity, hand . Pt did not achieve goals due to lack of attendance however pt is more compliant of therapy in upcoming weeks. Pt would benefit from continued OT services focusing on impairments for 8-12 more weeks. Pt educated on progress/therapy process and pt in understanding and agreement of recommendations. Recommending to continue HEP     PLEASE COMPLETE [address]  Educated pt on charges of insurance, acknowledge understanding, and are in agreement. PLAN OF CARE START: 10/16/2023  PLAN OF CARE END: 2024  FREQUENCY: 1-2 times per week   PRECAUTIONS PSYCH, Autism, seizures     Subjective    Mechanism of Injury  Pt has hx of autism and has had recent seizures since giving birth to her baby who is in NICU. Pt reports having decreased sensory processing to hold baby and seeking OT services. Occupational Profile    Pt reports her son is in NICU in [place], [place]. Pt reports difficulty with sensory difficulties with pumping.  She needs to be able to change diaper, breast feed, difficulty pumping. Pt reports increased anxiety and having panic attacks. Pt reports she wants to stay at home. Pt enjoys reading - murder mystery. Pt will be primary caregiver for her son. Pt lives with best friend in an apartment. Pt reports difficulties with dropping items; pt reports residing with best firend however is residing at [facility] in [place], [place] near [facility]. Pt reports difficulty falling asleep.  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[address])     not diagnosed as of 1/9/23-trying to R/O    Depression     Diabetes mellitus ([address])     Disease of thyroid gland     hypo    Dysphagia     solids and liquids    Female infertility     Fibromyalgia, primary     Gastric ulcer     History of bronchitis     Hypothyroidism     Iron deficiency anemia     infusion in 11/2022    Irregular menses     Miscarriage     Morbid obesity with BMI of 50.0-59.9, adult ([address])     Plantar fasciitis of left foot     Polycystic ovary syndrome     Reflux esophagitis     Seizures ([address])     last one 7/13/22    Sleep apnea     CPAP    Wears glasses        Past Surgical Hx:   Past Surgical History:   Procedure Laterality Date    EGD      with Bx due to barretts esophagus    EYE SURGERY Bilateral     lazy eye repair    MS BIOPSY OF LIP N/A 11/10/2022    Procedure: EXCISION BIOPSY [place] GLANDS LOWER LIP;  Surgeon: Jorge A Ricketts MD;  Location: [facility];  Service: ENT    MS CERCLAGE UTERINE CERVIX NONOBSTETRICAL N/A 5/23/2023    Procedure: CERCLAGE CERVICAL;  Surgeon: Maryanne Sosa MD;  Location: [facility];  Service: Obstetrics    MS HYSTEROSCOPY BX ENDOMETRIUM&/POLYPC W/WO D&C N/A 2021    Procedure: DILATATION AND CURETTAGE (D&C) WITH HYSTEROSCOPY;  Surgeon: Kat Rodriguez MD;  [facility]: [facility];  Service: Gynecology    WISDOM TOOTH EXTRACTION          Pain:  Location:     [place]    With Activity:  0    Objective    Upper Extremities:  Pt is R hand dominant                JACKY: RUE: 28 previous 26lb LUE:14 previous 26lb  The age norm is approximately 68 lbs, indicating decreased  strength. 2 point pinch: RUE: 6, LUE: 3.75  3 point pinch: RUE: 8, LUE: 4.5  Lateral pinch: RUE: 6.5, LUE:6                   NINE-HOLE PEG TEST: assesses dexterity/fine motor coordination   R hand: 36.45 (previous 33.85 seconds )  L hand: 41.98 seconds (previous 38.36 seconds )Pt demonstrates decreased [place] compared to norms for age/sex (17.47 seconds)     Functional Dexterity Test: assesses patient's ability to use the hand for daily tasks requiring a 3-jaw swati prehension between the fingers and the thumb   R UE: 43.64 seconds (previous 36.82)  L UE: 53.18 seconds (previous 39.40)  Pt demonstrates decreased dexterity compared to norms for age/sex (23.1 seconds)    Sensory Profile   Quadrant 1: low registration 44/75- much more than most people  Quadrant 2: sensation seeking  33/75 much less than most people  quadrant 3 sensory sensitivity 69/75 much more than most people  Quadrant 4 Sensation Avoiding 69/75 much more than most people     Functional Cognition:              GOALS:   Short Term Goals:  Pt will increase UE  strength by 3-4 lbs to complete care taking tasks    Pt will increase FMC by 3-4 seconds on 9 hole peg to complete  to complete child rearing tasks.    Pt will increase dexterity by 3-4 seconds on functional dexterity to bathe baby  Pt will demo with G understanding and carryover of sleep hygeine strategies  Pt will demo with G carryover of compensatory for sensory feedback by completing sensory sensitivity 55/75 on Sensory profile       Long Term Goals: - weeks  Pt will increase UE  strength by 10 lbs (26) to complete care taking tasks    Pt will increase FMC by 5-6 seconds on 9 hole peg to complete child rearing tasks  Pt will increase dexterity by 5-6 seconds on functional dexterity to bathe baby  Pt will demo with G understanding and carryover of sleep hygeine strategies  Pt will demo with G carryover of compensatory for sensory feedback by completing sensory sensitivity 50/75 on Sensory profile     EDUCATED on stress management and cognitive distortion, strategies      OTHER PLANNED THERAPY INTERVENTIONS:   Supine, seated, and in stance neuro re-ed  Tricep AG  NMES/FES  FMC/prehension  Timed Trials  Manual tx  Hand to target  Sensory re-ed  Seated functional reach: crossing midline  Supine place and hold  WBearing strategies   Closed chain activities  Open chain activities  Internal and external memory aides  Multimatrix for saccades/ visual clutter/attention  Hypersensitivity strategies education  Multi-modal environment  Sustained/alternating/divided attention  Tracking tube  Oculomotor control:  saccades, con/divergence  Conv./div.  Dynamic tasks  Work stations with timed transitions  Temporal Awareness  Memory and mental manipulation  Auditory processing with immediate recall  Memory retention with immediate and delayed recall  Edu on cog/vision apps

## 2023-10-16 NOTE — PROGRESS NOTES
Follow-up Note -  [facility]  Jhoana Hines 1990 29 y.o. female         ASSESSMENT & PLAN:    Gastroesophageal reflux disease  Patient has previously had EGD and manometry prior to recent pregnancy and delivery; see previous work-up. Question if she may have developed worsening of hiatal hernia in the setting of recent pregnancy and severe obesity. Gastroparesis is also in the differential, it appears she did swallow most of the prescribed meal at the time of the gastric emptying study but this was technically limited. May also benefit from different PPI, her dysphagia appears to represent globus sensation which I suspect would be related to undertreated GERD based on the results of her previous manometry and pH testing, and the fact that she is only on an H2 blocker at this time    -We will start esomeprazole 40 mg twice daily    -Advised patient regarding dietary and lifestyle modification strategies for the mitigation of GERD    -We will check an upper GI series    -If substantial hiatal hernia is evidenced on upper GI series we may consider referral for GERD correcting procedures pending her response to maximizing PPI therapy    -Follow-up in a few months, consider repeating gastric emptying study pending outcome of upper GI series and maximization of her acid reducing therapy      Reason: Follow-up; dysphagia, globus sensation, vomiting    HPI: 71-year-old morbidly obese (BMI 52.1) epileptic diabetic female with history of sleep apnea, fibromyalgia and hypothyroidism who presents for follow-up. She was last evaluated by her service shortly before she learned she was pregnant at the end of January 2023; she delivered prematurely in the summer and her child is in the NICU currently; she herself had complicated delivery course with multiple seizures.     When we had last evaluated her she was having ongoing problems with acid reflux and dysphagia with poor appetite and she says that these issues have persisted to today. She says she suffered with hyperemesis during pregnancy however and was generally worse, she had undergone EGD at the beginning of the year only showing some gastritis and small hiatal hernia, manometry that showed normal contractile function of her esophagus with 9 out of 10 swallows, there was significant acid reflux with correlation to her symptoms despite once daily PPI therapy so she had been recommended to take PPI therapy twice daily at that time. She says that in the interim she has been off PPI therapy as she says she was told to stop it during pregnancy. She thinks that omeprazole was not very helpful in retrospect in any case. She is currently only taking Pepcid 40 mg daily. She says she is still having problems with food and liquids feeling like they are getting stuck in the upper chest with swallowing, this does not happen every single time she eats or drinks but it does happen frequently, generally every day. She says that a lot of times hours after she eats food will,. She had a gastric emptying study in the past which was normal within limits of the study although she was only able to eat one of the 2 pieces of toast, she says she was not given enough time to finish the meal.  Her bowel habits are unchanged and she generally moves her bowels once every 2 to 3 days without associated abdominal or rectal discomfort. Denies any use of NSAIDs. REVIEW OF SYSTEMS:      CONSTITUTIONAL: Denies any fever, chills, or rigors. Good appetite, and no recent weight loss. HEENT: No earache or tinnitus. Denies hearing loss or visual disturbances. CARDIOVASCULAR: No chest pain or palpitations. RESPIRATORY: Denies any cough, hemoptysis, shortness of breath or dyspnea on exertion. GASTROINTESTINAL: As noted in the History of Present Illness. GENITOURINARY: No problems with urination. Denies any hematuria or dysuria.   NEUROLOGIC: No dizziness or vertigo, denies headaches. MUSCULOSKELETAL: Denies any muscle or joint pain. SKIN: Denies skin rashes or itching. ENDOCRINE: Denies excessive thirst. Denies intolerance to heat or cold. PSYCHOSOCIAL: Denies depression or anxiety. Denies any recent memory loss.      Past Medical History:   Diagnosis Date    Abnormal Pap smear of cervix     Anemia     Asthma     Autism     [place] esophagus     CPAP (continuous positive airway pressure) dependence     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HCC)     not diagnosed as of 1/9/23-trying to R/O    Depression     Diabetes mellitus ([address])     Disease of thyroid gland     hypo    Dysphagia     solids and liquids    Female infertility     Fibromyalgia, primary     Gastric ulcer     History of bronchitis     Hypothyroidism     Iron deficiency anemia     infusion in 11/2022    Irregular menses     Miscarriage     Morbid obesity with BMI of 50.0-59.9, adult (HCC)     Plantar fasciitis of left foot     Polycystic ovary syndrome     Reflux esophagitis     Seizures ([address])     last one 7/13/22    Sleep apnea     CPAP    Wears glasses       Past Surgical History:   Procedure Laterality Date    EGD      with Bx due to barretts esophagus    EYE SURGERY Bilateral     lazy eye repair    WY BIOPSY OF LIP N/A 11/10/2022    Procedure: EXCISION BIOPSY SALVARY GLANDS LOWER LIP;  Surgeon: Kathie Segovia MD;  Location: [facility];  Service: ENT    WY CERCLAGE UTERINE CERVIX NONOBSTETRICAL N/A 5/23/2023    Procedure: CERCLAGE CERVICAL;  Surgeon: Antonella Pires MD;  Location: [facility];  Service: Obstetrics    WY HYSTEROSCOPY BX ENDOMETRIUM&/POLYPC W/WO D&C N/A 9/22/2021    Procedure: DILATATION AND CURETTAGE (D&C) WITH HYSTEROSCOPY;  Surgeon: Amina Bello MD;  Location: [facility];  Service: Gynecology    WISDOM TOOTH EXTRACTION       Social History     Socioeconomic History    Marital status: Single     Spouse name: Not on file    Number of children: Not on file    Years of education: Not on file    Highest education level: Not on file   Occupational History    Not on file   Tobacco Use    Smoking status: Never    Smokeless tobacco: Never   Vaping Use    Vaping Use: Never used   Substance and Sexual Activity    Alcohol use: Never     Comment: occ    Drug use: Not Currently     Types: Marijuana     Comment: about a couple times a week, quit June 2022    Sexual activity: Not Currently     Partners: Male     Birth control/protection: Abstinence     Comment: Trying to conceive for 2 years now   Other Topics Concern    Not on file   Social History Narrative    Not on file     Social Determinants of Health     Financial Resource Strain: Low Risk  (6/29/2021)    Overall Financial Resource Strain (CARDIA)     Difficulty of Paying Living Expenses: Not very hard   Food Insecurity: No Food Insecurity (6/6/2023)    Hunger Vital Sign     Worried About Running Out of Food in the Last Year: Never true     Ran Out of Food in the Last Year: Never true   Transportation Needs: No Transportation Needs (6/29/2021)    PRAPARE - Transportation     Lack of Transportation (Medical): No     Lack of Transportation (Non-Medical):  No   Physical Activity: Not on file   Stress: Not on file   Social Connections: Not on file   Intimate Partner Violence: Not on file   Housing Stability: Low Risk  (6/6/2023)    Housing Stability Vital Sign     Unable to Pay for Housing in the Last Year: No     Number of Places Lived in the Last Year: 1     Unstable Housing in the Last Year: No     Family History   Problem Relation Age of Onset    Bipolar disorder Mother     Depression Mother     Depression Father     Diabetes Maternal Grandmother     Thyroid disease Maternal Grandmother     Hypertension Maternal Grandmother     Heart disease Maternal Grandmother     Diabetes Maternal Grandfather     Diabetes Paternal Grandmother     Breast cancer Paternal Grandmother     Stroke Paternal Grandmother     Diabetes Paternal Grandfather     Breast cancer Maternal Aunt 35        bilateral Stomach cancer Maternal Aunt      Haloperidol, Bee pollen, Penicillins, and Pollen extract  Current Outpatient Medications   Medication Sig Dispense Refill    acetaminophen (TYLENOL) 500 mg tablet Take 1 tablet (500 mg total) by mouth every 6 (six) hours as needed for mild pain  0    albuterol (Proventil HFA) 90 mcg/act inhaler Inhale 2 puffs every 6 (six) hours as needed for wheezing 18 g 3    esomeprazole (NexIUM) 40 MG capsule Take 1 capsule (40 mg total) by mouth 2 (two) times a day before meals 60 capsule 3    famotidine (PEPCID) 40 MG tablet TAKE ONE TABLET BY MOUTH EVERY DAY AT BEDTIME (GENERIC FOR PEPCID) 30 tablet 5    ferrous sulfate 324 (65 Fe) mg Take 324 mg by mouth 2 (two) times a day before meals      lamoTRIgine (LaMICtal) 100 mg tablet Take 1 tablet (100 mg total) by mouth 2 (two) times a day Start 1 tab twice daily Aug 4th. 60 tablet 3    lamoTRIgine (LaMICtal) 25 mg tablet Start with 1 tab daily for 1 week, then 1 tab twice daily the 2nd week, then 2 tabs twice daily the 3rd week. 50 tablet 0    levETIRAcetam (KEPPRA) 1000 MG tablet Take 1 tablet (1,000 mg total) by mouth 2 (two) times a day 60 tablet 3    levETIRAcetam (KEPPRA) 500 mg tablet TAKE THREE TABLETS BY MOUTH TWICE DAILY FOR 14 DAYS      levothyroxine 25 mcg tablet TAKE ONE TABLET BY MOUTH EVERY DAY IN THE MORNING 30 tablet 2    sertraline (ZOLOFT) 20 mg/mL concentrated solution Take 25 mg by mouth daily      amitriptyline (ELAVIL) 10 mg tablet  (Patient not taking: Reported on 10/16/2023)      fluticasone (FLONASE) 50 mcg/act nasal spray 1 spray into each nostril daily (Patient not taking: Reported on 52/91/8421) 9.9 mL 0    folic acid (FOLVITE) 1 mg tablet Take 1 tablet (1 mg total) by mouth daily (Patient not taking: Reported on 7/13/2023) 90 tablet 1    hydrocortisone 1 % cream Apply 1 application.  topically daily as needed for irritation (Patient not taking: Reported on 10/16/2023) 30 g 0    ibuprofen (MOTRIN) 600 mg tablet Take 1 tablet (600 mg total) by mouth every 6 (six) hours (Patient not taking: Reported on 6/15/2023) 30 tablet 0    nystatin (MYCOSTATIN) 500,000 units/5 mL suspension Apply 5 mL (500,000 Units total) to the mouth or throat 4 (four) times a day (Patient not taking: Reported on 6/15/2023) 473 mL 1    ondansetron (ZOFRAN) 4 mg tablet TAKE ONE TABLET BY MOUTH EVERY 8 HOURS AS NEEDED FOR NAUSEA AND VOMITING (Patient not taking: Reported on 6/15/2023) 30 tablet 2    Polyethylene Glycol 3350 (MIRALAX PO) Take by mouth (Patient not taking: Reported on 6/15/2023)      Prenatal Vit-Iron Carbonyl-FA (prenatal multivitamin) TABS Take 1 tablet by mouth daily (Patient not taking: Reported on 7/13/2023)      witch hazel-glycerin (TUCKS) topical pad Apply 1 pad. topically every 4 (four) hours as needed for irritation (Patient not taking: Reported on 6/15/2023)  0     No current facility-administered medications for this visit. Blood pressure 109/73, pulse 85, height 4' 7" (1.397 m), weight 102 kg (224 lb), SpO2 98 %, currently breastfeeding. PHYSICAL EXAM:      General Appearance:   Alert, cooperative, no distress, appears stated age    HEENT:   Normocephalic, atraumatic, anicteric. Neck:  Supple, symmetrical, trachea midline, no adenopathy;    thyroid: no enlargement/tenderness/nodules; no carotid  bruit or JVD    Lungs:   Clear to auscultation bilaterally; no rales, rhonchi or wheezing; respirations unlabored    Heart[de-identified]   S1 and S2 normal; regular rate and rhythm; no murmur, rub, or gallop.    Abdomen:   Soft, non-tender, non-distended; normal bowel sounds; no masses, no organomegaly    Extremities: No edema, erythema, wounds, rashes   Rectal:   Deferred                      Lab Results   Component Value Date    WBC 6.81 07/12/2023    HGB 11.9 07/12/2023    HCT 36.7 07/12/2023    MCV 91 07/12/2023     07/12/2023     Lab Results   Component Value Date    GLUCOSE 99 06/04/2023    CALCIUM 7.7 (L) 06/08/2023    K 3.5 06/08/2023    CO2 25 06/08/2023     06/08/2023    BUN 3 (L) 06/08/2023    CREATININE 0.40 (L) 06/08/2023     Lab Results   Component Value Date    ALT 38 06/07/2023    AST 23 06/07/2023    ALKPHOS 66 06/07/2023     Lab Results   Component Value Date    INR 0.90 03/30/2023    PROTIME 12.3 03/30/2023       No results found.

## 2023-10-20 ENCOUNTER — APPOINTMENT (OUTPATIENT)
Facility: CLINIC | Age: 33
End: 2023-10-20
Payer: MEDICARE

## 2023-10-27 DIAGNOSIS — G47.33 OSA (OBSTRUCTIVE SLEEP APNEA): Primary | ICD-10-CM

## 2023-10-27 NOTE — PROGRESS NOTES
I attempted to call patient twice. She is at [facility] with her son who is in NICU. I did review her compliance data from 9/116 to 10/25/2023. Her AHI is lowered to 0.3.   Her 95th percentile pressure is 9.5  I have placed an order to lower her auto CPAP to 6 to 12 cm H2O pressure

## 2023-10-30 ENCOUNTER — APPOINTMENT (OUTPATIENT)
Facility: CLINIC | Age: 33
End: 2023-10-30
Payer: MEDICARE

## 2023-10-31 ENCOUNTER — TELEPHONE (OUTPATIENT)
Dept: FAMILY MEDICINE CLINIC | Facility: CLINIC | Age: 33
End: 2023-10-31

## 2023-10-31 NOTE — TELEPHONE ENCOUNTER
Patient has been unable to get thorough to make an appt.   Please call patient to schedule her OB postpartum visit and she can discuss with the Dr her concern for testing for skeletal dysplasia and get her TDAP and FLU

## 2023-11-17 ENCOUNTER — TELEPHONE (OUTPATIENT)
Dept: HEMATOLOGY ONCOLOGY | Facility: CLINIC | Age: 33
End: 2023-11-17

## 2023-11-18 ENCOUNTER — NURSE TRIAGE (OUTPATIENT)
Dept: OTHER | Facility: OTHER | Age: 33
End: 2023-11-18

## 2023-11-19 NOTE — TELEPHONE ENCOUNTER
Reason for Disposition  • Second seizure occurs on the same day    Answer Assessment - Initial Assessment Questions  1. ONSET: "How long did the seizure last?" (Minutes)       2-3 min  2. CONTENT: "Describe what happened during the seizure. Did the body become stiff? Was there any jerking?"       Jerking in hands, eyes rolling back and forth, looking around but not making any contact, unable to respond  3. CIRCUMSTANCE: "What was the individual doing when the seizure began?"       She was sitting looking at her phone  4. MENTAL STATUS: "Does he know who he is, who you are, and where he is?"       Alert and oriented   5. PRIOR SEIZURES: "Has the individual had a seizure (convulsion) before?" If Yes, ask: "When was the last time?" and "What happened last time?"      Last seizure was in September 7. MEDICATIONS: "Does the individual take anticonvulsant medications?" (e.g., yes/no, compliance, any recent changes)      Lamictal, keppra  8. INJURY: "Did the individual hurt himself during the seizure?" (e.g., head, tongue)      No injury  9.  OTHER SYMPTOMS: "Are there any other symptoms?" (e.g., fever, headache)      Feeling lightheaded recently    Protocols used: [facility]

## 2023-11-19 NOTE — TELEPHONE ENCOUNTER
presently at [facility] with her son. She notes the NICU nurse witnessed her having 2 seizures. The first was at 7:55pm, the second at 8:25pm today. Both seizures occurred while Vaishnavi Membreno was sitting looking at her phone. Her hand was jerking, eyes rolled back and she was unable to respond to any of the nurses questions. The seizures lasted about 2-3 min. No injuries occurred. Patient is tired now, but no other symptoms. She notes she has been taking her lamictal and keppra as prescribed.     On call provider notified

## 2023-11-20 ENCOUNTER — OFFICE VISIT (OUTPATIENT)
Dept: FAMILY MEDICINE CLINIC | Facility: CLINIC | Age: 33
End: 2023-11-20
Payer: MEDICARE

## 2023-11-20 ENCOUNTER — PATIENT OUTREACH (OUTPATIENT)
Dept: FAMILY MEDICINE CLINIC | Facility: CLINIC | Age: 33
End: 2023-11-20

## 2023-11-20 VITALS
SYSTOLIC BLOOD PRESSURE: 114 MMHG | BODY MASS INDEX: 53.46 KG/M2 | HEIGHT: 55 IN | HEART RATE: 106 BPM | WEIGHT: 231 LBS | OXYGEN SATURATION: 98 % | DIASTOLIC BLOOD PRESSURE: 70 MMHG

## 2023-11-20 DIAGNOSIS — G40.409 OTHER GENERALIZED EPILEPSY, NOT INTRACTABLE, WITHOUT STATUS EPILEPTICUS (HCC): ICD-10-CM

## 2023-11-20 DIAGNOSIS — Z00.00 MEDICARE ANNUAL WELLNESS VISIT, SUBSEQUENT: Primary | ICD-10-CM

## 2023-11-20 DIAGNOSIS — Z23 ENCOUNTER FOR IMMUNIZATION: ICD-10-CM

## 2023-11-20 DIAGNOSIS — J20.9 ACUTE BRONCHITIS, UNSPECIFIED ORGANISM: ICD-10-CM

## 2023-11-20 DIAGNOSIS — F32.A DEPRESSION, UNSPECIFIED DEPRESSION TYPE: ICD-10-CM

## 2023-11-20 DIAGNOSIS — H54.7 VISION PROBLEM: ICD-10-CM

## 2023-11-20 DIAGNOSIS — E03.9 HYPOTHYROIDISM, UNSPECIFIED TYPE: ICD-10-CM

## 2023-11-20 PROCEDURE — G0008 ADMIN INFLUENZA VIRUS VAC: HCPCS | Performed by: FAMILY MEDICINE

## 2023-11-20 PROCEDURE — 90686 IIV4 VACC NO PRSV 0.5 ML IM: CPT | Performed by: FAMILY MEDICINE

## 2023-11-20 PROCEDURE — G0439 PPPS, SUBSEQ VISIT: HCPCS | Performed by: FAMILY MEDICINE

## 2023-11-20 RX ORDER — LAMOTRIGINE 25 MG/1
TABLET ORAL
Qty: 50 TABLET | Refills: 0 | Status: SHIPPED | OUTPATIENT
Start: 2023-11-20

## 2023-11-20 RX ORDER — ALBUTEROL SULFATE 90 UG/1
2 AEROSOL, METERED RESPIRATORY (INHALATION) EVERY 6 HOURS PRN
Qty: 18 G | Refills: 3 | Status: SHIPPED | OUTPATIENT
Start: 2023-11-20

## 2023-11-20 RX ORDER — LEVOTHYROXINE SODIUM 0.03 MG/1
TABLET ORAL
Qty: 30 TABLET | Refills: 2 | Status: SHIPPED | OUTPATIENT
Start: 2023-11-20

## 2023-11-20 NOTE — PROGRESS NOTES
PATTI had received a referral from Maurisio Peoples MD r/t [facility]. DANUTA had completed a chart review. DANUTA had received an in basket from the provider about this patient. Per in basket, patient was seen today and is in urgent need of getting some therapy. Per in basket, patient has iron deficiency anemia, asthma, some degree of depression that is untreated and seizures. Per in basket, patient has a baby in NICU due to prematurity, born at 25 weeks and no social support. Per in basket, patient has a PCA against the father of the baby. Per chart, patient was given referral for SW and psych services, but asked if [facility] can help patient get an appointment with someone for talk therapy as soon as possible? PATTI responded back to the provider that she can outreach and go over [facility] options with the patient based on insurance coverage. PATTI also informed the provider that the patient will need to set up her own appointment as we cannot do that for patients. Per chart, [facility] outreach patient today to place her on a waitlist. DANUTA notes [facility] will accept Medicare plan but not secondary Medicaid as it is through [place]. [facility] will outreach patient about this tomorrow. [facility] will continue to be available.

## 2023-11-20 NOTE — ASSESSMENT & PLAN NOTE
Patient seems to be depressed as evidenced by decreased sleep, decreased appetite, decreased interests, low energy levels, decreased concentration, and apparent psychomotor slowing. She endorsed feeling low and high amounts of stress due to her child being in the NICU and her need for getting " trauma therapy"  Per chart review, this is a chronic problem and there have been multiple attempts to start medication on the patient.   Patient was started amitriptyline by joint discussion of Dr. Quang Noriega and her psychiatrist, that patient never started  She was open to start talk therapy first, and then will consider medication  Referral given for psych services/behavioral therapy  Referral given for ,   informed

## 2023-11-20 NOTE — ASSESSMENT & PLAN NOTE
This is a chronic problem   Patient has no acute complaints today. She needed a refill for her albuterol.     She endorses occasional use of the inhaler only

## 2023-11-20 NOTE — ASSESSMENT & PLAN NOTE
Patient indicated that she feels her eyes are " crossing over" a lot more than they usually do.   There has been little/no change in her vision during this time but she does get uncomfortable due to this problem  On exam, PERRLA, no nystagmus noted  Referral for ophthalmology given

## 2023-11-20 NOTE — PATIENT INSTRUCTIONS
Medicare Preventive Visit Patient Instructions  Thank you for completing your Welcome to Medicare Visit or Medicare Annual Wellness Visit today. Your next wellness visit will be due in one year (11/20/2024). The screening/preventive services that you may require over the next 5-10 years are detailed below. Some tests may not apply to you based off risk factors and/or age. Screening tests ordered at today's visit but not completed yet may show as past due. Also, please note that scanned in results may not display below. Preventive Screenings:  Service Recommendations Previous Testing/Comments   Colorectal Cancer Screening  * Colonoscopy    * Fecal Occult Blood Test (FOBT)/Fecal Immunochemical Test (FIT)  * Fecal DNA/Cologuard Test  * Flexible Sigmoidoscopy Age: 43-73 years old   Colonoscopy: every 10 years (may be performed more frequently if at higher risk)  OR  FOBT/FIT: every 1 year  OR  Cologuard: every 3 years  OR  Sigmoidoscopy: every 5 years  Screening may be recommended earlier than age 39 if at higher risk for colorectal cancer. Also, an individualized decision between you and your healthcare provider will decide whether screening between the ages of 77-80 would be appropriate. Colonoscopy: Not on file  FOBT/FIT: Not on file  Cologuard: Not on file  Sigmoidoscopy: Not on file          Breast Cancer Screening Age: 36 years old  Frequency: every 1-2 years  Not required if history of left and right mastectomy Mammogram: 07/29/2022    Screening Not Indicated  Screening Not Indicated   Cervical Cancer Screening Between the ages of 21-29, pap smear recommended once every 3 years. Between the ages of 32-69, can perform pap smear with HPV co-testing every 5 years.    Recommendations may differ for women with a history of total hysterectomy, cervical cancer, or abnormal pap smears in past. Pap Smear: 04/06/2023    Screening Current  Screening Current   Hepatitis C Screening Once for adults born between 1945 and 1965  More frequently in patients at high risk for Hepatitis C Hep C Antibody: 07/29/2022    Screening Current  Screening Current   Diabetes Screening 1-2 times per year if you're at risk for diabetes or have pre-diabetes Fasting glucose: 85 mg/dL (5/23/2023)  A1C: 5.8 % (7/29/2022)  Screening Current  Screening Current   Cholesterol Screening Once every 5 years if you don't have a lipid disorder. May order more often based on risk factors. Lipid panel: 07/29/2022    Screening Current  Screening Current     Other Preventive Screenings Covered by Medicare:  Abdominal Aortic Aneurysm (AAA) Screening: covered once if your at risk. You're considered to be at risk if you have a family history of AAA. Lung Cancer Screening: covers low dose CT scan once per year if you meet all of the following conditions: (1) Age 48-67; (2) No signs or symptoms of lung cancer; (3) Current smoker or have quit smoking within the last 15 years; (4) You have a tobacco smoking history of at least 20 pack years (packs per day multiplied by number of years you smoked); (5) You get a written order from a healthcare provider. Glaucoma Screening: covered annually if you're considered high risk: (1) You have diabetes OR (2) Family history of glaucoma OR (3)  aged 48 and older OR (3)  American aged 72 and older  Osteoporosis Screening: covered every 2 years if you meet one of the following conditions: (1) You're estrogen deficient and at risk for osteoporosis based off medical history and other findings; (2) Have a vertebral abnormality; (3) On glucocorticoid therapy for more than 3 months; (4) Have primary hyperparathyroidism; (5) On osteoporosis medications and need to assess response to drug therapy. Last bone density test (DXA Scan): Not on file. HIV Screening: covered annually if you're between the age of 14-79. Also covered annually if you are younger than 13 and older than 72 with risk factors for HIV infection. For pregnant patients, it is covered up to 3 times per pregnancy. Immunizations:  Immunization Recommendations   Influenza Vaccine Annual influenza vaccination during flu season is recommended for all persons aged >= 6 months who do not have contraindications   Pneumococcal Vaccine   * Pneumococcal conjugate vaccine = PCV13 (Prevnar 13), PCV15 (Vaxneuvance), PCV20 (Prevnar 20)  * Pneumococcal polysaccharide vaccine = PPSV23 (Pneumovax) Adults 77-43 yo with certain risk factors or if 69+ yo  If never received any pneumonia vaccine: recommend Prevnar 20 (PCV20)  Give PCV20 if previously received 1 dose of PCV13 or PPSV23   Hepatitis B Vaccine 3 dose series if at intermediate or high risk (ex: diabetes, end stage renal disease, liver disease)   Respiratory syncytial virus (RSV) Vaccine - COVERED BY MEDICARE PART D  * RSVPreF3 (Arexvy) CDC recommends that adults 61years of age and older may receive a single dose of RSV vaccine using shared clinical decision-making (SCDM)   Tetanus (Td) Vaccine - COST NOT COVERED BY MEDICARE PART B Following completion of primary series, a booster dose should be given every 10 years to maintain immunity against tetanus. Td may also be given as tetanus wound prophylaxis. Tdap Vaccine - COST NOT COVERED BY MEDICARE PART B Recommended at least once for all adults. For pregnant patients, recommended with each pregnancy. Shingles Vaccine (Shingrix) - COST NOT COVERED BY MEDICARE PART B  2 shot series recommended in those 19 years and older who have or will have weakened immune systems or those 50 years and older     Health Maintenance Due:      Topic Date Due   • Cervical Cancer Screening  04/06/2028   • HIV Screening  Completed   • Hepatitis C Screening  Completed     Immunizations Due:      Topic Date Due   • COVID-19 Vaccine (3 - Pfizer series) 11/14/2021   • Influenza Vaccine (1) 09/01/2023     Advance Directives   What are advance directives?   Advance directives are legal documents that state your wishes and plans for medical care. These plans are made ahead of time in case you lose your ability to make decisions for yourself. Advance directives can apply to any medical decision, such as the treatments you want, and if you want to donate organs. What are the types of advance directives? There are many types of advance directives, and each state has rules about how to use them. You may choose a combination of any of the following:  Living will: This is a written record of the treatment you want. You can also choose which treatments you do not want, which to limit, and which to stop at a certain time. This includes surgery, medicine, IV fluid, and tube feedings. Durable power of  for [facility]): This is a written record that states who you want to make healthcare choices for you when you are unable to make them for yourself. This person, called a proxy, is usually a family member or a friend. You may choose more than 1 proxy. Do not resuscitate (DNR) order:  A DNR order is used in case your heart stops beating or you stop breathing. It is a request not to have certain forms of treatment, such as CPR. A DNR order may be included in other types of advance directives. Medical directive: This covers the care that you want if you are in a coma, near death, or unable to make decisions for yourself. You can list the treatments you want for each condition. Treatment may include pain medicine, surgery, blood transfusions, dialysis, IV or tube feedings, and a ventilator (breathing machine). Values history: This document has questions about your views, beliefs, and how you feel and think about life. This information can help others choose the care that you would choose. Why are advance directives important? An advance directive helps you control your care. Although spoken wishes may be used, it is better to have your wishes written down.  Spoken wishes can be misunderstood, or not followed. Treatments may be given even if you do not want them. An advance directive may make it easier for your family to make difficult choices about your care. Weight Management   Why it is important to manage your weight:  Being overweight increases your risk of health conditions such as heart disease, high blood pressure, type 2 diabetes, and certain types of cancer. It can also increase your risk for osteoarthritis, sleep apnea, and other respiratory problems. Aim for a slow, steady weight loss. Even a small amount of weight loss can lower your risk of health problems. How to lose weight safely:  A safe and healthy way to lose weight is to eat fewer calories and get regular exercise. You can lose up about 1 pound a week by decreasing the number of calories you eat by 500 calories each day. Healthy meal plan for weight management:  A healthy meal plan includes a variety of foods, contains fewer calories, and helps you stay healthy. A healthy meal plan includes the following:  Eat whole-grain foods more often. A healthy meal plan should contain fiber. Fiber is the part of grains, fruits, and vegetables that is not broken down by your body. Whole-grain foods are healthy and provide extra fiber in your diet. Some examples of whole-grain foods are whole-wheat breads and pastas, oatmeal, brown rice, and bulgur. Eat a variety of vegetables every day. Include dark, leafy greens such as spinach, kale, mundo greens, and mustard greens. Eat yellow and orange vegetables such as carrots, sweet potatoes, and winter squash. Eat a variety of fruits every day. Choose fresh or canned fruit (canned in its own juice or light syrup) instead of juice. Fruit juice has very little or no fiber. Eat low-fat dairy foods. Drink fat-free (skim) milk or 1% milk. Eat fat-free yogurt and low-fat cottage cheese. Try low-fat cheeses such as mozzarella and other reduced-fat cheeses.   Choose meat and other protein foods that are low in fat. Choose beans or other legumes such as split peas or lentils. Choose fish, skinless poultry (chicken or turkey), or lean cuts of red meat (beef or pork). Before you cook meat or poultry, cut off any visible fat. Use less fat and oil. Try baking foods instead of frying them. Add less fat, such as margarine, sour cream, regular salad dressing and mayonnaise to foods. Eat fewer high-fat foods. Some examples of high-fat foods include french fries, doughnuts, ice cream, and cakes. Eat fewer sweets. Limit foods and drinks that are high in sugar. This includes candy, cookies, regular soda, and sweetened drinks. Exercise:  Exercise at least 30 minutes per day on most days of the week. Some examples of exercise include walking, biking, dancing, and swimming. You can also fit in more physical activity by taking the stairs instead of the elevator or parking farther away from stores. Ask your healthcare provider about the best exercise plan for you. © Copyright [employer] 2018 Information is for End User's use only and may not be sold, redistributed or otherwise used for commercial purposes.  All illustrations and images included in CareNotes® are the copyrighted property of [employer] or [address] [place] [address]

## 2023-11-20 NOTE — PROGRESS NOTES
Assessment and Plan:     Problem List Items Addressed This Visit       Depression     Patient seems to be depressed as evidenced by decreased sleep, decreased appetite, decreased interests, low energy levels, decreased concentration, and apparent psychomotor slowing. She endorsed feeling low and high amounts of stress due to her child being in the NICU and her need for getting " trauma therapy"  Per chart review, this is a chronic problem and there have been multiple attempts to start medication on the patient. Patient was started amitriptyline by joint discussion of Dr. Luis Nolasco and her psychiatrist, that patient never started  She was open to start talk therapy first, and then will consider medication  Referral given for psych services/behavioral therapy  Referral given for ,   informed         Relevant Orders    Ambulatory Referral to Social Work Care Management Program    Ambulatory referral to [employer]    Encounter for immunization - Primary    Relevant Orders    influenza vaccine, quadrivalent, 0.5 mL, preservative-free, for adult and pediatric patients 6 mos+ (AFLURIA, FLUARIX, FLULAVAL, FLUZONE) (Completed)    Acute bronchitis     This is a chronic problem   Patient has no acute complaints today. She needed a refill for her albuterol. She endorses occasional use of the inhaler only         Relevant Medications    albuterol (Proventil HFA) 90 mcg/act inhaler    Vision problem     Patient indicated that she feels her eyes are " crossing over" a lot more than they usually do. There has been little/no change in her vision during this time but she does get uncomfortable due to this problem  On exam, PERRLA, no nystagmus noted  Referral for ophthalmology given         Relevant Orders    Ambulatory Referral to Ophthalmology     BMI Counseling: Body mass index is 53.69 kg/m².  The BMI is above normal. Nutrition recommendations include decreasing portion sizes, encouraging healthy choices of fruits and vegetables, limiting drinks that contain sugar and reducing intake of saturated and trans fat. Exercise recommendations include exercising 3-5 times per week. Rationale for BMI follow-up plan is due to patient being overweight or obese. Preventive health issues were discussed with patient, and age appropriate screening tests were ordered as noted in patient's After Visit Summary. Personalized health advice and appropriate referrals for health education or preventive services given if needed, as noted in patient's After Visit Summary. History of Present Illness:     Patient presents for a Medicare Wellness Visit    29year old female, known case of epilepsy, iron deficiency anemia, hypothyroidisim and asthma came to the office to establish care. She has no acute complaints today except feeling down and depressed. Her baby, Deon Patterson is admitted in the NICU since birth. He was born at 25 weeks and is currently at [facility]. She admits to skipping meals, lack of sleep and being extremely stressed out because of him. She has no social support system, lives with a room mate who is supportive. She does not drink, do drugs or smoke. Patient Care Team:  [facility], DO as PCP - General (Family Medicine)  Dewey Tesfaye. MD Parminder as PCP - PCP-[facility] ([facility])  Dewey Tesfaye. MD Parminder as PCP - PCP-[facility] ([facility])     Review of Systems:     Review of Systems   Constitutional:  Negative for chills and fever. HENT:  Negative for ear pain and sore throat. Eyes:  Negative for pain and visual disturbance. Respiratory:  Negative for cough and shortness of breath. Cardiovascular:  Negative for chest pain and palpitations. Gastrointestinal:  Negative for abdominal pain and vomiting. Endocrine: Negative. Genitourinary:  Negative for dysuria and hematuria. Musculoskeletal:  Negative for arthralgias and back pain. Skin:  Negative for color change and rash. Neurological:  Negative for seizures and syncope. Psychiatric/Behavioral:  Positive for decreased concentration and sleep disturbance. The patient is nervous/anxious. All other systems reviewed and are negative.        Problem List:     Patient Active Problem List   Diagnosis    Depression    Nonintractable epilepsy without status epilepticus ([address])    History of irregular menstrual bleeding    PCOS (polycystic ovarian syndrome)    Infertility, female    Spain's esophagus    Gastric ulcer    GARIMA (obstructive sleep apnea)    Female infertility    Autism    Bipolar depression ([address])    Morbid obesity ([address])    Obesity affecting pregnancy in second trimester    Gastroesophageal reflux disease    Excessive daytime sleepiness    Iron deficiency anemia secondary to inadequate dietary iron intake    Constipation    Dysphagia    Hypothyroid in pregnancy, antepartum     (spontaneous vaginal delivery)    Chronic constipation    Nausea/vomiting in pregnancy    Seizures ([address])    Obesity in pregnancy    History of stillbirth in currently pregnant patient, unspecified trimester    Vaginal bleeding before 22 weeks gestation    Maternal morbid obesity, antepartum ([address])    Short stature    Family history of congenital heart defect    Hypothyroidism during pregnancy in second trimester    History of stillbirth in currently pregnant patient, second trimester    Previous child with congenital anomaly, currently pregnant, antepartum    Chorioamnionitis in second trimester    Rh negative state in antepartum period    Request for sterilization    Encounter for immunization    Acute bronchitis    Vision problem      Past Medical and Surgical History:     Past Medical History:   Diagnosis Date    Abnormal Pap smear of cervix     Anemia     Asthma     Autism     Spain esophagus     CPAP (continuous positive airway pressure) dependence     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[address])     not diagnosed as of 23-trying to R/O    Depression Diabetes mellitus ([address])     Disease of thyroid gland     hypo    Dysphagia     solids and liquids    Female infertility     Fibromyalgia, primary     Gastric ulcer     History of bronchitis     Hypothyroidism     Iron deficiency anemia     infusion in 11/2022    Irregular menses     Miscarriage     Morbid obesity with BMI of 50.0-59.9, adult ([address])     Plantar fasciitis of left foot     Polycystic ovary syndrome     Reflux esophagitis     Seizures ([address])     last one 7/13/22    Sleep apnea     CPAP    Wears glasses      Past Surgical History:   Procedure Laterality Date    EGD      with Bx due to barretts esophagus    EYE SURGERY Bilateral     lazy eye repair    MT BIOPSY OF LIP N/A 11/10/2022    Procedure: EXCISION BIOPSY SALVARY GLANDS LOWER LIP;  Surgeon: Juan Diego Sanchez MD;  Location: [facility];  Service: ENT    MT CERCLAGE UTERINE CERVIX NONOBSTETRICAL N/A 5/23/2023    Procedure: CERCLAGE CERVICAL;  Surgeon: Jade Hatchet, MD;  Location: [facility];  Service: Obstetrics    MT HYSTEROSCOPY BX ENDOMETRIUM&/POLYPC W/WO D&C N/A 9/22/2021    Procedure: DILATATION AND CURETTAGE (D&C) WITH HYSTEROSCOPY;  Surgeon: Julissa Lezama MD;  Location: [facility];  Service: Gynecology    WISDOM TOOTH EXTRACTION        Family History:     Family History   Problem Relation Age of Onset    Bipolar disorder Mother     Depression Mother     Depression Father     Diabetes Maternal Grandmother     Thyroid disease Maternal Grandmother     Hypertension Maternal Grandmother     Heart disease Maternal Grandmother     Diabetes Maternal Grandfather     Diabetes Paternal Grandmother     Breast cancer Paternal Grandmother     Stroke Paternal Grandmother     Diabetes Paternal Grandfather     Breast cancer Maternal Aunt 35        bilateral    Stomach cancer Maternal Aunt       Social History:     Social History     Socioeconomic History    Marital status: Single     Spouse name: None    Number of children: None    Years of education: None Highest education level: None   Occupational History    None   Tobacco Use    Smoking status: Never    Smokeless tobacco: Never   Vaping Use    Vaping Use: Never used   Substance and Sexual Activity    Alcohol use: Not Currently     Comment: occ    Drug use: Not Currently     Types: Marijuana     Comment: about a couple times a week, quit June 2022    Sexual activity: Not Currently     Partners: Male     Birth control/protection: Abstinence     Comment: Trying to conceive for 2 years now   Other Topics Concern    None   Social History Narrative    None     Social Determinants of Health     Financial Resource Strain: Medium Risk (11/20/2023)    Overall Financial Resource Strain (CARDIA)     Difficulty of Paying Living Expenses: Somewhat hard   Food Insecurity: No Food Insecurity (6/6/2023)    Hunger Vital Sign     Worried About Running Out of Food in the Last Year: Never true     [address] in the Last Year: Never true   Transportation Needs: Unmet Transportation Needs (11/20/2023)    PRAPARE - Transportation     Lack of Transportation (Medical): Yes     Lack of Transportation (Non-Medical): Yes   Physical Activity: Not on file   Stress: Not on file   Social Connections: Not on file   Intimate Partner Violence: Not on file   Housing Stability: [address]  (6/6/2023)    Housing Stability Vital Sign     Unable to Pay for Housing in the Last Year: No     Number of [address] in the Last Year: 1     Unstable Housing in the Last Year: No      Medications and Allergies:     Current Outpatient Medications   Medication Sig Dispense Refill    acetaminophen (TYLENOL) 500 mg tablet Take 1 tablet (500 mg total) by mouth every 6 (six) hours as needed for mild pain  0    albuterol (Proventil HFA) 90 mcg/act inhaler Inhale 2 puffs every 6 (six) hours as needed for wheezing 18 g 3    esomeprazole (NexIUM) 40 MG capsule Take 1 capsule (40 mg total) by mouth 2 (two) times a day before meals 60 capsule 3    famotidine (PEPCID) 40 MG tablet TAKE ONE TABLET BY MOUTH EVERY DAY AT BEDTIME (GENERIC FOR PEPCID) 30 tablet 5    ferrous sulfate 324 (65 Fe) mg Take 324 mg by mouth 2 (two) times a day before meals      lamoTRIgine (LaMICtal) 100 mg tablet Take 1 tablet (100 mg total) by mouth 2 (two) times a day Start 1 tab twice daily Aug 4th. 60 tablet 3    lamoTRIgine (LaMICtal) 25 mg tablet Start with 1 tab daily for 1 week, then 1 tab twice daily the 2nd week, then 2 tabs twice daily the 3rd week. 50 tablet 0    levETIRAcetam (KEPPRA) 1000 MG tablet Take 1 tablet (1,000 mg total) by mouth 2 (two) times a day 60 tablet 3    levETIRAcetam (KEPPRA) 500 mg tablet TAKE THREE TABLETS BY MOUTH TWICE DAILY FOR 14 DAYS      amitriptyline (ELAVIL) 10 mg tablet  (Patient not taking: Reported on 10/16/2023)      fluticasone (FLONASE) 50 mcg/act nasal spray 1 spray into each nostril daily (Patient not taking: Reported on 15/35/4710) 9.9 mL 0    folic acid (FOLVITE) 1 mg tablet Take 1 tablet (1 mg total) by mouth daily (Patient not taking: Reported on 7/13/2023) 90 tablet 1    hydrocortisone 1 % cream Apply 1 application.  topically daily as needed for irritation (Patient not taking: Reported on 10/16/2023) 30 g 0    ibuprofen (MOTRIN) 600 mg tablet Take 1 tablet (600 mg total) by mouth every 6 (six) hours (Patient not taking: Reported on 6/15/2023) 30 tablet 0    levothyroxine 25 mcg tablet TAKE ONE TABLET BY MOUTH EVERY DAY IN THE MORNING 30 tablet 2    nystatin (MYCOSTATIN) 500,000 units/5 mL suspension Apply 5 mL (500,000 Units total) to the mouth or throat 4 (four) times a day (Patient not taking: Reported on 6/15/2023) 473 mL 1    ondansetron (ZOFRAN) 4 mg tablet TAKE ONE TABLET BY MOUTH EVERY 8 HOURS AS NEEDED FOR NAUSEA AND VOMITING (Patient not taking: Reported on 6/15/2023) 30 tablet 2    Polyethylene Glycol 3350 (MIRALAX PO) Take by mouth (Patient not taking: Reported on 6/15/2023)      Prenatal Vit-Iron Carbonyl-FA (prenatal multivitamin) TABS Take 1 tablet by mouth daily (Patient not taking: Reported on 7/13/2023)      sertraline (ZOLOFT) 20 mg/mL concentrated solution Take 25 mg by mouth daily      witch hazel-glycerin (TUCKS) topical pad Apply 1 pad. topically every 4 (four) hours as needed for irritation (Patient not taking: Reported on 6/15/2023)  0     No current facility-administered medications for this visit. Allergies   Allergen Reactions    Haloperidol Other (See Comments)     Jaw locks up    Jaw locks up   Jaw locks up    Bee Pollen Other (See Comments)    Penicillins Hives    Pollen Extract Allergic Rhinitis      Immunizations:     Immunization History   Administered Date(s) Administered    COVID-19 PFIZER VACCINE 0.3 ML IM 08/29/2021, 09/19/2021    Influenza, injectable, quadrivalent, preservative free 0.5 mL 12/01/2020, 11/20/2023    Influenza, recombinant, quadrivalent,injectable, preservative free 10/24/2022    Rho (D) Immune Globulin 02/03/2023, 03/30/2023, 06/09/2023    Tdap 05/28/2021    Tuberculin Skin Test-PPD Intradermal 07/27/2022      Health Maintenance:         Topic Date Due    Cervical Cancer Screening  04/06/2028    HIV Screening  Completed    Hepatitis C Screening  Completed         Topic Date Due    COVID-19 Vaccine (3 - Pfizer series) 11/14/2021      Medicare Screening Tests and Risk Assessments:     Alesia Garza is here for her Subsequent Wellness visit. Last Medicare Wellness visit information reviewed, patient interviewed and updates made to the record today. Health Risk Assessment:   Patient rates overall health as fair. Patient feels that their physical health rating is same. Patient is very satisfied with their life. Eyesight was rated as slightly worse. Hearing was rated as same. Patient feels that their emotional and mental health rating is slightly worse. Patients states they are sometimes angry. Patient states they are never, rarely unusually tired/fatigued. Pain experienced in the last 7 days has been some. Patient's pain rating has been 3/10. Patient states that she has experienced weight loss or gain in last 6 months. Depression Screening:   PHQ-9 Score: 16      Fall Risk Screening: In the past year, patient has experienced: history of falling in past year    Number of falls: 1  Injured during fall?: Yes    Feels unsteady when standing or walking?: Yes    Worried about falling?: No      Urinary Incontinence Screening:   Patient has not leaked urine accidently in the last six months. Home Safety:  Patient does not have trouble with stairs inside or outside of their home. Patient has working smoke alarms and has working carbon monoxide detector. Home safety hazards include: none. Nutrition:   Current diet is Regular. Medications:   Patient is currently taking over-the-counter supplements. OTC medications include: see medication list. Patient is able to manage medications. Iron  Keppra for seizures  Lamictal for seizures  Levothyroxine  albuterol      Activities of Daily Living (ADLs)/Instrumental Activities of Daily Living (IADLs):   Walk and transfer into and out of bed and chair?: Yes  Dress and groom yourself?: Yes    Bathe or shower yourself?: Yes    Feed yourself?  Yes  Do your laundry/housekeeping?: Yes  Manage your money, pay your bills and track your expenses?: No  Make your own meals?: Yes    Do your own shopping?: Yes    Previous Hospitalizations:   Any hospitalizations or ED visits within the last 12 months?: Yes    How many hospitalizations have you had in the last year?: 1-2    Hospitalization Comments: [facility] hospitalization    PREVENTIVE SCREENINGS      Cardiovascular Screening:    General: Screening Current      Diabetes Screening:     General: Screening Current      Breast Cancer Screening:     General: Screening Not Indicated and Screening Current      Cervical Cancer Screening:    General: Screening Current      Lung Cancer Screening:     General: Screening Not Indicated Hepatitis C Screening:    General: Screening Current    Screening, Brief Intervention, and Referral to Treatment (SBIRT)    Screening  Typical number of drinks in a day: 0  Typical number of drinks in a week: 0  Interpretation: Low risk drinking behavior. Single Item Drug Screening:  How often have you used an illegal drug (including marijuana) or a prescription medication for non-medical reasons in the past year? never    Single Item Drug Screen Score: 0  Interpretation: Negative screen for possible drug use disorder    No results found. Physical Exam:     /70 (BP Location: Left arm, Patient Position: Sitting, Cuff Size: Large)   Pulse (!) 106   Ht 4' 7" (1.397 m)   Wt 105 kg (231 lb)   SpO2 98%   BMI 53.69 kg/m²     Physical Exam  Vitals and nursing note reviewed. Constitutional:       General: She is not in acute distress. Appearance: She is well-developed. HENT:      Head: Normocephalic and atraumatic. Eyes:      Conjunctiva/sclera: Conjunctivae normal.   Cardiovascular:      Rate and Rhythm: Normal rate and regular rhythm. Heart sounds: No murmur heard. Pulmonary:      Effort: Pulmonary effort is normal. No respiratory distress. Breath sounds: Normal breath sounds. Abdominal:      General: Bowel sounds are normal. There is no distension. Palpations: Abdomen is soft. Tenderness: There is no abdominal tenderness. Musculoskeletal:         General: No swelling. Cervical back: Neck supple. Skin:     General: Skin is warm and dry. Capillary Refill: Capillary refill takes less than 2 seconds. Neurological:      General: No focal deficit present. Mental Status: She is alert and oriented to person, place, and time.    Psychiatric:         Mood and Affect: Mood normal.      Comments: Blunt affect          Jair Jackson MD

## 2023-11-21 ENCOUNTER — PATIENT OUTREACH (OUTPATIENT)
Dept: FAMILY MEDICINE CLINIC | Facility: CLINIC | Age: 33
End: 2023-11-21

## 2023-11-21 DIAGNOSIS — Z74.2 NEEDS ASSISTANCE WHILE AT HOME: Primary | ICD-10-CM

## 2023-11-21 NOTE — PROGRESS NOTES
[facility] had called the patient via phone. [facility] had introduced herself and reason for consult. [facility] asked patient how she was doing. Patient reported she is doing the best she can. Patient stated she has PTSD, anxiety, and MDD. [facility] discussed with this patient her MH options. Patient reported that she goes to [facility] [phone]. Patient reported her counselor, Yosijohanny Waddell is going out on maternity leave in July 2024. Patient stated they are trying to find her another counselor but there is no one right now. Patient stated she has a psychiatrist, Kelly Haas that she sees every other month. [facility] had suggested patient set up services with the [facility] and [address] [place] [address] Drive of [place] [phone]. [facility] also suggested the patient reach back out to [address] [place] [address] about that referral. Patient advised [facility] email her the information at [email or web address]. [facility] agreed. Patient denied any hx of D&A use. Patient reported she lives with best friend, Alex Calvo. Patient reported her main support is Alex Calvo. Patient reported she gets SSD $1, 403. Patient denied any issues with medication cost. Patient reported she gets SNAP in the amount of $200-300. Patient reported Alex Calvo provides her with transportation or she uses Uber. Patient reported she is aware of [address] transportation through her Medicaid but does not like it. Patient denied any housing concerns at this time. Patient stated she has a dx of autism and would like to apply for DDD. Patient stated she is not able to do math but can read and write. Patient stated she also would like to apply for MLTSS as she needs help her with ADLs at home. Er chart, patient applied 2 years ago and was denied as she was independent. [facility] is not sure patient would qualify this time around but we can try to apply. [facility] informed patient that she would have her [address] [place] [address] outreach for these assistance.  Patient agreed. Patient denied any other needs at this time. SW had provided her contact information. SWCM advised patient reach out as needed. Patient agreed. Patient consented to continued SW outreach at this time. SWCM added patient to report under socially complex. [facility] had sent in basket to Moni salcedo. [facility] had emailed the patient as discussed. [facility] will continue to f/u.

## 2023-11-22 ENCOUNTER — PATIENT OUTREACH (OUTPATIENT)
Dept: FAMILY MEDICINE CLINIC | Facility: CLINIC | Age: 33
End: 2023-11-22

## 2023-11-22 NOTE — PROGRESS NOTES
Telephone Encpunter  11/22/2023    [facility] received referral for patient needing assistance while at home. [facility] performed chart review. [facility] placed call to patient and introduced herself and her role. [facility] informed patient that referral was placed. Patient aware and agreed to [facility] opening referral.     [facility] performed CHW assessment. Patient stated that she has a child that is in NICU in [place] for 170 days. Patient stated that the NICU has told her that her child may come home in January but he is going to have delays and at the very minimum will come home with a feeding tube. Patient stated that nurses in the NICU has suggested that she get a caregiver for herself because she is not sleeping, eating, drinking or taking her medication regularly. NICU nurses are also concerned about patient holding her baby because she has seizures. [facility] informed patient that Jacqualine Crigler stated patient would want to apply for [place] DDD eligibility and MLTSS. [facility] informed patient the documents that would be needed for DDD application. Patient stated that she has those documents at home but stays in [facility] at the hospital with her son. Patient stated that she is very rarely in town because she does not have transportation back and forth from the hospital that often. [facility] informed patient that she will have to at minimum sign the documents. Patient took [facility] number to call when she will be in town to complete. OC offered to follow up next week to get an update, Patient agreed. [facility] will send link via [employer] for patient, as she did state that she uses [employer] and email. Links for MLTSS and DDD sent.     Next outreach  11/29/2023

## 2023-11-22 NOTE — TELEPHONE ENCOUNTER
11/20 1:56    [facility] left a VM re: Lamotrigine and Keppra. Transcribed vM:   Hi, this is [facility] in [place]. Phone number [phone]. Calling concerning patient [employer]. Date of birth [birth year]. Just wanted to make sure it's okay for her to be on the Lamotrigine with the Levetiracetam. If you could give us a call back, that would be great. Again, [phone]. It looks like Dr. Lorin Mitchell is starting the Lamotrigine and the patient takes Levetiracetam from a different provider. She takes 1500 mg every 12 hours. So if you could give us a call back, that would be great at [phone]. Thank you.  Rasta.

## 2023-11-24 NOTE — TELEPHONE ENCOUNTER
11/21/23:    [facility] called again regarding Lamotrigine script. Calling back due to patient also being on Keppra from Dr. Viviane Whittaker. They want to double check that it is okay for patient to be taking both medications.  Please call [facility] [phone]

## 2023-11-27 ENCOUNTER — TELEPHONE (OUTPATIENT)
Dept: GASTROENTEROLOGY | Facility: CLINIC | Age: 33
End: 2023-11-27

## 2023-11-27 NOTE — TELEPHONE ENCOUNTER
Patient has an ov scheduled on January 18, 2024 with Dr. Live Murillo. Please move pt, the [facility] OV schedule is being closed so Dr. Live Murillo can do procedures at [facility]. Thank you!

## 2023-11-28 ENCOUNTER — PATIENT OUTREACH (OUTPATIENT)
Dept: FAMILY MEDICINE CLINIC | Facility: CLINIC | Age: 33
End: 2023-11-28

## 2023-11-28 NOTE — PROGRESS NOTES
[facility] had discussed this case with Brooklyn Montalvo. Loli Carver informed [facility] that she discussed DDD and MLTSS with this patient. Loli Carver reported that the patient does not seem to have time to get documents for DDD. Loli Carver reported that the patient has been in [place], [place] with her baby that is in the NICU. Loli Carver reported patient's baby will be released possible in January on feeding tube and may have some delays. [facility] had called the patient. SW left a voicemail. [facility] received a call back from this patient. SW discussed the above. Patient reported she is very overwhelmed. Patient stated she maybe home this weekend so can try to get the documents needed for DDD. Patient stated as for MLTSS maybe they can wait until her baby is discharged Feb 23rd, 2024. Patient stated she has a lot of services to put in place for baby before discharge. SW understood and agreed. [facility] told patient to keep her updated on her situation. Patient agreed. [facility] had called Parent as Teacher , Almita Woods informed [facility] that she can [id number] for their program. [facility] thanked [address] and called [employer]. [facility] was transferred to [facility]. [facility] informed [facility] that patient would qualify for Parent as Teacher so to place referral using their intake form. [facility] noted that she has intake form and will fill out and email it back. [facility] had called the patient again. [facility] discussed the Parent as Teacher program. Patient was agreeable to this program. [facility] informed the patient that she would complete intake form and email it to them. Pateint understood. DANUTA completed intake form and emailed it to [email or web address]). [facility] routed note to Loli Carver. DANUTA will continue to f/u.      Addendum:    DANUTA received an email update that patient was already in the [facility] program. [facility] responded back and asked who the  is for this patient. [facility] also asked if patient would still be able to be referred to Parent as Teacher program. [facility] will await response back.

## 2023-11-29 ENCOUNTER — PATIENT OUTREACH (OUTPATIENT)
Dept: FAMILY MEDICINE CLINIC | Facility: CLINIC | Age: 33
End: 2023-11-29

## 2023-11-29 NOTE — PROGRESS NOTES
PATTI had called [employer] and was transferred to Jerrod. Jerrod reported the RN for this patient is Guanaco. Jerrod reported that she would have Guanaco give [facility] a call back. Jerrod reported that patient can only be enrolled in one home program at a time. Jerrod reported that once patient is done with [facility] then will refer her to Parent as Teachers. DANUTA thanked Jerrod for update. PATTI routed note to [facility]. PATTI will continue to f/u.

## 2023-11-29 NOTE — PROGRESS NOTES
Email sent to patient:  Good Morning Margoth! I attempted to reach you today. I hope all is well! Please find a list of required documents needed for [place] DDD application that we spoke about. Documentation of Age, [place], [place] Residency   ? Note: applicant must be a permanent resident of [facility] to apply for services through [employer]. ? Copy of Birth Certificate   ? Copy of Social Security Card   ? Current Photo Identification from [employer]   ? Supplemental Security Income (SSI) Annual Award Letter    These documents can be emailed to me and I can print them to attach to your application. After the application and documents are received and reviewed someone will contact you to schedule an evaluation. Please reach out if you have any questions!   [address] Management 
Telephone Encounter  11/29/2023    [employer] placed a call to patient following up on referral that was placed for patient needing assistance in home. Per last outreach, patient would like to apply for [address] DDD and MLTSS but is not in town often, as she has a child in [facility] NICU. Per SW outreach, patient child is anticipated to be discharged from [facility] on 2/23/24. Per SW, patient may be home this weekend to attempt to obtain documents needed for [address] DDD application. [employer] reached patient voicemail and left a message that the documents could be emailed to [employer]. [employer] sent and email to patient with list of needed documents so patient can have [employer] email to reply to email with documents attached. [employer] left contact phone number if needed as well. [employer] will continue to follow up.      Next Outreach  12/6/2023
Your test results may take 1-3 days. You will get a text/email.  Please check the patient online portal (Dejon and website) for results. You can create a portal account at [email or web address]. [facility] [place] [facility]. If you have old records with [employer] or [facility]  or encounter any difficulties with us you will need to call the HELP line to merge results [id number] (Mon-Fri [id number]-[id number]).    Please follow the instructions on provided coronavirus discharge educational forms and if needed self quarantine for 14 days.     If you test positive for COVID 19:    1. STAY HOME for 14 DAYS  2. Minimize Human contact to ONLY ESSENTIAL  3. Every time you wash your hands, sing the HAPPY BIRTHDAY Song so you know you're washing long enough.  Make sure to scrub the webspace between your fingers.  4. DRINK 1-3 Liters of fluids day x at least 5 days.  To remain hydrated. Your fatigue, lightheadedness, and body aches will decrease and your fever has a better chance of breaking if you are well hydrated.    5. For your Fever and Body aches takes Tylenol 650-100mg every 4-6h (max 4000mg/day). Try not to use ibuprofen, aspirin or naproxen (Advil, Motrin or Aleve) as these may worsen Coronavirus infection.  6. Use an inhaler for mild shortness of breath and cough  7. RETURN TO THE ER IMMEDIATELY IF YOU HAVE WORSENING SHORTNESS OF BREATH  8. TAKE THE FOLLOWING SUPPLEMENTS DAILY.        VITAMIN C 1000MG ONCE DAILY.        VITAMIN D 200IU ONCE DAILY.        ZINC 50MG ONCE DAILY.

## 2023-11-30 ENCOUNTER — PATIENT OUTREACH (OUTPATIENT)
Dept: FAMILY MEDICINE CLINIC | Facility: CLINIC | Age: 33
End: 2023-11-30

## 2023-11-30 ENCOUNTER — PATIENT MESSAGE (OUTPATIENT)
Dept: NEUROLOGY | Facility: CLINIC | Age: 33
End: 2023-11-30

## 2023-11-30 NOTE — PROGRESS NOTES
PATTI had called [employer]. [facility] notes she received a call yesterday from the Supervisor, Clau Ndiaye. DANUTARICHARD was transferred to [employer]. DANUTACM left a voicemail. DANUTA will await call back. DANUTA routed note to [facility]. DANUTA will continue to f/u. Addendum:    PATTI had received a call back from Clau Ndiaye. PATTI spoke with Clau Ndiaye about this patient's capacity to care for herself and baby. Clau Ndiaye reported that she would speak with the [facility] RN, Toña Partida about this concern. Clau Ndiaye stated that she would call back with Toña Partida once in office. PATTI thanked Clau Ndiaye for call. PATTI sent updated noted to Moni salcedo.

## 2023-12-01 ENCOUNTER — TELEPHONE (OUTPATIENT)
Dept: HEMATOLOGY ONCOLOGY | Facility: CLINIC | Age: 33
End: 2023-12-01

## 2023-12-01 ENCOUNTER — PATIENT OUTREACH (OUTPATIENT)
Dept: FAMILY MEDICINE CLINIC | Facility: CLINIC | Age: 33
End: 2023-12-01

## 2023-12-01 ENCOUNTER — TELEPHONE (OUTPATIENT)
Dept: NEUROLOGY | Facility: CLINIC | Age: 33
End: 2023-12-01

## 2023-12-01 NOTE — PROGRESS NOTES
Telephone Encounter  12/1/2023    Patient called [address] [place] [address] 165 and stated that she will not be coming home this weekend to look for documents to complete DDD application. Patient informed [address] [place] [address] that her son, Bogdan Brumfield, is having difficulties at the moment. Patient stated that he is unstable, his heart rate keeps dropping and had to have chest compressions and epinephrine yesterday. Patient stated that a  from [facility] inquired if she was connected with . Patient informed [address] [place] [address] that is not comfortable with [facility] knowing anything regarding her health. [address] [place] [address] asked patient what number she provided them, patient stated OC's contact information was given. Patient stated that she is not comfortable emailing documents for DDD application but will be able to stop in office next week if her son is stable enough to leave the NICU.      Next outreach  12/8/2023

## 2023-12-01 NOTE — TELEPHONE ENCOUNTER
Hello Good afternoon,     Patient has called and is requesting a NIKKI due to difference of opinion. Patient would like to be seen by either  or .  goes to the [place] and pt has [address] so maybe that would work best for her due to [employer]. Rosalino Cruz, Patient is asking if there's anyway you can call back at [phone]. Patient is also requesting a letter as she is unable to hold her child unless she has a letter/ clearance to do so. Patient child is 6 months and is in the NICU.       Thank you all,     Iesha Griggs

## 2023-12-04 ENCOUNTER — PATIENT OUTREACH (OUTPATIENT)
Dept: FAMILY MEDICINE CLINIC | Facility: CLINIC | Age: 33
End: 2023-12-04

## 2023-12-04 NOTE — PROGRESS NOTES
[facility] received incoming voicemail:    Hi, good afternoon, Doris. My name is Bethany Garcia. I'm a new  at [facility] and I'm calling in regards to [facility]. She gave me your number and I wanted the opportunity to chat with you regarding your agency and your conversation with with Toña  as we are caring for her baby and we understand that you have promised her some resources and in home care nurses if you could give me a call back, my number is [phone]. Thank you. [facility] [employer] will send message to Holly Robles to address.

## 2023-12-04 NOTE — TELEPHONE ENCOUNTER
Morris Luong Morning,     Can you please advise if ok for patient to be seen in [address]? Pt has Medicare a and b as primary and has [address] [facility] as secondary?         Thank you in advance,     Marnie Pisano         Primary Coverage    Payer Plan Sponsor Code Group Number Group Name   MEDICARE MEDICARE A AND B        Primary Subscriber    ID Name [facility] Address   [id number] [address] [id number] [address]     Secondary Coverage    Payer Plan Sponsor Code Group Number Group Name   [address]  [id number]      Secondary Subscriber      ID Name [facility] Address   [id number] CATHY VARELA [id number]

## 2023-12-05 ENCOUNTER — PATIENT OUTREACH (OUTPATIENT)
Dept: FAMILY MEDICINE CLINIC | Facility: CLINIC | Age: 33
End: 2023-12-05

## 2023-12-05 NOTE — TELEPHONE ENCOUNTER
Patient called if she can schedule NIKKI with Dr Liz Norris. Let her know we are waiting for Dr Liz Norris response and will call her back when to schedule.

## 2023-12-05 NOTE — PROGRESS NOTES
PATTI had called [address] [facility]. Per chart, [facility] has called Yolanda Caino about this patient and baby in NICU. DANUTA had introduced herself. PATTI explained to [facility] her role as well as Demetris Vargas. PATTI informed Edda that they are working on DDD and MLTSS. DANUTACM explained what DDD and MLTSS is to [facility]. DANUTA informed [facility] that the patient has a [facility] RN, Dulce You who does check in on that patient and baby. SWCM reported concerns due to patient's autism and comprehension. DANUTA informed Edda that [facility] can see the baby once discharge. [facility] reported baby discharge date is tentative at this time but "he is a fighter." DANUTA offered a 3-way call between herself, [facility] and the patient if needed. [facility] thanked [facility] for the information and would discuss with the patient. DANUTA routed note to Demetris Vargas. PATTI will continue to f/u.

## 2023-12-06 ENCOUNTER — TELEPHONE (OUTPATIENT)
Dept: PSYCHIATRY | Facility: CLINIC | Age: 33
End: 2023-12-06

## 2023-12-06 ENCOUNTER — TELEPHONE (OUTPATIENT)
Dept: NEUROLOGY | Facility: CLINIC | Age: 33
End: 2023-12-06

## 2023-12-06 NOTE — TELEPHONE ENCOUNTER
I spoke to Patient and informed her that Dr. Kristen Kohli is going to fit her in today at 4 pm however he will not write her a letter stating she can hold her baby. Patient stated she cannot come to todays appointment because she is in [facility]. I was able to put Patient on Dr. Natalya Lagos schedule for Feb 1st at 11:30am for an appointment. I did inform Patient that Dr. Darline Logan has stated she will not write a letter either. Patient stated that she better be able to get her medication refills and I informed Patient  that would be a decision of the provider.

## 2023-12-06 NOTE — TELEPHONE ENCOUNTER
Patient called stating she believes her HIPAA was violated because she stated Jaylin Asher got the last office visit from [facility] and now she can't hold her baby. No notations found that any information was disclosed. Patient requesting a call back from a supervisor. Please call back.

## 2023-12-06 NOTE — TELEPHONE ENCOUNTER
----- Message from Manav Eric sent at 12/6/2023 10:55 AM EST -----  Regarding: FW: Calling in  Contact: [phone]    ----- Message -----  From: Jhoana Hines  Sent: 12/6/2023  10:53 AM EST  To: [facility]  Subject: Calling in                                       I have been trying to call into the office all morning. I finally got through and then the call dropped. Now the estimated wait time is 47 minutes.  Can an office staff member please call me? [phone]

## 2023-12-06 NOTE — TELEPHONE ENCOUNTER
Hello,     I have Called pt no answer, LMOM  as per below  and  have approved the NIKKI we can now schedule OVL with  in [facility], 03/13/20224, 8 am has been placed on hold as it would be the first available OVL and we can then place on the waiting list.    Please inform me when patient calls back.     Thank you,     Bruce Sterling

## 2023-12-06 NOTE — TELEPHONE ENCOUNTER
I did phone Patient Re: emilie messages about the letter. I informed the Patient that she cancelled her appointment with Dr. Chelsea Merida because she wanted to wait for Dr. Patricia Matos appointment. At which time she stated she was waiting for a call back to schedule with Dr. Merritt Franks. I informed the Patient that Dr. Merritt Franks is only in the [place] office once or twice a month. Patient stated that she was told by the manager that a letter would be written. I stated to the Patient that I would discuss this with the manager and contact her once I have more information.

## 2023-12-06 NOTE — TELEPHONE ENCOUNTER
I spoke to Patient and informed her that Dr. Alis Agustin is going to fit her in today at 4 pm however he will not write her a letter stating she can hold her baby. Patient stated she cannot come to todays appointment because she is in [facility]. I was able to put Patient on Dr. Josr Soriano schedule for Feb 1st at 11:30am for an appointment. I did inform Patient that Dr. Lorin Mitchell has stated she will not write a letter either. Patient stated that she better be able to get her medication refills and I informed Patient  that would be a decision of the provider.

## 2023-12-06 NOTE — TELEPHONE ENCOUNTER
----- Message from Annemarie Ji sent at 12/6/2023 10:55 AM EST -----  Regarding: FW: Calling in  Contact: [phone]    ----- Message -----  From: Tobi John  Sent: 12/6/2023  10:53 AM EST  To: Neurology [facility] Clinical  Subject: Calling in                                       I have been trying to call into the office all morning. I finally got through and then the call dropped. Now the estimated wait time is 47 minutes.  Can an office staff member please call me? [phone]

## 2023-12-07 ENCOUNTER — APPOINTMENT (OUTPATIENT)
Dept: LAB | Facility: HOSPITAL | Age: 33
End: 2023-12-07
Attending: PSYCHIATRY & NEUROLOGY
Payer: MEDICARE

## 2023-12-07 ENCOUNTER — TELEPHONE (OUTPATIENT)
Dept: FAMILY MEDICINE CLINIC | Facility: CLINIC | Age: 33
End: 2023-12-07

## 2023-12-07 ENCOUNTER — TELEPHONE (OUTPATIENT)
Dept: OTHER | Facility: HOSPITAL | Age: 33
End: 2023-12-07

## 2023-12-07 DIAGNOSIS — D50.8 IRON DEFICIENCY ANEMIA SECONDARY TO INADEQUATE DIETARY IRON INTAKE: ICD-10-CM

## 2023-12-07 LAB
BASOPHILS # BLD AUTO: 0.1 THOUSANDS/ÂΜL (ref 0–0.1)
BASOPHILS NFR BLD AUTO: 1 % (ref 0–1)
EOSINOPHIL # BLD AUTO: 0.04 THOUSAND/ÂΜL (ref 0–0.61)
EOSINOPHIL NFR BLD AUTO: 1 % (ref 0–6)
ERYTHROCYTE [DISTWIDTH] IN BLOOD BY AUTOMATED COUNT: 14.6 % (ref 11.6–15.1)
FERRITIN SERPL-MCNC: 22 NG/ML (ref 11–307)
HCT VFR BLD AUTO: 37 % (ref 34.8–46.1)
HGB BLD-MCNC: 11.9 G/DL (ref 11.5–15.4)
IMM GRANULOCYTES # BLD AUTO: 0.02 THOUSAND/UL (ref 0–0.2)
IMM GRANULOCYTES NFR BLD AUTO: 0 % (ref 0–2)
IRON SATN MFR SERPL: 18 % (ref 15–50)
IRON SERPL-MCNC: 66 UG/DL (ref 50–212)
LYMPHOCYTES # BLD AUTO: 1.28 THOUSANDS/ÂΜL (ref 0.6–4.47)
LYMPHOCYTES NFR BLD AUTO: 19 % (ref 14–44)
MCH RBC QN AUTO: 27.5 PG (ref 26.8–34.3)
MCHC RBC AUTO-ENTMCNC: 32.2 G/DL (ref 31.4–37.4)
MCV RBC AUTO: 86 FL (ref 82–98)
MONOCYTES # BLD AUTO: 0.45 THOUSAND/ÂΜL (ref 0.17–1.22)
MONOCYTES NFR BLD AUTO: 7 % (ref 4–12)
NEUTROPHILS # BLD AUTO: 5.02 THOUSANDS/ÂΜL (ref 1.85–7.62)
NEUTS SEG NFR BLD AUTO: 72 % (ref 43–75)
NRBC BLD AUTO-RTO: 0 /100 WBCS
PLATELET # BLD AUTO: 377 THOUSANDS/UL (ref 149–390)
PMV BLD AUTO: 8.9 FL (ref 8.9–12.7)
RBC # BLD AUTO: 4.32 MILLION/UL (ref 3.81–5.12)
TIBC SERPL-MCNC: 369 UG/DL (ref 250–450)
UIBC SERPL-MCNC: 303 UG/DL (ref 155–355)
WBC # BLD AUTO: 6.91 THOUSAND/UL (ref 4.31–10.16)

## 2023-12-07 PROCEDURE — 83540 ASSAY OF IRON: CPT

## 2023-12-07 PROCEDURE — 82728 ASSAY OF FERRITIN: CPT

## 2023-12-07 PROCEDURE — 83550 IRON BINDING TEST: CPT

## 2023-12-07 PROCEDURE — 85025 COMPLETE CBC W/AUTO DIFF WBC: CPT

## 2023-12-07 NOTE — TELEPHONE ENCOUNTER
Patient is calling because she needs some changes to the letter that was written by Dr. Burma Romberg. It needs to say that if patient should have a seizure, that she has to wait 2-7 days before holding her son. Also needs to state that 2 weeks later, she may hold baby unsupervised. Please make these adjustments to the letter.  Patient will be in to  letter on 12/8/23 around 2pm.

## 2023-12-07 NOTE — LETTER
December 7, 2023     Patient: Bandar Rizo  YOB: 1990  Date of Visit: 12/7/2023      To Whom it May Concern:    Bandar Rizo is under my professional care. If patient should have a seizure, that she has to wait 2-7 days before holding her son. Also needs to state that 2 weeks later, she may hold baby unsupervised     If you have any questions or concerns, please don't hesitate to call.          Sincerely,          Anola Mcburney, DO        CC: No Recipients

## 2023-12-08 ENCOUNTER — PATIENT OUTREACH (OUTPATIENT)
Dept: FAMILY MEDICINE CLINIC | Facility: CLINIC | Age: 33
End: 2023-12-08

## 2023-12-08 ENCOUNTER — OFFICE VISIT (OUTPATIENT)
Dept: HEMATOLOGY ONCOLOGY | Facility: MEDICAL CENTER | Age: 33
End: 2023-12-08
Payer: MEDICARE

## 2023-12-08 ENCOUNTER — TELEPHONE (OUTPATIENT)
Dept: HEMATOLOGY ONCOLOGY | Facility: MEDICAL CENTER | Age: 33
End: 2023-12-08

## 2023-12-08 VITALS
BODY MASS INDEX: 52.07 KG/M2 | OXYGEN SATURATION: 98 % | SYSTOLIC BLOOD PRESSURE: 118 MMHG | WEIGHT: 225 LBS | HEIGHT: 55 IN | RESPIRATION RATE: 18 BRPM | DIASTOLIC BLOOD PRESSURE: 74 MMHG | TEMPERATURE: 98.5 F | HEART RATE: 88 BPM

## 2023-12-08 DIAGNOSIS — E11.69 DIABETES MELLITUS TYPE 2 IN OBESE: ICD-10-CM

## 2023-12-08 DIAGNOSIS — Z87.42 HISTORY OF IRREGULAR MENSTRUAL BLEEDING: ICD-10-CM

## 2023-12-08 DIAGNOSIS — D50.8 IRON DEFICIENCY ANEMIA SECONDARY TO INADEQUATE DIETARY IRON INTAKE: Primary | ICD-10-CM

## 2023-12-08 DIAGNOSIS — E66.9 DIABETES MELLITUS TYPE 2 IN OBESE: ICD-10-CM

## 2023-12-08 PROCEDURE — 99214 OFFICE O/P EST MOD 30 MIN: CPT | Performed by: INTERNAL MEDICINE

## 2023-12-08 RX ORDER — SODIUM CHLORIDE 9 MG/ML
20 INJECTION, SOLUTION INTRAVENOUS ONCE
OUTPATIENT
Start: 2023-12-11

## 2023-12-08 NOTE — PROGRESS NOTES
Telephone Encounter  12/8/2023    [employer] 165 placed a call to patient regarding documents needed to complete DDD application. Per patient, she was not comfortable emailing documents, patient would prefer to bring them to the office. [employer] 165 left voicemail informing patient if she stops in the office to ask  staff for copies, to be placed in [facility] Patient can refer back to list that was sent via email, detailing which documents are needed. [address] [employer] [employer] [address] notes, patient has confirmed receipt of that email. CMOC will continue to reach out.     Next scheduled Outreach  12/15/2023

## 2023-12-08 NOTE — PROGRESS NOTES
Gaby Cook  1990  [facility] [place] [address] [address]. [facility] [id number]    DISCUSSION/SUMMARY:    59-year-old female with anemia. Prior workup was consistent with iron deficiency anemia. Patient has had trouble tolerating oral iron supplements in the past.    Clinically patient's color is pretty good. Recent laboratory tests are okay/acceptable, about the same as before. That being said, Mrs. Zuluaga states feeling exhausted and has other signs of hypoferritinemia. We discussed options. Patient is to be treated with Feraheme. Afterwards, if patient is able, she will work on her diet and try to take the iron supplement a couple times a week monitoring for GI side effects. At this time, patient states that there is no discharge time for her son, patient will be traveling back and forth from [place] to [facility] routinely for the next few weeks. We rediscussed what to monitor for as far as progressive fatigue, decreased activities, chewing ice, restless legs, respiratory issues, dizziness etc. Mrs. Qiu to return in 1 year but this may change depending upon patient's symptoms. Patient knows to call the hematology/oncology office if there are any other questions or concerns. Carefully review your medication list and verify that the list is accurate and up-to-date. Please call the hematology/oncology office if there are medications missing from the list, medications on the list that you are not currently taking or if there is a dosage or instruction that is different from how you're taking that medication.     Patient goals and areas of care: IV iron  Barriers to care:  None   Patient is able to self-care  ______________________________________________________________________________________    Chief Complaint   Patient presents with    Follow-up    History of iron deficiency anemia     History of Present Illness:  33 year-old female previously referred for evaluation of anemia. Mrs. Nagi Porras has had iron deficiency anemia from a number of years, heavy menstrual periods. Has had anemia for a number of years; heavy menstrual periods. Patient returns for follow-up. Patient states feeling quite exhausted. Patient had a 32week old son a number of months ago; son is in the ICU at [facility]. There have been a number of complications so patient is appropriately upset. They travel back and forth has been difficult; patient has not been eating well, not taking any iron supplements. No significant/excessive bruising or bleeding. Patient states that she is chewing ice, has restless legs at night. Review of Systems   Constitutional:  Positive for fatigue. HENT: Negative. Eyes: Negative. Respiratory: Negative. Cardiovascular: Negative. Gastrointestinal: Negative. Endocrine: Negative. Genitourinary: Negative. Musculoskeletal: Negative. Skin: Negative. Allergic/Immunologic: Negative. Neurological: Negative. Hematological: Negative. Psychiatric/Behavioral: Negative. All other systems reviewed and are negative.     Patient Active Problem List   Diagnosis    Depression    Nonintractable epilepsy without status epilepticus ([address])    History of irregular menstrual bleeding    PCOS (polycystic ovarian syndrome)    Infertility, female    Spain's esophagus    Gastric ulcer    GARIMA (obstructive sleep apnea)    Female infertility    Autism    Bipolar depression ([address])    Morbid obesity ([address])    Obesity affecting pregnancy in second trimester    Diabetes mellitus type 2 in obese     Gastroesophageal reflux disease    Excessive daytime sleepiness    Iron deficiency anemia secondary to inadequate dietary iron intake    Constipation    Dysphagia    Hypothyroid in pregnancy, antepartum     (spontaneous vaginal delivery)    Chronic constipation    Nausea/vomiting in pregnancy    Seizures ([address]) Obesity in pregnancy    History of stillbirth in currently pregnant patient, unspecified trimester    Vaginal bleeding before 22 weeks gestation    Maternal morbid obesity, antepartum ([address])    Short stature    Family history of congenital heart defect    Hypothyroidism during pregnancy in second trimester    History of stillbirth in currently pregnant patient, second trimester    Previous child with congenital anomaly, currently pregnant, antepartum    Chorioamnionitis in second trimester    Rh negative state in antepartum period    Request for sterilization    Encounter for immunization    Acute bronchitis    Vision problem     Past Medical History:   Diagnosis Date    Abnormal Pap smear of cervix     Anemia     Anxiety     Asthma     Autism     Spain esophagus     Bipolar disorder ([address])     CPAP (continuous positive airway pressure) dependence     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[address])     not diagnosed as of 1/9/23-trying to R/O    Depression     Diabetes mellitus ([address])     Disease of thyroid gland     hypo    Dysphagia     solids and liquids    Female infertility     Fibromyalgia, primary     Gastric ulcer     History of bronchitis     Hypothyroidism     Iron deficiency anemia     infusion in 11/2022    Irregular menses     Miscarriage     Morbid obesity with BMI of 50.0-59.9, adult ([address])     Plantar fasciitis of left foot     Polycystic ovary syndrome     Reflux esophagitis     Seizures ([address])     last one 7/13/22    Sleep apnea     CPAP    Wears glasses      Past Surgical History:   Procedure Laterality Date    EGD      with Bx due to barretts esophagus    EYE SURGERY Bilateral     lazy eye repair    [place] BIOPSY OF LIP N/A 11/10/2022    Procedure: EXCISION BIOPSY SALVARY GLANDS LOWER LIP;  Surgeon: Giuseppe Vaughan MD;  [facility];  Service: ENT    WV CERCLAGE UTERINE CERVIX NONOBSTETRICAL N/A 5/23/2023    Procedure: CERCLAGE CERVICAL;  Surgeon: Roberto Carlos Hadley MD;  Location: [facility];  Service: Obstetrics    WV HYSTEROSCOPY BX ENDOMETRIUM&/POLYPC W/WO D&C N/A 9/22/2021    Procedure: DILATATION AND CURETTAGE (D&C) WITH HYSTEROSCOPY;  Surgeon: Anish Lowe MD;  Location: [facility];  Service: Gynecology    WISDOM TOOTH EXTRACTION     Past surgical history:  No prior blood transfusions    Family History   Problem Relation Age of Onset    Bipolar disorder Mother     Depression Mother     Depression Father     Diabetes Maternal Grandmother     Thyroid disease Maternal Grandmother     Hypertension Maternal Grandmother     Heart disease Maternal Grandmother     Diabetes Maternal Grandfather     Diabetes Paternal Grandmother     Breast cancer Paternal Grandmother     Stroke Paternal Grandmother     Diabetes Paternal Grandfather     Breast cancer Maternal Aunt 35        bilateral    Stomach cancer Maternal Aunt    Family history:  No known familial or genetic diseases, no children    Social History     Socioeconomic History    Marital status: Single     Spouse name: Not on file    Number of children: Not on file    Years of education: Not on file    Highest education level: Not on file   Occupational History    Not on file   Tobacco Use    Smoking status: Never    Smokeless tobacco: Never   Vaping Use    Vaping Use: Never used   Substance and Sexual Activity    Alcohol use: Not Currently     Comment: occ    Drug use: Not Currently     Types: Marijuana     Comment: about a couple times a week, quit June 2022    Sexual activity: Not Currently     Partners: Male     Birth control/protection: Abstinence     Comment: Trying to conceive for 2 years now   Other Topics Concern    Not on file   Social History Narrative    Not on file     Social Determinants of Health     Financial Resource Strain: Low Risk  (11/21/2023)    Overall Financial Resource Strain (CARDIA)     Difficulty of Paying Living Expenses: Not very hard   Recent Concern: Financial Resource Strain - Medium Risk (11/20/2023)    Overall Financial Resource Strain (CARDIA) Difficulty of Paying Living Expenses: Somewhat hard   Food Insecurity: No Food Insecurity (11/21/2023)    Hunger Vital Sign     Worried About Running Out of Food in the Last Year: Never true     Ran Out of Food in the Last Year: Never true   Transportation Needs: No Transportation Needs (11/21/2023)    PRAPARE - Transportation     Lack of Transportation (Medical): No     Lack of Transportation (Non-Medical):  No   Recent Concern: Transportation Needs - Unmet Transportation Needs (11/20/2023)    PRAPARE - Transportation     Lack of Transportation (Medical): Yes     Lack of Transportation (Non-Medical): Yes   Physical Activity: Not on file   Stress: No Stress Concern Present (11/21/2023)    [facility]     Feeling of Stress : Not at all   Social Connections: Not on file   Intimate Partner Violence: Not on file   Housing Stability: [address]  (6/6/2023)    Housing Stability Vital Sign     Unable to Pay for Housing in the Last Year: No     Number of Places Lived in the Last Year: 1     Unstable Housing in the Last Year: No   Social history: Presently not working, no toxic exposure, no drug, alcohol or tobacco    Current Outpatient Medications:     acetaminophen (TYLENOL) 500 mg tablet, Take 1 tablet (500 mg total) by mouth every 6 (six) hours as needed for mild pain, Disp: , Rfl: 0    albuterol (Proventil HFA) 90 mcg/act inhaler, Inhale 2 puffs every 6 (six) hours as needed for wheezing, Disp: 18 g, Rfl: 3    esomeprazole (NexIUM) 40 MG capsule, Take 1 capsule (40 mg total) by mouth 2 (two) times a day before meals, Disp: 60 capsule, Rfl: 3    famotidine (PEPCID) 40 MG tablet, TAKE ONE TABLET BY MOUTH EVERY DAY AT BEDTIME (GENERIC FOR PEPCID), Disp: 30 tablet, Rfl: 5    ferrous sulfate 324 (65 Fe) mg, Take 324 mg by mouth 2 (two) times a day before meals, Disp: , Rfl:     lamoTRIgine (LaMICtal) 100 mg tablet, Take 1 tablet (100 mg total) by mouth 2 (two) times a day Start 1 tab twice daily Aug 4th. , Disp: 60 tablet, Rfl: 3    lamoTRIgine (LaMICtal) 25 mg tablet, START WITH 1 TABLET BY MOUTH FOR 1 WEEK, THEN 1 TABLET TWICE DAILY THE 2ND WEEK, THEN 2 TABLETS TWICE DAILY THE 3RD WEEK (GENERIC FOR LAMICTAL), Disp: 50 tablet, Rfl: 0    levETIRAcetam (KEPPRA) 1000 MG tablet, Take 1 tablet (1,000 mg total) by mouth 2 (two) times a day, Disp: 60 tablet, Rfl: 3    levETIRAcetam (KEPPRA) 500 mg tablet, TAKE THREE TABLETS BY MOUTH TWICE DAILY FOR 14 DAYS, Disp: , Rfl:     levothyroxine 25 mcg tablet, TAKE ONE TABLET BY MOUTH EVERY DAY IN THE MORNING, Disp: 30 tablet, Rfl: 2    amitriptyline (ELAVIL) 10 mg tablet, , Disp: , Rfl:     fluticasone (FLONASE) 50 mcg/act nasal spray, 1 spray into each nostril daily (Patient not taking: Reported on 10/16/2023), Disp: 9.9 mL, Rfl: 0    folic acid (FOLVITE) 1 mg tablet, Take 1 tablet (1 mg total) by mouth daily (Patient not taking: Reported on 7/13/2023), Disp: 90 tablet, Rfl: 1    hydrocortisone 1 % cream, Apply 1 application.  topically daily as needed for irritation (Patient not taking: Reported on 10/16/2023), Disp: 30 g, Rfl: 0    ibuprofen (MOTRIN) 600 mg tablet, Take 1 tablet (600 mg total) by mouth every 6 (six) hours (Patient not taking: Reported on 6/15/2023), Disp: 30 tablet, Rfl: 0    nystatin (MYCOSTATIN) 500,000 units/5 mL suspension, Apply 5 mL (500,000 Units total) to the mouth or throat 4 (four) times a day (Patient not taking: Reported on 6/15/2023), Disp: 473 mL, Rfl: 1    ondansetron (ZOFRAN) 4 mg tablet, TAKE ONE TABLET BY MOUTH EVERY 8 HOURS AS NEEDED FOR NAUSEA AND VOMITING (Patient not taking: Reported on 6/15/2023), Disp: 30 tablet, Rfl: 2    Polyethylene Glycol 3350 (MIRALAX PO), Take by mouth (Patient not taking: Reported on 6/15/2023), Disp: , Rfl:     Prenatal Vit-Iron Carbonyl-FA (prenatal multivitamin) TABS, Take 1 tablet by mouth daily (Patient not taking: Reported on 7/13/2023), Disp: , Rfl: sertraline (ZOLOFT) 20 mg/mL concentrated solution, Take 25 mg by mouth daily, Disp: , Rfl:     witch hazel-glycerin (TUCKS) topical pad, Apply 1 pad. topically every 4 (four) hours as needed for irritation (Patient not taking: Reported on 6/15/2023), Disp: , Rfl: 0    Allergies   Allergen Reactions    Haloperidol Other (See Comments)     Jaw locks up    Jaw locks up   Jaw locks up    Bee Pollen Other (See Comments)    Penicillins Hives    Pollen Extract Allergic Rhinitis       Vitals:    12/08/23 0837   BP: 118/74   Pulse: 88   Resp: 18   Temp: 98.5 °F (36.9 °C)   SpO2: 98%     Physical Exam  Constitutional:       Appearance: She is well-developed. Comments: Obese young female, no respiratory distress, no signs of pain   HENT:      Head: Normocephalic and atraumatic. Right Ear: External ear normal.      Left Ear: External ear normal.   Eyes:      Conjunctiva/sclera: Conjunctivae normal.      Pupils: Pupils are equal, round, and reactive to light. Cardiovascular:      Rate and Rhythm: Normal rate and regular rhythm. Heart sounds: Normal heart sounds. Pulmonary:      Effort: Pulmonary effort is normal.      Breath sounds: Normal breath sounds. Comments: Clear bilaterally  Abdominal:      General: Bowel sounds are normal.      Palpations: Abdomen is soft. Comments: +bowel sounds, nontender, soft, no guarding, can not palpate liver or spleen   Musculoskeletal:         General: Normal range of motion. Cervical back: Normal range of motion and neck supple. Skin:     General: Skin is warm. Comments: Good color, warm, moist, no petechiae or ecchymoses   Neurological:      Mental Status: She is alert and oriented to person, place, and time. Deep Tendon Reflexes: Reflexes are normal and symmetric. Psychiatric:         Behavior: Behavior normal.         Thought Content:  Thought content normal.         Judgment: Judgment normal.     Extremities:  Minimal bilateral lower extremity edema, no cords, pulses are 1+  Lymphatics:  No adenopathy in the neck, supraclavicular region, axilla bilaterally    Labs

## 2023-12-11 ENCOUNTER — TELEPHONE (OUTPATIENT)
Dept: FAMILY MEDICINE CLINIC | Facility: CLINIC | Age: 33
End: 2023-12-11

## 2023-12-11 NOTE — TELEPHONE ENCOUNTER
Vm left on clinical line:    Hi, good afternoon. This is Scheryl Popper and [employer] from [facility]. [facility] and just looking for some clarification regarding a letter that we have. If you could call us back at your earliest convenience, [phone]. We were hoping to get in contact with the doctor, Suri More from [facility]. Thank you so much.

## 2023-12-12 ENCOUNTER — PATIENT OUTREACH (OUTPATIENT)
Dept: FAMILY MEDICINE CLINIC | Facility: CLINIC | Age: 33
End: 2023-12-12

## 2023-12-12 ENCOUNTER — TELEPHONE (OUTPATIENT)
Dept: NEUROLOGY | Facility: CLINIC | Age: 33
End: 2023-12-12

## 2023-12-12 NOTE — PROGRESS NOTES
[facility] had called the patient via phone. [facility] had asked patient how she and the baby are doing. Patient reported she is stressed out. Patient stated [address] [place] [address] has no right to know her medical hx. Patient reported she did provide verbal permission for Madina Worthington to speak with [facility] and/or Clifford Beckman about services provided. Patient stated she still wants to work on DDD and MLTSS. Patient stated she is going home today and will gather documents. [facility] advised patient keep us updated on her situation. Patient agreed. [facility] routed note to Paul Mcintosh. DANUTA will continue to f/u.

## 2023-12-12 NOTE — TELEPHONE ENCOUNTER
Patient called and cancelled all upcoming appointments with all providers in neurology group. Patient is not yet established with new neurologist.  Patient had blood work and her keppra levels were high. Patient would like medical advise on what to do?   Please assist

## 2023-12-12 NOTE — TELEPHONE ENCOUNTER
Pt called to cancel appts with Dr. Jazz Bennett and Dr. Stephanie Clemente that are upcoming. She is going to be going to a different Neuro. Group. Does not want to r/s.

## 2023-12-13 ENCOUNTER — PATIENT MESSAGE (OUTPATIENT)
Dept: NEUROLOGY | Facility: CLINIC | Age: 33
End: 2023-12-13

## 2023-12-13 ENCOUNTER — TELEPHONE (OUTPATIENT)
Dept: OTHER | Facility: HOSPITAL | Age: 33
End: 2023-12-13

## 2023-12-13 DIAGNOSIS — G40.909 NONINTRACTABLE EPILEPSY WITHOUT STATUS EPILEPTICUS, UNSPECIFIED EPILEPSY TYPE (HCC): ICD-10-CM

## 2023-12-13 DIAGNOSIS — G40.909: ICD-10-CM

## 2023-12-13 DIAGNOSIS — G40.909 SEIZURE DISORDER (HCC): Primary | ICD-10-CM

## 2023-12-13 DIAGNOSIS — O99.351: ICD-10-CM

## 2023-12-13 RX ORDER — LEVETIRACETAM 500 MG/1
TABLET ORAL
OUTPATIENT
Start: 2023-12-13

## 2023-12-13 RX ORDER — LEVETIRACETAM 500 MG/1
500 TABLET ORAL DAILY
Qty: 30 TABLET | Refills: 2 | Status: SHIPPED | OUTPATIENT
Start: 2023-12-13

## 2023-12-13 RX ORDER — LEVETIRACETAM 1000 MG/1
1000 TABLET ORAL 2 TIMES DAILY
Qty: 60 TABLET | Refills: 3 | OUTPATIENT
Start: 2023-12-13

## 2023-12-13 NOTE — TELEPHONE ENCOUNTER
Hello Good afternoon,     Patient has called back and is requesting a call back regarding Medication  Dosage. Patient requested a call back at [phone].       Thank you,     Tee Shoemaker

## 2023-12-14 ENCOUNTER — HOSPITAL ENCOUNTER (OUTPATIENT)
Dept: INFUSION CENTER | Facility: HOSPITAL | Age: 33
Discharge: HOME | End: 2023-12-14
Attending: INTERNAL MEDICINE
Payer: MEDICARE

## 2023-12-14 VITALS
SYSTOLIC BLOOD PRESSURE: 106 MMHG | RESPIRATION RATE: 18 BRPM | TEMPERATURE: 98.2 F | DIASTOLIC BLOOD PRESSURE: 52 MMHG | HEART RATE: 94 BPM | OXYGEN SATURATION: 98 %

## 2023-12-14 DIAGNOSIS — Z87.42 HISTORY OF IRREGULAR MENSTRUAL BLEEDING: Primary | ICD-10-CM

## 2023-12-14 DIAGNOSIS — D50.8 IRON DEFICIENCY ANEMIA SECONDARY TO INADEQUATE DIETARY IRON INTAKE: ICD-10-CM

## 2023-12-14 PROCEDURE — 96365 THER/PROPH/DIAG IV INF INIT: CPT

## 2023-12-14 RX ORDER — LEVETIRACETAM 1000 MG/1
1000 TABLET ORAL 2 TIMES DAILY
Qty: 60 TABLET | Refills: 6 | Status: SHIPPED | OUTPATIENT
Start: 2023-12-14

## 2023-12-14 RX ORDER — SODIUM CHLORIDE 9 MG/ML
20 INJECTION, SOLUTION INTRAVENOUS ONCE
Status: CANCELLED | OUTPATIENT
Start: 2023-12-21

## 2023-12-14 RX ORDER — SODIUM CHLORIDE 9 MG/ML
20 INJECTION, SOLUTION INTRAVENOUS ONCE
Status: COMPLETED | OUTPATIENT
Start: 2023-12-14 | End: 2023-12-14

## 2023-12-14 RX ADMIN — FERUMOXYTOL 510 MG: 510 INJECTION INTRAVENOUS at 14:42

## 2023-12-14 RX ADMIN — SODIUM CHLORIDE 20 ML/HR: 0.9 INJECTION, SOLUTION INTRAVENOUS at 14:41

## 2023-12-14 NOTE — TELEPHONE ENCOUNTER
Patient is needing this letter revised as soon as possible. The NICU is not allowing mom to hold the baby until this letter has been printed and stamped.

## 2023-12-14 NOTE — TELEPHONE ENCOUNTER
Called pt and states that she takes keppra 1500 mg bid. Her keppra level was elevated. She wants to know if she should decrease her keppra? Chart reviewed  There is another enc dated 12/12/23-  Advised pt of the below. She verbalized understanding. Pt states that she is running low on the 1000 mg tabs. Requesting script be sent to [address] [place] [address] on file. Rx entered.  Pls review and sign off    thanks    Ray Wing MD   to Jareth Wood RN       12/12/23  5:22 PM   It's only borderline elevated but we can have her reduce keppra dose from 1500mg bid to taking 1500mg at bedtime and 1000mg in morning

## 2023-12-14 NOTE — PROGRESS NOTES
Margoth Cook  tolerated treatment well with no complications.      Margoth Cook is aware of future appt on 12/21 at 1330.     AVS printed and given to Margoth Cook:   No

## 2023-12-14 NOTE — TELEPHONE ENCOUNTER
Pt called back. Keyshawn Campbell is asking for clarification that last letter written-     Salvador Chamberlain is under my professional care. If patient should have a seizure, she needs to wait 2 days before holding her son. She can then hold her son 1 week later. Unsupervised. Meaning she needs to wait 2 days  before holding her son supervised. Dr Jacky Jackson- can you confirm that clarification and then I can call [place] back?

## 2023-12-14 NOTE — TELEPHONE ENCOUNTER
Aldo Henry MD   to Teresa Ring RN       12/12/23  5:22 PM   It's only borderline elevated but we can have her reduce keppra dose from 1500mg bid to taking 1500mg at bedtime and 1000mg in morning.

## 2023-12-14 NOTE — PLAN OF CARE
Problem: Potential for Falls  Goal: Patient will remain free of falls  Description: INTERVENTIONS:  - Educate patient/family on patient safety including physical limitations  - Instruct patient to call for assistance with activity   - Consult OT/PT to assist with strengthening/mobility   - Keep Call bell within reach  - Keep bed low and locked with side rails adjusted as appropriate  - Keep care items and personal belongings within reach  - Initiate and maintain comfort rounds  - Make Fall Risk Sign visible to staff  - Offer Toileting every  Hours, in advance of need  - Initiate/Maintain alarm  - Obtain necessary fall risk management equipment:   - Apply yellow socks and bracelet for high fall risk patients  - Consider moving patient to room near nurses station  Outcome: Progressing

## 2023-12-15 ENCOUNTER — PATIENT OUTREACH (OUTPATIENT)
Dept: FAMILY MEDICINE CLINIC | Facility: CLINIC | Age: 33
End: 2023-12-15

## 2023-12-15 NOTE — PROGRESS NOTES
Telephone Encounter  12/15/2023    [facility] called patient regarding documents needed to complete DDD application. Patient informed [facility] that she planned to come in to office on this day to drop off copies of documents required for application. [facility] informed patient to inform  staff to put in an envelope for [facility]. Patient stated that she will email SS award letter as that document was online. [facility] will follow up on Tuesday, as patient stated she will be in town on that date.      Next outreach  12/19/2023

## 2023-12-15 NOTE — TELEPHONE ENCOUNTER
Neurology will not sign a ladder more specific than what I already sent. Please see my comments to request yesterday/last night. Regarding: [place] weakness, walk a little gets dizzy, but once she sits down she feels okay, for about a week now  ----- Message from Mindy Peters sent at 12/1/2022  8:13 AM CST -----  Patient Name: Cari Servin    Specialist or PCP Name: Dr Emilia Vasquez    Symptoms: weakness, walk a little gets dizzy, but once she sits down she feels okay, for about a week now    Pregnant (females aged 13-60. If Yes, how long?) : n/a    Call Back # : [phone]    Which State are you currently located in?: [place]     Name of Clinic Site / Acct# : [facility]    Use following scripting for patients waiting for a callback:   “Nurse callback times vary based on call volumes; please be aware the return phone call may come from an unidentified phone number. If your symptoms worsen or become life threatening while waiting, you should seek immediate assistance by calling 911 or going to the ER for evaluation.”

## 2023-12-19 ENCOUNTER — PATIENT OUTREACH (OUTPATIENT)
Dept: FAMILY MEDICINE CLINIC | Facility: CLINIC | Age: 33
End: 2023-12-19

## 2023-12-19 NOTE — PROGRESS NOTES
Telephone Encounter  12/19/2023    [facility] placed a call to patient, following up on patient desire to complete DDD application.     [facility] notes patient was uncomfortable emailing, and has made numerous offers to bring documents into office. [facility] has nt received any documents.     [facility] left voicemail. [facility] offered to send application to patient for her to complete at her leisure.     [facility] will continue to follow up.    Next Outreach  12/26/2023

## 2023-12-21 ENCOUNTER — HOSPITAL ENCOUNTER (OUTPATIENT)
Dept: INFUSION CENTER | Facility: HOSPITAL | Age: 33
Discharge: HOME | End: 2023-12-21
Attending: INTERNAL MEDICINE
Payer: MEDICARE

## 2023-12-21 VITALS
DIASTOLIC BLOOD PRESSURE: 65 MMHG | SYSTOLIC BLOOD PRESSURE: 128 MMHG | HEART RATE: 73 BPM | TEMPERATURE: 96.5 F | OXYGEN SATURATION: 99 % | RESPIRATION RATE: 18 BRPM

## 2023-12-21 DIAGNOSIS — Z87.42 HISTORY OF IRREGULAR MENSTRUAL BLEEDING: Primary | ICD-10-CM

## 2023-12-21 DIAGNOSIS — D50.8 IRON DEFICIENCY ANEMIA SECONDARY TO INADEQUATE DIETARY IRON INTAKE: ICD-10-CM

## 2023-12-21 PROCEDURE — 96365 THER/PROPH/DIAG IV INF INIT: CPT

## 2023-12-21 RX ORDER — SODIUM CHLORIDE 9 MG/ML
20 INJECTION, SOLUTION INTRAVENOUS ONCE
Status: COMPLETED | OUTPATIENT
Start: 2023-12-21 | End: 2023-12-21

## 2023-12-21 RX ORDER — SODIUM CHLORIDE 9 MG/ML
20 INJECTION, SOLUTION INTRAVENOUS ONCE
Status: CANCELLED | OUTPATIENT
Start: 2023-12-21

## 2023-12-21 RX ADMIN — SODIUM CHLORIDE 20 ML/HR: 0.9 INJECTION, SOLUTION INTRAVENOUS at 14:30

## 2023-12-21 RX ADMIN — FERUMOXYTOL 510 MG: 510 INJECTION INTRAVENOUS at 14:30

## 2023-12-21 NOTE — PLAN OF CARE
Problem: Potential for Falls  Goal: Patient will remain free of falls  Description: INTERVENTIONS:  - Educate patient/family on patient safety including physical limitations  - Instruct patient to call for assistance with activity   - Keep Call bell within reach  - Keep care items and personal belongings within reach  Outcome: Progressing

## 2023-12-22 DIAGNOSIS — D50.8 IRON DEFICIENCY ANEMIA SECONDARY TO INADEQUATE DIETARY IRON INTAKE: Primary | ICD-10-CM

## 2023-12-22 DIAGNOSIS — E03.9 HYPOTHYROIDISM, UNSPECIFIED TYPE: ICD-10-CM

## 2023-12-23 RX ORDER — LEVOTHYROXINE SODIUM 0.03 MG/1
25 TABLET ORAL EVERY MORNING
Qty: 30 TABLET | Refills: 1 | Status: SHIPPED | OUTPATIENT
Start: 2023-12-23

## 2023-12-23 RX ORDER — FERROUS SULFATE 324(65)MG
324 TABLET, DELAYED RELEASE (ENTERIC COATED) ORAL
Qty: 60 TABLET | Refills: 1 | Status: SHIPPED | OUTPATIENT
Start: 2023-12-23

## 2023-12-26 ENCOUNTER — PATIENT OUTREACH (OUTPATIENT)
Dept: FAMILY MEDICINE CLINIC | Facility: CLINIC | Age: 33
End: 2023-12-26

## 2023-12-26 NOTE — LETTER
12/26/23    Dear Margoth Cook,    I am a Community Health Worker with [address] [address].  I have made several attempts to call you by phone.  It is important that you contact me back at [phone] so that I can assist with your care needs.     Sincerely,     Doris Hughes

## 2023-12-26 NOTE — PROGRESS NOTES
Telephone Encounter  12/26/2023    [facility] called patient, following up on patient desire to complete DDD application.     [facility] unable to reach patient. [facility] left voicemail and will send unable to reach letter via [employer].     [facility] will continue to follow up.     Next Outreach  1/2/2024

## 2023-12-27 ENCOUNTER — PATIENT OUTREACH (OUTPATIENT)
Dept: FAMILY MEDICINE CLINIC | Facility: CLINIC | Age: 33
End: 2023-12-27

## 2023-12-27 ENCOUNTER — TELEPHONE (OUTPATIENT)
Dept: FAMILY MEDICINE CLINIC | Facility: CLINIC | Age: 33
End: 2023-12-27

## 2023-12-27 NOTE — PROGRESS NOTES
[facility] had called the patient via phone. [facility] has asked patient how she is doing. Patient reported she is not doing well. Patient stated she has to rush back to the NICU as baby, Jay is in critical conditions. Patient stated he was placed on oxygen support. Patient stated before that they did compression on Jay. Patient stated she is going through a lot right now mentally, emotionally and physically. [facility] had provided supportive counseling.    [facility] asked the patient if she has followed up with her counselor and/or psychiatrist from [facility] [phone]. Patient stated she spoke with them and they want to stop her antidepressants. Patient stated they told her she was not taking it during and after her pregnancy. Patient stated she needs her antidepressants more than ever. SW advised patient reach out to them and let them know. Patient agreed to do so. Patient stated she has a f/u appointment with them on January 5th, 2024.    [facility] asked the patient if she still wanted to apply for DDD and MLTSS. Patient stated she does and will email the documents for DDD application to [facility] Adriana Hughes when she is back home next weekend. [facility] informed the patient that she will let Adriana know of this. SW advised patient reach out as needed. Patient understood and thanked [facility] for checking in. [facility] routed note to Adriana. [facility] will continue to f/u.

## 2024-01-02 ENCOUNTER — PATIENT OUTREACH (OUTPATIENT)
Dept: FAMILY MEDICINE CLINIC | Facility: CLINIC | Age: 34
End: 2024-01-02

## 2024-01-02 NOTE — PROGRESS NOTES
Telephone Encounter  12/2024    CMOC following up on referral for patient needing assistance while at home. Per previous outreaches patient was interested in applying for [place] DDD and MLTSS.     Patient had her baby prematurely and child is in [facility] NICU with an anticipated discharge date of 2/23/24.     Per patient, she does not drive, but gets rides to [place] to spend time with her baby in the hospital any chance she gets.     Per previous outreaches, patient desired to email SS award letter and would bring documents needed for applications to office such as birth certificate and social security card. Patient has not emailed or brought any documents as of yet.    CMOC spoke with DANUTA Carbajal at our huddle. CMOC and SW agree that CMOC could close referral for now and reopen when patient is able to respond to CMOC to complete DDD application and obtain documents.     CMOC placed a call to patient and reached voicemail explaining that CMOC will close referral and to call when appropriate to re-consult. This is CMOC third unsuccessful attempt to reach patient and UTR letter was sent at last outreach.     CMOC will remove herself from the care team and SW will re-refer when appropriate.     No further outreach

## 2024-01-04 ENCOUNTER — PATIENT OUTREACH (OUTPATIENT)
Dept: FAMILY MEDICINE CLINIC | Facility: CLINIC | Age: 34
End: 2024-01-04

## 2024-01-04 NOTE — PROGRESS NOTES
DANUTA had discussed this case with CMOC Adriana Hughes during huddle. Adriana reported she closed case on 1/2 due to lack of communication from patient. Adriana reported patient has not completed or obtain documents for DDD and MLTSS application. Adriana reported patient can re-referred if willing to work on goals.    DANUTA had called the patient via phone. DANUTA left a voicemail. DANUTA will attempt to call again at a later date and time. DANUTA will continue to f/u.

## 2024-01-08 ENCOUNTER — TELEPHONE (OUTPATIENT)
Dept: OBGYN CLINIC | Facility: CLINIC | Age: 34
End: 2024-01-08

## 2024-01-08 NOTE — TELEPHONE ENCOUNTER
Placed call to patient, left message on answering machine to return our call to schedule NOB. LMP 12/13. Available NOB appointments on 2/8/24.

## 2024-01-08 NOTE — TELEPHONE ENCOUNTER
Pt called and said she got a positive pregnancy test.  LMP 12/13/23.  She would like a call back to schedule appt.  Pt says she's not avail Wednesdays

## 2024-01-10 ENCOUNTER — PATIENT OUTREACH (OUTPATIENT)
Dept: FAMILY MEDICINE CLINIC | Facility: CLINIC | Age: 34
End: 2024-01-10

## 2024-01-10 NOTE — PROGRESS NOTES
"[facility] had called the patient via phone. SW asked patient how she is doing. Patient reported she is very stressed out. Patient stated she was pregnant. Patient stated she has an f/u appointment on 2/9 at 9:30am with her OBGYN about this new pregnancy.    SW asked the patient if this new pregnancy was with Jay's father. Patient reported that it is not. Patient reported Jay's father is not allowed to be around them. Patient reported that she has a PFA on Jay's father.    Patient reported that this new pregnancy was with someone else. Patient reported that person does not want to be involved with her or the pregnancy. Patient said \"it is sad but I will manage\". SW provided supportive counseling.    SWCM asked how Jay is doing in the NICU. Patient reported today is the first day that Jay has been stable. Patient reported that Jay has not been doing well for about 3 weeks prior. SW told patient that she hopes Jay continues to improve.    SW asked patient if she had returned [facility] Adriana Hughes's phone calls or letters. Patient stated she has not returned Adriana's phone calls or letters. Patient stated she will be home next Tuesday. Patient stated she does have her ID and birth certificate. Patient stated she is awaiting on replacement for her SS card.    [facility] told patient that she would outreach Adriana and see when they can meet in person. Patient agreed. [facility] placed patient on hold and called Adriana. SW noted Adriana called her back. [facility] discussed the above with Adriana.    Adriana advised patient reference email sent to her about documents needed. Adriana informed [facility] that she can meet with patient next Tuesday anytime. [facility] had spoke with the patient about this. Patient stated she would speak with her friend, Selina who is her main source of transportation. Patient stated she will be home until Thursday. [facility] informed patient that Adriana can accommodate for another " Referring Provider (Optional): Dr. Pineda day if Tuesday is not an option but needs to let us know. Patient understood.    Patient told PATTI that she would give her a call back when she knows more. PATTI agreed. PATTI routed note to Adriana. PATTI will continue to f/u.

## 2024-01-12 ENCOUNTER — PATIENT OUTREACH (OUTPATIENT)
Dept: FAMILY MEDICINE CLINIC | Facility: CLINIC | Age: 34
End: 2024-01-12

## 2024-01-12 NOTE — PROGRESS NOTES
[facility] notes case hand off to DANUTA Campos who will be temporarily covering while out of office. [facility] added Kiesha to the care team. [facility] noted she discussed this case with Kiesha and CMOC Adriana Hughes. Patient will be calling PATTI or Adriana about coming into office next week. Patient will be working on DDD and MLTSS with Adriana if she shows up. [facility] notes if patient does not show up please try to call her. Patient is going through a lot emotionally due to son, Jay still in NICU. Patient also reported she is currently pregnant. Patient goes to [facility] [phone] for MH services. [facility] routed note to Luis.

## 2024-01-13 ENCOUNTER — NURSE TRIAGE (OUTPATIENT)
Dept: OTHER | Facility: OTHER | Age: 34
End: 2024-01-13

## 2024-01-13 NOTE — TELEPHONE ENCOUNTER
"Regarding: pregnant cramping/diarrhea  ----- Message from Jessenia Kaur sent at 1/13/2024  4:50 PM EST -----  \"I am 5 weeks pregnant and I am experiencing cramping and diarrhea.\"    "

## 2024-01-13 NOTE — TELEPHONE ENCOUNTER
"Reason for Disposition  • Patient sounds very sick or weak to the triager    Answer Assessment - Initial Assessment Questions  1. DIARRHEA SEVERITY: \"How bad is the diarrhea?\" \"How many extra stools have you had in the past 24 hours than normal?\"     - NO DIARRHEA (SCALE 0)    - MILD (SCALE 1-3): Few loose or mushy BMs; increase of 1-3 stools over normal daily number of stools; mild increase in ostomy output.    -  MODERATE (SCALE 4-7): Increase of 4-6 stools daily over normal; moderate increase in ostomy output.  * SEVERE (SCALE 8-10; OR 'WORST POSSIBLE'): Increase of 7 or more stools daily over normal; moderate increase in ostomy output; incontinence.     5 today     2. ONSET: \"When did the diarrhea begin?\"       Four days ago    3. BM CONSISTENCY: \"How loose or watery is the diarrhea?\"       Just water    4. VOMITING: \"Are you also vomiting?\" If Yes, ask: \"How many times in the past 24 hours?\"       One episode of vomiting    5. ABDOMINAL PAIN: \"Are you having any abdominal pain?\" If Yes, ask: \"What does it feel like?\" (e.g., crampy, dull, intermittent, constant)       Cramping    6. ABDOMINAL PAIN SEVERITY: If present, ask: \"How bad is the pain?\"  (e.g., Scale 1-10; mild, moderate, or severe)    - MILD (1-3): doesn't interfere with normal activities, abdomen soft and not tender to touch     - MODERATE (4-7): interferes with normal activities or awakens from sleep, tender to touch     - SEVERE (8-10): excruciating pain, doubled over, unable to do any normal activities        2/10    7. ORAL INTAKE: If vomiting, \"Have you been able to drink liquids?\" \"How much fluids have you had in the past 24 hours?\"      Yes, drinking water and Gatorade; no Gatorade for two days    8. HYDRATION: \"Any signs of dehydration?\" (e.g., dry mouth [not just dry lips], too weak to stand, dizziness, new weight loss) \"When did you last urinate?\"      Lightheaded, feels like she is going to pass out at times    9. EXPOSURE: \"Have you " "traveled to a foreign country recently?\" \"Have you been exposed to anyone with diarrhea?\" \"Could you have eaten any food that was spoiled?\"      Denies    10. ANTIBIOTIC USE: \"Are you taking antibiotics now or have you taken antibiotics in the past 2 months?\"        Was on antibiotics    11. OTHER SYMPTOMS: \"Do you have any other symptoms?\" (e.g., fever, blood in stool)        Denies    12. PREGNANCY: \"Is there any chance you are pregnant?\" \"When was your last menstrual period?\"        5 weeks pregnant on Tuesday    Protocols used: Diarrhea-ADULT-AH    "

## 2024-01-15 NOTE — TELEPHONE ENCOUNTER
Called pt to see how she is feeling and states feeling much better.  They started her on an antibiotic for UTI.  They also checked her HCG levels and current level was 279.  They did give her a script to have repeat levels done.  Advised to call if any further questions or concerns.

## 2024-01-16 ENCOUNTER — PATIENT MESSAGE (OUTPATIENT)
Dept: OBGYN CLINIC | Facility: CLINIC | Age: 34
End: 2024-01-16

## 2024-01-16 ENCOUNTER — PATIENT OUTREACH (OUTPATIENT)
Dept: FAMILY MEDICINE CLINIC | Facility: CLINIC | Age: 34
End: 2024-01-16
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
## 2024-01-24 ENCOUNTER — PATIENT OUTREACH (OUTPATIENT)
Dept: FAMILY MEDICINE CLINIC | Facility: CLINIC | Age: 34
End: 2024-01-24

## 2024-01-24 NOTE — LETTER
[address] [address]    Re: DANUTA CM   1/24/2024       Dear Margoth,    We tried to reach you by phone on 1/24/2024 and was unfortunately unable to reach you.  It is important that you contact the [facility] as soon as possible at: [phone]     Sincerely,         Kiesha Campos
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
## 2024-01-27 DIAGNOSIS — G40.909: ICD-10-CM

## 2024-01-27 DIAGNOSIS — G40.909 NONINTRACTABLE EPILEPSY WITHOUT STATUS EPILEPTICUS, UNSPECIFIED EPILEPSY TYPE (HCC): ICD-10-CM

## 2024-01-27 DIAGNOSIS — O99.351: ICD-10-CM

## 2024-01-29 ENCOUNTER — PATIENT OUTREACH (OUTPATIENT)
Dept: FAMILY MEDICINE CLINIC | Facility: CLINIC | Age: 34
End: 2024-01-29

## 2024-01-29 RX ORDER — LEVETIRACETAM 1000 MG/1
1000 TABLET ORAL 2 TIMES DAILY
Qty: 60 TABLET | Refills: 2 | Status: SHIPPED | OUTPATIENT
Start: 2024-01-29

## 2024-01-29 NOTE — PROGRESS NOTES
Patient called CMOC regarding documents needed for DDD application.     Patient requested documents sent via email.     CMOC resent email from November.     CMOC will reopen referral once patient sets appointment to complete DDD application.

## 2024-02-05 ENCOUNTER — PATIENT OUTREACH (OUTPATIENT)
Dept: FAMILY MEDICINE CLINIC | Facility: CLINIC | Age: 34
End: 2024-02-05
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
## 2024-02-09 ENCOUNTER — TELEPHONE (OUTPATIENT)
Dept: OBGYN CLINIC | Facility: CLINIC | Age: 34
End: 2024-02-09

## 2024-02-09 NOTE — TELEPHONE ENCOUNTER
Pt had an appt today with Dr. Sims that she missed.  Left message for her to call the office so we can get her rescheduled as she is very high risk.

## 2024-02-09 NOTE — TELEPHONE ENCOUNTER
Pt called and offered Monday 2/12/2024 and states she is not able to do that as she is with baby in [facility].  He is about to have his surgery and pt states she will not be available unitl 2/29 or 3/1.  Advised pt will check schedule to see when we can get her in.  She has no current complaints.

## 2024-02-12 ENCOUNTER — PATIENT OUTREACH (OUTPATIENT)
Dept: FAMILY MEDICINE CLINIC | Facility: CLINIC | Age: 34
End: 2024-02-12

## 2024-02-12 NOTE — PROGRESS NOTES
[facility] had called the patient via phone. [facility] had asked the patient how she was doing. Patient reported she is doing well. Patient stated she will be receiving OB care in [place], [place] until she is able to come back to [place], [place]. Patient stated her friend/roommate, Selina continues to provide transportation as needed. Patient stated she hopes for Jay antony to be discharged from the NICU in April 2024. SWCM told the patient that she hopes for the same.    Patient reported she has the documents for the DDD paperwork. Patient stated she can send it to [facility] Adriana Hughes today. SW informed the patient that she would still need to come into the office and sign paperwork. SWCM informed the patient that she would let Adriana know of documents. SW informed the patient that Adriana closed case on 1/29.    SW informed the patient that we may need to hold off on DDD paperwork. SW informed the patient that maybe we can regroup once she is home with Jay in April. [facility] informed the patient that we have not been able to get any paperwork completed since opening case back in November 2023. Patient understood. [facility] informed the patient that she would continue to check in. Patient thanked [facility] for call.     [facility] discussed this case with Adriana. Adriana will be on look out for email from patient if sent. PATTI will continue to f/u.

## 2024-02-16 ENCOUNTER — TELEPHONE (OUTPATIENT)
Dept: FAMILY MEDICINE CLINIC | Facility: CLINIC | Age: 34
End: 2024-02-16

## 2024-02-16 NOTE — TELEPHONE ENCOUNTER
Patient is requesting Rx to help relived oral thrush. She is currently in the NICU in [facility], she will not be returning to [place] until 2/28/24. I have updated [facility] in her chart.

## 2024-02-21 ENCOUNTER — TELEPHONE (OUTPATIENT)
Dept: FAMILY MEDICINE CLINIC | Facility: CLINIC | Age: 34
End: 2024-02-21

## 2024-02-21 NOTE — TELEPHONE ENCOUNTER
Regarding: FW: Scheduling an appointment   Contact: [phone]  Clerical- can someone please call pt?    ----- Message -----  From: Burton Ashraf DO  Sent: 2/21/2024  10:15 AM EST  To: Aubrey Castro  Subject: FW: Scheduling an appointment                    I'm not sure why Shruti sent this back to me. I cannot schedule her an appointment. The patient does not care who she sees. And She should see someone before we just prescribe her medication. I know she is out of the area... Maybe she can have a virtual visit with someone?       ----- Message -----  From: Shruti Lilly  Sent: 2/20/2024   5:35 PM EST  To: Burton Ashraf DO  Subject: Scheduling an appointment                        ----- Message from Shruti Lilly sent at 2/20/2024  5:35 PM EST -----       ----- Message from Margoth Cook to Katherin Johnson MD sent at 2/19/2024 12:06 PM -----   I’ve been trying to get through to schedule an appointment. I have oral thrush from having hyperemesis. I’m currently 10 weeks pregnant and need to get scheduled before it causes a problem. I’m only back home from February 28th - March 1st. I don’t care who I see. I need to see someone. I can’t see anyone that Thursday unless it’s first thing in the morning.

## 2024-02-28 NOTE — TELEPHONE ENCOUNTER
Dr. CHURCH given her history , should we try and order her labs and send her for an out side scan so we can refer to [facility] until we can get her in , right now she is scheduled for April 2 nd , Hopefully we will have a cancellation and I can get her in before than , please review and advise . [facility] [place]

## 2024-02-28 NOTE — TELEPHONE ENCOUNTER
Spoke with pt as we had an opening in the schedule.  She states as of right now she will not be home until May as her son will be in the NICU until then.  He is scheduled to have trach surgery this coming Wednesday and then she will have to take an 8wk training course before she can come home.  Asked pt how her pregnancy is going so far and states the last 3 days she had heavy bleeding and yesterday some passing of large clots.  She did have ultrasound yesterday and was told all is ok and baby's heart rate was at 164bpm and cervix is still closed.  She states she is going back and forth right now with MFM in [place] in regards to having cerclage placed.  She will continue her prenatal care there until she comes home.  Pt was advised to call with any further questions or concerns.

## 2024-02-29 ENCOUNTER — PATIENT OUTREACH (OUTPATIENT)
Dept: FAMILY MEDICINE CLINIC | Facility: CLINIC | Age: 34
End: 2024-02-29

## 2024-02-29 NOTE — PROGRESS NOTES
[facility] had called the patient via phone. [facility] asked patient how she is doing. Patient reported she is doing well. Patient reported she is being seen in [place] for OB care. Patient stated she is going to stay out in [place] for now. Patient stated her son who is in the NICU will have trach put in on Wednesday. Patient stated that he will remain in the hospital for 6-8 weeks after surgery. Patient stated once discharged home patient will have a nurse 16 hours day through the [facility] for care. [facility] told the patient that she hopes the surgery goes well for her son and they can be home soon. Patient agreed.     [facility] informed the patient that she would continue to check in. Patient thanked [facility] for call. [facility] advised patient keep her updated on situation. Patient agreed. [facility] will continue to f/u.

## 2024-03-14 ENCOUNTER — PATIENT OUTREACH (OUTPATIENT)
Age: 34
End: 2024-03-14

## 2024-03-14 NOTE — PROGRESS NOTES
SWCM had called the patient via phone. Per chart, patient reported to Camille Rice RN from [facility] plans to look for housing in that area and not return to [place]. Per chart, patient currently staying in the [facility]. Per chart, patient's best friend also willing to move with her. SWCM wanted to discuss this with patient further. SWCM left a voicemail. SW will attempt to call again at next scheduled outreach. DANUTACM changed episode to surveillance. SWCM will continue to be available.

## 2024-03-20 ENCOUNTER — PATIENT OUTREACH (OUTPATIENT)
Age: 34
End: 2024-03-20

## 2024-03-20 NOTE — PROGRESS NOTES
PATTI had called the patient via phone. PATTI left a voicemail. PATTI notes this is the second phone call attempt. PATTI sent unable to reach letter via [employer]. PATTI will await 2 weeks before closing case. PATTI will continue to f/u.

## 2024-03-20 NOTE — LETTER
[address]  [address]  [place] [address]  [phone]    Re: Unable to reach   3/20/2024       Dear Margoth,    I tried to reach you by phone and was unfortunately unable to reach you. It is important you be a part of your plan of care. If you are still in need of assistance with services, please give me a call back at [phone] from 8am-4:30pm, Mon-Fri.    Sincerely,         DOUGLAS Godfrey  Outpatient Care Manager -

## 2024-04-02 ENCOUNTER — TELEPHONE (OUTPATIENT)
Age: 34
End: 2024-04-02

## 2024-04-02 NOTE — TELEPHONE ENCOUNTER
[facility] - adult preventative health record  Scanned into encounter  Placed in [employer] [place] clinical folder   Fax: [phone]

## 2024-04-03 ENCOUNTER — PATIENT OUTREACH (OUTPATIENT)
Age: 34
End: 2024-04-03

## 2024-04-03 NOTE — PROGRESS NOTES
PATTI had completed a chart review. Per chart, patient has not called SW back since sending unable to reach letter. SW will be closing case today due to lack of communication. Please reconsult as needed.

## 2024-05-30 ENCOUNTER — HOSPITAL ENCOUNTER (EMERGENCY)
Facility: HOSPITAL | Age: 34
Discharge: STILL ADMITTED OR ADMINISTRATIVE | End: 2024-05-30
Payer: MEDICARE

## 2024-05-30 ENCOUNTER — HOSPITAL ENCOUNTER (OUTPATIENT)
Facility: HOSPITAL | Age: 34
Discharge: HOME | End: 2024-05-30
Attending: OBSTETRICS & GYNECOLOGY | Admitting: OBSTETRICS & GYNECOLOGY
Payer: MEDICARE

## 2024-05-30 PROBLEM — N92.0 SPOTTING: Status: ACTIVE | Noted: 2024-05-30

## 2024-05-30 PROCEDURE — 99214 OFFICE O/P EST MOD 30 MIN: CPT

## 2024-05-30 PROCEDURE — NC001 PR NO CHARGE: Performed by: OBSTETRICS & GYNECOLOGY

## 2024-05-31 VITALS — SYSTOLIC BLOOD PRESSURE: 126 MMHG | RESPIRATION RATE: 18 BRPM | DIASTOLIC BLOOD PRESSURE: 72 MMHG | HEART RATE: 98 BPM

## 2024-05-31 NOTE — PROGRESS NOTES
"L&D Triage Note - OB/GYN  Margoth Cook 34 y.o. female MRN: [id number]  Unit/Bed#:  TRIAGE  Encounter: [id number]        Patient is transferring her care to [facility]     ASSESSMENT/PLAN  Margoth Cook is a 34 y.o.  at [id number] presents with vaginal spotting at 6 PM.  Patient has cerclage in place due to history of 22-week  labor.  No blood visualized on speculum exam and cerclage not on tension.  Cervix is closed and cerclage felt circumferentially.  Microscopy within normal limits with no RBCs visualized.  Patient is getting transported to [facility] for her son and if continued bleeding occurs she may get care in [place].    1) Vaginal bleeding  - Speculum: No bleeding or discolored discharge noted.  Cerclage visualized and not on tension  - Cervix is closed and cerclage felt circumferentially  -Microscopy within normal limits with no RBCs visualized    2)  Discharge instructions  - Patient instructed to call if experiencing worsening contractions, vaginal bleeding, loss of fluid or decreased fetal movement.  - Will follow up with OBGYN in office    D/w Dr. Tyler  ______________    SUBJECTIVE    JAKE: Estimated Date of Delivery: None noted.    HPI:  34 y.o.  at 24w1d who presents with complaint of vaginal bleeding. Pt states she had a stressful day today and noticed streaks of blood when she was wiping around 6pm.  Patient states \"it was not a lot at all\". She denies any strenuous activity today except lifting her 1 yr old son for a chest x-ray.  Patient came up from the ED where she was getting care for her son who is being transported to [facility] very shortly.  Pt has a cervical cerclage which was placed on 3/11 due to a history of  labor and delivery at 22 weeks 5 days. Used to follow care with OB in [place] but is transferring to [facility].     Contractions: No  Leakage of fluid: No  Vaginal Bleeding: yes  Fetal movement: present    Her obstetrical history is " significant for fetal demise in 1st pregnancy due to ABO incompatibility, 4th pregnancy resulted in 22wk  delivery    Review of Systems   HENT:  Negative for congestion.    Cardiovascular:  Negative for chest pain.   Respiratory:  Negative for cough.    Gastrointestinal:  Negative for bloating, abdominal pain, nausea and vomiting.   Genitourinary:  Negative for dysuria.        Spotting      General: Well appearing, no distress  Respiratory: Unlabored breathing  Cardiovascular: Regular rate.  Abdomen: Soft, gravid, nontender  Fundal Height: Appropriate for gestational age.  Extremities: Warm and well perfused.  Non tender.       OBJECTIVE:  /72   Pulse 98   Resp 18   LMP 2022 (Exact Date)   There is no height or weight on file to calculate BMI.      - Speculum exam: No blood or brown discharge seen.  Cerclage visualized and not on tension.  Cervical os appears closed    KOH/Wet [place]:     Infection:   -No clue cells    -No hyphae   -No trichomonads present    - No RBCs visualized   Membrane status   -No ferning   -Negative nitrazene   -No pooling     SVE:  Cervix closed    FHT:  Tones 140s-150s    TOCO:   No contractions      Luz Riley MD  OBGYN, PGY-1  24  4:49 AM

## 2024-06-06 ENCOUNTER — TELEPHONE (OUTPATIENT)
Age: 34
End: 2024-06-06

## 2024-06-06 ENCOUNTER — ULTRASOUND (OUTPATIENT)
Dept: OBGYN CLINIC | Facility: CLINIC | Age: 34
End: 2024-06-06

## 2024-06-06 VITALS — DIASTOLIC BLOOD PRESSURE: 70 MMHG | BODY MASS INDEX: 54.39 KG/M2 | SYSTOLIC BLOOD PRESSURE: 120 MMHG | WEIGHT: 234 LBS

## 2024-06-06 DIAGNOSIS — Z34.82 PRENATAL CARE, SUBSEQUENT PREGNANCY, SECOND TRIMESTER: Primary | ICD-10-CM

## 2024-06-06 DIAGNOSIS — Z36.89 ENCOUNTER FOR OTHER SPECIFIED ANTENATAL SCREENING: ICD-10-CM

## 2024-06-06 PROCEDURE — PNV: Performed by: NURSE PRACTITIONER

## 2024-06-06 NOTE — PATIENT INSTRUCTIONS
Congratulations!! Please review our Pregnancy Essential Guide and [facility] L&D Virtual tour from our networks website.     [facility] Pregnancy Essentials Guide  [facility] ([email or web address])     [facility] ([employer])

## 2024-06-06 NOTE — PROGRESS NOTES
Margoth Cook is a 34 y.o.  at 25w1d, transfer of care from [place]. She has been at [place] with her son who has been there for care for 3** and initiated prenatal care there. He was d/c home recently and unfortunately was readmitted to the hospital at [facility]. He is trached and vented.     Her last MFM visit with [place] was 24 at 23w 2d.  gm 0lb 15 oz 82%. Repeat growth scan recommended.     She had a cerclage placed in 3/11/24. She had a few episodes of bleeding due to cerclage. Last episode of bleeding was on 24. Denies any current bleeding.    Reports +FM,. Overall she is doing well.   Denies LOF/Cramping. She has sporadic n/v and occasional Ha. B/L LE mild edema. Denies tobacco, drugs or ETOH use. Denies DV.     Visit Vitals  /70 (BP Location: Left arm, Cuff Size: Large)   Wt 106 kg (234 lb)   LMP 2023   BMI 54.39 kg/m²   OB Status Pregnant   Smoking Status Never   BSA 1.87 m²       Urine neg/neg      Diagnoses and all orders for this visit:    Prenatal care, subsequent pregnancy, second trimester    Encounter for other specified  screening  -     Glucose, 1H PG  -     CBC and differential  -     Anemia Panel w/Reflex, OB; Future  -     Type and screen; Future  -     RPR-Syphilis Screening (Total Syphilis IGG/IGM); Future  -     Ambulatory Referral to Maternal Fetal Medicine; Future      Will see Evelina tomorrow to complete her OB intake    28 week labs ordered.    MFM referral provided for missed anatomy and Growth.    RTO in 3 weeks for PNV or sooner if needed.

## 2024-06-07 ENCOUNTER — TELEPHONE (OUTPATIENT)
Age: 34
End: 2024-06-07

## 2024-06-07 ENCOUNTER — INITIAL PRENATAL (OUTPATIENT)
Dept: OBGYN CLINIC | Facility: CLINIC | Age: 34
End: 2024-06-07

## 2024-06-07 VITALS
OXYGEN SATURATION: 98 % | WEIGHT: 234 LBS | BODY MASS INDEX: 54.15 KG/M2 | RESPIRATION RATE: 16 BRPM | HEIGHT: 55 IN | SYSTOLIC BLOOD PRESSURE: 118 MMHG | DIASTOLIC BLOOD PRESSURE: 66 MMHG

## 2024-06-07 DIAGNOSIS — Z34.82 PRENATAL CARE, SUBSEQUENT PREGNANCY, SECOND TRIMESTER: Primary | ICD-10-CM

## 2024-06-07 DIAGNOSIS — Z86.39 HISTORY OF HYPOTHYROIDISM: ICD-10-CM

## 2024-06-07 PROCEDURE — OBC: Performed by: NURSE PRACTITIONER

## 2024-06-07 RX ORDER — CHOLECALCIFEROL (VITAMIN D3) 25 MCG
1 TABLET,CHEWABLE ORAL DAILY
COMMUNITY

## 2024-06-07 NOTE — TELEPHONE ENCOUNTER
"Left message to schedule Growth scan in 3 wks around 6/28/24 in notes please put \"cardiac outflow tracts and septal view not imaged well on Level 2 US at [place]\" This is per Dr. Marinelli    "

## 2024-06-07 NOTE — TELEPHONE ENCOUNTER
"----- Message from Ant Marinelli MD sent at 6/6/2024  1:20 PM EDT -----  Regarding: RE: 25 wk transfer from [place]  Hi Lucila,    She had a detailed US with MFM at [place].  She should have a growth US with us in 3 weeks.  Please include a notation that says \"cardiac outflow tracts and septal view not imaged well on Level 2 US at [place]\"    Thank you,    Hari  ----- Message -----  From: Lucila Riley  Sent: 6/6/2024  10:08 AM EDT  To: Ant Marinelli MD  Subject: 25 wk transfer from [place]                      Good morning Dr. Marinelli,    We have a 25 wk transfer patient from [place].    Thank you    Lucila"
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
## 2024-06-08 LAB
DME PARACHUTE DELIVERY DATE REQUESTED: NORMAL
DME PARACHUTE ITEM DESCRIPTION: NORMAL
DME PARACHUTE ORDER STATUS: NORMAL
DME PARACHUTE SUPPLIER NAME: NORMAL
DME PARACHUTE SUPPLIER PHONE: NORMAL

## 2024-06-10 ENCOUNTER — HOSPITAL ENCOUNTER (OUTPATIENT)
Facility: HOSPITAL | Age: 34
Discharge: HOME | End: 2024-06-10
Attending: OBSTETRICS & GYNECOLOGY | Admitting: OBSTETRICS & GYNECOLOGY
Payer: MEDICARE

## 2024-06-10 ENCOUNTER — NURSE TRIAGE (OUTPATIENT)
Age: 34
End: 2024-06-10

## 2024-06-10 ENCOUNTER — TELEPHONE (OUTPATIENT)
Age: 34
End: 2024-06-10

## 2024-06-10 VITALS
HEART RATE: 99 BPM | TEMPERATURE: 98.2 F | RESPIRATION RATE: 16 BRPM | OXYGEN SATURATION: 99 % | DIASTOLIC BLOOD PRESSURE: 54 MMHG | SYSTOLIC BLOOD PRESSURE: 100 MMHG

## 2024-06-10 PROBLEM — O36.8190 DECREASED FETAL MOVEMENT: Status: ACTIVE | Noted: 2024-06-10

## 2024-06-10 LAB
ALBUMIN SERPL BCP-MCNC: 3.2 G/DL (ref 3.5–5)
ALP SERPL-CCNC: 75 U/L (ref 34–104)
ALT SERPL W P-5'-P-CCNC: 7 U/L (ref 7–52)
ANION GAP SERPL CALCULATED.3IONS-SCNC: 7 MMOL/L (ref 4–13)
AST SERPL W P-5'-P-CCNC: 10 U/L (ref 13–39)
BILIRUB SERPL-MCNC: 0.17 MG/DL (ref 0.2–1)
BUN SERPL-MCNC: 8 MG/DL (ref 5–25)
CALCIUM ALBUM COR SERPL-MCNC: 9.2 MG/DL (ref 8.3–10.1)
CALCIUM SERPL-MCNC: 8.6 MG/DL (ref 8.4–10.2)
CHLORIDE SERPL-SCNC: 104 MMOL/L (ref 96–108)
CO2 SERPL-SCNC: 24 MMOL/L (ref 21–32)
CREAT SERPL-MCNC: 0.45 MG/DL (ref 0.6–1.3)
CREAT UR-MCNC: 119.8 MG/DL
ERYTHROCYTE [DISTWIDTH] IN BLOOD BY AUTOMATED COUNT: 13.5 % (ref 11.6–15.1)
GFR SERPL CREATININE-BSD FRML MDRD: 132 ML/MIN/1.73SQ M
GLUCOSE SERPL-MCNC: 109 MG/DL (ref 65–140)
HCT VFR BLD AUTO: 32.1 % (ref 34.8–46.1)
HGB BLD-MCNC: 10.3 G/DL (ref 11.5–15.4)
MCH RBC QN AUTO: 30.1 PG (ref 26.8–34.3)
MCHC RBC AUTO-ENTMCNC: 32.1 G/DL (ref 31.4–37.4)
MCV RBC AUTO: 94 FL (ref 82–98)
PLATELET # BLD AUTO: 293 THOUSANDS/UL (ref 149–390)
PMV BLD AUTO: 9 FL (ref 8.9–12.7)
POTASSIUM SERPL-SCNC: 3.6 MMOL/L (ref 3.5–5.3)
PROT SERPL-MCNC: 6.4 G/DL (ref 6.4–8.4)
PROT UR-MCNC: 15 MG/DL
PROT/CREAT UR: 0.13 MG/G{CREAT} (ref 0–0.1)
RBC # BLD AUTO: 3.42 MILLION/UL (ref 3.81–5.12)
SODIUM SERPL-SCNC: 135 MMOL/L (ref 135–147)
WBC # BLD AUTO: 6.33 THOUSAND/UL (ref 4.31–10.16)

## 2024-06-10 PROCEDURE — 80053 COMPREHEN METABOLIC PANEL: CPT

## 2024-06-10 PROCEDURE — 59025 FETAL NON-STRESS TEST: CPT | Performed by: OBSTETRICS & GYNECOLOGY

## 2024-06-10 PROCEDURE — 82570 ASSAY OF URINE CREATININE: CPT

## 2024-06-10 PROCEDURE — 84156 ASSAY OF PROTEIN URINE: CPT

## 2024-06-10 PROCEDURE — 59025 FETAL NON-STRESS TEST: CPT

## 2024-06-10 PROCEDURE — 85027 COMPLETE CBC AUTOMATED: CPT

## 2024-06-10 PROCEDURE — 76815 OB US LIMITED FETUS(S): CPT

## 2024-06-10 PROCEDURE — 76815 OB US LIMITED FETUS(S): CPT | Performed by: OBSTETRICS & GYNECOLOGY

## 2024-06-10 PROCEDURE — 99213 OFFICE O/P EST LOW 20 MIN: CPT

## 2024-06-10 RX ORDER — ACETAMINOPHEN 325 MG/1
975 TABLET ORAL ONCE
Status: COMPLETED | OUTPATIENT
Start: 2024-06-10 | End: 2024-06-10

## 2024-06-10 RX ADMIN — ACETAMINOPHEN 975 MG: 325 TABLET, FILM COATED ORAL at 16:58

## 2024-06-10 NOTE — PROCEDURES
Margoth Cook, a  at 25w5d with an JAKE of 2024, by Last Menstrual Period, was seen at [facility] for the following procedure(s): $Procedure Type: NST, JOE]    Nonstress Test  Reason for NST: Decreased Fetal Movement  Variability: Moderate  Decelerations: Variable  Accelerations: Yes  Acoustic Stimulator: No  Baseline: 140 BPM  Uterine Irritability: No  Contractions: Not present                              Sherice Rowe MD  PGY-1 OBGYN

## 2024-06-10 NOTE — TELEPHONE ENCOUNTER
Patient called in stating that she went to L&D as she was triaged and advised to go to L&D to be further evaluated. Patient was told to go to the ED when she got to L&D as per pt.  Patient went to the Emergency department and waited down in the emergency room for 15 min then called the office. Patient was notified that she was advised to go back to labor and delivery. Patient had walked back up to L&D while on the phone with Diana REINA, and then notified Diana REINA that she was being registered. No further questions or concerns at this time.

## 2024-06-10 NOTE — TELEPHONE ENCOUNTER
Patient is scheduled for a Growth scan 6/19/24 and is requesting a LYFT ride please to and from her appointment.

## 2024-06-10 NOTE — TELEPHONE ENCOUNTER
"Spoke with patient who is 25w5d, , she reports they do believe her due date is wrong, she was measuring almost 30 week at last appointment.  She reports she started with bilateral lower leg swelling last night with a headache.  She reports today when walking around her feet turn purple and she has moderate swelling, headache at 4/10, does not like to take any medications, so she has not.  She denies any vision changes.  She does reports decreased fetal movement.  She also reports hx Pre-E with previous delivery at 23w.  Patient was advised to go to L&D at [facility].    Secure Chat messages sent to on call provider and L&D Charge RN.    Reason for Disposition   Pregnant 23 or more weeks and baby is moving less today (e.g., kick count < 5 in 1 hour or < 10 in 2 hours)    Answer Assessment - Initial Assessment Questions  1. LOCATION: \"Where does it hurt?\"       Headache  2. ONSET: \"When did the headache start?\" (Minutes, hours or days)       Last night  3. PATTERN: \"Does the pain come and go, or has it been constant since it started?\"      constant  4. SEVERITY: \"How bad is the pain?\" and \"What does it keep you from doing?\"     - MILD - doesn't interfere with normal activities     - MODERATE - interferes with normal activities or awakens from sleep     - SEVERE - excruciating pain, unable to do any normal activities         4/10  6. CAUSE: \"What do you think is causing the headache?\"      Unsure  8. HEAD INJURY: \"Has there been any recent injury to the head?\"       no  9. OTHER SYMPTOMS: \"Do you have any other symptoms?\" (e.g., fever, stiff neck, blurred vision; swelling of hands, face, or feet)      Swelling of bilateral lower legs/ankles/feet.  10. PREGNANCY: \"How many weeks pregnant are you?\"        25w5d  11. JAKE: \"What date are you expecting to deliver?\"        24    Protocols used: Pregnancy - Headache-ADULT-OH    "

## 2024-06-10 NOTE — PROGRESS NOTES
"L&D Triage Note - OB/GYN  Margoth Cook 34 y.o. female MRN: [id number]  Unit/Bed#:  TRIAGE  Encounter: [id number]    Patient is seen by CFW.    ASSESSMENT  Margoth Cook is a 34 y.o.  at 25w5d here with decreased fetal movement, b/l leg swelling, and headache. Plan for work-up to r/o preeclampsia, as well as NST/JOE.     PLAN  #1. B/l LE edema:   Extensive history of preeclampsia in prior pregnancy with history of fetal demise and  birth  CBC, CMP, urine PC wnl   Headache improved s/p Tylenol  Blood pressures cycling q15 all wnl  Does not meet criteria for any hypertensive disorder at this time    #2. Decreased fetal movement:   NST reactive   JOE 18, wnl    #3. Disposition: d/c home with strict return precautions   Advised to follow-up with routine prenatal care.   Counseled to return if she has any obstetric complaints, including decreased fetal movement, leakage of fluid, vaginal bleeding, contractions. Counseled to return for any worsening symptoms of preeclampsia, including intractable headache, changes in vision, chest pain, difficulty breathing, palpitations.     D/w Dr. Tyler.  ______________    SUBJECTIVE    JAKE: Estimated Date of Delivery: 24    HPI:  34 y.o.  25w5d presents with complaint of b/l LE edema, headache, and DFM. States that overnight her ankles \"blew up.\" \"Last night I could put my shoes on but this morning I couldn't. She states that she also feels that fetal movement has been decreased overall since she woke up, though she is feeling the baby move. Other symptoms include a mild, 2/10 headache. She is otherwise without complaint and denies changes in vision, chest pain, difficulty breathing, palpitations, RUQ pain.     Of note, current pregnancy is significant for cerclage placed 3/11.     Contractions: denies  Leakage of fluid: denies  Vaginal Bleeding: denies  Fetal movement: decreased    Her obstetrical history is significant for  delivery " at 22w5d for pre-E, chorioamnionitis, sepsis.    ROS:  Constitutional: Negative  Respiratory: Negative  Cardiovascular: Negative    Gastrointestinal: Negative    OBJECTIVE:    Vitals:  /54   Pulse 99   Temp 98.2 °F (36.8 °C) (Oral)   Resp 16   LMP 12/13/2023   SpO2 99%   There is no height or weight on file to calculate BMI.    Physical Exam  Vitals reviewed.   Constitutional:       Appearance: Normal appearance.   Cardiovascular:      Rate and Rhythm: Normal rate.   Pulmonary:      Effort: Pulmonary effort is normal.   Abdominal:      Comments: Gravid uterus   Musculoskeletal:         General: Swelling present. Normal range of motion.      Right lower leg: Edema present.      Left lower leg: Edema present.      Comments: Symmetric, bilateral non-pitting edema to mid-calf   Skin:     General: Skin is warm and dry.   Neurological:      General: No focal deficit present.      Mental Status: She is alert and oriented to person, place, and time.   Psychiatric:         Mood and Affect: Mood normal.         Behavior: Behavior normal.       SVE: deferred    FHT:  Baseline Rate (FHR): 140 bpm  Variability: Moderate  Accelerations: 10 x 10 (<32 weeks)  Decelerations: None    TOCO:   Contraction Frequency (minutes): none    IMAGING:      TAUS   JOE      - Q1 8.46 cm     - Q2 3.55 cm     - Q3 2.71 cm     - Q4 2.96 cm     - Total: 17.68 cm   Presentation: breech    Labs:   Recent Results (from the past 24 hour(s))   CBC and Platelet    Collection Time: 06/10/24  5:03 PM   Result Value Ref Range    WBC 6.33 4.31 - 10.16 Thousand/uL    RBC 3.42 (L) 3.81 - 5.12 Million/uL    Hemoglobin 10.3 (L) 11.5 - 15.4 g/dL    Hematocrit 32.1 (L) 34.8 - 46.1 %    MCV 94 82 - 98 fL    MCH 30.1 26.8 - 34.3 pg    MCHC 32.1 31.4 - 37.4 g/dL    RDW 13.5 11.6 - 15.1 %    Platelets 293 149 - 390 Thousands/uL    MPV 9.0 8.9 - 12.7 fL   Comprehensive metabolic panel    Collection Time: 06/10/24  5:03 PM   Result Value Ref Range    Sodium 135  135 - 147 mmol/L    Potassium 3.6 3.5 - 5.3 mmol/L    Chloride 104 96 - 108 mmol/L    CO2 24 21 - 32 mmol/L    ANION GAP 7 4 - 13 mmol/L    BUN 8 5 - 25 mg/dL    Creatinine 0.45 (L) 0.60 - 1.30 mg/dL    Glucose 109 65 - 140 mg/dL    Calcium 8.6 8.4 - 10.2 mg/dL    Corrected Calcium 9.2 8.3 - 10.1 mg/dL    AST 10 (L) 13 - 39 U/L    ALT 7 7 - 52 U/L    Alkaline Phosphatase 75 34 - 104 U/L    Total Protein 6.4 6.4 - 8.4 g/dL    Albumin 3.2 (L) 3.5 - 5.0 g/dL    Total Bilirubin 0.17 (L) 0.20 - 1.00 mg/dL    eGFR 132 ml/min/1.73sq m   Protein / creatinine ratio, urine    Collection Time: 06/10/24  5:03 PM   Result Value Ref Range    Creatinine, Ur 119.8 Reference range not established. mg/dL    Protein Urine Random 15 Reference range not established. mg/dL    Prot/Creat Ratio, Ur 0.13 (H) 0.00 - 0.10       Sherice Rowe MD  6/10/2024  6:00 PM

## 2024-06-18 ENCOUNTER — TELEPHONE (OUTPATIENT)
Facility: HOSPITAL | Age: 34
End: 2024-06-18

## 2024-06-18 NOTE — TELEPHONE ENCOUNTER
Called [employer] spoke with Usama @ # [phone] to set up LYFT transportation for patient's Maternal Fetal Medicine appointment.  Appointment scheduled on  June 19, 2024 at 9:45 AM at our [facility].       Spoke with patient and explained LYFT will pick-up patient at 9:10 AM for Maternal Fetal Medicine appointment.   Patient instructed she has 5 minutes maximum to get into car upon arrival. Patient verbalized understanding.     Confirmed phone and address.   [phone]  [facility]  [address]  [facility] [address]

## 2024-06-19 ENCOUNTER — ULTRASOUND (OUTPATIENT)
Facility: HOSPITAL | Age: 34
End: 2024-06-19
Payer: MEDICARE

## 2024-06-19 VITALS
BODY MASS INDEX: 54.53 KG/M2 | WEIGHT: 235.6 LBS | SYSTOLIC BLOOD PRESSURE: 94 MMHG | DIASTOLIC BLOOD PRESSURE: 54 MMHG | HEIGHT: 55 IN | HEART RATE: 113 BPM

## 2024-06-19 DIAGNOSIS — Z3A.27 27 WEEKS GESTATION OF PREGNANCY: ICD-10-CM

## 2024-06-19 DIAGNOSIS — E66.01 SEVERE OBESITY DUE TO EXCESS CALORIES AFFECTING PREGNANCY IN SECOND TRIMESTER (HCC): ICD-10-CM

## 2024-06-19 DIAGNOSIS — O99.282 HYPOTHYROIDISM DURING PREGNANCY IN SECOND TRIMESTER: ICD-10-CM

## 2024-06-19 DIAGNOSIS — O09.892 HISTORY OF PREMATURE DELIVERY, CURRENTLY PREGNANT, SECOND TRIMESTER: Primary | ICD-10-CM

## 2024-06-19 DIAGNOSIS — O99.212 MATERNAL MORBID OBESITY IN SECOND TRIMESTER, ANTEPARTUM (HCC): ICD-10-CM

## 2024-06-19 DIAGNOSIS — G40.909 SEIZURE DISORDER DURING PREGNANCY IN SECOND TRIMESTER (HCC): ICD-10-CM

## 2024-06-19 DIAGNOSIS — O99.352 SEIZURE DISORDER DURING PREGNANCY IN SECOND TRIMESTER (HCC): ICD-10-CM

## 2024-06-19 DIAGNOSIS — E66.01 MATERNAL MORBID OBESITY IN SECOND TRIMESTER, ANTEPARTUM (HCC): ICD-10-CM

## 2024-06-19 DIAGNOSIS — E03.9 HYPOTHYROIDISM DURING PREGNANCY IN SECOND TRIMESTER: ICD-10-CM

## 2024-06-19 DIAGNOSIS — O99.212 SEVERE OBESITY DUE TO EXCESS CALORIES AFFECTING PREGNANCY IN SECOND TRIMESTER (HCC): ICD-10-CM

## 2024-06-19 DIAGNOSIS — Z34.82 PRENATAL CARE, SUBSEQUENT PREGNANCY, SECOND TRIMESTER: ICD-10-CM

## 2024-06-19 PROCEDURE — 76816 OB US FOLLOW-UP PER FETUS: CPT | Performed by: OBSTETRICS & GYNECOLOGY

## 2024-06-19 PROCEDURE — 99215 OFFICE O/P EST HI 40 MIN: CPT | Performed by: OBSTETRICS & GYNECOLOGY

## 2024-06-19 NOTE — LETTER
June 25, 2024     Lara Tyler MD  [address]  [address]  [place] [address]    Patient: Margoth Cook   YOB: 1990   Date of Visit: 6/19/2024       Dear Dr. Tyler:    Thank you for referring Margoth Cook to me for evaluation. Below are my notes for this consultation.    If you have questions, please do not hesitate to call me. I look forward to following your patient along with you.         Sincerely,        Ant Marinelli MD        CC: No Recipients    Ant Marinelli MD  6/25/2024  5:41 PM  Sign when Signing Visit  Please refer to the [facility] ultrasound report in Ob Procedures for additional information regarding today's visit

## 2024-06-25 ENCOUNTER — TELEPHONE (OUTPATIENT)
Age: 34
End: 2024-06-25

## 2024-06-25 ENCOUNTER — ROUTINE PRENATAL (OUTPATIENT)
Dept: OBGYN CLINIC | Facility: CLINIC | Age: 34
End: 2024-06-25

## 2024-06-25 VITALS — WEIGHT: 235 LBS | BODY MASS INDEX: 54.62 KG/M2 | DIASTOLIC BLOOD PRESSURE: 68 MMHG | SYSTOLIC BLOOD PRESSURE: 102 MMHG

## 2024-06-25 DIAGNOSIS — Z34.82 PRENATAL CARE, SUBSEQUENT PREGNANCY, SECOND TRIMESTER: Primary | ICD-10-CM

## 2024-06-25 PROCEDURE — PNV: Performed by: NURSE PRACTITIONER

## 2024-06-25 NOTE — PROGRESS NOTES
Margoth Cook is a 34 y.o.  at 27w6d. Reports +FM  She is doing well with HA sometimes. Denies LOF/Bleeding/Cramping. Denies n/v. Some BLE edema. Denies tobacco, drugs or ETOH use. Denies DV.     Of note baby Jay is finally home. They are working on decannulation of his trach.    Visit Vitals  /68   Wt 107 kg (235 lb)   LMP 2023   BMI 54.62 kg/m²   OB Status Pregnant   Smoking Status Never   BSA 1.88 m²     Pre- Vitals      Flowsheet Row Most Recent Value   Prenatal Assessment    Fetal Heart Rate 142   Fundal Height (cm) 28 cm   Movement Present   Prenatal Vitals    Blood Pressure 102/68   Weight - Scale 107 kg (235 lb)   Urine Albumin/Glucose    Dilation/Effacement/Station    Vaginal Drainage    Edema               Urine neg/neg      Margoth was seen today for routine prenatal visit.    Diagnoses and all orders for this visit:    Prenatal care, subsequent pregnancy, second trimester  -     Breast pump        Patient reminded to get her 28 week labs drawn.     Nurse visit later this week for Rhogam.     TDAP at next visit.  She desires a tubal ligation.   Breast pump script provided.    Scheduled with MFM for 34 week scan.     RTO in 2 weeks for PNV or sooner if needed.

## 2024-06-25 NOTE — TELEPHONE ENCOUNTER
Patient called and canceled todays appointment due to childcare and I do not see any openings in [place]. She also cannot make 7/25 due to having a mfm appointment at the same time.  I do not see openings for around that date also.  Please call patient back at [phone] . Thank you

## 2024-07-01 ENCOUNTER — ROUTINE PRENATAL (OUTPATIENT)
Dept: OBGYN CLINIC | Facility: CLINIC | Age: 34
End: 2024-07-01
Payer: MEDICARE

## 2024-07-01 ENCOUNTER — CLINICAL SUPPORT (OUTPATIENT)
Dept: OBGYN CLINIC | Facility: CLINIC | Age: 34
End: 2024-07-01
Payer: MEDICARE

## 2024-07-01 VITALS
BODY MASS INDEX: 54.85 KG/M2 | HEIGHT: 55 IN | WEIGHT: 237 LBS | SYSTOLIC BLOOD PRESSURE: 100 MMHG | DIASTOLIC BLOOD PRESSURE: 64 MMHG

## 2024-07-01 DIAGNOSIS — Z29.13 NEED FOR RHOGAM DUE TO RH NEGATIVE MOTHER: Primary | ICD-10-CM

## 2024-07-01 DIAGNOSIS — Z87.51 HISTORY OF PRETERM LABOR: Primary | ICD-10-CM

## 2024-07-01 DIAGNOSIS — N92.0 SPOTTING: ICD-10-CM

## 2024-07-01 PROCEDURE — 96372 THER/PROPH/DIAG INJ SC/IM: CPT | Performed by: STUDENT IN AN ORGANIZED HEALTH CARE EDUCATION/TRAINING PROGRAM

## 2024-07-01 PROCEDURE — 99213 OFFICE O/P EST LOW 20 MIN: CPT | Performed by: STUDENT IN AN ORGANIZED HEALTH CARE EDUCATION/TRAINING PROGRAM

## 2024-07-01 NOTE — PROGRESS NOTES
Margoth is a 34-year-old -2-2-1 at 28 weeks and 5 days gestation who presented initially for nursing visit for RhoGAM-had complaints of increased pelvic pressure.  This pregnancy is complicated by history of  delivery x 2 and cerclage in place-   Reports began feeling more pelvic pressure does report she had-a little bit of pink with wiping overnight.  Denies any loss of fluid and reports good fetal movement.  On speculum exam  The cervix appeared closed, cerclage visualized with knot at the 12:00 region on the cervix which did not appear to be on tension.  On digital exam the cervix was closed with cerclage knot palpated to be on tension.  Transabdominal ultrasound showed fetus in vertex presentation, good movement, fetal heart rate of 139 bpm.  We reviewed  labor precautions and if she had any increased blood pressure, cramping, Vaginal bleeding or any concerns recommend she present to L&D triage for further evaluation.  Baby Jay  recently sent down to [place] because nephrostomy tube got pulled out per mom.  Return to office in 1 week for scheduled prenatal care or sooner if needed.

## 2024-07-05 ENCOUNTER — NURSE TRIAGE (OUTPATIENT)
Age: 34
End: 2024-07-05

## 2024-07-05 NOTE — TELEPHONE ENCOUNTER
"Patient is 29w2d gestation  reporting increased swelling in lower legs and ankles bilaterally.  She denies any redness or pain.  She denies any other swelling, RUQ pain, or changes in vision.  She states she has a slight headache that she thinks is due to not getting enough sleep last night.  Advised patient to obtain compression stockings or socks as well as elevating both extremities when sitting down and during bedtime to increase circulation in her legs.  Advised patient to drink enough water, at least 64 ounces per day and to elevate legs when laying or sitting down.  Patient has appointment scheduled next Friday for routine OB.  Patient verbalized understanding and voiced appreciation for phone call.       Reason for Disposition   MILD swelling of both ankles (i.e., pedal edema)    Answer Assessment - Initial Assessment Questions  1. ONSET: \"When did the swelling start?\" (e.g., minutes, hours, days)      Today   2. LOCATION: \"What part of the leg is swollen?\"  \"Are both legs swollen or just one leg?\"      Both legs and ankles   3. SEVERITY: \"How bad is the swelling?\" (e.g., localized; mild, moderate, severe)   - Localized - small area of swelling localized to one leg   - MILD pedal edema - swelling limited to foot and ankle, pitting edema < 1/4 inch (6 mm) deep, rest and elevation eliminate most or all swelling   - MODERATE edema - swelling of lower leg to knee, pitting edema > 1/4 inch (6 mm) deep, rest and elevation only partially reduce swelling   - SEVERE edema - swelling extends above knee, facial or hand swelling present       Mild  to moderate   4. REDNESS: \"Does the swelling look red or infected?\"      Denies   5. PAIN: \"Is the swelling painful to touch?\" If Yes, ask: \"How painful is it?\"   (Scale 1-10; mild, moderate or severe)      Denies   6. FEVER: \"Do you have a fever?\" If Yes, ask: \"What is it, how was it measured, and when did it start?\"       Denies   7. CAUSE: \"What do you think is causing " "the leg swelling?\"      Pregnancy   8. MEDICAL HISTORY: \"Do you have a history of blood clots, heart failure, kidney disease, liver failure, or cancer?\"      Denies   9. OTHER SYMPTOMS: \"Do you have any other symptoms?\" (e.g., chest pain, difficulty breathing)      Denies   10. JAKE: \"What date are you expecting to deliver?\"        29w2d    Protocols used: Pregnancy - Leg Swelling and Edema-ADULT-[place]    "

## 2024-07-12 ENCOUNTER — ROUTINE PRENATAL (OUTPATIENT)
Dept: OBGYN CLINIC | Facility: CLINIC | Age: 34
End: 2024-07-12

## 2024-07-12 VITALS — BODY MASS INDEX: 54.39 KG/M2 | DIASTOLIC BLOOD PRESSURE: 64 MMHG | WEIGHT: 234 LBS | SYSTOLIC BLOOD PRESSURE: 110 MMHG

## 2024-07-12 DIAGNOSIS — Z3A.30 30 WEEKS GESTATION OF PREGNANCY: Primary | ICD-10-CM

## 2024-07-12 DIAGNOSIS — Z30.2 REQUEST FOR STERILIZATION: ICD-10-CM

## 2024-07-12 PROCEDURE — PNV: Performed by: STUDENT IN AN ORGANIZED HEALTH CARE EDUCATION/TRAINING PROGRAM

## 2024-07-12 NOTE — PROGRESS NOTES
Margoth is a 34 y.o.  30w2d. Reports ++FM, no LOF, VB, or regular contractions.     Vitals:    24 0800   BP: 110/64     +FHTs    A/P:  Third tri labs notable for hgb 10.3,   Rh status NEG, rhogam 24  Growth scheduled 24    TDAP vaccine--would like today, none in office, would like at next appt  30 week packet given, labor floor consent signed today     Contraception: sterilization, would like depo bridge if needed, no nexplanon, no IUD  [place] sterilization consent form signed again today    Breastfeeding: yes, breast pump in the works  Birth plan:  at 28wks IUFD-->  at 22w5d wks Pre-E, Chorio and Sepsis.   Cerclage in place from [place], plan to remove at 36-37 weeks    Discussed pre-term labor precautions  Return to office in 2 weeks

## 2024-07-14 ENCOUNTER — TELEPHONE (OUTPATIENT)
Dept: OTHER | Facility: OTHER | Age: 34
End: 2024-07-14

## 2024-07-15 ENCOUNTER — NURSE TRIAGE (OUTPATIENT)
Age: 34
End: 2024-07-15

## 2024-07-15 ENCOUNTER — TELEPHONE (OUTPATIENT)
Dept: OBGYN CLINIC | Facility: CLINIC | Age: 34
End: 2024-07-15

## 2024-07-15 NOTE — TELEPHONE ENCOUNTER
Returned call.    Reassured Margoth that I was able to review notes from [facility] triage visit, and documentation was reassuring--they state no cervical dilation appreciated and that cerclage was not on tension. They document that the tracing was reactive and normal. They documented JOE 20. Agree that reasonable to repeat ultrasound a in a week or so. Will reach out to [facility] to see if able to move up growth ultrasound.    Margoth reports contraction earlier today, but currently ++FM, no LOF, VB, or regular contractions. Reviewed if she were to note decreased fetal movement, bleeding LOF, regular contractions or pelvic pain/pressure or inability to tolerate PO should have repeat evaluation.    All questions answered to the best of my ability.

## 2024-07-15 NOTE — TELEPHONE ENCOUNTER
"30 weeks 5 days EDC 9/18/24  Went to triage L&D @ [facility] across from [facility] yesterday, was at [facility] for her child, was seen for infrequent contractions and vaginal pressure. Had been vomiting first thing in the AM for the last 3 days and also vomiting 3-4 x's daily. Per patient she was given IV fluids yesterday for dehydration. Was told she is .5 cm dilated, has cerclage in place. Had ultrasound yesterday because fetal heart rate dropped into 80's several times during NST , they checked baby heart rate that was fine on ultrasound and they related drops in heart rate to baby moving but patient was also told her fluid level was too high.   Currently patient is still at [facility] and will not be home until tomorrow. Vomited 1 x today, able to keep down and food and fluids. Feels nauseated but able to keep fluids down today. Denies vaginal bleeding, no leaking of fluid and no vaginal discharge. Feeling active feal movents. Had 1 contraction this AM but none since. No pain.   Patient is checking if she can go back to triage for NST today.   Care Everywhere has appointment notes from 7/14/24.  ESC- Dr. Blakely-Ill call her thanks     Reason for Disposition   MILD vomiting (e.g., 1-2 times / day) and present > 3 days and started after the 9th week of pregnancy    Answer Assessment - Initial Assessment Questions  1. VOMITING SEVERITY: \"How many times have you vomited in the past 24 hours?\"      - MILD:  1 - 2 times/day     - MODERATE: 3 - 5 times/day, decreased oral intake without significant weight loss or symptoms of dehydration     - SEVERE: 6 or more times/day, vomits everything or nearly everything, with significant weight loss, symptoms of dehydration       1 x today- past 3 days 3-4 times daily  2. ONSET: \"When did the vomiting begin?\"       3 days ago  3. FLUIDS: \"What fluids or food have you vomited up today?\" \"Are you able " "to keep any liquids down?\"      Fluid, able to eat and drink today  4. TREATMENT: \"What have you been doing so far to treat this?\"       PO fluids   5. DEHYDRATION: \"When was the last time you urinated?\" \"Are you feeling lightheaded?\" \"Weight loss?\"      No dehydration today  6. PREGNANCY: \"How many weeks pregnant are you?\" \"How has the pregnancy been going?\"      30 weeks 5 days   7. JAKE: \"What date are you expecting to deliver?\"      9/18/24  9. OTHER SYMPTOMS: \"Do you have any other symptoms?\"      Felt contractions and vag pressure yesterday- was seen @ [facility] [place] 7/14    Protocols used: Pregnancy - Morning Sickness (Nausea and Vomiting of Pregnancy)-ADULT-[place]    "

## 2024-07-15 NOTE — TELEPHONE ENCOUNTER
Patient called to inform that she is currently in [facility]. She is having contractions and she is 1 cm dilated.

## 2024-07-16 NOTE — PROGRESS NOTES
OB/GYN  PN Visit  Margoth Cook  [id number]  2024  2:06 PM  REY Mascorro    S: 34 y.o.  30w6d here for PN visit. Pregnancy complicated by short interval pregnancy, BMI of 55, 28 wk IUFD, PTD  at 22 w5d (pre-E/chorio/sepsis), RH negative, and cerclage in place 24 from [place]. Plan to remove cerclage 36-37 weeks    OB complaints:  Denies c/o n/v/ha, no edema, no smoking, no DV.   No vb/lof  No cramping/ctxns or signs of PTL.    She reports decreased movement since Monday and contractions. She went to the hospital ([facility] They told her that it was dehydration and unclear if she was dilated. The providers told her that there is a suspicion for polyhydramnios.  She reports that she still is not feeling appropriate movement since Monday and considers it decreased.    O:    Pre-Domingo Vitals      Flowsheet Row Most Recent Value   Prenatal Assessment    Fetal Heart Rate 145   Fundal Height (cm) 35 cm   Movement Decreased   Prenatal Vitals    Blood Pressure 124/80   Weight - Scale 107 kg (235 lb)   Urine Albumin/Glucose    Dilation/Effacement/Station    Vaginal Drainage    Edema               Gen: no acute distress, nonlabored breathing.  OB exam completed: fundal height, +FHT.  Urine: -/-    A/P:  Problem List Items Addressed This Visit       Decreased fetal movement     -History of IUFD at 28 weeks.  -FHR in office today: 145  -Next Growth scan: 24  -With reported decreased fetal movement, advised evaluation at [facility]. Reviewed the importance of evaluation to ensure fetal health (more than just a heart beat check). Patient verbalizes understanding. She is concerned because there is noone to watch her son, who has medical conditions. [facility] ordered for her to go to hospital. She desires going home to relieve her son's nurse and plans to have her friend bring her to the hospital. Again, reiterated the IMPORTANCE of evaluation on labor and delivery.   -Message sent to on-call  team.         Prenatal care in third trimester - Primary     Labor precautions reviewed  Fetal kick counts reviewed  Tdap: give at next visit  Rhogam: 07/01/24  Labs UTD;   30 week packet given at previous visit; consent signed.  Pediatrician: established  Contraception: Depo Bridge to BS; Consents reviewed and signed by REY Alvarez  7/18/2024  2:06 PM

## 2024-07-17 ENCOUNTER — TELEPHONE (OUTPATIENT)
Facility: HOSPITAL | Age: 34
End: 2024-07-17

## 2024-07-17 NOTE — TELEPHONE ENCOUNTER
Called [employer] spoke with Chayito @ # [phone] to set up [facility] transportation for patient's Maternal Fetal Medicine appointment.  Appointment scheduled on  July 19, 2024 at 2:30 PM at [facility].       Spoke with patient and explained [facility] will pick-up patient at 1:45 PM for Maternal Fetal Medicine appointment.   Patient instructed she has 5 minutes maximum to get into car upon arrival. Patient verbalized understanding.     Confirmed phone and address.   [phone]  [facility]  [place] [address]

## 2024-07-18 ENCOUNTER — ROUTINE PRENATAL (OUTPATIENT)
Dept: OBGYN CLINIC | Facility: CLINIC | Age: 34
End: 2024-07-18

## 2024-07-18 VITALS
WEIGHT: 235 LBS | BODY MASS INDEX: 54.38 KG/M2 | SYSTOLIC BLOOD PRESSURE: 124 MMHG | DIASTOLIC BLOOD PRESSURE: 80 MMHG | HEIGHT: 55 IN

## 2024-07-18 DIAGNOSIS — Z34.93 PRENATAL CARE IN THIRD TRIMESTER: Primary | ICD-10-CM

## 2024-07-18 DIAGNOSIS — O36.8130 DECREASED FETAL MOVEMENTS IN THIRD TRIMESTER, SINGLE OR UNSPECIFIED FETUS: ICD-10-CM

## 2024-07-18 PROCEDURE — PNV

## 2024-07-18 NOTE — ASSESSMENT & PLAN NOTE
Labor precautions reviewed  Fetal kick counts reviewed  Tdap: give at next visit  Rhogam: 07/01/24  Labs UTD;   30 week packet given at previous visit; consent signed.  Pediatrician: established  Contraception: Depdanni Guerrero to BS; Consents reviewed and signed by Dr. Frankel

## 2024-07-18 NOTE — ASSESSMENT & PLAN NOTE
-History of IUFD at 28 weeks.  -FHR in office today: 145  -Next Growth scan: 07/25/24  -With reported decreased fetal movement, advised evaluation at [facility]. Reviewed the importance of evaluation to ensure fetal health (more than just a heart beat check). Patient verbalizes understanding. She is concerned because there is noone to watch her son, who has medical conditions. [facility] ordered for her to go to hospital. She desires going home to relieve her son's nurse and plans to have her friend bring her to the hospital. Again, reiterated the IMPORTANCE of evaluation on labor and delivery.   -Message sent to on-call team.

## 2024-07-19 ENCOUNTER — OFFICE VISIT (OUTPATIENT)
Dept: PERINATAL CARE | Facility: CLINIC | Age: 34
End: 2024-07-19
Payer: MEDICARE

## 2024-07-19 ENCOUNTER — HOSPITAL ENCOUNTER (OUTPATIENT)
Facility: HOSPITAL | Age: 34
Setting detail: OBSERVATION
Discharge: HOME | End: 2024-07-20
Attending: STUDENT IN AN ORGANIZED HEALTH CARE EDUCATION/TRAINING PROGRAM | Admitting: STUDENT IN AN ORGANIZED HEALTH CARE EDUCATION/TRAINING PROGRAM
Payer: MEDICARE

## 2024-07-19 VITALS
HEART RATE: 108 BPM | HEIGHT: 55 IN | BODY MASS INDEX: 54.8 KG/M2 | WEIGHT: 236.8 LBS | DIASTOLIC BLOOD PRESSURE: 74 MMHG | SYSTOLIC BLOOD PRESSURE: 126 MMHG

## 2024-07-19 DIAGNOSIS — E66.9 TYPE 2 DIABETES MELLITUS WITH OBESITY  (HCC): ICD-10-CM

## 2024-07-19 DIAGNOSIS — Z36.89 ENCOUNTER FOR ULTRASOUND TO ASSESS FETAL GROWTH: ICD-10-CM

## 2024-07-19 DIAGNOSIS — Z33.1 PREGNANT STATE, INCIDENTAL: Primary | ICD-10-CM

## 2024-07-19 DIAGNOSIS — E66.01 MATERNAL MORBID OBESITY, ANTEPARTUM (HCC): ICD-10-CM

## 2024-07-19 DIAGNOSIS — Z3A.31 31 WEEKS GESTATION OF PREGNANCY: ICD-10-CM

## 2024-07-19 DIAGNOSIS — O99.210 MATERNAL MORBID OBESITY, ANTEPARTUM (HCC): ICD-10-CM

## 2024-07-19 DIAGNOSIS — E11.69 TYPE 2 DIABETES MELLITUS WITH OBESITY  (HCC): ICD-10-CM

## 2024-07-19 DIAGNOSIS — Z36.2 ENCOUNTER FOR FOLLOW-UP ULTRASOUND OF FETAL ANATOMY: ICD-10-CM

## 2024-07-19 PROBLEM — O41.1220 CHORIOAMNIONITIS IN SECOND TRIMESTER: Status: RESOLVED | Noted: 2023-06-08 | Resolved: 2024-07-19

## 2024-07-19 PROBLEM — O36.8190 DECREASED FETAL MOVEMENT: Status: RESOLVED | Noted: 2024-06-10 | Resolved: 2024-07-19

## 2024-07-19 PROBLEM — Z23 ENCOUNTER FOR IMMUNIZATION: Status: RESOLVED | Noted: 2023-11-20 | Resolved: 2024-07-19

## 2024-07-19 PROBLEM — O99.212 OBESITY AFFECTING PREGNANCY IN SECOND TRIMESTER: Status: RESOLVED | Noted: 2021-08-25 | Resolved: 2024-07-19

## 2024-07-19 PROBLEM — O20.9 VAGINAL BLEEDING BEFORE 22 WEEKS GESTATION: Status: RESOLVED | Noted: 2023-03-30 | Resolved: 2024-07-19

## 2024-07-19 PROBLEM — Z34.82 PRENATAL CARE, SUBSEQUENT PREGNANCY, SECOND TRIMESTER: Status: RESOLVED | Noted: 2024-06-25 | Resolved: 2024-07-19

## 2024-07-19 PROBLEM — N97.9 INFERTILITY, FEMALE: Status: RESOLVED | Noted: 2021-04-19 | Resolved: 2024-07-19

## 2024-07-19 PROBLEM — O09.292 HISTORY OF STILLBIRTH IN CURRENTLY PREGNANT PATIENT, SECOND TRIMESTER: Status: RESOLVED | Noted: 2023-05-24 | Resolved: 2024-07-19

## 2024-07-19 LAB
ABO GROUP BLD: NORMAL
ALBUMIN SERPL BCG-MCNC: 3.6 G/DL (ref 3.5–5)
ALP SERPL-CCNC: 111 U/L (ref 34–104)
ALT SERPL W P-5'-P-CCNC: 10 U/L (ref 7–52)
ANION GAP SERPL CALCULATED.3IONS-SCNC: 10 MMOL/L (ref 4–13)
AST SERPL W P-5'-P-CCNC: 12 U/L (ref 13–39)
BILIRUB SERPL-MCNC: 0.26 MG/DL (ref 0.2–1)
BLD GP AB SCN SERPL QL: POSITIVE
BLOOD GROUP ANTIBODIES SERPL: NORMAL
BUN SERPL-MCNC: 7 MG/DL (ref 5–25)
CALCIUM SERPL-MCNC: 9 MG/DL (ref 8.4–10.2)
CHLORIDE SERPL-SCNC: 103 MMOL/L (ref 96–108)
CO2 SERPL-SCNC: 21 MMOL/L (ref 21–32)
CREAT SERPL-MCNC: 0.43 MG/DL (ref 0.6–1.3)
ERYTHROCYTE [DISTWIDTH] IN BLOOD BY AUTOMATED COUNT: 14 % (ref 11.6–15.1)
GFR SERPL CREATININE-BSD FRML MDRD: 134 ML/MIN/1.73SQ M
GLUCOSE SERPL-MCNC: 82 MG/DL (ref 65–140)
HCT VFR BLD AUTO: 34.1 % (ref 34.8–46.1)
HGB BLD-MCNC: 11.4 G/DL (ref 11.5–15.4)
MCH RBC QN AUTO: 29.8 PG (ref 26.8–34.3)
MCHC RBC AUTO-ENTMCNC: 33.4 G/DL (ref 31.4–37.4)
MCV RBC AUTO: 89 FL (ref 82–98)
PLATELET # BLD AUTO: 325 THOUSANDS/UL (ref 149–390)
PMV BLD AUTO: 9 FL (ref 8.9–12.7)
POTASSIUM SERPL-SCNC: 3.7 MMOL/L (ref 3.5–5.3)
PROT SERPL-MCNC: 7.1 G/DL (ref 6.4–8.4)
RBC # BLD AUTO: 3.82 MILLION/UL (ref 3.81–5.12)
RH BLD: NEGATIVE
SODIUM SERPL-SCNC: 134 MMOL/L (ref 135–147)
SPECIMEN EXPIRATION DATE: NORMAL
WBC # BLD AUTO: 6.25 THOUSAND/UL (ref 4.31–10.16)

## 2024-07-19 PROCEDURE — 86900 BLOOD TYPING SEROLOGIC ABO: CPT

## 2024-07-19 PROCEDURE — 86870 RBC ANTIBODY IDENTIFICATION: CPT

## 2024-07-19 PROCEDURE — 76816 OB US FOLLOW-UP PER FETUS: CPT | Performed by: OBSTETRICS & GYNECOLOGY

## 2024-07-19 PROCEDURE — 86780 TREPONEMA PALLIDUM: CPT

## 2024-07-19 PROCEDURE — 86901 BLOOD TYPING SEROLOGIC RH(D): CPT

## 2024-07-19 PROCEDURE — 80053 COMPREHEN METABOLIC PANEL: CPT

## 2024-07-19 PROCEDURE — G0379 DIRECT REFER HOSPITAL OBSERV: HCPCS

## 2024-07-19 PROCEDURE — 85027 COMPLETE CBC AUTOMATED: CPT

## 2024-07-19 PROCEDURE — 99214 OFFICE O/P EST MOD 30 MIN: CPT | Performed by: OBSTETRICS & GYNECOLOGY

## 2024-07-19 PROCEDURE — 99222 1ST HOSP IP/OBS MODERATE 55: CPT | Performed by: STUDENT IN AN ORGANIZED HEALTH CARE EDUCATION/TRAINING PROGRAM

## 2024-07-19 PROCEDURE — 86850 RBC ANTIBODY SCREEN: CPT

## 2024-07-19 PROCEDURE — 87150 DNA/RNA AMPLIFIED PROBE: CPT

## 2024-07-19 PROCEDURE — 99213 OFFICE O/P EST LOW 20 MIN: CPT

## 2024-07-19 RX ORDER — CALCIUM CARBONATE 500 MG/1
1000 TABLET, CHEWABLE ORAL 2 TIMES DAILY PRN
Status: DISCONTINUED | OUTPATIENT
Start: 2024-07-19 | End: 2024-07-20 | Stop reason: HOSPADM

## 2024-07-19 RX ORDER — ALBUTEROL SULFATE 90 UG/1
2 AEROSOL, METERED RESPIRATORY (INHALATION) EVERY 6 HOURS PRN
Status: DISCONTINUED | OUTPATIENT
Start: 2024-07-19 | End: 2024-07-20 | Stop reason: HOSPADM

## 2024-07-19 RX ORDER — BETAMETHASONE SODIUM PHOSPHATE AND BETAMETHASONE ACETATE 3; 3 MG/ML; MG/ML
12 INJECTION, SUSPENSION INTRA-ARTICULAR; INTRALESIONAL; INTRAMUSCULAR; SOFT TISSUE EVERY 24 HOURS
Status: DISCONTINUED | OUTPATIENT
Start: 2024-07-19 | End: 2024-07-20

## 2024-07-19 RX ORDER — ACETAMINOPHEN 325 MG/1
975 TABLET ORAL EVERY 8 HOURS PRN
Status: DISCONTINUED | OUTPATIENT
Start: 2024-07-19 | End: 2024-07-20

## 2024-07-19 RX ORDER — ACETAMINOPHEN 325 MG/1
975 TABLET ORAL ONCE
Status: COMPLETED | OUTPATIENT
Start: 2024-07-19 | End: 2024-07-19

## 2024-07-19 RX ORDER — CEFAZOLIN SODIUM 1 G/50ML
1000 SOLUTION INTRAVENOUS EVERY 8 HOURS
Status: DISCONTINUED | OUTPATIENT
Start: 2024-07-20 | End: 2024-07-20

## 2024-07-19 RX ORDER — MAGNESIUM SULFATE HEPTAHYDRATE 40 MG/ML
2 INJECTION, SOLUTION INTRAVENOUS ONCE
Status: COMPLETED | OUTPATIENT
Start: 2024-07-19 | End: 2024-07-19

## 2024-07-19 RX ORDER — SODIUM CHLORIDE, SODIUM LACTATE, POTASSIUM CHLORIDE, CALCIUM CHLORIDE 600; 310; 30; 20 MG/100ML; MG/100ML; MG/100ML; MG/100ML
125 INJECTION, SOLUTION INTRAVENOUS CONTINUOUS
Status: DISCONTINUED | OUTPATIENT
Start: 2024-07-19 | End: 2024-07-20

## 2024-07-19 RX ORDER — CEFAZOLIN SODIUM 2 G/50ML
2000 SOLUTION INTRAVENOUS ONCE
Status: COMPLETED | OUTPATIENT
Start: 2024-07-19 | End: 2024-07-19

## 2024-07-19 RX ORDER — CALCIUM GLUCONATE 94 MG/ML
1 INJECTION, SOLUTION INTRAVENOUS ONCE AS NEEDED
Status: DISCONTINUED | OUTPATIENT
Start: 2024-07-19 | End: 2024-07-20

## 2024-07-19 RX ORDER — MAGNESIUM SULFATE HEPTAHYDRATE 40 MG/ML
2 INJECTION, SOLUTION INTRAVENOUS CONTINUOUS
Status: DISCONTINUED | OUTPATIENT
Start: 2024-07-19 | End: 2024-07-20

## 2024-07-19 RX ORDER — LEVOTHYROXINE SODIUM 0.03 MG/1
25 TABLET ORAL
Status: DISCONTINUED | OUTPATIENT
Start: 2024-07-20 | End: 2024-07-20 | Stop reason: HOSPADM

## 2024-07-19 RX ORDER — ONDANSETRON 2 MG/ML
4 INJECTION INTRAMUSCULAR; INTRAVENOUS EVERY 6 HOURS PRN
Status: DISCONTINUED | OUTPATIENT
Start: 2024-07-19 | End: 2024-07-20 | Stop reason: HOSPADM

## 2024-07-19 RX ORDER — MAGNESIUM SULFATE HEPTAHYDRATE 40 MG/ML
4 INJECTION, SOLUTION INTRAVENOUS ONCE
Status: COMPLETED | OUTPATIENT
Start: 2024-07-19 | End: 2024-07-19

## 2024-07-19 RX ADMIN — ONDANSETRON 4 MG: 2 INJECTION INTRAMUSCULAR; INTRAVENOUS at 19:14

## 2024-07-19 RX ADMIN — ACETAMINOPHEN 975 MG: 325 TABLET, FILM COATED ORAL at 19:31

## 2024-07-19 RX ADMIN — BETAMETHASONE SODIUM PHOSPHATE AND BETAMETHASONE ACETATE 12 MG: 3; 3 INJECTION, SUSPENSION INTRA-ARTICULAR; INTRALESIONAL; INTRAMUSCULAR at 18:46

## 2024-07-19 RX ADMIN — MAGNESIUM SULFATE HEPTAHYDRATE 2 G: 40 INJECTION, SOLUTION INTRAVENOUS at 18:40

## 2024-07-19 RX ADMIN — SODIUM CHLORIDE, SODIUM LACTATE, POTASSIUM CHLORIDE, AND CALCIUM CHLORIDE 125 ML/HR: .6; .31; .03; .02 INJECTION, SOLUTION INTRAVENOUS at 18:18

## 2024-07-19 RX ADMIN — MAGNESIUM SULFATE HEPTAHYDRATE 2 G/HR: 40 INJECTION, SOLUTION INTRAVENOUS at 18:58

## 2024-07-19 RX ADMIN — LEVETIRACETAM 1250 MG: 250 TABLET, FILM COATED ORAL at 19:31

## 2024-07-19 RX ADMIN — CEFAZOLIN SODIUM 2000 MG: 2 SOLUTION INTRAVENOUS at 18:22

## 2024-07-19 RX ADMIN — MAGNESIUM SULFATE HEPTAHYDRATE 4 G: 40 INJECTION, SOLUTION INTRAVENOUS at 18:18

## 2024-07-19 NOTE — PATIENT INSTRUCTIONS
Thank you for choosing us for your  care today.  If you have any questions about your ultrasound or care, please do not hesitate to contact us or your primary obstetrician.        Some general instructions for your pregnancy are:    Exercise: Aim for 150 minutes per week of regular exercise.  Walking is great!  Nutrition: Choose healthy sources of calcium, iron, and protein.  Avoid ultraprocessed foods and added sugar.  Learn about Preeclampsia: preeclampsia is a common, potentially serious high blood pressure complication in pregnancy.  A blood pressure of 140mmHg (systolic or top number) or 90mmHg (diastolic or bottom number) should be evaluated by your doctor.  Aspirin is sometimes prescribed in early pregnancy to prevent preeclampsia in women with risk factors - ask your obstetrician if you should be on this medication.  For more resources, visit:  https://www.highriskpregnancyinfo.org/preeclampsia  If you smoke, please try to quit completely but also try to reduce your smoking by as much as possible (as soon as possible).  Do not vape.  Please also avoid cannabis products.  Other warning signs to watch out for in pregnancy or postpartum: chest pain, obstructed breathing or shortness of breath, seizures, thoughts of hurting yourself or your baby, bleeding, a painful or swollen leg, fever, or headache (see AWNN POST-BIRTH Warning Signs campaign).  If these happen call 911.  Itching is also not normal in pregnancy and if you experience this, especially over your hands and feet, potentially worse at night, notify your doctors.     Please go to Labor and Delivery now for evaluation.  The address of [facility] is [address] [address] ([facility] [place]).

## 2024-07-19 NOTE — LETTER
"2024     Katrina Mills MD  [address] [address]    Patient: Margoth Cook   YOB: 1990   Date of Visit: 2024       Dear Dr. Mills and Dr. Frankel,    Margoth Cook was referred to [facility] given symptoms concerning for  labor with cerclage in situ (to see Dr. Frankel), and will see Dr. Strong for her next OB visit.        Sincerely,        Dipika Guidry MD        CC: MD Dipika Bravo MD  2024  4:01 PM  Sign when Signing Visit  [facility]: Margoth Cook was seen today at 31w2d for fetal growth assessment ultrasound.  See ultrasound report under \"OB Procedures\" tab.  Please don't hesitate to contact our office with any concerns or questions.  -Dipika Guidry MD    "

## 2024-07-19 NOTE — ASSESSMENT & PLAN NOTE
"Lab Results   Component Value Date    HGBA1C 5.8 (H) 07/29/2022       No results for input(s): \"POCGLU\" in the last 72 hours.    Blood Sugar Average: Last 72 hrs:    Reports normal glucola in early pregnancy  Has not yet completed 28 week glucola.   "

## 2024-07-19 NOTE — QUICK NOTE
"I was asked by Dr. Frankel to evaluate patient's cervix given recommendations from outside to consider removal of cerclage.  I interviewed and spoke to the patient prior to an exam.  Patient indicates that she began having very infrequent contractions every couple of hours starting on Monday.  She was evaluated at [facility] where they indicated that there was a \"divot\" upon digital exam and based upon his \"divot\", consideration of cerclage removal was recommended.  She was evaluated by maternal-fetal medicine earlier today where she had a reassuring growth ultrasound.  She was then advised to go to labor and delivery for further evaluation and management.  I discussed with the patient general management of cervical cerclage with plan removal at around 36 weeks gestation or sooner as otherwise clinically indicated based upon labor, bleeding, or concerns for cervical damage.  A sterile speculum exam was performed with Dr. Frankel.  The patient's cervix appears without damage.  A third trimester cervix is identified with cerclage noted at approximately 1:00.  There is not appear to be significant tension on the cerclage and there does not appear to be significant outpouching of the lower uterine segment.  The third trimester cervix appears relatively normal in my opinion without significant edema, bleeding, or concern for impending damage.  Based upon my evaluation, I recommend leaving cerclage in situ and monitoring patient clinically with plans for administration of  corticosteroids.  I discussed this recommendation with the patient and with Dr. Frankel and all parties agree that there does not appear to be impending risk to the patient with this management strategy.  I remain available should there be any further concerns related to this patient.    Thank you very much for allowing us to participate in the care of this very nice patient.  Should you have any questions, please do not hesitate to " contact me.

## 2024-07-19 NOTE — ASSESSMENT & PLAN NOTE
Cerclage in place    Discontinue Magnesium  Discontinue ancef for GBS prophylaxis  Can restart both if moving toward delivery    NST TID  BTM 7/19 - 7/20  GBS collected

## 2024-07-19 NOTE — H&P
"H & P- Obstetrics   Margoth Cook 34 y.o. female MRN: [id number]  Unit/Bed#: LD TRIAGE 2- Encounter: [id number]    Assessment: 34 y.o.  at 31w2d admitted for observation in the setting of  contractions with a cerclage in place.    Cephalic confirmed by TAUS  GBS status: unk, collected       Plan:   Seizures ([facility])  Assessment & Plan  last one 22    Home keppra reordered    Hypothyroid in pregnancy, antepartum  Assessment & Plan  Home synthroid ordered    Type 2 diabetes mellitus with obesity  ([facility])  Assessment & Plan  Lab Results   Component Value Date    HGBA1C 5.8 (H) 2022       No results for input(s): \"POCGLU\" in the last 72 hours.    Blood Sugar Average: Last 72 hrs:    Reports normal glucola in early pregnancy  Has not yet completed 28 week glucola.     Bipolar depression ([facility])  Assessment & Plan  Home zoloft ordered    * 31 weeks gestation of pregnancy  Assessment & Plan  Admit to OBGYN  T&S, CBC, CMP, RPR  Diet: CLD  Continuous monitoring while on magnesium  BTM for fetal lung maturity  GBS collected    Considering cerclage removal vs leaving in-situ for latency          Discussed case and plan w/ Dr. Frankel      Chief Complaint: contractions    HPI: Margoth Cook is a 34 y.o.  with an JAKE of 2024, by Last Menstrual Period at 31w2d who is being admitted for observation in the setting of contractions with cerclage in place. She has a history of painless cervical dilation.. She complains of uterine contractions, occurring irregularly, has no LOF, and reports no VB. She states she has felt good FM.    Patient Active Problem List   Diagnosis    Depression    Nonintractable epilepsy without status epilepticus (HCC)    History of irregular menstrual bleeding    PCOS (polycystic ovarian syndrome)    Spain's esophagus    Gastric ulcer    GARIMA (obstructive sleep apnea)    Autism    Bipolar depression (HCC)    Type 2 diabetes mellitus with obesity  ([facility])    " Gastroesophageal reflux disease    Excessive daytime sleepiness    Iron deficiency anemia secondary to inadequate dietary iron intake    Constipation    Dysphagia    Hypothyroid in pregnancy, antepartum    Chronic constipation    Nausea/vomiting in pregnancy    Seizures (HCC)    History of stillbirth in currently pregnant patient, unspecified trimester    Maternal morbid obesity, antepartum (HCC)    Short stature    Family history of congenital heart defect    Hypothyroidism during pregnancy in second trimester    Previous child with congenital anomaly, currently pregnant, antepartum    Rh negative state in antepartum period    Request for sterilization    Acute bronchitis    Vision problem    Spotting    Prenatal care in third trimester    31 weeks gestation of pregnancy       Baby complications/comments: none    Review of Systems   Constitutional:  Negative for chills and fever.   Eyes:  Negative for visual disturbance.   Respiratory:  Negative for shortness of breath.    Cardiovascular:  Negative for chest pain.   Gastrointestinal:  Negative for abdominal pain, diarrhea, nausea and vomiting.   Genitourinary:  Negative for dysuria, flank pain, hematuria, vaginal bleeding and vaginal discharge.   Skin:  Negative for rash.   Neurological:  Negative for dizziness, numbness and headaches.   All other systems reviewed and are negative.      OB Hx:  OB History    Para Term  AB Living   5 2 0 2 2 1   SAB IAB Ectopic Multiple Live Births   2 0 0 0 1      # Outcome Date GA Lbr Lopez/2nd Weight Sex Type Anes PTL Lv   5 Current            4  // 22w5d  600 g (1 lb 5.2 oz) M Vag-Spont EPI Y BHUPINDER      Complications:  labor in second trimester   3 2019           2 2012           1   28w0d    Vag-Spont   FD      Complications: ABO incompatibility in pregnancy      Obstetric Comments   Both SAB <2 months   Still birth at 7 months   Has had hypercoagulable workup in [place] which was  negative    PTD @ 22w5d PreE, chorio and sepsis. 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          Past Medical Hx:  Past Medical History:   Diagnosis Date    Abnormal Pap smear of cervix     Anemia     Anxiety     Asthma     Autism     [place] esophagus     Bipolar disorder (HCC)     CPAP (continuous positive airway pressure) dependence     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HCC)     not diagnosed as of 1/9/23-trying to R/O    Depression     Diabetes mellitus (HCC)     Disease of thyroid gland     hypo    Dysphagia     solids and liquids    Female infertility     Fibromyalgia, primary     Gastric ulcer     History of bronchitis     Hypothyroidism     Iron deficiency anemia     infusion in 11/2022    Irregular menses     Miscarriage     Morbid obesity with BMI of 50.0-59.9, adult (HCC)     Plantar fasciitis of left foot     Polycystic ovary syndrome     Reflux esophagitis     Seizures ([facility])     last one 7/13/22    Sleep apnea     CPAP    Wears glasses        Past Surgical hx:  Past Surgical History:   Procedure Laterality Date    EGD      with Bx due to barretts esophagus    EYE SURGERY Bilateral     lazy eye repair    AZ BIOPSY OF LIP N/A 11/10/2022    Procedure: EXCISION BIOPSY SALVARY GLANDS LOWER LIP;  Surgeon: Casey Florez MD;  Location: [facility];  Service: ENT    AZ CERCLAGE UTERINE CERVIX NONOBSTETRICAL N/A 5/23/2023    Procedure: CERCLAGE CERVICAL;  Surgeon: Ant Marinelli MD;  Location: [facility];  Service: Obstetrics    AZ HYSTEROSCOPY BX ENDOMETRIUM&/POLYPC W/WO D&C N/A 9/22/2021    Procedure: DILATATION AND CURETTAGE (D&C) WITH HYSTEROSCOPY;  Surgeon: Albina Parsons MD;  Location: [facility];  Service: Gynecology    WISDOM TOOTH EXTRACTION         Social Hx:  Alcohol use: denies  Tobacco use: denies  Other substance use:  denies    Allergies   Allergen Reactions    Haloperidol Other (See Comments)     Jaw locks up    Jaw locks up   Jaw locks up    Bee Pollen Other (See Comments)    Penicillins Hives    Pollen Extract Allergic Rhinitis    Fluoxetine Allergic Rhinitis         Medications Prior to Admission:     acetaminophen (TYLENOL) 500 mg tablet    ASPIRIN 81 PO    ferrous sulfate 324 (65 Fe) mg    lamoTRIgine (LaMICtal) 100 mg tablet    lamoTRIgine (LaMICtal) 25 mg tablet    levETIRAcetam (KEPPRA) 1000 MG tablet    levothyroxine 25 mcg tablet    Prenatal Vit-Fe Sulfate-FA-DHA (Prenatal Vitamin/Min +DHA) 27-0.8-200 MG CAPS    albuterol (Proventil HFA) 90 mcg/act inhaler    cephalexin (KEFLEX) 500 mg capsule    Diflucan 150 MG tablet    doxycycline hyclate (VIBRA-TABS) 100 mg tablet    esomeprazole (NexIUM) 40 MG capsule    famotidine (PEPCID) 40 MG tablet    levETIRAcetam (KEPPRA) 500 mg tablet    levETIRAcetam (Keppra) 500 mg tablet    levETIRAcetam (KEPPRA) 750 mg tablet    metroNIDAZOLE (FLAGYL) 500 mg tablet    mirtazapine (REMERON) 7.5 MG tablet    sertraline (ZOLOFT) 50 mg tablet    Objective:  Temp:  [98.8 °F (37.1 °C)] 98.8 °F (37.1 °C)  HR:  [] 99  Resp:  [18] 18  BP: (103-126)/(58-74) 103/58  Body mass index is 54.85 kg/m².     Physical Exam:  Physical Exam  Constitutional:       General: She is not in acute distress.     Appearance: Normal appearance.   Genitourinary:      Vulva normal.      No vaginal discharge.   HENT:      Head: Normocephalic and atraumatic.   Eyes:      Extraocular Movements: Extraocular movements intact.   Cardiovascular:      Rate and Rhythm: Normal rate.      Pulses: Normal pulses.   Pulmonary:      Effort: Pulmonary effort is normal. No respiratory distress.   Abdominal:      Tenderness: There is no abdominal tenderness. There is no guarding.   Musculoskeletal:         General: Normal range of motion.      Cervical back: Normal range of motion.   Neurological:      General: No focal deficit  present.      Mental Status: She is alert. Mental status is at baseline.   Skin:     General: Skin is warm and dry.   Psychiatric:         Mood and Affect: Mood normal.         Behavior: Behavior normal.   Vitals reviewed. Exam conducted with a chaperone present.          Lab Results   Component Value Date    WBC 6.33 06/10/2024    HGB 10.3 (L) 06/10/2024    HCT 32.1 (L) 06/10/2024     06/10/2024     Lab Results   Component Value Date    K 3.8 07/14/2024     (L) 07/14/2024    CO2 24 07/14/2024    BUN 9 07/14/2024    CREATININE 0.45 07/14/2024    GLUCOSE 99 06/04/2023    AST 10 (L) 06/10/2024    ALT 7 06/10/2024     Prenatal Labs: Reviewed     Blood type: O neg  Antibody: neg  GBS: unk, collected  HIV: NR  Rubella: immune  Syphilis IgM/IgG: NR  HBsAg: NR  HCAb: NR  Chlamydia: neg  Gonorrhea: neg  Diabetes 1 hour screen: passed early glucola  Hgb: pending  Platelets: pending  <2 Midnights  OBSERVATION    Signature/Title: Armand Abarca MD  Date: 7/19/2024  Time: 6:07 PM

## 2024-07-19 NOTE — PROGRESS NOTES
"[facility]: Margoth Cook was seen today at 31w2d for fetal growth assessment ultrasound.  See ultrasound report under \"OB Procedures\" tab.  Please don't hesitate to contact our office with any concerns or questions.  -Dipika Guidry MD    "

## 2024-07-20 VITALS
BODY MASS INDEX: 54.62 KG/M2 | HEIGHT: 55 IN | WEIGHT: 236 LBS | RESPIRATION RATE: 18 BRPM | TEMPERATURE: 98.1 F | SYSTOLIC BLOOD PRESSURE: 94 MMHG | HEART RATE: 98 BPM | DIASTOLIC BLOOD PRESSURE: 61 MMHG | OXYGEN SATURATION: 99 %

## 2024-07-20 LAB — TREPONEMA PALLIDUM IGG+IGM AB [PRESENCE] IN SERUM OR PLASMA BY IMMUNOASSAY: NORMAL

## 2024-07-20 PROCEDURE — 99232 SBSQ HOSP IP/OBS MODERATE 35: CPT | Performed by: OBSTETRICS & GYNECOLOGY

## 2024-07-20 RX ORDER — BETAMETHASONE SODIUM PHOSPHATE AND BETAMETHASONE ACETATE 3; 3 MG/ML; MG/ML
12 INJECTION, SUSPENSION INTRA-ARTICULAR; INTRALESIONAL; INTRAMUSCULAR; SOFT TISSUE EVERY 24 HOURS
Status: COMPLETED | OUTPATIENT
Start: 2024-07-20 | End: 2024-07-20

## 2024-07-20 RX ORDER — ACETAMINOPHEN 325 MG/1
975 TABLET ORAL EVERY 6 HOURS PRN
Status: DISCONTINUED | OUTPATIENT
Start: 2024-07-20 | End: 2024-07-20 | Stop reason: HOSPADM

## 2024-07-20 RX ADMIN — ACETAMINOPHEN 975 MG: 325 TABLET, FILM COATED ORAL at 01:22

## 2024-07-20 RX ADMIN — LEVETIRACETAM 1250 MG: 250 TABLET, FILM COATED ORAL at 09:17

## 2024-07-20 RX ADMIN — ACETAMINOPHEN 975 MG: 325 TABLET, FILM COATED ORAL at 15:49

## 2024-07-20 RX ADMIN — SODIUM CHLORIDE, SODIUM LACTATE, POTASSIUM CHLORIDE, AND CALCIUM CHLORIDE 125 ML/HR: .6; .31; .03; .02 INJECTION, SOLUTION INTRAVENOUS at 02:25

## 2024-07-20 RX ADMIN — LEVETIRACETAM 1250 MG: 250 TABLET, FILM COATED ORAL at 18:02

## 2024-07-20 RX ADMIN — ONDANSETRON 4 MG: 2 INJECTION INTRAMUSCULAR; INTRAVENOUS at 09:15

## 2024-07-20 RX ADMIN — BETAMETHASONE SODIUM PHOSPHATE AND BETAMETHASONE ACETATE 12 MG: 3; 3 INJECTION, SUSPENSION INTRA-ARTICULAR; INTRALESIONAL; INTRAMUSCULAR at 16:30

## 2024-07-20 RX ADMIN — CEFAZOLIN SODIUM 1000 MG: 1 SOLUTION INTRAVENOUS at 02:30

## 2024-07-20 RX ADMIN — SODIUM CHLORIDE, SODIUM LACTATE, POTASSIUM CHLORIDE, AND CALCIUM CHLORIDE 1000 ML: .6; .31; .03; .02 INJECTION, SOLUTION INTRAVENOUS at 01:25

## 2024-07-20 RX ADMIN — LEVOTHYROXINE SODIUM 25 MCG: 25 TABLET ORAL at 05:59

## 2024-07-20 RX ADMIN — MAGNESIUM SULFATE HEPTAHYDRATE 2 G/HR: 40 INJECTION, SOLUTION INTRAVENOUS at 04:08

## 2024-07-20 NOTE — PROGRESS NOTES
Reevaluated patient at bedside. Contractions have decreased in intensity and spaced out. Cervix remains closed on SVE. Patient is suitable for discharge home with strict return precautions.    Vitals:    07/20/24 1558   BP: 94/61   Pulse: 98   Resp: 18   Temp: 98.1 °F (36.7 °C)   SpO2: 99%     Armand Abarca MD  07/20/24  5:58 PM

## 2024-07-20 NOTE — QUICK NOTE
Pt reevaluated for increased pelvic pressure associated with painful contractions.   Reports that these contractions feel more intense than earlier.   Speculum examination performed with chaperone present.   Cerclage suture visualized with edematous cervix as previously described. No membranes or bleeding noted at cervix.   FHT has been reactive with moderate variability.   Plan for IV fluid bolus and tylenol. Will reassess if pelvic pressure increases.     D/w Dr. Frankel.     Troy Carlos,   07/20/24  1:36 AM

## 2024-07-20 NOTE — PLAN OF CARE
Problem: ANTEPARTUM  Goal: Maintain pregnancy as long as maternal and/or fetal condition is stable  Description: INTERVENTIONS:  - Maternal surveillance  - Fetal surveillance  - Monitor uterine activity  - Medications as ordered  - Bedrest  Outcome: Progressing     Problem: PAIN - ADULT  Goal: Verbalizes/displays adequate comfort level or baseline comfort level  Description: Interventions:  - Encourage patient to monitor pain and request assistance  - Assess pain using appropriate pain scale  - Administer analgesics based on type and severity of pain and evaluate response  - Implement non-pharmacological measures as appropriate and evaluate response  - Consider cultural and social influences on pain and pain management  - Notify physician/advanced practitioner if interventions unsuccessful or patient reports new pain  Outcome: Progressing     Problem: INFECTION - ADULT  Goal: Absence or prevention of progression during hospitalization  Description: INTERVENTIONS:  - Assess and monitor for signs and symptoms of infection  - Monitor lab/diagnostic results  - Monitor all insertion sites, i.e. indwelling lines, tubes, and drains  - Monitor endotracheal if appropriate and nasal secretions for changes in amount and color  - [place] appropriate cooling/warming therapies per order  - Administer medications as ordered  - Instruct and encourage patient and family to use good hand hygiene technique  - Identify and instruct in appropriate isolation precautions for identified infection/condition  Outcome: Progressing  Goal: Absence of fever/infection during neutropenic period  Description: INTERVENTIONS:  - Monitor WBC    Outcome: Progressing     Problem: SAFETY ADULT  Goal: Patient will remain free of falls  Description: INTERVENTIONS:  - Educate patient/family on patient safety including physical limitations  - Instruct patient to call for assistance with activity   - Consult OT/PT to assist with strengthening/mobility   -  Keep Call bell within reach  - Keep bed low and locked with side rails adjusted as appropriate  - Keep care items and personal belongings within reach  - Initiate and maintain comfort rounds  - Make Fall Risk Sign visible to staff  - Offer Toileting every  Hours, in advance of need  - Initiate/Maintain alarm  - Obtain necessary fall risk management equipment:   - Apply yellow socks and bracelet for high fall risk patients  - Consider moving patient to room near nurses station  Outcome: Progressing  Goal: Maintain or return to baseline ADL function  Description: INTERVENTIONS:  -  Assess patient's ability to carry out ADLs; assess patient's baseline for ADL function and identify physical deficits which impact ability to perform ADLs (bathing, care of mouth/teeth, toileting, grooming, dressing, etc.)  - Assess/evaluate cause of self-care deficits   - Assess range of motion  - Assess patient's mobility; develop plan if impaired  - Assess patient's need for assistive devices and provide as appropriate  - Encourage maximum independence but intervene and supervise when necessary  - Involve family in performance of ADLs  - Assess for home care needs following discharge   - Consider OT consult to assist with ADL evaluation and planning for discharge  - Provide patient education as appropriate  Outcome: Progressing  Goal: Maintains/Returns to pre admission functional level  Description: INTERVENTIONS:  - Perform AM-PAC 6 Click Basic Mobility/ Daily Activity assessment daily.  - Set and communicate daily mobility goal to care team and patient/family/caregiver.   - Collaborate with rehabilitation services on mobility goals if consulted  - Perform Range of Motion  times a day.  - Reposition patient every hours.  - Dangle patient  times a day  - Stand patient  times a day  - Ambulate patient times a day  - Out of bed to chair  times a day   - Out of bed for meals  times a day  - Out of bed for toileting  - Record patient  progress and toleration of activity level   Outcome: Progressing     Problem: Knowledge Deficit  Goal: Patient/family/caregiver demonstrates understanding of disease process, treatment plan, medications, and discharge instructions  Description: Complete learning assessment and assess knowledge base.  Interventions:  - Provide teaching at level of understanding  - Provide teaching via preferred learning methods  Outcome: Progressing     Problem: DISCHARGE PLANNING  Goal: Discharge to home or other facility with appropriate resources  Description: INTERVENTIONS:  - Identify barriers to discharge w/patient and caregiver  - Arrange for needed discharge resources and transportation as appropriate  - Identify discharge learning needs (meds, wound care, etc.)  - Arrange for interpretive services to assist at discharge as needed  - Refer to Case Management Department for coordinating discharge planning if the patient needs post-hospital services based on physician/advanced practitioner order or complex needs related to functional status, cognitive ability, or social support system  Outcome: Progressing

## 2024-07-20 NOTE — PLAN OF CARE
Problem: ANTEPARTUM  Goal: Maintain pregnancy as long as maternal and/or fetal condition is stable  Description: INTERVENTIONS:  - Maternal surveillance  - Fetal surveillance  - Monitor uterine activity  - Medications as ordered  - Bedrest  Outcome: Progressing     Problem: PAIN - ADULT  Goal: Verbalizes/displays adequate comfort level or baseline comfort level  Description: Interventions:  - Encourage patient to monitor pain and request assistance  - Assess pain using appropriate pain scale  - Administer analgesics based on type and severity of pain and evaluate response  - Implement non-pharmacological measures as appropriate and evaluate response  - Consider cultural and social influences on pain and pain management  - Notify physician/advanced practitioner if interventions unsuccessful or patient reports new pain  Outcome: Progressing     Problem: INFECTION - ADULT  Goal: Absence or prevention of progression during hospitalization  Description: INTERVENTIONS:  - Assess and monitor for signs and symptoms of infection  - Monitor lab/diagnostic results  - Monitor all insertion sites, i.e. indwelling lines, tubes, and drains  - Monitor endotracheal if appropriate and nasal secretions for changes in amount and color  - [place] appropriate cooling/warming therapies per order  - Administer medications as ordered  - Instruct and encourage patient and family to use good hand hygiene technique  - Identify and instruct in appropriate isolation precautions for identified infection/condition  Outcome: Progressing  Goal: Absence of fever/infection during neutropenic period  Description: INTERVENTIONS:  - Monitor WBC    Outcome: Progressing     Problem: SAFETY ADULT  Goal: Patient will remain free of falls  Description: INTERVENTIONS:  - Educate patient/family on patient safety including physical limitations  - Instruct patient to call for assistance with activity   - Consult OT/PT to assist with strengthening/mobility   -  Keep Call bell within reach  - Keep bed low and locked with side rails adjusted as appropriate  - Keep care items and personal belongings within reach  - Initiate and maintain comfort rounds  - Apply yellow socks and bracelet for high fall risk patients  - Consider moving patient to room near nurses station  Outcome: Progressing  Goal: Maintain or return to baseline ADL function  Description: INTERVENTIONS:  -  Assess patient's ability to carry out ADLs; assess patient's baseline for ADL function and identify physical deficits which impact ability to perform ADLs (bathing, care of mouth/teeth, toileting, grooming, dressing, etc.)  - Assess/evaluate cause of self-care deficits   - Assess range of motion  - Assess patient's mobility; develop plan if impaired  - Assess patient's need for assistive devices and provide as appropriate  - Encourage maximum independence but intervene and supervise when necessary  - Involve family in performance of ADLs  - Assess for home care needs following discharge   - Consider OT consult to assist with ADL evaluation and planning for discharge  - Provide patient education as appropriate  Outcome: Progressing  Goal: Maintains/Returns to pre admission functional level  Description: INTERVENTIONS:  - Perform AM-PAC 6 Click Basic Mobility/ Daily Activity assessment daily.  - Set and communicate daily mobility goal to care team and patient/family/caregiver.   - Collaborate with rehabilitation services on mobility goals if consulted  - Out of bed for toileting  - Record patient progress and toleration of activity level   Outcome: Progressing     Problem: Knowledge Deficit  Goal: Patient/family/caregiver demonstrates understanding of disease process, treatment plan, medications, and discharge instructions  Description: Complete learning assessment and assess knowledge base.  Interventions:  - Provide teaching at level of understanding  - Provide teaching via preferred learning  methods  Outcome: Progressing     Problem: DISCHARGE PLANNING  Goal: Discharge to home or other facility with appropriate resources  Description: INTERVENTIONS:  - Identify barriers to discharge w/patient and caregiver  - Arrange for needed discharge resources and transportation as appropriate  - Identify discharge learning needs (meds, wound care, etc.)  - Arrange for interpretive services to assist at discharge as needed  - Refer to Case Management Department for coordinating discharge planning if the patient needs post-hospital services based on physician/advanced practitioner order or complex needs related to functional status, cognitive ability, or social support system  Outcome: Progressing

## 2024-07-20 NOTE — PLAN OF CARE
Problem: ANTEPARTUM  Goal: Maintain pregnancy as long as maternal and/or fetal condition is stable  Description: INTERVENTIONS:  - Maternal surveillance  - Fetal surveillance  - Monitor uterine activity  - Medications as ordered  - Bedrest  7/20/2024 1756 by Jessenia Flood RN  Outcome: Adequate for Discharge  7/20/2024 0813 by Jessenia Flood RN  Outcome: Progressing     Problem: PAIN - ADULT  Goal: Verbalizes/displays adequate comfort level or baseline comfort level  Description: Interventions:  - Encourage patient to monitor pain and request assistance  - Assess pain using appropriate pain scale  - Administer analgesics based on type and severity of pain and evaluate response  - Implement non-pharmacological measures as appropriate and evaluate response  - Consider cultural and social influences on pain and pain management  - Notify physician/advanced practitioner if interventions unsuccessful or patient reports new pain  7/20/2024 1756 by Jessenia Flood RN  Outcome: Adequate for Discharge  7/20/2024 0813 by Jessenia Flood RN  Outcome: Progressing     Problem: INFECTION - ADULT  Goal: Absence or prevention of progression during hospitalization  Description: INTERVENTIONS:  - Assess and monitor for signs and symptoms of infection  - Monitor lab/diagnostic results  - Monitor all insertion sites, i.e. indwelling lines, tubes, and drains  - Monitor endotracheal if appropriate and nasal secretions for changes in amount and color  - [place] appropriate cooling/warming therapies per order  - Administer medications as ordered  - Instruct and encourage patient and family to use good hand hygiene technique  - Identify and instruct in appropriate isolation precautions for identified infection/condition  7/20/2024 1756 by Jessenia Flood RN  Outcome: Adequate for Discharge  7/20/2024 0813 by Jessenia Flood RN  Outcome: Progressing  Goal: Absence of fever/infection during neutropenic period  Description:  INTERVENTIONS:  - Monitor WBC    7/20/2024 1756 by Jessenia Flood RN  Outcome: Adequate for Discharge  7/20/2024 0813 by Jessenia Flood RN  Outcome: Progressing     Problem: SAFETY ADULT  Goal: Patient will remain free of falls  Description: INTERVENTIONS:  - Educate patient/family on patient safety including physical limitations  - Instruct patient to call for assistance with activity   - Consult OT/PT to assist with strengthening/mobility   - Keep Call bell within reach  - Keep bed low and locked with side rails adjusted as appropriate  - Keep care items and personal belongings within reach  - Initiate and maintain comfort rounds  - Make Fall Risk Sign visible to staff  - Apply yellow socks and bracelet for high fall risk patients  - Consider moving patient to room near nurses station  7/20/2024 1756 by Jessenia Flood RN  Outcome: Adequate for Discharge  7/20/2024 0813 by Jessenia Flood RN  Outcome: Progressing  Goal: Maintain or return to baseline ADL function  Description: INTERVENTIONS:  -  Assess patient's ability to carry out ADLs; assess patient's baseline for ADL function and identify physical deficits which impact ability to perform ADLs (bathing, care of mouth/teeth, toileting, grooming, dressing, etc.)  - Assess/evaluate cause of self-care deficits   - Assess range of motion  - Assess patient's mobility; develop plan if impaired  - Assess patient's need for assistive devices and provide as appropriate  - Encourage maximum independence but intervene and supervise when necessary  - Involve family in performance of ADLs  - Assess for home care needs following discharge   - Consider OT consult to assist with ADL evaluation and planning for discharge  - Provide patient education as appropriate  7/20/2024 1756 by Jessenia Flood RN  Outcome: Adequate for Discharge  7/20/2024 0813 by Jessenia Flood RN  Outcome: Progressing  Goal: Maintains/Returns to pre admission functional level  Description:  INTERVENTIONS:  - Perform AM-PAC 6 Click Basic Mobility/ Daily Activity assessment daily.  - Set and communicate daily mobility goal to care team and patient/family/caregiver.   - Collaborate with rehabilitation services on mobility goals if consulted  - Out of bed for toileting  - Record patient progress and toleration of activity level   7/20/2024 1756 by Jessenia Flood RN  Outcome: Adequate for Discharge  7/20/2024 0813 by Jessenia Flood RN  Outcome: Progressing     Problem: Knowledge Deficit  Goal: Patient/family/caregiver demonstrates understanding of disease process, treatment plan, medications, and discharge instructions  Description: Complete learning assessment and assess knowledge base.  Interventions:  - Provide teaching at level of understanding  - Provide teaching via preferred learning methods  7/20/2024 1756 by Jessenia Flood RN  Outcome: Adequate for Discharge  7/20/2024 0813 by Jessenia Flood RN  Outcome: Progressing     Problem: DISCHARGE PLANNING  Goal: Discharge to home or other facility with appropriate resources  Description: INTERVENTIONS:  - Identify barriers to discharge w/patient and caregiver  - Arrange for needed discharge resources and transportation as appropriate  - Identify discharge learning needs (meds, wound care, etc.)  - Arrange for interpretive services to assist at discharge as needed  - Refer to Case Management Department for coordinating discharge planning if the patient needs post-hospital services based on physician/advanced practitioner order or complex needs related to functional status, cognitive ability, or social support system  7/20/2024 1756 by Jessenia Flood RN  Outcome: Adequate for Discharge  7/20/2024 0813 by Jessenia Flood RN  Outcome: Progressing

## 2024-07-20 NOTE — PROGRESS NOTES
"Progress Note - OB/GYN  Margoth Cook 34 y.o. female MRN: [id number]  Unit/Bed#: -01 Encounter: [id number]    Assessment and Plan     Margoth Cook is a patient of: [facility] . She is admitted for observation in the setting of  contractions with a cerclage in place.       * 31 weeks gestation of pregnancy  Assessment & Plan  Cerclage in place    Discontinue Magnesium  Discontinue ancef for GBS prophylaxis  Can restart both if moving toward delivery    NST TID  BTM  -   GBS collected      Seizures (HCC)  Assessment & Plan  last one 22    Home keppra reordered    Hypothyroid in pregnancy, antepartum  Assessment & Plan  Home synthroid ordered    Type 2 diabetes mellitus with obesity  (HCC)  Assessment & Plan  Lab Results   Component Value Date    HGBA1C 5.8 (H) 2022       No results for input(s): \"POCGLU\" in the last 72 hours.    Blood Sugar Average: Last 72 hrs:    Reports normal glucola in early pregnancy  Has not yet completed 28 week glucola.     Bipolar depression (HCC)  Assessment & Plan  Home zoloft ordered        Disposition    - Anticipate discharge home later this afternoon after 2nd dose of betamethasone if remains clinically well and stable      Subjective/Objective     Subjective:    Margoth Cook is feeling anxious this morning. She is feeling contractions every 10 minutes or so, irregular pattern. She has felt this way since Monday with on/off contractions. She is not having any leaking fluid or vaginal bleeding. Endorses good fetal movement.     We discussed this morning that the goal right now is to keep her pregnant as long as possible, and the recommendation is to leave the cerclage in place unless things become worse.      Vitals:   /58 (BP Location: Right arm)   Pulse 94   Temp 98.2 °F (36.8 °C) (Oral)   Resp 18   Ht 4' 7\" (1.397 m)   Wt 107 kg (236 lb)   LMP 2023   SpO2 100%   BMI 54.85 kg/m²       Intake/Output Summary (Last " 24 hours) at 7/20/2024 0959  Last data filed at 7/20/2024 0600  Gross per 24 hour   Intake 3621.25 ml   Output 1800 ml   Net 1821.25 ml       Invasive Devices       Peripheral Intravenous Line  Duration             Peripheral IV 07/19/24 Left;Ventral (anterior) Forearm <1 day                    Physical Exam:   GEN: Margoth Joey appears well, alert, pleasant and cooperative   CARDIO: RRR  RESP:  Normal respiratory effort  ABDOMEN: Soft, no tenderness, gravid  EXTREMITIES: Non tender, no erythema, b/l Berna's sign negative      Labs:     Hemoglobin   Date Value Ref Range Status   07/19/2024 11.4 (L) 11.5 - 15.4 g/dL Final   06/10/2024 10.3 (L) 11.5 - 15.4 g/dL Final     WBC   Date Value Ref Range Status   07/19/2024 6.25 4.31 - 10.16 Thousand/uL Final   06/10/2024 6.33 4.31 - 10.16 Thousand/uL Final     Platelets   Date Value Ref Range Status   07/19/2024 325 149 - 390 Thousands/uL Final   06/10/2024 293 149 - 390 Thousands/uL Final     Creatinine   Date Value Ref Range Status   07/19/2024 0.43 (L) 0.60 - 1.30 mg/dL Final     Comment:     Standardized to IDMS reference method   07/14/2024 0.45 0.44 - 1.03 mg/dL Final   06/10/2024 0.45 (L) 0.60 - 1.30 mg/dL Final     Comment:     Standardized to IDMS reference method     AST   Date Value Ref Range Status   07/19/2024 12 (L) 13 - 39 U/L Final   06/10/2024 10 (L) 13 - 39 U/L Final     ALT   Date Value Ref Range Status   07/19/2024 10 7 - 52 U/L Final     Comment:     Specimen collection should occur prior to Sulfasalazine administration due to the potential for falsely depressed results.    06/10/2024 7 7 - 52 U/L Final     Comment:     Specimen collection should occur prior to Sulfasalazine administration due to the potential for falsely depressed results.           Armand Abarca MD  OBGYN PGY3  7/20/2024  9:59 AM

## 2024-07-22 ENCOUNTER — TELEPHONE (OUTPATIENT)
Age: 34
End: 2024-07-22

## 2024-07-22 LAB — GP B STREP DNA SPEC QL NAA+PROBE: POSITIVE

## 2024-07-22 NOTE — TELEPHONE ENCOUNTER
Patient called to reschedule her OB visit, no openings seen in timeframe needed. Attempted to warm transfer with no answer. Patient can go to other locations with L[facility]FT.

## 2024-07-27 LAB
GP B STREP DNA SPEC QL NAA+PROBE: ABNORMAL
GP B STREP DNA SPEC QL NAA+PROBE: ABNORMAL

## 2024-07-28 ENCOUNTER — NURSE TRIAGE (OUTPATIENT)
Dept: OTHER | Facility: OTHER | Age: 34
End: 2024-07-28

## 2024-07-28 NOTE — TELEPHONE ENCOUNTER
"Regardinw pregnant / precautions from unknown bacterial infection  ----- Message from Evelyn SPANN sent at 2024  3:25 PM EDT -----  \" I am 32w pregnant and my son is in the hospital and we are waiting his blood cultures to come back. They say it is a bacterial infection but don't know exactly what yet, what should I look out for or do since I am pregnant. \"    "

## 2024-07-28 NOTE — TELEPHONE ENCOUNTER
"Reason for Disposition   Health Information question, no triage required and triager able to answer question    Answer Assessment - Initial Assessment Questions  1. REASON FOR CALL or QUESTION: \"What is your reason for calling today?\" or \"How can I best help you?\" or \"What question do you have that I can help answer?\"      Pt's son is in the hospital a/w results of blood cultures for definitive    Denies feeling ill at this time    Protocols used: Information Only Call-ADULT-        * Monitor for fever  * Monitor for symptoms similar to those son is experiencing  * Practice good hand hygiene  * Mask when visiting until diagnosis confirmed  Mother verbalized understanding.  "

## 2024-07-29 NOTE — PROGRESS NOTES
VISIT 34 yr old  at 32w6d: Seen L&D  - r/o PTL - GBS Pos; (+) edema - mild in feet - comes and goes; Denies n/v/HA/cramping/vb/lof/dv/smoking; occasional lightheaded but rare occurrence; urine neg/neg  Labs - needs to complete TSH and 1 hour GTT - plans to complete today; PNC - 31w growth WNL, vtx, JOE WNL; rec weekly NST/JOE begin at 34w   PNVs + DHA - tolerating daily  Good FM - r/rajesh 10 kicks/2 hrs  Tdap - reviewed and encouraged - administered today without issue; Rhogam done 24    Breast pump - Medicaid kicks in  - please place order next visit; Peds - established; Contraception - depo bridge to BS; consents reviewed and signed by Dr. Frankel;  Yellow folder given and reviewed/Delivery Care Consent reviewed and signed at prior visit  Encouraged hydration. Reviewed signs and symptoms of labor and preE and when to call  RTO in 2 weeks for routine ob check or sooner if needed

## 2024-07-30 ENCOUNTER — APPOINTMENT (OUTPATIENT)
Dept: LAB | Facility: HOSPITAL | Age: 34
End: 2024-07-30
Payer: MEDICARE

## 2024-07-30 ENCOUNTER — ROUTINE PRENATAL (OUTPATIENT)
Dept: OBGYN CLINIC | Facility: CLINIC | Age: 34
End: 2024-07-30

## 2024-07-30 VITALS — SYSTOLIC BLOOD PRESSURE: 102 MMHG | BODY MASS INDEX: 54.15 KG/M2 | DIASTOLIC BLOOD PRESSURE: 64 MMHG | WEIGHT: 233 LBS

## 2024-07-30 DIAGNOSIS — Z3A.32 32 WEEKS GESTATION OF PREGNANCY: Primary | ICD-10-CM

## 2024-07-30 DIAGNOSIS — E66.01 MATERNAL MORBID OBESITY, ANTEPARTUM (HCC): ICD-10-CM

## 2024-07-30 DIAGNOSIS — Z23 NEED FOR TDAP VACCINATION: ICD-10-CM

## 2024-07-30 DIAGNOSIS — O99.280 HYPOTHYROID IN PREGNANCY, ANTEPARTUM: ICD-10-CM

## 2024-07-30 DIAGNOSIS — Z36.89 ENCOUNTER FOR OTHER SPECIFIED ANTENATAL SCREENING: ICD-10-CM

## 2024-07-30 DIAGNOSIS — Z34.82 PRENATAL CARE, SUBSEQUENT PREGNANCY, SECOND TRIMESTER: ICD-10-CM

## 2024-07-30 DIAGNOSIS — Z23 ENCOUNTER FOR IMMUNIZATION: ICD-10-CM

## 2024-07-30 DIAGNOSIS — Z86.39 HISTORY OF HYPOTHYROIDISM: ICD-10-CM

## 2024-07-30 DIAGNOSIS — O09.299 HISTORY OF STILLBIRTH IN CURRENTLY PREGNANT PATIENT, UNSPECIFIED TRIMESTER: ICD-10-CM

## 2024-07-30 DIAGNOSIS — O35.2XX0 PREVIOUS CHILD WITH CONGENITAL ANOMALY, CURRENTLY PREGNANT, ANTEPARTUM, SINGLE OR UNSPECIFIED FETUS: ICD-10-CM

## 2024-07-30 DIAGNOSIS — E03.9 HYPOTHYROID IN PREGNANCY, ANTEPARTUM: ICD-10-CM

## 2024-07-30 DIAGNOSIS — O99.210 MATERNAL MORBID OBESITY, ANTEPARTUM (HCC): ICD-10-CM

## 2024-07-30 LAB
ABO GROUP BLD: NORMAL
BASOPHILS # BLD AUTO: 0.06 THOUSANDS/ÂΜL (ref 0–0.1)
BASOPHILS NFR BLD AUTO: 1 % (ref 0–1)
BLD GP AB SCN SERPL QL: NEGATIVE
EOSINOPHIL # BLD AUTO: 0.03 THOUSAND/ÂΜL (ref 0–0.61)
EOSINOPHIL NFR BLD AUTO: 0 % (ref 0–6)
ERYTHROCYTE [DISTWIDTH] IN BLOOD BY AUTOMATED COUNT: 14.1 % (ref 11.6–15.1)
GLUCOSE 1H P 50 G GLC PO SERPL-MCNC: 115 MG/DL (ref 70–134)
HCT VFR BLD AUTO: 34.2 % (ref 34.8–46.1)
HGB BLD-MCNC: 11.2 G/DL (ref 11.5–15.4)
IMM GRANULOCYTES # BLD AUTO: 0.04 THOUSAND/UL (ref 0–0.2)
IMM GRANULOCYTES NFR BLD AUTO: 1 % (ref 0–2)
LYMPHOCYTES # BLD AUTO: 0.87 THOUSANDS/ÂΜL (ref 0.6–4.47)
LYMPHOCYTES NFR BLD AUTO: 13 % (ref 14–44)
MCH RBC QN AUTO: 29.2 PG (ref 26.8–34.3)
MCHC RBC AUTO-ENTMCNC: 32.7 G/DL (ref 31.4–37.4)
MCV RBC AUTO: 89 FL (ref 82–98)
MONOCYTES # BLD AUTO: 0.44 THOUSAND/ÂΜL (ref 0.17–1.22)
MONOCYTES NFR BLD AUTO: 6 % (ref 4–12)
NEUTROPHILS # BLD AUTO: 5.5 THOUSANDS/ÂΜL (ref 1.85–7.62)
NEUTS SEG NFR BLD AUTO: 79 % (ref 43–75)
NRBC BLD AUTO-RTO: 0 /100 WBCS
PLATELET # BLD AUTO: 285 THOUSANDS/UL (ref 149–390)
PMV BLD AUTO: 8.8 FL (ref 8.9–12.7)
RBC # BLD AUTO: 3.83 MILLION/UL (ref 3.81–5.12)
RH BLD: NEGATIVE
SPECIMEN EXPIRATION DATE: NORMAL
TREPONEMA PALLIDUM IGG+IGM AB [PRESENCE] IN SERUM OR PLASMA BY IMMUNOASSAY: NORMAL
TSH SERPL DL<=0.05 MIU/L-ACNC: 2.49 UIU/ML (ref 0.45–4.5)
WBC # BLD AUTO: 6.94 THOUSAND/UL (ref 4.31–10.16)

## 2024-07-30 PROCEDURE — 86780 TREPONEMA PALLIDUM: CPT

## 2024-07-30 PROCEDURE — 36415 COLL VENOUS BLD VENIPUNCTURE: CPT

## 2024-07-30 PROCEDURE — 86850 RBC ANTIBODY SCREEN: CPT

## 2024-07-30 PROCEDURE — 86900 BLOOD TYPING SEROLOGIC ABO: CPT

## 2024-07-30 PROCEDURE — 86901 BLOOD TYPING SEROLOGIC RH(D): CPT

## 2024-07-30 PROCEDURE — PNV: Performed by: PHYSICIAN ASSISTANT

## 2024-07-30 PROCEDURE — 84443 ASSAY THYROID STIM HORMONE: CPT

## 2024-07-30 RX ORDER — ONDANSETRON 4 MG/1
TABLET, FILM COATED ORAL
COMMUNITY
Start: 2024-07-15 | End: 2024-07-30 | Stop reason: ALTCHOICE

## 2024-08-05 NOTE — PATIENT INSTRUCTIONS
Kick Counts in Pregnancy   AMBULATORY CARE:   Kick counts  measure how much your baby is moving in your womb. A kick from your baby can be felt as a twist, turn, swish, roll, or jab. It is common to feel your baby kicking at 26 to 28 weeks of pregnancy. You may feel your baby kick as early as 20 weeks of pregnancy. You may want to start counting at 28 weeks.   Contact your doctor immediately if:   You feel a change in the number of kicks or movements of your baby.      You feel fewer than 10 kicks within 2 hours.      You have questions or concerns about your baby's movements.     Why measure kick counts:  Your baby's movement may provide information about your baby's health. He or she may move less, or not at all, if there are problems. Your baby may move less if he or she is not getting enough oxygen or nutrition from the placenta. Do not smoke while you are pregnant. Smoking decreases the amount of oxygen that gets to your baby. Talk to your healthcare provider if you need help to quit smoking. Tell your healthcare provider as soon as you feel a change in your baby's movements.  When to measure kick counts:   Measure kick counts at the same time every day.       Measure kick counts when your baby is awake and most active. Your baby may be most active in the evening.     How to measure kick counts:  Check that your baby is awake before you measure kick counts. You can wake up your baby by lightly pushing on your belly, walking, or drinking something cold. Your healthcare provider may tell you different ways to measure kick counts. You may be told to do the following:  Use a chart or clock to keep track of the time you start and finish counting.      Sit in a chair or lie on your left side.      Place your hands on the largest part of your belly.      Count until you reach 10 kicks. Write down how much time it takes to count 10 kicks.      It may take 30 minutes to 2 hours to count 10 kicks. It should not take more  than 2 hours to count 10 kicks.     Follow up with your doctor as directed:  Write down your questions so you remember to ask them during your visits.   © Copyright [employer] 2023 Information is for End User's use only and may not be sold, redistributed or otherwise used for commercial purposes.  The above information is an  only. It is not intended as medical advice for individual conditions or treatments. Talk to your doctor, nurse or pharmacist before following any medical regimen to see if it is safe and effective for you. Nonstress Test for Pregnancy   WHAT YOU NEED TO KNOW:   What do I need to know about a nonstress test?  A nonstress test measures your baby's heart rate and movements. Nonstress means that no stress will be placed on your baby during the test.  How do I prepare for a nonstress test?  Your healthcare provider will talk to you about how to prepare for this test. Your provider may tell you to eat and drink plenty of liquids before your test. If you smoke, you may be asked not to smoke within 2 hours before the test. Your provider will also tell you which medicines to take or not take on the day of your test.  What will happen during a nonstress test?  You may be asked to lie down or recline back for the test on a bed. One or 2 belts with sensors will be placed around your abdomen. Your baby's heart rate will be recorded with a machine. If your baby does not move, your baby may be asleep. Your healthcare provider may make a noise near your abdomen to try to wake your baby. The test usually takes about 20 minutes, but can take longer if your baby needs to be awakened.        What do I need to know about the test results?  Your baby will be expected to move at least 2 times for a certain amount of time. Your baby's heart rate will be expected to go up by a certain number of beats per minute during movement. If your baby does not move as expected, the test may need to be repeated or you  may need other tests.  CARE AGREEMENT:   You have the right to help plan your care. Learn about your health condition and how it may be treated. Discuss treatment options with your healthcare providers to decide what care you want to receive. You always have the right to refuse treatment. The above information is an  only. It is not intended as medical advice for individual conditions or treatments. Talk to your doctor, nurse or pharmacist before following any medical regimen to see if it is safe and effective for you.  © 2023 Information is for End User's use only and may not be sold, redistributed or otherwise used for commercial purposes. Thank you for choosing us for your  care today.  If you have any questions about your ultrasound or care, please do not hesitate to contact us or your primary obstetrician.        Some general instructions for your pregnancy are:    Exercise: Aim for 150 minutes per week of regular exercise.  Walking is great!  Nutrition: Choose healthy sources of calcium, iron, and protein.  Avoid ultraprocessed foods and added sugar.  Learn about Preeclampsia: preeclampsia is a common, potentially serious high blood pressure complication in pregnancy.  A blood pressure of 140mmHg (systolic or top number) or 90mmHg (diastolic or bottom number) should be evaluated by your doctor.  Aspirin is sometimes prescribed in early pregnancy to prevent preeclampsia in women with risk factors - ask your obstetrician if you should be on this medication.  For more resources, visit:  https://[email or web address].org/preeclampsia  If you smoke, please try to quit completely but also try to reduce your smoking by as much as possible (as soon as possible).  Do not vape.  Please also avoid cannabis products.  Other warning signs to watch out for in pregnancy or postpartum: chest pain, obstructed breathing or shortness of breath, seizures, thoughts of hurting yourself  or your baby, bleeding, a painful or swollen leg, fever, or headache (see [facility] POST-BIRTH Warning Signs campaign).  If these happen call 911.  Itching is also not normal in pregnancy and if you experience this, especially over your hands and feet, potentially worse at night, notify your doctors.  Nonstress Test for Pregnancy   WHAT YOU NEED TO KNOW:   What do I need to know about a nonstress test?  A nonstress test measures your baby's heart rate and movements. Nonstress means that no stress will be placed on your baby during the test.  How do I prepare for a nonstress test?  Your healthcare provider will talk to you about how to prepare for this test. Your provider may tell you to eat and drink plenty of liquids before your test. If you smoke, you may be asked not to smoke within 2 hours before the test. Your provider will also tell you which medicines to take or not take on the day of your test.  What will happen during a nonstress test?  You may be asked to lie down or recline back for the test on a bed. One or 2 belts with sensors will be placed around your abdomen. Your baby's heart rate will be recorded with a machine. If your baby does not move, your baby may be asleep. Your healthcare provider may make a noise near your abdomen to try to wake your baby. The test usually takes about 20 minutes, but can take longer if your baby needs to be awakened.        What do I need to know about the test results?  Your baby will be expected to move at least 2 times for a certain amount of time. Your baby's heart rate will be expected to go up by a certain number of beats per minute during movement. If your baby does not move as expected, the test may need to be repeated or you may need other tests.  CARE AGREEMENT:   You have the right to help plan your care. Learn about your health condition and how it may be treated. Discuss treatment options with your healthcare providers to decide what care you want to receive. You  always have the right to refuse treatment. The above information is an  only. It is not intended as medical advice for individual conditions or treatments. Talk to your doctor, nurse or pharmacist before following any medical regimen to see if it is safe and effective for you.  © Copyright [employer] 2023 Information is for End User's use only and may not be sold, redistributed or otherwise used for commercial purposes. Kick Counts in Pregnancy   AMBULATORY CARE:   Kick counts  measure how much your baby is moving in your womb. A kick from your baby can be felt as a twist, turn, swish, roll, or jab. It is common to feel your baby kicking at 26 to 28 weeks of pregnancy. You may feel your baby kick as early as 20 weeks of pregnancy. You may want to start counting at 28 weeks.   Contact your doctor immediately if:   You feel a change in the number of kicks or movements of your baby.      You feel fewer than 10 kicks within 2 hours.      You have questions or concerns about your baby's movements.     Why measure kick counts:  Your baby's movement may provide information about your baby's health. He or she may move less, or not at all, if there are problems. Your baby may move less if he or she is not getting enough oxygen or nutrition from the placenta. Do not smoke while you are pregnant. Smoking decreases the amount of oxygen that gets to your baby. Talk to your healthcare provider if you need help to quit smoking. Tell your healthcare provider as soon as you feel a change in your baby's movements.  When to measure kick counts:   Measure kick counts at the same time every day.       Measure kick counts when your baby is awake and most active. Your baby may be most active in the evening.     How to measure kick counts:  Check that your baby is awake before you measure kick counts. You can wake up your baby by lightly pushing on your belly, walking, or drinking something cold. Your healthcare provider may  tell you different ways to measure kick counts. You may be told to do the following:  Use a chart or clock to keep track of the time you start and finish counting.      Sit in a chair or lie on your left side.      Place your hands on the largest part of your belly.      Count until you reach 10 kicks. Write down how much time it takes to count 10 kicks.      It may take 30 minutes to 2 hours to count 10 kicks. It should not take more than 2 hours to count 10 kicks.     Follow up with your doctor as directed:  Write down your questions so you remember to ask them during your visits.   © Copyright [employer] 2023 Information is for End User's use only and may not be sold, redistributed or otherwise used for commercial purposes.  The above information is an  only. It is not intended as medical advice for individual conditions or treatments. Talk to your doctor, nurse or pharmacist before following any medical regimen to see if it is safe and effective for you.

## 2024-08-08 ENCOUNTER — TELEPHONE (OUTPATIENT)
Dept: PERINATAL CARE | Facility: OTHER | Age: 34
End: 2024-08-08

## 2024-08-08 ENCOUNTER — ROUTINE PRENATAL (OUTPATIENT)
Dept: OBGYN CLINIC | Facility: CLINIC | Age: 34
End: 2024-08-08
Payer: MEDICARE

## 2024-08-08 VITALS — DIASTOLIC BLOOD PRESSURE: 66 MMHG | SYSTOLIC BLOOD PRESSURE: 106 MMHG | BODY MASS INDEX: 55.55 KG/M2 | WEIGHT: 239 LBS

## 2024-08-08 DIAGNOSIS — O34.33 CERVICAL CERCLAGE SUTURE PRESENT IN THIRD TRIMESTER: ICD-10-CM

## 2024-08-08 DIAGNOSIS — Z34.93 PRENATAL CARE IN THIRD TRIMESTER: ICD-10-CM

## 2024-08-08 DIAGNOSIS — Z23 ENCOUNTER FOR IMMUNIZATION: ICD-10-CM

## 2024-08-08 DIAGNOSIS — Z3A.34 34 WEEKS GESTATION OF PREGNANCY: Primary | ICD-10-CM

## 2024-08-08 PROCEDURE — 90715 TDAP VACCINE 7 YRS/> IM: CPT | Performed by: STUDENT IN AN ORGANIZED HEALTH CARE EDUCATION/TRAINING PROGRAM

## 2024-08-08 PROCEDURE — 90471 IMMUNIZATION ADMIN: CPT | Performed by: STUDENT IN AN ORGANIZED HEALTH CARE EDUCATION/TRAINING PROGRAM

## 2024-08-08 PROCEDURE — PNV: Performed by: STUDENT IN AN ORGANIZED HEALTH CARE EDUCATION/TRAINING PROGRAM

## 2024-08-08 RX ORDER — PRENATAL VIT/IRON FUM/FOLIC AC 27MG-0.8MG
1 TABLET ORAL DAILY
COMMUNITY

## 2024-08-08 NOTE — TELEPHONE ENCOUNTER
Called [facility] [employer] spoke with Usama @ # [phone] to set up [facility] transportation for patient's Maternal Fetal Medicine appointment.  Appointment scheduled on  August 9, 2024 at 9:15 AM at our [place] office.    Attempted to call patient, but was unavailable, and was not able to leave voicemail for patient. Sent text message and [place] [employer] message confirming [facility] will pick-up at 8:30 AM for her Maternal Fetal Medicine appointment.   Patient instructed she has 5 minutes maximum to get into car upon arrival.  Patient instructed to call # [phone] any questions.

## 2024-08-08 NOTE — Clinical Note
Hi--Margoth would like her cerclage removed closer at her next appt with you (35w5d) because her son Jay will be having surgery 8/28 at [facility] and she's worried removal 8/26 would interfere. Are you ok with that? I also told her there's a chance she may need to have it removed in triage, however, wanted to FYI you in case you'd like this to be changed to a 30-minute appt or have any extra instruments with you for that.

## 2024-08-08 NOTE — PROGRESS NOTES
Margoth is a 34 y.o.  34w1d. Reports ++FM, no LOF, VB, or regular contractions.     Vitals:    24 1600   BP: 106/66     +FHTs    A/P:  Rh status NEG, s/p rhogam 24  Received tdap  Cerclage due for removal 36-37 weeks, magdy is getting surgery (nephrostomy tube removed ) would prefer to have cerclage removed closer to 36 weeks instead of 2 days prior to her son's scheduled surgery. Next appt scheduled for 35w5d, notified Dr. Tyler. May need to reschedule, may also need to be performed in hospital.    Discussed pre-term labor precautions  Return to office in 2 weeks

## 2024-08-09 ENCOUNTER — ULTRASOUND (OUTPATIENT)
Facility: HOSPITAL | Age: 34
End: 2024-08-09
Payer: MEDICARE

## 2024-08-09 VITALS
BODY MASS INDEX: 54.89 KG/M2 | SYSTOLIC BLOOD PRESSURE: 96 MMHG | WEIGHT: 237.2 LBS | HEART RATE: 98 BPM | HEIGHT: 55 IN | DIASTOLIC BLOOD PRESSURE: 60 MMHG

## 2024-08-09 DIAGNOSIS — E66.01 MATERNAL MORBID OBESITY, ANTEPARTUM (HCC): ICD-10-CM

## 2024-08-09 DIAGNOSIS — O99.210 MATERNAL MORBID OBESITY, ANTEPARTUM (HCC): ICD-10-CM

## 2024-08-09 DIAGNOSIS — Z3A.34 34 WEEKS GESTATION OF PREGNANCY: Primary | ICD-10-CM

## 2024-08-09 PROCEDURE — 59025 FETAL NON-STRESS TEST: CPT | Performed by: OBSTETRICS & GYNECOLOGY

## 2024-08-09 PROCEDURE — 76815 OB US LIMITED FETUS(S): CPT | Performed by: OBSTETRICS & GYNECOLOGY

## 2024-08-09 NOTE — PROGRESS NOTES
A fetal ultrasound and NST were completed. See Ob procedures in Epic for an interpretation and recommendations. Do not hesitate to contact us in [facility] if you have questions.    Usman Clinton MD, MSCE  Maternal Fetal Medicine

## 2024-08-09 NOTE — PROGRESS NOTES
Non-Stress Testing:    Non-Stress test, equipment, procedure, and expected outcomes explained. Reviewed fetal kick counts and when to call OB.Verified patient understanding of fetal kick counts with teach back method. Patient reports feeling daily fetal movements. Patient has no questions or concerns.     Reviewed non-stress test with Dr. Clinton.

## 2024-08-14 ENCOUNTER — TELEPHONE (OUTPATIENT)
Facility: HOSPITAL | Age: 34
End: 2024-08-14

## 2024-08-15 ENCOUNTER — ULTRASOUND (OUTPATIENT)
Facility: HOSPITAL | Age: 34
End: 2024-08-15
Payer: MEDICARE

## 2024-08-15 VITALS
SYSTOLIC BLOOD PRESSURE: 104 MMHG | BODY MASS INDEX: 53.83 KG/M2 | HEIGHT: 55 IN | WEIGHT: 232.6 LBS | DIASTOLIC BLOOD PRESSURE: 58 MMHG | HEART RATE: 93 BPM

## 2024-08-15 DIAGNOSIS — O99.210 MATERNAL MORBID OBESITY, ANTEPARTUM (HCC): ICD-10-CM

## 2024-08-15 DIAGNOSIS — E66.01 MATERNAL MORBID OBESITY, ANTEPARTUM (HCC): ICD-10-CM

## 2024-08-15 DIAGNOSIS — Z3A.35 35 WEEKS GESTATION OF PREGNANCY: Primary | ICD-10-CM

## 2024-08-15 PROCEDURE — 76815 OB US LIMITED FETUS(S): CPT | Performed by: OBSTETRICS & GYNECOLOGY

## 2024-08-15 PROCEDURE — 59025 FETAL NON-STRESS TEST: CPT | Performed by: OBSTETRICS & GYNECOLOGY

## 2024-08-16 ENCOUNTER — TELEPHONE (OUTPATIENT)
Dept: OBGYN CLINIC | Facility: CLINIC | Age: 34
End: 2024-08-16

## 2024-08-16 NOTE — TELEPHONE ENCOUNTER
Placed call to patient to review scheduling of cerclage removal in L&D triage. Left a non detailed message on machine requesting return call to office.

## 2024-08-19 ENCOUNTER — PROCEDURE VISIT (OUTPATIENT)
Dept: OBGYN CLINIC | Facility: CLINIC | Age: 34
End: 2024-08-19
Payer: MEDICARE

## 2024-08-19 VITALS
DIASTOLIC BLOOD PRESSURE: 60 MMHG | WEIGHT: 236 LBS | SYSTOLIC BLOOD PRESSURE: 98 MMHG | HEIGHT: 55 IN | BODY MASS INDEX: 54.62 KG/M2

## 2024-08-19 DIAGNOSIS — O35.2XX0 PREVIOUS CHILD WITH CONGENITAL ANOMALY, CURRENTLY PREGNANT, ANTEPARTUM, SINGLE OR UNSPECIFIED FETUS: ICD-10-CM

## 2024-08-19 DIAGNOSIS — O34.33 CERVICAL CERCLAGE SUTURE PRESENT IN THIRD TRIMESTER: Primary | ICD-10-CM

## 2024-08-19 DIAGNOSIS — N92.0 SPOTTING: ICD-10-CM

## 2024-08-19 DIAGNOSIS — Z67.91 RH NEGATIVE STATE IN ANTEPARTUM PERIOD: ICD-10-CM

## 2024-08-19 DIAGNOSIS — E66.01 MATERNAL MORBID OBESITY, ANTEPARTUM (HCC): ICD-10-CM

## 2024-08-19 DIAGNOSIS — E03.9 HYPOTHYROID IN PREGNANCY, ANTEPARTUM: ICD-10-CM

## 2024-08-19 DIAGNOSIS — Z30.2 REQUEST FOR STERILIZATION: ICD-10-CM

## 2024-08-19 DIAGNOSIS — O26.899 RH NEGATIVE STATE IN ANTEPARTUM PERIOD: ICD-10-CM

## 2024-08-19 DIAGNOSIS — J45.909 ASTHMA DURING PREGNANCY: ICD-10-CM

## 2024-08-19 DIAGNOSIS — O09.299 HISTORY OF STILLBIRTH IN CURRENTLY PREGNANT PATIENT, UNSPECIFIED TRIMESTER: ICD-10-CM

## 2024-08-19 DIAGNOSIS — O99.280 HYPOTHYROID IN PREGNANCY, ANTEPARTUM: ICD-10-CM

## 2024-08-19 DIAGNOSIS — O99.519 ASTHMA DURING PREGNANCY: ICD-10-CM

## 2024-08-19 DIAGNOSIS — D50.8 IRON DEFICIENCY ANEMIA SECONDARY TO INADEQUATE DIETARY IRON INTAKE: ICD-10-CM

## 2024-08-19 DIAGNOSIS — E11.69 TYPE 2 DIABETES MELLITUS WITH OBESITY  (HCC): ICD-10-CM

## 2024-08-19 DIAGNOSIS — Z3A.35 35 WEEKS GESTATION OF PREGNANCY: ICD-10-CM

## 2024-08-19 DIAGNOSIS — E66.9 TYPE 2 DIABETES MELLITUS WITH OBESITY  (HCC): ICD-10-CM

## 2024-08-19 DIAGNOSIS — O99.210 MATERNAL MORBID OBESITY, ANTEPARTUM (HCC): ICD-10-CM

## 2024-08-19 DIAGNOSIS — G40.909 NONINTRACTABLE EPILEPSY WITHOUT STATUS EPILEPTICUS, UNSPECIFIED EPILEPSY TYPE (HCC): ICD-10-CM

## 2024-08-19 PROBLEM — Z34.93 PRENATAL CARE IN THIRD TRIMESTER: Status: RESOLVED | Noted: 2024-07-18 | Resolved: 2024-08-19

## 2024-08-19 PROBLEM — O99.282 HYPOTHYROIDISM DURING PREGNANCY IN SECOND TRIMESTER: Status: RESOLVED | Noted: 2023-05-24 | Resolved: 2024-08-19

## 2024-08-19 PROCEDURE — 99214 OFFICE O/P EST MOD 30 MIN: CPT | Performed by: OBSTETRICS & GYNECOLOGY

## 2024-08-19 NOTE — PROGRESS NOTES
"OB/GYN  PN Visit  Margoth Cook  [id number]  2024  1:11 PM  Dr. Lara Tyler MD    S: 34 y.o.  35w5d here for PN visit. She reports period cramping but denies contractions. She denies leakage of fluid and vaginal bleeding. She reports good fetal movement. She reports nausea but denies vomiting. She denies headache, domestic violence, and smoking. She reports edema of her feet. Her pregnancy is complicated by obesity, hypothyroidism, seizure disorder, short interval pregnancy, hx PTD x2, & cerclage in place.     O:  Pre-Domingo Vitals      Flowsheet Row Most Recent Value   Prenatal Assessment    Prenatal Vitals    Blood Pressure 98/60   Weight - Scale 107 kg (236 lb)   Urine Albumin/Glucose    Dilation/Effacement/Station    Vaginal Drainage no   Edema none   FHT: 145bpm  Fundal height: 38cm     Physical Exam  Vitals reviewed. Exam conducted with a chaperone present.   Constitutional:       General: She is not in acute distress.     Appearance: Normal appearance. She is well-developed. She is not ill-appearing, toxic-appearing or diaphoretic.   Cardiovascular:      Rate and Rhythm: Normal rate.   Pulmonary:      Effort: Pulmonary effort is normal. No respiratory distress.   Abdominal:      General: There is no distension.      Palpations: Abdomen is soft. There is no mass.      Tenderness: There is no abdominal tenderness. There is no guarding or rebound.   Genitourinary:     General: Normal vulva.      Exam position: Lithotomy position.      Labia:         Right: No rash, tenderness, lesion or injury.         Left: No rash, tenderness, lesion or injury.       Vagina: No signs of injury and foreign body. No vaginal discharge, erythema, tenderness, bleeding, lesions or prolapsed vaginal walls.      Cervix: Cervical bleeding (Minimal bleeding from cerclage \"holes\" after removal) present. No cervical motion tenderness, discharge, friability, lesion, erythema or eversion.      Uterus: Enlarged. Not " "tender.       Comments: Gravid, nontender  Skin:     General: Skin is warm and dry.   Neurological:      Mental Status: She is alert and oriented to person, place, and time.   Psychiatric:         Mood and Affect: Mood normal.         Behavior: Behavior normal.         A/P:    Problem List          Unprioritized    GARIMA (obstructive sleep apnea) (Chronic)    Overview     Patient is on CPAP         Depression    Nonintractable epilepsy without status epilepticus ([facility])    Overview     Continue Lamictal and Keppra         History of irregular menstrual bleeding    PCOS (polycystic ovarian syndrome)    Spain's esophagus    Gastric ulcer    Autism    Bipolar depression ([facility])    Type 2 diabetes mellitus with obesity  (Former[facility])    Overview     1hr GTT WNL!          Gastroesophageal reflux disease    Excessive daytime sleepiness    Iron deficiency anemia secondary to inadequate dietary iron intake    Overview     Continue iron supplementation  Lab Results   Component Value Date/Time    HGB 11.2 (L) 07/30/2024 05:32 PM    HGB 11.4 (L) 07/19/2024 05:59 PM            Dysphagia    Hypothyroid in pregnancy, antepartum    Overview     Continue Levothyroxine         Chronic constipation    Nausea/vomiting in pregnancy    History of stillbirth in currently pregnant patient, unspecified trimester    Overview     Late to care in G1 pregnancy - 28w demise due to \"fluid around the heart/trisomy\"         Maternal morbid obesity, antepartum ([facility])    Overview     Continue APFS         Short stature    Family history of congenital heart defect    Previous child with congenital anomaly, currently pregnant, antepartum    Overview     G1 - Trisomy? Heart defect? Fetal demise  G4 - 22 week delivery with chronic health concerns due to prematurity; Also ATOH1 gene mutation (very rare)  CFDNA WNL           Rh negative state in antepartum period    Overview     Rhogam administered 7/1/24  Will need again postpartum         Request for sterilization "    Overview     Consents signed previously  DMPA bridge to sterilization  Discussed with pt LARC methods of contraception including intrauterine device, Nexplanon, and Depo Provera in addition to surgical sterilization. Discussed the risks, benefits, and alternatives to each. Discussed that the Nexplanon has been proven to be the most effective form of contraception; pt desires permanence. Discussed that Mirena IUD has excellent bleeding profile that she would be unable to achieve with surgical sterilization alone; pt is uninterested in bleeding profile. Discussed that surgical sterilization is a PERMANENT and IRREVERSIBLE surgical procedure; pt demonstrated understanding and continued desire to proceed. Patient is okay if she does not have any more children, even if she dates a different partner or loses her children. She is aware that if she were to need a C/S, we could proceed with sterilization at that time (pending safety). Otherwise, will plan for outpatient laparoscopic procedure >6 weeks postpartum. She would like DMPA for bridge.          Vision problem    Spotting    35 weeks gestation of pregnancy    Current Assessment & Plan     - Continue PNV  - Labor precautions reviewed  - Fetal kick counts reviewed  - Labs: UTD  - Ultrasounds: Most recent growth scan wnl on 24  - Tdap: Administered 24  - Flu Shot: Will offer in season  - RSV:  Will offer during season (-) @ [id number]   - COVID: Vaccinated  - Rhogam: Administered 24  - Delivery:  with epidural; Concern: had issues with blood pressure last time with epidural; Had to give meds at [place] during spinal due to low BP  - Contraception: DMPA bridge to sterilization (consents signed previously)  - Breastfeeding: Yes; has pump  - Pediatrician: Established  - RTO in 1 week         Asthma during pregnancy    Overview     Continue albuterol inhaler - has not needed         Cervical cerclage suture present in third trimester    Overview      History indicated cerclage placed at [place]  Cerclage removed today in office              Future Appointments   Date Time Provider Department Center   2024  8:45 AM  NURSE MAGDALENO 3 [facility]   2024 11:30 AM María Frankel MD [facility]   2024  9:15 AM  NURSE MAGDALENO 3 [facility]   9/3/2024  2:45 PM REY Corcoran [facility]   2024  1:15 PM Ping Sims MD [facility]   2024 10:45 AM REY Corcoran [facility]   2024 10:40 AM Tj Chin MD HEM ONC [facility]         Lara Tyler MD  2024  1:11 PM

## 2024-08-19 NOTE — TELEPHONE ENCOUNTER
Availability in schedule for 30 min appt. Reviewed with Dr. Tyler. Called patient, successful in reaching patient and patient confirmed able to arrive for 1300 appointment in office today for OB visit with cerclage removal. Patient had no questions at this time.

## 2024-08-19 NOTE — PROGRESS NOTES
Cervical cerclage     Date/Time  8/19/2024 1:00 PM     Performed by  Lara Tyler MD   Authorized by  Lara Tyler MD     Universal Protocol   Consent: Verbal consent obtained.  Risks and benefits: risks, benefits and alternatives were discussed  Consent given by: patient  Patient understanding: patient states understanding of the procedure being performed  Patient consent: the patient's understanding of the procedure matches consent given  Procedure consent: procedure consent matches procedure scheduled  Required items: required blood products, implants, devices, and special equipment available  Patient identity confirmed: verbally with patient      Local anesthesia used: no     Anesthesia   Local anesthesia used: no     Sedation   Patient sedated: no        Specimen: no    Culture: no   Procedure Details   Procedure Notes: Speculum exam was performed. Cerclage knot was grasped with polyp forceps. Cervical tissue was pushed back from the knot base with pap spatula. End of the knot was visualized and cut on the left side. Cerclage was removed intact. Cervical exam was performed and she was noted to be 1/T/H. She tolerated the cerclage removal well.     Lara Modi MD  OB/GYN  She/her  8/19/2024  1:26 PM

## 2024-08-19 NOTE — ASSESSMENT & PLAN NOTE
- Continue PNV  - Labor precautions reviewed  - Fetal kick counts reviewed  - Labs: UTD  - Ultrasounds: Most recent growth scan wnl on 24  - Tdap: Administered 24  - Flu Shot: Will offer in season  - RSV:  Will offer during season (-) @ [id number]   - COVID: Vaccinated  - Rhogam: Administered 24  - Delivery:  with epidural; Concern: had issues with blood pressure last time with epidural; Had to give meds at [place] during spinal due to low BP  - Contraception: DMPA bridge to sterilization (consents signed previously)  - Breastfeeding: Yes; has pump  - Pediatrician: Established  - RTO in 1 week

## 2024-08-19 NOTE — TELEPHONE ENCOUNTER
Placed call to patient, line rang and then busy signal without option to leave voicemail. Will continue attempts to reach patient in regards to facilitating cerclage removal scheduling.

## 2024-08-20 PROBLEM — J20.9 ACUTE BRONCHITIS: Status: RESOLVED | Noted: 2023-11-20 | Resolved: 2024-08-20

## 2024-08-20 PROBLEM — O99.519 ASTHMA DURING PREGNANCY: Status: ACTIVE | Noted: 2024-08-20

## 2024-08-20 PROBLEM — K59.00 CONSTIPATION: Status: RESOLVED | Noted: 2022-05-11 | Resolved: 2024-08-20

## 2024-08-20 PROBLEM — O34.33 CERVICAL CERCLAGE SUTURE PRESENT IN THIRD TRIMESTER: Status: ACTIVE | Noted: 2024-08-20

## 2024-08-20 PROBLEM — J45.909 ASTHMA DURING PREGNANCY: Status: ACTIVE | Noted: 2024-08-20

## 2024-08-20 NOTE — PROGRESS NOTES
Please refer to the [facility] ultrasound report in Ob Procedures for additional information regarding today's visit

## 2024-08-21 ENCOUNTER — TELEPHONE (OUTPATIENT)
Age: 34
End: 2024-08-21

## 2024-08-21 NOTE — TELEPHONE ENCOUNTER
Pt called wanting to confirm her lyft for tomorrows appointment. Warm transfer to [facility], spoke with Alayna. States lyft has not been scheduled yet but will be today. Pt will be contacted with  time. Pt made aware and is thankful.

## 2024-08-22 ENCOUNTER — ULTRASOUND (OUTPATIENT)
Facility: HOSPITAL | Age: 34
End: 2024-08-22
Payer: MEDICARE

## 2024-08-22 ENCOUNTER — TELEPHONE (OUTPATIENT)
Facility: HOSPITAL | Age: 34
End: 2024-08-22

## 2024-08-22 VITALS
DIASTOLIC BLOOD PRESSURE: 56 MMHG | BODY MASS INDEX: 54.85 KG/M2 | HEART RATE: 108 BPM | SYSTOLIC BLOOD PRESSURE: 106 MMHG | HEIGHT: 55 IN | WEIGHT: 237 LBS

## 2024-08-22 DIAGNOSIS — O24.113 PREGNANCY COMPLICATED BY PRE-EXISTING TYPE 2 DIABETES, THIRD TRIMESTER: ICD-10-CM

## 2024-08-22 DIAGNOSIS — E66.01 MATERNAL MORBID OBESITY IN THIRD TRIMESTER, ANTEPARTUM (HCC): ICD-10-CM

## 2024-08-22 DIAGNOSIS — O09.293 HISTORY OF STILLBIRTH IN CURRENTLY PREGNANT PATIENT, THIRD TRIMESTER: ICD-10-CM

## 2024-08-22 DIAGNOSIS — Z3A.36 36 WEEKS GESTATION OF PREGNANCY: Primary | ICD-10-CM

## 2024-08-22 DIAGNOSIS — O99.213 MATERNAL MORBID OBESITY IN THIRD TRIMESTER, ANTEPARTUM (HCC): ICD-10-CM

## 2024-08-22 PROCEDURE — 59025 FETAL NON-STRESS TEST: CPT | Performed by: OBSTETRICS & GYNECOLOGY

## 2024-08-22 PROCEDURE — 76815 OB US LIMITED FETUS(S): CPT | Performed by: OBSTETRICS & GYNECOLOGY

## 2024-08-22 NOTE — LETTER
August 22, 2024     Lara Tyler MD  [address] [place] [address]  [place] [id number]    Patient: Margoth Cook   YOB: 1990   Date of Visit: 8/22/2024       Dear Dr. Tyler:    Thank you for referring Margoth Cook to me for evaluation. Below are my notes for this consultation.    If you have questions, please do not hesitate to call me. I look forward to following your patient along with you.         Sincerely,        Ant Marinelli MD        CC: No Recipients    Ant Marinelli MD  8/22/2024  8:59 AM  Sign when Signing Visit  Please refer to the [facility] ultrasound report in Ob Procedures for additional information regarding today's visit

## 2024-08-22 NOTE — TELEPHONE ENCOUNTER
Called [employer] spoke with Usama @ # [phone] to set up LYFT transportation for patient's Maternal Fetal Medicine appointment.  Appointment scheduled on  August 22, 2024 at 8:45 AM at our [facility].       Confirmed phone and address.   [phone]  [address] [place] [address] [address]  [facility] [address]    Left voicemail for patient confirming LYFT will pick-up at 8:00 AM for her Maternal Fetal Medicine appointment.   Patient instructed she has 5 minutes maximum to get into car upon arrival.  Patient instructed to call # [phone] any questions.

## 2024-08-22 NOTE — PROGRESS NOTES
Repeat Non-Stress Testing:    Patient verbalizes +FM. Pt denies ALL:               Leaking of fluid   Contractions   Vaginal bleeding   Decreased fetal movement    Patient is performing daily kick counts. Patient has no questions or concerns.   NST strip reviewed by Dr. Marinelli.      Reason: obesity, hypothyroidism, seizure  disorder, previous stillbirth, short interval pregnancy & cerclage in place

## 2024-08-26 ENCOUNTER — NURSE TRIAGE (OUTPATIENT)
Dept: OTHER | Facility: OTHER | Age: 34
End: 2024-08-26

## 2024-08-26 ENCOUNTER — ROUTINE PRENATAL (OUTPATIENT)
Dept: OBGYN CLINIC | Facility: CLINIC | Age: 34
End: 2024-08-26

## 2024-08-26 VITALS — SYSTOLIC BLOOD PRESSURE: 118 MMHG | WEIGHT: 237.6 LBS | DIASTOLIC BLOOD PRESSURE: 70 MMHG | BODY MASS INDEX: 55.22 KG/M2

## 2024-08-26 DIAGNOSIS — Z34.83 PRENATAL CARE, SUBSEQUENT PREGNANCY, THIRD TRIMESTER: Primary | ICD-10-CM

## 2024-08-26 PROCEDURE — 87150 DNA/RNA AMPLIFIED PROBE: CPT | Performed by: NURSE PRACTITIONER

## 2024-08-26 PROCEDURE — PNV: Performed by: NURSE PRACTITIONER

## 2024-08-26 NOTE — TELEPHONE ENCOUNTER
Got disconnect from patient. Patient called back into service. Nurse Janice Duncan taking care of patient at this time.   Reason for Disposition  • Unable to complete triage due to phone connection issues    Protocols used: No Contact or Duplicate Contact Call-ADULT-

## 2024-08-26 NOTE — TELEPHONE ENCOUNTER
"Regardin weeks/ fever, headache  ----- Message from Handy VEE sent at 2024  7:18 PM EDT -----  \" I am 36 weeks. I have a fever of 100, I have a headache, my entire face hurts. I am also very fatigued. \"    " NONE

## 2024-08-26 NOTE — TELEPHONE ENCOUNTER
"Answer Assessment - Initial Assessment Questions  1. TEMPERATURE: \"What is the most recent temperature?\"  \"How was it measured?\"       Most recent temperature   2. ONSET: \"When did the fever start?\"       Today    3. SYMPTOMS: \"Do you have any other symptoms besides the fever?\"   (e.g., cough, cold symptoms, sore throat, rash, diarrhea, vomiting, abdominal pain, urine problems)      Headache, nausea, facial pain, lethargy    4. CAUSE: If there are no symptoms, ask: \"What do you think is causing the fever?\"       *No Answer*  5. CONTACTS: \"Does anyone else in the family have an infection?\"      *No Answer*  6. TREATMENT: \"What have you done so far to treat this fever?\" (e.g., medications)      *No Answer*  7. IMMUNOCOMPROMISE: \"Do you have of the following: diabetes, HIV positive, splenectomy, chronic steroid treatment, transplant patient, etc.\"      *No Answer*  8. OTHER SYMPTOMS: \"Do you have any other symptoms?\" (e.g., abdominal pain, fever, vaginal   bleeding, decreased fetal movement)      *No Answer*  9. JAKE: \"What date are you expecting to deliver?      *No Answer*  10. TRAVEL: \"Have you traveled out of the country in the last month?\" (e.g., travel history, exposures)        *No Answer*    Protocols used: Pregnancy - Fever-ADULT-AH    "

## 2024-08-26 NOTE — PROGRESS NOTES
Margoth Cook is a 34 y.o.  at 36w5d. Reports fetal movement.    Denies LOF/Bleeding/Cramping. Denies n/v/Ha. LE edema. Denies tobacco, drugs or ETOH use. Denies DV.     Her son was flown to [facility] over the weekend for sepsis. He is having surgery on Wednesday to fix his ureter.     Visit Vitals  /70   Wt 108 kg (237 lb 9.6 oz)   LMP 2023   BMI 55.22 kg/m²   OB Status Pregnant   Smoking Status Never   BSA 1.89 m²     Pre- Vitals      Flowsheet Row Most Recent Value   Prenatal Assessment    Fetal Heart Rate 155   Fundal Height (cm) 38 cm   Movement Present   Prenatal Vitals    Blood Pressure 118/70   Weight - Scale 108 kg (237 lb 9.6 oz)   Urine Albumin/Glucose    Dilation/Effacement/Station    Vaginal Drainage    Edema    LLE Edema Trace   RLE Edema Trace              Urine TR/neg      Margoth was seen today for routine prenatal visit.    Diagnoses and all orders for this visit:    Prenatal care, subsequent pregnancy, third trimester  -     Strep B DNA probe, amplification      GBS culture collected.    Continue FKC.    RTO in 1 weeks for PNV or sooner if needed.

## 2024-08-26 NOTE — TELEPHONE ENCOUNTER
"Reason for Disposition  • [1] Sinus pain of forehead AND [2] yellow or green nasal discharge    Answer Assessment - Initial Assessment Questions  1. LOCATION: \"Where does it hurt?\"       Face, denies swelling of face, hands  2. ONSET: \"When did the headache start?\" (Minutes, hours or days)       This morning  3. PATTERN: \"Does the pain come and go, or has it been constant since it started?\"      constant  4. SEVERITY: \"How bad is the pain?\" and \"What does it keep you from doing?\"     - MILD - doesn't interfere with normal activities     - MODERATE - interferes with normal activities or awakens from sleep     - SEVERE - excruciating pain, unable to do any normal activities         3/10  5. RECURRENT SYMPTOM: \"Have you ever had headaches before?\" If Yes, ask: \"When was the last time?\" and \"What happened that time?\"       denies  6. CAUSE: \"What do you think is causing the headache?\"      denies  7. MIGRAINE: \"Have you been diagnosed with migraine headaches?\" If Yes, ask: \"Is this headache similar?\"       denies  8. HEAD INJURY: \"Has there been any recent injury to the head?\"       denies  9. OTHER SYMPTOMS: \"Do you have any other symptoms?\" (e.g., fever, stiff neck, blurred vision; swelling of hands, face, or feet)      Runny nose, R ear pain, clear nasal discharge  10. PREGNANCY: \"How many weeks pregnant are you?\"        36  11. JAKE: \"What date are you expecting to deliver?\"        9/18  Denies swelling of face, hands, denies headache.    Protocols used: Pregnancy - Headache-ADULT-AH  OV not available until Wednesday, pt states she will go to Urgent Care in the AM. Discussed s/sx of pre-eclampsia, which pt denies at time of triage, and other reasons to call back. Pt verbalized understanding.   "

## 2024-08-28 LAB — GP B STREP DNA SPEC QL NAA+PROBE: NEGATIVE

## 2024-08-30 ENCOUNTER — TELEPHONE (OUTPATIENT)
Dept: OBGYN CLINIC | Facility: CLINIC | Age: 34
End: 2024-08-30

## 2024-09-01 ENCOUNTER — NURSE TRIAGE (OUTPATIENT)
Dept: OTHER | Facility: OTHER | Age: 34
End: 2024-09-01

## 2024-09-01 NOTE — TELEPHONE ENCOUNTER
"Regardin weeks pregnant and 4 days/ vomiting, weakness, sore throat , and  low grade fever/ Possible Covid  ----- Message from Urmila WHITLOCK sent at 2024  5:56 PM EDT -----  \"I am 38 weeks pregnant and 4 days and I would like to get tested for Covid unsure of where to go. My oldest son was admitted to hospital for surgery and while there he was tested positive for Covid. I am experiencing vomiting, weakness, sore throat , and  low grade fever   \"    "

## 2024-09-01 NOTE — TELEPHONE ENCOUNTER
Reason for Disposition  • [1] COVID-19 diagnosed by positive lab test (e.g., PCR, rapid self-test kit) AND [2] mild symptoms (e.g., cough, fever, others) AND [3] no complications or SOB    Answer Assessment - Initial Assessment Questions  Were you within 6 feet or less, for up to 15 minutes or more with a person that has a confirmed COVID-19 test?   Yes    What was the date of your exposure?   This past week     Are you experiencing any symptoms attributed to the virus?  (Assess for SOB, cough, fever, difficulty breathing)   Yes, vomiting, weakness, sore throat, fever     HIGH RISK: Do you have any history heart or lung conditions, weakened immune system, diabetes, Asthma, CHF, HIV, COPD, Chemo, renal failure, sickle cell, etc?   Yes, Asthma     PREGNANCY: Are you pregnant or did you recently give birth?   Yes, 37 weeks 4 days         Patient took at home covid test and came back and was positive but the test was . Advised patient she could get another at home test and take another test that isn't . Went over ED precautions with patient. Patient verbalized understanding.    Protocols used: Coronavirus (COVID-19) Diagnosed or Suspected-ADULT-

## 2024-09-01 NOTE — TELEPHONE ENCOUNTER
"Regardin weeks pregnant/ 2cm Dilated  ----- Message from Radha VEE sent at 2024 11:06 AM EDT -----  Pt stated, My son just tested positive for Covid and I am 2 cm dilated, I was told to call [facility] to see if I should come in.\"    "

## 2024-09-01 NOTE — TELEPHONE ENCOUNTER
Reason for Disposition  • MODERATE-SEVERE abdominal pain    Protocols used: Pregnancy - Labor-ADULT-AH

## 2024-09-01 NOTE — TELEPHONE ENCOUNTER
Patient calling in to report symptoms, but mainly wants to know if she has permission from OB provider to visit son at [facility], who just tested positive for COVID. Care advice given, per provider on call. Patient verbalized understanding.

## 2024-09-01 NOTE — TELEPHONE ENCOUNTER
"Answer Assessment - Initial Assessment Questions  1. ONSET: \"When did the symptoms begin?\"         Yesterday around 6pm. 4-5/10 abd pain/contractions    2. CONTRACTIONS: \"Describe the contractions that you are having.\" (e.g., duration, frequency, regularity, severity)      Very sporadic - unable to time contractions    3. JAKE: \"What date are you expecting to deliver?\"      2024    4. PARITY: \"Have you had a baby before?\" If Yes, ask: \"How long did the labor last?\"          5. FETAL MOVEMENT: \"Has the baby's movement decreased or changed significantly from normal?\"      Not moving as much, but still moving. Not as active - started yesterday / over 10 kick counts in an hour    6. OTHER SYMPTOMS: \"Do you have any other symptoms?\" (e.g., leaking fluid from vagina, vaginal bleeding, fever, hand/facial swelling)      Back pain constant; hurts to walk 4-5/10. Increased vaginal discharge. No other symptoms    Protocols used: Pregnancy - Labor-ADULT-    "

## 2024-09-02 ENCOUNTER — ANESTHESIA EVENT (INPATIENT)
Dept: LABOR AND DELIVERY | Facility: HOSPITAL | Age: 34
End: 2024-09-02
Payer: MEDICARE

## 2024-09-02 ENCOUNTER — ANESTHESIA (INPATIENT)
Dept: LABOR AND DELIVERY | Facility: HOSPITAL | Age: 34
End: 2024-09-02
Payer: MEDICARE

## 2024-09-02 ENCOUNTER — HOSPITAL ENCOUNTER (INPATIENT)
Facility: HOSPITAL | Age: 34
LOS: 5 days | Discharge: HOME | End: 2024-09-07
Attending: OBSTETRICS & GYNECOLOGY | Admitting: OBSTETRICS & GYNECOLOGY
Payer: MEDICARE

## 2024-09-02 ENCOUNTER — NURSE TRIAGE (OUTPATIENT)
Dept: OTHER | Facility: OTHER | Age: 34
End: 2024-09-02

## 2024-09-02 DIAGNOSIS — O09.899 HISTORY OF PRETERM DELIVERY, CURRENTLY PREGNANT: ICD-10-CM

## 2024-09-02 DIAGNOSIS — Z98.891 S/P EMERGENCY CESAREAN SECTION: ICD-10-CM

## 2024-09-02 PROBLEM — B95.1 POSITIVE GBS TEST: Status: ACTIVE | Noted: 2024-09-02

## 2024-09-02 PROBLEM — U07.1 COVID-19: Status: ACTIVE | Noted: 2024-09-02

## 2024-09-02 PROBLEM — Z34.90 ENCOUNTER FOR INDUCTION OF LABOR: Status: ACTIVE | Noted: 2024-09-02

## 2024-09-02 PROBLEM — Z3A.37 37 WEEKS GESTATION OF PREGNANCY: Status: ACTIVE | Noted: 2024-07-19

## 2024-09-02 LAB
ABO GROUP BLD: NORMAL
ALBUMIN SERPL BCG-MCNC: 3.1 G/DL (ref 3.5–5)
ALP SERPL-CCNC: 127 U/L (ref 34–104)
ALT SERPL W P-5'-P-CCNC: 16 U/L (ref 7–52)
ANION GAP SERPL CALCULATED.3IONS-SCNC: 10 MMOL/L (ref 4–13)
AST SERPL W P-5'-P-CCNC: 19 U/L (ref 13–39)
BILIRUB SERPL-MCNC: 0.31 MG/DL (ref 0.2–1)
BLD GP AB SCN SERPL QL: NEGATIVE
BUN SERPL-MCNC: 10 MG/DL (ref 5–25)
CALCIUM ALBUM COR SERPL-MCNC: 9.5 MG/DL (ref 8.3–10.1)
CALCIUM SERPL-MCNC: 8.8 MG/DL (ref 8.4–10.2)
CHLORIDE SERPL-SCNC: 104 MMOL/L (ref 96–108)
CO2 SERPL-SCNC: 19 MMOL/L (ref 21–32)
CREAT SERPL-MCNC: 0.56 MG/DL (ref 0.6–1.3)
ERYTHROCYTE [DISTWIDTH] IN BLOOD BY AUTOMATED COUNT: 14.6 % (ref 11.6–15.1)
FLUAV RNA RESP QL NAA+PROBE: NEGATIVE
FLUBV RNA RESP QL NAA+PROBE: NEGATIVE
GFR SERPL CREATININE-BSD FRML MDRD: 122 ML/MIN/1.73SQ M
GLUCOSE SERPL-MCNC: 96 MG/DL (ref 65–140)
HCT VFR BLD AUTO: 32 % (ref 34.8–46.1)
HGB BLD-MCNC: 10.6 G/DL (ref 11.5–15.4)
MCH RBC QN AUTO: 29.1 PG (ref 26.8–34.3)
MCHC RBC AUTO-ENTMCNC: 33.1 G/DL (ref 31.4–37.4)
MCV RBC AUTO: 88 FL (ref 82–98)
PLATELET # BLD AUTO: 230 THOUSANDS/UL (ref 149–390)
PMV BLD AUTO: 9.4 FL (ref 8.9–12.7)
POTASSIUM SERPL-SCNC: 3.5 MMOL/L (ref 3.5–5.3)
PROT SERPL-MCNC: 6.3 G/DL (ref 6.4–8.4)
RBC # BLD AUTO: 3.64 MILLION/UL (ref 3.81–5.12)
RH BLD: NEGATIVE
RSV RNA RESP QL NAA+PROBE: NEGATIVE
SARS-COV-2 RNA RESP QL NAA+PROBE: POSITIVE
SODIUM SERPL-SCNC: 133 MMOL/L (ref 135–147)
SPECIMEN EXPIRATION DATE: NORMAL
WBC # BLD AUTO: 5.65 THOUSAND/UL (ref 4.31–10.16)

## 2024-09-02 PROCEDURE — 86900 BLOOD TYPING SEROLOGIC ABO: CPT

## 2024-09-02 PROCEDURE — 0241U HB NFCT DS VIR RESP RNA 4 TRGT: CPT

## 2024-09-02 PROCEDURE — 85027 COMPLETE CBC AUTOMATED: CPT

## 2024-09-02 PROCEDURE — 4A1HXCZ MONITORING OF PRODUCTS OF CONCEPTION, CARDIAC RATE, EXTERNAL APPROACH: ICD-10-PCS | Performed by: OBSTETRICS & GYNECOLOGY

## 2024-09-02 PROCEDURE — 86780 TREPONEMA PALLIDUM: CPT

## 2024-09-02 PROCEDURE — 80053 COMPREHEN METABOLIC PANEL: CPT

## 2024-09-02 PROCEDURE — 99213 OFFICE O/P EST LOW 20 MIN: CPT

## 2024-09-02 PROCEDURE — 86901 BLOOD TYPING SEROLOGIC RH(D): CPT

## 2024-09-02 PROCEDURE — 3E033VJ INTRODUCTION OF OTHER HORMONE INTO PERIPHERAL VEIN, PERCUTANEOUS APPROACH: ICD-10-PCS | Performed by: OBSTETRICS & GYNECOLOGY

## 2024-09-02 PROCEDURE — 86850 RBC ANTIBODY SCREEN: CPT

## 2024-09-02 PROCEDURE — 83036 HEMOGLOBIN GLYCOSYLATED A1C: CPT

## 2024-09-02 RX ORDER — SODIUM CHLORIDE, SODIUM LACTATE, POTASSIUM CHLORIDE, CALCIUM CHLORIDE 600; 310; 30; 20 MG/100ML; MG/100ML; MG/100ML; MG/100ML
125 INJECTION, SOLUTION INTRAVENOUS CONTINUOUS
Status: DISCONTINUED | OUTPATIENT
Start: 2024-09-02 | End: 2024-09-03

## 2024-09-02 RX ORDER — BUPIVACAINE HYDROCHLORIDE 2.5 MG/ML
INJECTION, SOLUTION EPIDURAL; INFILTRATION; INTRACAUDAL AS NEEDED
Status: DISCONTINUED | OUTPATIENT
Start: 2024-09-02 | End: 2024-09-03

## 2024-09-02 RX ORDER — ALBUTEROL SULFATE 90 UG/1
2 AEROSOL, METERED RESPIRATORY (INHALATION) EVERY 6 HOURS PRN
Status: DISCONTINUED | OUTPATIENT
Start: 2024-09-02 | End: 2024-09-03

## 2024-09-02 RX ORDER — ONDANSETRON 2 MG/ML
4 INJECTION INTRAMUSCULAR; INTRAVENOUS ONCE
Status: COMPLETED | OUTPATIENT
Start: 2024-09-02 | End: 2024-09-02

## 2024-09-02 RX ORDER — HYDROXYZINE HYDROCHLORIDE 25 MG/1
25 TABLET, FILM COATED ORAL EVERY 6 HOURS PRN
Status: DISCONTINUED | OUTPATIENT
Start: 2024-09-02 | End: 2024-09-03

## 2024-09-02 RX ORDER — OXYTOCIN/RINGER'S LACTATE 30/500 ML
1-30 PLASTIC BAG, INJECTION (ML) INTRAVENOUS
Status: DISCONTINUED | OUTPATIENT
Start: 2024-09-02 | End: 2024-09-03

## 2024-09-02 RX ORDER — ACETAMINOPHEN 10 MG/ML
1000 INJECTION, SOLUTION INTRAVENOUS ONCE
Status: COMPLETED | OUTPATIENT
Start: 2024-09-02 | End: 2024-09-02

## 2024-09-02 RX ORDER — LEVETIRACETAM 500 MG/1
500 TABLET ORAL DAILY
Status: DISCONTINUED | OUTPATIENT
Start: 2024-09-02 | End: 2024-09-03

## 2024-09-02 RX ORDER — CEFAZOLIN SODIUM 1 G/50ML
1000 SOLUTION INTRAVENOUS EVERY 8 HOURS
Status: DISCONTINUED | OUTPATIENT
Start: 2024-09-02 | End: 2024-09-03

## 2024-09-02 RX ORDER — LEVOTHYROXINE SODIUM 25 UG/1
25 TABLET ORAL
Status: DISCONTINUED | OUTPATIENT
Start: 2024-09-02 | End: 2024-09-07 | Stop reason: HOSPADM

## 2024-09-02 RX ORDER — CEFAZOLIN SODIUM 2 G/50ML
2000 SOLUTION INTRAVENOUS ONCE
Status: COMPLETED | OUTPATIENT
Start: 2024-09-02 | End: 2024-09-02

## 2024-09-02 RX ORDER — BUPIVACAINE HYDROCHLORIDE 2.5 MG/ML
30 INJECTION, SOLUTION EPIDURAL; INFILTRATION; INTRACAUDAL ONCE AS NEEDED
Status: DISCONTINUED | OUTPATIENT
Start: 2024-09-02 | End: 2024-09-03

## 2024-09-02 RX ADMIN — SODIUM CHLORIDE, SODIUM LACTATE, POTASSIUM CHLORIDE, AND CALCIUM CHLORIDE 125 ML/HR: .6; .31; .03; .02 INJECTION, SOLUTION INTRAVENOUS at 21:12

## 2024-09-02 RX ADMIN — LEVETIRACETAM 500 MG: 500 TABLET, FILM COATED ORAL at 21:18

## 2024-09-02 RX ADMIN — HYDROXYZINE HYDROCHLORIDE 25 MG: 25 TABLET ORAL at 22:20

## 2024-09-02 RX ADMIN — ROPIVACAINE HYDROCHLORIDE: 2 INJECTION, SOLUTION EPIDURAL; INFILTRATION at 22:35

## 2024-09-02 RX ADMIN — ACETAMINOPHEN 1000 MG: 10 INJECTION INTRAVENOUS at 20:01

## 2024-09-02 RX ADMIN — SODIUM CHLORIDE, SODIUM LACTATE, POTASSIUM CHLORIDE, AND CALCIUM CHLORIDE 1000 ML: .6; .31; .03; .02 INJECTION, SOLUTION INTRAVENOUS at 07:46

## 2024-09-02 RX ADMIN — ACETAMINOPHEN 1000 MG: 10 INJECTION INTRAVENOUS at 12:14

## 2024-09-02 RX ADMIN — CEFAZOLIN SODIUM 1000 MG: 1 SOLUTION INTRAVENOUS at 20:51

## 2024-09-02 RX ADMIN — LAMOTRIGINE 225 MG: 100 TABLET ORAL at 22:20

## 2024-09-02 RX ADMIN — Medication 2 MILLI-UNITS/MIN: at 14:10

## 2024-09-02 RX ADMIN — ONDANSETRON 4 MG: 2 INJECTION INTRAMUSCULAR; INTRAVENOUS at 20:50

## 2024-09-02 RX ADMIN — ROPIVACAINE HYDROCHLORIDE: 2 INJECTION, SOLUTION EPIDURAL; INFILTRATION at 14:50

## 2024-09-02 RX ADMIN — ONDANSETRON 4 MG: 2 INJECTION INTRAMUSCULAR; INTRAVENOUS at 07:51

## 2024-09-02 RX ADMIN — BUPIVACAINE HYDROCHLORIDE 1.2 ML: 2.5 INJECTION, SOLUTION EPIDURAL; INFILTRATION; INTRACAUDAL; PERINEURAL at 14:46

## 2024-09-02 RX ADMIN — CEFAZOLIN SODIUM 2000 MG: 2 SOLUTION INTRAVENOUS at 14:11

## 2024-09-02 RX ADMIN — SODIUM CHLORIDE, SODIUM LACTATE, POTASSIUM CHLORIDE, AND CALCIUM CHLORIDE 125 ML/HR: .6; .31; .03; .02 INJECTION, SOLUTION INTRAVENOUS at 15:01

## 2024-09-02 RX ADMIN — SODIUM CHLORIDE, SODIUM LACTATE, POTASSIUM CHLORIDE, AND CALCIUM CHLORIDE 125 ML/HR: .6; .31; .03; .02 INJECTION, SOLUTION INTRAVENOUS at 12:14

## 2024-09-02 NOTE — ASSESSMENT & PLAN NOTE
+COVID test in triage  S/p difficult intubation for STAT C/S and non-working epidural  S/p ICU admission  Per ICU team, patient is not a candidate for Paxlovid.   Patient's 1 year old, Jay, currently admitted at [facility] with COVID - weaned from O2 but now needs to tolerate feeds  Baby now in room with mom (10 feet away unless masked)  Isolation precautions.

## 2024-09-02 NOTE — TELEPHONE ENCOUNTER
Reason for Disposition   Patient already left for the hospital/clinic.    Answer Assessment - Initial Assessment Questions  N/A    Patient reports fever of 101, decreased fetal movement, body aches, vomiting.  States she is on her way to [place] ED.    Protocols used: No Contact or Duplicate Contact Call-ADULT-

## 2024-09-02 NOTE — TELEPHONE ENCOUNTER
"Regardin weeks / fever  ----- Message from Ese MCGRAW sent at 2024  9:35 PM EDT -----  \"I am 38 weeks and I am due to be induced this week. I have a fever of 100.8, at what point should I go to the hospital. I took a COVID test that tested negative however my son tested positive.\"    "

## 2024-09-02 NOTE — H&P
"H & P- Obstetrics   Margoth Cook 34 y.o. female MRN: [id number]  Unit/Bed#: -01 Encounter: [id number]    Assessment: 34 y.o.  at 37w5d admitted for induction of labor in the setting of DFM at term  FHT: cat 1  Clinical EFW: 61% at 31w2d ; Cephalic confirmed by TAUS  GBS status: pos   Postpartum contraception plan: BLS    Plan:   * Encounter for induction of labor  Assessment & Plan  Admit to OBGYN   Clear liquid diet   F/u T&S, CBC, RPR   IVF LR 125cc/hr   Continuous fetal monitoring and tocometry   Analgesia at maternal request   Vertex by TAUS  Induction plan pitocin         Positive GBS test  Assessment & Plan  Penicillin intrapartum       COVID-19  Assessment & Plan  +COVID test in triage    Cervical cerclage suture present in third trimester  Assessment & Plan  History indicated cerclage placed at [facility]  Cerclage removed on     Rh negative state in antepartum period  Assessment & Plan  Offer Rhogam pp    History of stillbirth in currently pregnant patient, unspecified trimester  Assessment & Plan  Late to care in G1 pregnancy - 28w demise due to \"fluid around the heart/trisomy\"       Type 2 diabetes mellitus with obesity  (HCC)  Assessment & Plan  Lab Results   Component Value Date    HGBA1C 5.8 (H) 2022       No results for input(s): \"POCGLU\" in the last 72 hours.    Not on insulin        Nonintractable epilepsy without status epilepticus (HCC)  Assessment & Plan  On keppra and lamictal      Discussed case and plan w/ Dr. Willis      Chief Complaint: induction of labor    HPI: Margoth Cook is a 34 y.o.  with an JAKE of 2024, by Last Menstrual Period at 37w5d who is being admitted for induction of labor. She complains of uterine contractions, has no LOF, and reports no VB. She states she has felt good FM.    Patient Active Problem List   Diagnosis    Depression    Nonintractable epilepsy without status epilepticus (HCC)    History of irregular menstrual " bleeding    PCOS (polycystic ovarian syndrome)    Spain's esophagus    Gastric ulcer    GARIMA (obstructive sleep apnea)    Autism    Bipolar depression (HCC)    Type 2 diabetes mellitus with obesity  (HCC)    Gastroesophageal reflux disease    Excessive daytime sleepiness    Iron deficiency anemia secondary to inadequate dietary iron intake    Dysphagia    Hypothyroid in pregnancy, antepartum    Chronic constipation    Nausea/vomiting in pregnancy    History of stillbirth in currently pregnant patient, unspecified trimester    Maternal morbid obesity, antepartum (HCC)    Short stature    Family history of congenital heart defect    Previous child with congenital anomaly, currently pregnant, antepartum    Rh negative state in antepartum period    Request for sterilization    Vision problem    Spotting    37 weeks gestation of pregnancy    Asthma during pregnancy    Cervical cerclage suture present in third trimester    COVID-19    Positive GBS test    Encounter for induction of labor    History of  delivery, currently pregnant       Baby complications/comments: none    Review of Systems   Constitutional:  Negative for chills and fever.   HENT:  Negative for ear pain and sore throat.    Eyes:  Negative for pain and visual disturbance.   Respiratory:  Negative for cough and shortness of breath.    Cardiovascular:  Negative for chest pain and palpitations.   Gastrointestinal:  Negative for abdominal pain and vomiting.   Genitourinary:  Negative for dysuria and hematuria.   Musculoskeletal:  Negative for arthralgias and back pain.   Skin:  Negative for color change and rash.   Neurological:  Negative for seizures and syncope.   All other systems reviewed and are negative.      OB Hx:  OB History    Para Term  AB Living   5 2 0 2 2 1   SAB IAB Ectopic Multiple Live Births   2 0 0 0 1      # Outcome Date GA Lbr Lopez/2nd Weight Sex Type Anes PTL Lv   5 Current            4  23 22w5d  600 g  (1 lb 5.2 oz) M Vag-Spont EPI Y BHUPINDER      Complications:  labor in second trimester   3 SAB            2 SAB            1   28w0d    Vag-Spont   FD      Complications: ABO incompatibility in pregnancy      Obstetric Comments   Both SAB <2 months   Still birth at 7 months   Has had hypercoagulable workup in [place] which was negative    PTD @ 22w5d PreE, chorio and sepsis. 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          Past Medical Hx:  Past Medical History:   Diagnosis Date    Abnormal Pap smear of cervix     Anemia     Anxiety     Asthma     Autism     Spain esophagus     Bipolar disorder (HCC)     CPAP (continuous positive airway pressure) dependence     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HCC)     not diagnosed as of 23-trying to R/O    Depression     Diabetes mellitus (HCC)     Disease of thyroid gland     hypo    Dysphagia     solids and liquids    Female infertility     Fibromyalgia, primary     Gastric ulcer     History of bronchitis     Hypothyroidism     Iron deficiency anemia     infusion in 2022    Irregular menses     Miscarriage     Morbid obesity with BMI of 50.0-59.9, adult (HCC)     Plantar fasciitis of left foot     Polycystic ovary syndrome     Reflux esophagitis     Seizures (HCC)     last one 22    Sleep apnea     CPAP    Varicella     had disease    Wears glasses        Past Surgical hx:  Past Surgical History:   Procedure Laterality Date    EGD      with Bx due to barretts esophagus    EYE SURGERY Bilateral     lazy eye repair    AK BIOPSY OF LIP N/A 11/10/2022    Procedure: EXCISION BIOPSY SALVARY GLANDS LOWER LIP;  Surgeon: Casey Florez MD;  Location: [facility];  Service: ENT    AK CERCLAGE UTERINE CERVIX NONOBSTETRICAL N/A 2023    Procedure: CERCLAGE CERVICAL;  Surgeon: Ant MISTRY  MD Isis;  Location: [facility] ;  Service: Obstetrics    ID HYSTEROSCOPY BX ENDOMETRIUM&/POLYPC W/WO D&C N/A 9/22/2021    Procedure: DILATATION AND CURETTAGE (D&C) WITH HYSTEROSCOPY;  Surgeon: Albina Parsons MD;  Location: [facility];  Service: Gynecology    WISDOM TOOTH EXTRACTION         Allergies   Allergen Reactions    Haloperidol Other (See Comments)     Jaw locks up    Jaw locks up   Jaw locks up    Bee Pollen Other (See Comments)    Penicillins Hives    Pollen Extract Allergic Rhinitis    Fluoxetine Allergic Rhinitis         Medications Prior to Admission:     acetaminophen (TYLENOL) 500 mg tablet    albuterol (Proventil HFA) 90 mcg/act inhaler    lamoTRIgine (LaMICtal) 25 mg tablet    levETIRAcetam (Keppra) 500 mg tablet    levETIRAcetam (KEPPRA) 750 mg tablet    levothyroxine 25 mcg tablet    Prenatal Vit-Fe Sulfate-FA-DHA (Prenatal Vitamin/Min +DHA) 27-0.8-200 MG CAPS    ASPIRIN 81 PO    ferrous sulfate 324 (65 Fe) mg    lamoTRIgine (LaMICtal) 100 mg tablet    levETIRAcetam (KEPPRA) 1000 MG tablet    levETIRAcetam (KEPPRA) 500 mg tablet    prenatal vitamin (CLASSIC FORMULA) 27-0.8 mg    Objective:  Temp:  [99 °F (37.2 °C)-99.1 °F (37.3 °C)] 99.1 °F (37.3 °C)  HR:  [118-127] 118  Resp:  [18-20] 20  BP: ()/(55-63) 98/55  Body mass index is 54.85 kg/m².     Physical Exam:  Physical Exam  Constitutional:       Appearance: Normal appearance.   HENT:      Head: Atraumatic.   Eyes:      Extraocular Movements: Extraocular movements intact.      Conjunctiva/sclera: Conjunctivae normal.   Cardiovascular:      Rate and Rhythm: Normal rate and regular rhythm.      Pulses: Normal pulses.   Pulmonary:      Effort: Pulmonary effort is normal. No respiratory distress.      Breath sounds: Normal breath sounds.   Abdominal:      Palpations: Abdomen is soft.      Tenderness: There is no abdominal tenderness.   Neurological:      General: No focal deficit present.      Mental Status: She is alert and oriented to person, place,  and time.   Skin:     General: Skin is warm and dry.   Psychiatric:         Mood and Affect: Mood normal.         Behavior: Behavior normal.   Vitals reviewed. Exam conducted with a chaperone present.            FHT:  Baseline Rate (FHR): 145 bpm  Variability: Moderate  Accelerations: 15 x 15 or greater  Decelerations: None  FHR Category: Category I    TOCO:   Contraction Frequency (minutes): Irreg  Contraction Duration (seconds): 60  Contraction Intensity: Mild    Lab Results   Component Value Date    WBC 5.65 09/02/2024    HGB 10.6 (L) 09/02/2024    HCT 32.0 (L) 09/02/2024     09/02/2024     Lab Results   Component Value Date    K 3.5 09/02/2024     09/02/2024    CO2 19 (L) 09/02/2024    BUN 10 09/02/2024    CREATININE 0.56 (L) 09/02/2024    GLUCOSE 99 06/04/2023    AST 19 09/02/2024    ALT 16 09/02/2024     Prenatal Labs: Reviewed      Blood type: O neg  Antibody: neg  GBS: pos  HIV: neg  Rubella: neg  Syphilis IgM/IgG: neg  HBsAg: neg  HCAb: neg  Chlamydia: neg  Gonorrhea: neg  Diabetes 1 hour screen: 115  3 hour glucose: N/A  Platelets: 230  Hgb: 10.6  >2 Midnights  INPATIENT     Signature/Title: Carmen Smallwood MD  Date: 9/2/2024  Time: 1:40 PM

## 2024-09-02 NOTE — ASSESSMENT & PLAN NOTE
- Continue routine post partum care  - Encourage ambulation  - Encourage breastfeeding  - QBL: 595cc; Hgb 8.5 s/p venofer   - Surgifoam on abdominal muscles  - s/p TXA  - PO pain medication  - s/p VT  - s/p Flagyl and Keflex x48hrs for SSI PPx

## 2024-09-02 NOTE — PLAN OF CARE
Problem: BIRTH - VAGINAL/ SECTION  Goal: Fetal and maternal status remain reassuring during the birth process  Description: INTERVENTIONS:  - Monitor vital signs  - Monitor fetal heart rate  - Monitor uterine activity  - Monitor labor progression (vaginal delivery)  - DVT prophylaxis  - Antibiotic prophylaxis  Outcome: Progressing  Goal: Emotionally satisfying birthing experience for mother/fetus  Description: Interventions:  - Assess, plan, implement and evaluate the nursing care given to the patient in labor  - Advocate the philosophy that each childbirth experience is a unique experience and support the family's chosen level of involvement and control during the labor process   - Actively participate in both the patient's and family's teaching of the birth process  - Consider cultural, [employer] and age-specific factors and plan care for the patient in labor  Outcome: Progressing

## 2024-09-02 NOTE — ANESTHESIA PROCEDURE NOTES
CSE Block    Patient location during procedure: OB  Start time: 9/2/2024 2:46 PM  Reason for block: procedure for pain and at surgeon's request  Staffing  Performed by: Ernesto Tineo MD  Authorized by: Ernesto Tineo MD    Preanesthetic Checklist  Completed: patient identified, IV checked, site marked, risks and benefits discussed, surgical consent, monitors and equipment checked, pre-op evaluation and timeout performed  CSE  Patient position: sitting  Prep: ChloraPrep  Patient monitoring: cardiac monitor and continuous pulse ox  Approach: midline  Spinal Needle  Needle type: pencil-tip   Needle gauge: 27 G  Needle length: 10 cm  Needle insertion depth: 7 cm  Epidural Needle  Injection technique: GRACE saline  Needle type: Tuohy   Needle gauge: 18 G  Location: lumbar (1-5)  Catheter  Catheter type: side hole  Catheter size: 20 G  Catheter at skin depth: 12 cm  Catheter securement method: stabilization device  Test dose: negative  Assessment  Injection Assessment:  negative aspiration for heme, no paresthesia on injection, positive aspiration for clear CSF and no pain on injection.  Additional Notes  Single pass, easy placement, catheter threaded easily

## 2024-09-02 NOTE — OB LABOR/OXYTOCIN SAFETY PROGRESS
Oxytocin Safety Progress Check Note - Margoth Cook 34 y.o. female MRN: [id number]    Unit/Bed#: -01 Encounter: [id number]    Dose (maritza-units/min) Oxytocin: 10 maritza-units/min  Contraction Frequency (minutes): 5  Contraction Intensity: Mild  Uterine Activity Characteristics: Irregular  Cervical Dilation: 3        Cervical Effacement: 70  Fetal Station: -3  Baseline Rate (FHR): 135 bpm  Fetal Heart Rate (FHT): 132 BPM  FHR Category: 1      Vital Signs:   Vitals:    09/02/24 1625   BP: 107/55   Pulse: 100   Resp: 18   Temp: 98.6 °F (37 °C)   SpO2:        Notes/comments:     Margoth is comfortable and feeling well. SVE as above. FHT cat 1. Continue titrating pitocin as tolerated.    Dr. Willis aware    Carmen Smallwood MD 9/2/2024 6:11 PM

## 2024-09-02 NOTE — ANESTHESIA PREPROCEDURE EVALUATION
Procedure:  LABOR ANALGESIA    Relevant Problems   ANESTHESIA (within normal limits)      CARDIO (within normal limits)      ENDO   (+) Hypothyroid in pregnancy, antepartum   (+) Type 2 diabetes mellitus with obesity  (HCC)      GI/HEPATIC   (+) Dysphagia   (+) Gastric ulcer   (+) Gastroesophageal reflux disease      /RENAL (within normal limits)      GYN   (+) 37 weeks gestation of pregnancy      HEMATOLOGY   (+) Iron deficiency anemia secondary to inadequate dietary iron intake      MUSCULOSKELETAL (within normal limits)      NEURO/PSYCH   (+) Depression   (+) Nonintractable epilepsy without status epilepticus (HCC)      PULMONARY   (+) Asthma during pregnancy   (+) GARIMA (obstructive sleep apnea)      Behavioral Health   (+) Autism   (+) Bipolar depression (HCC)      Obstetrics/Gynecology   (+) Maternal morbid obesity, antepartum (HCC)   (+) PCOS (polycystic ovarian syndrome)      FEN/Gastrointestinal   (+) Spain's esophagus      Other   (+) COVID-19   (+) Short stature        Physical Exam    Airway    Mallampati score: II  TM Distance: >3 FB  Neck ROM: full     Dental   No notable dental hx     Cardiovascular  Rhythm: regular, Rate: normal, Cardiovascular exam normal    Pulmonary  Pulmonary exam normal Breath sounds clear to auscultation    Other Findings  post-pubertal.      Anesthesia Plan  ASA Score- 3     Anesthesia Type- epidural with ASA Monitors.         Additional Monitors:     Airway Plan:            Plan Factors-Exercise tolerance (METS): >4 METS.    Chart reviewed. EKG reviewed. Imaging results reviewed. Existing labs reviewed. Patient summary reviewed.                  Induction- intravenous.    Postoperative Plan-     Perioperative Resuscitation Plan - Level 1 - Full Code.       Informed Consent- Anesthetic plan and risks discussed with patient.  I personally reviewed this patient with the CRNA. Discussed and agreed on the Anesthesia Plan with the CRNA..

## 2024-09-02 NOTE — ASSESSMENT & PLAN NOTE
"Lab Results   Component Value Date    HGBA1C 5.8 (H) 07/29/2022       No results for input(s): \"POCGLU\" in the last 72 hours.    Not on insulin      "

## 2024-09-02 NOTE — TELEPHONE ENCOUNTER
"Regardin weeks / vomitting/ fever  ----- Message from Ese MCGRAW sent at 2024  4:14 AM EDT -----  \"I am 37 weeks and I can't keep anything down, and I have a fever.\"    "

## 2024-09-02 NOTE — OB LABOR/OXYTOCIN SAFETY PROGRESS
Oxytocin Safety Progress Check Note - Margoth Cook 34 y.o. female MRN: [id number]    Unit/Bed#: -01 Encounter: [id number]    Dose (maritza-units/min) Oxytocin: 8 maritza-units/min  Contraction Frequency (minutes): 5  Contraction Intensity: Mild  Uterine Activity Characteristics: Irregular  Cervical Dilation: 2        Cervical Effacement: 70  Fetal Station: -3  Baseline Rate (FHR): 135 bpm  Fetal Heart Rate (FHT): 132 BPM  FHR Category:       Vital Signs:   Vitals:    09/02/24 1625   BP: 107/55   Pulse: 100   Resp: 18   Temp: 98.6 °F (37 °C)   SpO2:      Margoth is comfortable and doing well. SVE deferred in setting of pitocin initiation within last 4hrs. FHT cat 1. Ctx q2-4 min.    Dr. Willis aware    Carmen Smallwood MD 9/2/2024 4:49 PM

## 2024-09-02 NOTE — TELEPHONE ENCOUNTER
"Reason for Disposition  • [1] COVID-19 diagnosed by positive lab test (e.g., PCR, rapid self-test kit) AND [2] mild symptoms (e.g., cough, fever, others) AND [3] no complications or SOB    Additional Information  • Pregnant    Answer Assessment - Initial Assessment Questions  1. TEMPERATURE: \"What is the most recent temperature?\"  \"How was it measured?\"       100.8 under the tongue. Highest temp 101    2. ONSET: \"When did the fever start?\"       Today mid afternoon    3. SYMPTOMS: \"Do you have any other symptoms besides the fever?\"   (e.g., cough, cold symptoms, sore throat, rash, diarrhea, vomiting, abdominal pain, urine problems)      Weak, exhausted, vomiting off and on, cough, and sore throat.    4. CAUSE: If there are no symptoms, ask: \"What do you think is causing the fever?\"       Covid    5. CONTACTS: \"Does anyone else in the family have an infection?\"      Son has Covid     6. TREATMENT: \"What have you done so far to treat this fever?\" (e.g., medications)      Tylenol taken an hour ago temp now 100.8    7. IMMUNOCOMPROMISE: \"Do you have of the following: diabetes, HIV positive, splenectomy, chronic steroid treatment, transplant patient, etc.\"      Denies    8. OTHER SYMPTOMS: \"Do you have any other symptoms?\" (e.g., abdominal pain, fever, vaginal   bleeding, decreased fetal movement)      Good FM    9. JAKE: \"What date are you expecting to deliver?      2024    10. TRAVEL: \"Have you traveled out of the country in the last month?\" (e.g., travel history, exposures)        Denies    Mid afternoon started with chills and body aches. Now has sore throat, cough, fever, and intermittent vomiting. Able to drink and void. Good FM. Denies vaginal bleeding, cramping, and other symptoms. Patient was exposed to son that tested positive today. Took two home Covid tests. The first test test was  so she took a new test that was not . Patient saw a faint positive line. Reviewed home care and ER precautions " per protocol, verbalized understanding.      Please follow up with patient.    Protocols used: Fever-ADULT-AH, Pregnancy - Fever-ADULT-AH, Coronavirus (COVID-19) Diagnosed or Suspected-ADULT-AH

## 2024-09-03 PROBLEM — E03.9 HYPOTHYROIDISM: Status: ACTIVE | Noted: 2024-09-03

## 2024-09-03 PROBLEM — T88.4XXA DIFFICULT AIRWAY FOR INTUBATION: Status: ACTIVE | Noted: 2024-09-03

## 2024-09-03 PROBLEM — Z98.891 S/P EMERGENCY CESAREAN SECTION: Status: ACTIVE | Noted: 2024-09-02

## 2024-09-03 PROBLEM — G89.18 ACUTE POST-OPERATIVE PAIN: Status: ACTIVE | Noted: 2024-09-03

## 2024-09-03 LAB
ANION GAP SERPL CALCULATED.3IONS-SCNC: 7 MMOL/L (ref 4–13)
BASE EX.OXY STD BLDV CALC-SCNC: 86.3 % (ref 60–80)
BASE EXCESS BLDCOV CALC-SCNC: -7.9 MMOL/L (ref 1–9)
BASE EXCESS BLDV CALC-SCNC: -7.2 MMOL/L
BASOPHILS # BLD MANUAL: 0 THOUSAND/UL (ref 0–0.1)
BASOPHILS NFR MAR MANUAL: 0 % (ref 0–1)
BUN SERPL-MCNC: 7 MG/DL (ref 5–25)
CALCIUM SERPL-MCNC: 8.1 MG/DL (ref 8.4–10.2)
CHLORIDE SERPL-SCNC: 105 MMOL/L (ref 96–108)
CO2 SERPL-SCNC: 20 MMOL/L (ref 21–32)
CREAT SERPL-MCNC: 0.48 MG/DL (ref 0.6–1.3)
EOSINOPHIL # BLD MANUAL: 0 THOUSAND/UL (ref 0–0.4)
EOSINOPHIL NFR BLD MANUAL: 0 % (ref 0–6)
ERYTHROCYTE [DISTWIDTH] IN BLOOD BY AUTOMATED COUNT: 14.5 % (ref 11.6–15.1)
ERYTHROCYTE [DISTWIDTH] IN BLOOD BY AUTOMATED COUNT: 14.6 % (ref 11.6–15.1)
EST. AVERAGE GLUCOSE BLD GHB EST-MCNC: 117 MG/DL
GFR SERPL CREATININE-BSD FRML MDRD: 129 ML/MIN/1.73SQ M
GLUCOSE SERPL-MCNC: 106 MG/DL (ref 65–140)
GLUCOSE SERPL-MCNC: 123 MG/DL (ref 65–140)
GLUCOSE SERPL-MCNC: 160 MG/DL (ref 65–140)
HBA1C MFR BLD: 5.7 %
HCO3 BLDCOV-SCNC: 19.2 MMOL/L (ref 12.2–28.6)
HCO3 BLDV-SCNC: 17.6 MMOL/L (ref 24–30)
HCT VFR BLD AUTO: 25.6 % (ref 34.8–46.1)
HCT VFR BLD AUTO: 27.5 % (ref 34.8–46.1)
HGB BLD-MCNC: 8.3 G/DL (ref 11.5–15.4)
HGB BLD-MCNC: 8.8 G/DL (ref 11.5–15.4)
LACTATE SERPL-SCNC: 0.9 MMOL/L (ref 0.5–2)
LYMPHOCYTES # BLD AUTO: 0.16 THOUSAND/UL (ref 0.6–4.47)
LYMPHOCYTES # BLD AUTO: 2 % (ref 14–44)
MAGNESIUM SERPL-MCNC: 1.4 MG/DL (ref 1.9–2.7)
MCH RBC QN AUTO: 28.6 PG (ref 26.8–34.3)
MCH RBC QN AUTO: 29 PG (ref 26.8–34.3)
MCHC RBC AUTO-ENTMCNC: 32 G/DL (ref 31.4–37.4)
MCHC RBC AUTO-ENTMCNC: 32.4 G/DL (ref 31.4–37.4)
MCV RBC AUTO: 89 FL (ref 82–98)
MCV RBC AUTO: 90 FL (ref 82–98)
MONOCYTES # BLD AUTO: 0.4 THOUSAND/UL (ref 0–1.22)
MONOCYTES NFR BLD: 5 % (ref 4–12)
NEUTROPHILS # BLD MANUAL: 7.36 THOUSAND/UL (ref 1.85–7.62)
NEUTS BAND NFR BLD MANUAL: 6 % (ref 0–8)
NEUTS SEG NFR BLD AUTO: 87 % (ref 43–75)
O2 CT BLDV-SCNC: 12.2 ML/DL
OXYHGB MFR BLDCOV: 46 %
PCO2 BLDCOV: 45 MM HG (ref 27–43)
PCO2 BLDV: 33 MM HG (ref 42–50)
PH BLDCOV: 7.25 [PH] (ref 7.19–7.49)
PH BLDV: 7.35 [PH] (ref 7.3–7.4)
PHOSPHATE SERPL-MCNC: 4.3 MG/DL (ref 2.7–4.5)
PLATELET # BLD AUTO: 181 THOUSANDS/UL (ref 149–390)
PLATELET # BLD AUTO: 195 THOUSANDS/UL (ref 149–390)
PLATELET BLD QL SMEAR: ADEQUATE
PMV BLD AUTO: 9 FL (ref 8.9–12.7)
PMV BLD AUTO: 9.3 FL (ref 8.9–12.7)
PO2 BLDCOV: 20.5 MM HG (ref 15–45)
PO2 BLDV: 53.7 MM HG (ref 35–45)
POTASSIUM SERPL-SCNC: 3.7 MMOL/L (ref 3.5–5.3)
RBC # BLD AUTO: 2.86 MILLION/UL (ref 3.81–5.12)
RBC # BLD AUTO: 3.08 MILLION/UL (ref 3.81–5.12)
RBC MORPH BLD: NORMAL
SAO2 % BLDCOV: 9.5 ML/DL
SODIUM SERPL-SCNC: 132 MMOL/L (ref 135–147)
TREPONEMA PALLIDUM IGG+IGM AB [PRESENCE] IN SERUM OR PLASMA BY IMMUNOASSAY: NORMAL
WBC # BLD AUTO: 7.5 THOUSAND/UL (ref 4.31–10.16)
WBC # BLD AUTO: 7.91 THOUSAND/UL (ref 4.31–10.16)

## 2024-09-03 PROCEDURE — 83605 ASSAY OF LACTIC ACID: CPT

## 2024-09-03 PROCEDURE — 99223 1ST HOSP IP/OBS HIGH 75: CPT | Performed by: EMERGENCY MEDICINE

## 2024-09-03 PROCEDURE — 10907ZC DRAINAGE OF AMNIOTIC FLUID, THERAPEUTIC FROM PRODUCTS OF CONCEPTION, VIA NATURAL OR ARTIFICIAL OPENING: ICD-10-PCS | Performed by: OBSTETRICS & GYNECOLOGY

## 2024-09-03 PROCEDURE — 82805 BLOOD GASES W/O2 SATURATION: CPT

## 2024-09-03 PROCEDURE — 85027 COMPLETE CBC AUTOMATED: CPT

## 2024-09-03 PROCEDURE — NC001 PR NO CHARGE: Performed by: OBSTETRICS & GYNECOLOGY

## 2024-09-03 PROCEDURE — 84100 ASSAY OF PHOSPHORUS: CPT

## 2024-09-03 PROCEDURE — 94640 AIRWAY INHALATION TREATMENT: CPT

## 2024-09-03 PROCEDURE — 3E0E7GC INTRODUCTION OF OTHER THERAPEUTIC SUBSTANCE INTO PRODUCTS OF CONCEPTION, VIA NATURAL OR ARTIFICIAL OPENING: ICD-10-PCS | Performed by: OBSTETRICS & GYNECOLOGY

## 2024-09-03 PROCEDURE — 99223 1ST HOSP IP/OBS HIGH 75: CPT | Performed by: NURSE PRACTITIONER

## 2024-09-03 PROCEDURE — NC001 PR NO CHARGE: Performed by: EMERGENCY MEDICINE

## 2024-09-03 PROCEDURE — 83735 ASSAY OF MAGNESIUM: CPT

## 2024-09-03 PROCEDURE — 94760 N-INVAS EAR/PLS OXIMETRY 1: CPT

## 2024-09-03 PROCEDURE — 82948 REAGENT STRIP/BLOOD GLUCOSE: CPT

## 2024-09-03 PROCEDURE — 4A1H74Z MONITORING OF PRODUCTS OF CONCEPTION, CARDIAC ELECTRICAL ACTIVITY, VIA NATURAL OR ARTIFICIAL OPENING: ICD-10-PCS | Performed by: OBSTETRICS & GYNECOLOGY

## 2024-09-03 PROCEDURE — 85007 BL SMEAR W/DIFF WBC COUNT: CPT

## 2024-09-03 PROCEDURE — 88307 TISSUE EXAM BY PATHOLOGIST: CPT | Performed by: PATHOLOGY

## 2024-09-03 PROCEDURE — 10H073Z INSERTION OF MONITORING ELECTRODE INTO PRODUCTS OF CONCEPTION, VIA NATURAL OR ARTIFICIAL OPENING: ICD-10-PCS | Performed by: OBSTETRICS & GYNECOLOGY

## 2024-09-03 PROCEDURE — 80048 BASIC METABOLIC PNL TOTAL CA: CPT

## 2024-09-03 PROCEDURE — 82805 BLOOD GASES W/O2 SATURATION: CPT | Performed by: OBSTETRICS & GYNECOLOGY

## 2024-09-03 RX ORDER — PANTOPRAZOLE SODIUM 40 MG/1
40 TABLET, DELAYED RELEASE ORAL
Status: DISCONTINUED | OUTPATIENT
Start: 2024-09-03 | End: 2024-09-07 | Stop reason: HOSPADM

## 2024-09-03 RX ORDER — HYDROMORPHONE HCL/PF 1 MG/ML
0.5 SYRINGE (ML) INJECTION
Status: DISCONTINUED | OUTPATIENT
Start: 2024-09-03 | End: 2024-09-03

## 2024-09-03 RX ORDER — KETAMINE HCL IN NACL, ISO-OSM 100MG/10ML
SYRINGE (ML) INJECTION AS NEEDED
Status: DISCONTINUED | OUTPATIENT
Start: 2024-09-03 | End: 2024-09-03

## 2024-09-03 RX ORDER — ALBUMIN, HUMAN INJ 5% 5 %
SOLUTION INTRAVENOUS CONTINUOUS PRN
Status: DISCONTINUED | OUTPATIENT
Start: 2024-09-03 | End: 2024-09-03

## 2024-09-03 RX ORDER — IPRATROPIUM BROMIDE AND ALBUTEROL SULFATE 2.5; .5 MG/3ML; MG/3ML
3 SOLUTION RESPIRATORY (INHALATION) ONCE
Status: COMPLETED | OUTPATIENT
Start: 2024-09-03 | End: 2024-09-03

## 2024-09-03 RX ORDER — OXYCODONE HYDROCHLORIDE 5 MG/1
5 TABLET ORAL EVERY 4 HOURS PRN
Status: DISCONTINUED | OUTPATIENT
Start: 2024-09-03 | End: 2024-09-05

## 2024-09-03 RX ORDER — SODIUM CHLORIDE, SODIUM LACTATE, POTASSIUM CHLORIDE, CALCIUM CHLORIDE 600; 310; 30; 20 MG/100ML; MG/100ML; MG/100ML; MG/100ML
INJECTION, SOLUTION INTRAVENOUS CONTINUOUS PRN
Status: DISCONTINUED | OUTPATIENT
Start: 2024-09-03 | End: 2024-09-03

## 2024-09-03 RX ORDER — LEVETIRACETAM 500 MG/1
500 TABLET ORAL EVERY 12 HOURS SCHEDULED
Status: DISCONTINUED | OUTPATIENT
Start: 2024-09-03 | End: 2024-09-07 | Stop reason: HOSPADM

## 2024-09-03 RX ORDER — CEFAZOLIN SODIUM 1 G/3ML
INJECTION, POWDER, FOR SOLUTION INTRAMUSCULAR; INTRAVENOUS AS NEEDED
Status: DISCONTINUED | OUTPATIENT
Start: 2024-09-03 | End: 2024-09-03

## 2024-09-03 RX ORDER — PROPOFOL 10 MG/ML
INJECTION, EMULSION INTRAVENOUS AS NEEDED
Status: DISCONTINUED | OUTPATIENT
Start: 2024-09-03 | End: 2024-09-03

## 2024-09-03 RX ORDER — KETOROLAC TROMETHAMINE 30 MG/ML
30 INJECTION, SOLUTION INTRAMUSCULAR; INTRAVENOUS EVERY 6 HOURS SCHEDULED
Status: DISCONTINUED | OUTPATIENT
Start: 2024-09-03 | End: 2024-09-03

## 2024-09-03 RX ORDER — SODIUM CHLORIDE, SODIUM LACTATE, POTASSIUM CHLORIDE, CALCIUM CHLORIDE 600; 310; 30; 20 MG/100ML; MG/100ML; MG/100ML; MG/100ML
125 INJECTION, SOLUTION INTRAVENOUS CONTINUOUS
Status: DISCONTINUED | OUTPATIENT
Start: 2024-09-03 | End: 2024-09-07 | Stop reason: HOSPADM

## 2024-09-03 RX ORDER — DEXAMETHASONE SODIUM PHOSPHATE 10 MG/ML
INJECTION, SOLUTION INTRAMUSCULAR; INTRAVENOUS AS NEEDED
Status: DISCONTINUED | OUTPATIENT
Start: 2024-09-03 | End: 2024-09-03

## 2024-09-03 RX ORDER — ACETAMINOPHEN 325 MG/1
650 TABLET ORAL EVERY 6 HOURS SCHEDULED
Status: DISCONTINUED | OUTPATIENT
Start: 2024-09-03 | End: 2024-09-03

## 2024-09-03 RX ORDER — FENTANYL CITRATE/PF 50 MCG/ML
25 SYRINGE (ML) INJECTION
Status: DISCONTINUED | OUTPATIENT
Start: 2024-09-03 | End: 2024-09-03

## 2024-09-03 RX ORDER — TRANEXAMIC ACID 10 MG/ML
INJECTION, SOLUTION INTRAVENOUS AS NEEDED
Status: DISCONTINUED | OUTPATIENT
Start: 2024-09-03 | End: 2024-09-03

## 2024-09-03 RX ORDER — MIDAZOLAM HYDROCHLORIDE 2 MG/2ML
INJECTION, SOLUTION INTRAMUSCULAR; INTRAVENOUS AS NEEDED
Status: DISCONTINUED | OUTPATIENT
Start: 2024-09-03 | End: 2024-09-03

## 2024-09-03 RX ORDER — MAGNESIUM SULFATE HEPTAHYDRATE 40 MG/ML
2 INJECTION, SOLUTION INTRAVENOUS ONCE
Status: COMPLETED | OUTPATIENT
Start: 2024-09-03 | End: 2024-09-03

## 2024-09-03 RX ORDER — ONDANSETRON 2 MG/ML
INJECTION INTRAMUSCULAR; INTRAVENOUS AS NEEDED
Status: DISCONTINUED | OUTPATIENT
Start: 2024-09-03 | End: 2024-09-03

## 2024-09-03 RX ORDER — IBUPROFEN 600 MG/1
600 TABLET, FILM COATED ORAL EVERY 6 HOURS
Status: DISCONTINUED | OUTPATIENT
Start: 2024-09-04 | End: 2024-09-03

## 2024-09-03 RX ORDER — SUCCINYLCHOLINE/SOD CL,ISO/PF 100 MG/5ML
SYRINGE (ML) INTRAVENOUS AS NEEDED
Status: DISCONTINUED | OUTPATIENT
Start: 2024-09-03 | End: 2024-09-03

## 2024-09-03 RX ORDER — OXYTOCIN/RINGER'S LACTATE 30/500 ML
62.5 PLASTIC BAG, INJECTION (ML) INTRAVENOUS ONCE
Status: COMPLETED | OUTPATIENT
Start: 2024-09-03 | End: 2024-09-03

## 2024-09-03 RX ORDER — METRONIDAZOLE 500 MG/100ML
500 INJECTION, SOLUTION INTRAVENOUS ONCE
Status: COMPLETED | OUTPATIENT
Start: 2024-09-03 | End: 2024-09-03

## 2024-09-03 RX ORDER — CHLOROPROCAINE HYDROCHLORIDE 30 MG/ML
INJECTION, SOLUTION EPIDURAL; INFILTRATION; INTRACAUDAL; PERINEURAL AS NEEDED
Status: DISCONTINUED | OUTPATIENT
Start: 2024-09-03 | End: 2024-09-03

## 2024-09-03 RX ORDER — DOCUSATE SODIUM 100 MG/1
100 CAPSULE, LIQUID FILLED ORAL 2 TIMES DAILY
Status: DISCONTINUED | OUTPATIENT
Start: 2024-09-03 | End: 2024-09-07 | Stop reason: HOSPADM

## 2024-09-03 RX ORDER — METOCLOPRAMIDE HYDROCHLORIDE 5 MG/ML
10 INJECTION INTRAMUSCULAR; INTRAVENOUS ONCE AS NEEDED
Status: DISCONTINUED | OUTPATIENT
Start: 2024-09-03 | End: 2024-09-03

## 2024-09-03 RX ORDER — DIPHENHYDRAMINE HCL 25 MG
25 TABLET ORAL EVERY 6 HOURS PRN
Status: DISCONTINUED | OUTPATIENT
Start: 2024-09-03 | End: 2024-09-07 | Stop reason: HOSPADM

## 2024-09-03 RX ORDER — ONDANSETRON 2 MG/ML
4 INJECTION INTRAMUSCULAR; INTRAVENOUS EVERY 8 HOURS PRN
Status: DISCONTINUED | OUTPATIENT
Start: 2024-09-03 | End: 2024-09-07 | Stop reason: HOSPADM

## 2024-09-03 RX ORDER — CEPHALEXIN 500 MG/1
500 CAPSULE ORAL EVERY 8 HOURS SCHEDULED
Status: DISPENSED | OUTPATIENT
Start: 2024-09-03 | End: 2024-09-05

## 2024-09-03 RX ORDER — HYDROMORPHONE HCL/PF 1 MG/ML
SYRINGE (ML) INJECTION AS NEEDED
Status: DISCONTINUED | OUTPATIENT
Start: 2024-09-03 | End: 2024-09-03

## 2024-09-03 RX ORDER — FENTANYL CITRATE 50 UG/ML
INJECTION, SOLUTION INTRAMUSCULAR; INTRAVENOUS AS NEEDED
Status: DISCONTINUED | OUTPATIENT
Start: 2024-09-03 | End: 2024-09-03

## 2024-09-03 RX ORDER — ONDANSETRON 2 MG/ML
4 INJECTION INTRAMUSCULAR; INTRAVENOUS ONCE AS NEEDED
Status: COMPLETED | OUTPATIENT
Start: 2024-09-03 | End: 2024-09-03

## 2024-09-03 RX ORDER — CHLORHEXIDINE GLUCONATE ORAL RINSE 1.2 MG/ML
15 SOLUTION DENTAL EVERY 12 HOURS SCHEDULED
Status: DISCONTINUED | OUTPATIENT
Start: 2024-09-03 | End: 2024-09-07 | Stop reason: HOSPADM

## 2024-09-03 RX ORDER — SIMETHICONE 80 MG
80 TABLET,CHEWABLE ORAL 4 TIMES DAILY PRN
Status: DISCONTINUED | OUTPATIENT
Start: 2024-09-03 | End: 2024-09-07 | Stop reason: HOSPADM

## 2024-09-03 RX ORDER — CALCIUM CARBONATE 500 MG/1
1000 TABLET, CHEWABLE ORAL DAILY PRN
Status: DISCONTINUED | OUTPATIENT
Start: 2024-09-03 | End: 2024-09-07 | Stop reason: HOSPADM

## 2024-09-03 RX ORDER — METRONIDAZOLE 500 MG/1
500 TABLET ORAL EVERY 8 HOURS SCHEDULED
Status: COMPLETED | OUTPATIENT
Start: 2024-09-03 | End: 2024-09-05

## 2024-09-03 RX ORDER — ACETAMINOPHEN 325 MG/1
975 TABLET ORAL EVERY 8 HOURS SCHEDULED
Status: DISCONTINUED | OUTPATIENT
Start: 2024-09-03 | End: 2024-09-07 | Stop reason: HOSPADM

## 2024-09-03 RX ORDER — ENOXAPARIN SODIUM 100 MG/ML
60 INJECTION SUBCUTANEOUS EVERY 12 HOURS
Status: DISCONTINUED | OUTPATIENT
Start: 2024-09-03 | End: 2024-09-07 | Stop reason: HOSPADM

## 2024-09-03 RX ORDER — INSULIN LISPRO 100 [IU]/ML
2-12 INJECTION, SOLUTION INTRAVENOUS; SUBCUTANEOUS
Status: DISCONTINUED | OUTPATIENT
Start: 2024-09-03 | End: 2024-09-03

## 2024-09-03 RX ORDER — IBUPROFEN 600 MG/1
600 TABLET, FILM COATED ORAL EVERY 6 HOURS SCHEDULED
Status: DISCONTINUED | OUTPATIENT
Start: 2024-09-03 | End: 2024-09-07 | Stop reason: HOSPADM

## 2024-09-03 RX ORDER — HYDROMORPHONE HCL/PF 1 MG/ML
0.5 SYRINGE (ML) INJECTION
Status: COMPLETED | OUTPATIENT
Start: 2024-09-03 | End: 2024-09-03

## 2024-09-03 RX ORDER — OXYCODONE HYDROCHLORIDE 10 MG/1
10 TABLET ORAL EVERY 4 HOURS PRN
Status: DISCONTINUED | OUTPATIENT
Start: 2024-09-03 | End: 2024-09-05

## 2024-09-03 RX ORDER — HYDROMORPHONE HCL/PF 1 MG/ML
0.5 SYRINGE (ML) INJECTION EVERY 4 HOURS PRN
Status: DISPENSED | OUTPATIENT
Start: 2024-09-03 | End: 2024-09-04

## 2024-09-03 RX ORDER — OXYTOCIN/RINGER'S LACTATE 30/500 ML
PLASTIC BAG, INJECTION (ML) INTRAVENOUS CONTINUOUS PRN
Status: DISCONTINUED | OUTPATIENT
Start: 2024-09-03 | End: 2024-09-03

## 2024-09-03 RX ADMIN — SODIUM CHLORIDE, SODIUM LACTATE, POTASSIUM CHLORIDE, AND CALCIUM CHLORIDE 125 ML/HR: .6; .31; .03; .02 INJECTION, SOLUTION INTRAVENOUS at 01:29

## 2024-09-03 RX ADMIN — ACETAMINOPHEN 975 MG: 325 TABLET ORAL at 21:50

## 2024-09-03 RX ADMIN — MIDAZOLAM HYDROCHLORIDE 2 MG: 1 INJECTION, SOLUTION INTRAMUSCULAR; INTRAVENOUS at 01:43

## 2024-09-03 RX ADMIN — Medication 100 MG: at 01:42

## 2024-09-03 RX ADMIN — FENTANYL CITRATE 25 MCG: 50 INJECTION INTRAMUSCULAR; INTRAVENOUS at 02:51

## 2024-09-03 RX ADMIN — NOREPINEPHRINE BITARTRATE 16 MCG: 1 INJECTION, SOLUTION, CONCENTRATE INTRAVENOUS at 02:03

## 2024-09-03 RX ADMIN — FENTANYL CITRATE 25 MCG: 50 INJECTION INTRAMUSCULAR; INTRAVENOUS at 03:10

## 2024-09-03 RX ADMIN — ONDANSETRON 4 MG: 2 INJECTION INTRAMUSCULAR; INTRAVENOUS at 02:44

## 2024-09-03 RX ADMIN — METRONIDAZOLE 500 MG: 500 TABLET ORAL at 18:45

## 2024-09-03 RX ADMIN — CEPHALEXIN 500 MG: 500 CAPSULE ORAL at 21:50

## 2024-09-03 RX ADMIN — OXYCODONE HYDROCHLORIDE 10 MG: 10 TABLET ORAL at 20:18

## 2024-09-03 RX ADMIN — OXYCODONE HYDROCHLORIDE 5 MG: 5 TABLET ORAL at 11:38

## 2024-09-03 RX ADMIN — IBUPROFEN 600 MG: 600 TABLET ORAL at 23:17

## 2024-09-03 RX ADMIN — Medication 62.5 MILLI-UNITS/MIN: at 02:55

## 2024-09-03 RX ADMIN — HYDROMORPHONE HYDROCHLORIDE 0.5 MG: 1 INJECTION, SOLUTION INTRAMUSCULAR; INTRAVENOUS; SUBCUTANEOUS at 02:01

## 2024-09-03 RX ADMIN — CHLORHEXIDINE GLUCONATE 15 ML: 1.2 RINSE ORAL at 08:22

## 2024-09-03 RX ADMIN — ALBUMIN (HUMAN): 12.5 INJECTION, SOLUTION INTRAVENOUS at 01:50

## 2024-09-03 RX ADMIN — CHLOROPROCAINE HYDROCHLORIDE 20 MG: 30 INJECTION, SOLUTION EPIDURAL; INFILTRATION; INTRACAUDAL; PERINEURAL at 01:37

## 2024-09-03 RX ADMIN — Medication 50 MG: at 01:51

## 2024-09-03 RX ADMIN — DOCUSATE SODIUM 100 MG: 100 CAPSULE, LIQUID FILLED ORAL at 16:57

## 2024-09-03 RX ADMIN — NOREPINEPHRINE BITARTRATE 16 MCG: 1 INJECTION, SOLUTION, CONCENTRATE INTRAVENOUS at 01:56

## 2024-09-03 RX ADMIN — OXYCODONE HYDROCHLORIDE 5 MG: 5 TABLET ORAL at 07:40

## 2024-09-03 RX ADMIN — FENTANYL CITRATE 100 MCG: 50 INJECTION INTRAMUSCULAR; INTRAVENOUS at 02:04

## 2024-09-03 RX ADMIN — MAGNESIUM SULFATE HEPTAHYDRATE 2 G: 40 INJECTION, SOLUTION INTRAVENOUS at 13:01

## 2024-09-03 RX ADMIN — HYDROMORPHONE HYDROCHLORIDE 0.5 MG: 1 INJECTION, SOLUTION INTRAMUSCULAR; INTRAVENOUS; SUBCUTANEOUS at 04:15

## 2024-09-03 RX ADMIN — PROPOFOL 200 MG: 10 INJECTION, EMULSION INTRAVENOUS at 01:42

## 2024-09-03 RX ADMIN — TRANEXAMIC ACID 1000 MG: 10 INJECTION, SOLUTION INTRAVENOUS at 01:49

## 2024-09-03 RX ADMIN — DEXAMETHASONE SODIUM PHOSPHATE 10 MG: 10 INJECTION INTRAMUSCULAR; INTRAVENOUS at 02:11

## 2024-09-03 RX ADMIN — Medication: at 04:45

## 2024-09-03 RX ADMIN — HYDROMORPHONE HYDROCHLORIDE 0.5 MG: 1 INJECTION, SOLUTION INTRAMUSCULAR; INTRAVENOUS; SUBCUTANEOUS at 03:46

## 2024-09-03 RX ADMIN — LAMOTRIGINE 225 MG: 100 TABLET ORAL at 20:44

## 2024-09-03 RX ADMIN — PHENYLEPHRINE HYDROCHLORIDE 50 MCG/MIN: 50 INJECTION INTRAVENOUS at 01:42

## 2024-09-03 RX ADMIN — FENTANYL CITRATE 25 MCG: 50 INJECTION INTRAMUSCULAR; INTRAVENOUS at 03:02

## 2024-09-03 RX ADMIN — CHLORHEXIDINE GLUCONATE 15 ML: 1.2 RINSE ORAL at 21:53

## 2024-09-03 RX ADMIN — METRONIDAZOLE 500 MG: 500 TABLET ORAL at 11:37

## 2024-09-03 RX ADMIN — LEVETIRACETAM 500 MG: 500 TABLET, FILM COATED ORAL at 08:22

## 2024-09-03 RX ADMIN — DOCUSATE SODIUM 100 MG: 100 CAPSULE, LIQUID FILLED ORAL at 08:22

## 2024-09-03 RX ADMIN — IRON SUCROSE 200 MG: 20 INJECTION, SOLUTION INTRAVENOUS at 18:45

## 2024-09-03 RX ADMIN — LAMOTRIGINE 225 MG: 100 TABLET ORAL at 11:52

## 2024-09-03 RX ADMIN — ACETAMINOPHEN 975 MG: 325 TABLET ORAL at 13:00

## 2024-09-03 RX ADMIN — KETOROLAC TROMETHAMINE 30 MG: 30 INJECTION, SOLUTION INTRAMUSCULAR; INTRAVENOUS at 16:58

## 2024-09-03 RX ADMIN — ENOXAPARIN SODIUM 60 MG: 60 INJECTION SUBCUTANEOUS at 17:08

## 2024-09-03 RX ADMIN — KETOROLAC TROMETHAMINE 30 MG: 30 INJECTION, SOLUTION INTRAMUSCULAR; INTRAVENOUS at 11:38

## 2024-09-03 RX ADMIN — KETOROLAC TROMETHAMINE 30 MG: 30 INJECTION, SOLUTION INTRAMUSCULAR; INTRAVENOUS at 04:20

## 2024-09-03 RX ADMIN — SODIUM CHLORIDE, SODIUM LACTATE, POTASSIUM CHLORIDE, AND CALCIUM CHLORIDE 125 ML/HR: .6; .31; .03; .02 INJECTION, SOLUTION INTRAVENOUS at 05:55

## 2024-09-03 RX ADMIN — HYDROMORPHONE HYDROCHLORIDE 0.5 MG: 1 INJECTION, SOLUTION INTRAMUSCULAR; INTRAVENOUS; SUBCUTANEOUS at 03:28

## 2024-09-03 RX ADMIN — CEPHALEXIN 500 MG: 500 CAPSULE ORAL at 13:00

## 2024-09-03 RX ADMIN — IPRATROPIUM BROMIDE AND ALBUTEROL SULFATE 3 ML: .5; 3 SOLUTION RESPIRATORY (INHALATION) at 03:57

## 2024-09-03 RX ADMIN — ACETAMINOPHEN 650 MG: 325 TABLET, FILM COATED ORAL at 06:02

## 2024-09-03 RX ADMIN — LEVOTHYROXINE SODIUM 25 MCG: 25 TABLET ORAL at 06:02

## 2024-09-03 RX ADMIN — METRONIDAZOLE 500 MG: 500 INJECTION, SOLUTION INTRAVENOUS at 02:54

## 2024-09-03 RX ADMIN — CEFAZOLIN 2000 MG: 1 INJECTION, POWDER, FOR SOLUTION INTRAMUSCULAR; INTRAVENOUS at 02:02

## 2024-09-03 RX ADMIN — ONDANSETRON 4 MG: 2 INJECTION INTRAMUSCULAR; INTRAVENOUS at 11:38

## 2024-09-03 RX ADMIN — SODIUM CHLORIDE, SODIUM LACTATE, POTASSIUM CHLORIDE, AND CALCIUM CHLORIDE: .6; .31; .03; .02 INJECTION, SOLUTION INTRAVENOUS at 01:37

## 2024-09-03 RX ADMIN — Medication 999 MILLI-UNITS/MIN: at 01:41

## 2024-09-03 RX ADMIN — ONDANSETRON 4 MG: 2 INJECTION INTRAMUSCULAR; INTRAVENOUS at 01:41

## 2024-09-03 RX ADMIN — LEVETIRACETAM 500 MG: 500 TABLET, FILM COATED ORAL at 20:19

## 2024-09-03 NOTE — ANESTHESIA PROCEDURE NOTES
Anesthesia Notable Event    Date/Time: 9/3/2024 1:37 AM    Patient location during procedure: OR procedure room  Performed by: Juli Lawson CRNA  Authorized by: Ernesto Tineo MD    Anesthesia notable event Intraoperative:difficult mask ventilation and difficult intubation  Unable to intubate proceeded procedure with LMA. See intra-op note.

## 2024-09-03 NOTE — OB LABOR/OXYTOCIN SAFETY PROGRESS
Oxytocin Safety Progress Check Note - Margoth Cook 34 y.o. female MRN: [id number]    Unit/Bed#: -01 Encounter: [id number]    Dose (maritza-units/min) Oxytocin: 10 maritza-units/min  Contraction Frequency (minutes): 2-4  Contraction Intensity: Mild/Moderate  Uterine Activity Characteristics: Irregular  Cervical Dilation: 5        Cervical Effacement: 90  Fetal Station: -1  Baseline Rate (FHR): 140 bpm  Fetal Heart Rate (FHT): 160 BPM  FHR Category: 2               Vital Signs:   Vitals:    09/03/24 0000   BP: 104/51   Pulse: 96   Resp: 18   Temp: 98.7 °F (37.1 °C)   SpO2:        Notes/comments:     Margoth is comfortable with epidural, but uncomfortable from a COVID standpoint. On SVE she is 5/90/-1. AROM for clear fluid. FSE placed due to difficulty tracing. Variable deceleration noted approximately 5-10 mins after FSE placed. Pitocin decreased to 10 from 18. Will consider IUPC and amnioinfusion if recurrent. D/W Dr. Willis.     Nan Prater MD 9/3/2024 1:14 AM

## 2024-09-03 NOTE — PROGRESS NOTES
Critical Care Interval Transfer Note:    Brief Hospital Summary:     Admitted for induction of labor which was complicated by uterine prolapse requiring emergent . She is also COVID positive. Her son is unfortunately ill with the same. She was a difficult airway which they were unable to intubate her. The case was completed with an CANSECO. She was brought to the ICU extubated. Initially required a PCA pump however, that was stopped when she started multimodal pain regimen. Remains on low oxygen requirements with no indication for COVID treatment. Post-op labs stable and patient stable for downgrade.       Barriers to discharge:   POD 0:    Pain Control   Bleeding monitoring.      Consults: IP CONSULT TO ACUTE PAIN SERVICE  IP CONSULT TO CASE MANAGEMENT    Recommended to review admission imaging for incidental findings and document in discharge navigator: Chart reviewed, no known incidental findings noted at this time.      Discharge Plan: Anticipate discharge in 48-72 hrs to home.  [place] Plan: Continue for accurate I/O monitoring for 48 hours            Patient seen and evaluated by Critical Care today and deemed to be appropriate for transfer to Med Surg. Spoke to Dr. Sims from OBGYN  to accept transfer. Critical care can be contacted via [facility] with any questions or concerns.

## 2024-09-03 NOTE — ASSESSMENT & PLAN NOTE
Presented to hospital for induction of labor with decreased fetal movement   Induced with Pitocin   Noted to have decelerations on FHM - Found to have uterine prolapse   Emergent C- section however, was unable to intubate - Proceeded with case via CANSECO    Plan:   STAT Post-op labs   Monitor WOB  Continue Capnography   Maintain off COVID pathway   Monitor for vaginal bleeding   Serial abdominal/fundal exams  Monitor incision   Coordinate baby to visit if appropriate

## 2024-09-03 NOTE — DISCHARGE SUMMARY
Discharge Summary - Margoth Cook 34 y.o. female MRN: [id number]    Unit/Bed#: -01 Encounter: [id number]    Admission Date: 2024     Discharge Date: 24    Patient Active Problem List   Diagnosis    Depression    Nonintractable epilepsy without status epilepticus ([facility])    History of irregular menstrual bleeding    PCOS (polycystic ovarian syndrome)    Spain's esophagus    Gastric ulcer    GARIMA (obstructive sleep apnea)    Autism    Bipolar depression ([facility])    Morbid obesity ([facility])    Gastroesophageal reflux disease    Excessive daytime sleepiness    Iron deficiency anemia secondary to inadequate dietary iron intake    Dysphagia    Hypothyroid in pregnancy, antepartum    Chronic constipation    Nausea/vomiting in pregnancy    History of stillbirth in currently pregnant patient, unspecified trimester    Maternal morbid obesity, antepartum ([facility])    Short stature    Family history of congenital heart defect    Previous child with congenital anomaly, currently pregnant, antepartum    Rh negative state in antepartum period    Request for sterilization    Vision problem    Spotting    37 weeks gestation of pregnancy    Asthma during pregnancy    H/O Cervical cerclage suture present in third trimester    COVID-19    Positive GBS test    S/P emergency  section    History of  delivery, currently pregnant    Difficult airway for intubation    Acute post-operative pain    Hypothyroidism    Type 2 diabetes mellitus without complication, without long-term current use of insulin ([facility])       OBGYN Practice: [facility]     Hospital Course:   Margoth is a 34 y.o.  at 37w6d presenting with decreased fetal movement at term in the setting of a complicated obstetric history significant for a stillbirth at 28w with multiple anomalies/trisomy 13, and 22w5d delivery s/p PE indicated cerclage. Her induction was started with pitocin as she was 2cm dilated. She received an epidural and made change  to 4cm. Given difficulty with fetal heart tracing due to body habitus, decision was made to AROM once patient was 5/90/-1. FSE was placed. She began having a category II FHT with variable decelerations. Pitocin was decreased from 18 to 10. Patient began to have recurrent variable decelerations. Decision was made to place IUPC and start amnioinfusion. On SVE, a cord prolapse was noted. Nursing, anesthesia, Dr. Juan (SOD) and Dr. Willis were notified and decision made to proceed for a stat 1LTCS for cord prolapse.     Delivery Findings:  Margoth delivered a viable male  on 9/3/2024 1:40 AM via , Low Transverse . The delivery was complicated by difficult intubation requiring LMA.    Baby's Weight: 6 lb 13 9oz  Apgar scores: 6  and 9  at 1 and 5 minutes, respectively  Anesthesia: General  QBL: 595 ml      was transferred to  nursery. Patient tolerated the procedure well and was transferred to recovery in stable condition.     Her post-partum course was complicated by respiratory distress in setting of COVID-19 infection and difficult intubation requiring ICU admission on POD0. By POD2 she was breathing comfortably on room air in postpartum room.  Her post-partum pain was well controlled with oral analgesics. On day of discharge she was ambulating, voiding spontaneously, tolerating oral intake and hemodynamically stable. Mom's blood type is O negative and  Rhogam was given on .    She was discharged home on postpartum day #4 without complications. Patient was instructed to follow up with her OB as an outpatient and was given appropriate warnings to call doctor or provider if she develops signs of infection or uncontrolled pain.    Discharge Problem List by Issue:   Type 2 diabetes mellitus without complication, without long-term current use of insulin (HCC)  Assessment & Plan  Lab Results   Component Value Date    HGBA1C 5.7 (H) 2024   SSI discontinued    Recent Labs      "24  1057 24  1844   POCGLU 160* 106       Blood Sugar Average: Last 72 hrs:  (P) 133      Hypothyroidism  Assessment & Plan  Continue Levothyroxine    Acute post-operative pain  Assessment & Plan  S/p dPCA, APS consulted  Now on PO pain medications with improvement in pain control    Positive GBS test  Assessment & Plan  Penicillin intrapartum       COVID-19  Assessment & Plan  +COVID test in triage  S/p difficult intubation for STAT C/S and non-working epidural  S/p ICU admission  Per ICU team, patient is not a candidate for Paxlovid.   Patient's 1 year old, Jay, currently [facility] at [facility] with COVID - weaned from O2 but now needs to tolerate feeds  Baby now in room with mom (10 feet away unless masked)  Isolation precautions.     H/O Cervical cerclage suture present in third trimester  Assessment & Plan  History indicated cerclage placed at [place]  Cerclage removed on     Rh negative state in antepartum period  Assessment & Plan  Rhogam ordered    History of stillbirth in currently pregnant patient, unspecified trimester  Assessment & Plan  Late to care in G1 pregnancy - 28w demise due to \"fluid around the heart/trisomy\"       Iron deficiency anemia secondary to inadequate dietary iron intake  Assessment & Plan  S/p outpatient iron supplementation  Lab Results   Component Value Date/Time    HGB 8.5 (L) 2024 06:13 AM    HGB 8.3 (L) 2024 01:29 PM    HGB 8.8 (L) 2024 07:45 AM    HGB 10.6 (L) 2024 07:46 AM   S/p venofer yesterday  Asymptomatic       Nonintractable epilepsy without status epilepticus (HCC)  Assessment & Plan  Continue home keppra and lamictal    Depression  Assessment & Plan  Not currently on medications  Previously on Zoloft  High risk for PPD    * S/P emergency  section  Assessment & Plan  - Continue routine post partum care  - Encourage ambulation  - Encourage breastfeeding  - QBL: 595cc; Hgb 8.5 s/p venofer yesterday  - Surgifoam on abdominal " muscles  - s/p TXA  - Pain improved with PO pain medication  - s/p VT  - Flagyl and Keflex x48hrs for SSI PPx         Disposition: Home    Planned Readmission: No    Discharge Medications:   Please see AVS    Discharge instructions :   -Do not place anything (no partner, tampons or douche) in your vagina for 6 weeks.  -You may walk for exercise for the first 6 weeks then gradually return to your usual activities.   -Please do not drive for 1 week if you have no stitches and for 2 weeks if you have stitches or underwent a  delivery.    -You may take baths or shower per your preference.   -Please look at your bust (breasts) in the mirror daily and call your doctor for redness or tenderness or increased warmth.   - If you have had a  section please look at your incision daily as well and call provider for increasing redness or steady drainage from the incision.   -Please call your doctor's office if temperature > 100.4*F or 38* C, worsening pain or a foul discharge.    Follow Up:  - Follow up in 1 week for early postpartum visit    Frieda Duffy MD  PGY 2, Obstetrics and Gynecology

## 2024-09-03 NOTE — ASSESSMENT & PLAN NOTE
Complicated with medication regimen for seizures and depression   Nutrition counseling when able   Consider referral to weight management when appropriate

## 2024-09-03 NOTE — CONSULTS
Consultation - Acute Pain Service  Margoth Cook 34 y.o. female MRN: [id number]  Unit/Bed#: ICU 02 Encounter: [id number]               Margoth Cook is a 34 y.o. female who is status post  section for prolapse of umbilical cord, epidural was placed for labor preoperatively which was noted to be out of the patient when repositioned post .  Acute pain service was consulted for post  pain which is managed on current medication regimen.    Acute post-operative pain  Assessment & Plan  Multimodal analgesic plan:  Change Tylenol to 975 mg every 8 hours scheduled  Discontinue Dilaudid PCA  Toradol IV followed by oral Motrin every 6 hours scheduled  Oxycodone 5 mg every 4 hours as needed for moderate pain  Oxycodone 10 mg every 4 hours as needed for severe pain  Add Dilaudid 0.5 mg IV every 4 hours as needed for breakthrough pain  IV Dilaudid set to  in the next 24 hours  Narcan as needed for opioid reversal agent/respiratory depression    Bowel regimen  Colace 100 mg twice a day      Treatment plan and medications have been reviewed with patient, bedside nursing staff and primary care service.  Pain is well-controlled, would not anticipate any further adjustments to her medication regimen at this time.    APS will sign off at this time. Thank you for the consult. All opioids and other analgesics to be written at discretion of primary team. Please contact Acute Pain Service - via [employer] from [phone] with additional questions or concerns. See [email or web address] or Su for additional contacts and after hours information.    History of Present Illness    Admit Date:  2024  Hospital Day:  1 day  Primary Service:  OB/GYN  Attending Provider:  Abrahan Orozco DO  Physician Requesting Consult: Abrahan Orozco DO  Reason for Consult / Principal Problem: Postop pain  HPI: Margoth Cook is a 34 y.o. year old female who is status post  section for prolapse of umbilical  cord, epidural was placed for labor preoperatively which was noted to be out of the patient when repositioned post .  Acute pain service was consulted for post  pain which is managed on current medication regimen.    Current pain location(s): Pain Score: 7  Pain Location/Orientation: Location: Abdomen  Pain Scale: Pain Assessment Tool: 0-10  Current Analgesic regimen: Patient resting in bed, no acute distress.  Overall feels better today.  Lower abdominal/incisional pain is present which she feels is much improved since oral oxycodone dose this morning.  Minimal improvement in pain with Dilaudid PCA, she is agreeable to discontinue the PCA and utilize oxycodone as needed with IV Dilaudid for breakthrough pain.  Denied headache, dizziness, nausea or vomiting.  Denied chest pain or shortness of breath.  Had some nausea in the last 24 hours postoperatively which is now improved.    I have reviewed the patient's controlled substance dispensing history in the Prescription Drug Monitoring Program in compliance with the [facility] regulations before prescribing any controlled substances.     Inpatient consult to Acute Pain Service  Consult performed by: REY Jiménez  Consult ordered by: Ernesto Tineo MD          Review of Systems   Respiratory:  Negative for shortness of breath.    Cardiovascular:  Negative for chest pain.   Gastrointestinal:  Positive for abdominal pain and nausea. Negative for vomiting.   Genitourinary:  Negative for difficulty urinating.   Neurological:  Positive for seizures. Negative for dizziness and headaches.   All other systems reviewed and are negative.      Historical Information   Past Medical History:   Diagnosis Date    Abnormal Pap smear of cervix     Anemia     Anxiety     Asthma     Autism     Spain esophagus     Bipolar disorder (HCC)     CPAP (continuous positive airway pressure) dependence     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HCC)     not diagnosed as of 23-trying to  R/O    Depression     Diabetes mellitus (HCC)     Disease of thyroid gland     hypo    Dysphagia     solids and liquids    Female infertility     Fibromyalgia, primary     Gastric ulcer     History of bronchitis     Hypothyroidism     Iron deficiency anemia     infusion in 11/2022    Irregular menses     Miscarriage     Morbid obesity with BMI of 50.0-59.9, adult (HCC)     Plantar fasciitis of left foot     Polycystic ovary syndrome     Reflux esophagitis     Seizures (HCC)     last one 7/13/22    Sleep apnea     CPAP    Varicella     had disease    Wears glasses      Past Surgical History:   Procedure Laterality Date    EGD      with Bx due to barretts esophagus    EYE SURGERY Bilateral     lazy eye repair    VA BIOPSY OF LIP N/A 11/10/2022    Procedure: EXCISION BIOPSY SALVARY GLANDS LOWER LIP;  Surgeon: Casey Florez MD;  Location: [facility];  Service: ENT    VA CERCLAGE UTERINE CERVIX NONOBSTETRICAL N/A 5/23/2023    Procedure: CERCLAGE CERVICAL;  Surgeon: Ant Marinelli MD;  Location: [facility];  Service: Obstetrics    VA HYSTEROSCOPY BX ENDOMETRIUM&/POLYPC W/WO D&C N/A 9/22/2021    Procedure: DILATATION AND CURETTAGE (D&C) WITH HYSTEROSCOPY;  Surgeon: Albina Parsons MD;  Location: [facility];  Service: Gynecology    WISDOM TOOTH EXTRACTION       Social History   Social History     Substance and Sexual Activity   Alcohol Use Not Currently    Comment: occ     Social History     Substance and Sexual Activity   Drug Use Not Currently    Types: Marijuana    Comment: about a couple times a week, quit Jan 2023     Social History     Tobacco Use   Smoking Status Never   Smokeless Tobacco Never     Family History: non-contributory    Meds/Allergies   all current active meds have been reviewed, current meds:   Current Facility-Administered Medications   Medication Dose Route Frequency    acetaminophen (TYLENOL) tablet 975 mg  975 mg Oral Q8H DELPHINE    calcium carbonate (TUMS) chewable tablet 1,000 mg  1,000 mg Oral Daily  PRN    cephalexin (KEFLEX) capsule 500 mg  500 mg Oral Q8H DELPHINE    chlorhexidine (PERIDEX) 0.12 % oral rinse 15 mL  15 mL Mouth/Throat Q12H DELPHINE    diphenhydrAMINE (BENADRYL) tablet 25 mg  25 mg Oral Q6H PRN    docusate sodium (COLACE) capsule 100 mg  100 mg Oral BID    enoxaparin (LOVENOX) subcutaneous injection 60 mg  60 mg Subcutaneous Q12H    HYDROmorphone (DILAUDID) injection 0.5 mg  0.5 mg Intravenous Q4H PRN    ketorolac (TORADOL) injection 30 mg  30 mg Intravenous Q6H DELPHINE    Followed by    [START ON 9/4/2024] ibuprofen (MOTRIN) tablet 600 mg  600 mg Oral Q6H    insulin lispro (HumALOG/ADMELOG) 100 units/mL subcutaneous injection 2-12 Units  2-12 Units Subcutaneous TID AC    lactated ringers infusion  125 mL/hr Intravenous Continuous    lamoTRIgine (LaMICtal) tablet 225 mg  225 mg Oral BID    levETIRAcetam (KEPPRA) tablet 500 mg  500 mg Oral Daily    levothyroxine tablet 25 mcg  25 mcg Oral Early Morning    metroNIDAZOLE (FLAGYL) tablet 500 mg  500 mg Oral Q8H DELPHINE    naloxone (NARCAN) 0.04 mg/mL syringe 0.04 mg  0.04 mg Intravenous Q3 min PRN    ondansetron (ZOFRAN) injection 4 mg  4 mg Intravenous Q8H PRN    oxyCODONE (ROXICODONE) immediate release tablet 10 mg  10 mg Oral Q4H PRN    oxyCODONE (ROXICODONE) IR tablet 5 mg  5 mg Oral Q4H PRN    pantoprazole (PROTONIX) EC tablet 40 mg  40 mg Oral Early Morning    phenol (CHLORASEPTIC) 1.4 % mucosal liquid 1 spray  1 spray Mouth/Throat Q2H PRN    Rho(D) immune globulin (RHOGAM ULTRA-FILTERED PLUS) IM injection 300 mcg  300 mcg Intramuscular Once    simethicone (MYLICON) chewable tablet 80 mg  80 mg Oral 4x Daily PRN   , and PTA meds:   Prior to Admission Medications   Prescriptions Last Dose Informant Patient Reported? Taking?   ASPIRIN 81 PO   Yes No   Sig: Take 1 tablet by mouth in the morning   Patient not taking: Reported on 8/19/2024   Prenatal Vit-Fe Sulfate-FA-DHA (Prenatal Vitamin/Min +DHA) 27-0.8-200 MG CAPS 9/1/2024  Yes Yes   Sig: Take 1 capsule by  mouth in the morning   acetaminophen (TYLENOL) 500 mg tablet 9/1/2024 Self No Yes   Sig: Take 1 tablet (500 mg total) by mouth every 6 (six) hours as needed for mild pain   albuterol (Proventil HFA) 90 mcg/act inhaler 9/1/2024  No Yes   Sig: Inhale 2 puffs every 6 (six) hours as needed for wheezing   ferrous sulfate 324 (65 Fe) mg   No No   Sig: TAKE 1 TABLET BY MOUTH TWICE DAILY BEFORE MEALS   Patient taking differently: Take 324 mg by mouth daily before breakfast   lamoTRIgine (LaMICtal) 100 mg tablet  Self No No   Sig: Take 1 tablet (100 mg total) by mouth 2 (two) times a day Start 1 tab twice daily Aug 4th.   Patient taking differently: Take 225 mg by mouth 2 (two) times a day Taking  225 mg   lamoTRIgine (LaMICtal) 25 mg tablet 9/1/2024  No Yes   Sig: START WITH 1 TABLET BY MOUTH FOR 1 WEEK, THEN 1 TABLET TWICE DAILY THE 2ND WEEK, THEN 2 TABLETS TWICE DAILY THE 3RD WEEK (GENERIC FOR LAMICTAL)   levETIRAcetam (Keppra) 500 mg tablet 9/1/2024  No Yes   Sig: Take 1 tablet (500 mg total) by mouth daily In morning.   levothyroxine 25 mcg tablet 9/1/2024  No Yes   Sig: TAKE 1 TABLET BY MOUTH EVERY DAY IN THE MORNING   prenatal vitamin (CLASSIC FORMULA) 27-0.8 mg   Yes No   Sig: Take 1 tablet by mouth daily   Patient not taking: Reported on 8/19/2024      Facility-Administered Medications: None       Allergies   Allergen Reactions    Haloperidol Other (See Comments)     Jaw locks up    Jaw locks up   Jaw locks up    Bee Pollen Other (See Comments)    Penicillins Hives    Pollen Extract Allergic Rhinitis    Fluoxetine Allergic Rhinitis       Objective   Vitals:    09/03/24 0600 09/03/24 0635 09/03/24 0700 09/03/24 0748   BP:  115/55 98/53    BP Location:   Right arm    Pulse: (!) 108 82 83    Resp: 14 18 15    Temp:  97.6 °F (36.4 °C) 98.3 °F (36.8 °C)    TempSrc:  Oral Oral    SpO2: 97% 98% 97% 97%   Weight: 112 kg (247 lb 9.2 oz)      Height:             Intake/Output Summary (Last 24 hours) at 9/3/2024 1000  Last  data filed at 9/3/2024 0940  Gross per 24 hour   Intake 6508.37 ml   Output 1370 ml   Net 5138.37 ml       Physical Exam  Vitals and nursing note reviewed.   Constitutional:       General: She is awake. She is not in acute distress.     Appearance: She is not ill-appearing, toxic-appearing or diaphoretic.   HENT:      Head: Normocephalic and atraumatic.      Nose: Nose normal. No congestion or rhinorrhea.      Mouth/Throat:      Mouth: Mucous membranes are moist.   Eyes:      Extraocular Movements: Extraocular movements intact.   Cardiovascular:      Rate and Rhythm: Normal rate.   Pulmonary:      Effort: Pulmonary effort is normal. No tachypnea, bradypnea or respiratory distress.   Abdominal:      Palpations: Abdomen is soft.   Musculoskeletal:      Lumbar back: No swelling or tenderness.   Skin:     General: Skin is warm and dry.      Coloration: Skin is not pale.   Neurological:      Mental Status: She is alert and oriented to person, place, and time. Mental status is at baseline.   Psychiatric:         Attention and Perception: Attention normal.         Mood and Affect: Mood normal. Mood is not anxious.         Speech: Speech normal.         Behavior: Behavior normal. Behavior is cooperative.         Cognition and Memory: Cognition and memory normal.           Lab Results:  Estimated Creatinine Clearance: 184.7 mL/min (A) (by C-G formula based on SCr of 0.48 mg/dL (L)).  Lab Results   Component Value Date    WBC 7.91 09/03/2024    HGB 8.8 (L) 09/03/2024    HCT 27.5 (L) 09/03/2024     09/03/2024         Component Value Date/Time    K 3.7 09/03/2024 0745    K 3.8 07/14/2024 2105     09/03/2024 0745     (L) 07/14/2024 2105    CO2 20 (L) 09/03/2024 0745    CO2 24 07/14/2024 2105    BUN 7 09/03/2024 0745    BUN 9 07/14/2024 2105    CREATININE 0.48 (L) 09/03/2024 0745    CREATININE 0.45 07/14/2024 2105         Component Value Date/Time    CALCIUM 8.1 (L) 09/03/2024 0745    CALCIUM 9.5 07/14/2024  2105    ALKPHOS 127 (H) 09/02/2024 0746    AST 19 09/02/2024 0746    ALT 16 09/02/2024 0746    TP 6.3 (L) 09/02/2024 0746    ALB 3.1 (L) 09/02/2024 0746       Counseling / Coordination of Care  Total floor / unit time spent today 50 minutes. Greater than 50% of total time was spent with the patient and / or family counseling and / or coordination of care. A description of the counseling / coordination of care: Chart review included vital signs, allergies, past medical history, progress notes, current and home medications.  Discussed postoperative pain management which is inclusive of opioid and nonopioid medications, the use of PCA pump and oral adjuvants.  Also reviewed the use of peripheral nerve blocks if pain would become severe, will hold off given that pain is predominantly well-controlled at this time.  Treatment plan has been reviewed with patient, bedside nursing staff and primary care service.    Please note that the APS provides consultative services regarding pain management only.  With the exception of ketamine and epidural infusions and except when indicated, final decisions regarding starting or changing doses of analgesic medications are at the discretion of the consulting service.  REY Jiménez  Acute Pain Service

## 2024-09-03 NOTE — PROGRESS NOTES
LATE ENTRY DUE TO PATIENT ACUITY    Present at the bedside due to category 2 fetal heart tracing.  Patient was noted to be 5/90/-1 and an FSE was placed without difficulty. She began having a category II FHT with variable decelerations. Pitocin was decreased from 18 to 10. Patient then began to have recurrent variable decelerations despite decrease in Pitocin rate. Decision was made to place IUPC and start amnioinfusion. On SVE, a cord prolapse was noted at 0130. Nursing, anesthesia, Dr. Juan (SOD) and Dr. Willis were notified and decision made to proceed for a stat 1LTCS for cord prolapse. Patient was documented as in the room at 0135, with incision at 0137 and baby delivered at 0140.    Debriefed with team postoperatively and separately with patient. Notified patient of small <1cm hemostatic laceration to the upper back not seen at the time of delivery. All questions were answered to the best of my ability.     Nan Prater MD  OBGYN PGY-4  09/03/24 2:49 AM

## 2024-09-03 NOTE — ASSESSMENT & PLAN NOTE
S/p emergent    ATC tylenol, Toradol, Motrin   Dilaudid PCA   Roxicodone PRN     Plan:   Maintain on capnography with PCA  Monitor pain control   OOB to chair   IS when able   Add bowl regimen

## 2024-09-03 NOTE — CONSULTS
[facility]  Consult  Name: Margoth Cook 34 y.o. female I MRN: [id number]  Unit/Bed#: ICU 02 I Date of Admission: 2024   Date of Service: 9/3/2024 I Hospital Day: 1    Consults    Assessment & Plan   * S/P emergency  section  Assessment & Plan  Presented to hospital for induction of labor with decreased fetal movement   Induced with Pitocin   Noted to have decelerations on FHM - Found to have uterine prolapse   Emergent C- section however, was unable to intubate - Proceeded with case via CANSECO    Plan:   STAT Post-op labs   Monitor WOB  Continue Capnography   Maintain off COVID pathway   Monitor for vaginal bleeding   Serial abdominal/fundal exams  Monitor incision   Coordinate baby to visit if appropriate     Difficult airway for intubation  Assessment & Plan  Multiple attempts for intubation for emergent .   Unsuccessful and case was completed with an LAMA     Plan:   Monitor airway   Monitor oxygen requirements     Acute post-operative pain  Assessment & Plan  S/p emergent    ATC tylenol, Toradol, Motrin   Dilaudid PCA   Roxicodone PRN     Plan:   Maintain on capnography with PCA  Monitor pain control   OOB to chair   IS when able   Add bowl regimen     Hypothyroidism  Assessment & Plan  Continue synthroid     History of  delivery, currently pregnant  Assessment & Plan   baby at 22 weeks   Son with genetic disorder - trached.   Treated at [facility]     Positive GBS test  Assessment & Plan  Intra op treated with PCN     COVID-19  Assessment & Plan  Tested positive during triage  2 L NC   Not on Pathway    Plan:   Monitor oxygen requirements   No indication for COVID treatment at this time     H/O Cervical cerclage suture present in third trimester  Assessment & Plan  History indicated cerclage placed at [place]  Cerclage removed on     Rh negative state in antepartum period  Assessment & Plan  Rhogam given      Plan:   Will need  "postpartum     History of stillbirth in currently pregnant patient, unspecified trimester  Assessment & Plan  Received late care in first pregnancy   Fetal demise at 28 weeks     Iron deficiency anemia secondary to inadequate dietary iron intake  Assessment & Plan  Last infusion noted in    Monitor CBC   Monitor blood loss s/p      Gastroesophageal reflux disease  Assessment & Plan  Started Protonix     Type 2 diabetes mellitus with obesity  (HCC)  Assessment & Plan  Lab Results   Component Value Date    HGBA1C 5.8 (H) 2022       No results for input(s): \"POCGLU\" in the last 72 hours.    Blood Sugar Average: Last 72 hrs:      Monitor accuchecks ACHS   SSI if necessary       Morbid obesity (HCC)  Assessment & Plan  Complicated with medication regimen for seizures and depression   Nutrition counseling when able   Consider referral to weight management when appropriate     GARIMA (obstructive sleep apnea)  Assessment & Plan  Wears CPAP HS     Plan:   VBG now   CPAP HS   Maintain capnography     Gastric ulcer  Assessment & Plan  Restart Prilosec    Nonintractable epilepsy without status epilepticus (HCC)  Assessment & Plan  Diagnosed at age 17.   Continue on Keppra and Lamictal     Plan:   Check Keppra levels as needed   Caution medications that lower seizure threshold.     Depression  Assessment & Plan  Unclear medication regimen at current   Will restart medication as appropriate for patient.            History of Present Illness     HPI: Margoth Cook is a 34 y.o. who presents on  for induction of labor with contractions and no LOF. She has a OB history of  with an JAKE of 2024. PMH seizure disorder, depression, GARIMA, Gastric ulcer, Spain's Esophagus, Hypothyroidism, Iron deficiency anemia, Type II DM, and morbid obesity. Patient was induced with Pitocin but, unfortunately suffered fetal decelerations and uterine prolapse requiring emergent . She was taken to the OR but, " they were unable to intubate her. The case proceeded with an CANSECO for airway. Placed in ICU post-op for airway monitoring. Baby on Mother Baby doing well. Patient has no utilized PCA since oral administration. Likely could be stopped. Post-Op labs pending.       History obtained from chart review and the patient.  Review of Systems: Review of Systems   Gastrointestinal:  Positive for abdominal pain (Lower AB pain/incisional pain).   Genitourinary:  Negative for vaginal bleeding.     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[facility])      Comment:  not diagnosed as of 1/9/23-trying to R/O  No date: Depression  No date: Diabetes mellitus ([facility])  No date: Disease of thyroid gland      Comment:  hypo  No date: Dysphagia      Comment:  solids and liquids  No date: Female infertility  No date: Fibromyalgia, primary  No date: Gastric ulcer  No date: History of bronchitis  No date: Hypothyroidism  No date: Iron deficiency anemia      Comment:  infusion in 11/2022  No date: Irregular menses  No date: Miscarriage  No date: Morbid obesity with BMI of 50.0-59.9, adult ([facility])  No date: Plantar fasciitis of left foot  No date: Polycystic ovary syndrome  No date: Reflux esophagitis  No date: Seizures ([facility])      Comment:  last one 7/13/22  No date: Sleep apnea      Comment:  CPAP  No date: Varicella      Comment:  had disease  No date: Wears glasses Past Surgical History:  No date: EGD      Comment:  with Bx due to barretts esophagus  No date: EYE SURGERY; Bilateral      Comment:  lazy eye repair  11/10/2022: MT BIOPSY OF LIP; N/A      Comment:  Procedure: EXCISION BIOPSY SALVARY GLANDS LOWER LIP;                 Surgeon: Casey Florez,  MD;  Location: [facility];                Service: ENT  5/23/2023: SD CERCLAGE UTERINE CERVIX NONOBSTETRICAL; N/A      Comment:  Procedure: CERCLAGE CERVICAL;  Surgeon: Ant Marinelli MD;  Location: [facility] ;  Service: Obstetrics  9/22/2021: SD HYSTEROSCOPY BX ENDOMETRIUM&/POLYPC W/WO D&C; N/A      Comment:  Procedure: DILATATION AND CURETTAGE (D&C) WITH                HYSTEROSCOPY;  Surgeon: Albina Parsons MD;  Location: [facility]                MAIN OR;  Service: Gynecology  No date: WISDOM TOOTH EXTRACTION   Current Outpatient Medications   Medication Instructions    acetaminophen (TYLENOL) 500 mg, Oral, Every 6 hours PRN    albuterol (Proventil HFA) 90 mcg/act inhaler 2 puffs, Inhalation, Every 6 hours PRN    ASPIRIN 81 PO 1 tablet, Daily    ferrous sulfate 324 mg, Oral, 2 times daily before meals    lamoTRIgine (LaMICtal) 25 mg tablet START WITH 1 TABLET BY MOUTH FOR 1 WEEK, THEN 1 TABLET TWICE DAILY THE 2ND WEEK, THEN 2 TABLETS TWICE DAILY THE 3RD WEEK (GENERIC FOR LAMICTAL)    lamoTRIgine (LAMICTAL) 100 mg, Oral, 2 times daily, Start 1 tab twice daily Aug 4th.    levETIRAcetam (KEPPRA) 500 mg, Oral, Daily, In morning.    levothyroxine 25 mcg, Oral, Every morning    Prenatal Vit-Fe Sulfate-FA-DHA (Prenatal Vitamin/Min +DHA) 27-0.8-200 MG CAPS 1 capsule, Oral, Daily    prenatal vitamin (CLASSIC FORMULA) 27-0.8 mg 1 tablet, Daily    Allergies   Allergen Reactions    Haloperidol Other (See Comments)     Jaw locks up    Jaw locks up   Jaw locks up    Bee Pollen Other (See Comments)    Penicillins Hives    Pollen Extract Allergic Rhinitis    Fluoxetine Allergic Rhinitis      Social History     Tobacco Use    Smoking status: Never    Smokeless tobacco: Never   Vaping Use    Vaping status: Never Used   Substance Use Topics    Alcohol use: Not Currently     Comment: occ    Drug use: Not Currently     Types: Marijuana     Comment: about a couple times a week, quit Jan 2023    Family History   Problem Relation Age of  Onset    Bipolar disorder Mother     Depression Mother     Depression Father     Other Son     Diabetes Maternal Grandmother     Thyroid disease Maternal Grandmother     Hypertension Maternal Grandmother     Heart disease Maternal Grandmother     Diabetes Maternal Grandfather     Diabetes Paternal Grandmother     Breast cancer Paternal Grandmother     Stroke Paternal Grandmother     Diabetes Paternal Grandfather     Breast cancer Maternal Aunt 35        bilateral    Stomach cancer Maternal Aunt         Objective                            Vitals I/O      Most Recent Min/Max in 24hrs   Temp 98.3 °F (36.8 °C) Temp  Min: 97.6 °F (36.4 °C)  Max: 101.5 °F (38.6 °C)   Pulse 83 Pulse  Min: 82  Max: 125   Resp 15 Resp  Min: 14  Max: 24   BP 98/53 BP  Min: 82/53  Max: 147/79   O2 Sat 97 % SpO2  Min: 91 %  Max: 100 %      Intake/Output Summary (Last 24 hours) at 9/3/2024 0748  Last data filed at 9/3/2024 0557  Gross per 24 hour   Intake 3251.4 ml   Output 1245 ml   Net 2006.4 ml       Diet Regular; Regular House    Invasive Monitoring           Physical Exam   Physical Exam  Eyes:      Pupils: Pupils are equal, round, and reactive to light.   Skin:     General: Skin is warm and dry.   HENT:      Head: Normocephalic.   Cardiovascular:      Rate and Rhythm: Normal rate and regular rhythm.      Pulses: Normal pulses.      Heart sounds: Normal heart sounds.   Musculoskeletal:      Right lower leg: No edema.      Left lower leg: No edema.   Abdominal: General: Abdomen is flat. Bowel sounds are decreased.      Palpations: Abdomen is soft.      Tenderness: There is abdominal tenderness (at incision line).          Comments: Abdominal binder    Constitutional:       General: She is not in acute distress.     Appearance: She is well-developed.   Pulmonary:      Effort: Pulmonary effort is normal. No tachypnea.      Breath sounds: Normal breath sounds.      Comments: 2L NC - capnography   Psychiatric:         Behavior: Behavior normal.          Thought Content: Thought content normal.         Cognition and Memory: Cognition normal.   Neurological:      General: No focal deficit present.      Mental Status: She is alert and oriented to person, place, and time. Mental status is at baseline.      GCS: GCS eye subscore is 4. GCS verbal subscore is 5. GCS motor subscore is 6.      Motor: Strength full and intact in all extremities.   Genitourinary/Anorectal:     Comments: Cantrell   No vaginal bleeding noted   Cantrell present.          Diagnostic Studies      EKG: NSR BMP: 75  Imaging:  I have personally reviewed pertinent films in PACS     Medications:  Scheduled PRN   acetaminophen, 650 mg, Q6H DELPHINE  cephalexin, 500 mg, Q8H DELPHINE  chlorhexidine, 15 mL, Q12H DELPHINE  docusate sodium, 100 mg, BID  enoxaparin, 60 mg, Q12H  ketorolac, 30 mg, Q6H DELPHINE   Followed by  [START ON 9/4/2024] ibuprofen, 600 mg, Q6H  insulin lispro, 2-12 Units, TID AC  lamoTRIgine, 225 mg, BID  levETIRAcetam, 500 mg, Daily  levothyroxine, 25 mcg, Early Morning  metroNIDAZOLE, 500 mg, Q8H DELPHINE  pantoprazole, 40 mg, Early Morning  Rho(D) immune globulin, 300 mcg, Once      calcium carbonate, 1,000 mg, Daily PRN  diphenhydrAMINE, 25 mg, Q6H PRN  naloxone, 0.04 mg, Q3 min PRN  ondansetron, 4 mg, Q8H PRN  oxyCODONE, 10 mg, Q4H PRN  oxyCODONE, 5 mg, Q4H PRN  phenol, 1 spray, Q2H PRN  simethicone, 80 mg, 4x Daily PRN       Continuous    HYDROmorphone,   lactated ringers, 125 mL/hr, Last Rate: 125 mL/hr (09/03/24 0555)         Labs:    CBC    Recent Labs     09/02/24  0746   WBC 5.65   HGB 10.6*   HCT 32.0*        BMP    Recent Labs     09/02/24  0746   SODIUM 133*   K 3.5      CO2 19*   AGAP 10   BUN 10   CREATININE 0.56*   CALCIUM 8.8       Coags    No recent results     Additional Electrolytes  No recent results       Blood Gas    No recent results  No recent results LFTs  Recent Labs     09/02/24  0746   ALT 16   AST 19   ALKPHOS 127*   ALB 3.1*   TBILI 0.31       Infectious  No recent  results  Glucose  Recent Labs     09/02/24  0746   GLUC 96               REY Victor

## 2024-09-03 NOTE — ANESTHESIA POSTPROCEDURE EVALUATION
Post-Op Assessment Note    CV Status:  Stable    Pain management: adequate      Post-op block assessment: site cleaned and catheter intact   Mental Status:  Alert and awake   Hydration Status:  Euvolemic   PONV Controlled:  Controlled   Airway Patency:  Patent     Post Op Vitals Reviewed: Yes    Anethesia notable event occurred.    Staff: CRNA               BP   135/82   Temp   97   Pulse  109   Resp   18   SpO2   100

## 2024-09-03 NOTE — PLAN OF CARE

## 2024-09-03 NOTE — ASSESSMENT & PLAN NOTE
Multimodal analgesic plan:  Change Tylenol to 975 mg every 8 hours scheduled  Discontinue Dilaudid PCA  Toradol IV followed by oral Motrin every 6 hours scheduled  Oxycodone 5 mg every 4 hours as needed for moderate pain  Oxycodone 10 mg every 4 hours as needed for severe pain  Add Dilaudid 0.5 mg IV every 4 hours as needed for breakthrough pain  IV Dilaudid set to  in the next 24 hours  Narcan as needed for opioid reversal agent/respiratory depression    Bowel regimen  Colace 100 mg twice a day

## 2024-09-03 NOTE — ASSESSMENT & PLAN NOTE
Diagnosed at age 17.   Continue on Keppra and Lamictal     Plan:   Check Keppra levels as needed   Caution medications that lower seizure threshold.

## 2024-09-03 NOTE — ASSESSMENT & PLAN NOTE
Multiple attempts for intubation for emergent .   Unsuccessful and case was completed with an LAMA     Plan:   Monitor airway   Monitor oxygen requirements

## 2024-09-03 NOTE — ASSESSMENT & PLAN NOTE
"Lab Results   Component Value Date    HGBA1C 5.8 (H) 07/29/2022       No results for input(s): \"POCGLU\" in the last 72 hours.    Blood Sugar Average: Last 72 hrs:      Monitor accuchecks ACHS   SSI if necessary     "

## 2024-09-03 NOTE — ASSESSMENT & PLAN NOTE
Tested positive during triage  2 L NC   Not on Pathway    Plan:   Monitor oxygen requirements   No indication for COVID treatment at this time

## 2024-09-03 NOTE — OP NOTE
OPERATIVE REPORT  PATIENT NAME: Margoth Cook    :  1990  MRN: [id number]  Pt Location: [facility] L&D OR ROOM 02    SURGERY DATE: 9/3/2024    Surgeons and Role:     * Yany Juan MD - Primary     * Nan Prater MD - Assisting     * Carmen Smallwood MD - Assisting     * Natasha Willis MD - Co-surgeon     * Yany Juan MD - Co-surgeon    Preop Diagnosis:  Prolapse of umbilical cord [O69.0XX0]    Post-Op Diagnosis Codes:     * Prolapse of umbilical cord [O69.0XX0]    Procedure(s) (LRB):   SECTION () (N/A)    Specimen(s):  ID Type Source Tests Collected by Time Destination   1 :  Tissue (Placenta on Hold) OB Only Placenta TISSUE EXAM OB (PLACENTA) ONLY Yany Juan MD 9/3/2024 0141    C :  Cord Blood Cord BLOOD GAS, VENOUS, CORD, BLOOD GAS, ARTERIAL, CORD Yany Juan MD 9/3/2024 0140        Surgical QBL:  Surgical QBL (mL): 595 mL      Drains:  Urethral Catheter Straight-tip 16 Fr. (Active)   Goal for Removal Voiding trial when ambulation improves 24   Site Assessment Clean;Skin intact 24   Cantrell Care Done 24 2100   Collection Container Standard drainage bag 24   Securement Method Securing device (Describe) 24   Output (mL) 300 mL 24   Number of days: 1       Anesthesia Type:   * No anesthesia type entered *    Operative Indications:  Prolapse of umbilical cord [O69.0XX0]     Doron Group Classification System:  No Multiple pregnancy, No Transverse or oblique lie, No Breech lie, Gestational age is > or =37 weeks, Multiparous, No Previous uterine scar, Labor induced +  is DORON GROUP 4a    Maternal Findings:  Normal uterus  Normal tubes and ovaries bilaterally  No adhesions     Findings:  Viable male, weight pending;  Apgar scores of 6 at one minute and 9 at five minutes.   Clear amniotic fluid  Normal placenta with 3-vessel cord inserted  centrally    Arterial and Venous Gases:  Umbilical Cord Venous Blood Gas:  Results from last 7 days   Lab Units 24  0140   PH COV  7.248   PCO2 COV mm HG 45.0*   HCO3 COV mmol/L 19.2   BASE EXC COV mmol/L -7.9*   O2 CT CD VB mL/dL 9.5   O2 HGB, VENOUS CORD % 46.0     Umbilical Cord Arterial Blood Gas:        Complications:  None; patient tolerated the procedure well.           Disposition: Critical Care Unit, hemodynamically stable, and extubated and stable           Condition: stable    Brief Labor Course:   Margoth is a 34 y.o.  at 37w6d presenting with decreased fetal movement at term in the setting of a complicated obstetric history significant for a stillbirth at 28w with multiple anomalies/trisomy 13, and 22w5d delivery s/p PE indicated cerclage. Her induction was started with pitocin as she was 2cm dilated. She received an epidural and made change to 4cm. Given difficulty with fetal heart tracing due to body habitus, decision was made to AROM once patient was 5/90/-1. FSE was placed. She began having a category II FHT with variable decelerations. Pitocin was decreased from 18 to 10. Patient began to have recurrent variable decelerations. Decision was made to place IUPC and start amnioinfusion. On SVE, a cord prolapse was noted. Nursing, anesthesia, Dr. Juan (SOD) and Dr. Willis were notified and decision made to proceed for a stat 1LTCS for cord prolapse.    Procedure Details   The patient was seen prior to the procedure. Risks, benefits, possible complications, alternate treatment options, and expected outcomes were discussed with the patient.  The patient agreed with the proposed plan and gave informed consent for a 1LTCS.      The patient was taken to the OB Operating Room where she received a bolus of her epidural anesthesia. For infection prophylaxis, she received 2g Ancef preoperatively. Fetal heart tones in the OR were assessed and noted to be in the 80s. Cantrell catheter was already in  place. The abdomen was prepped with Betadine and the vagina was not prepped, and the patient was draped in the usual sterile manner. An abbreviated Time Out was held. The patient was identified as Margoth Cook and the procedure verified as a  Delivery for cord prolapse.    A Pfannenstiel incision was made and carried down through the underlying subcutaneous tissue to the fascia using a scalpel. The rectus fascia was then nicked in the midline and dissected bluntly using Alfonso-Smith technique. The rectus muscles were  and the peritoneum was identified, entered, and extended longitudinally with blunt dissection. The bladder blade was inserted. A low transverse uterine incision was made with the scalpel and extended laterally with blunt dissection. The amnion was entered bluntly.      The fetal head was palpated, elevated, and delivered through the uterine incision followed by the body. There was noted to be poor tone. The umbilical cord was immediately doubly clamped and cut.  There was no apparent injury to the . The infant was handed off to the  providers.  Arterial and venous cord gases, cord blood, and a segment of umbilical cord were obtained for evaluation.  The placenta delivered spontaneously with uterine fundal massage and appeared normal. The uterus was exteriorized and cleaned out with a moist lap sponge.     The uterine incision was closed with a running locked suture of 0 Vicryl. A second layer of the same suture was used to imbricate the first.  Hemostasis was noted to be excellent. The posterior culdesac was cleared of all clots and debris with moist lap sponges. The uterus was returned to the abdomen. The paracolic gutters were inspected and cleared of all clots and debris with moist lap sponges. Additional bleeding was noted. Uterus was exteriorized and layers were systematically evaluated and left rectus muscle bleeding was noted. A figure of eight was placed at  the site and hemostasis was achieved. Surgifoam was placed over the muscle. The fascia was closed with a running suture of 0 Vicryl. Subcutaneous adipose tissue was closed with a running suture of 2-0 Plain gut. The skin was closed with a subcuticular running suture of 4-0 Monocryl. Mepilex was applied.      The patient appeared to tolerate the procedure very well.  Lap sponge, needle, and instrument counts were correct x2.  The patient was transferred to her postpartum recovery room in stable condition and her infant went to the  nursery.    Attending Attestation: Dr. Natasha Willis MD was present for the entire procedure.    Nan Prater MD  OB/GYN PGY-4  9/3/2024  2:50 AM

## 2024-09-03 NOTE — PROGRESS NOTES
"Progress Note - OB/GYN  Post-Partum Physician Note   Margoth Cook 34 y.o. female MRN: [id number]  Unit/Bed#: ICU 02 Encounter: [id number]    Subjective/Objective   Chief Complaint: Postpartum    Subjective: Pain controlled on current regimen, comfortable breathing on 2 L O2 NC Eating a regular diet without difficulty. Flatus Yes . Bowel movements are  not yet . Voiding araya in place.   Ambulating No Breastfeeding Yes plannning to pump/nurse Lochia Lochia  Lochia Color: Rubra  Amount: Minimal  Lochia Odor: None  Clots: None normal     Objective:    Vitals: Blood pressure 98/53, pulse 83, temperature 98.3 °F (36.8 °C), temperature source Oral, resp. rate 15, height 4' 7\" (1.397 m), weight 112 kg (247 lb 9.2 oz), last menstrual period 12/13/2023, SpO2 97%, currently breastfeeding.      Intake/Output Summary (Last 24 hours) at 9/3/2024 1022  Last data filed at 9/3/2024 0940  Gross per 24 hour   Intake 6508.37 ml   Output 1370 ml   Net 5138.37 ml       Physical Exam:  GEN: Margoth Cook appears well, alert and oriented x 3, pleasant and cooperative    ABDOMEN: normal bowel sounds, soft, no tenderness, no distention  : Uterus firm at umbilicus nontender incision dressing clean dry intact  EXTREMITIES: peripheral pulses normal; no clubbing, or cyanosis.  Mild edema    Labs:   Recent Results (from the past 24 hour(s))   CORD, Blood gas, venous    Collection Time: 09/03/24  1:40 AM   Result Value Ref Range    pH, Cord Mauro 7.248 7.190 - 7.490    pCO2, Cord Mauro 45.0 (H) 27.0 - 43.0 mm HG    pO2, Cord Mauro 20.5 15.0 - 45.0 mm HG    HCO3, Cord Mauro 19.2 12.2 - 28.6 mmol/L    Base Exc, Cord Mauro -7.9 (L) 1.0 - 9.0 mmol/L    O2 Cont, Cord Mauro 9.5 mL/dL    O2 HGB,VENOUS CORD 46.0 %   Blood gas, venous    Collection Time: 09/03/24  7:45 AM   Result Value Ref Range    pH, Mauro 7.346 7.300 - 7.400    pCO2, Mauro 33.0 (L) 42.0 - 50.0 mm Hg    pO2, Mauro 53.7 (H) 35.0 - 45.0 mm Hg    HCO3, Mauro 17.6 (L) 24 - 30 mmol/L    Base " Excess, Mauro -7.2 mmol/L    O2 Content, Mauro 12.2 ml/dL    O2 HGB, VENOUS 86.3 (H) 60.0 - 80.0 %   CBC and differential    Collection Time: 09/03/24  7:45 AM   Result Value Ref Range    WBC 7.91 4.31 - 10.16 Thousand/uL    RBC 3.08 (L) 3.81 - 5.12 Million/uL    Hemoglobin 8.8 (L) 11.5 - 15.4 g/dL    Hematocrit 27.5 (L) 34.8 - 46.1 %    MCV 89 82 - 98 fL    MCH 28.6 26.8 - 34.3 pg    MCHC 32.0 31.4 - 37.4 g/dL    RDW 14.6 11.6 - 15.1 %    MPV 9.3 8.9 - 12.7 fL    Platelets 195 149 - 390 Thousands/uL   Lactic acid, plasma (w/reflex if result > 2.0)    Collection Time: 09/03/24  7:45 AM   Result Value Ref Range    LACTIC ACID 0.9 0.5 - 2.0 mmol/L   Magnesium    Collection Time: 09/03/24  7:45 AM   Result Value Ref Range    Magnesium 1.4 (L) 1.9 - 2.7 mg/dL   Phosphorus    Collection Time: 09/03/24  7:45 AM   Result Value Ref Range    Phosphorus 4.3 2.7 - 4.5 mg/dL   Basic metabolic panel    Collection Time: 09/03/24  7:45 AM   Result Value Ref Range    Sodium 132 (L) 135 - 147 mmol/L    Potassium 3.7 3.5 - 5.3 mmol/L    Chloride 105 96 - 108 mmol/L    CO2 20 (L) 21 - 32 mmol/L    ANION GAP 7 4 - 13 mmol/L    BUN 7 5 - 25 mg/dL    Creatinine 0.48 (L) 0.60 - 1.30 mg/dL    Glucose 123 65 - 140 mg/dL    Calcium 8.1 (L) 8.4 - 10.2 mg/dL    eGFR 129 ml/min/1.73sq m   Manual Differential(PHLEBS Do Not Order)    Collection Time: 09/03/24  7:45 AM   Result Value Ref Range    Segmented % 87 (H) 43 - 75 %    Bands % 6 0 - 8 %    Lymphocytes % 2 (L) 14 - 44 %    Monocytes % 5 4 - 12 %    Eosinophils % 0 0 - 6 %    Basophils % 0 0 - 1 %    Absolute Neutrophils 7.36 1.85 - 7.62 Thousand/uL    Absolute Lymphocytes 0.16 (L) 0.60 - 4.47 Thousand/uL    Absolute Monocytes 0.40 0.00 - 1.22 Thousand/uL    Absolute Eosinophils 0.00 0.00 - 0.40 Thousand/uL    Absolute Basophils 0.00 0.00 - 0.10 Thousand/uL    Total Counted      RBC Morphology Normal     Platelet Estimate Adequate Adequate       MEDS:   Current Facility-Administered Medications    Medication Dose Route Frequency    acetaminophen (TYLENOL) tablet 975 mg  975 mg Oral Q8H [facility]    calcium carbonate (TUMS) chewable tablet 1,000 mg  1,000 mg Oral Daily PRN    cephalexin (KEFLEX) capsule 500 mg  500 mg Oral Q8H [facility]    chlorhexidine (PERIDEX) 0.12 % oral rinse 15 mL  15 mL Mouth/Throat Q12H DELPHINE    diphenhydrAMINE (BENADRYL) tablet 25 mg  25 mg Oral Q6H PRN    docusate sodium (COLACE) capsule 100 mg  100 mg Oral BID    enoxaparin (LOVENOX) subcutaneous injection 60 mg  60 mg Subcutaneous Q12H    HYDROmorphone (DILAUDID) injection 0.5 mg  0.5 mg Intravenous Q4H PRN    ketorolac (TORADOL) injection 30 mg  30 mg Intravenous Q6H [facility]    Followed by    [START ON 9/4/2024] ibuprofen (MOTRIN) tablet 600 mg  600 mg Oral Q6H    insulin lispro (HumALOG/ADMELOG) 100 units/mL subcutaneous injection 2-12 Units  2-12 Units Subcutaneous TID AC    lactated ringers infusion  125 mL/hr Intravenous Continuous    lamoTRIgine (LaMICtal) tablet 225 mg  225 mg Oral BID    levETIRAcetam (KEPPRA) tablet 500 mg  500 mg Oral Daily    levothyroxine tablet 25 mcg  25 mcg Oral Early Morning    metroNIDAZOLE (FLAGYL) tablet 500 mg  500 mg Oral Q8H DELPHINE    naloxone (NARCAN) 0.04 mg/mL syringe 0.04 mg  0.04 mg Intravenous Q3 min PRN    ondansetron (ZOFRAN) injection 4 mg  4 mg Intravenous Q8H PRN    oxyCODONE (ROXICODONE) immediate release tablet 10 mg  10 mg Oral Q4H PRN    oxyCODONE (ROXICODONE) IR tablet 5 mg  5 mg Oral Q4H PRN    pantoprazole (PROTONIX) EC tablet 40 mg  40 mg Oral Early Morning    phenol (CHLORASEPTIC) 1.4 % mucosal liquid 1 spray  1 spray Mouth/Throat Q2H PRN    Rho(D) immune globulin (RHOGAM ULTRA-FILTERED PLUS) IM injection 300 mcg  300 mcg Intramuscular Once    simethicone (MYLICON) chewable tablet 80 mg  80 mg Oral 4x Daily PRN     Invasive Devices       Peripheral Intravenous Line  Duration             Peripheral IV 09/02/24 Left;Upper;Ventral (anterior) Arm 1 day    Peripheral IV 09/02/24 Dorsal  (posterior);Right Hand <1 day              Drain  Duration             Urethral Catheter Straight-tip 16 Fr. <1 day                      Assessment & Plan     Assessment:  Patient Active Problem List   Diagnosis    Depression    Nonintractable epilepsy without status epilepticus (HCC)    History of irregular menstrual bleeding    PCOS (polycystic ovarian syndrome)    Spain's esophagus    Gastric ulcer    GARIMA (obstructive sleep apnea)    Autism    Bipolar depression (HCC)    Morbid obesity (HCC)    Type 2 diabetes mellitus with obesity  (HCC)    Gastroesophageal reflux disease    Excessive daytime sleepiness    Iron deficiency anemia secondary to inadequate dietary iron intake    Dysphagia    Hypothyroid in pregnancy, antepartum    Chronic constipation    Nausea/vomiting in pregnancy    History of stillbirth in currently pregnant patient, unspecified trimester    Maternal morbid obesity, antepartum (HCC)    Short stature    Family history of congenital heart defect    Previous child with congenital anomaly, currently pregnant, antepartum    Rh negative state in antepartum period    Request for sterilization    Vision problem    Spotting    37 weeks gestation of pregnancy    Asthma during pregnancy    H/O Cervical cerclage suture present in third trimester    COVID-19    Positive GBS test    S/P emergency  section    History of  delivery, currently pregnant    Difficult airway for intubation    Acute post-operative pain    Hypothyroidism       Plan:  1) Postpartum day #  0 status post urgent c/s for cord prolapse, difficult intubation for discomfort, epidural had come out.  2) Continue routine postpartum care.  3) Encourage ambulation  4) Advance diet as tolerated  5) can be ready for return to PP floor with isolation precautions,  6) consider treatment paxlovid for covid, defer to primary icu team  7) regimen adjust by pain team    Ping Sims MD  9/3/2024  10:22 AM

## 2024-09-04 PROBLEM — E11.9 TYPE 2 DIABETES MELLITUS WITHOUT COMPLICATION, WITHOUT LONG-TERM CURRENT USE OF INSULIN (HCC): Status: ACTIVE | Noted: 2024-09-04

## 2024-09-04 LAB
ABO GROUP BLD: NORMAL
ALBUMIN SERPL BCG-MCNC: 2.5 G/DL (ref 3.5–5)
ALP SERPL-CCNC: 92 U/L (ref 34–104)
ALT SERPL W P-5'-P-CCNC: 14 U/L (ref 7–52)
ANION GAP SERPL CALCULATED.3IONS-SCNC: 5 MMOL/L (ref 4–13)
AST SERPL W P-5'-P-CCNC: 32 U/L (ref 13–39)
BASOPHILS # BLD AUTO: 0.04 THOUSANDS/ÂΜL (ref 0–0.1)
BASOPHILS NFR BLD AUTO: 1 % (ref 0–1)
BILIRUB SERPL-MCNC: 0.12 MG/DL (ref 0.2–1)
BLD GP AB SCN SERPL QL: NEGATIVE
BUN SERPL-MCNC: 8 MG/DL (ref 5–25)
CALCIUM ALBUM COR SERPL-MCNC: 8.9 MG/DL (ref 8.3–10.1)
CALCIUM SERPL-MCNC: 7.7 MG/DL (ref 8.4–10.2)
CHLORIDE SERPL-SCNC: 108 MMOL/L (ref 96–108)
CO2 SERPL-SCNC: 24 MMOL/L (ref 21–32)
CREAT SERPL-MCNC: 0.51 MG/DL (ref 0.6–1.3)
EOSINOPHIL # BLD AUTO: 0.01 THOUSAND/ÂΜL (ref 0–0.61)
EOSINOPHIL NFR BLD AUTO: 0 % (ref 0–6)
ERYTHROCYTE [DISTWIDTH] IN BLOOD BY AUTOMATED COUNT: 14.7 % (ref 11.6–15.1)
FETAL CELL SCN BLD QL ROSETTE: NEGATIVE
GFR SERPL CREATININE-BSD FRML MDRD: 126 ML/MIN/1.73SQ M
GLUCOSE SERPL-MCNC: 99 MG/DL (ref 65–140)
HCT VFR BLD AUTO: 26 % (ref 34.8–46.1)
HGB BLD-MCNC: 8.5 G/DL (ref 11.5–15.4)
IMM GRANULOCYTES # BLD AUTO: 0.05 THOUSAND/UL (ref 0–0.2)
IMM GRANULOCYTES NFR BLD AUTO: 1 % (ref 0–2)
LYMPHOCYTES # BLD AUTO: 1.14 THOUSANDS/ÂΜL (ref 0.6–4.47)
LYMPHOCYTES NFR BLD AUTO: 16 % (ref 14–44)
MAGNESIUM SERPL-MCNC: 1.7 MG/DL (ref 1.9–2.7)
MCH RBC QN AUTO: 29.2 PG (ref 26.8–34.3)
MCHC RBC AUTO-ENTMCNC: 32.7 G/DL (ref 31.4–37.4)
MCV RBC AUTO: 89 FL (ref 82–98)
MONOCYTES # BLD AUTO: 0.38 THOUSAND/ÂΜL (ref 0.17–1.22)
MONOCYTES NFR BLD AUTO: 5 % (ref 4–12)
NEUTROPHILS # BLD AUTO: 5.39 THOUSANDS/ÂΜL (ref 1.85–7.62)
NEUTS SEG NFR BLD AUTO: 77 % (ref 43–75)
NRBC BLD AUTO-RTO: 0 /100 WBCS
PHOSPHATE SERPL-MCNC: 3.4 MG/DL (ref 2.7–4.5)
PLATELET # BLD AUTO: 202 THOUSANDS/UL (ref 149–390)
PMV BLD AUTO: 9.3 FL (ref 8.9–12.7)
POTASSIUM SERPL-SCNC: 3.3 MMOL/L (ref 3.5–5.3)
PROT SERPL-MCNC: 5 G/DL (ref 6.4–8.4)
RBC # BLD AUTO: 2.91 MILLION/UL (ref 3.81–5.12)
RH BLD: NEGATIVE
SODIUM SERPL-SCNC: 137 MMOL/L (ref 135–147)
WBC # BLD AUTO: 7.01 THOUSAND/UL (ref 4.31–10.16)

## 2024-09-04 PROCEDURE — 86900 BLOOD TYPING SEROLOGIC ABO: CPT

## 2024-09-04 PROCEDURE — 85461 HEMOGLOBIN FETAL: CPT

## 2024-09-04 PROCEDURE — 84100 ASSAY OF PHOSPHORUS: CPT

## 2024-09-04 PROCEDURE — 99024 POSTOP FOLLOW-UP VISIT: CPT | Performed by: OBSTETRICS & GYNECOLOGY

## 2024-09-04 PROCEDURE — 80053 COMPREHEN METABOLIC PANEL: CPT

## 2024-09-04 PROCEDURE — 85025 COMPLETE CBC W/AUTO DIFF WBC: CPT

## 2024-09-04 PROCEDURE — 83735 ASSAY OF MAGNESIUM: CPT

## 2024-09-04 PROCEDURE — 86850 RBC ANTIBODY SCREEN: CPT

## 2024-09-04 PROCEDURE — 86901 BLOOD TYPING SEROLOGIC RH(D): CPT

## 2024-09-04 PROCEDURE — 80175 DRUG SCREEN QUAN LAMOTRIGINE: CPT

## 2024-09-04 RX ADMIN — OXYCODONE HYDROCHLORIDE 5 MG: 5 TABLET ORAL at 03:14

## 2024-09-04 RX ADMIN — DOCUSATE SODIUM 100 MG: 100 CAPSULE, LIQUID FILLED ORAL at 09:11

## 2024-09-04 RX ADMIN — METRONIDAZOLE 500 MG: 500 TABLET ORAL at 20:13

## 2024-09-04 RX ADMIN — LEVETIRACETAM 500 MG: 500 TABLET, FILM COATED ORAL at 18:08

## 2024-09-04 RX ADMIN — IBUPROFEN 600 MG: 600 TABLET ORAL at 23:12

## 2024-09-04 RX ADMIN — LEVETIRACETAM 500 MG: 500 TABLET, FILM COATED ORAL at 06:00

## 2024-09-04 RX ADMIN — IBUPROFEN 600 MG: 600 TABLET ORAL at 06:00

## 2024-09-04 RX ADMIN — CEPHALEXIN 500 MG: 500 CAPSULE ORAL at 14:16

## 2024-09-04 RX ADMIN — DOCUSATE SODIUM 100 MG: 100 CAPSULE, LIQUID FILLED ORAL at 18:08

## 2024-09-04 RX ADMIN — LAMOTRIGINE 225 MG: 100 TABLET ORAL at 20:13

## 2024-09-04 RX ADMIN — ACETAMINOPHEN 975 MG: 325 TABLET ORAL at 06:00

## 2024-09-04 RX ADMIN — CEPHALEXIN 500 MG: 500 CAPSULE ORAL at 06:00

## 2024-09-04 RX ADMIN — CEPHALEXIN 500 MG: 500 CAPSULE ORAL at 23:12

## 2024-09-04 RX ADMIN — IBUPROFEN 600 MG: 600 TABLET ORAL at 18:07

## 2024-09-04 RX ADMIN — CHLORHEXIDINE GLUCONATE 15 ML: 1.2 RINSE ORAL at 20:14

## 2024-09-04 RX ADMIN — LAMOTRIGINE 225 MG: 100 TABLET ORAL at 09:35

## 2024-09-04 RX ADMIN — IBUPROFEN 600 MG: 600 TABLET ORAL at 12:04

## 2024-09-04 RX ADMIN — PHENOL 1 SPRAY: 1.4 SPRAY ORAL at 03:14

## 2024-09-04 RX ADMIN — CHLORHEXIDINE GLUCONATE 15 ML: 1.2 RINSE ORAL at 09:03

## 2024-09-04 RX ADMIN — ACETAMINOPHEN 975 MG: 325 TABLET ORAL at 14:16

## 2024-09-04 RX ADMIN — ENOXAPARIN SODIUM 60 MG: 60 INJECTION SUBCUTANEOUS at 06:00

## 2024-09-04 RX ADMIN — ENOXAPARIN SODIUM 60 MG: 60 INJECTION SUBCUTANEOUS at 18:08

## 2024-09-04 RX ADMIN — PANTOPRAZOLE SODIUM 40 MG: 40 TABLET, DELAYED RELEASE ORAL at 06:00

## 2024-09-04 RX ADMIN — METRONIDAZOLE 500 MG: 500 TABLET ORAL at 02:01

## 2024-09-04 RX ADMIN — METRONIDAZOLE 500 MG: 500 TABLET ORAL at 12:04

## 2024-09-04 RX ADMIN — HYDROMORPHONE HYDROCHLORIDE 0.5 MG: 1 INJECTION, SOLUTION INTRAMUSCULAR; INTRAVENOUS; SUBCUTANEOUS at 01:55

## 2024-09-04 RX ADMIN — HYDROMORPHONE HYDROCHLORIDE 0.5 MG: 1 INJECTION, SOLUTION INTRAMUSCULAR; INTRAVENOUS; SUBCUTANEOUS at 08:58

## 2024-09-04 RX ADMIN — LEVOTHYROXINE SODIUM 25 MCG: 25 TABLET ORAL at 06:00

## 2024-09-04 RX ADMIN — HUMAN RHO(D) IMMUNE GLOBULIN 300 MCG: 300 INJECTION, SOLUTION INTRAMUSCULAR at 12:06

## 2024-09-04 RX ADMIN — ACETAMINOPHEN 975 MG: 325 TABLET ORAL at 23:12
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
## 2024-09-04 NOTE — LACTATION NOTE
CONSULT - LACTATION  Margoth Cook 34 y.o. female MRN: [id number]    [facility] AN L&D Room / Bed:  320/ 320-01 Encounter: [id number]    Maternal Information     MOTHER:  N/A  Maternal Age: This patient's mother is not on file.  OB History: This patient's mother is not on file.  Previouse breast reduction surgery? No    Lactation history:   Has patient previously breast fed: No   How long had patient previously breast fed:     Previous breast feeding complications:     This patient's mother is not on file.    Birth information:  YOB: 1990   Time of birth:     Sex: female   Delivery type:     Birth Weight: No birth weight on file.   Percent of Weight Change: Birth weight not on file     Gestational Age: <None>   [unfilled]    Assessment     Breast and nipple assessment:  extra large breasts with dark areola and nipples that prasad with stimulation      Feeding recommendations:  pump every 2-3 hours and supplement with expressed colostrum and DBM via syringe and bottle and nipple. Mom is COVID + and baby is in NICU for COVID. Mom's first baby is in [facility] due to prematurity. Mom received a zomee pump with first baby and is not sure if she is eligible to receive another pump.     Sent to  for ins. Review.    Mom was set up with multi-user pump by night staff. Mom expressed 7 mls of colostrum this am.     Reviewed NICU pumping log.     Reviewed RSB/DC    Enc. To call lactation if baby returns from NICU. Review of how to est. Milk supply.     Enc. To call lactation.    Discharge feeding plan for NICU Pumping     Set alarms to pump every 2 hrs during the day and every 3 hrs at night  Use massage, warmth, & hand expression to stimulate glandular tissue prior to pumping.  May use nipple cream/butter/oil where tunnel and funnel meet on flange to assist movement of breast tissue inside the flange  Use breast compressions, hands on pumping techniques to assist in  expressing milk    Cycle pumping - Begin the pump in stimulation mode. Once milk is visible in the tunnel of the flange, change pump setting to expression mode. Once milk is NOT seen in the tunnel, cycle back to stimulation mode.Continue cycle pumping until end of feeding.    Pump both breasts simultaneously  Use all 5 senses when pumping to increase milk transfer.  Store expressed milk or feed expressed milk via syringe, NG tube or paced bottle feeding method.   Continue to hand express between feeds to stimulate the breasts frequently    Review Milkmob on youtube or scan QR code for MilkMob video  When with baby, place baby skin to skin. Attempt to latch when medically cleared.         Milk Mob     Education of breastfeeding with large breasts. Demonstration and teach back of positioning and alignment. Use pillows, tables, rolled towels/blankets to lift breast. Lift baby up to breast level. Education on hand expression prior to latch, positioning of hand to compress the breast, and positioning and alignment of baby for deep latch with large breasts.     Education on s2s for feedings. Encouraged hand expression prior to latch. Education on baby's body alignment; belly to belly with mom; ear, shoulder hip alignment, long neck, chin / cheek touching cheek. Reviewed how baby can breathe at the breast. Tugging sensation, no pinching/pain.      (Scan QR code for [employer] - positions)       [employer] - positions      Information on hand expression given. Discussed benefits of knowing how to manually express breast including stimulating milk supply, softening nipple for latch and evacuating breast in the event of engorgement.    Mom is encouraged to place baby skin to skin for feedings. Skin to skin education provided for baby placement on mother's chest, baby only in diaper, blankets below shoulders on baby's back. Skin to skin is encouraged to continue at home for feedings and  between feedings.    Worked on positioning infant up at chest level and starting to feed infant with nose arriving at the nipple. Then, using areolar compression to achieve a deep latch that is comfortable and exchanges optimum amounts of milk.     - Start feedings on breast that last feeding ended   - allow no more than 3 hours between breast feeding sessions   - time between feedings is counted from the beginning of the first feed to the beginning of the next feeding session    Reviewed early signs of hunger, including tensing of hands and shoulders - no need to wait for open eyes.  Crying is a late hunger sign.  If baby is crying, soothe baby first and then attempt to latch.  Reviewed normal sucking patterns: transition from stimulation to nutritive to release or non-nutritive. The goal is to see and hear lots of swallowing.    Reviewed normal nursing pattern: infant could latch on one breast up to 30 minutes or until releases on own. Signs of satiation is open hand with fingers that do not grab your finger.  Discussed difference in sensation of non-nutritive v nutritive sucking    Met with mother. Provided mother with Ready, Set, Baby booklet.    Discussed Skin to Skin contact an benefits to mom and baby.  Talked about the delay of the first bath until baby has adjusted. Spoke about the benefits of rooming in. Feeding on cue and what that means for recognizing infant's hunger. Avoidance of pacifiers for the first month discussed. Talked about exclusive breastfeeding for the first 6 months.    Positioning and latch reviewed as well as showing images of other feeding positions.  Discussed the properties of a good latch in any position. Reviewed hand/manual expression.  Discussed s/s that baby is getting enough milk and some s/s that breastfeeding dyad may need further help.    Gave information on common concerns, what to expect the first few weeks after delivery, preparing for other caregivers, and how partners  can help. Resources for support also provided.    Encouraged parents to call for assistance, questions, and concerns about breastfeeding.  Extension provided.    Provided education on growth spurts, when to introduce bottles; paced bottle feeding, and non-nutritive suck at the breast. Provided education on Signs of satiation. Encouraged to call lactation to observe a latch prior to discharge for reassurance. Encouraged to call baby and me with any questions and closely monitor output.      Christy Hume, MA 9/4/2024 9:28 AM

## 2024-09-04 NOTE — LACTATION NOTE
This note was copied from a baby's chart.  Follow up Lactation: Romulo is back from the NICU and mom wants to latch baby to the breast. Mom desires to excl. Breastfeed as long as she can and then feed expressed milk from a bottle.    Demonstration and teach back of hand expression. Use of hand pump to elongate the Left nipple.     [place] Latch After Lactation Education/Consult:  Efficiency: Football on the left              Lips Flanged: Yes, better with each latch attempt              Depth of latch: deeper with each latch attempt              Audible Swallow: Yes              Visible Milk: Yes, with HE              Wide Open/ Asymmetrical: Yes, wider with demonstration and teach back of alignment              Suck Swallow Cycle: Breathing: yes, Coordinated: yes  Nipple Assessment after latch: Normal: elongated/eraser, no discoloration and no damage noted.  Latch Problems: none    Position After Lactation Education/Consult:  Infant's Ergonomics/Body               Body Alignment: Yes, with demonstration               Head Supported: Yes, enc. Snug hold. Mom does loose hold and has to repeat latch.                Close to Mom's body/ Lifted/ Supported: Yes               Mom's Ergonomics/Body: Yes, pillows in lower lumbar support                           Supported: Yes                           Sitting Back: Yes                           Brings Baby to her breast: Yes, with demonstration and teach back  Positioning Problems: alignment of nipple to nose, enc. Chin deep into the breast tissue. Due to large breasts, enc. Pillows under the breast to lift the breast and the baby. Enc. Hand pump prior to latching to elongate the short shank nipples. Brought baby s2s. Demonstration alignment and how to achieve a deep latch. Enc. U shape hold of the breast and snug hold of baby. Active, coordinated sucks. Once baby takes a breathing break, Romulo demonstrates ability to begin active sucking once more. Enc. Breast compressions  and relatching as needed.     Ed., on timing of feeds and signs of satiation.     Feeding Plan:     Education of breastfeeding with large breasts. Demonstration and teach back of positioning and alignment. Use pillows, tables, rolled towels/blankets to lift breast. Lift baby up to breast level. Education on hand expression prior to latch, positioning of hand to compress the breast, and positioning and alignment of baby for deep latch with large breasts.     Mom's nipple everts with stimulation. With nipple compression, short shank noted. Education on ways to elongate the nipple: Hand expression, Latch assist, breast shells, supple cups, manual and electric pump stimulation.     Discharge feeding plan    1. Meet early feeding cues  2. Use massage, warmth, hand expression to stimulate breasts  3. Bring baby to breast skin to skin  4. Align nipple to nose, place baby's chin behind the areola, (move baby not breast) and bring baby to breast when mouth is wide and deep latch is achieved. (Scan QR code for [employer] - positions)  5. Use breast compressions to stimulate suck  6. Allow baby to stay on the breast for up to 30 min.   7. Look for signs of satiation  8. Once baby unlatches, use burping techniques  9. Use massage, warmth, hand expression on the second breast  10. Bring baby to the 2nd breast to finish the feeding  11. Follow Step #4 to achieve a deep latch and alignment  12. You may offer each breast up to two times in a breastfeeding session    (Scan QR code for [employer] - positions)     [employer] - positions      If baby does not meet diaper output  1. If baby does not suck with stimulation, becomes fussy, or un-latches, use breast pump to express milk.   2.  Feed expressed milk via paced bottle feeding method. Review Milkmob on [employer] or scan QR code for MilkMob video  3. Bring baby back to opposite breast for non-nutritive suck and skin to skin  4. Pump  after each feed to stimulate breasts and have expressed milk for next feed       Milk Mob

## 2024-09-04 NOTE — ASSESSMENT & PLAN NOTE
S/p outpatient iron supplementation  Lab Results   Component Value Date/Time    HGB 8.5 (L) 09/04/2024 06:13 AM    HGB 8.3 (L) 09/03/2024 01:29 PM    HGB 8.8 (L) 09/03/2024 07:45 AM    HGB 10.6 (L) 09/02/2024 07:46 AM   S/p venofer yesterday  Asymptomatic

## 2024-09-04 NOTE — PLAN OF CARE

## 2024-09-04 NOTE — ASSESSMENT & PLAN NOTE
Lab Results   Component Value Date    HGBA1C 5.7 (H) 09/02/2024   SSI discontinued    Recent Labs     09/03/24  1057 09/03/24  1844   POCGLU 160* 106       Blood Sugar Average: Last 72 hrs:  (P) 133

## 2024-09-04 NOTE — DISCHARGE INSTR - ACTIVITY
Discharge feeding plan for NICU Pumping     Set alarms to pump every 2 hrs during the day and every 3 hrs at night  Use massage, warmth, & hand expression to stimulate glandular tissue prior to pumping.  May use nipple cream/butter/oil where tunnel and funnel meet on flange to assist movement of breast tissue inside the flange  Use breast compressions, hands on pumping techniques to assist in expressing milk    Cycle pumping - Begin the pump in stimulation mode. Once milk is visible in the tunnel of the flange, change pump setting to expression mode. Once milk is NOT seen in the tunnel, cycle back to stimulation mode.Continue cycle pumping until end of feeding.    Pump both breasts simultaneously  Use all 5 senses when pumping to increase milk transfer.  Store expressed milk or feed expressed milk via syringe, NG tube or paced bottle feeding method.   Continue to hand express between feeds to stimulate the breasts frequently    Review Milkmob on youtube or scan QR code for MilkMob video  When with baby, place baby skin to skin. Attempt to latch when medically cleared.         Milk Mob     Education of breastfeeding with large breasts. Demonstration and teach back of positioning and alignment. Use pillows, tables, rolled towels/blankets to lift breast. Lift baby up to breast level. Education on hand expression prior to latch, positioning of hand to compress the breast, and positioning and alignment of baby for deep latch with large breasts.     Education on s2s for feedings. Encouraged hand expression prior to latch. Education on baby's body alignment; belly to belly with mom; ear, shoulder hip alignment, long neck, chin / cheek touching cheek. Reviewed how baby can breathe at the breast. Tugging sensation, no pinching/pain.      (Scan QR code for [employer] - positions)       [employer] - positions

## 2024-09-04 NOTE — PLAN OF CARE

## 2024-09-04 NOTE — PROGRESS NOTES
"  Progress Note - OB/GYN   Margoth Cook 34 y.o. female MRN: [id number]  Unit/Bed#: -01 Encounter: [id number]    Assessment:  Post partum Day #1 s/p STAT 1LTCS due to cord prolapse    Plan:  Type 2 diabetes mellitus without complication, without long-term current use of insulin (HCC)  Assessment & Plan  Lab Results   Component Value Date    HGBA1C 5.7 (H) 09/02/2024   SSI discontinued    Recent Labs     09/03/24  1057 09/03/24  1844   POCGLU 160* 106       Blood Sugar Average: Last 72 hrs:  (P) 133      Hypothyroidism  Assessment & Plan  Continue Levothyroxine    Acute post-operative pain  Assessment & Plan  S/p dPCA, APS consulted  Now on PO pain medications with improvement in pain control    Positive GBS test  Assessment & Plan  Penicillin intrapartum       COVID-19  Assessment & Plan  +COVID test in triage  S/p difficult intubation for STAT C/S and non-working epidural  S/p ICU admission  Per ICU team, patient is not a candidate for Paxlovid.   Patient's 1 year old, Jay, currently admitted at [facility] with COVID - weaned from O2 but now needs to tolerate feeds  Baby now in room with mom (10 feet away unless masked)  Isolation precautions.     H/O Cervical cerclage suture present in third trimester  Assessment & Plan  History indicated cerclage placed at [place]  Cerclage removed on 08/202/24    Rh negative state in antepartum period  Assessment & Plan  Rhogam ordered    History of stillbirth in currently pregnant patient, unspecified trimester  Assessment & Plan  Late to care in G1 pregnancy - 28w demise due to \"fluid around the heart/trisomy\"       Iron deficiency anemia secondary to inadequate dietary iron intake  Assessment & Plan  S/p outpatient iron supplementation  Lab Results   Component Value Date/Time    HGB 8.5 (L) 09/04/2024 06:13 AM    HGB 8.3 (L) 09/03/2024 01:29 PM    HGB 8.8 (L) 09/03/2024 07:45 AM    HGB 10.6 (L) 09/02/2024 07:46 AM   S/p venofer yesterday  Asymptomatic " "      Nonintractable epilepsy without status epilepticus (HCC)  Assessment & Plan  Continue home keppra and lamictal    Depression  Assessment & Plan  Not currently on medications  Previously on Zoloft  High risk for PPD    * S/P emergency  section  Assessment & Plan  - Continue routine post partum care  - Encourage ambulation  - Encourage breastfeeding  - QBL: 595cc; Hgb 8.5 s/p venofer yesterday  - Surgifoam on abdominal muscles  - s/p TXA  - Pain improved with PO pain medication  - s/p VT  - Flagyl and Keflex x48hrs for SSI PPx             Subjective/Objective   Chief Complaint:     Post delivery. Patient is doing well. Lochia WNL. Pain well controlled.     Subjective:   This morning, she has no complaints.     Pain: yes, improved with PO meds today  Tolerating PO: yes  Voiding: yes  Flatus: Yes  BM: no  Ambulating: yes  Breastfeeding:  yes  Chest pain: no  Shortness of breath: no  Leg pain: no  Lochia: minimal    Objective:     Vitals: /65 (BP Location: Right arm)   Pulse 100   Temp 97.7 °F (36.5 °C) (Oral)   Resp 18   Ht 4' 7\" (1.397 m)   Wt 112 kg (247 lb 9.2 oz)   LMP 2023   SpO2 100%   Breastfeeding Yes   BMI 57.54 kg/m²       Intake/Output Summary (Last 24 hours) at 2024 1346  Last data filed at 9/3/2024 2018  Gross per 24 hour   Intake --   Output 600 ml   Net -600 ml       Lab Results   Component Value Date    WBC 7.01 2024    HGB 8.5 (L) 2024    HCT 26.0 (L) 2024    MCV 89 2024     2024       Physical Exam:   Physical Exam  Vitals reviewed.   Constitutional:       General: She is not in acute distress.     Appearance: She is not ill-appearing, toxic-appearing or diaphoretic.   Cardiovascular:      Rate and Rhythm: Normal rate.   Pulmonary:      Effort: Pulmonary effort is normal. No respiratory distress.   Abdominal:      General: There is no distension.      Palpations: Abdomen is soft. There is no mass.      Tenderness: There is no " abdominal tenderness. There is no guarding or rebound.      Comments: Mepilex dressing place   Skin:     General: Skin is warm and dry.   Neurological:      Mental Status: She is alert and oriented to person, place, and time. Mental status is at baseline.   Psychiatric:         Mood and Affect: Mood normal.         Behavior: Behavior normal.         Thought Content: Thought content normal.         Judgment: Judgment normal.           Lara Tyler MD  9/4/2024  1:46 PM

## 2024-09-05 PROCEDURE — 99024 POSTOP FOLLOW-UP VISIT: CPT | Performed by: STUDENT IN AN ORGANIZED HEALTH CARE EDUCATION/TRAINING PROGRAM

## 2024-09-05 RX ORDER — OXYCODONE HYDROCHLORIDE 5 MG/1
5 TABLET ORAL EVERY 4 HOURS PRN
Qty: 5 TABLET | Refills: 0 | Status: CANCELLED | OUTPATIENT
Start: 2024-09-05 | End: 2024-09-15

## 2024-09-05 RX ORDER — MAGNESIUM SULFATE HEPTAHYDRATE 40 MG/ML
2 INJECTION, SOLUTION INTRAVENOUS ONCE
Status: COMPLETED | OUTPATIENT
Start: 2024-09-05 | End: 2024-09-05

## 2024-09-05 RX ORDER — TRAMADOL HYDROCHLORIDE 50 MG/1
50 TABLET ORAL EVERY 6 HOURS PRN
Status: DISCONTINUED | OUTPATIENT
Start: 2024-09-05 | End: 2024-09-07 | Stop reason: HOSPADM

## 2024-09-05 RX ORDER — TRAMADOL HYDROCHLORIDE 50 MG/1
100 TABLET ORAL EVERY 6 HOURS PRN
Status: DISCONTINUED | OUTPATIENT
Start: 2024-09-05 | End: 2024-09-07 | Stop reason: HOSPADM

## 2024-09-05 RX ADMIN — DOCUSATE SODIUM 100 MG: 100 CAPSULE, LIQUID FILLED ORAL at 17:56

## 2024-09-05 RX ADMIN — ACETAMINOPHEN 975 MG: 325 TABLET ORAL at 06:00

## 2024-09-05 RX ADMIN — ACETAMINOPHEN 975 MG: 325 TABLET ORAL at 15:07

## 2024-09-05 RX ADMIN — LEVOTHYROXINE SODIUM 25 MCG: 25 TABLET ORAL at 06:01

## 2024-09-05 RX ADMIN — ENOXAPARIN SODIUM 60 MG: 60 INJECTION SUBCUTANEOUS at 06:00

## 2024-09-05 RX ADMIN — DOCUSATE SODIUM 100 MG: 100 CAPSULE, LIQUID FILLED ORAL at 12:33

## 2024-09-05 RX ADMIN — CHLORHEXIDINE GLUCONATE 15 ML: 1.2 RINSE ORAL at 08:46

## 2024-09-05 RX ADMIN — MAGNESIUM SULFATE HEPTAHYDRATE 2 G: 40 INJECTION, SOLUTION INTRAVENOUS at 12:34

## 2024-09-05 RX ADMIN — TRAMADOL HYDROCHLORIDE 100 MG: 50 TABLET, COATED ORAL at 20:08

## 2024-09-05 RX ADMIN — LEVETIRACETAM 500 MG: 500 TABLET, FILM COATED ORAL at 17:56

## 2024-09-05 RX ADMIN — PANTOPRAZOLE SODIUM 40 MG: 40 TABLET, DELAYED RELEASE ORAL at 06:01

## 2024-09-05 RX ADMIN — LAMOTRIGINE 225 MG: 100 TABLET ORAL at 08:46

## 2024-09-05 RX ADMIN — IBUPROFEN 600 MG: 600 TABLET ORAL at 12:34

## 2024-09-05 RX ADMIN — LEVETIRACETAM 500 MG: 500 TABLET, FILM COATED ORAL at 06:01

## 2024-09-05 RX ADMIN — LAMOTRIGINE 225 MG: 100 TABLET ORAL at 22:14

## 2024-09-05 RX ADMIN — METRONIDAZOLE 500 MG: 500 TABLET ORAL at 03:56

## 2024-09-05 RX ADMIN — IBUPROFEN 600 MG: 600 TABLET ORAL at 06:00

## 2024-09-05 RX ADMIN — IBUPROFEN 600 MG: 600 TABLET ORAL at 17:56

## 2024-09-05 NOTE — PROGRESS NOTES
"Progress Note - OB/GYN   Margoth Cook 34 y.o. female MRN: [id number]  Unit/Bed#:  320-01 Encounter: [id number]      Assessment/Plan    Margoth Cook is a 34 y.o.  who is PPD 2 s/p LTCS at 37w6d in setting of cord prolapse under general anesthesia    Type 2 diabetes mellitus without complication, without long-term current use of insulin ([facility])  Assessment & Plan  Lab Results   Component Value Date    HGBA1C 5.7 (H) 2024   SSI discontinued    Recent Labs     24  1057 24  1844   POCGLU 160* 106       Blood Sugar Average: Last 72 hrs:  (P) 133      Hypothyroidism  Assessment & Plan  Continue Levothyroxine    Acute post-operative pain  Assessment & Plan  S/p dPCA, APS consulted  Now on PO pain medications with improvement in pain control    Positive GBS test  Assessment & Plan  Penicillin intrapartum       COVID-19  Assessment & Plan  +COVID test in triage  S/p difficult intubation for STAT C/S and non-working epidural  S/p ICU admission  Per ICU team, patient is not a candidate for Paxlovid.   Patient's 1 year old, Jay, currently admitted at [facility] with COVID - weaned from O2 but now needs to tolerate feeds  Baby now in room with mom (10 feet away unless masked)  Isolation precautions.     H/O Cervical cerclage suture present in third trimester  Assessment & Plan  History indicated cerclage placed at [place]  Cerclage removed on     Rh negative state in antepartum period  Assessment & Plan  Rhogam ordered    History of stillbirth in currently pregnant patient, unspecified trimester  Assessment & Plan  Late to care in G1 pregnancy - 28w demise due to \"fluid around the heart/trisomy\"       Iron deficiency anemia secondary to inadequate dietary iron intake  Assessment & Plan  S/p outpatient iron supplementation  Lab Results   Component Value Date/Time    HGB 8.5 (L) 2024 06:13 AM    HGB 8.3 (L) 2024 01:29 PM    HGB 8.8 (L) 2024 07:45 AM    HGB 10.6 (L) " 2024 07:46 AM   S/p venofer yesterday  Asymptomatic       Nonintractable epilepsy without status epilepticus (HCC)  Assessment & Plan  Continue home keppra and lamictal    Depression  Assessment & Plan  Not currently on medications  Previously on Zoloft  High risk for PPD    * S/P emergency  section  Assessment & Plan  - Continue routine post partum care  - Encourage ambulation  - Encourage breastfeeding  - QBL: 595cc; Hgb 8.5 s/p venofer yesterday  - Surgifoam on abdominal muscles  - s/p TXA  - Pain improved with PO pain medication  - s/p VT  - Flagyl and Keflex x48hrs for SSI PPx           Disposition: Anticipate discharge home postpartum Day #2  Barriers to discharge: none      Subjective/Objective     Subjective: Postpartum state, very tearful and reporting being overwhelmed, sad, frustrated, and upset by delivery and requesting discharge as soon as possible    Pain: yes  Tolerating PO: yes  Voiding: yes  Flatus: yes  BM: no  Ambulating: yes  Breastfeeding: Breastfeeding  Chest pain: no  Shortness of breath: no  Leg pain: no  Lochia: minimal    Objective:     Vitals:  Vitals:    24 0940 24 1602 24 2352   BP: 115/65 111/56 98/53 112/58   BP Location: Right arm Right arm Right arm Right arm   Pulse: 100 101 96 97   Resp: 18 18 18 18   Temp: 97.7 °F (36.5 °C) 97.9 °F (36.6 °C) 97.6 °F (36.4 °C) 98.1 °F (36.7 °C)   TempSrc: Oral Oral Axillary Oral   SpO2: 100% 99% 99% 100%   Weight:       Height:           Physical Exam:   GEN: appears well, alert and oriented x 3, pleasant and cooperative   CV: Regular rate  RESP: non labored breathing  ABDOMEN: soft, no tenderness, no distention, Uterine fundus firm and non-tender, at the umbilicus, incision under Mepilex dressing, dressing c/d/i  EXTREMITIES: non-tender  NEURO Alert and oriented to person, place, and time.       Lab Results   Component Value Date    WBC 7.01 2024    HGB 8.5 (L) 2024    HCT 26.0 (L)  09/04/2024    MCV 89 09/04/2024     09/04/2024         Frieda Duffy MD  Obstetrics & Gynecology  09/05/24

## 2024-09-05 NOTE — PLAN OF CARE

## 2024-09-05 NOTE — LACTATION NOTE
This note was copied from a baby's chart.     09/05/24 1700   Lactation Consultation   Reason for Consult 20 m;20 min;10 mins   Lactation Consultant Total Time 50   LATCH Documentation   Latch 2   Audible Swallowing 2   Type of Nipple 1   Comfort (Breast/Nipple) 2   Hold (Positioning) 1   LATCH Score 8   Having latch problems? No   Position(s) Used Football;Laid Back;Cross Cradle  (Saddle)   Breasts/Nipples   Left Breast Soft;Filling   Right Breast Soft;Filling   Left Nipple Flat   Right Nipple Flat;Other (Comment)  (edematous)   Intervention Hand expression;Other (comment)  (Contralateral leaking with feeding and hand expression. 20 ml's collected for next feeding)   Breastfeeding Progress Not yet established   Other OB Lactation Tools   Feeding Devices Feeding Cup   Patient Follow-Up   Lactation Consult Status 2   Follow-Up Type Inpatient;Call as needed   Other OB Lactation Documentation    Additional Problem Noted Description of oral structures and function in lactation note. HE effective for 20 ml. Called to room initially for Nipple Shield. Right areola fibrous. Left areola soft. Both areaolae edematous. demonstrated cup feeding with approximately 2-3 ml's EBM. Additional 20 ml's at bedside.   Hazelbaker Assessment for Lingual Frenulum Function    Appearance Items Function Items   Appearance of tongue when lifted  1: Slight cleft in tip apparent   Lateralization  1: Body of tongue but not tongue tip   Elasticity of frenulum  1: Moderately elastic   Lift of tongue  1: Only edges to mid-mouth     Length of lingual frenulum when tongue lifted  lingual frenulum length: 1: 1 cm     Extension of tongue  1: Tip over lower gum only   Attachment of lingual frenulum to tongue  1: At tip   Spread of anterior tongue  1: Moderate of partial   Attachment of lingual frenulum to inferior alveolar ridge  0: Notched tip: Attached at ridge Cupping  1: Side edges only, moderate cup   Ankyloglossia Grading:  Class I: mild, 12-16  mm  Class II: moderate, 8-11 mm  Class III: severe, 3-7 mm  ClassIV: complete, less than 3 mm Peristalsis  2: Complete, anterior to posterior       SCORE:    Appearance: 3 (<8=ankyloglossia)  Function: 8 (<11=ankyloglossia) Snapback  2: None     Reviewed pump cycling and storage. Reviewed risks and benefits of nipple shield use. Encouraged close holding for optimal latch. Reviewed hand expression and cup feeding.    Warm referral to [facility].    Encouraged parents to call for assistance, questions, and concerns about breastfeeding.  Extension provided.

## 2024-09-05 NOTE — CASE MANAGEMENT
Case Management Progress Note    Patient name Margoth Cook  Location /-01 MRN [id number]  : 1990 Date 2024       LOS (days): 3  Geometric Mean LOS (GMLOS) (days):   Days to GMLOS:        OBJECTIVE:        Current admission status: Inpatient  Preferred Pharmacy:   [id number] - [place], [place] - [address]  [facility] [address] [place] [address]  Phone: [phone] Fax: [phone]    [id number] - [address] [place] [address]  Phone: [phone] Fax: [phone]    [facility] [place] [address]  [address]  [place] [address]  Phone: [phone] Fax: [phone]    Primary Care Provider: John Martinez MD    Primary Insurance: MEDICARE  Secondary Insurance:     PROGRESS NOTE:    CM met with patient again as per her request, patient was tearful throughout meeting reporting her toddler will be transferred to a long term care facility this weekend because [facility] is unable to secure 24 hour nursing care at her home.  Patient discussed feeling guilty about not being about to take her toddler home.  CM offered patient resources, patient reported she is already established with [employer].  CM discussed resources for therapist, mental health follow up etc. Patient reported she already is established with a therapist and psychiatrist and they reported she does not need medications.  CM notified MD of conversation, patient declines all resources no further case management needs.

## 2024-09-05 NOTE — PLAN OF CARE
Problem: POSTPARTUM  Goal: Experiences normal postpartum course  Description: INTERVENTIONS:  - Monitor maternal vital signs  - Assess uterine involution and lochia  Outcome: Progressing  Goal: Appropriate maternal -  bonding  Description: INTERVENTIONS:  - Identify family support  - Assess for appropriate maternal/infant bonding   -Encourage maternal/infant bonding opportunities  - Referral to  or  as needed  Outcome: Progressing  Goal: Establishment of infant feeding pattern  Description: INTERVENTIONS:  - Assess breast/bottle feeding  - Refer to lactation as needed  Outcome: Progressing  Goal: Incision(s), wounds(s) or drain site(s) healing without S/S of infection  Description: INTERVENTIONS  - Assess and document dressing, incision, wound bed, drain sites and surrounding tissue  - Provide patient and family education  Outcome: Progressing     Problem: PAIN - ADULT  Goal: Verbalizes/displays adequate comfort level or baseline comfort level  Description: Interventions:  - Encourage patient to monitor pain and request assistance  - Assess pain using appropriate pain scale  - Administer analgesics based on type and severity of pain and evaluate response  - Implement non-pharmacological measures as appropriate and evaluate response  - Consider cultural and social influences on pain and pain management  - Notify physician/advanced practitioner if interventions unsuccessful or patient reports new pain  Outcome: Progressing     Problem: INFECTION - ADULT  Goal: Absence or prevention of progression during hospitalization  Description: INTERVENTIONS:  - Assess and monitor for signs and symptoms of infection  - Monitor lab/diagnostic results  - Monitor all insertion sites, i.e. indwelling lines, tubes, and drains  - Monitor endotracheal if appropriate and nasal secretions for changes in amount and color  - Callender appropriate cooling/warming therapies per order  - Administer medications  as ordered  - Instruct and encourage patient and family to use good hand hygiene technique  - Identify and instruct in appropriate isolation precautions for identified infection/condition  Outcome: Progressing  Goal: Absence of fever/infection during neutropenic period  Description: INTERVENTIONS:  - Monitor WBC    Outcome: Progressing     Problem: SAFETY ADULT  Goal: Patient will remain free of falls  Description: INTERVENTIONS:  - Educate patient/family on patient safety including physical limitations  - Instruct patient to call for assistance with activity   - Consult OT/PT to assist with strengthening/mobility   - Keep Call bell within reach  - Keep bed low and locked with side rails adjusted as appropriate  - Keep care items and personal belongings within reach  - Initiate and maintain comfort rounds  - Make Fall Risk Sign visible to staff  - Apply yellow socks and bracelet for high fall risk patients  - Consider moving patient to room near nurses station  Outcome: Progressing  Goal: Maintain or return to baseline ADL function  Description: INTERVENTIONS:  -  Assess patient's ability to carry out ADLs; assess patient's baseline for ADL function and identify physical deficits which impact ability to perform ADLs (bathing, care of mouth/teeth, toileting, grooming, dressing, etc.)  - Assess/evaluate cause of self-care deficits   - Assess range of motion  - Assess patient's mobility; develop plan if impaired  - Assess patient's need for assistive devices and provide as appropriate  - Encourage maximum independence but intervene and supervise when necessary  - Involve family in performance of ADLs  - Assess for home care needs following discharge   - Consider OT consult to assist with ADL evaluation and planning for discharge  - Provide patient education as appropriate  Outcome: Progressing  Goal: Maintains/Returns to pre admission functional level  Description: INTERVENTIONS:  - Perform AM-PAC 6 Click Basic  Mobility/ Daily Activity assessment daily.  - Set and communicate daily mobility goal to care team and patient/family/caregiver.   - Collaborate with rehabilitation services on mobility goals if consulted  - Out of bed for toileting  - Record patient progress and toleration of activity level   Outcome: Progressing     Problem: Knowledge Deficit  Goal: Patient/family/caregiver demonstrates understanding of disease process, treatment plan, medications, and discharge instructions  Description: Complete learning assessment and assess knowledge base.  Interventions:  - Provide teaching at level of understanding  - Provide teaching via preferred learning methods  Outcome: Progressing     Problem: DISCHARGE PLANNING  Goal: Discharge to home or other facility with appropriate resources  Description: INTERVENTIONS:  - Identify barriers to discharge w/patient and caregiver  - Arrange for needed discharge resources and transportation as appropriate  - Identify discharge learning needs (meds, wound care, etc.)  - Arrange for interpretive services to assist at discharge as needed  - Refer to Case Management Department for coordinating discharge planning if the patient needs post-hospital services based on physician/advanced practitioner order or complex needs related to functional status, cognitive ability, or social support system  Outcome: Progressing

## 2024-09-06 LAB — LAMOTRIGINE SERPL-MCNC: 3.6 UG/ML (ref 2–20)

## 2024-09-06 PROCEDURE — 99024 POSTOP FOLLOW-UP VISIT: CPT | Performed by: OBSTETRICS & GYNECOLOGY

## 2024-09-06 RX ORDER — LIDOCAINE 50 MG/G
1 PATCH TOPICAL DAILY
Status: DISCONTINUED | OUTPATIENT
Start: 2024-09-06 | End: 2024-09-07 | Stop reason: HOSPADM

## 2024-09-06 RX ADMIN — IBUPROFEN 600 MG: 600 TABLET ORAL at 10:12

## 2024-09-06 RX ADMIN — DOCUSATE SODIUM 100 MG: 100 CAPSULE, LIQUID FILLED ORAL at 10:12

## 2024-09-06 RX ADMIN — IBUPROFEN 600 MG: 600 TABLET ORAL at 22:23

## 2024-09-06 RX ADMIN — ACETAMINOPHEN 975 MG: 325 TABLET ORAL at 01:47

## 2024-09-06 RX ADMIN — ENOXAPARIN SODIUM 60 MG: 60 INJECTION SUBCUTANEOUS at 18:37

## 2024-09-06 RX ADMIN — LEVETIRACETAM 500 MG: 500 TABLET, FILM COATED ORAL at 18:37

## 2024-09-06 RX ADMIN — DOCUSATE SODIUM 100 MG: 100 CAPSULE, LIQUID FILLED ORAL at 18:35

## 2024-09-06 RX ADMIN — LIDOCAINE 1 PATCH: 50 PATCH CUTANEOUS at 06:37

## 2024-09-06 RX ADMIN — ACETAMINOPHEN 975 MG: 325 TABLET ORAL at 18:35

## 2024-09-06 RX ADMIN — LEVETIRACETAM 500 MG: 500 TABLET, FILM COATED ORAL at 05:51

## 2024-09-06 RX ADMIN — ACETAMINOPHEN 975 MG: 325 TABLET ORAL at 10:12

## 2024-09-06 RX ADMIN — CHLORHEXIDINE GLUCONATE 15 ML: 1.2 RINSE ORAL at 10:14

## 2024-09-06 RX ADMIN — ENOXAPARIN SODIUM 60 MG: 60 INJECTION SUBCUTANEOUS at 05:51

## 2024-09-06 RX ADMIN — LEVOTHYROXINE SODIUM 25 MCG: 25 TABLET ORAL at 05:51

## 2024-09-06 RX ADMIN — IBUPROFEN 600 MG: 600 TABLET ORAL at 01:47

## 2024-09-06 RX ADMIN — IBUPROFEN 600 MG: 600 TABLET ORAL at 15:42

## 2024-09-06 RX ADMIN — LAMOTRIGINE 225 MG: 100 TABLET ORAL at 10:50

## 2024-09-06 RX ADMIN — PANTOPRAZOLE SODIUM 40 MG: 40 TABLET, DELAYED RELEASE ORAL at 05:51

## 2024-09-06 RX ADMIN — LAMOTRIGINE 225 MG: 100 TABLET ORAL at 22:23

## 2024-09-06 NOTE — PLAN OF CARE

## 2024-09-06 NOTE — PLAN OF CARE

## 2024-09-06 NOTE — PROGRESS NOTES
"Progress Note - OB/GYN   Margoth Cook 34 y.o. female MRN: [id number]  Unit/Bed#: -01 Encounter: [id number]      Assessment/Plan    Margoth Cook is a 34 y.o.  who is PPD 3 s/p LTCS at 37w6d     Type 2 diabetes mellitus without complication, without long-term current use of insulin ([facility])  Assessment & Plan  Lab Results   Component Value Date    HGBA1C 5.7 (H) 2024   SSI discontinued    Recent Labs     24  1057 24  1844   POCGLU 160* 106       Blood Sugar Average: Last 72 hrs:  (P) 133      Hypothyroidism  Assessment & Plan  Continue Levothyroxine    Acute post-operative pain  Assessment & Plan  S/p dPCA, APS consulted  Scheduled tylenol and ibuprofen, lidocaine patch, tramadol PRN     Positive GBS test  Assessment & Plan  Penicillin intrapartum       COVID-19  Assessment & Plan  +COVID test in triage  S/p difficult intubation for STAT C/S and non-working epidural  S/p ICU admission  Per ICU team, patient is not a candidate for Paxlovid.   Patient's 1 year old, Jay, currently admitted at [facility] with COVID - weaned from O2 but now needs to tolerate feeds  Baby now in room with mom (10 feet away unless masked)  Isolation precautions.     H/O Cervical cerclage suture present in third trimester  Assessment & Plan  History indicated cerclage placed at [place]  Cerclage removed on     Rh negative state in antepartum period  Assessment & Plan  Rhogam received     History of stillbirth in currently pregnant patient, unspecified trimester  Assessment & Plan  Late to care in G1 pregnancy - 28w demise due to \"fluid around the heart/trisomy\"       Iron deficiency anemia secondary to inadequate dietary iron intake  Assessment & Plan  S/p outpatient iron supplementation  Lab Results   Component Value Date/Time    HGB 8.5 (L) 2024 06:13 AM    HGB 8.3 (L) 2024 01:29 PM    HGB 8.8 (L) 2024 07:45 AM    HGB 10.6 (L) 2024 07:46 AM   S/p venofer " yesterday  Asymptomatic       Nonintractable epilepsy without status epilepticus (HCC)  Assessment & Plan  Continue home keppra and lamictal    Depression  Assessment & Plan  Not currently on medications  Previously on Zoloft  High risk for PPD    * S/P emergency  section  Assessment & Plan  - Continue routine post partum care  - Encourage ambulation  - Encourage breastfeeding  - QBL: 595cc; Hgb 8.5 s/p venofer   - Surgifoam on abdominal muscles  - s/p TXA  - PO pain medication  - s/p VT  - s/p Flagyl and Keflex x48hrs for SSI PPx           Disposition: Anticipate discharge home postpartum Day #3-4  Barriers to discharge:ongoing couplet care      Subjective/Objective     Subjective: Postpartum state    Pain: improving  Tolerating PO: yes  Voiding: yes  Flatus: yes  BM: yes  Ambulating: yes  Breastfeeding: Bottle feeding  Chest pain: no  Shortness of breath: no  Leg pain: no  Lochia: minimal    Objective:     Vitals:  Vitals:    24 2352 24 0813 24 1524 24 0025   BP: 112/58 104/60 112/57 112/62   BP Location: Right arm Right arm Right arm Right arm   Pulse: 97 85 94 100   Resp: 18 18 18 17   Temp: 98.1 °F (36.7 °C) 97.7 °F (36.5 °C) 98.1 °F (36.7 °C) 98.2 °F (36.8 °C)   TempSrc: Oral Oral Oral Oral   SpO2: 100% 100% 99% 100%   Weight:       Height:           Physical Exam:   GEN: appears well, alert and oriented x 3, pleasant and cooperative   CV: Regular rate  RESP: non labored breathing  ABDOMEN: soft, no tenderness, no distention, Uterine fundus firm and mildly-tender, at the umbilicus, incision dressed in Mepilex, dressing c/d/i  EXTREMITIES: non-tender  NEURO Alert and oriented to person, place, and time.       Lab Results   Component Value Date    WBC 7.01 2024    HGB 8.5 (L) 2024    HCT 26.0 (L) 2024    MCV 89 2024     2024         Frieda Duffy MD  Obstetrics & Gynecology  24

## 2024-09-07 VITALS
HEART RATE: 87 BPM | WEIGHT: 247.58 LBS | RESPIRATION RATE: 18 BRPM | TEMPERATURE: 97.9 F | BODY MASS INDEX: 57.3 KG/M2 | DIASTOLIC BLOOD PRESSURE: 54 MMHG | SYSTOLIC BLOOD PRESSURE: 110 MMHG | OXYGEN SATURATION: 100 % | HEIGHT: 55 IN

## 2024-09-07 PROBLEM — Z60.9 HIGH RISK SOCIAL SITUATION: Status: ACTIVE | Noted: 2024-09-07

## 2024-09-07 RX ORDER — IBUPROFEN 600 MG/1
600 TABLET, FILM COATED ORAL EVERY 6 HOURS SCHEDULED
Qty: 30 TABLET | Refills: 0 | Status: SHIPPED | OUTPATIENT
Start: 2024-09-07

## 2024-09-07 RX ORDER — ACETAMINOPHEN 325 MG/1
975 TABLET ORAL EVERY 8 HOURS SCHEDULED
Qty: 90 TABLET | Refills: 0 | Status: SHIPPED | OUTPATIENT
Start: 2024-09-07 | End: 2024-09-17

## 2024-09-07 RX ORDER — TRAMADOL HYDROCHLORIDE 50 MG/1
50 TABLET ORAL EVERY 6 HOURS PRN
Qty: 5 TABLET | Refills: 0 | Status: SHIPPED | OUTPATIENT
Start: 2024-09-07 | End: 2024-09-17

## 2024-09-07 RX ORDER — LIDOCAINE 50 MG/G
1 PATCH TOPICAL DAILY
Start: 2024-09-07 | End: 2024-09-17

## 2024-09-07 RX ADMIN — IBUPROFEN 600 MG: 600 TABLET ORAL at 07:43

## 2024-09-07 RX ADMIN — LAMOTRIGINE 225 MG: 100 TABLET ORAL at 09:47

## 2024-09-07 RX ADMIN — PANTOPRAZOLE SODIUM 40 MG: 40 TABLET, DELAYED RELEASE ORAL at 07:42

## 2024-09-07 RX ADMIN — LIDOCAINE 1 PATCH: 50 PATCH CUTANEOUS at 09:47

## 2024-09-07 RX ADMIN — LEVETIRACETAM 500 MG: 500 TABLET, FILM COATED ORAL at 07:42

## 2024-09-07 RX ADMIN — LEVOTHYROXINE SODIUM 25 MCG: 25 TABLET ORAL at 07:43

## 2024-09-07 RX ADMIN — ACETAMINOPHEN 975 MG: 325 TABLET ORAL at 07:42

## 2024-09-07 RX ADMIN — DOCUSATE SODIUM 100 MG: 100 CAPSULE, LIQUID FILLED ORAL at 09:48

## 2024-09-07 RX ADMIN — CHLORHEXIDINE GLUCONATE 15 ML: 1.2 RINSE ORAL at 09:48

## 2024-09-07 NOTE — PLAN OF CARE
Problem: POSTPARTUM  Goal: Experiences normal postpartum course  Description: INTERVENTIONS:  - Monitor maternal vital signs  - Assess uterine involution and lochia  Outcome: Completed  Goal: Appropriate maternal -  bonding  Description: INTERVENTIONS:  - Identify family support  - Assess for appropriate maternal/infant bonding   -Encourage maternal/infant bonding opportunities  - Referral to  or  as needed  Outcome: Completed  Goal: Establishment of infant feeding pattern  Description: INTERVENTIONS:  - Assess breast/bottle feeding  - Refer to lactation as needed  Outcome: Completed  Goal: Incision(s), wounds(s) or drain site(s) healing without S/S of infection  Description: INTERVENTIONS  - Assess and document dressing, incision, wound bed, drain sites and surrounding tissue  - Provide patient and family education  Outcome: Completed     Problem: PAIN - ADULT  Goal: Verbalizes/displays adequate comfort level or baseline comfort level  Description: Interventions:  - Encourage patient to monitor pain and request assistance  - Assess pain using appropriate pain scale  - Administer analgesics based on type and severity of pain and evaluate response  - Implement non-pharmacological measures as appropriate and evaluate response  - Consider cultural and social influences on pain and pain management  - Notify physician/advanced practitioner if interventions unsuccessful or patient reports new pain  Outcome: Completed     Problem: INFECTION - ADULT  Goal: Absence or prevention of progression during hospitalization  Description: INTERVENTIONS:  - Assess and monitor for signs and symptoms of infection  - Monitor lab/diagnostic results  - Monitor all insertion sites, i.e. indwelling lines, tubes, and drains  - Monitor endotracheal if appropriate and nasal secretions for changes in amount and color  - [place] appropriate cooling/warming therapies per order  - Administer medications as  ordered  - Instruct and encourage patient and family to use good hand hygiene technique  - Identify and instruct in appropriate isolation precautions for identified infection/condition  Outcome: Completed  Goal: Absence of fever/infection during neutropenic period  Description: INTERVENTIONS:  - Monitor WBC    Outcome: Completed     Problem: SAFETY ADULT  Goal: Patient will remain free of falls  Description: INTERVENTIONS:  - Educate patient/family on patient safety including physical limitations  - Instruct patient to call for assistance with activity   - Consult OT/PT to assist with strengthening/mobility   - Keep Call bell within reach  - Keep bed low and locked with side rails adjusted as appropriate  - Keep care items and personal belongings within reach  - Initiate and maintain comfort rounds  - Make Fall Risk Sign visible to staff  - Apply yellow socks and bracelet for high fall risk patients  - Consider moving patient to room near nurses station  Outcome: Completed  Goal: Maintain or return to baseline ADL function  Description: INTERVENTIONS:  -  Assess patient's ability to carry out ADLs; assess patient's baseline for ADL function and identify physical deficits which impact ability to perform ADLs (bathing, care of mouth/teeth, toileting, grooming, dressing, etc.)  - Assess/evaluate cause of self-care deficits   - Assess range of motion  - Assess patient's mobility; develop plan if impaired  - Assess patient's need for assistive devices and provide as appropriate  - Encourage maximum independence but intervene and supervise when necessary  - Involve family in performance of ADLs  - Assess for home care needs following discharge   - Consider OT consult to assist with ADL evaluation and planning for discharge  - Provide patient education as appropriate  Outcome: Completed  Goal: Maintains/Returns to pre admission functional level  Description: INTERVENTIONS:  - Perform AM-PAC 6 Click Basic Mobility/ Daily  Activity assessment daily.  - Set and communicate daily mobility goal to care team and patient/family/caregiver.   - Collaborate with rehabilitation services on mobility goals if consulted  - Out of bed for toileting  - Record patient progress and toleration of activity level   Outcome: Completed     Problem: Knowledge Deficit  Goal: Patient/family/caregiver demonstrates understanding of disease process, treatment plan, medications, and discharge instructions  Description: Complete learning assessment and assess knowledge base.  Interventions:  - Provide teaching at level of understanding  - Provide teaching via preferred learning methods  Outcome: Completed     Problem: DISCHARGE PLANNING  Goal: Discharge to home or other facility with appropriate resources  Description: INTERVENTIONS:  - Identify barriers to discharge w/patient and caregiver  - Arrange for needed discharge resources and transportation as appropriate  - Identify discharge learning needs (meds, wound care, etc.)  - Arrange for interpretive services to assist at discharge as needed  - Refer to Case Management Department for coordinating discharge planning if the patient needs post-hospital services based on physician/advanced practitioner order or complex needs related to functional status, cognitive ability, or social support system  Outcome: Completed

## 2024-09-07 NOTE — LACTATION NOTE
This note was copied from a baby's chart.  Discharge Lactation    Met with Margoth who is mixed feeding her baby boy formula and expressed milk via bottle. She reports feeding at the breast first. She states her breasts are engorged and her right produces more than the left. Baby has a thin anterior lingual frenulum.    Encouraged feeding at the breast as often as baby cues. Discussed needing to space formula feeding out. Encouraged waking baby for feeds overnight. Encouraged continuing the diaper log and feeding log.    Discussed pumping when formula feeding. Discussed paced bottle feeding. Encouraged follow up at the [facility] for lactation and postpartum mental health needs. Margoth has no questions at this time.

## 2024-09-07 NOTE — ASSESSMENT & PLAN NOTE
"Patient with complex OB and social history, receiving outpatient resources  Appropriately overwhelmed and tearful about this traumatic delivery and her other medically complicated child who needs a high level of care   Denies suicidal and homicidal ideation and reports that her mood is \"fine\"  She has close outpatient follow up arranged  "

## 2024-09-07 NOTE — PROGRESS NOTES
"Progress Note - OB/GYN   Margoth Cook 34 y.o. female MRN: [id number]  Unit/Bed#: -01 Encounter: [id number]      Assessment/Plan    Margoth Cook is a 34 y.o.  who is PPD 4 s/p LTCS at 37w6d     High risk social situation  Assessment & Plan  Patient with complex OB and social history, receiving outpatient resources  Appropriately overwhelmed and tearful about this traumatic delivery and her other medically complicated child who needs a high level of care   Denies suicidal and homicidal ideation and reports that her mood is \"fine\"  She has close outpatient follow up arranged    Type 2 diabetes mellitus without complication, without long-term current use of insulin ([facility])  Assessment & Plan  Lab Results   Component Value Date    HGBA1C 5.7 (H) 2024   SSI discontinued    Recent Labs     24  1057 24  1844   POCGLU 160* 106       Blood Sugar Average: Last 72 hrs:  (P) 133      Hypothyroidism  Assessment & Plan  Continue Levothyroxine    Acute post-operative pain  Assessment & Plan  S/p dPCA, APS consulted  Scheduled tylenol and ibuprofen, lidocaine patch, tramadol PRN     Positive GBS test  Assessment & Plan  Penicillin intrapartum       COVID-19  Assessment & Plan  +COVID test in triage  S/p difficult intubation for STAT C/S and non-working epidural  S/p ICU admission  Per ICU team, patient is not a candidate for Paxlovid.   Patient's 1 year old, Jay, currently admitted at [facility] with COVID - weaned from O2 but now needs to tolerate feeds  Baby now in room with mom (10 feet away unless masked)  Isolation precautions.     H/O Cervical cerclage suture present in third trimester  Assessment & Plan  History indicated cerclage placed at [place]  Cerclage removed on     Rh negative state in antepartum period  Assessment & Plan  Rhogam received     History of stillbirth in currently pregnant patient, unspecified trimester  Assessment & Plan  Late to care in G1 pregnancy - 28w " "demise due to \"fluid around the heart/trisomy\"       Iron deficiency anemia secondary to inadequate dietary iron intake  Assessment & Plan  S/p outpatient iron supplementation  Lab Results   Component Value Date/Time    HGB 8.5 (L) 2024 06:13 AM    HGB 8.3 (L) 2024 01:29 PM    HGB 8.8 (L) 2024 07:45 AM    HGB 10.6 (L) 2024 07:46 AM   S/p venofer yesterday  Asymptomatic       Nonintractable epilepsy without status epilepticus (HCC)  Assessment & Plan  Continue home keppra and lamictal    Depression  Assessment & Plan  Not currently on medications  Previously on Zoloft  High risk for PPD    * S/P emergency  section  Assessment & Plan  - Continue routine post partum care  - Encourage ambulation  - Encourage breastfeeding  - QBL: 595cc; Hgb 8.5 s/p venofer   - Surgifoam on abdominal muscles  - s/p TXA  - PO pain medication  - s/p VT  - s/p Flagyl and Keflex x48hrs for SSI PPx           Disposition: Anticipate discharge home postpartum Day #4-5  Barriers to discharge: care coordination      Subjective/Objective     Subjective: Postpartum state. Flat affect, reports that everything is \"fine,\" though is tearful and overwhelmed. Denies suicidal and homicidal ideation.    Pain: no  Tolerating PO: yes  Voiding: yes  Flatus: yes  BM: yes  Ambulating: yes  Breastfeeding: Breastfeeding and Using breast pump  Chest pain: no  Shortness of breath: no  Leg pain: no  Lochia: minimal    Objective:     Vitals:  Vitals:    24 0025 24 0904 24 1800 24 0010   BP: 112/62 107/60 126/69 110/54   BP Location: Right arm  Right arm Right arm   Pulse: 100 86 78 87   Resp: 17 20 20 18   Temp: 98.2 °F (36.8 °C) 98 °F (36.7 °C) 98.3 °F (36.8 °C) 97.9 °F (36.6 °C)   TempSrc: Oral Oral Oral Oral   SpO2: 100% 99% 100% 100%   Weight:       Height:           Physical Exam:   GEN: appears well, alert and oriented x 3, pleasant and cooperative   CV: Regular rate  RESP: non labored breathing  ABDOMEN: " soft, no tenderness, no distention, Uterine fundus firm and non-tender, at the umbilicus, incision under Mepilex dressing which is curling up  EXTREMITIES: non-tender  NEURO Alert and oriented to person, place, and time.       Lab Results   Component Value Date    WBC 7.01 09/04/2024    HGB 8.5 (L) 09/04/2024    HCT 26.0 (L) 09/04/2024    MCV 89 09/04/2024     09/04/2024         Frieda Duffy MD  Obstetrics & Gynecology  09/07/24

## 2024-09-07 NOTE — PLAN OF CARE

## 2024-09-08 ENCOUNTER — NURSE TRIAGE (OUTPATIENT)
Dept: OTHER | Facility: OTHER | Age: 34
End: 2024-09-08

## 2024-09-08 RX ORDER — FUROSEMIDE 20 MG
20 TABLET ORAL DAILY
Qty: 5 TABLET | Refills: 0 | Status: SHIPPED | OUTPATIENT
Start: 2024-09-08 | End: 2024-09-17

## 2024-09-08 NOTE — TELEPHONE ENCOUNTER
Reason for Disposition  • [1] MODERATE leg swelling (e.g., more than just ankles, below knees) AND [2] lasts > 5 days postpartum    Protocols used: Postpartum - Leg Swelling and Edema-ADULT-AH

## 2024-09-08 NOTE — TELEPHONE ENCOUNTER
"Answer Assessment - Initial Assessment Questions  1. ONSET: \"When did the swelling start?\" (e.g., minutes, hours, days)      Immediately after C section    2. LOCATION: \"What part of the leg is swollen?\"  \"Are both legs swollen or just one leg?\"      Both legs up to the knees and feet  Unable to put on pants or shoes    3. SEVERITY: \"How bad is the swelling?\" (e.g., localized; mild, moderate, severe)   - Localized - small area of swelling localized to one leg   - MILD pedal edema - swelling limited to foot and ankle, pitting edema < 1/4 inch (6 mm) deep, rest and elevation eliminate most or all swelling   - MODERATE edema - swelling of lower leg to knee, pitting edema > 1/4 inch (6 mm) deep, rest and elevation only partially reduce swelling   - SEVERE edema - swelling extends above knee, facial or hand swelling present       Leaves a azar for a bit    4. REDNESS: \"Does the swelling look red or infected?\"      Denies    5. PAIN: \"Is the swelling painful to touch?\" If Yes, ask: \"How painful is it?\"   (Scale 1-10; mild, moderate or severe)     6 /10    6. FEVER: \"Do you have a fever?\" If Yes, ask: \"What is it, how was it measured, and when did it start?\"       Denies    7. MEDICAL HISTORY: \"Do you have a history of blood clots, heart failure, kidney disease, liver failure, or cancer?\"      Denies    8. OTHER SYMPTOMS: \"Do you have any other symptoms?\" (e.g., chest pain, difficulty breathing)      Denies    9. DELIVERY DATE: \"When was your delivery date?\" \"Vaginal delivery or ?\"      Emergency C section 9/3    Protocols used: Postpartum - Leg Swelling and Edema-ADULT-AH    On call provider is calling in 5 day script for lasix.  Advised to continue to hydrate well, monitor for symptoms of eclamsia, elevate legs above level of heart when resting but to continue to ambulate.  Reiterated importance of keeping appointment for tomorrow afternoon.  Patient advised and verbalized understanding.  "

## 2024-09-09 ENCOUNTER — TELEPHONE (OUTPATIENT)
Age: 34
End: 2024-09-09

## 2024-09-09 ENCOUNTER — NURSE TRIAGE (OUTPATIENT)
Age: 34
End: 2024-09-09

## 2024-09-09 ENCOUNTER — TRANSITIONAL CARE MANAGEMENT (OUTPATIENT)
Age: 34
End: 2024-09-09

## 2024-09-09 ENCOUNTER — NURSE TRIAGE (OUTPATIENT)
Dept: OTHER | Facility: OTHER | Age: 34
End: 2024-09-09

## 2024-09-09 ENCOUNTER — PATIENT MESSAGE (OUTPATIENT)
Dept: OBGYN CLINIC | Facility: CLINIC | Age: 34
End: 2024-09-09

## 2024-09-09 ENCOUNTER — HOSPITAL ENCOUNTER (EMERGENCY)
Facility: HOSPITAL | Age: 34
Discharge: HOME | End: 2024-09-10
Attending: EMERGENCY MEDICINE
Payer: MEDICARE

## 2024-09-09 DIAGNOSIS — R51.9 HEADACHE: ICD-10-CM

## 2024-09-09 DIAGNOSIS — Z59.41 FOOD INSECURITY: Primary | ICD-10-CM

## 2024-09-09 DIAGNOSIS — R60.0 BILATERAL LOWER EXTREMITY EDEMA: Primary | ICD-10-CM

## 2024-09-09 DIAGNOSIS — R79.89 POSITIVE D DIMER: ICD-10-CM

## 2024-09-09 PROCEDURE — 83735 ASSAY OF MAGNESIUM: CPT | Performed by: EMERGENCY MEDICINE

## 2024-09-09 PROCEDURE — 99285 EMERGENCY DEPT VISIT HI MDM: CPT | Performed by: EMERGENCY MEDICINE

## 2024-09-09 PROCEDURE — 36415 COLL VENOUS BLD VENIPUNCTURE: CPT | Performed by: EMERGENCY MEDICINE

## 2024-09-09 PROCEDURE — 88307 TISSUE EXAM BY PATHOLOGIST: CPT | Performed by: PATHOLOGY

## 2024-09-09 PROCEDURE — 85007 BL SMEAR W/DIFF WBC COUNT: CPT | Performed by: EMERGENCY MEDICINE

## 2024-09-09 PROCEDURE — 85027 COMPLETE CBC AUTOMATED: CPT | Performed by: EMERGENCY MEDICINE

## 2024-09-09 PROCEDURE — 96374 THER/PROPH/DIAG INJ IV PUSH: CPT

## 2024-09-09 PROCEDURE — 99284 EMERGENCY DEPT VISIT MOD MDM: CPT

## 2024-09-09 PROCEDURE — 85379 FIBRIN DEGRADATION QUANT: CPT | Performed by: EMERGENCY MEDICINE

## 2024-09-09 PROCEDURE — 80053 COMPREHEN METABOLIC PANEL: CPT | Performed by: EMERGENCY MEDICINE

## 2024-09-09 RX ORDER — ACETAMINOPHEN 10 MG/ML
1000 INJECTION, SOLUTION INTRAVENOUS ONCE
Status: COMPLETED | OUTPATIENT
Start: 2024-09-09 | End: 2024-09-09

## 2024-09-09 RX ADMIN — ACETAMINOPHEN 1000 MG: 10 INJECTION INTRAVENOUS at 23:40

## 2024-09-09 SDOH — ECONOMIC STABILITY - FOOD INSECURITY: FOOD INSECURITY: Z59.41

## 2024-09-09 NOTE — TELEPHONE ENCOUNTER
Spoke with patient, scheduled appt - patient did not want to wait until Jan. To schedule appointment, she asked to see a different provider.

## 2024-09-09 NOTE — TELEPHONE ENCOUNTER
"Pt returning a call to discuss symptoms and having to cancel appointment in office this afternoon. Patient delivered via c/s on 9/3/24. Pt calling with worsening bilateral leg swelling extending from back of her knees to toes. Per patient, canceling appointment today due to being unable to wear any shoes including flip flops due to swelling, and being unable to wear her pants due to swelling being so severe. Denies pain or redness with swelling, denies headache, blurry vision, swelling in hands/arms or face, shortness of breath. States she is starting to feel better after c/s, no concerns with incision. Lochia minimal. RN advised will discuss with provider on call due to concerns with swelling. Pt agreeable to plan. No further questions.     ESC sent to provider on call, Dr. Frankel. Per provider, \"she needs to be seen for evaluation--can some in slippers anything, but needs exam of lower extremities and blood pressure assessment\".     RN placed a call back to patient and provided recommendations.  Warm transferred to practice clerical team for assistance with scheduling. Pt aware to call back if develops headache, blurry vision, or worsening swelling.     Reason for Disposition  • [1] MODERATE leg swelling (e.g., more than just ankles, below knees) AND [2] lasts > 5 days postpartum    Answer Assessment - Initial Assessment Questions  1. ONSET: \"When did the swelling start?\" (e.g., minutes, hours, days)      Since delivery  2. LOCATION: \"What part of the leg is swollen?\"  \"Are both legs swollen or just one leg?\"      From back of knee down to toes, bilateral  3. SEVERITY: \"How bad is the swelling?\" (e.g., localized; mild, moderate, severe)   - Localized - small area of swelling localized to one leg   - MILD pedal edema - swelling limited to foot and ankle, pitting edema < 1/4 inch (6 mm) deep, rest and elevation eliminate most or all swelling   - MODERATE edema - swelling of lower leg to knee, pitting edema > 1/4 " "inch (6 mm) deep, rest and elevation only partially reduce swelling   - SEVERE edema - swelling extends above knee, facial or hand swelling present       Moderate  4. REDNESS: \"Does the swelling look red or infected?\"      Denies  5. PAIN: \"Is the swelling painful to touch?\" If Yes, ask: \"How painful is it?\"   (Scale 1-10; mild, moderate or severe)      Denies  6. FEVER: \"Do you have a fever?\" If Yes, ask: \"What is it, how was it measured, and when did it start?\"       Denies  7. MEDICAL HISTORY: \"Do you have a history of blood clots, heart failure, kidney disease, liver failure, or cancer?\"      Denies  8. OTHER SYMPTOMS: \"Do you have any other symptoms?\" (e.g., chest pain, difficulty breathing)      Denies  9. DELIVERY DATE: \"When was your delivery date?\" \"Vaginal delivery or ?\"      9/3/24    Protocols used: Postpartum - Leg Swelling and Edema-ADULT-AH    "

## 2024-09-09 NOTE — TELEPHONE ENCOUNTER
Regarding: leg swelling 6 days PP  ----- Message from Constance VEE sent at 9/9/2024 11:37 AM EDT -----  Pt sent a my chart message that she was not able to make her PP incision and bp check today. She said her legs were so swollen that she can't wear shoes or find pants that fit. The swelling goes all the way up to the back of her knees. She is 6 days PP.

## 2024-09-09 NOTE — TELEPHONE ENCOUNTER
----- Message from John Martinez MD sent at 9/8/2024  8:57 PM EDT -----  Garrick Martin,  This is my patient who has just delivered. Wecan call to ask if she wants to be seen for her postpartum visit with us, if not we can schedule her for an annual.  Can you please arrange an appointment with me? Thanks    Thanks,    John Martinez MD

## 2024-09-09 NOTE — TELEPHONE ENCOUNTER
Patient calling in, states her  has an appointment with [facility]'s pediatrics tomorrow, but she noticed his umbilical site is full of chunky pus. States her  has not yet been established by pediatrician, so she is not sure who to call.    Call was warm transferred to [phone] [phone]) for further assistance in  concerns.

## 2024-09-10 ENCOUNTER — TELEPHONE (OUTPATIENT)
Dept: OBGYN CLINIC | Facility: CLINIC | Age: 34
End: 2024-09-10

## 2024-09-10 ENCOUNTER — APPOINTMENT (EMERGENCY)
Dept: RADIOLOGY | Facility: HOSPITAL | Age: 34
End: 2024-09-10
Payer: MEDICARE

## 2024-09-10 VITALS
RESPIRATION RATE: 18 BRPM | TEMPERATURE: 98.7 F | SYSTOLIC BLOOD PRESSURE: 138 MMHG | HEART RATE: 82 BPM | OXYGEN SATURATION: 99 % | DIASTOLIC BLOOD PRESSURE: 69 MMHG

## 2024-09-10 LAB
ALBUMIN SERPL BCG-MCNC: 2.9 G/DL (ref 3.5–5)
ALP SERPL-CCNC: 87 U/L (ref 34–104)
ALT SERPL W P-5'-P-CCNC: 8 U/L (ref 7–52)
ANION GAP SERPL CALCULATED.3IONS-SCNC: 8 MMOL/L (ref 4–13)
AST SERPL W P-5'-P-CCNC: 10 U/L (ref 13–39)
BASOPHILS # BLD MANUAL: 0 THOUSAND/UL (ref 0–0.1)
BASOPHILS NFR MAR MANUAL: 0 % (ref 0–1)
BILIRUB SERPL-MCNC: 0.2 MG/DL (ref 0.2–1)
BUN SERPL-MCNC: 12 MG/DL (ref 5–25)
CALCIUM ALBUM COR SERPL-MCNC: 9.2 MG/DL (ref 8.3–10.1)
CALCIUM SERPL-MCNC: 8.3 MG/DL (ref 8.4–10.2)
CHLORIDE SERPL-SCNC: 105 MMOL/L (ref 96–108)
CO2 SERPL-SCNC: 26 MMOL/L (ref 21–32)
CREAT SERPL-MCNC: 0.7 MG/DL (ref 0.6–1.3)
D DIMER PPP FEU-MCNC: 5.53 UG/ML FEU
EOSINOPHIL # BLD MANUAL: 0.05 THOUSAND/UL (ref 0–0.4)
EOSINOPHIL NFR BLD MANUAL: 1 % (ref 0–6)
ERYTHROCYTE [DISTWIDTH] IN BLOOD BY AUTOMATED COUNT: 15 % (ref 11.6–15.1)
GFR SERPL CREATININE-BSD FRML MDRD: 114 ML/MIN/1.73SQ M
GIANT PLATELETS BLD QL SMEAR: PRESENT
GLUCOSE SERPL-MCNC: 89 MG/DL (ref 65–140)
HCT VFR BLD AUTO: 27.3 % (ref 34.8–46.1)
HGB BLD-MCNC: 8.6 G/DL (ref 11.5–15.4)
LG PLATELETS BLD QL SMEAR: PRESENT
LYMPHOCYTES # BLD AUTO: 1.2 THOUSAND/UL (ref 0.6–4.47)
LYMPHOCYTES # BLD AUTO: 23 % (ref 14–44)
MACROCYTES BLD QL AUTO: PRESENT
MAGNESIUM SERPL-MCNC: 1.8 MG/DL (ref 1.9–2.7)
MCH RBC QN AUTO: 28.8 PG (ref 26.8–34.3)
MCHC RBC AUTO-ENTMCNC: 31.5 G/DL (ref 31.4–37.4)
MCV RBC AUTO: 91 FL (ref 82–98)
METAMYELOCYTE ABSOLUTE CT: 0.05 THOUSAND/UL (ref 0–0.1)
METAMYELOCYTES NFR BLD MANUAL: 1 % (ref 0–1)
MONOCYTES # BLD AUTO: 0.26 THOUSAND/UL (ref 0–1.22)
MONOCYTES NFR BLD: 5 % (ref 4–12)
MYELOCYTE ABSOLUTE CT: 0.1 THOUSAND/UL (ref 0–0.1)
MYELOCYTES NFR BLD MANUAL: 2 % (ref 0–1)
NEUTROPHILS # BLD MANUAL: 3.54 THOUSAND/UL (ref 1.85–7.62)
NEUTS SEG NFR BLD AUTO: 68 % (ref 43–75)
PLATELET # BLD AUTO: 381 THOUSANDS/UL (ref 149–390)
PLATELET BLD QL SMEAR: ADEQUATE
PMV BLD AUTO: 8.8 FL (ref 8.9–12.7)
POLYCHROMASIA BLD QL SMEAR: PRESENT
POTASSIUM SERPL-SCNC: 3.6 MMOL/L (ref 3.5–5.3)
PROT SERPL-MCNC: 5.7 G/DL (ref 6.4–8.4)
RBC # BLD AUTO: 2.99 MILLION/UL (ref 3.81–5.12)
RBC MORPH BLD: PRESENT
SODIUM SERPL-SCNC: 139 MMOL/L (ref 135–147)
WBC # BLD AUTO: 5.21 THOUSAND/UL (ref 4.31–10.16)

## 2024-09-10 PROCEDURE — 70496 CT ANGIOGRAPHY HEAD: CPT

## 2024-09-10 PROCEDURE — 96372 THER/PROPH/DIAG INJ SC/IM: CPT

## 2024-09-10 RX ORDER — ENOXAPARIN SODIUM 150 MG/ML
1 INJECTION SUBCUTANEOUS ONCE
Status: COMPLETED | OUTPATIENT
Start: 2024-09-10 | End: 2024-09-10

## 2024-09-10 RX ADMIN — ENOXAPARIN SODIUM 105 MG: 120 INJECTION SUBCUTANEOUS at 03:11

## 2024-09-10 RX ADMIN — IOHEXOL 90 ML: 350 INJECTION, SOLUTION INTRAVENOUS at 02:19

## 2024-09-10 NOTE — DISCHARGE INSTRUCTIONS
At this time we are giving you 1 shot of Lovenox and then you will need to get an ultrasound of your lower extremities.  You will have to call central scheduling [phone] to schedule an outpatient ultrasound which can be done at the outpatient side of the hospital.    Please call your OB to discuss whether or not they want to see you in office sooner.    Return to ER if develop chest pain or shortness of breath.

## 2024-09-10 NOTE — TELEPHONE ENCOUNTER
Called patient to inform of Dr Tyler's recommendation to keep appointment as scheduled in office for 9/12/2024 & schedule bilateral venous doppler lower extremities asap - scheduled for patient today @ 3:00 pm @ [facility] (patient only has transportation today & needs appointment after 2 pm).  Patient states swelling is unchanged.  Patient informed of appointment time today.

## 2024-09-10 NOTE — TELEPHONE ENCOUNTER
"Regarding: Post Partum Leg Swelling, Headache  ----- Message from Juliann SPANN sent at 9/9/2024  9:30 PM EDT -----  \" I I had my baby on Sept 3rd and I have been having swelling all the way up my leg since I had my Emergency C- Section and I I have a Headache that won't go away. Which ER should I go to.\"    "

## 2024-09-10 NOTE — ED PROVIDER NOTES
History  Chief Complaint   Patient presents with    Leg Swelling     Pt arrives to the ED post partum as of 9/3. Pt c/o worsening leg swelling and a headache. Pt placed on lasix two days ago and does not feel as if it has improved. Covid + .      34-year-old female past medical history significant for recent  on  presenting to ED today for headache as well as bilateral lower extremity swelling.  She had been put on Lasix by her OB for this leg swelling which did not seem improvement.  Concern about possible clot.  She is also having a headache.  No nausea or vomiting.  No fevers or chills.  No recent sick contacts.        Prior to Admission Medications   Prescriptions Last Dose Informant Patient Reported? Taking?   Prenatal Vit-Fe Sulfate-FA-DHA (Prenatal Vitamin/Min +DHA) 27-0.8-200 MG CAPS   Yes No   Sig: Take 1 capsule by mouth in the morning   acetaminophen (TYLENOL) 325 mg tablet   No No   Sig: Take 3 tablets (975 mg total) by mouth every 8 (eight) hours   acetaminophen (TYLENOL) 500 mg tablet  Self No No   Sig: Take 1 tablet (500 mg total) by mouth every 6 (six) hours as needed for mild pain   albuterol (Proventil HFA) 90 mcg/act inhaler   No No   Sig: Inhale 2 puffs every 6 (six) hours as needed for wheezing   ferrous sulfate 324 (65 Fe) mg   No No   Sig: TAKE 1 TABLET BY MOUTH TWICE DAILY BEFORE MEALS   Patient taking differently: Take 324 mg by mouth daily before breakfast   furosemide (LASIX) 20 mg tablet   No No   Sig: Take 1 tablet (20 mg total) by mouth daily for 5 days   ibuprofen (MOTRIN) 600 mg tablet   No No   Sig: Take 1 tablet (600 mg total) by mouth every 6 (six) hours   lamoTRIgine (LaMICtal) 100 mg tablet  Self No No   Sig: Take 1 tablet (100 mg total) by mouth 2 (two) times a day Start 1 tab twice daily Aug 4th.   Patient taking differently: Take 225 mg by mouth 2 (two) times a day Taking  225 mg   lamoTRIgine (LaMICtal) 25 mg tablet   No No   Sig: START WITH 1 TABLET BY  MOUTH FOR 1 WEEK, THEN 1 TABLET TWICE DAILY THE 2ND WEEK, THEN 2 TABLETS TWICE DAILY THE 3RD WEEK (GENERIC FOR LAMICTAL)   levETIRAcetam (Keppra) 500 mg tablet   No No   Sig: Take 1 tablet (500 mg total) by mouth daily In morning.   levothyroxine 25 mcg tablet   No No   Sig: TAKE 1 TABLET BY MOUTH EVERY DAY IN THE MORNING   lidocaine (LIDODERM) 5 %   No No   Sig: Apply 1 patch topically over 12 hours daily Remove & Discard patch within 12 hours or as directed by MD   prenatal vitamin (CLASSIC FORMULA) 27-0.8 mg   Yes No   Sig: Take 1 tablet by mouth daily   Patient not taking: Reported on 8/19/2024   traMADol (ULTRAM) 50 mg tablet   No No   Sig: Take 1 tablet (50 mg total) by mouth every 6 (six) hours as needed for moderate pain for up to 10 days      Facility-Administered Medications: None       Past Medical History:   Diagnosis Date    Abnormal Pap smear of cervix     Anemia     Anxiety     Asthma     Autism     [place] esophagus     Bipolar disorder (HCC)     CPAP (continuous positive airway pressure) dependence     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HCC)     not diagnosed as of 1/9/23-trying to R/O    Depression     Diabetes mellitus (HCC)     Disease of thyroid gland     hypo    Dysphagia     solids and liquids    Female infertility     Fibromyalgia, primary     Gastric ulcer     History of bronchitis     Hypothyroidism     Iron deficiency anemia     infusion in 11/2022    Irregular menses     Miscarriage     Morbid obesity with BMI of 50.0-59.9, adult (HCC)     Plantar fasciitis of left foot     Polycystic ovary syndrome     Reflux esophagitis     Seizures (HCC)     last one 7/13/22    Sleep apnea     CPAP    Varicella     had disease    Wears glasses        Past Surgical History:   Procedure Laterality Date    EGD      with Bx due to barretts esophagus    EYE SURGERY Bilateral     lazy eye repair    GA BIOPSY OF LIP N/A 11/10/2022    Procedure: EXCISION BIOPSY SALVARY GLANDS LOWER LIP;  Surgeon: Casey Florez MD;   Location: [facility];  Service: ENT    WA CERCLAGE UTERINE CERVIX NONOBSTETRICAL N/A 2023    Procedure: CERCLAGE CERVICAL;  Surgeon: Ant Marinelli MD;  Location: [facility] ;  Service: Obstetrics    WA  DELIVERY ONLY N/A 9/3/2024    Procedure:  SECTION ();  Surgeon: Yany Juan MD;  [facility];  Service: Obstetrics    WA HYSTEROSCOPY BX ENDOMETRIUM&/POLYPC W/WO D&C N/A 2021    Procedure: DILATATION AND CURETTAGE (D&C) WITH HYSTEROSCOPY;  Surgeon: Albina Parsons MD;  Location: [facility];  Service: Gynecology    WISDOM TOOTH EXTRACTION         Family History   Problem Relation Age of Onset    Bipolar disorder Mother     Depression Mother     Depression Father     Other Son     Diabetes Maternal Grandmother     Thyroid disease Maternal Grandmother     Hypertension Maternal Grandmother     Heart disease Maternal Grandmother     Diabetes Maternal Grandfather     Diabetes Paternal Grandmother     Breast cancer Paternal Grandmother     Stroke Paternal Grandmother     Diabetes Paternal Grandfather     Breast cancer Maternal Aunt 35        bilateral    Stomach cancer Maternal Aunt      I have reviewed and agree with the history as documented.    E-Cigarette/Vaping    E-Cigarette Use Never User      E-Cigarette/Vaping Substances    Nicotine No     THC No     CBD No     Flavoring No     Other No     Unknown No      Social History     Tobacco Use    Smoking status: Never    Smokeless tobacco: Never   Vaping Use    Vaping status: Never Used   Substance Use Topics    Alcohol use: Not Currently     Comment: occ    Drug use: Not Currently     Types: Marijuana     Comment: about a couple times a week, quit 2023       Review of Systems   Constitutional:  Negative for chills and fever.   HENT:  Negative for hearing loss.    Eyes:  Negative for visual disturbance.   Respiratory:  Negative for shortness of breath.    Cardiovascular:  Positive for leg swelling. Negative for chest  pain.   Gastrointestinal:  Negative for abdominal pain, constipation, diarrhea, nausea and vomiting.   Genitourinary:  Negative for difficulty urinating.   Musculoskeletal:  Negative for myalgias.   Skin:  Negative for color change.   Neurological:  Positive for headaches. Negative for dizziness.   Psychiatric/Behavioral:  Negative for agitation.    All other systems reviewed and are negative.      Physical Exam  Physical Exam  Vitals and nursing note reviewed.   Constitutional:       General: She is not in acute distress.     Appearance: Normal appearance. She is well-developed. She is not ill-appearing.   HENT:      Head: Normocephalic and atraumatic.      Right Ear: External ear normal.      Left Ear: External ear normal.      Nose: Nose normal.      Mouth/Throat:      Mouth: Mucous membranes are moist.      Pharynx: Oropharynx is clear. No oropharyngeal exudate.   Eyes:      General:         Right eye: No discharge.         Left eye: No discharge.      Extraocular Movements: Extraocular movements intact.      Conjunctiva/sclera: Conjunctivae normal.      Pupils: Pupils are equal, round, and reactive to light.   Cardiovascular:      Rate and Rhythm: Normal rate and regular rhythm.      Heart sounds: Normal heart sounds. No murmur heard.     No friction rub. No gallop.   Pulmonary:      Effort: Pulmonary effort is normal. No respiratory distress.      Breath sounds: Normal breath sounds. No stridor. No wheezing.   Abdominal:      General: Bowel sounds are normal. There is no distension.      Palpations: Abdomen is soft.      Tenderness: There is no abdominal tenderness.   Musculoskeletal:         General: No swelling. Normal range of motion.      Cervical back: Normal range of motion and neck supple. No rigidity.   Skin:     General: Skin is warm and dry.      Capillary Refill: Capillary refill takes less than 2 seconds.   Neurological:      General: No focal deficit present.      Mental Status: She is alert and  oriented to person, place, and time. Mental status is at baseline.      Cranial Nerves: No cranial nerve deficit.      Motor: No weakness.      Gait: Gait normal.   Psychiatric:         Mood and Affect: Mood normal.         Behavior: Behavior normal.         Vital Signs  ED Triage Vitals   Temperature Pulse Respirations Blood Pressure SpO2   09/09/24 2300 09/09/24 2300 09/09/24 2300 09/09/24 2300 09/09/24 2300   98.7 °F (37.1 °C) 85 18 135/72 100 %      Temp Source Heart Rate Source Patient Position - Orthostatic VS BP Location FiO2 (%)   09/09/24 2300 09/09/24 2300 09/10/24 0209 09/10/24 0209 --   Oral Monitor Sitting Right arm       Pain Score       --                  Vitals:    09/09/24 2300 09/10/24 0209   BP: 135/72 138/69   Pulse: 85 82   Patient Position - Orthostatic VS:  Sitting         Visual Acuity      ED Medications  Medications   acetaminophen (Ofirmev) injection 1,000 mg (0 mg Intravenous Stopped 9/9/24 2355)   iohexol (OMNIPAQUE) 350 MG/ML injection (MULTI-DOSE) 90 mL (90 mL Intravenous Given 9/10/24 0219)   enoxaparin (LOVENOX) subcutaneous injection 105 mg (105 mg Subcutaneous Given 9/10/24 0311)       Diagnostic Studies  Results Reviewed       Procedure Component Value Units Date/Time    RBC Morphology Reflex Test [[id number]] Collected: 09/09/24 2337    Lab Status: Final result Specimen: Blood from Arm, Right Updated: 09/10/24 0101    D-dimer, quantitative [[id number]]  (Abnormal) Collected: 09/09/24 2337    Lab Status: Final result Specimen: Blood from Arm, Right Updated: 09/10/24 0010     D-Dimer, Quant 5.53 ug/ml FEU     CBC and differential [[id number]]  (Abnormal) Collected: 09/09/24 2337    Lab Status: Final result Specimen: Blood from Arm, Right Updated: 09/10/24 0006     WBC 5.21 Thousand/uL      RBC 2.99 Million/uL      Hemoglobin 8.6 g/dL      Hematocrit 27.3 %      MCV 91 fL      MCH 28.8 pg      MCHC 31.5 g/dL      RDW 15.0 %      MPV 8.8 fL      Platelets 381 Thousands/uL      Narrative:      This is an appended report.  These results have been appended to a previously verified report.    Manual Differential(PHLEBS Do Not Order) [[id number]]  (Abnormal) Collected: 09/09/24 2337    Lab Status: Final result Specimen: Blood from Arm, Right Updated: 09/10/24 0006     Segmented % 68 %      Lymphocytes % 23 %      Monocytes % 5 %      Eosinophils % 1 %      Basophils % 0 %      Metamyelocytes % 1 %      Myelocytes % 2 %      Absolute Neutrophils 3.54 Thousand/uL      Absolute Lymphocytes 1.20 Thousand/uL      Absolute Monocytes 0.26 Thousand/uL      Absolute Eosinophils 0.05 Thousand/uL      Absolute Basophils 0.00 Thousand/uL      Absolute Metamyelocytes 0.05 Thousand/uL      Absolute Myelocytes 0.10 Thousand/uL      Total Counted --     RBC Morphology Present     Platelet Estimate Adequate     Giant PLTs Present     Large Platelet Present     Macrocytes Present     Polychromasia Present    Comprehensive metabolic panel [[id number]]  (Abnormal) Collected: 09/09/24 2337    Lab Status: Final result Specimen: Blood from Arm, Right Updated: 09/10/24 0002     Sodium 139 mmol/L      Potassium 3.6 mmol/L      Chloride 105 mmol/L      CO2 26 mmol/L      ANION GAP 8 mmol/L      BUN 12 mg/dL      Creatinine 0.70 mg/dL      Glucose 89 mg/dL      Calcium 8.3 mg/dL      Corrected Calcium 9.2 mg/dL      AST 10 U/L      ALT 8 U/L      Alkaline Phosphatase 87 U/L      Total Protein 5.7 g/dL      Albumin 2.9 g/dL      Total Bilirubin 0.20 mg/dL      eGFR 114 ml/min/1.73sq m     Narrative:      National Kidney Disease Foundation guidelines for Chronic Kidney Disease (CKD):     Stage 1 with normal or high GFR (GFR > 90 mL/min/1.73 square meters)    Stage 2 Mild CKD (GFR = 60-89 mL/min/1.73 square meters)    Stage 3A Moderate CKD (GFR = 45-59 mL/min/1.73 square meters)    Stage 3B Moderate CKD (GFR = 30-44 mL/min/1.73 square meters)    Stage 4 Severe CKD (GFR = 15-29 mL/min/1.73 square meters)    Stage 5 End Stage  CKD (GFR <15 mL/min/1.73 square meters)  Note: GFR calculation is accurate only with a steady state creatinine    Magnesium [[id number]]  (Abnormal) Collected: 09/09/24 2337    Lab Status: Final result Specimen: Blood from Arm, Right Updated: 09/10/24 0002     Magnesium 1.8 mg/dL                    CT VENOGRAM BRAIN   Final Result by Jhonathan Hdz MD (09/10 0233)      Unremarkable CT venogram of the brain.         Workstation performed: [id number]          VAS VENOUS DUPLEX - LOWER LIMB BILATERAL    (Results Pending)              Procedures  Procedures         ED Course  ED Course as of 09/10/24 0428   Tue Sep 10, 2024   0257 I had reached out to OB. OB is in delivery. Patient does not want to wait for the response. She told me that she has to be home to tour a facility for her son.  I discussed with her that I do not have an answer on whether or not they would want her transferred.  Risks/benefits discussed with patient as I do not have management recommendations from OB. She understands that. Anna x4 for this conversation and understands the risks that she could be in more potential harm if we discharge without hearing their answer.                                 SBIRT 22yo+      Flowsheet Row Most Recent Value   Initial Alcohol Screen: US AUDIT-C     1. How often do you have a drink containing alcohol? 0 Filed at: 09/09/2024 2303   2. How many drinks containing alcohol do you have on a typical day you are drinking?  0 Filed at: 09/09/2024 2303   3b. FEMALE Any Age, or MALE 65+: How often do you have 4 or more drinks on one occassion? 0 Filed at: 09/09/2024 2303   Audit-C Score 0 Filed at: 09/09/2024 2303   RANDY: How many times in the past year have you...    Used an illegal drug or used a prescription medication for non-medical reasons? Never Filed at: 09/09/2024 2303                      Medical Decision Making  34-year-old female presenting to ED today with peripheral edema and headache.  At this time  headache could be venous sinus thrombosis given the fact that the patient has had recent delivery which does increase her risk for clots as well as her obesity.  In terms of her peripheral edema we will start with a dimer to see if it is elevated which will likely be elevated given that she is recently pregnant and so we will need at least Lovenox and outpatient ultrasound.  Will check a CBC to evaluate for possible preeclampsia/help as well as a CMP.  Her blood pressure is within normal limits which is good.    Lab workup and imaging remarkable for elevated D-dimer so she will need Lovenox and outpatient ultrasound.  Attempted to reach out to OB to get management recommendations however patient did not want a wait.  Discussed with her the risks and patient ultimately opted for discharge.  Return precautions discussed.  She was aware that she could be at risk for preeclampsia and that is why he wanted to talk to OB.  Patient discharged home.    Amount and/or Complexity of Data Reviewed  Labs: ordered.  Radiology: ordered.    Risk  Prescription drug management.                 Disposition  Final diagnoses:   Bilateral lower extremity edema   Positive D dimer   Headache     Time reflects when diagnosis was documented in both MDM as applicable and the Disposition within this note       Time User Action Codes Description Comment    9/10/2024  3:02 AM Booker Ramirez Add [R60.0] Bilateral lower extremity edema     9/10/2024  3:02 AM Booker Ramirez Add [R79.89] Positive D dimer     9/10/2024  3:02 AM Booker Ramirez Add [R51.9] Headache           ED Disposition       ED Disposition   Discharge    Condition   Stable    Date/Time   Tue Sep 10, 2024 0302    Comment   Margoth oCok discharge to home/self care.                   Follow-up Information       Follow up With Specialties Details Why Contact Info Additional Information    Ping Sims MD Obstetrics and Gynecology, Obstetrics, Gynecology Schedule an appointment as  soon as possible for a visit  For follow-up [address]  [facility] [address]  [phone]       [facility] Emergency Department Emergency Medicine Go to  If symptoms worsen, As needed [address] [facility] [address]  [phone] [facility] Emergency Department, [address], [place], [address]            Discharge Medication List as of 9/10/2024  3:05 AM        CONTINUE these medications which have NOT CHANGED    Details   !! acetaminophen (TYLENOL) 325 mg tablet Take 3 tablets (975 mg total) by mouth every 8 (eight) hours, Starting Sat 9/7/2024, Normal      !! acetaminophen (TYLENOL) 500 mg tablet Take 1 tablet (500 mg total) by mouth every 6 (six) hours as needed for mild pain, Starting Wed 9/22/2021, No Print      albuterol (Proventil HFA) 90 mcg/act inhaler Inhale 2 puffs every 6 (six) hours as needed for wheezing, Starting Mon 11/20/2023, Normal      ferrous sulfate 324 (65 Fe) mg TAKE 1 TABLET BY MOUTH TWICE DAILY BEFORE MEALS, Starting Sat 12/23/2023, Normal      furosemide (LASIX) 20 mg tablet Take 1 tablet (20 mg total) by mouth daily for 5 days, Starting Sun 9/8/2024, Until Fri 9/13/2024, Normal      ibuprofen (MOTRIN) 600 mg tablet Take 1 tablet (600 mg total) by mouth every 6 (six) hours, Starting Sat 9/7/2024, Normal      !! lamoTRIgine (LaMICtal) 100 mg tablet Take 1 tablet (100 mg total) by mouth 2 (two) times a day Start 1 tab twice daily Aug 4th., Starting Thu 7/13/2023, Normal      !! lamoTRIgine (LaMICtal) 25 mg tablet START WITH 1 TABLET BY MOUTH FOR 1 WEEK, THEN 1 TABLET TWICE DAILY THE 2ND WEEK, THEN 2 TABLETS TWICE DAILY THE 3RD WEEK (GENERIC FOR LAMICTAL), Normal      levETIRAcetam (Keppra) 500 mg tablet Take 1 tablet (500 mg total) by mouth daily In morning., Starting Wed 12/13/2023, Normal      levothyroxine 25 mcg tablet TAKE 1 TABLET BY MOUTH EVERY DAY IN THE MORNING, Starting Sat 12/23/2023, Normal       lidocaine (LIDODERM) 5 % Apply 1 patch topically over 12 hours daily Remove & Discard patch within 12 hours or as directed by MD, Starting Sat 9/7/2024, No Print      Prenatal Vit-Fe Sulfate-FA-DHA (Prenatal Vitamin/Min +DHA) 27-0.8-200 MG CAPS Take 1 capsule by mouth in the morning, Historical Med      prenatal vitamin (CLASSIC FORMULA) 27-0.8 mg Take 1 tablet by mouth daily, Historical Med      traMADol (ULTRAM) 50 mg tablet Take 1 tablet (50 mg total) by mouth every 6 (six) hours as needed for moderate pain for up to 10 days, Starting Sat 9/7/2024, Until Tue 9/17/2024 at 2359, Normal       !! - Potential duplicate medications found. Please discuss with provider.          Outpatient Discharge Orders    VAS VENOUS DUPLEX - LOWER LIMB BILATERAL   Standing Status: Future Standing Exp. Date: 09/10/28       PDMP Review       None            ED Provider  Electronically Signed by             Booker Ramirez MD  09/10/24 0429

## 2024-09-10 NOTE — TELEPHONE ENCOUNTER
----- Message from Mago GEORGES sent at 9/10/2024  1:17 PM EDT -----  Called pt to see how she was feeling.  States her symptoms are about the same.  She is scheduled to have venous doppler study today and doing her best to get there.  She is scheduled to see Dr. Mills on Thursday and advised if no improvement or worsening in symptoms then she needs to present back to the ER for further evaluation and management.    Mago  ----- Message -----  From: María Frankel MD  Sent: 9/10/2024   3:55 AM EDT  To: [facility] Clinical    Margoth Cook is a 34 y.o.  S/p CS c/b ICU admission for respiratory distress in s/o covid  Seen in [facility] ED yesterday with severe, persistent headache and lower extremity swelling, but left before completing evaluation for preeclampsia. Can someone please call and check on her later today and review with on call doc if any urgent concerns? Thanks!

## 2024-09-12 ENCOUNTER — PATIENT OUTREACH (OUTPATIENT)
Age: 34
End: 2024-09-12

## 2024-09-12 ENCOUNTER — OFFICE VISIT (OUTPATIENT)
Dept: OBGYN CLINIC | Facility: CLINIC | Age: 34
End: 2024-09-12

## 2024-09-12 VITALS
SYSTOLIC BLOOD PRESSURE: 120 MMHG | BODY MASS INDEX: 54.85 KG/M2 | DIASTOLIC BLOOD PRESSURE: 74 MMHG | WEIGHT: 237 LBS | HEIGHT: 55 IN

## 2024-09-12 DIAGNOSIS — Z98.891 S/P EMERGENCY CESAREAN SECTION: ICD-10-CM

## 2024-09-12 DIAGNOSIS — M79.89 SWELLING OF LOWER EXTREMITY: ICD-10-CM

## 2024-09-12 PROCEDURE — 99024 POSTOP FOLLOW-UP VISIT: CPT | Performed by: STUDENT IN AN ORGANIZED HEALTH CARE EDUCATION/TRAINING PROGRAM

## 2024-09-12 RX ORDER — FUROSEMIDE 20 MG
20 TABLET ORAL DAILY
Qty: 5 TABLET | Refills: 0 | Status: SHIPPED | OUTPATIENT
Start: 2024-09-12 | End: 2024-09-17

## 2024-09-12 RX ORDER — SERTRALINE HYDROCHLORIDE 25 MG/1
25 TABLET, FILM COATED ORAL DAILY
Qty: 30 TABLET | Refills: 1 | Status: SHIPPED | OUTPATIENT
Start: 2024-09-12 | End: 2024-09-19

## 2024-09-12 NOTE — PROGRESS NOTES
[facility]  Postpartum follow up  Katrina Mills MD  24    Assessment/Plan:   Margoth is a 34-year-old -2-2-2 postop day 9 from a stat primary  delivery for cord prolapse.  Incision care and postoperative pain was reviewed with patient today-encouraged her to take Motrin and Tylenol regularly with use of heat and ice.  Postpartum depression: Patient with history of depression and prior medications of pregnancy with multiple social circumstances surrounding this pregnancy and current care of her other child-I did review with Margoth today that I think is a good idea to start medication in the setting of concern for postpartum depression-we reviewed SSRIs and patient agreeable to trial of Zoloft starting with 25 mg this week and increasing dose to 50 mg next week.  We reviewed with side effects and risks including GI effects and worsening mood with suicidal ideation.  Currently patient denies any SI/HI she seems to have support from her friends.  Also recommend patient be seen by baby in the for postpartum mental health patient was agreeable to going to counseling and our office staff was tasked to set her up.  Patient scheduled prior to leaving for follow-up visit next week.  Lower extremity edema: Is bilateral and likely secondary to cardiovascular changes and fluid shifts in the postpartum period-patient was taking Lasix in the hospital but an outpatient and is amendable to trial of Lasix which was ordered for her today.  Reviewed baby me for lactation support.     Diagnoses and all orders for this visit:    Postpartum depression  -     sertraline (Zoloft) 25 mg tablet; Take 1 tablet (25 mg total) by mouth daily    Swelling of lower extremity  -     furosemide (LASIX) 20 mg tablet; Take 1 tablet (20 mg total) by mouth daily          Subjective:     Patient ID: Margoth Cook is a 34 y.o. female.    HPI  Margoth is a 34-year-old -2-2-2 who is postop day 9 from a stat   "delivery in the setting of cord prolapse who presents today for incision check and blood pressure check in the setting of persistent headache following delivery.  Patient reports headache is resolved and denies any preeclamptic symptoms.  She does report continued discomfort from lower extremity edema in addition to her incisional discomfort.  Patient was seen in the ED but did leave prior to evaluation and canceled LE dopplers- notes bilateral edema bothers her most- denies pain more in one extremity more than the other.  For pain she is taking Motrin and Tylenol-declines if narcotics is this makes her tired.  Denies any fevers, chills, right upper quadrant pain, visual disturbance, chest pain or shortness of breath.    Patient is extremely tearful on exam today-she dispelled feelings of guilt she has that her other child no he is currently admitted in [place] when she is trying to get assistance for him to come home and the fact that she is caring for her current son.  She reports feelings that baby does not do well with her and she feels extremely overwhelmed.  She is having challenges with breast-feeding and reports reaching out for assistance with mental health but was told that it was not recommended given she is breast-feeding.  Margoth denies any Mishel ideation or homicidal ideation and is present with her friend today who is helping her take care of baby.  Denies any fevers, chills,    Review of Systems    Per HPI    Objective:  /74 (BP Location: Left arm, Patient Position: Sitting, Cuff Size: Large)   Ht 4' 7\" (1.397 m)   Wt 108 kg (237 lb)   LMP 12/13/2023 (Exact Date)   Breastfeeding Yes Comment: Breastfeeding and bottlefeeding with formula  BMI 55.08 kg/m²      Physical Exam  Vitals reviewed.   Constitutional:       Appearance: Normal appearance. She is not ill-appearing or diaphoretic.   HENT:      Head: Normocephalic and atraumatic.   Cardiovascular:      Rate and Rhythm: Normal rate. "   Pulmonary:      Effort: Pulmonary effort is normal. No respiratory distress.   Abdominal:      Comments: Abdomen soft, appropriately tender postoperative.  Mepilex dressing was removed and underlying incision is clean and intact-area cleansed and dry dressing placed.  No overlying erythema or discharge noted.  Reviewed keeping the area clean and dry.   Musculoskeletal:      Right lower leg: Edema present.      Left lower leg: Edema present.      Comments: Equal bilateral lower extremity edema    Neurological:      Mental Status: She is alert.   Psychiatric:      Comments: Mood appears saddened and patient very tearful on exam today

## 2024-09-12 NOTE — PROGRESS NOTES
SERINA TIPTON had received a referral from , Diana Velasquez r/t food insecurity. SERINA TIPTON had completed a chart review. Per order, patient identified via [facility] report - food insecurity. OP SW will outreach patient to further assess needs.    OP DANUTA had called the patient via phone. OP SW had introduced herself and reason for consult. OP SW asked the patient how she was doing. Patient reported she was doing well.    Patient reported she still lives with friend/roommate, Selina. Patient reported Selina is main support. Patient reported Selina provides transportation or she uses [employer]. Patient reported she has SSD for income. Patient reported she has SNAP benefits.    OP SW discussed patient obtaining [facility] for children under 5 years old. Patient was referred on [employer] to [facility] [employer] (program) for food. Patient confirmed that she received message. Patient stated she would apply. Patient did not report any housing or utility issues at this time.    Patient reported that her son, Jay Cook 6/7/2023 goes to [facility] due to complex medical condition. Patient reported she has SW through [facility] for Jay. Patient reported she just gave birth to another son, Erik Cook 9/3/2024. Patient reported she has an appointment with [facility] for Erik. Patient reported [facility] RN, Nancy is still involved.    Per chart, patient has Medicare for insurance. Patient did not report any issues with medication cost. Patient reported she is independent with her ADLs/IADLs. Patient reported she is able to manage on her own.    Per chart, patient has dx of anxiety and depression. Patient [facility] for counseling and psychiatry. Per chart, patient has hx of marijuana use. Patient reported she quit Jan 2023. Patient denied any other needs at this time.    SERINA TIPTON had provided her contact information. OP DANUTA advised patient  reach out as needed. Patient agreed. Patient denied any continued SW outreach at this time. OP SW closed referral. Please reconsult as needed.

## 2024-09-13 ENCOUNTER — TELEPHONE (OUTPATIENT)
Dept: OBGYN CLINIC | Facility: CLINIC | Age: 34
End: 2024-09-13

## 2024-09-13 NOTE — TELEPHONE ENCOUNTER
----- Message from Katrina Mills MD sent at 9/12/2024  5:28 PM EDT -----  Regarding: Postpartum depression follow-up  Please can you please help facilitate postpartum depression follow-up and [facility] for patient-extremely tearful at today's visit and started on Zoloft with follow-up next week.    Thank you,  Katrina Mills MD

## 2024-09-13 NOTE — TELEPHONE ENCOUNTER
"Recalled patient to follow up on scheduling appt @ [facility] for postpartum depression.  Patient states she is feeling \"better\" today, states she denies harm to herself or others.  She will  prescription for Zoloft today & start as recommended.  [facility]ed [facility] & left message to recall office to assist patient in setting up appointment (she is not available on Thursdays).  "

## 2024-09-13 NOTE — TELEPHONE ENCOUNTER
Return call from Ness @ [facility] - will need to verify insurance (Medicare) with provider as to if patient can be seen @ [facility]

## 2024-09-13 NOTE — TELEPHONE ENCOUNTER
Phone call from Ness - will be able to see patient if only has Medicare (unable to see patient if secondary insurance is Medicaid - [place] secondary is inactive).  She will call patient to confirm & if able to schedule appointment, will do so.

## 2024-09-16 NOTE — TELEPHONE ENCOUNTER
Placed call to patient to f/u if able to connect/schedule with [facility] support center. Patient states she was able to communicate and unfortunately [facility] is unable to schedule/complete visit d/t her insurance. Patient states she was advised to call her insurance for in network/ available resources. Patient confirmed appt in office as scheduled for f/u 9/19/24 and is aware will review with [place] office staff for any alternative resources could offer patient at this time. Patient had no questions and verbalized understanding to call the office if any questions or concerns prior to scheduled appt in office.

## 2024-09-16 NOTE — TELEPHONE ENCOUNTER
Spoke with pt and advised there is a hotline for postpartum depression called speak up when you are down.  Provided number [phone].  Advised pt to call if any further questions or concerns. She is scheduled for follow up 9/19/2024

## 2024-09-17 ENCOUNTER — PATIENT OUTREACH (OUTPATIENT)
Age: 34
End: 2024-09-17

## 2024-09-17 ENCOUNTER — OFFICE VISIT (OUTPATIENT)
Age: 34
End: 2024-09-17

## 2024-09-17 VITALS
SYSTOLIC BLOOD PRESSURE: 123 MMHG | TEMPERATURE: 98.1 F | BODY MASS INDEX: 50.94 KG/M2 | DIASTOLIC BLOOD PRESSURE: 81 MMHG | HEIGHT: 55 IN | OXYGEN SATURATION: 98 % | HEART RATE: 74 BPM | WEIGHT: 220.1 LBS

## 2024-09-17 DIAGNOSIS — Z09 HOSPITAL DISCHARGE FOLLOW-UP: Primary | ICD-10-CM

## 2024-09-17 DIAGNOSIS — F31.9 BIPOLAR DEPRESSION (HCC): ICD-10-CM

## 2024-09-17 PROBLEM — E11.9 TYPE 2 DIABETES MELLITUS WITHOUT COMPLICATION, WITHOUT LONG-TERM CURRENT USE OF INSULIN (HCC): Status: RESOLVED | Noted: 2024-09-04 | Resolved: 2024-09-17

## 2024-09-17 PROBLEM — O09.899 HISTORY OF PRETERM DELIVERY, CURRENTLY PREGNANT: Status: RESOLVED | Noted: 2024-09-02 | Resolved: 2024-09-17

## 2024-09-17 PROBLEM — Z3A.37 37 WEEKS GESTATION OF PREGNANCY: Status: RESOLVED | Noted: 2024-07-19 | Resolved: 2024-09-17

## 2024-09-17 PROCEDURE — 99495 TRANSJ CARE MGMT MOD F2F 14D: CPT | Performed by: FAMILY MEDICINE
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
## 2024-09-18 NOTE — ASSESSMENT & PLAN NOTE
Patient noted to have diagnosis of bipolar depression  Previously on Abilify (stopped during most recent pregnancy)  Started on Lexapro by OBGYN    Plan  Continue Lexapro  Follow up with psychiatry

## 2024-09-19 ENCOUNTER — OFFICE VISIT (OUTPATIENT)
Dept: OBGYN CLINIC | Facility: CLINIC | Age: 34
End: 2024-09-19

## 2024-09-19 VITALS — BODY MASS INDEX: 51.83 KG/M2 | DIASTOLIC BLOOD PRESSURE: 74 MMHG | SYSTOLIC BLOOD PRESSURE: 120 MMHG | WEIGHT: 223 LBS

## 2024-09-19 PROCEDURE — 99024 POSTOP FOLLOW-UP VISIT: CPT | Performed by: STUDENT IN AN ORGANIZED HEALTH CARE EDUCATION/TRAINING PROGRAM

## 2024-09-19 RX ORDER — SERTRALINE HYDROCHLORIDE 25 MG/1
50 TABLET, FILM COATED ORAL DAILY
Qty: 30 TABLET | Refills: 1 | Status: SHIPPED | OUTPATIENT
Start: 2024-09-19

## 2024-09-19 NOTE — PROGRESS NOTES
[facility]  Postpartum depression follow-up  Katrina Mills MD  24      Assessment/Plan:   34-year-old -2-2-2 who is 2 weeks postop from a stat primary  delivery in the setting of cord prolapse with follow-up for postpartum depression.  EDPS in office today is 10 and patient feeling improved from last week.  Was unable to solidify mental health care within [facility] and me given insurance-information for [facility] which is a mental health facility in [place] which should be able to accept her insurance.  Continue Zoloft 50 mg daily.  Contraception: Patient planning for tubal ligation and has signed MA 31 2024.  Reviewed continued postop precautions and restrictions.  Return in 2 to 3 weeks for postpartum visit.    Subjective:     Patient ID: Margoth Cook is a 34 y.o. female.    HPI  Margoth is a 34-year-old  who is 2 weeks from a stat  delivery for cord prolapse following up in office this week given concern for very postpartum depression last visit.  Patient was initiated on Zoloft at that visit and was unable to initiate care in [place] with network [facility] given her insurance.  Margoth does report feeling much better and she is in better spirits when I spoke with her today.  She does report she initiated Zoloft and is now taking 50 mg daily and denies any concerning side effects including suicidal or homicidal ideation.  Jay her other son has since come home-and although she has not been able to solidify nursing care for him a she is continuing to work on this but is notably tired from having a  and a son with special needs at home but does report good support from her mother and friend. Her pain and lower extremity edema have since improved.  She is otherwise having no urinary or bowel concerns.    Review of Systems    Per HPI    Objective:  /74 (BP Location: Left arm, Patient Position: Sitting)   Wt 101 kg (223 lb)    LMP 12/13/2023 (Exact Date)   BMI 51.83 kg/m²      Physical Exam  Vitals reviewed.   Constitutional:       Appearance: Normal appearance. She is not ill-appearing or diaphoretic.   HENT:      Head: Normocephalic and atraumatic.   Cardiovascular:      Rate and Rhythm: Normal rate.   Pulmonary:      Effort: Pulmonary effort is normal. No respiratory distress.   Abdominal:      Palpations: Abdomen is soft.      Tenderness: There is no abdominal tenderness. There is no guarding or rebound.      Comments: Incision is clean/dry/intact.   Musculoskeletal:      Comments: Lower extremity edema improved   Skin:     General: Skin is warm and dry.   Neurological:      Mental Status: She is alert and oriented to person, place, and time.   Psychiatric:      Comments: Does not make good eye contact does appear in better spirits today than last week.

## 2024-09-19 NOTE — PATIENT INSTRUCTIONS
"Patient Education     Depression during and after pregnancy - Discharge instructions   The Basics   Written by the doctors and editors at [facility]   What are discharge instructions? -- Discharge instructions are information about how to take care of yourself after getting medical care for a health problem.  What is depression? -- Depression is a disorder that makes you sad, but it is different than normal sadness. For many people, pregnancy is a happy time. But some people have depression while they are pregnant or after they have their baby:    depression - This is the medical term for depression during pregnancy. \"\" means the period of time before a baby is born.   Postpartum depression - This is a kind of depression that some people get after having a baby. \"Postpartum\" means the period of time shortly after giving birth. But depression can affect all parents, not just the person who gives birth.  Depression can make it hard to eat, sleep, work, or enjoy life. It can also make it hard to care for yourself and your baby or other children.  Get help right away if you are thinking of hurting or killing yourself! -- Sometimes, people with depression think of hurting or killing themselves. If you ever feel like you might hurt yourself or someone else, help is available:   In the [place], contact the [employer] Suicide & Crisis Lifeline:   To speak to someone, call or text [employer].   To talk to someone online, go to www.[employer].org/chat.   Call your doctor or nurse, and tell them that it is an emergency.   Call for an ambulance (in the [place] and [place], call ).   Go to the emergency department at the nearest hospital.  How do I care for myself at home? -- Make sure that you understand exactly what you need to do to care for yourself. Ask questions if there is anything you do not understand.  You should also:   Take your medicines exactly as your doctor tells you to, so you get the correct amount of each " medicine.   Some people find it helpful to use reminders or a weekly pill box.   If you are having side effects, talk to your doctor. Many side effects go away after a few days or weeks.   Go to all of your appointments. This might include counseling sessions, support groups, or medical appointments.   Avoid alcohol and recreational drugs.   Try to get regular physical activity when you are able. Even gentle forms of activity, like walking, are good for your health.   Ask a trusted friend or family member to help with the baby, if possible. They can also help with things like cooking, cleaning, and other chores. Having help, especially at night so you can sleep, might help you be able to cope better.   Try to get at least 6 to 8 hours of sleep every night if possible. This can be very hard to do with a new baby, but it is important to get as much sleep as you can. When possible, rest or nap when your baby sleeps.   Speak with trusted family or friends about your depression and how they can help.   Find healthy ways to handle stress, like focusing on doing only the tasks that are most essential. It can also help to stay connected to others. Deep breathing, relaxation exercises, meditation, and activities like yoga or viola chi can also help you handle stress. Try to be gentle with yourself.   Try to eat a healthy diet that includes plenty of fruits, vegetables, whole grains, and low-fat dairy products. This can help improve your overall health.  What follow-up care do I need? -- Depression needs to be watched closely. Your doctor or nurse might ask you to make a follow-up appointment to check on your progress. Go to these appointments.  When should I call the doctor? -- Call for advice if:   Your depression does not get better within 1 or 2 weeks.   Your depression is getting worse.   Your family or friends say that they are worried about you.   You continue to have problems eating or sleeping.   You are having trouble  functioning at work, at home, or in school.  All topics are updated as new evidence becomes available and our peer review process is complete.  This topic retrieved from [employer] on: Feb 26, 2024.  Topic 609238 Version 1.0  Release: 32.2.4 - C32.56  © 2024 UpToDate, Inc. and/or its affiliates. All rights reserved.  Consumer Information Use and Disclaimer   Disclaimer: This generalized information is a limited summary of diagnosis, treatment, and/or medication information. It is not meant to be comprehensive and should be used as a tool to help the user understand and/or assess potential diagnostic and treatment options. It does NOT include all information about conditions, treatments, medications, side effects, or risks that may apply to a specific patient. It is not intended to be medical advice or a substitute for the medical advice, diagnosis, or treatment of a health care provider based on the health care provider's examination and assessment of a patient's specific and unique circumstances. Patients must speak with a health care provider for complete information about their health, medical questions, and treatment options, including any risks or benefits regarding use of medications. This information does not endorse any treatments or medications as safe, effective, or approved for treating a specific patient. UpToDate, Inc. and its affiliates disclaim any warranty or liability relating to this information or the use thereof.The use of this information is governed by the Terms of Use, available at https://www.[employer] Biotechuwer.com/en/know/clinical-effectiveness-terms. 2024© UpToDate, Inc. and its affiliates and/or licensors. All rights reserved.  Copyright   © 2024 UpToDate, Inc. and/or its affiliates. All rights reserved.

## 2024-09-20 ENCOUNTER — PATIENT OUTREACH (OUTPATIENT)
Age: 34
End: 2024-09-20

## 2024-09-20 NOTE — PROGRESS NOTES
[facility] reports the following:    Pt is receiving the following services for the care of her son Jay.    As of 9/16 plan was in place.     [facility] Provides: Monday -Sunday 7am- 7 pm     [facility] provides: Friday through Monday 7p-7a    [facility] T-W-Th 8p-6a     Admission to [facility] was approved. Transportation via [employer] was also approved to take patient to the facility to see her son. Pt declined this option.     Pt was advised  that there could be changes in availability of staffing.     Luz reports that patients room mate was educated on ventilator and trach care.     Luz was planning on doing home visit today to follow up.     The above staffing information was placed on a sticky note in both mothers and child's chart.     Providers made aware.

## 2024-09-20 NOTE — PROGRESS NOTES
Provider requested assistance from this RN RICHARD. Provider requested that RN CM meet with patient. RN CM met with patient, Dr. Chavez and Dr. Mata. Pt crying. Pt has a 15 month old ventilator dependent child with complex medical needs. Child is a not a patient at this practice. Pt report she is not receiving enough help to meet her sons needs. Pt reports she only receives a nurse 3 days a week from 10pm to 6 am. Pt reports that this is not enough assistance.     Explained challenges in obtaining in home assistance in this demographic area, however RN CM will do chart review and investigate.     Chart notes reviewed. It is noted that patient was advised against taking patient home on multiple occasions. Pt was advised of challenges in finding around the clock care that she is requesting to meet the patients needs.     RN noted in one encounter that [facility] RICHARD Fajardo is involved this case. RN CM will reach out to Luz.

## 2024-09-26 ENCOUNTER — NURSE TRIAGE (OUTPATIENT)
Age: 34
End: 2024-09-26

## 2024-09-26 NOTE — TELEPHONE ENCOUNTER
"Patient is s/p c/s on 9/3/2024. Patient notes minimal bleeding/spotting after delivery that had stopped. She was feeling well until about 2 days ago when she started having increased vaginal bleeding. Notes changing her pad every 2-2.5 hrs with some dime sized clots. She also notes some serosanguinous drainage from her incision and increased pelvic/abdominal pain similar to immediate post-op. Pain is 3-4/10 described as a sharp pulling pain that she has started taking ibuprofen again for. Not breastfeeding, not on hormones or blood thinners. Has home nurse who checked her BP today and it was 143/86. Notes a mild headache earlier but had a rough night with baby. Unsure of what incision looks like currently. Unsure of any open areas or appearance of infections. Requested patient have roommate look at incision and/or take photo and send via [employer] for review. Denies any other symptoms.     Reviewed to call or seek emergency care if she is saturating a pad with blood every hour for 2 or more hours, passes a clot the size of a golf ball or larger, or if she has severe, debilitating pain that makes it difficult to get out of bed or walk. Additionally if she develops a fever, HA that does not resolve with tylenol or any vision changes should call or seek emergency care.    Patient verbalizes understanding.    HP in basket message to Dr. Mills for review and further recommendations.    Reason for Disposition   Normal postpartum bleeding    Answer Assessment - Initial Assessment Questions  1. ONSET: \"Describe your bleeding: is it getting worse, staying the same, improving, or stopping and starting?\"      2 days  2. AMOUNT: \"How much bleeding are you having today?\"      - SPOTTING: spotting, or pinkish / brownish mucous discharge; does not fill panti-liner or pad     - MILD:  less than 1 pad / hour; less than patient's usual menstrual bleeding    - MODERATE: 1-2 pads / hour; 1 menstrual cup every 6 hours; small-medium " "blood clots (e.g., pea, grape, small coin)    - SEVERE: soaking 2 or more pads/hour for 2 or more hours; 1 menstrual cup every 2 hours; bleeding not contained by pads or continuous red blood from vagina; large blood clots (e.g., golf ball, large coin)       Mild - changing pad every 2-2.5 hours, some dimes sized clots  3. ABDOMINAL PAIN: \"Do you have any pain?\" \"How bad is the pain?\" \"What does it keep you from doing?\"      - MILD -  doesn't interfere with normal activities, abdomen soft and not tender to touch      - MODERATE - interferes with normal activities or awakens from sleep, tender to touch      - SEVERE - excruciating pain, doubled over, unable to do any normal activities        3-4/10 pain sharp/pulling sensation  4. HORMONES: \"Are you taking any birth control medications?\" (e.g., birth control pills, Depo-Provera)      Denies  5. BLOOD THINNERS: \"Do you take any blood thinners?\" (e.g., coumadin, aspirin)      Denies  6. OTHER SYMPTOMS: \"Do you have any other symptoms?\" (e.g., fever, chills, dizziness)      Denies fever, some pelvic pressure with urination  7. DELIVERY DATE: \"When was your delivery date?\" \"Vaginal delivery or ?\"      9/3/2024  8. BREASTFEEDING: \"Are you breastfeeding?\"      Denies    Elevated /86    Protocols used: Postpartum - Vaginal Bleeding and Lochia-ADULT-[place]    "

## 2024-09-27 NOTE — TELEPHONE ENCOUNTER
Spoke with pt, says the bleeding has slowed down, she is changing pad every 3 hours. Pain is still the same rate of 4. Says she is no longer seeing blood with her discharge. Overall feeling better than yesterday. Please advise if pt should come in for evaluation.

## 2024-09-27 NOTE — TELEPHONE ENCOUNTER
Spoke with pt and states the bleeding is not that significant at this time and feels comfortable waiting until her scheduled appt 10/14/2024.  Pt was advised if any concerns to please call the office so we can get her in sooner for evaluation.

## 2024-10-01 ENCOUNTER — HOSPITAL ENCOUNTER (EMERGENCY)
Facility: HOSPITAL | Age: 34
Discharge: HOME | End: 2024-10-01
Attending: EMERGENCY MEDICINE | Admitting: EMERGENCY MEDICINE
Payer: MEDICARE

## 2024-10-01 ENCOUNTER — NURSE TRIAGE (OUTPATIENT)
Dept: OTHER | Facility: OTHER | Age: 34
End: 2024-10-01

## 2024-10-01 ENCOUNTER — APPOINTMENT (EMERGENCY)
Dept: ULTRASOUND IMAGING | Facility: HOSPITAL | Age: 34
End: 2024-10-01
Payer: MEDICARE

## 2024-10-01 VITALS
RESPIRATION RATE: 18 BRPM | SYSTOLIC BLOOD PRESSURE: 135 MMHG | TEMPERATURE: 97.7 F | DIASTOLIC BLOOD PRESSURE: 76 MMHG | OXYGEN SATURATION: 99 % | HEART RATE: 77 BPM

## 2024-10-01 DIAGNOSIS — R10.2 PELVIC PAIN: ICD-10-CM

## 2024-10-01 DIAGNOSIS — N93.9 VAGINAL BLEEDING: Primary | ICD-10-CM

## 2024-10-01 LAB
ALBUMIN SERPL BCG-MCNC: 3.9 G/DL (ref 3.5–5)
ALP SERPL-CCNC: 121 U/L (ref 34–104)
ALT SERPL W P-5'-P-CCNC: 7 U/L (ref 7–52)
ANION GAP SERPL CALCULATED.3IONS-SCNC: 9 MMOL/L (ref 4–13)
AST SERPL W P-5'-P-CCNC: 10 U/L (ref 13–39)
BASOPHILS # BLD AUTO: 0.04 THOUSANDS/ÂΜL (ref 0–0.1)
BASOPHILS NFR BLD AUTO: 1 % (ref 0–1)
BILIRUB SERPL-MCNC: 0.28 MG/DL (ref 0.2–1)
BUN SERPL-MCNC: 21 MG/DL (ref 5–25)
CALCIUM SERPL-MCNC: 9.3 MG/DL (ref 8.4–10.2)
CHLORIDE SERPL-SCNC: 104 MMOL/L (ref 96–108)
CO2 SERPL-SCNC: 26 MMOL/L (ref 21–32)
CREAT SERPL-MCNC: 0.65 MG/DL (ref 0.6–1.3)
EOSINOPHIL # BLD AUTO: 0.1 THOUSAND/ÂΜL (ref 0–0.61)
EOSINOPHIL NFR BLD AUTO: 2 % (ref 0–6)
ERYTHROCYTE [DISTWIDTH] IN BLOOD BY AUTOMATED COUNT: 13.9 % (ref 11.6–15.1)
GFR SERPL CREATININE-BSD FRML MDRD: 117 ML/MIN/1.73SQ M
GLUCOSE SERPL-MCNC: 100 MG/DL (ref 65–140)
HCT VFR BLD AUTO: 35.1 % (ref 34.8–46.1)
HGB BLD-MCNC: 11.1 G/DL (ref 11.5–15.4)
IMM GRANULOCYTES # BLD AUTO: 0.01 THOUSAND/UL (ref 0–0.2)
IMM GRANULOCYTES NFR BLD AUTO: 0 % (ref 0–2)
LIPASE SERPL-CCNC: 15 U/L (ref 11–82)
LYMPHOCYTES # BLD AUTO: 1.11 THOUSANDS/ÂΜL (ref 0.6–4.47)
LYMPHOCYTES NFR BLD AUTO: 22 % (ref 14–44)
MCH RBC QN AUTO: 27.7 PG (ref 26.8–34.3)
MCHC RBC AUTO-ENTMCNC: 31.6 G/DL (ref 31.4–37.4)
MCV RBC AUTO: 88 FL (ref 82–98)
MONOCYTES # BLD AUTO: 0.32 THOUSAND/ÂΜL (ref 0.17–1.22)
MONOCYTES NFR BLD AUTO: 6 % (ref 4–12)
NEUTROPHILS # BLD AUTO: 3.41 THOUSANDS/ÂΜL (ref 1.85–7.62)
NEUTS SEG NFR BLD AUTO: 69 % (ref 43–75)
NRBC BLD AUTO-RTO: 0 /100 WBCS
PLATELET # BLD AUTO: 341 THOUSANDS/UL (ref 149–390)
PMV BLD AUTO: 8.7 FL (ref 8.9–12.7)
POTASSIUM SERPL-SCNC: 3.6 MMOL/L (ref 3.5–5.3)
PROT SERPL-MCNC: 7.1 G/DL (ref 6.4–8.4)
RBC # BLD AUTO: 4.01 MILLION/UL (ref 3.81–5.12)
SODIUM SERPL-SCNC: 139 MMOL/L (ref 135–147)
WBC # BLD AUTO: 4.99 THOUSAND/UL (ref 4.31–10.16)

## 2024-10-01 PROCEDURE — 36415 COLL VENOUS BLD VENIPUNCTURE: CPT

## 2024-10-01 PROCEDURE — 76856 US EXAM PELVIC COMPLETE: CPT

## 2024-10-01 PROCEDURE — 76830 TRANSVAGINAL US NON-OB: CPT

## 2024-10-01 PROCEDURE — 99284 EMERGENCY DEPT VISIT MOD MDM: CPT | Performed by: PHYSICIAN ASSISTANT

## 2024-10-01 PROCEDURE — 99283 EMERGENCY DEPT VISIT LOW MDM: CPT

## 2024-10-01 PROCEDURE — 85025 COMPLETE CBC W/AUTO DIFF WBC: CPT | Performed by: EMERGENCY MEDICINE

## 2024-10-01 PROCEDURE — 83690 ASSAY OF LIPASE: CPT | Performed by: EMERGENCY MEDICINE

## 2024-10-01 PROCEDURE — 80053 COMPREHEN METABOLIC PANEL: CPT | Performed by: EMERGENCY MEDICINE

## 2024-10-01 NOTE — TELEPHONE ENCOUNTER
"Reason for Disposition  • SEVERE vaginal bleeding (e.g., soaking 2 pads or tampons per hour and present 2 or more hours; 1 menstrual cup every 2 hours)    Answer Assessment - Initial Assessment Questions  1. ONSET: \"When did the bleeding start?\" \"Describe your bleeding: is it getting worse, staying the same, improving, or stopping and starting?\"      Has been bleeding since giving birth. Has been intermittently worse.  2. BLEEDING SEVERITY: \"Describe the bleeding that you are having.\" \"How much bleeding is there?\"       Moderate amount of blood   3. ABDOMEN PAIN: \"Do you have any pain?\" \"How bad is the pain?\"  (e.g., Scale 0-10; none, mild, moderate, or severe)      Moderate abdominal pain since surgery- hurting whenever she walks  4. HORMONES: \"Are you taking any birth control medicines?\" (e.g., birth control pills, Depo-Provera)      denies  5. BLOOD THINNERS: \"Do you take any blood thinners?\" (e.g., aspirin, enoxaparin / Lovenox, warfarin / Coumadin)      denies  6. OTHER SYMPTOMS: \"Do you have any other symptoms?\" (e.g., fever, chills, dizziness)      Dizziness intermittently  7. DELIVERY DATE: \"When was your delivery date?\" \"Vaginal delivery or ?\"       on 9/3/2024  8. BREASTFEEDING: \"Are you breastfeeding?\"      denies    Protocols used: Postpartum - Vaginal Bleeding and Lochia-Adult-[facility]    "

## 2024-10-01 NOTE — ED PROVIDER NOTES
"Final diagnoses:   Vaginal bleeding   Pelvic pain     ED Disposition       ED Disposition   Discharge    Condition   Stable    Date/Time   Tue Oct 1, 2024  1:35 PM    Comment   Margoth Cook discharge to home/self care.                   Assessment & Plan       Medical Decision Making  Patient is a 33 y/o  female, s/p  on 9/3/2024, presenting to the ED for evaluation of vaginal bleeding and pelvic pain.      DDx including but not limited to: retained products of conception, menses, DUB.    Patient's labs are overall unremarkable.  Hemoglobin is 11.1 which is stable and improved compared to labs from 3 weeks ago.  She has a minimally elevated alk phos which is also similar to prior labs and is not new.  Discussed with OB/GYN who recommended an ultrasound for further evaluation.  Pelvic ultrasound shows \"complex fluid within the endometrial canal\" but was otherwise unremarkable.  She appears comfortable and denies any heavy bleeding at this time.  She has very minimal tenderness in the suprapubic region on exam, but her incision is well-healed and appears clean/dry/intact with no signs of drainage or acute infection.  Per OB/GYN, this likely represents the return of menses and states that this is normal for the first cycle after delivery.  They recommend that patient follow-up at her scheduled appointment on 10/14.  I advised patient to return to the ED immediately if she develops any worsening pain or increased vaginal bleeding in the meantime.    The management plan was discussed in detail with the patient at bedside and all questions were answered. Strict ED return instructions were discussed at bedside. Prior to discharge, both verbal and written instructions were provided. We discussed the signs and symptoms that should prompt the patient to return to the ED. All questions were answered and the patient was comfortable with the plan of care and discharged home. The patient agrees to return to " the Emergency Department for concerns and/or progression of illness.     Amount and/or Complexity of Data Reviewed  Labs: ordered.  Radiology: ordered.  Discussion of management or test interpretation with external provider(s): Dr. Rangel (OBGYN)             Medications - No data to display    ED Risk Strat Scores                                               History of Present Illness       Chief Complaint   Patient presents with    Postpartum Complications     Patient reports  she had emergency c section on 9/3, has had abdominal pain since but today started to pass clots larger than a quarter, but bleeding has slowed down.        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HCC)     not diagnosed as of 23-trying to R/O    Depression     Diabetes mellitus (HCC)     Disease of thyroid gland     hypo    Dysphagia     solids and liquids    Female infertility     Fibromyalgia, primary     Gastric ulcer     History of bronchitis     History of  delivery, currently pregnant 2024     at 22 weeks  Son has a ATOH1 gene      Hypothyroidism     Iron deficiency anemia     infusion in 2022    Irregular menses     Marijuana use     Patient reported she quit 2023    Miscarriage     Morbid obesity with BMI of 50.0-59.9, adult (HCC)     Plantar fasciitis of left foot     Polycystic ovary syndrome     Reflux esophagitis     Seizures (HCC)     last one 22    Sleep apnea     CPAP    Varicella     had disease    Wears glasses       Past Surgical History:   Procedure Laterality Date    EGD      with Bx due to barretts esophagus    EYE SURGERY Bilateral     lazy eye repair    IA BIOPSY OF LIP N/A 11/10/2022    Procedure: EXCISION BIOPSY SALVARY GLANDS  LOWER LIP;  Surgeon: Casey Florez MD;  Location: [facility];  Service: ENT    [place] CERCLAGE UTERINE CERVIX NONOBSTETRICAL N/A 2023    Procedure: CERCLAGE CERVICAL;  Surgeon: Ant Marinelli MD;  Location: [facility] ;  Service: Obstetrics    N[place]  DELIVERY ONLY N/A 9/3/2024    Procedure:  SECTION ();  Surgeon: Yany Juan MD;  [facility]: [facility];  Service: Obstetrics    [place] HYSTEROSCOPY BX ENDOMETRIUM&/POLYPC W/WO D&C N/A 2021    Procedure: DILATATION AND CURETTAGE (D&C) WITH HYSTEROSCOPY;  Surgeon: Albina Parsons MD;  Location: [facility];  Service: Gynecology    WISDOM TOOTH EXTRACTION        Family History   Problem Relation Age of Onset    Bipolar disorder Mother     Depression Mother     Depression Father     Other Son     Diabetes Maternal Grandmother     Thyroid disease Maternal Grandmother     Hypertension Maternal Grandmother     Heart disease Maternal Grandmother     Diabetes Maternal Grandfather     Diabetes Paternal Grandmother     Breast cancer Paternal Grandmother     Stroke Paternal Grandmother     Diabetes Paternal Grandfather     Breast cancer Maternal Aunt 35        bilateral    Stomach cancer Maternal Aunt       Social History     Tobacco Use    Smoking status: Never    Smokeless tobacco: Never   Vaping Use    Vaping status: Never Used   Substance Use Topics    Alcohol use: Not Currently     Comment: occ    Drug use: Not Currently     Types: Marijuana     Comment: about a couple times a week, quit 2023      E-Cigarette/Vaping    E-Cigarette Use Never User       E-Cigarette/Vaping Substances    Nicotine No     THC No     CBD No     Flavoring No     Other No     Unknown No       I have reviewed and agree with the history as documented.     Patient is a 33 y/o  female, s/p  on 9/3/2024, presenting to the ED for evaluation of vaginal bleeding and pelvic pain.  Patient had a stat  for cord prolapse on 9/3/2024.  She states that she  has had very light vaginal spotting intermittently since delivery; however, states that she had increased vaginal bleeding that started about 5 days ago.  She states that this bleeding was consistent with a normal period and she was changing a pad every 2-3 hours. She also reports increased lower abdominal pain/cramping that started around the same time. She states that the pelvic cramping worsened this morning and she went to the bathroom and passed 4-5 large golf-ball sized clots. She states that she only has very minimal spotting between passage of clots and is not having any continuous or heavy vaginal bleeding. Patient states that she was having some serosanguineous drainage from the  incision site a few days ago but says that this has resolved.  She denies any dizziness, lightheadedness, blurred vision, fevers, chills, nausea, vomiting, diarrhea or urinary symptoms.  She denies any chest pain, shortness of breath or lower extremity edema.        Review of Systems   Constitutional:  Negative for chills and fever.   HENT:  Negative for congestion, rhinorrhea and sore throat.    Eyes:  Negative for visual disturbance.   Respiratory:  Negative for cough and shortness of breath.    Cardiovascular:  Negative for chest pain, palpitations and leg swelling.   Gastrointestinal:  Negative for constipation, diarrhea, nausea and vomiting.   Genitourinary:  Positive for pelvic pain and vaginal bleeding. Negative for dysuria, flank pain, frequency and hematuria.   Musculoskeletal:  Negative for back pain and neck pain.   Skin:  Negative for rash.   Neurological:  Negative for dizziness, syncope, weakness and headaches.   All other systems reviewed and are negative.          Objective       ED Triage Vitals [10/01/24 1001]   Temperature Pulse Blood Pressure Respirations SpO2 Patient Position - Orthostatic VS   97.7 °F (36.5 °C) 77 151/97 18 99 % Sitting      Temp Source Heart Rate Source BP Location FiO2 (%) Pain  Score    Oral Monitor Right arm -- --      Vitals      Date and Time Temp Pulse SpO2 Resp BP Pain Score FACES Pain Rating User   10/01/24 1118 -- -- -- -- 135/76 -- -- LH   10/01/24 1001 97.7 °F (36.5 °C) 77 99 % 18 151/97 -- -- AK            Physical Exam  Vitals and nursing note reviewed.   Constitutional:       General: She is awake.      Appearance: Normal appearance. She is well-developed. She is not toxic-appearing or diaphoretic.   HENT:      Head: Normocephalic and atraumatic.      Right Ear: External ear normal.      Left Ear: External ear normal.      Nose: Nose normal.      Mouth/Throat:      Lips: Pink.      Mouth: Mucous membranes are moist.   Eyes:      General: Lids are normal. No scleral icterus.     Conjunctiva/sclera: Conjunctivae normal.      Pupils: Pupils are equal, round, and reactive to light.   Cardiovascular:      Rate and Rhythm: Normal rate and regular rhythm.      Pulses: Normal pulses.           Radial pulses are 2+ on the right side and 2+ on the left side.      Heart sounds: Normal heart sounds, S1 normal and S2 normal.   Pulmonary:      Effort: Pulmonary effort is normal. No accessory muscle usage.      Breath sounds: Normal breath sounds. No stridor. No decreased breath sounds, wheezing, rhonchi or rales.   Abdominal:      General: Abdomen is flat. Bowel sounds are normal. There is no distension.      Palpations: Abdomen is soft.      Tenderness: There is abdominal tenderness in the suprapubic area. There is no right CVA tenderness, left CVA tenderness, guarding or rebound.      Comments: Transverse incision site over lower pelvis is clean/dry/intact with no drainage or surrounding erythema/warmth.  Very mild suprapubic tenderness.  No rebound tenderness, guarding or rigidity.   Musculoskeletal:      Cervical back: Full passive range of motion without pain and neck supple. No signs of trauma. No pain with movement.      Right lower leg: No edema.      Left lower leg: No edema.    Lymphadenopathy:      Cervical: No cervical adenopathy.   Skin:     General: Skin is warm and dry.      Capillary Refill: Capillary refill takes less than 2 seconds.      Coloration: Skin is not cyanotic, jaundiced or pale.   Neurological:      Mental Status: She is alert and oriented to person, place, and time.      GCS: GCS eye subscore is 4. GCS verbal subscore is 5. GCS motor subscore is 6.      Gait: Gait normal.   Psychiatric:         Mood and Affect: Mood normal.         Speech: Speech normal.         Behavior: Behavior is cooperative.         Results Reviewed       Procedure Component Value Units Date/Time    Comprehensive metabolic panel [[id number]]  (Abnormal) Collected: 10/01/24 1053    Lab Status: Final result Specimen: Blood from Arm, Right Updated: 10/01/24 1128     Sodium 139 mmol/L      Potassium 3.6 mmol/L      Chloride 104 mmol/L      CO2 26 mmol/L      ANION GAP 9 mmol/L      BUN 21 mg/dL      Creatinine 0.65 mg/dL      Glucose 100 mg/dL      Calcium 9.3 mg/dL      AST 10 U/L      ALT 7 U/L      Alkaline Phosphatase 121 U/L      Total Protein 7.1 g/dL      Albumin 3.9 g/dL      Total Bilirubin 0.28 mg/dL      eGFR 117 ml/min/1.73sq m     Narrative:      National Kidney Disease Foundation guidelines for Chronic Kidney Disease (CKD):     Stage 1 with normal or high GFR (GFR > 90 mL/min/1.73 square meters)    Stage 2 Mild CKD (GFR = 60-89 mL/min/1.73 square meters)    Stage 3A Moderate CKD (GFR = 45-59 mL/min/1.73 square meters)    Stage 3B Moderate CKD (GFR = 30-44 mL/min/1.73 square meters)    Stage 4 Severe CKD (GFR = 15-29 mL/min/1.73 square meters)    Stage 5 End Stage CKD (GFR <15 mL/min/1.73 square meters)  Note: GFR calculation is accurate only with a steady state creatinine    Lipase [[id number]]  (Normal) Collected: 10/01/24 1053    Lab Status: Final result Specimen: Blood from Arm, Right Updated: 10/01/24 1128     Lipase 15 u/L     CBC and differential [[id number]]  (Abnormal) Collected:  10/01/24 1053    Lab Status: Final result Specimen: Blood from Arm, Right Updated: 10/01/24 1115     WBC 4.99 Thousand/uL      RBC 4.01 Million/uL      Hemoglobin 11.1 g/dL      Hematocrit 35.1 %      MCV 88 fL      MCH 27.7 pg      MCHC 31.6 g/dL      RDW 13.9 %      MPV 8.7 fL      Platelets 341 Thousands/uL      nRBC 0 /100 WBCs      Segmented % 69 %      Immature Grans % 0 %      Lymphocytes % 22 %      Monocytes % 6 %      Eosinophils Relative 2 %      Basophils Relative 1 %      Absolute Neutrophils 3.41 Thousands/µL      Absolute Immature Grans 0.01 Thousand/uL      Absolute Lymphocytes 1.11 Thousands/µL      Absolute Monocytes 0.32 Thousand/µL      Eosinophils Absolute 0.10 Thousand/µL      Basophils Absolute 0.04 Thousands/µL             US pelvis complete w transvaginal   Final Interpretation by Kam Sosa MD (10/01 1313)      Complex within the endometrial canal, otherwise unremarkable study.                           Workstation performed: [id number]             Procedures    ED Medication and Procedure Management   Prior to Admission Medications   Prescriptions Last Dose Informant Patient Reported? Taking?   albuterol (Proventil HFA) 90 mcg/act inhaler   No No   Sig: Inhale 2 puffs every 6 (six) hours as needed for wheezing   ferrous sulfate 324 (65 Fe) mg   No No   Sig: TAKE 1 TABLET BY MOUTH TWICE DAILY BEFORE MEALS   Patient taking differently: Take 324 mg by mouth daily before breakfast   ibuprofen (MOTRIN) 600 mg tablet   No No   Sig: Take 1 tablet (600 mg total) by mouth every 6 (six) hours   lamoTRIgine (LaMICtal) 100 mg tablet  Self No No   Sig: Take 1 tablet (100 mg total) by mouth 2 (two) times a day Start 1 tab twice daily Aug 4th.   Patient taking differently: Take 225 mg by mouth 2 (two) times a day Taking  225 mg   lamoTRIgine (LaMICtal) 25 mg tablet   No No   Sig: START WITH 1 TABLET BY MOUTH FOR 1 WEEK, THEN 1 TABLET TWICE DAILY THE 2ND WEEK, THEN 2 TABLETS TWICE DAILY THE 3RD  WEEK (GENERIC FOR LAMICTAL)   levETIRAcetam (Keppra) 500 mg tablet   No No   Sig: Take 1 tablet (500 mg total) by mouth daily In morning.   levothyroxine 25 mcg tablet   No No   Sig: TAKE 1 TABLET BY MOUTH EVERY DAY IN THE MORNING   sertraline (Zoloft) 25 mg tablet   No No   Sig: Take 2 tablets (50 mg total) by mouth daily      Facility-Administered Medications: None     Discharge Medication List as of 10/1/2024  1:36 PM        CONTINUE these medications which have NOT CHANGED    Details   albuterol (Proventil HFA) 90 mcg/act inhaler Inhale 2 puffs every 6 (six) hours as needed for wheezing, Starting Mon 11/20/2023, Normal      ferrous sulfate 324 (65 Fe) mg TAKE 1 TABLET BY MOUTH TWICE DAILY BEFORE MEALS, Starting Sat 12/23/2023, Normal      ibuprofen (MOTRIN) 600 mg tablet Take 1 tablet (600 mg total) by mouth every 6 (six) hours, Starting Sat 9/7/2024, Normal      !! lamoTRIgine (LaMICtal) 100 mg tablet Take 1 tablet (100 mg total) by mouth 2 (two) times a day Start 1 tab twice daily Aug 4th., Starting Thu 7/13/2023, Normal      !! lamoTRIgine (LaMICtal) 25 mg tablet START WITH 1 TABLET BY MOUTH FOR 1 WEEK, THEN 1 TABLET TWICE DAILY THE 2ND WEEK, THEN 2 TABLETS TWICE DAILY THE 3RD WEEK (GENERIC FOR LAMICTAL), Normal      levETIRAcetam (Keppra) 500 mg tablet Take 1 tablet (500 mg total) by mouth daily In morning., Starting Wed 12/13/2023, Normal      levothyroxine 25 mcg tablet TAKE 1 TABLET BY MOUTH EVERY DAY IN THE MORNING, Starting Sat 12/23/2023, Normal      sertraline (Zoloft) 25 mg tablet Take 2 tablets (50 mg total) by mouth daily, Starting Thu 9/19/2024, Normal       !! - Potential duplicate medications found. Please discuss with provider.        No discharge procedures on file.  ED SEPSIS DOCUMENTATION   Time reflects when diagnosis was documented in both MDM as applicable and the Disposition within this note       Time User Action Codes Description Comment    10/1/2024  1:26 PM Rachel Chandler Add  [N93.9] Vaginal bleeding     10/1/2024  1:27 PM Rachel Chandler Add [R10.2] Pelvic pain                  Rachel Chandler PA-C  10/01/24 9251

## 2024-10-01 NOTE — TELEPHONE ENCOUNTER
Reached out to on-call provider regarding patient bleeding. Provider recommends to go to ED. Patient agreeable.

## 2024-10-02 PROBLEM — R73.03 PREDIABETES: Status: ACTIVE | Noted: 2024-10-02

## 2024-10-02 PROBLEM — E78.00 ELEVATED LDL CHOLESTEROL LEVEL: Status: ACTIVE | Noted: 2024-10-02

## 2024-10-02 NOTE — PROGRESS NOTES
Ambulatory Visit  Name: Margoth Cook      : 1990      MRN: [id number]  Encounter Provider: Cosme Adams MD  Encounter Date: 10/3/2024   Encounter department: [facility]    Assessment & Plan  Finger infection  Pt has index finger infection 3 days ago with purulent discharge that came out yesterday. Pt still has mild pain and erythema at the end of index finger.    Will start keflex 500 mg BID for 5 days and Pt was advised to call if infection does not get better with Abx.  Orders:    cephalexin (KEFLEX) 500 mg capsule; Take 1 capsule (500 mg total) by mouth 2 (two) times a day for 5 days    Encounter for immunization    Orders:    influenza vaccine preservative-free 0.5 mL IM (Fluzone, Afluria, Fluarix, Flulaval)    Elevated LDL cholesterol level    Orders:    Lipid panel; Future    Hypothyroidism, unspecified type    Orders:    levothyroxine 25 mcg tablet; Take 1 tablet (25 mcg total) by mouth every morning       History of Present Illness     Hand Pain   Incident onset: 3 days ago. There was no injury mechanism. The pain is present in the right fingers. The quality of the pain is described as aching. The pain does not radiate. The pain is at a severity of 1/10. The pain is mild. The pain has been Constant since the incident. Pertinent negatives include no chest pain, muscle weakness, numbness or tingling. Nothing aggravates the symptoms. She has tried nothing for the symptoms.       Review of Systems   Constitutional:  Negative for chills and fever.   HENT:  Negative for ear pain and sore throat.    Eyes:  Negative for pain and visual disturbance.   Respiratory:  Negative for cough and shortness of breath.    Cardiovascular:  Negative for chest pain and palpitations.   Gastrointestinal:  Negative for abdominal pain, constipation, diarrhea, nausea and vomiting.   Genitourinary:  Negative for dysuria and hematuria.   Musculoskeletal:  Negative for arthralgias, back  "pain, neck pain and neck stiffness.   Skin:  Negative for color change and rash.   Neurological:  Negative for dizziness, tingling, weakness, numbness and headaches.   Psychiatric/Behavioral:  Negative for agitation and confusion. The patient is not nervous/anxious.    All other systems reviewed and are negative.          Objective     /82 (BP Location: Right arm, Patient Position: Sitting, Cuff Size: Large)   Pulse 86   Temp 98.8 °F (37.1 °C) (Tympanic)   Resp 17   Ht 4' 7\" (1.397 m)   Wt 99.7 kg (219 lb 12.8 oz)   LMP 12/13/2023 (Exact Date)   SpO2 98%   BMI 51.09 kg/m²     Physical Exam  Vitals and nursing note reviewed.   Constitutional:       General: She is not in acute distress.     Appearance: Normal appearance. She is well-developed. She is obese. She is not ill-appearing.   HENT:      Head: Normocephalic and atraumatic.      Right Ear: External ear normal.      Left Ear: External ear normal.      Nose: Nose normal. No congestion or rhinorrhea.      Mouth/Throat:      Mouth: Mucous membranes are moist.      Pharynx: Oropharynx is clear. No oropharyngeal exudate or posterior oropharyngeal erythema.   Eyes:      General:         Right eye: No discharge.         Left eye: No discharge.      Conjunctiva/sclera: Conjunctivae normal.   Cardiovascular:      Rate and Rhythm: Normal rate and regular rhythm.      Pulses: Normal pulses.      Heart sounds: Normal heart sounds. No murmur heard.     No friction rub. No gallop.   Pulmonary:      Effort: Pulmonary effort is normal. No respiratory distress.      Breath sounds: Normal breath sounds. No stridor. No wheezing, rhonchi or rales.   Chest:      Chest wall: No tenderness.   Abdominal:      General: Abdomen is flat. Bowel sounds are normal. There is no distension.      Palpations: Abdomen is soft.      Tenderness: There is no abdominal tenderness. There is no guarding.   Musculoskeletal:         General: No swelling, tenderness, deformity or signs of " injury. Normal range of motion.      Cervical back: Normal range of motion and neck supple. No rigidity or tenderness.      Right lower leg: No edema.      Left lower leg: No edema.   Lymphadenopathy:      Cervical: No cervical adenopathy.   Skin:     General: Skin is warm and dry.      Capillary Refill: Capillary refill takes less than 2 seconds.      Findings: Erythema and lesion (right index finger infection with erythema) present. No bruising or rash.   Neurological:      General: No focal deficit present.      Mental Status: She is alert and oriented to person, place, and time. Mental status is at baseline.      Motor: No weakness.      Gait: Gait normal.      Deep Tendon Reflexes: Reflexes normal.   Psychiatric:         Mood and Affect: Mood normal.         Behavior: Behavior normal.         Thought Content: Thought content normal.

## 2024-10-03 ENCOUNTER — TELEPHONE (OUTPATIENT)
Age: 34
End: 2024-10-03

## 2024-10-03 ENCOUNTER — OFFICE VISIT (OUTPATIENT)
Age: 34
End: 2024-10-03

## 2024-10-03 VITALS
TEMPERATURE: 98.8 F | WEIGHT: 219.8 LBS | DIASTOLIC BLOOD PRESSURE: 82 MMHG | SYSTOLIC BLOOD PRESSURE: 133 MMHG | BODY MASS INDEX: 50.87 KG/M2 | HEIGHT: 55 IN | HEART RATE: 86 BPM | OXYGEN SATURATION: 98 % | RESPIRATION RATE: 17 BRPM

## 2024-10-03 DIAGNOSIS — L08.9 FINGER INFECTION: Primary | ICD-10-CM

## 2024-10-03 DIAGNOSIS — E03.9 HYPOTHYROIDISM, UNSPECIFIED TYPE: ICD-10-CM

## 2024-10-03 DIAGNOSIS — Z23 ENCOUNTER FOR IMMUNIZATION: ICD-10-CM

## 2024-10-03 DIAGNOSIS — E78.00 ELEVATED LDL CHOLESTEROL LEVEL: ICD-10-CM

## 2024-10-03 PROCEDURE — G2211 COMPLEX E/M VISIT ADD ON: HCPCS | Performed by: FAMILY MEDICINE

## 2024-10-03 PROCEDURE — G0008 ADMIN INFLUENZA VIRUS VAC: HCPCS | Performed by: FAMILY MEDICINE

## 2024-10-03 PROCEDURE — 99213 OFFICE O/P EST LOW 20 MIN: CPT | Performed by: FAMILY MEDICINE

## 2024-10-03 PROCEDURE — 90656 IIV3 VACC NO PRSV 0.5 ML IM: CPT | Performed by: FAMILY MEDICINE

## 2024-10-03 RX ORDER — LEVOTHYROXINE SODIUM 25 UG/1
25 TABLET ORAL EVERY MORNING
Qty: 90 TABLET | Refills: 2 | Status: SHIPPED | OUTPATIENT
Start: 2024-10-03

## 2024-10-03 RX ORDER — CEPHALEXIN 500 MG/1
500 CAPSULE ORAL 2 TIMES DAILY
Qty: 10 CAPSULE | Refills: 0 | Status: SHIPPED | OUTPATIENT
Start: 2024-10-03 | End: 2024-10-08

## 2024-10-03 NOTE — TELEPHONE ENCOUNTER
Contacted Patient regarding recent ED Visit dated 10/1/24.      Advised patient to contact the office with new or worsen symptoms as we have On Call Provider 24 hrs. Provided office hours and phone. No further action needed at this time.        ED:[facility]  CC: Postpartum Complication  DX:Vaginal bleeding; Pelvic pain  Time: 9:55am  Last OV: 9/17/24      S/w Patient regarding ED visit, states she does not need a follow up for this issue.   Patient does have Follow up appointment in the office today regarding another problem.

## 2024-10-14 ENCOUNTER — TELEPHONE (OUTPATIENT)
Age: 34
End: 2024-10-14

## 2024-10-14 NOTE — TELEPHONE ENCOUNTER
patient unable to make appointment initially scheduled 10/14 with Dr. Escobedo. Next available appointment 11/13. Contact number on file to assist with scheduling.

## 2024-10-24 NOTE — TELEPHONE ENCOUNTER
Pt called to schedule Postpartum visit . Delivered 09/03 - delivering MD was not from practice and wanted Dr. Mills

## 2024-12-05 ENCOUNTER — TELEPHONE (OUTPATIENT)
Dept: HEMATOLOGY ONCOLOGY | Facility: MEDICAL CENTER | Age: 34
End: 2024-12-05

## 2024-12-05 NOTE — TELEPHONE ENCOUNTER
Left message on patient's phone indicating to have labs drawn prior to appointment.  Indicated that the scripts are in the system, the tests are non-fasting and that patient can go to any [facility] facility to have the labs drawn.  Provided callback number [phone].

## 2024-12-08 NOTE — PROGRESS NOTES
Margoth Cook  1990  [facility]  [address] [place] [id number]    DISCUSSION/SUMMARY:    33-year-old female with anemia.  Prior workup was consistent with iron deficiency anemia.  Patient did not show up for the office visit today.

## 2024-12-09 ENCOUNTER — OFFICE VISIT (OUTPATIENT)
Dept: HEMATOLOGY ONCOLOGY | Facility: MEDICAL CENTER | Age: 34
End: 2024-12-09

## 2024-12-09 DIAGNOSIS — D50.8 IRON DEFICIENCY ANEMIA SECONDARY TO INADEQUATE DIETARY IRON INTAKE: Primary | ICD-10-CM

## 2024-12-09 PROCEDURE — NA001 NO CHARGE AUDIO ONLY: Performed by: INTERNAL MEDICINE

## 2024-12-11 ENCOUNTER — TELEPHONE (OUTPATIENT)
Dept: HEMATOLOGY ONCOLOGY | Facility: CLINIC | Age: 34
End: 2024-12-11

## 2024-12-11 ENCOUNTER — TELEPHONE (OUTPATIENT)
Age: 34
End: 2024-12-11

## 2024-12-11 DIAGNOSIS — D50.8 IRON DEFICIENCY ANEMIA SECONDARY TO INADEQUATE DIETARY IRON INTAKE: Primary | ICD-10-CM

## 2024-12-11 NOTE — TELEPHONE ENCOUNTER
Called patient to ask her to call office back to schedule a f/u appt. With Dr. Chin as she missed her appt. On 12/9/24.

## 2024-12-11 NOTE — TELEPHONE ENCOUNTER
Lab orders entered. Called patient to make her aware. She verbalized understanding and has no additional questions or concerns at this moment.

## 2024-12-20 ENCOUNTER — TELEPHONE (OUTPATIENT)
Dept: HEMATOLOGY ONCOLOGY | Facility: MEDICAL CENTER | Age: 34
End: 2024-12-20

## 2024-12-23 ENCOUNTER — APPOINTMENT (OUTPATIENT)
Dept: LAB | Facility: HOSPITAL | Age: 34
End: 2024-12-23
Attending: INTERNAL MEDICINE
Payer: MEDICARE

## 2024-12-23 DIAGNOSIS — D50.8 IRON DEFICIENCY ANEMIA SECONDARY TO INADEQUATE DIETARY IRON INTAKE: ICD-10-CM

## 2024-12-23 LAB
BASOPHILS # BLD AUTO: 0.07 THOUSANDS/ÂΜL (ref 0–0.1)
BASOPHILS NFR BLD AUTO: 1 % (ref 0–1)
EOSINOPHIL # BLD AUTO: 0.1 THOUSAND/ÂΜL (ref 0–0.61)
EOSINOPHIL NFR BLD AUTO: 2 % (ref 0–6)
ERYTHROCYTE [DISTWIDTH] IN BLOOD BY AUTOMATED COUNT: 14.3 % (ref 11.6–15.1)
FERRITIN SERPL-MCNC: 24 NG/ML (ref 11–307)
HCT VFR BLD AUTO: 38.2 % (ref 34.8–46.1)
HGB BLD-MCNC: 11.9 G/DL (ref 11.5–15.4)
IMM GRANULOCYTES # BLD AUTO: 0.02 THOUSAND/UL (ref 0–0.2)
IMM GRANULOCYTES NFR BLD AUTO: 0 % (ref 0–2)
IRON SATN MFR SERPL: 17 % (ref 15–50)
IRON SERPL-MCNC: 68 UG/DL (ref 50–212)
LYMPHOCYTES # BLD AUTO: 1.25 THOUSANDS/ÂΜL (ref 0.6–4.47)
LYMPHOCYTES NFR BLD AUTO: 19 % (ref 14–44)
MCH RBC QN AUTO: 26.7 PG (ref 26.8–34.3)
MCHC RBC AUTO-ENTMCNC: 31.2 G/DL (ref 31.4–37.4)
MCV RBC AUTO: 86 FL (ref 82–98)
MONOCYTES # BLD AUTO: 0.35 THOUSAND/ÂΜL (ref 0.17–1.22)
MONOCYTES NFR BLD AUTO: 5 % (ref 4–12)
NEUTROPHILS # BLD AUTO: 4.72 THOUSANDS/ÂΜL (ref 1.85–7.62)
NEUTS SEG NFR BLD AUTO: 73 % (ref 43–75)
NRBC BLD AUTO-RTO: 0 /100 WBCS
PLATELET # BLD AUTO: 371 THOUSANDS/UL (ref 149–390)
PMV BLD AUTO: 8.9 FL (ref 8.9–12.7)
RBC # BLD AUTO: 4.45 MILLION/UL (ref 3.81–5.12)
TIBC SERPL-MCNC: 397.6 UG/DL (ref 250–450)
TRANSFERRIN SERPL-MCNC: 284 MG/DL (ref 203–362)
UIBC SERPL-MCNC: 330 UG/DL (ref 155–355)
WBC # BLD AUTO: 6.51 THOUSAND/UL (ref 4.31–10.16)

## 2024-12-23 PROCEDURE — 85025 COMPLETE CBC W/AUTO DIFF WBC: CPT

## 2024-12-23 PROCEDURE — 83540 ASSAY OF IRON: CPT

## 2024-12-23 PROCEDURE — 82728 ASSAY OF FERRITIN: CPT

## 2024-12-23 PROCEDURE — 36415 COLL VENOUS BLD VENIPUNCTURE: CPT

## 2024-12-23 PROCEDURE — 83550 IRON BINDING TEST: CPT

## 2024-12-24 ENCOUNTER — OFFICE VISIT (OUTPATIENT)
Dept: HEMATOLOGY ONCOLOGY | Facility: MEDICAL CENTER | Age: 34
End: 2024-12-24
Payer: MEDICARE

## 2024-12-24 VITALS
TEMPERATURE: 97.9 F | DIASTOLIC BLOOD PRESSURE: 64 MMHG | WEIGHT: 229 LBS | HEART RATE: 93 BPM | BODY MASS INDEX: 52.99 KG/M2 | RESPIRATION RATE: 17 BRPM | HEIGHT: 55 IN | OXYGEN SATURATION: 98 % | SYSTOLIC BLOOD PRESSURE: 112 MMHG

## 2024-12-24 DIAGNOSIS — Z87.42 HISTORY OF IRREGULAR MENSTRUAL BLEEDING: ICD-10-CM

## 2024-12-24 DIAGNOSIS — D50.8 IRON DEFICIENCY ANEMIA SECONDARY TO INADEQUATE DIETARY IRON INTAKE: Primary | ICD-10-CM

## 2024-12-24 PROCEDURE — 99213 OFFICE O/P EST LOW 20 MIN: CPT | Performed by: PHYSICIAN ASSISTANT

## 2024-12-24 NOTE — PROGRESS NOTES
Name: Margoth Cook      : 1990      MRN: [id number]  Encounter Provider: Tiara Lopez PA-C  Encounter Date: 2024   Encounter department: [facility]  :  Assessment & Plan  Iron deficiency anemia secondary to inadequate dietary iron intake  34-year-old female with anemia.  Prior workup was consistent with iron deficiency anemia.  Patient has been treated with Feraheme in the past.     She had repeat lab work on 2024.  Hemoglobin was 11.9, hematocrit 38.2, MCV 86.  Ferritin 24 and iron saturation 17%.    Patient can continue with the oral iron.     We rediscussed what to monitor for as far as progressive fatigue, decreased activities, chewing ice, restless legs, respiratory issues, dizziness etc. Mrs. Qiu to return in 6 months but this may change depending upon patient's symptoms.  Patient knows to call the hematology/oncology office if there are any other questions or concerns.     Orders:  •  CBC and differential; Future  •  Comprehensive metabolic panel; Future  •  Iron, TIBC and Ferritin Panel; Future    History of irregular menstrual bleeding  Likely the reason for iron deficiency.  Defer to gynecology for management.           History of Present Illness {?Quick Links Encounters * My Last Note * Last Note in Specialty * Snapshot * Since Last Visit * History :10772}  Chief Complaint   Patient presents with   • Follow-up   Margoth Cook is a 34 y.o. female previously referred for evaluation of anemia.  Mrs. Qiu has had iron deficiency anemia for a number of years, heavy menstrual periods. Patient returns for follow-up.    Patient underwent  on  of this year.  Her hemoglobin was between 8 and 9 g during her hospital stay.  She received Venofer while inpatient.    Currently she is taking oral iron 1 tablet daily.     Patient states feeling quite exhausted.  Patient has a son who is about 18 months old who is  "ventilator dependent; however she says that he is doing quite well and hopes for tracheostomy to be removed this summer.     No significant/excessive bruising or bleeding.  She says her menstrual periods have returned and are lasting for about 1 week. She says her periods are heavy for the whole 7 days. She is changing a pad/tampon every 90 minutes or so.  She plans to discuss with her OB/GYN.    Review of Systems   Constitutional:  Positive for fatigue. Negative for appetite change, fever and unexpected weight change.   HENT:  Negative for nosebleeds.    Respiratory:  Negative for cough, choking and shortness of breath.         Negative hemoptysis.   Cardiovascular:  Negative for chest pain, palpitations and leg swelling.   Gastrointestinal: Negative.  Negative for abdominal distention, abdominal pain, anal bleeding, blood in stool, constipation, diarrhea, nausea and vomiting.   Endocrine: Negative.  Negative for cold intolerance.   Genitourinary: Negative.  Negative for hematuria, menstrual problem, vaginal bleeding, vaginal discharge and vaginal pain.   Musculoskeletal: Negative.  Negative for arthralgias, myalgias, neck pain and neck stiffness.   Skin: Negative.  Negative for color change, pallor and rash.   Allergic/Immunologic: Negative.  Negative for immunocompromised state.   Neurological: Negative.  Negative for weakness and headaches.   Hematological:  Negative for adenopathy. Does not bruise/bleed easily.   All other systems reviewed and are negative.        Objective {?Quick Links Trend Vitals * Enter New Vitals * Results Review * Timeline (Adult) * Labs * Imaging * Cardiology * Procedures * Lung Cancer Screening * Surgical eConsent :78921}  /64 (BP Location: Right arm, Patient Position: Sitting, Cuff Size: Adult)   Pulse 93   Temp 97.9 °F (36.6 °C) (Temporal)   Resp 17   Ht 4' 7\" (1.397 m)   Wt 104 kg (229 lb)   SpO2 98%   BMI 53.22 kg/m²     Physical Exam  Constitutional:       General: " She is not in acute distress.     Appearance: She is well-developed. She is obese.   HENT:      Head: Normocephalic and atraumatic.   Eyes:      General: No scleral icterus.     Conjunctiva/sclera: Conjunctivae normal.   Cardiovascular:      Rate and Rhythm: Normal rate and regular rhythm.   Pulmonary:      Effort: Pulmonary effort is normal. No respiratory distress.      Breath sounds: Normal breath sounds.   Abdominal:      General: Abdomen is flat. There is no distension.      Palpations: Abdomen is soft.      Tenderness: There is no abdominal tenderness.   Musculoskeletal:      Right lower leg: No edema.      Left lower leg: No edema.   Skin:     General: Skin is warm.      Coloration: Skin is not pale.      Findings: No rash.   Neurological:      Mental Status: She is alert and oriented to person, place, and time.   Psychiatric:         Mood and Affect: Mood normal.         Thought Content: Thought content normal.         Judgment: Judgment normal.         Labs: I have reviewed the following labs:  Results for orders placed or performed in visit on 12/23/24   CBC and differential   Result Value Ref Range    WBC 6.51 4.31 - 10.16 Thousand/uL    RBC 4.45 3.81 - 5.12 Million/uL    Hemoglobin 11.9 11.5 - 15.4 g/dL    Hematocrit 38.2 34.8 - 46.1 %    MCV 86 82 - 98 fL    MCH 26.7 (L) 26.8 - 34.3 pg    MCHC 31.2 (L) 31.4 - 37.4 g/dL    RDW 14.3 11.6 - 15.1 %    MPV 8.9 8.9 - 12.7 fL    Platelets 371 149 - 390 Thousands/uL    nRBC 0 /100 WBCs    Segmented % 73 43 - 75 %    Immature Grans % 0 0 - 2 %    Lymphocytes % 19 14 - 44 %    Monocytes % 5 4 - 12 %    Eosinophils Relative 2 0 - 6 %    Basophils Relative 1 0 - 1 %    Absolute Neutrophils 4.72 1.85 - 7.62 Thousands/µL    Absolute Immature Grans 0.02 0.00 - 0.20 Thousand/uL    Absolute Lymphocytes 1.25 0.60 - 4.47 Thousands/µL    Absolute Monocytes 0.35 0.17 - 1.22 Thousand/µL    Eosinophils Absolute 0.10 0.00 - 0.61 Thousand/µL    Basophils Absolute 0.07 0.00 -  0.10 Thousands/µL   TIBC Panel (incl. Iron, TIBC, % Iron Saturation)   Result Value Ref Range    Iron Saturation 17 15 - 50 %    TIBC 397.6 250 - 450 ug/dL    Iron 68 50 - 212 ug/dL    Transferrin 284 203 - 362 mg/dL    UIBC 330 155 - 355 ug/dL   Result Value Ref Range    Ferritin 24 11 - 307 ng/mL

## 2024-12-24 NOTE — ASSESSMENT & PLAN NOTE
34-year-old female with anemia.  Prior workup was consistent with iron deficiency anemia.  Patient has been treated with Feraheme in the past.     She had repeat lab work on 12/23/2024.  Hemoglobin was 11.9, hematocrit 38.2, MCV 86.  Ferritin 24 and iron saturation 17%.    Patient can continue with the oral iron.     We rediscussed what to monitor for as far as progressive fatigue, decreased activities, chewing ice, restless legs, respiratory issues, dizziness etc. Mrs. Rothmanyer to return in 6 months but this may change depending upon patient's symptoms.  Patient knows to call the hematology/oncology office if there are any other questions or concerns.     Orders:  •  CBC and differential; Future  •  Comprehensive metabolic panel; Future  •  Iron, TIBC and Ferritin Panel; Future

## 2025-01-03 DIAGNOSIS — G40.909 SEIZURE DISORDER (HCC): ICD-10-CM

## 2025-01-03 DIAGNOSIS — G40.409 OTHER GENERALIZED EPILEPSY, NOT INTRACTABLE, WITHOUT STATUS EPILEPTICUS (HCC): ICD-10-CM

## 2025-01-06 RX ORDER — LEVETIRACETAM 500 MG/1
500 TABLET ORAL DAILY
Qty: 30 TABLET | Refills: 5 | Status: SHIPPED | OUTPATIENT
Start: 2025-01-06

## 2025-01-06 RX ORDER — LAMOTRIGINE 100 MG/1
100 TABLET ORAL 2 TIMES DAILY
Qty: 60 TABLET | Refills: 0 | Status: SHIPPED | OUTPATIENT
Start: 2025-01-06

## 2025-01-06 RX ORDER — LAMOTRIGINE 25 MG/1
TABLET ORAL
Qty: 50 TABLET | Refills: 0 | Status: SHIPPED | OUTPATIENT
Start: 2025-01-06

## 2025-01-09 ENCOUNTER — HOSPITAL ENCOUNTER (EMERGENCY)
Facility: HOSPITAL | Age: 35
Discharge: HOME | End: 2025-01-09
Attending: EMERGENCY MEDICINE
Payer: MEDICARE

## 2025-01-09 ENCOUNTER — APPOINTMENT (EMERGENCY)
Dept: RADIOLOGY | Facility: HOSPITAL | Age: 35
End: 2025-01-09
Payer: MEDICARE

## 2025-01-09 VITALS
WEIGHT: 229.2 LBS | TEMPERATURE: 97.9 F | SYSTOLIC BLOOD PRESSURE: 138 MMHG | OXYGEN SATURATION: 97 % | HEART RATE: 99 BPM | RESPIRATION RATE: 16 BRPM | DIASTOLIC BLOOD PRESSURE: 76 MMHG | BODY MASS INDEX: 53.27 KG/M2

## 2025-01-09 DIAGNOSIS — R09.A2 FOREIGN BODY SENSATION IN THROAT: Primary | ICD-10-CM

## 2025-01-09 PROCEDURE — 70360 X-RAY EXAM OF NECK: CPT

## 2025-01-09 PROCEDURE — 99284 EMERGENCY DEPT VISIT MOD MDM: CPT | Performed by: EMERGENCY MEDICINE

## 2025-01-09 PROCEDURE — 99283 EMERGENCY DEPT VISIT LOW MDM: CPT

## 2025-01-10 NOTE — DISCHARGE INSTRUCTIONS
This time your x-ray did not show any signs of any foreign body.  If you are still having that sensation after taking Tylenol and ibuprofen every 6 hours for few days I will give you information of follow-up with the ENT.  Please also follow-up with your primary care doctor.    Return to ER if you develop fever.

## 2025-01-10 NOTE — ED PROVIDER NOTES
Time reflects when diagnosis was documented in both MDM as applicable and the Disposition within this note       Time User Action Codes Description Comment    1/9/2025 11:15 PM Booker Ramirez Add [R09.A2] Foreign body sensation in throat           ED Disposition       ED Disposition   Discharge    Condition   Stable    Date/Time   Thu Jan 9, 2025 11:15 PM    Comment   Margoth Cook discharge to home/self care.                   Assessment & Plan       Medical Decision Making  34-year-old female presenting to the ED today for possible ingestion of chicken bone.  At this time reassuring physical exam.  Will do an x-ray of the soft tissue neck to eval.  It is likely that her pain is secondary to some irritation while she was on a chicken bone.    On my interpretation of this x-ray no foreign bodies appreciated by me.  Discussed continuing Tylenol and ibuprofen every 6 hours.  If still having symptoms given information of follow-up with ENT.  Return precautions discussed and patient was discharged home.    Amount and/or Complexity of Data Reviewed  Radiology: ordered.             Medications - No data to display    ED Risk Strat Scores                          SBIRT 22yo+      Flowsheet Row Most Recent Value   Initial Alcohol Screen: US AUDIT-C     1. How often do you have a drink containing alcohol? 0 Filed at: 01/09/2025 2245   2. How many drinks containing alcohol do you have on a typical day you are drinking?  0 Filed at: 01/09/2025 2245   3b. FEMALE Any Age, or MALE 65+: How often do you have 4 or more drinks on one occassion? 0 Filed at: 01/09/2025 2245   Audit-C Score 0 Filed at: 01/09/2025 2245   RANDY: How many times in the past year have you...    Used an illegal drug or used a prescription medication for non-medical reasons? Never Filed at: 01/09/2025 2245                            History of Present Illness       Chief Complaint   Patient presents with    Foreign Body in Throat     States around 930 pm  was eating chicken and felt a splintered bone go down throat, states she tried to cough it out but feels it in her throat.        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[facility])     not diagnosed as of 23-trying to R/O    Depression     Diabetes mellitus (HCC)     Disease of thyroid gland     hypo    Dysphagia     solids and liquids    Female infertility     Fibromyalgia, primary     Gastric ulcer     History of bronchitis     History of  delivery, currently pregnant 2024     at 22 weeks  Son has a ATOH1 gene      Hypothyroidism     Iron deficiency anemia     infusion in 2022    Irregular menses     Marijuana use     Patient reported she quit 2023    Miscarriage     Morbid obesity with BMI of 50.0-59.9, adult ([facility])     Plantar fasciitis of left foot     Polycystic ovary syndrome     Reflux esophagitis     Seizures ([facility])     last one 23    Sleep apnea     CPAP    Varicella     had disease    Wears glasses       Past Surgical History:   Procedure Laterality Date    EGD      with Bx due to barretts esophagus    EYE SURGERY Bilateral     lazy eye repair    [place] BIOPSY OF LIP N/A 11/10/2022    Procedure: EXCISION BIOPSY SALVARY GLANDS LOWER LIP;  Surgeon: Casey Florez MD;  Location: [facility];  Service: ENT    [place] CERCLAGE UTERINE CERVIX NONOBSTETRICAL N/A 2023    Procedure: CERCLAGE CERVICAL;  Surgeon: Ant Marinelli MD;  Location: [facility] ;  Service: Obstetrics    WV  DELIVERY ONLY N/A 9/3/2024    Procedure:  SECTION ();  Surgeon: Yany Juan MD;  Location: [facility] ;  Service: Obstetrics    [place] HYSTEROSCOPY BX ENDOMETRIUM&/POLYPC W/WO D&C N/A 2021    Procedure: DILATATION AND CURETTAGE  (D&C) WITH HYSTEROSCOPY;  Surgeon: Albina Parsons MD;  Location: [facility] OR;  Service: Gynecology    WISDOM TOOTH EXTRACTION        Family History   Problem Relation Age of Onset    Bipolar disorder Mother     Depression Mother     Depression Father     Other Son     Diabetes Maternal Grandmother     Thyroid disease Maternal Grandmother     Hypertension Maternal Grandmother     Heart disease Maternal Grandmother     Diabetes Maternal Grandfather     Diabetes Paternal Grandmother     Breast cancer Paternal Grandmother     Stroke Paternal Grandmother     Diabetes Paternal Grandfather     Breast cancer Maternal Aunt 35        bilateral    Stomach cancer Maternal Aunt       Social History     Tobacco Use    Smoking status: Never    Smokeless tobacco: Never   Vaping Use    Vaping status: Never Used   Substance Use Topics    Alcohol use: Not Currently     Comment: occ    Drug use: Yes     Types: Marijuana     Comment: vapes THC several times a week      E-Cigarette/Vaping    E-Cigarette Use Never User       E-Cigarette/Vaping Substances    Nicotine Yes     THC Yes     CBD No     Flavoring Yes     Other No     Unknown No       I have reviewed and agree with the history as documented.     34-year-old female no significant past medical history presenting to the ED today for possible foreign body swallowing.  Patient states that she was eating some chicken there was a small little bone in the chicken that was sharp and she did not think much of it.  Then swallowed some bread.  She is having some pain mostly in the right side of her throat.  No having difficulty breathing or swallowing saliva.        Review of Systems   Constitutional:  Negative for chills and fever.   HENT:  Negative for hearing loss.    Eyes:  Negative for visual disturbance.   Respiratory:  Negative for shortness of breath.    Cardiovascular:  Negative for chest pain.   Gastrointestinal:  Negative for abdominal pain, constipation, diarrhea, nausea and  vomiting.   Genitourinary:  Negative for difficulty urinating.   Musculoskeletal:  Negative for myalgias.   Skin:  Negative for color change.   Neurological:  Negative for dizziness and headaches.   Psychiatric/Behavioral:  Negative for agitation.    All other systems reviewed and are negative.          Objective       ED Triage Vitals [01/09/25 2238]   Temperature Pulse Blood Pressure Respirations SpO2 Patient Position - Orthostatic VS   97.9 °F (36.6 °C) 97 141/73 20 100 % Sitting      Temp Source Heart Rate Source BP Location FiO2 (%) Pain Score    Oral Monitor Right arm -- 2      Vitals      Date and Time Temp Pulse SpO2 Resp BP Pain Score FACES Pain Rating User   01/09/25 2300 -- 99 97 % 16 138/76 1 -- SM   01/09/25 2238 97.9 °F (36.6 °C) 97 100 % 20 141/73 2 -- SW            Physical Exam  Vitals and nursing note reviewed.   Constitutional:       General: She is not in acute distress.     Appearance: Normal appearance. She is well-developed. She is not ill-appearing.   HENT:      Head: Normocephalic and atraumatic.      Right Ear: External ear normal.      Left Ear: External ear normal.      Nose: Nose normal. No congestion.      Mouth/Throat:      Mouth: Mucous membranes are moist.      Pharynx: Oropharynx is clear. No oropharyngeal exudate.   Eyes:      General:         Right eye: No discharge.         Left eye: No discharge.      Extraocular Movements: Extraocular movements intact.      Conjunctiva/sclera: Conjunctivae normal.      Pupils: Pupils are equal, round, and reactive to light.   Cardiovascular:      Rate and Rhythm: Normal rate and regular rhythm.      Heart sounds: Normal heart sounds. No murmur heard.     No friction rub. No gallop.   Pulmonary:      Effort: Pulmonary effort is normal. No respiratory distress.      Breath sounds: Normal breath sounds. No stridor. No wheezing.   Abdominal:      General: Bowel sounds are normal. There is no distension.      Palpations: Abdomen is soft.       Tenderness: There is no abdominal tenderness.   Musculoskeletal:         General: No swelling. Normal range of motion.      Cervical back: Normal range of motion and neck supple. No rigidity.   Skin:     General: Skin is warm and dry.      Capillary Refill: Capillary refill takes less than 2 seconds.   Neurological:      General: No focal deficit present.      Mental Status: She is alert and oriented to person, place, and time. Mental status is at baseline.      Cranial Nerves: No cranial nerve deficit.      Sensory: No sensory deficit.      Motor: No weakness.      Gait: Gait normal.   Psychiatric:         Mood and Affect: Mood normal.         Behavior: Behavior normal.         Results Reviewed       None            XR neck soft tissue    (Results Pending)       Procedures    ED Medication and Procedure Management   Prior to Admission Medications   Prescriptions Last Dose Informant Patient Reported? Taking?   albuterol (Proventil HFA) 90 mcg/act inhaler   No No   Sig: Inhale 2 puffs every 6 (six) hours as needed for wheezing   ferrous sulfate 324 (65 Fe) mg   No No   Sig: TAKE 1 TABLET BY MOUTH TWICE DAILY BEFORE MEALS   Patient taking differently: Take 324 mg by mouth daily before breakfast   ibuprofen (MOTRIN) 600 mg tablet   No No   Sig: Take 1 tablet (600 mg total) by mouth every 6 (six) hours   lamoTRIgine (LaMICtal) 100 mg tablet   No No   Sig: Take 1 tablet (100 mg total) by mouth 2 (two) times a day Start 1 tab twice daily Aug 4th.   lamoTRIgine (LaMICtal) 25 mg tablet   No No   Sig: START WITH 1 TABLET BY MOUTH FOR 1 WEEK, THEN 1 TABLET TWICE DAILY THE 2ND WEEK, THEN 2 TABLETS TWICE DAILY THE 3RD WEEK (GENERIC FOR LAMICTAL)   levETIRAcetam (Keppra) 500 mg tablet   No No   Sig: Take 1 tablet (500 mg total) by mouth daily In morning.   levothyroxine 25 mcg tablet   No No   Sig: Take 1 tablet (25 mcg total) by mouth every morning   sertraline (Zoloft) 25 mg tablet   No No   Sig: Take 2 tablets (50 mg total)  by mouth daily      Facility-Administered Medications: None     Discharge Medication List as of 1/9/2025 11:18 PM        CONTINUE these medications which have NOT CHANGED    Details   albuterol (Proventil HFA) 90 mcg/act inhaler Inhale 2 puffs every 6 (six) hours as needed for wheezing, Starting Mon 11/20/2023, Normal      ferrous sulfate 324 (65 Fe) mg TAKE 1 TABLET BY MOUTH TWICE DAILY BEFORE MEALS, Starting Sat 12/23/2023, Normal      ibuprofen (MOTRIN) 600 mg tablet Take 1 tablet (600 mg total) by mouth every 6 (six) hours, Starting Sat 9/7/2024, Normal      !! lamoTRIgine (LaMICtal) 100 mg tablet Take 1 tablet (100 mg total) by mouth 2 (two) times a day Start 1 tab twice daily Aug 4th., Starting Mon 1/6/2025, Normal      !! lamoTRIgine (LaMICtal) 25 mg tablet START WITH 1 TABLET BY MOUTH FOR 1 WEEK, THEN 1 TABLET TWICE DAILY THE 2ND WEEK, THEN 2 TABLETS TWICE DAILY THE 3RD WEEK (GENERIC FOR LAMICTAL), Normal      levETIRAcetam (Keppra) 500 mg tablet Take 1 tablet (500 mg total) by mouth daily In morning., Starting Mon 1/6/2025, Normal      levothyroxine 25 mcg tablet Take 1 tablet (25 mcg total) by mouth every morning, Starting Thu 10/3/2024, Normal      sertraline (Zoloft) 25 mg tablet Take 2 tablets (50 mg total) by mouth daily, Starting Thu 9/19/2024, Normal       !! - Potential duplicate medications found. Please discuss with provider.        No discharge procedures on file.  ED SEPSIS DOCUMENTATION   Time reflects when diagnosis was documented in both MDM as applicable and the Disposition within this note       Time User Action Codes Description Comment    1/9/2025 11:15 PM Booker Ramirez Add [R09.A2] Foreign body sensation in throat                  Booker Ramirez MD  01/09/25 3792

## 2025-01-29 ENCOUNTER — HOSPITAL ENCOUNTER (EMERGENCY)
Facility: HOSPITAL | Age: 35
Discharge: HOME | End: 2025-01-29
Attending: EMERGENCY MEDICINE | Admitting: EMERGENCY MEDICINE
Payer: MEDICARE

## 2025-01-29 ENCOUNTER — APPOINTMENT (EMERGENCY)
Dept: RADIOLOGY | Facility: HOSPITAL | Age: 35
End: 2025-01-29
Payer: MEDICARE

## 2025-01-29 VITALS
OXYGEN SATURATION: 99 % | HEART RATE: 87 BPM | TEMPERATURE: 98.4 F | SYSTOLIC BLOOD PRESSURE: 120 MMHG | RESPIRATION RATE: 16 BRPM | DIASTOLIC BLOOD PRESSURE: 72 MMHG

## 2025-01-29 DIAGNOSIS — R10.30 LOWER ABDOMINAL PAIN: Primary | ICD-10-CM

## 2025-01-29 LAB
ALBUMIN SERPL BCG-MCNC: 4.4 G/DL (ref 3.5–5)
ALP SERPL-CCNC: 118 U/L (ref 34–104)
ALT SERPL W P-5'-P-CCNC: 15 U/L (ref 7–52)
ANION GAP SERPL CALCULATED.3IONS-SCNC: 13 MMOL/L (ref 4–13)
APTT PPP: 25 SECONDS (ref 23–34)
AST SERPL W P-5'-P-CCNC: 15 U/L (ref 13–39)
BACTERIA UR QL AUTO: NORMAL /HPF
BASOPHILS # BLD AUTO: 0.19 THOUSANDS/ΜL (ref 0–0.1)
BASOPHILS NFR BLD AUTO: 2 % (ref 0–1)
BILIRUB SERPL-MCNC: 0.19 MG/DL (ref 0.2–1)
BILIRUB UR QL STRIP: NEGATIVE
BUN SERPL-MCNC: 17 MG/DL (ref 5–25)
CALCIUM SERPL-MCNC: 8.6 MG/DL (ref 8.4–10.2)
CHLORIDE SERPL-SCNC: 100 MMOL/L (ref 96–108)
CLARITY UR: CLEAR
CO2 SERPL-SCNC: 23 MMOL/L (ref 21–32)
COLOR UR: ABNORMAL
CREAT SERPL-MCNC: 0.62 MG/DL (ref 0.6–1.3)
EOSINOPHIL # BLD AUTO: 0.18 THOUSAND/ΜL (ref 0–0.61)
EOSINOPHIL NFR BLD AUTO: 2 % (ref 0–6)
ERYTHROCYTE [DISTWIDTH] IN BLOOD BY AUTOMATED COUNT: 14.3 % (ref 11.6–15.1)
EXT PREGNANCY TEST URINE: NEGATIVE
EXT. CONTROL: NORMAL
GFR SERPL CREATININE-BSD FRML MDRD: 118 ML/MIN/1.73SQ M
GLUCOSE SERPL-MCNC: 98 MG/DL (ref 65–140)
GLUCOSE UR STRIP-MCNC: NEGATIVE MG/DL
HCT VFR BLD AUTO: 38.8 % (ref 34.8–46.1)
HGB BLD-MCNC: 12.4 G/DL (ref 11.5–15.4)
HGB UR QL STRIP.AUTO: NEGATIVE
IMM GRANULOCYTES # BLD AUTO: 0.04 THOUSAND/UL (ref 0–0.2)
IMM GRANULOCYTES NFR BLD AUTO: 1 % (ref 0–2)
INR PPP: 0.85 (ref 0.85–1.19)
KETONES UR STRIP-MCNC: NEGATIVE MG/DL
LEUKOCYTE ESTERASE UR QL STRIP: NEGATIVE
LIPASE SERPL-CCNC: 15 U/L (ref 11–82)
LYMPHOCYTES # BLD AUTO: 1.95 THOUSANDS/ΜL (ref 0.6–4.47)
LYMPHOCYTES NFR BLD AUTO: 22 % (ref 14–44)
MCH RBC QN AUTO: 27.3 PG (ref 26.8–34.3)
MCHC RBC AUTO-ENTMCNC: 32 G/DL (ref 31.4–37.4)
MCV RBC AUTO: 85 FL (ref 82–98)
MONOCYTES # BLD AUTO: 0.36 THOUSAND/ΜL (ref 0.17–1.22)
MONOCYTES NFR BLD AUTO: 4 % (ref 4–12)
NEUTROPHILS # BLD AUTO: 6.1 THOUSANDS/ΜL (ref 1.85–7.62)
NEUTS SEG NFR BLD AUTO: 69 % (ref 43–75)
NITRITE UR QL STRIP: NEGATIVE
NON-SQ EPI CELLS URNS QL MICRO: NORMAL /HPF
NRBC BLD AUTO-RTO: 0 /100 WBCS
PH UR STRIP.AUTO: 5.5 [PH]
PLATELET # BLD AUTO: 392 THOUSANDS/UL (ref 149–390)
PMV BLD AUTO: 9.3 FL (ref 8.9–12.7)
POTASSIUM SERPL-SCNC: 3.3 MMOL/L (ref 3.5–5.3)
PROT SERPL-MCNC: 8.2 G/DL (ref 6.4–8.4)
PROT UR STRIP-MCNC: ABNORMAL MG/DL
PROTHROMBIN TIME: 12.1 SECONDS (ref 12.3–15)
RBC # BLD AUTO: 4.55 MILLION/UL (ref 3.81–5.12)
RBC #/AREA URNS AUTO: NORMAL /HPF
SODIUM SERPL-SCNC: 136 MMOL/L (ref 135–147)
SP GR UR STRIP.AUTO: >=1.03 (ref 1–1.03)
UROBILINOGEN UR STRIP-ACNC: <2 MG/DL
WBC # BLD AUTO: 8.82 THOUSAND/UL (ref 4.31–10.16)
WBC #/AREA URNS AUTO: NORMAL /HPF

## 2025-01-29 PROCEDURE — 96374 THER/PROPH/DIAG INJ IV PUSH: CPT

## 2025-01-29 PROCEDURE — 85025 COMPLETE CBC W/AUTO DIFF WBC: CPT | Performed by: EMERGENCY MEDICINE

## 2025-01-29 PROCEDURE — 81001 URINALYSIS AUTO W/SCOPE: CPT | Performed by: EMERGENCY MEDICINE

## 2025-01-29 PROCEDURE — 87086 URINE CULTURE/COLONY COUNT: CPT | Performed by: EMERGENCY MEDICINE

## 2025-01-29 PROCEDURE — 74177 CT ABD & PELVIS W/CONTRAST: CPT

## 2025-01-29 PROCEDURE — 83690 ASSAY OF LIPASE: CPT | Performed by: EMERGENCY MEDICINE

## 2025-01-29 PROCEDURE — 99284 EMERGENCY DEPT VISIT MOD MDM: CPT | Performed by: EMERGENCY MEDICINE

## 2025-01-29 PROCEDURE — 80053 COMPREHEN METABOLIC PANEL: CPT | Performed by: EMERGENCY MEDICINE

## 2025-01-29 PROCEDURE — 36415 COLL VENOUS BLD VENIPUNCTURE: CPT | Performed by: EMERGENCY MEDICINE

## 2025-01-29 PROCEDURE — 96361 HYDRATE IV INFUSION ADD-ON: CPT

## 2025-01-29 PROCEDURE — 99284 EMERGENCY DEPT VISIT MOD MDM: CPT

## 2025-01-29 PROCEDURE — 85610 PROTHROMBIN TIME: CPT | Performed by: EMERGENCY MEDICINE

## 2025-01-29 PROCEDURE — 85730 THROMBOPLASTIN TIME PARTIAL: CPT | Performed by: EMERGENCY MEDICINE

## 2025-01-29 PROCEDURE — 81025 URINE PREGNANCY TEST: CPT | Performed by: EMERGENCY MEDICINE

## 2025-01-29 RX ORDER — IBUPROFEN 600 MG/1
600 TABLET, FILM COATED ORAL EVERY 6 HOURS PRN
Qty: 30 TABLET | Refills: 0 | Status: SHIPPED | OUTPATIENT
Start: 2025-01-29

## 2025-01-29 RX ORDER — KETOROLAC TROMETHAMINE 30 MG/ML
15 INJECTION, SOLUTION INTRAMUSCULAR; INTRAVENOUS ONCE
Status: COMPLETED | OUTPATIENT
Start: 2025-01-29 | End: 2025-01-29

## 2025-01-29 RX ADMIN — IOHEXOL 100 ML: 350 INJECTION, SOLUTION INTRAVENOUS at 19:44

## 2025-01-29 RX ADMIN — KETOROLAC TROMETHAMINE 15 MG: 30 INJECTION, SOLUTION INTRAMUSCULAR; INTRAVENOUS at 20:11

## 2025-01-29 RX ADMIN — SODIUM CHLORIDE 1000 ML: 0.9 INJECTION, SOLUTION INTRAVENOUS at 20:03

## 2025-01-29 NOTE — Clinical Note
Margoth Cook was seen and treated in our emergency department on 1/29/2025.                Diagnosis:     Margoth  may return to work on return date.    She may return on this date: 02/01/2025         If you have any questions or concerns, please don't hesitate to call.      Javier Zuluaga, DO    ______________________________           _______________          _______________  Hospital Representative                              Date                                Time

## 2025-01-30 ENCOUNTER — TELEPHONE (OUTPATIENT)
Age: 35
End: 2025-01-30

## 2025-01-30 LAB — BACTERIA UR CULT: NORMAL

## 2025-01-30 NOTE — ED PROVIDER NOTES
Time reflects when diagnosis was documented in both MDM as applicable and the Disposition within this note       Time User Action Codes Description Comment    2025  9:34 PM Javier Zuluaga Add [R10.30] Lower abdominal pain           ED Disposition       ED Disposition   Discharge    Condition   Stable    Date/Time     9:34 PM    Comment   Margoth Cook discharge to home/self care.                   Assessment & Plan       Medical Decision Making  34-year-old female states back in September she had  and she has been having some lower quadrant abdominal discomfort since that time    Amount and/or Complexity of Data Reviewed  Labs: ordered.  Radiology: ordered.    Risk  Prescription drug management.             Medications   sodium chloride 0.9 % bolus 1,000 mL (0 mL Intravenous Stopped 25)   ketorolac (TORADOL) injection 15 mg (15 mg Intravenous Given 25)   iohexol (OMNIPAQUE) 350 MG/ML injection (MULTI-DOSE) 100 mL (100 mL Intravenous Given 25)       ED Risk Strat Scores                          SBIRT 20yo+      Flowsheet Row Most Recent Value   Initial Alcohol Screen: US AUDIT-C     1. How often do you have a drink containing alcohol? 0 Filed at: 2025   2. How many drinks containing alcohol do you have on a typical day you are drinking?  0 Filed at: 2025   3b. FEMALE Any Age, or MALE 65+: How often do you have 4 or more drinks on one occassion? 0 Filed at: 2025   Audit-C Score 0 Filed at: 2025   RANDY: How many times in the past year have you...    Used an illegal drug or used a prescription medication for non-medical reasons? Never Filed at: 2025                            History of Present Illness       Chief Complaint   Patient presents with    Abdominal Pain     Pt comes in from home with abd pain sharp pain  radiating into groin area into rectum  that has been around for about a m onth after  her emergency  on sept 3. Pt states it has progressively gotten worse today. With N/V starting today . Has gotten period since c section       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HCC)     not diagnosed as of 23-trying to R/O    Depression     Diabetes mellitus (HCC)     Disease of thyroid gland     hypo    Dysphagia     solids and liquids    Female infertility     Fibromyalgia, primary     Gastric ulcer     History of bronchitis     History of  delivery, currently pregnant 2024     at 22 weeks  Son has a ATOH1 gene      Hypothyroidism     Iron deficiency anemia     infusion in 2022    Irregular menses     Marijuana use     Patient reported she quit 2023    Miscarriage     Morbid obesity with BMI of 50.0-59.9, adult (HCC)     Plantar fasciitis of left foot     Polycystic ovary syndrome     Reflux esophagitis     Seizures (HCC)     last one 22    Sleep apnea     CPAP    Varicella     had disease    Wears glasses       Past Surgical History:   Procedure Laterality Date    EGD      with Bx due to barretts esophagus    EYE SURGERY Bilateral     lazy eye repair    HI BIOPSY OF LIP N/A 11/10/2022    Procedure: EXCISION BIOPSY SALVARY GLANDS LOWER LIP;  Surgeon: Casey Florez MD;  Location: [facility];  Service: ENT    HI CERCLAGE UTERINE CERVIX NONOBSTETRICAL N/A 2023    Procedure: CERCLAGE CERVICAL;  Surgeon: Ant Marinelli MD;  [facility]: [facility] ;  Service: Obstetrics    HI  DELIVERY ONLY N/A 9/3/2024    Procedure:  SECTION ();  Surgeon: Yany Juan MD;  [facility]: [facility] ;  Service: Obstetrics    HI HYSTEROSCOPY BX ENDOMETRIUM&/POLYPC W/WO D&C N/A 2021    Procedure:  DILATATION AND CURETTAGE (D&C) WITH HYSTEROSCOPY;  Surgeon: Albina Parsons MD;  Location: [facility];  Service: Gynecology    WISDOM TOOTH EXTRACTION        Family History   Problem Relation Age of Onset    Bipolar disorder Mother     Depression Mother     Depression Father     Other Son     Diabetes Maternal Grandmother     Thyroid disease Maternal Grandmother     Hypertension Maternal Grandmother     Heart disease Maternal Grandmother     Diabetes Maternal Grandfather     Diabetes Paternal Grandmother     Breast cancer Paternal Grandmother     Stroke Paternal Grandmother     Diabetes Paternal Grandfather     Breast cancer Maternal Aunt 35        bilateral    Stomach cancer Maternal Aunt       Social History     Tobacco Use    Smoking status: Never    Smokeless tobacco: Never   Vaping Use    Vaping status: Never Used   Substance Use Topics    Alcohol use: Not Currently     Comment: occ    Drug use: Yes     Types: Marijuana     Comment: vapes THC several times a week      E-Cigarette/Vaping    E-Cigarette Use Never User       E-Cigarette/Vaping Substances    Nicotine Yes     THC Yes     CBD No     Flavoring Yes     Other No     Unknown No       I have reviewed and agree with the history as documented.     34-year-old female with lower abdominal pain over the last 3 to 4 months after her .  States is kind of burning pain across the whole abdomen by her incision site.  No fevers chills nausea vomiting diarrhea no urinary issues no bowel bladder dysfunction.        Review of Systems   Constitutional:  Negative for activity change, chills, diaphoresis and fever.   HENT:  Negative for congestion, ear pain, nosebleeds, sore throat, trouble swallowing and voice change.    Eyes:  Negative for pain, discharge and redness.   Respiratory:  Negative for apnea, cough, choking, shortness of breath, wheezing and stridor.    Cardiovascular:  Negative for chest pain and palpitations.   Gastrointestinal:  Positive for  abdominal pain. Negative for abdominal distention, constipation, diarrhea, nausea and vomiting.   Endocrine: Negative for polydipsia.   Genitourinary:  Negative for difficulty urinating, dysuria, flank pain, frequency, hematuria and urgency.   Musculoskeletal:  Negative for back pain, gait problem, joint swelling, myalgias, neck pain and neck stiffness.   Skin:  Negative for pallor and rash.   Neurological:  Negative for dizziness, tremors, syncope, speech difficulty, weakness, numbness and headaches.   Hematological:  Negative for adenopathy.   Psychiatric/Behavioral:  Negative for confusion, hallucinations, self-injury and suicidal ideas. The patient is not nervous/anxious.            Objective       ED Triage Vitals   Temperature Pulse Blood Pressure Respirations SpO2 Patient Position - Orthostatic VS   01/29/25 1907 01/29/25 1905 01/29/25 1905 01/29/25 1905 01/29/25 1905 01/29/25 2150   98.4 °F (36.9 °C) 105 118/61 19 100 % Sitting      Temp Source Heart Rate Source BP Location FiO2 (%) Pain Score    01/29/25 1907 01/29/25 1905 01/29/25 2150 -- 01/29/25 2011    Oral Monitor Right arm  7      Vitals      Date and Time Temp Pulse SpO2 Resp BP Pain Score FACES Pain Rating User   01/29/25 2150 -- 87 99 % 16 120/72 3 -- CM   01/29/25 2011 -- -- -- -- -- 7 -- CM   01/29/25 1907 98.4 °F (36.9 °C) -- -- -- -- -- -- CAC   01/29/25 1905 -- 105 100 % 19 118/61 -- -- CAC            Physical Exam  Vitals and nursing note reviewed.   Constitutional:       General: She is not in acute distress.     Appearance: She is well-developed. She is not diaphoretic.   HENT:      Head: Normocephalic and atraumatic.      Right Ear: External ear normal.      Left Ear: External ear normal.      Nose: Nose normal.   Eyes:      Conjunctiva/sclera: Conjunctivae normal.      Pupils: Pupils are equal, round, and reactive to light.   Cardiovascular:      Rate and Rhythm: Normal rate and regular rhythm.      Heart sounds: Normal heart sounds.    Pulmonary:      Effort: Pulmonary effort is normal.      Breath sounds: Normal breath sounds.   Abdominal:      General: Bowel sounds are normal.      Palpations: Abdomen is soft.      Tenderness: There is abdominal tenderness in the suprapubic area.      Comments: Diffuse tenderness   Musculoskeletal:         General: Normal range of motion.      Cervical back: Normal range of motion and neck supple.   Skin:     General: Skin is warm and dry.   Neurological:      Mental Status: She is alert and oriented to person, place, and time.      Deep Tendon Reflexes: Reflexes are normal and symmetric.         Results Reviewed       Procedure Component Value Units Date/Time    Urine Microscopic [[id number]]  (Normal) Collected: 01/29/25 2004    Lab Status: Final result Specimen: Urine, Clean Catch Updated: 01/29/25 2021     RBC, UA None Seen /hpf      WBC, UA 0-1 /hpf      Epithelial Cells Occasional /hpf      Bacteria, UA Occasional /hpf     UA (URINE) with reflex to Scope [[id number]]  (Abnormal) Collected: 01/29/25 2004    Lab Status: Final result Specimen: Urine, Clean Catch Updated: 01/29/25 2011     Color, UA Light Yellow     Clarity, UA Clear     Specific Gravity, UA >=1.030     pH, UA 5.5     Leukocytes, UA Negative     Nitrite, UA Negative     Protein, UA Trace mg/dl      Glucose, UA Negative mg/dl      Ketones, UA Negative mg/dl      Urobilinogen, UA <2.0 mg/dl      Bilirubin, UA Negative     Occult Blood, UA Negative    POCT pregnancy, urine [[id number]]  (Normal) Collected: 01/29/25 2011    Lab Status: Final result Updated: 01/29/25 2011     EXT Preg Test, Ur Negative     Control Valid    Urine culture [[id number]] Collected: 01/29/25 2004    Lab Status: In process Specimen: Urine, Clean Catch Updated: 01/29/25 2007    Comprehensive metabolic panel [[id number]]  (Abnormal) Collected: 01/29/25 1936    Lab Status: Final result Specimen: Blood from Arm, Right Updated: 01/29/25 2000     Sodium 136 mmol/L      Potassium  3.3 mmol/L      Chloride 100 mmol/L      CO2 23 mmol/L      ANION GAP 13 mmol/L      BUN 17 mg/dL      Creatinine 0.62 mg/dL      Glucose 98 mg/dL      Calcium 8.6 mg/dL      AST 15 U/L      ALT 15 U/L      Alkaline Phosphatase 118 U/L      Total Protein 8.2 g/dL      Albumin 4.4 g/dL      Total Bilirubin 0.19 mg/dL      eGFR 118 ml/min/1.73sq m     Narrative:      National Kid[employer] Disease Foundation guidelines for Chronic Kidney Disease (CKD):     Stage 1 with normal or high GFR (GFR > 90 mL/min/1.73 square meters)    Stage 2 Mild CKD (GFR = 60-89 mL/min/1.73 square meters)    Stage 3A Moderate CKD (GFR = 45-59 mL/min/1.73 square meters)    Stage 3B Moderate CKD (GFR = 30-44 mL/min/1.73 square meters)    Stage 4 Severe CKD (GFR = 15-29 mL/min/1.73 square meters)    Stage 5 End Stage CKD (GFR <15 mL/min/1.73 square meters)  Note: GFR calculation is accurate only with a steady state creatinine    Lipase [[id number]]  (Normal) Collected: 01/29/25 1936    Lab Status: Final result Specimen: Blood from Arm, Right Updated: 01/29/25 2000     Lipase 15 u/L     Protime-INR [[id number]]  (Abnormal) Collected: 01/29/25 1936    Lab Status: Final result Specimen: Blood from Arm, Right Updated: 01/29/25 1957     Protime 12.1 seconds      INR 0.85    Narrative:      INR Therapeutic Range    Indication                                             INR Range      Atrial Fibrillation                                               2.0-3.0  Hypercoagulable State                                    2.0.2.3  Left Ventricular Asist Device                            2.0-3.0  Mechanical Heart Valve                                  -    Aortic(with afib, MI, embolism, HF, LA enlargement,    and/or coagulopathy)                                     2.0-3.0 (2.5-3.5)     Mitral                                                             2.5-3.5  Prosthetic/Bioprosthetic Heart Valve               2.0-3.0  Venous thromboembolism (VTE: VT, PE         2.0-3.0    APTT [[id number]]  (Normal) Collected: 25    Lab Status: Final result Specimen: Blood from Arm, Right Updated: 25     PTT 25 seconds     CBC and differential [[id number]]  (Abnormal) Collected: 25    Lab Status: Final result Specimen: Blood from Arm, Right Updated: 25     WBC 8.82 Thousand/uL      RBC 4.55 Million/uL      Hemoglobin 12.4 g/dL      Hematocrit 38.8 %      MCV 85 fL      MCH 27.3 pg      MCHC 32.0 g/dL      RDW 14.3 %      MPV 9.3 fL      Platelets 392 Thousands/uL      nRBC 0 /100 WBCs      Segmented % 69 %      Immature Grans % 1 %      Lymphocytes % 22 %      Monocytes % 4 %      Eosinophils Relative 2 %      Basophils Relative 2 %      Absolute Neutrophils 6.10 Thousands/µL      Absolute Immature Grans 0.04 Thousand/uL      Absolute Lymphocytes 1.95 Thousands/µL      Absolute Monocytes 0.36 Thousand/µL      Eosinophils Absolute 0.18 Thousand/µL      Basophils Absolute 0.19 Thousands/µL             CT abdomen pelvis with contrast   Final Interpretation by Jhonathan Hdz MD (2031)      No acute findings in the abdomen or pelvis.      Expected postoperative changes of recent  section, by patient history, with no evidence of complication. No focal fluid collection to suggest an abscess.      3.6 cm left ovarian cyst.         Workstation performed: [id number]             Procedures    ED Medication and Procedure Management   Prior to Admission Medications   Prescriptions Last Dose Informant Patient Reported? Taking?   albuterol (Proventil HFA) 90 mcg/act inhaler   No No   Sig: Inhale 2 puffs every 6 (six) hours as needed for wheezing   ferrous sulfate 324 (65 Fe) mg   No No   Sig: TAKE 1 TABLET BY MOUTH TWICE DAILY BEFORE MEALS   Patient taking differently: Take 324 mg by mouth daily before breakfast   ibuprofen (MOTRIN) 600 mg tablet   No No   Sig: Take 1 tablet (600 mg total) by mouth every 6 (six) hours   lamoTRIgine (LaMICtal)  100 mg tablet   No No   Sig: Take 1 tablet (100 mg total) by mouth 2 (two) times a day Start 1 tab twice daily Aug 4th.   lamoTRIgine (LaMICtal) 25 mg tablet   No No   Sig: START WITH 1 TABLET BY MOUTH FOR 1 WEEK, THEN 1 TABLET TWICE DAILY THE 2ND WEEK, THEN 2 TABLETS TWICE DAILY THE 3RD WEEK (GENERIC FOR LAMICTAL)   levETIRAcetam (Keppra) 500 mg tablet   No No   Sig: Take 1 tablet (500 mg total) by mouth daily In morning.   levothyroxine 25 mcg tablet   No No   Sig: Take 1 tablet (25 mcg total) by mouth every morning   sertraline (Zoloft) 25 mg tablet   No No   Sig: Take 2 tablets (50 mg total) by mouth daily      Facility-Administered Medications: None     Discharge Medication List as of 1/29/2025  9:35 PM        START taking these medications    Details   !! ibuprofen (MOTRIN) 600 mg tablet Take 1 tablet (600 mg total) by mouth every 6 (six) hours as needed for moderate pain, Starting Wed 1/29/2025, Normal       !! - Potential duplicate medications found. Please discuss with provider.        CONTINUE these medications which have NOT CHANGED    Details   albuterol (Proventil HFA) 90 mcg/act inhaler Inhale 2 puffs every 6 (six) hours as needed for wheezing, Starting Mon 11/20/2023, Normal      ferrous sulfate 324 (65 Fe) mg TAKE 1 TABLET BY MOUTH TWICE DAILY BEFORE MEALS, Starting Sat 12/23/2023, Normal      !! ibuprofen (MOTRIN) 600 mg tablet Take 1 tablet (600 mg total) by mouth every 6 (six) hours, Starting Sat 9/7/2024, Normal      !! lamoTRIgine (LaMICtal) 100 mg tablet Take 1 tablet (100 mg total) by mouth 2 (two) times a day Start 1 tab twice daily Aug 4th., Starting Mon 1/6/2025, Normal      !! lamoTRIgine (LaMICtal) 25 mg tablet START WITH 1 TABLET BY MOUTH FOR 1 WEEK, THEN 1 TABLET TWICE DAILY THE 2ND WEEK, THEN 2 TABLETS TWICE DAILY THE 3RD WEEK (GENERIC FOR LAMICTAL), Normal      levETIRAcetam (Keppra) 500 mg tablet Take 1 tablet (500 mg total) by mouth daily In morning., Starting Mon 1/6/2025, Normal       levothyroxine 25 mcg tablet Take 1 tablet (25 mcg total) by mouth every morning, Starting Thu 10/3/2024, Normal      sertraline (Zoloft) 25 mg tablet Take 2 tablets (50 mg total) by mouth daily, Starting Thu 9/19/2024, Normal       !! - Potential duplicate medications found. Please discuss with provider.        No discharge procedures on file.  ED SEPSIS DOCUMENTATION   Time reflects when diagnosis was documented in both MDM as applicable and the Disposition within this note       Time User Action Codes Description Comment    1/29/2025  9:34 PM Javier Zuluaga Add [R10.30] Lower abdominal pain                  Javier Zuluaga, DO  01/30/25 0053

## 2025-01-30 NOTE — TELEPHONE ENCOUNTER
Patient received a voicemail regarding her appt on Monday- informed patient appt was rescheduled with a physician as [facility] does not do surgery. Patient agreeable to updated appt.

## 2025-02-03 ENCOUNTER — OFFICE VISIT (OUTPATIENT)
Dept: OBGYN CLINIC | Facility: CLINIC | Age: 35
End: 2025-02-03
Payer: MEDICARE

## 2025-02-03 VITALS
BODY MASS INDEX: 53.92 KG/M2 | SYSTOLIC BLOOD PRESSURE: 120 MMHG | DIASTOLIC BLOOD PRESSURE: 70 MMHG | WEIGHT: 233 LBS | HEIGHT: 55 IN

## 2025-02-03 DIAGNOSIS — Z30.09 ENCOUNTER FOR CONSULTATION FOR FEMALE STERILIZATION: Primary | ICD-10-CM

## 2025-02-03 PROBLEM — E03.9 HYPOTHYROID IN PREGNANCY, ANTEPARTUM: Status: RESOLVED | Noted: 2022-10-24 | Resolved: 2025-02-03

## 2025-02-03 PROBLEM — O99.519 ASTHMA DURING PREGNANCY: Status: RESOLVED | Noted: 2024-08-20 | Resolved: 2025-02-03

## 2025-02-03 PROBLEM — B95.1 POSITIVE GBS TEST: Status: RESOLVED | Noted: 2024-09-02 | Resolved: 2025-02-03

## 2025-02-03 PROBLEM — O99.280 HYPOTHYROID IN PREGNANCY, ANTEPARTUM: Status: RESOLVED | Noted: 2022-10-24 | Resolved: 2025-02-03

## 2025-02-03 PROBLEM — O34.33 CERVICAL CERCLAGE SUTURE PRESENT IN THIRD TRIMESTER: Status: RESOLVED | Noted: 2024-08-20 | Resolved: 2025-02-03

## 2025-02-03 PROBLEM — J45.909 ASTHMA DURING PREGNANCY: Status: RESOLVED | Noted: 2024-08-20 | Resolved: 2025-02-03

## 2025-02-03 PROBLEM — O35.2XX0 PREVIOUS CHILD WITH CONGENITAL ANOMALY, CURRENTLY PREGNANT, ANTEPARTUM: Status: RESOLVED | Noted: 2023-05-25 | Resolved: 2025-02-03

## 2025-02-03 PROBLEM — O09.299: Status: RESOLVED | Noted: 2023-03-25 | Resolved: 2025-02-03

## 2025-02-03 PROCEDURE — 99214 OFFICE O/P EST MOD 30 MIN: CPT | Performed by: STUDENT IN AN ORGANIZED HEALTH CARE EDUCATION/TRAINING PROGRAM

## 2025-02-03 NOTE — H&P
History & Physical - OB/GYN   Margoth Cook 34 y.o. female MRN: [id number]  Unit/Bed#:  Encounter: [id number]        HPI:  Margoth Cook is a 34 y.o.  who presents to discuss desire for permanent sterilization-this is very well and has a very complicated obstetrical history and is not sure she no longer desires future childbearing.  She denies any other complaints today.    Last pap: 2023- ASCUS, HPV other HR, 16 and 18 positive.  Colposcopy completed 2024 at [facility] with recommendations to complete colposcopy postpartum.    Active Problems:  Patient Active Problem List   Diagnosis    Depression    Nonintractable epilepsy without status epilepticus (HCC)    History of irregular menstrual bleeding    PCOS (polycystic ovarian syndrome)    Spain's esophagus    Gastric ulcer    GARIMA (obstructive sleep apnea)    Autism    Bipolar depression (HCC)    Morbid obesity (HCC)    Gastroesophageal reflux disease    Excessive daytime sleepiness    Iron deficiency anemia secondary to inadequate dietary iron intake    Dysphagia    Hypothyroid in pregnancy, antepartum    Chronic constipation    Nausea/vomiting in pregnancy    History of stillbirth in currently pregnant patient, unspecified trimester    Maternal morbid obesity, antepartum (HCC)    Short stature    Family history of congenital heart defect    Previous child with congenital anomaly, currently pregnant, antepartum    Rh negative state in antepartum period    Request for sterilization    Vision problem    Spotting    Asthma during pregnancy    H/O Cervical cerclage suture present in third trimester    COVID-19    Positive GBS test    S/P emergency  section    Difficult airway for intubation    Acute post-operative pain    Hypothyroidism    High risk social situation    Prediabetes    Elevated LDL cholesterol level     PMH:  Past Medical History:   Diagnosis Date    37 weeks gestation of pregnancy 2024    Abnormal Pap smear of  cervix     Anemia     Anxiety     Asthma     Autism     Spain esophagus     Bipolar disorder (HCC)     CPAP (continuous positive airway pressure) dependence     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HCC)     not diagnosed as of 23-trying to R/O    Depression     Diabetes mellitus (HCC)     Disease of thyroid gland     hypo    Dysphagia     solids and liquids    Female infertility     Fibromyalgia, primary     Gastric ulcer     History of bronchitis     History of  delivery, currently pregnant 2024     at 22 weeks  Son has a ATOH1 gene      Hypothyroidism     Iron deficiency anemia     infusion in 2022    Irregular menses     Marijuana use     Patient reported she quit 2023    Miscarriage     Morbid obesity with BMI of 50.0-59.9, adult (HCC)     Plantar fasciitis of left foot     Polycystic ovary syndrome     Reflux esophagitis     Seizures (HCC)     last one 22    Sleep apnea     CPAP    Varicella     had disease    Wears glasses        PSH:  Past Surgical History:   Procedure Laterality Date    EGD      with Bx due to barretts esophagus    EYE SURGERY Bilateral     lazy eye repair    NM BIOPSY OF LIP N/A 11/10/2022    Procedure: EXCISION BIOPSY SALVARY GLANDS LOWER LIP;  Surgeon: Casey Florez MD;  Location: [facility];  Service: ENT    NM CERCLAGE UTERINE CERVIX NONOBSTETRICAL N/A 2023    Procedure: CERCLAGE CERVICAL;  Surgeon: Ant Marinelli MD;  [facility];  Service: Obstetrics    NM  DELIVERY ONLY N/A 9/3/2024    Procedure:  SECTION ();  Surgeon: Yany Juan MD;  [facility];  Service: Obstetrics    NM HYSTEROSCOPY BX ENDOMETRIUM&/POLYPC W/WO D&C N/A 2021    Procedure: DILATATION AND CURETTAGE (D&C) WITH HYSTEROSCOPY;  Surgeon: Albina Parsons MD;  Location: [facility];  Service: Gynecology    WISDOM TOOTH EXTRACTION       Social Hx:  Social History     Socioeconomic History    Marital status: Single     Spouse name: Not on file     Number of children: Not on file    Years of education: Not on file    Highest education level: Not on file   Occupational History    Not on file   Tobacco Use    Smoking status: Never    Smokeless tobacco: Never   Vaping Use    Vaping status: Never Used   Substance and Sexual Activity    Alcohol use: Not Currently     Comment: occ    Drug use: Not Currently     Types: Marijuana     Comment: vapes THC several times a week    Sexual activity: Not Currently     Partners: Male     Comment: recovering from pregnancy   Other Topics Concern    Not on file   Social History Narrative    Not on file     Social Drivers of Health     Financial Resource Strain: Low Risk  (9/12/2024)    Overall Financial Resource Strain (CARDIA)     Difficulty of Paying Living Expenses: Not very hard   Recent Concern: Financial Resource Strain - Medium Risk (7/22/2024)    Received from [facility]    Overall Financial Resource Strain (CARDIA)     Difficulty of Paying Living Expenses: Somewhat hard   Food Insecurity: No Food Insecurity (9/12/2024)    Nursing - Inadequate Food Risk Classification     Worried About Running Out of Food in the Last Year: Never true     Ran Out of Food in the Last Year: Never true     Ran Out of Food in the Last Year: Not on file   Recent Concern: Food Insecurity - Food Insecurity Present (9/4/2024)    Hunger Vital Sign     Worried About Running Out of Food in the Last Year: Sometimes true     Ran Out of Food in the Last Year: Sometimes true   Transportation Needs: No Transportation Needs (9/12/2024)    PRAPARE - Transportation     Lack of Transportation (Medical): No     Lack of Transportation (Non-Medical): No   Physical Activity: Not on file   Stress: No Stress Concern Present (9/12/2024)    [employer] [place] [employer] - Occupational Stress Questionnaire     Feeling of Stress : Not at all   Social Connections: Not on file   Intimate Partner Violence: Not At Risk (7/22/2024)     [facility]    Humiliation, Afraid, Rape, and Kick questionnaire     Fear of Current or Ex-Partner: No     Emotionally Abused: No     Physically Abused: No     Sexually Abused: No   Housing Stability: Low Risk  (2024)    Housing Stability Vital Sign     Unable to Pay for Housing in the Last Year: No     Number of Times Moved in the Last Year: 0     Homeless in the Last Year: No     Family History   Problem Relation Age of Onset    Bipolar disorder Mother     Depression Mother     Depression Father     Other Son     Diabetes Maternal Grandmother     Thyroid disease Maternal Grandmother     Hypertension Maternal Grandmother     Heart disease Maternal Grandmother     Diabetes Maternal Grandfather     Diabetes Paternal Grandmother     Breast cancer Paternal Grandmother     Stroke Paternal Grandmother     Diabetes Paternal Grandfather     Breast cancer Maternal Aunt 35        bilateral    Stomach cancer Maternal Aunt      OB Hx:  OB History    Para Term  AB Living   5 3 1 2 2 2   SAB IAB Ectopic Multiple Live Births   2 0 0 0 2      # Outcome Date GA Lbr Lopez/2nd Weight Sex Type Anes PTL Lv   5 Term 24 37w6d   M CS-LTranv   BHUPINDER      Name: Erik Cook      Apgar1: 6  Apgar5: 9   4  23 22w0d  600 g (1 lb 5.2 oz) M Vag-Spont EPI Y BHUPINDER      Birth Comments: currently at [facility] PICU      Complications:  labor in second trimester      Name: NICHOLE,BABY BOY (CATHY)      Apgar1: 8  Apgar5: 8   3 SAB            2 SAB            1   28w0d    Vag-Spont   FD      Complications: ABO incompatibility in pregnancy      Obstetric Comments   Both SAB <2 months   Still birth at 7 months   Has had hypercoagulable workup in [place] which was negative    PTD @ 22w5d PreE, chorio and sepsis. h/o cerclage   Cerclage placed 3/11/24 at [place]   Genetic carrier screening completed - [place] genetic counselor f/u and  referral to establish with [facility] Genetic Counselor (pt reports ATOH1 mutation for her and son Jay).          Meds:  Current Outpatient Medications on File Prior to Visit   Medication Sig Dispense Refill    albuterol (Proventil HFA) 90 mcg/act inhaler Inhale 2 puffs every 6 (six) hours as needed for wheezing 18 g 3    ferrous sulfate 324 (65 Fe) mg TAKE 1 TABLET BY MOUTH TWICE DAILY BEFORE MEALS 60 tablet 1    ibuprofen (MOTRIN) 600 mg tablet Take 1 tablet (600 mg total) by mouth every 6 (six) hours 30 tablet 0    ibuprofen (MOTRIN) 600 mg tablet Take 1 tablet (600 mg total) by mouth every 6 (six) hours as needed for moderate pain 30 tablet 0    lamoTRIgine (LaMICtal) 100 mg tablet Take 1 tablet (100 mg total) by mouth 2 (two) times a day Start 1 tab twice daily Aug 4th. 60 tablet 0    lamoTRIgine (LaMICtal) 25 mg tablet START WITH 1 TABLET BY MOUTH FOR 1 WEEK, THEN 1 TABLET TWICE DAILY THE 2ND WEEK, THEN 2 TABLETS TWICE DAILY THE 3RD WEEK (GENERIC FOR LAMICTAL) 50 tablet 0    levETIRAcetam (Keppra) 500 mg tablet Take 1 tablet (500 mg total) by mouth daily In morning. 30 tablet 5    levothyroxine 25 mcg tablet Take 1 tablet (25 mcg total) by mouth every morning 90 tablet 2    [DISCONTINUED] sertraline (Zoloft) 25 mg tablet Take 2 tablets (50 mg total) by mouth daily (Patient not taking: Reported on 2/3/2025) 30 tablet 1     No current facility-administered medications on file prior to visit.       Allergies:  Allergies   Allergen Reactions    Haloperidol Other (See Comments)     Jaw locks up    Jaw locks up   Jaw locks up    Bee Pollen Other (See Comments)    Penicillins Hives    Pollen Extract Allergic Rhinitis    Fluoxetine Allergic Rhinitis     Review of Systems   Constitutional:  Negative for chills and fever.   Respiratory:  Negative for chest tightness, shortness of breath and wheezing.    Cardiovascular:  Negative for chest pain, palpitations and leg swelling.   Gastrointestinal:  Negative for abdominal pain,  "constipation, diarrhea, nausea and vomiting.   Genitourinary:  Negative for dysuria, flank pain, frequency, hematuria and urgency.   Musculoskeletal:  Negative for arthralgias and myalgias.   Neurological:  Negative for weakness and light-headedness.       Physical Exam:  /70 (BP Location: Left arm, Patient Position: Sitting)   Ht 4' 7\" (1.397 m)   Wt 106 kg (233 lb)   LMP 2025 (Exact Date)   BMI 54.15 kg/m²     Physical Exam  Vitals reviewed.   Constitutional:       General: She is not in acute distress.     Appearance: Normal appearance. She is obese. She is not ill-appearing or diaphoretic.   HENT:      Head: Normocephalic and atraumatic.   Cardiovascular:      Rate and Rhythm: Normal rate.   Pulmonary:      Effort: Pulmonary effort is normal.   Abdominal:      Palpations: Abdomen is soft.      Tenderness: There is no abdominal tenderness. There is no guarding or rebound.   Musculoskeletal:      Right lower leg: No edema.      Left lower leg: No edema.   Skin:     General: Skin is warm and dry.   Neurological:      Mental Status: She is alert and oriented to person, place, and time.   Psychiatric:         Mood and Affect: Mood normal.         Behavior: Behavior normal.       Assessment/ Plan:   34 y.o.  with desire for permanent sterilization presenting to discuss surgical management-we reviewed laparoscopic bilateral salpingectomy at length. Alternatives, risks and benefits to bilateral salpingectomy or “removal of both fallopian tubes” were discussed in detail. Alternative contraceptive treatments including vasectomy, LARCs, Depo Provera, OCPs, patch, ring, POPs, condoms were discussed and patient is 100% sure she would like a bilateral salpingectomy as her form of contraception. Patient understands that this is a permanent and irreversible procedure and understands the risk of regret. We discussed the theoretical benefit of salpingectomy in the reduction of ovarian cancer. Risks of " infection, bleeding, injury to surrounding structures such as bladder, bowel, uterus, ureters and vasculature, failure to perform procedure, possibility of an open laparotomy procedure if injury occurs with further surgery were all discussed.   We reviewed her increased risk of the above listed given BMI of 54.  We also reviewed her increased risk of seizure given lowered threshold surrounding surgery.  Procedure was reviewed with patient and all questions were answered. Patient is agreeable to proceed with bilateral tubal ligation via salpingectomy and informed consent was obtained.  Given secondary insurance's medical assistance and MA 31 was also signed today.    Patient has history of ASCUS with HPV positive status 9/2023 and needs to be scheduled for Pap and colposcopy prior to procedure to ensure no further procedure necessary.    Katrina Mills MD  02/03/25

## 2025-02-04 ENCOUNTER — PREP FOR PROCEDURE (OUTPATIENT)
Dept: OBGYN CLINIC | Facility: CLINIC | Age: 35
End: 2025-02-04

## 2025-02-04 ENCOUNTER — TELEPHONE (OUTPATIENT)
Dept: OBGYN CLINIC | Facility: CLINIC | Age: 35
End: 2025-02-04

## 2025-02-04 DIAGNOSIS — Z30.2 ENCOUNTER FOR STERILIZATION: Primary | ICD-10-CM

## 2025-02-04 NOTE — TELEPHONE ENCOUNTER
----- Message from Katrina Mills MD sent at 2/3/2025  4:55 PM EST -----  Regarding: laparoscopic bilateral salpingectomy  [facility] GYN Department  Surgery Scheduling Sheet    Patient Name: Margoth Cook  : 1990    Provider: Katrina Mills MD     Needed: no; Language: N/A    Procedure: exam under anesthesia and bilateral salpingectomy    Diagnosis: desire for permanent sterilization    Special Needs or Equipment: bariatric trocars    Anesthesia: General anesthesia    Length of stay: outpatient  Does patient have comorbid conditions that will require close perioperative monitoring prior to safe discharge: no    The patient has comorbid conditions that will require close perioperative monitoring prior to safe discharge, including N/A.   This may require acute care beyond the usual and routine recovery period. As such, inpatient admission post-operatively is expected and appropriate, and anticipated hospital length of stay will be >2 midnights.    Pre-Admission Testing Needed: yes   Labs that should be ordered: urine pregnancy test    Order PAT that is recommended in prep for procedure?: Yes    Medical Clearance Needed: no; Provider: N/A    MA Form Signed (tubals/hysterectomy): Yes, 2/3/2025    Surgical Drink Given: no     How many days out of work: 2 week(s)     How many days no drivin week(s)       Is pre op appt needed?  no  Interval for post op appt: 2 week(s)     ##PATIENT NEEDS TO HAVE PAP AND COLPOSCOPY COMPLETED PRIOR TO PROCEDURE##      For Surgical Scheduler:     Surgery Scheduled On:  [place]: choice- NO ASC given patient BMI    Pre-op Appt:   Post op Appt:  Consult/Medical clearance appt:

## 2025-02-04 NOTE — TELEPHONE ENCOUNTER
Please dismiss prior message. That is not for this pt. I left a message for pt to call back to schedule surgery with Dr. Mills.

## 2025-02-05 ENCOUNTER — TELEPHONE (OUTPATIENT)
Age: 35
End: 2025-02-05

## 2025-02-07 ENCOUNTER — PROCEDURE VISIT (OUTPATIENT)
Dept: OBGYN CLINIC | Facility: CLINIC | Age: 35
End: 2025-02-07
Payer: MEDICARE

## 2025-02-07 VITALS
BODY MASS INDEX: 53.92 KG/M2 | WEIGHT: 233 LBS | SYSTOLIC BLOOD PRESSURE: 118 MMHG | DIASTOLIC BLOOD PRESSURE: 80 MMHG | HEIGHT: 55 IN

## 2025-02-07 DIAGNOSIS — Z12.4 ENCOUNTER FOR PAPANICOLAOU CERVICAL SMEAR FOLLOWING PRIOR ABNORMAL SMEAR: ICD-10-CM

## 2025-02-07 DIAGNOSIS — R87.610 ASCUS WITH POSITIVE HIGH RISK HPV CERVICAL: Primary | ICD-10-CM

## 2025-02-07 DIAGNOSIS — Z32.02 URINE PREGNANCY TEST NEGATIVE: ICD-10-CM

## 2025-02-07 DIAGNOSIS — R87.810 ASCUS WITH POSITIVE HIGH RISK HPV CERVICAL: Primary | ICD-10-CM

## 2025-02-07 LAB — SL AMB POCT URINE HCG: NORMAL

## 2025-02-07 PROCEDURE — 88305 TISSUE EXAM BY PATHOLOGIST: CPT | Performed by: STUDENT IN AN ORGANIZED HEALTH CARE EDUCATION/TRAINING PROGRAM

## 2025-02-07 PROCEDURE — 57456 ENDOCERV CURETTAGE W/SCOPE: CPT | Performed by: STUDENT IN AN ORGANIZED HEALTH CARE EDUCATION/TRAINING PROGRAM

## 2025-02-07 PROCEDURE — G0476 HPV COMBO ASSAY CA SCREEN: HCPCS | Performed by: STUDENT IN AN ORGANIZED HEALTH CARE EDUCATION/TRAINING PROGRAM

## 2025-02-07 PROCEDURE — G0145 SCR C/V CYTO,THINLAYER,RESCR: HCPCS | Performed by: STUDENT IN AN ORGANIZED HEALTH CARE EDUCATION/TRAINING PROGRAM

## 2025-02-07 PROCEDURE — 81025 URINE PREGNANCY TEST: CPT | Performed by: STUDENT IN AN ORGANIZED HEALTH CARE EDUCATION/TRAINING PROGRAM

## 2025-02-07 NOTE — PROGRESS NOTES
04/2023: ASCUS, HPV+, 16+, 18+  05/2023 colpo--no biopsies    Colposcopy completed 4/2024 at [facility], no biopsies or repeat pap and made recommendations to complete colposcopy postpartum       Colposcopy    Date/Time: 2/7/2025 2:00 PM    Performed by: María Frankel MD  Authorized by: María Frankel MD    Other Assisting Provider: Yes (comment)    Verbal consent obtained?: Yes    Risks and benefits: Risks, benefits and alternatives were discussed    Time Out:     Time out: Immediately prior to the procedure a time out was called    Required items: Required blood products, implants, devices and special equipment available    Patient identity confirmed:  Verbally with patient  Pre-procedure:     Prepped with: acetic acid    Indication:     Indications: ASCUS, HPV+  Procedure:     Procedure: Colposcopy w/ endocervical curettage      Anson speculum was placed in the vagina: yes      Under colposcopic examination the transition zone was seen in entirety: no      Endocervix was curetted using a Kevorkian curette: yes      Specimen(s) to pathology: yes    Post-procedure:     Impression: Low grade cervical dysplasia      Patient tolerance of procedure:  Tolerated well, no immediate complications      Reviewed pelvic rest for a few days  Call if any fevers, severe pain, heavy bleeding  If benign or low grade, repeat pap in 1 year  If high grade, will recommend LEEP/CKC    All questions answered to the best of my ability.

## 2025-02-10 LAB
HPV HR 12 DNA CVX QL NAA+PROBE: NEGATIVE
HPV16 DNA CVX QL NAA+PROBE: NEGATIVE
HPV18 DNA CVX QL NAA+PROBE: NEGATIVE

## 2025-02-11 ENCOUNTER — RESULTS FOLLOW-UP (OUTPATIENT)
Dept: OBGYN CLINIC | Facility: CLINIC | Age: 35
End: 2025-02-11

## 2025-02-11 PROCEDURE — 88305 TISSUE EXAM BY PATHOLOGIST: CPT | Performed by: STUDENT IN AN ORGANIZED HEALTH CARE EDUCATION/TRAINING PROGRAM

## 2025-02-14 LAB
LAB AP GYN PRIMARY INTERPRETATION: NORMAL
LAB AP LMP: NORMAL
Lab: NORMAL

## 2025-03-09 ENCOUNTER — OFFICE VISIT (OUTPATIENT)
Dept: URGENT CARE | Facility: CLINIC | Age: 35
End: 2025-03-09
Payer: MEDICARE

## 2025-03-09 VITALS
WEIGHT: 243 LBS | SYSTOLIC BLOOD PRESSURE: 112 MMHG | DIASTOLIC BLOOD PRESSURE: 98 MMHG | HEIGHT: 55 IN | OXYGEN SATURATION: 98 % | TEMPERATURE: 98.8 F | RESPIRATION RATE: 18 BRPM | BODY MASS INDEX: 56.23 KG/M2

## 2025-03-09 DIAGNOSIS — Z20.828 EXPOSURE TO THE FLU: Primary | ICD-10-CM

## 2025-03-09 DIAGNOSIS — J02.9 SORE THROAT: ICD-10-CM

## 2025-03-09 LAB — S PYO AG THROAT QL: NEGATIVE

## 2025-03-09 PROCEDURE — G2211 COMPLEX E/M VISIT ADD ON: HCPCS | Performed by: PHYSICIAN ASSISTANT

## 2025-03-09 PROCEDURE — 99213 OFFICE O/P EST LOW 20 MIN: CPT | Performed by: PHYSICIAN ASSISTANT

## 2025-03-09 PROCEDURE — 87636 SARSCOV2 & INF A&B AMP PRB: CPT | Performed by: PHYSICIAN ASSISTANT

## 2025-03-09 PROCEDURE — 87880 STREP A ASSAY W/OPTIC: CPT | Performed by: PHYSICIAN ASSISTANT

## 2025-03-09 NOTE — PATIENT INSTRUCTIONS
Acute Upper Respiratory Tract Infection: rapid strep was negative, COVID/Flu sent out   -Based on the patients hx and clinical presentation they are likely suffering from a Viral illness. No sign of bacterial infection at this time.   -Stay very well hydrated and push fluids, gatorade, pedialyte or electrolyte drinks can be beneficial. Staying hydrated is very important.   -Run a humidifier by your bed and take steamy showers to clear mucus   -Zicam nasal AllClear can be soothing to the nasal passages   -You can use flonase once daily for two weeks   -Advil or Tylenol for fever or pain.  -Diffusing aromatherapy oils, such as peppermint and eucalyptus can be soothing and help with congestion.   -Mucinex OTC or Zyrtec or Claritin. Drink plenty of water with the mucinex.   -Honey or throat lozenges for cough may be helpful  -Warm salt water gargles and tea with honey. Warm soups and teas can be soothing.   -Sleep with a few pillows propping you up at night to aid with coughing and congestion.   -Obtain a pulse ox device and check your O2 1-2 times a day. You want your oxygen levels to be >93%. If they go below 93% go to the ED immediately.   -Vitamin C 500mg, Vitamin D 2000IU daily. You can attempt to use zinc or Zicam up to 30mg per day.  Echinacea and elderberry may also aid with fighting viral infections.   -If your sx worsen or persist follow up with your PCP for recheck. Red flag signs and ED precautions discussed.

## 2025-03-09 NOTE — PROGRESS NOTES
[facility]        NAME: Margoth Cook is a 35 y.o. female  : 1990    MRN: [id number]  DATE: 2025  TIME: 11:46 AM    Assessment and Plan   Exposure to the flu [Z20.828]  1. Exposure to the flu  Covid19 and INFLUENZA A/B PCR      2. Sore throat  POCT rapid strepA            Patient Instructions   Acute Upper Respiratory Tract Infection:   -Based on the patients hx and clinical presentation they are likely suffering from a Viral illness. No sign of bacterial infection at this time.   -Stay very well hydrated and push fluids, gatorade, pedialyte or electrolyte drinks can be beneficial. Staying hydrated is very important.   -Run a humidifier by your bed and take steamy showers to clear mucus   -Zicam nasal AllClear can be soothing to the nasal passages   -You can use flonase once daily for two weeks   -Advil or Tylenol for fever or pain.  -Diffusing aromatherapy oils, such as peppermint and eucalyptus can be soothing and help with congestion.   -Mucinex OTC or Zyrtec or Claritin. Drink plenty of water with the mucinex.   -Honey or throat lozenges for cough may be helpful  -Warm salt water gargles and tea with honey. Warm soups and teas can be soothing.   -Sleep with a few pillows propping you up at night to aid with coughing and congestion.   -Obtain a pulse ox device and check your O2 1-2 times a day. You want your oxygen levels to be >93%. If they go below 93% go to the ED immediately.   -Vitamin C 500mg, Vitamin D 2000IU daily. You can attempt to use zinc or Zicam up to 30mg per day.  Echinacea and elderberry may also aid with fighting viral infections.   -If your sx worsen or persist follow up with your PCP for recheck. Red flag signs and ED precautions discussed.         Follow up with PCP in 3-5 days.  Proceed to  ER if symptoms worsen.    If tests have been performed at [facility], our office will contact you with results if changes need to be made to the care plan discussed with you  at the visit.  You can review your full results on [facility].    Chief Complaint     Chief Complaint   Patient presents with    viral ilness     Cold symptoms cough mucous since Thursday          History of Present Illness       Margoth is a 35-year-old female with a hx of PCOS, GARIMA, seizure disorder who presents today for a 4-day history of congestion, sore throat, headache, coughing, rhinorrhea. She states that the cough is productive.  The patient states that her son is also ill with similar symptoms.  The patient returned approximately 1 week ago from [place].  No fever, chills, body aches. No dyspnea, wheezing, chest tightness, cp, palpitations. No dizziness or weakness. No nausea, vomiting, diarrhea, constipation, abdominal pain.   No stridor or drooling. No rash. No sweats or diaphoresis. No muffled voice.  Good PO intake. No loss of taste or smell. No lower extremity edema. No hx of asthma or smoking. She has been taking Tylenol.         Review of Systems   Review of Systems   Constitutional:  Positive for fatigue. Negative for activity change, appetite change, chills, diaphoresis and fever.   HENT:  Positive for rhinorrhea and sore throat. Negative for congestion, ear discharge, ear pain, facial swelling, hearing loss, postnasal drip, sinus pressure, sinus pain, tinnitus, trouble swallowing and voice change.    Eyes:  Negative for visual disturbance.   Respiratory:  Positive for cough. Negative for apnea, chest tightness, shortness of breath, wheezing and stridor.    Cardiovascular:  Negative for chest pain, palpitations and leg swelling.   Gastrointestinal:  Negative for abdominal distention and abdominal pain.   Genitourinary:  Negative for decreased urine volume.   Musculoskeletal:  Negative for arthralgias, joint swelling, myalgias, neck pain and neck stiffness.   Skin:  Negative for rash.   Allergic/Immunologic: Negative for immunocompromised state.   Neurological:  Positive for headaches.  Negative for dizziness, weakness, light-headedness and numbness.   Hematological:  Negative for adenopathy.         Current Medications       Current Outpatient Medications:     ferrous sulfate 324 (65 Fe) mg, TAKE 1 TABLET BY MOUTH TWICE DAILY BEFORE MEALS, Disp: 60 tablet, Rfl: 1    ibuprofen (MOTRIN) 600 mg tablet, Take 1 tablet (600 mg total) by mouth every 6 (six) hours, Disp: 30 tablet, Rfl: 0    ibuprofen (MOTRIN) 600 mg tablet, Take 1 tablet (600 mg total) by mouth every 6 (six) hours as needed for moderate pain, Disp: 30 tablet, Rfl: 0    lamoTRIgine (LaMICtal) 100 mg tablet, Take 1 tablet (100 mg total) by mouth 2 (two) times a day Start 1 tab twice daily Aug 4th., Disp: 60 tablet, Rfl: 0    lamoTRIgine (LaMICtal) 25 mg tablet, START WITH 1 TABLET BY MOUTH FOR 1 WEEK, THEN 1 TABLET TWICE DAILY THE 2ND WEEK, THEN 2 TABLETS TWICE DAILY THE 3RD WEEK (GENERIC FOR LAMICTAL), Disp: 50 tablet, Rfl: 0    levETIRAcetam (Keppra) 500 mg tablet, Take 1 tablet (500 mg total) by mouth daily In morning., Disp: 30 tablet, Rfl: 5    levothyroxine 25 mcg tablet, Take 1 tablet (25 mcg total) by mouth every morning, Disp: 90 tablet, Rfl: 2    albuterol (Proventil HFA) 90 mcg/act inhaler, Inhale 2 puffs every 6 (six) hours as needed for wheezing (Patient not taking: Reported on 3/9/2025), Disp: 18 g, Rfl: 3    Current Allergies     Allergies as of 03/09/2025 - Reviewed 03/09/2025   Allergen Reaction Noted    Haloperidol Other (See Comments) 02/04/2021    Bee pollen Other (See Comments) 05/28/2021    Penicillins Hives 05/22/2020    Pollen extract Allergic Rhinitis 05/28/2021    Fluoxetine Allergic Rhinitis 01/17/2024            The following portions of the patient's history were reviewed and updated as appropriate: allergies, current medications, past family history, past medical history, past social history, past surgical history and problem list.     Past Medical History:   Diagnosis Date    37 weeks gestation of pregnancy  "2024    Abnormal Pap smear of cervix     Anemia     Anxiety     Asthma     Asthma during pregnancy 2024    Continue albuterol inhaler - has not needed      Autism     [place] esophagus     Bipolar disorder (HCC)     CPAP (continuous positive airway pressure) dependence     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HCC)     not diagnosed as of 24-trying to R/O    Depression     Diabetes mellitus (Formerly [facility])     Disease of thyroid gland     hypo    Dysphagia     solids and liquids    Female infertility     Fibromyalgia, primary     Gastric ulcer     H/O Cervical cerclage suture present in third trimester 2024    History indicated cerclage placed at [place]  Cerclage removed today in office      History of bronchitis     History of  delivery, currently pregnant 2024     at 22 weeks  Son has a ATOH1 gene      History of stillbirth in currently pregnant patient, unspecified trimester 2023    Late to care in G1 pregnancy - 28w demise due to \"fluid around the heart/trisomy\"      Hypothyroid in pregnancy, antepartum 10/24/2022    Continue Levothyroxine      Hypothyroidism     Iron deficiency anemia     infusion in 2022    Irregular menses     Marijuana use     Patient reported she quit 2023    Miscarriage     Morbid obesity with BMI of 50.0-59.9, adult (Formerly [facility])     Plantar fasciitis of left foot     Polycystic ovary syndrome     Previous child with congenital anomaly, currently pregnant, antepartum 2023    G1 - Trisomy? Heart defect? Fetal demise  G4 - 22 week delivery with chronic health concerns due to prematurity; Also ATOH1 gene mutation (very rare)  CFDNA WNL         Reflux esophagitis     Seizures (Formerly [facility])     last one 22    Sleep apnea     CPAP    Varicella     had disease    Wears glasses        Past Surgical History:   Procedure Laterality Date    EGD      with Bx due to barretts esophagus    EYE SURGERY Bilateral     lazy eye repair    [place] BIOPSY OF LIP N/A 11/10/2022    Procedure: " "EXCISION BIOPSY SALVARY GLANDS LOWER LIP;  Surgeon: Casey Florez MD;  [facility]: [facility];  Service: ENT    AL CERCLAGE UTERINE CERVIX NONOBSTETRICAL N/A 2023    Procedure: CERCLAGE CERVICAL;  Surgeon: Ant Marinelli MD;  [facility]: [facility] ;  Service: Obstetrics    AL  DELIVERY ONLY N/A 9/3/2024    Procedure:  SECTION ();  Surgeon: Yany Juan MD;  [facility];  Service: Obstetrics    AL HYSTEROSCOPY BX ENDOMETRIUM&/POLYPC W/WO D&C N/A 2021    Procedure: DILATATION AND CURETTAGE (D&C) WITH HYSTEROSCOPY;  Surgeon: Albina Parsons MD;  Location: [facility];  Service: Gynecology    WISDOM TOOTH EXTRACTION         Family History   Problem Relation Age of Onset    Bipolar disorder Mother     Depression Mother     Depression Father     Other Son     Diabetes Maternal Grandmother     Thyroid disease Maternal Grandmother     Hypertension Maternal Grandmother     Heart disease Maternal Grandmother     Diabetes Maternal Grandfather     Diabetes Paternal Grandmother     Breast cancer Paternal Grandmother     Stroke Paternal Grandmother     Diabetes Paternal Grandfather     Breast cancer Maternal Aunt 35        bilateral    Stomach cancer Maternal Aunt          Medications have been verified.        Objective   /98   Temp 98.8 °F (37.1 °C)   Resp 18   Ht 4' 7\" (1.397 m)   Wt 110 kg (243 lb)   LMP 2025 (Exact Date)   SpO2 98%   BMI 56.48 kg/m²   Patient's last menstrual period was 2025 (exact date).       Physical Exam     Physical Exam  Vitals and nursing note reviewed.   Constitutional:       General: She is not in acute distress.     Appearance: She is well-developed. She is not ill-appearing, toxic-appearing or diaphoretic.   HENT:      Head: Normocephalic and atraumatic.      Right Ear: Hearing, tympanic membrane, ear canal and external ear normal.      Left Ear: Hearing, tympanic membrane, ear canal and external ear normal.      Nose: Nose " normal. No mucosal edema or rhinorrhea.      Right Sinus: No maxillary sinus tenderness or frontal sinus tenderness.      Left Sinus: No maxillary sinus tenderness or frontal sinus tenderness.      Mouth/Throat:      Pharynx: Uvula midline. No oropharyngeal exudate, posterior oropharyngeal erythema or uvula swelling.      Tonsils: No tonsillar abscesses.   Cardiovascular:      Rate and Rhythm: Normal rate and regular rhythm.      Heart sounds: S1 normal and S2 normal. Heart sounds not distant. No murmur heard.     No friction rub. No gallop. No S3 or S4 sounds.   Pulmonary:      Effort: No tachypnea, bradypnea, accessory muscle usage or respiratory distress.      Breath sounds: No decreased breath sounds, wheezing, rhonchi or rales.   Musculoskeletal:      Cervical back: Normal range of motion and neck supple.   Lymphadenopathy:      Cervical: No cervical adenopathy.   Neurological:      Mental Status: She is alert and oriented to person, place, and time.   Psychiatric:         Behavior: Behavior normal.

## 2025-03-12 ENCOUNTER — HOSPITAL ENCOUNTER (EMERGENCY)
Facility: HOSPITAL | Age: 35
Discharge: HOME | End: 2025-03-12
Payer: MEDICARE

## 2025-03-12 ENCOUNTER — TELEPHONE (OUTPATIENT)
Age: 35
End: 2025-03-12

## 2025-03-12 ENCOUNTER — APPOINTMENT (EMERGENCY)
Dept: RADIOLOGY | Facility: HOSPITAL | Age: 35
End: 2025-03-12
Payer: MEDICARE

## 2025-03-12 VITALS
HEART RATE: 92 BPM | TEMPERATURE: 97.9 F | RESPIRATION RATE: 18 BRPM | SYSTOLIC BLOOD PRESSURE: 124 MMHG | DIASTOLIC BLOOD PRESSURE: 72 MMHG | OXYGEN SATURATION: 98 %

## 2025-03-12 DIAGNOSIS — J06.9 VIRAL URI WITH COUGH: Primary | ICD-10-CM

## 2025-03-12 LAB
FLUAV RNA RESP QL NAA+PROBE: NEGATIVE
FLUBV RNA RESP QL NAA+PROBE: NEGATIVE
RSV RNA RESP QL NAA+PROBE: NEGATIVE
S PYO DNA THROAT QL NAA+PROBE: NOT DETECTED
SARS-COV-2 RNA RESP QL NAA+PROBE: NEGATIVE

## 2025-03-12 PROCEDURE — 71045 X-RAY EXAM CHEST 1 VIEW: CPT

## 2025-03-12 PROCEDURE — 87651 STREP A DNA AMP PROBE: CPT | Performed by: PHYSICIAN ASSISTANT

## 2025-03-12 PROCEDURE — 99284 EMERGENCY DEPT VISIT MOD MDM: CPT | Performed by: PHYSICIAN ASSISTANT

## 2025-03-12 PROCEDURE — 0241U HB NFCT DS VIR RESP RNA 4 TRGT: CPT | Performed by: PHYSICIAN ASSISTANT

## 2025-03-12 PROCEDURE — 99284 EMERGENCY DEPT VISIT MOD MDM: CPT

## 2025-03-12 RX ORDER — BENZONATATE 100 MG/1
100 CAPSULE ORAL EVERY 8 HOURS
Qty: 21 CAPSULE | Refills: 0 | Status: SHIPPED | OUTPATIENT
Start: 2025-03-12

## 2025-03-12 NOTE — TELEPHONE ENCOUNTER
Incoming call from patient. States that she has Flu A currently, started with symptoms 3/9. Patient has procedure scheduled for 3/19 - has been unable to have pre-procedure labs performed due to being sick. Patient would like to reschedule procedure.     Routing to office for follow-up.

## 2025-03-12 NOTE — ED PROVIDER NOTES
Time reflects when diagnosis was documented in both MDM as applicable and the Disposition within this note       Time User Action Codes Description Comment    3/12/2025  9:03 AM Fabián David Add [J06.9] Viral URI with cough           ED Disposition       ED Disposition   Discharge    Condition   Stable    Date/Time   Wed Mar 12, 2025  9:03 AM    Comment   Margoth Cook discharge to home/self care.                   Assessment & Plan       Medical Decision Making  Differential diagnosis includes COVID, influenza, viral URI, pneumonia.  Nasal swab is negative for flu, COVID, RSV.  I ordered a chest x-ray.  I independently interpreted as no acute cardiopulmonary disease.  Patient is well-appearing and nontoxic.  Pulse ox 99% on room air indicating adequate oxygenation.  Given recent interaction with sick contact (grandmother) and arriving with 6-month-old child who has similar symptoms, I suspect viral syndrome at this time.  Will prescribe Tessalon Perles for cough.  Advise follow-up with [facility] next 2 to 3 days for recheck for any persistent concerns.  Return precautions were reviewed including shortness of breath.    Amount and/or Complexity of Data Reviewed  Labs: ordered.  Radiology: ordered.    Risk  Prescription drug management.             Medications - No data to display    ED Risk Strat Scores                            SBIRT 20yo+      Flowsheet Row Most Recent Value   Initial Alcohol Screen: US AUDIT-C     1. How often do you have a drink containing alcohol? 0 Filed at: 03/12/2025 0800   2. How many drinks containing alcohol do you have on a typical day you are drinking?  0 Filed at: 03/12/2025 0800   3b. FEMALE Any Age, or MALE 65+: How often do you have 4 or more drinks on one occassion? 0 Filed at: 03/12/2025 0800   Audit-C Score 0 Filed at: 03/12/2025 0800   RANDY: How many times in the past year have you...    Used an illegal drug or used a prescription medication for  "non-medical reasons? Never Filed at: 2025 0800                            History of Present Illness       Chief Complaint   Patient presents with    Sore Throat    Fever     Pt reports of fever on and off and cough since Sun with sore throat       Past Medical History:   Diagnosis Date    37 weeks gestation of pregnancy 2024    Abnormal Pap smear of cervix     Anemia     Anxiety     Asthma     Asthma during pregnancy 2024    Continue albuterol inhaler - has not needed      Autism     [place] esophagus     Bipolar disorder ([facility])     CPAP (continuous positive airway pressure) dependence     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[facility])     not diagnosed as of 24-trying to R/O    Depression     Diabetes mellitus ([facility])     Disease of thyroid gland     hypo    Dysphagia     solids and liquids    Female infertility     Fibromyalgia, primary     Gastric ulcer     H/O Cervical cerclage suture present in third trimester 2024    History indicated cerclage placed at [place]  Cerclage removed today in office      History of bronchitis     History of  delivery, currently pregnant 2024     at 22 weeks  Son has a ATOH1 gene      History of stillbirth in currently pregnant patient, unspecified trimester 2023    Late to care in G1 pregnancy - 28w demise due to \"fluid around the heart/trisomy\"      Hypothyroid in pregnancy, antepartum 10/24/2022    Continue Levothyroxine      Hypothyroidism     Iron deficiency anemia     infusion in 2022    Irregular menses     Marijuana use     Patient reported she quit 2023    Miscarriage     Morbid obesity with BMI of 50.0-59.9, adult ([facility])     Plantar fasciitis of left foot     Polycystic ovary syndrome     Previous child with congenital anomaly, currently pregnant, antepartum 2023    G1 - Trisomy? Heart defect? Fetal demise  G4 - 22 week delivery with chronic health concerns due to prematurity; Also ATOH1 gene mutation (very rare)  CFDNA WNL         " Reflux esophagitis     Seizures (HCC)     last one 22    Sleep apnea     CPAP    Varicella     had disease    Wears glasses       Past Surgical History:   Procedure Laterality Date    EGD      with Bx due to barretts esophagus    EYE SURGERY Bilateral     lazy eye repair    [place] BIOPSY OF LIP N/A 11/10/2022    Procedure: EXCISION BIOPSY SALVARY GLANDS LOWER LIP;  Surgeon: Casey Florez MD;  [facility];  Service: ENT    DC CERCLAGE UTERINE CERVIX NONOBSTETRICAL N/A 2023    Procedure: CERCLAGE CERVICAL;  Surgeon: Ant Marinelli MD;  [facility];  Service: Obstetrics    DC  DELIVERY ONLY N/A 9/3/2024    Procedure:  SECTION ();  Surgeon: Yany Juan MD;  [facility];  Service: Obstetrics    DC HYSTEROSCOPY BX ENDOMETRIUM&/POLYPC W/WO D&C N/A 2021    Procedure: DILATATION AND CURETTAGE (D&C) WITH HYSTEROSCOPY;  Surgeon: Albina Parsons MD;  [facility];  Service: Gynecology    WISDOM TOOTH EXTRACTION        Family History   Problem Relation Age of Onset    Bipolar disorder Mother     Depression Mother     Depression Father     Other Son     Diabetes Maternal Grandmother     Thyroid disease Maternal Grandmother     Hypertension Maternal Grandmother     Heart disease Maternal Grandmother     Diabetes Maternal Grandfather     Diabetes Paternal Grandmother     Breast cancer Paternal Grandmother     Stroke Paternal Grandmother     Diabetes Paternal Grandfather     Breast cancer Maternal Aunt 35        bilateral    Stomach cancer Maternal Aunt       Social History     Tobacco Use    Smoking status: Never    Smokeless tobacco: Never   Vaping Use    Vaping status: Never Used   Substance Use Topics    Alcohol use: Not Currently     Comment: occ    Drug use: Not Currently     Types: Marijuana     Comment: vapes THC several times a week      E-Cigarette/Vaping    E-Cigarette Use Never User       E-Cigarette/Vaping Substances    Nicotine Yes      THC Yes     CBD No     Flavoring Yes     Other No     Unknown No       I have reviewed and agree with the history as documented.     Patient is a 35-year-old female with history of asthma, focal seizures, anemia, hypothyroidism who reports onset 3 days ago of cough productive of green sputum, sore throat, intermittent fever (Tmax 102).  States her chest hurts with coughing.  She was seen at the urgent care 3 days ago.  She had a negative flu COVID swab.  She had a negative strep swab.  [facility] family recently traveled to [place] where her grandmother was sick.  She is concerned as she has a trach dependent child at home.  No abdominal pain, no nausea vomiting or diarrhea.  She is not a smoker.        Review of Systems   Constitutional:  Positive for fever.   HENT:  Positive for sore throat.    Respiratory:  Positive for cough. Negative for shortness of breath.    Cardiovascular:  Positive for chest pain.   Gastrointestinal:  Negative for abdominal pain, diarrhea, nausea and vomiting.   Neurological:  Negative for headaches.           Objective       ED Triage Vitals [03/12/25 0737]   Temperature Pulse Blood Pressure Respirations SpO2 Patient Position - Orthostatic VS   97.9 °F (36.6 °C) 91 109/58 20 99 % --      Temp Source Heart Rate Source BP Location FiO2 (%) Pain Score    Tympanic Monitor Left arm -- --      Vitals      Date and Time Temp Pulse SpO2 Resp BP Pain Score FACES Pain Rating User   03/12/25 0737 97.9 °F (36.6 °C) 91 99 % 20 109/58 -- -- ELSA            Physical Exam  Vitals and nursing note reviewed.   Constitutional:       General: She is not in acute distress.     Appearance: She is well-developed. She is not ill-appearing, toxic-appearing or diaphoretic.   HENT:      Head: Normocephalic and atraumatic.      Right Ear: Tympanic membrane and ear canal normal.      Left Ear: Tympanic membrane and ear canal normal.      Nose: No congestion or rhinorrhea.      Mouth/Throat:      Mouth: Mucous membranes are  moist.      Pharynx: Oropharynx is clear. Uvula midline. No oropharyngeal exudate or posterior oropharyngeal erythema.      Tonsils: No tonsillar exudate or tonsillar abscesses.   Eyes:      Conjunctiva/sclera: Conjunctivae normal.      Pupils: Pupils are equal, round, and reactive to light.   Cardiovascular:      Rate and Rhythm: Normal rate and regular rhythm.      Heart sounds: Normal heart sounds.   Pulmonary:      Effort: Pulmonary effort is normal. No respiratory distress.      Breath sounds: Normal breath sounds. No stridor. No wheezing, rhonchi or rales.   Chest:      Chest wall: No tenderness.   Abdominal:      General: Bowel sounds are normal.      Palpations: Abdomen is soft.      Tenderness: There is no abdominal tenderness.   Musculoskeletal:      Cervical back: Normal range of motion and neck supple.   Skin:     General: Skin is warm and dry.      Capillary Refill: Capillary refill takes less than 2 seconds.   Neurological:      General: No focal deficit present.      Mental Status: She is alert and oriented to person, place, and time.         Results Reviewed       Procedure Component Value Units Date/Time    FLU/RSV/COVID - if FLU/RSV clinically relevant (2hr TAT) [[id number]]  (Normal) Collected: 03/12/25 0759    Lab Status: Final result Specimen: Nares from Nose Updated: 03/12/25 0866     SARS-CoV-2 Negative     INFLUENZA A PCR Negative     INFLUENZA B PCR Negative     RSV PCR Negative    Narrative:      This test has been performed using the CoV-2/Flu/RSV plus assay on the [employer] GeneXpert platform. This test has been validated by the  and verified by the performing laboratory.     This test is designed to amplify and detect the following: nucleocapsid (N), envelope (E), and RNA-dependent RNA polymerase (RdRP) genes of the SARS-CoV-2 genome; matrix (M), basic polymerase (PB2), and acidic protein (PA) segments of the influenza A genome; matrix (M) and non-structural protein (NS)  segments of the influenza B genome, and the nucleocapsid genes of RSV A and RSV B.     Positive results are indicative of the presence of Flu A, Flu B, RSV, and/or SARS-CoV-2 RNA. Positive results for SARS-CoV-2 or suspected novel influenza should be reported to state, local, or federal health departments according to local reporting requirements.      All results should be assessed in conjunction with clinical presentation and other laboratory markers for clinical management.     FOR PEDIATRIC PATIENTS - copy/paste COVID Guidelines URL to browser: [email or web address]       Strep A PCR [[id number]]  (Normal) Collected: 03/12/25 0759    Lab Status: Final result Specimen: Throat Updated: 03/12/25 0832     STREP A PCR Not Detected            XR chest portable    (Results Pending)       Procedures    ED Medication and Procedure Management   Prior to Admission Medications   Prescriptions Last Dose Informant Patient Reported? Taking?   albuterol (Proventil HFA) 90 mcg/act inhaler   No No   Sig: Inhale 2 puffs every 6 (six) hours as needed for wheezing   Patient not taking: Reported on 3/9/2025   ferrous sulfate 324 (65 Fe) mg   No No   Sig: TAKE 1 TABLET BY MOUTH TWICE DAILY BEFORE MEALS   ibuprofen (MOTRIN) 600 mg tablet   No No   Sig: Take 1 tablet (600 mg total) by mouth every 6 (six) hours   ibuprofen (MOTRIN) 600 mg tablet   No No   Sig: Take 1 tablet (600 mg total) by mouth every 6 (six) hours as needed for moderate pain   lamoTRIgine (LaMICtal) 100 mg tablet   No No   Sig: Take 1 tablet (100 mg total) by mouth 2 (two) times a day Start 1 tab twice daily Aug 4th.   lamoTRIgine (LaMICtal) 25 mg tablet   No No   Sig: START WITH 1 TABLET BY MOUTH FOR 1 WEEK, THEN 1 TABLET TWICE DAILY THE 2ND WEEK, THEN 2 TABLETS TWICE DAILY THE 3RD WEEK (GENERIC FOR LAMICTAL)   levETIRAcetam (Keppra) 500 mg tablet   No No   Sig: Take 1 tablet (500 mg total) by mouth daily In  morning.   levothyroxine 25 mcg tablet   No No   Sig: Take 1 tablet (25 mcg total) by mouth every morning      Facility-Administered Medications: None     Patient's Medications   Discharge Prescriptions    BENZONATATE (TESSALON PERLES) 100 MG CAPSULE    Take 1 capsule (100 mg total) by mouth every 8 (eight) hours       Start Date: 3/12/2025 End Date: --       Order Dose: 100 mg       Quantity: 21 capsule    Refills: 0     No discharge procedures on file.  ED SEPSIS DOCUMENTATION   Time reflects when diagnosis was documented in both MDM as applicable and the Disposition within this note       Time User Action Codes Description Comment    3/12/2025  9:03 AM Fabián David Add [J06.9] Viral URI with cough                  Fabián David PA-C  03/12/25 0911

## 2025-03-12 NOTE — DISCHARGE INSTRUCTIONS
Tessalon perles for cough    Follow up with [facility] for recheck next 2-3 days if any persistent concerns    Return to ED for shortness of breath, worsening symptoms

## 2025-04-04 ENCOUNTER — APPOINTMENT (OUTPATIENT)
Dept: LAB | Facility: HOSPITAL | Age: 35
End: 2025-04-04
Attending: STUDENT IN AN ORGANIZED HEALTH CARE EDUCATION/TRAINING PROGRAM
Payer: MEDICARE

## 2025-04-04 ENCOUNTER — RESULTS FOLLOW-UP (OUTPATIENT)
Age: 35
End: 2025-04-04

## 2025-04-04 ENCOUNTER — LAB (OUTPATIENT)
Dept: LAB | Facility: HOSPITAL | Age: 35
End: 2025-04-04
Payer: MEDICARE

## 2025-04-04 ENCOUNTER — RESULTS FOLLOW-UP (OUTPATIENT)
Dept: OBGYN CLINIC | Facility: CLINIC | Age: 35
End: 2025-04-04

## 2025-04-04 ENCOUNTER — APPOINTMENT (OUTPATIENT)
Dept: LAB | Facility: HOSPITAL | Age: 35
End: 2025-04-04
Payer: MEDICARE

## 2025-04-04 DIAGNOSIS — Z30.2 ENCOUNTER FOR STERILIZATION: ICD-10-CM

## 2025-04-04 DIAGNOSIS — E78.00 ELEVATED LDL CHOLESTEROL LEVEL: ICD-10-CM

## 2025-04-04 LAB
ANION GAP SERPL CALCULATED.3IONS-SCNC: 11 MMOL/L (ref 4–13)
BASOPHILS # BLD AUTO: 0.1 THOUSANDS/ÂΜL (ref 0–0.1)
BASOPHILS NFR BLD AUTO: 2 % (ref 0–1)
BUN SERPL-MCNC: 14 MG/DL (ref 5–25)
CALCIUM SERPL-MCNC: 9.1 MG/DL (ref 8.4–10.2)
CHLORIDE SERPL-SCNC: 103 MMOL/L (ref 96–108)
CHOLEST SERPL-MCNC: 166 MG/DL (ref ?–200)
CO2 SERPL-SCNC: 23 MMOL/L (ref 21–32)
CREAT SERPL-MCNC: 0.55 MG/DL (ref 0.6–1.3)
EOSINOPHIL # BLD AUTO: 0.2 THOUSAND/ÂΜL (ref 0–0.61)
EOSINOPHIL NFR BLD AUTO: 3 % (ref 0–6)
ERYTHROCYTE [DISTWIDTH] IN BLOOD BY AUTOMATED COUNT: 14 % (ref 11.6–15.1)
GFR SERPL CREATININE-BSD FRML MDRD: 122 ML/MIN/1.73SQ M
GLUCOSE P FAST SERPL-MCNC: 83 MG/DL (ref 65–99)
HCT VFR BLD AUTO: 35.9 % (ref 34.8–46.1)
HDLC SERPL-MCNC: 47 MG/DL
HGB BLD-MCNC: 11.3 G/DL (ref 11.5–15.4)
IMM GRANULOCYTES # BLD AUTO: 0.02 THOUSAND/UL (ref 0–0.2)
IMM GRANULOCYTES NFR BLD AUTO: 0 % (ref 0–2)
LDLC SERPL CALC-MCNC: 90 MG/DL (ref 0–100)
LYMPHOCYTES # BLD AUTO: 1.37 THOUSANDS/ÂΜL (ref 0.6–4.47)
LYMPHOCYTES NFR BLD AUTO: 23 % (ref 14–44)
MCH RBC QN AUTO: 27.1 PG (ref 26.8–34.3)
MCHC RBC AUTO-ENTMCNC: 31.5 G/DL (ref 31.4–37.4)
MCV RBC AUTO: 86 FL (ref 82–98)
MONOCYTES # BLD AUTO: 0.31 THOUSAND/ÂΜL (ref 0.17–1.22)
MONOCYTES NFR BLD AUTO: 5 % (ref 4–12)
NEUTROPHILS # BLD AUTO: 3.94 THOUSANDS/ÂΜL (ref 1.85–7.62)
NEUTS SEG NFR BLD AUTO: 67 % (ref 43–75)
NONHDLC SERPL-MCNC: 119 MG/DL
NRBC BLD AUTO-RTO: 0 /100 WBCS
PLATELET # BLD AUTO: 352 THOUSANDS/UL (ref 149–390)
PMV BLD AUTO: 9 FL (ref 8.9–12.7)
POTASSIUM SERPL-SCNC: 3.7 MMOL/L (ref 3.5–5.3)
RBC # BLD AUTO: 4.17 MILLION/UL (ref 3.81–5.12)
SODIUM SERPL-SCNC: 137 MMOL/L (ref 135–147)
TRIGL SERPL-MCNC: 144 MG/DL (ref ?–150)
WBC # BLD AUTO: 5.94 THOUSAND/UL (ref 4.31–10.16)

## 2025-04-04 PROCEDURE — 36415 COLL VENOUS BLD VENIPUNCTURE: CPT

## 2025-04-04 PROCEDURE — 85025 COMPLETE CBC W/AUTO DIFF WBC: CPT

## 2025-04-04 PROCEDURE — 80061 LIPID PANEL: CPT

## 2025-04-04 PROCEDURE — 80048 BASIC METABOLIC PNL TOTAL CA: CPT

## 2025-04-04 NOTE — RESULT ENCOUNTER NOTE
Pt was called to notify that her cholesterol level was normal. However, I could not reach the Pt on the phone, so VM was left giving the result and Pt was advised to call back if she has any questions.

## 2025-04-07 RX ORDER — DULOXETIN HYDROCHLORIDE 20 MG/1
20 CAPSULE, DELAYED RELEASE ORAL DAILY
COMMUNITY

## 2025-04-07 NOTE — PRE-PROCEDURE INSTRUCTIONS
Pre-Surgery Instructions:   Medication Instructions    albuterol (Proventil HFA) 90 mcg/act inhaler Uses PRN- OK to take day of surgery    DULoxetine (CYMBALTA) 20 mg capsule Take day of surgery.    ferrous sulfate 324 (65 Fe) mg Stop taking 7 days prior to surgery.    ibuprofen (MOTRIN) 600 mg tablet Stop taking 7 days prior to surgery.    lamoTRIgine (LaMICtal) 100 mg tablet Take day of surgery.    lamoTRIgine (LaMICtal) 25 mg tablet Take day of surgery.    levETIRAcetam (Keppra) 500 mg tablet Take day of surgery.    levothyroxine 25 mcg tablet Take day of surgery.    Medication instructions for day of surgery reviewed. Please take all instructed medications with only a sip of water.       You will receive a call one business day prior to surgery with an arrival time and hospital directions. If your surgery is scheduled on a Monday, the hospital will be calling you on the Friday prior to your surgery. If you have not heard from anyone by 8pm, please call the hospital supervisor through the hospital  at [phone]. ([place] [phone] or [place] [phone]).    Do not eat or drink anything after midnight the night before your surgery, including candy, mints, lifesavers, or chewing gum. Do not drink alcohol 24hrs before your surgery. Try not to smoke at least 24hrs before your surgery.       Follow the pre surgery showering instructions as listed in the “My Surgical Experience Booklet” or otherwise provided by your surgeon's office. Do not use a blade to shave the surgical area 1 week before surgery. It is okay to use a clean electric clippers up to 24 hours before surgery. Do not apply any lotions, creams, including makeup, cologne, deodorant, or perfumes after showering on the day of your surgery. Do not use dry shampoo, hair spray, hair gel, or any type of hair products.     No contact lenses, eye make-up, or artificial eyelashes. Remove nail polish, including gel polish, and any artificial, gel,  or acrylic nails if possible. Remove all jewelry including rings and body piercing jewelry.     Wear causal clothing that is easy to take on and off. Consider your type of surgery.    Keep any valuables, jewelry, piercings at home. Please bring any specially ordered equipment (sling, braces) if indicated.    Arrange for a responsible person to drive you to and from the hospital on the day of your surgery. Please confirm the visitor policy for the day of your procedure when you receive your phone call with an arrival time.     Call the surgeon's office with any new illnesses, exposures, or additional questions prior to surgery.    Please reference your “My Surgical Experience Booklet” for additional information to prepare for your upcoming surgery.

## 2025-04-15 ENCOUNTER — ANESTHESIA EVENT (OUTPATIENT)
Dept: PERIOP | Facility: HOSPITAL | Age: 35
End: 2025-04-15
Payer: MEDICARE

## 2025-04-15 ENCOUNTER — TELEPHONE (OUTPATIENT)
Age: 35
End: 2025-04-15

## 2025-04-15 NOTE — TELEPHONE ENCOUNTER
Margoth called stating she has not received he call for surgery time tomorrow.  Reassured calls are typcially made after 4pm.  Noted she is on the schedule for surgical procedure tomorrow.

## 2025-04-16 ENCOUNTER — HOSPITAL ENCOUNTER (OUTPATIENT)
Facility: HOSPITAL | Age: 35
Setting detail: OUTPATIENT SURGERY
Discharge: HOME | End: 2025-04-16
Attending: STUDENT IN AN ORGANIZED HEALTH CARE EDUCATION/TRAINING PROGRAM | Admitting: STUDENT IN AN ORGANIZED HEALTH CARE EDUCATION/TRAINING PROGRAM
Payer: MEDICARE

## 2025-04-16 ENCOUNTER — ANESTHESIA (OUTPATIENT)
Dept: PERIOP | Facility: HOSPITAL | Age: 35
End: 2025-04-16
Payer: MEDICARE

## 2025-04-16 VITALS
OXYGEN SATURATION: 98 % | SYSTOLIC BLOOD PRESSURE: 115 MMHG | HEART RATE: 87 BPM | TEMPERATURE: 97.4 F | RESPIRATION RATE: 18 BRPM | DIASTOLIC BLOOD PRESSURE: 67 MMHG

## 2025-04-16 DIAGNOSIS — Z90.79 HISTORY OF BILATERAL SALPINGECTOMY: Primary | ICD-10-CM

## 2025-04-16 DIAGNOSIS — Z98.891 S/P EMERGENCY CESAREAN SECTION: ICD-10-CM

## 2025-04-16 DIAGNOSIS — Z30.2 ENCOUNTER FOR STERILIZATION: ICD-10-CM

## 2025-04-16 DIAGNOSIS — G89.18 POSTOPERATIVE PAIN: ICD-10-CM

## 2025-04-16 PROBLEM — Z67.91 RH NEGATIVE STATE IN ANTEPARTUM PERIOD: Status: RESOLVED | Noted: 2023-06-08 | Resolved: 2025-04-16

## 2025-04-16 PROBLEM — O26.899 RH NEGATIVE STATE IN ANTEPARTUM PERIOD: Status: RESOLVED | Noted: 2023-06-08 | Resolved: 2025-04-16

## 2025-04-16 PROBLEM — R62.52 SHORT STATURE: Status: RESOLVED | Noted: 2023-04-02 | Resolved: 2025-04-16

## 2025-04-16 PROBLEM — U07.1 COVID-19: Status: RESOLVED | Noted: 2024-09-02 | Resolved: 2025-04-16

## 2025-04-16 PROBLEM — O21.9 NAUSEA/VOMITING IN PREGNANCY: Status: RESOLVED | Noted: 2023-03-10 | Resolved: 2025-04-16

## 2025-04-16 PROBLEM — N92.0 SPOTTING: Status: RESOLVED | Noted: 2024-05-30 | Resolved: 2025-04-16

## 2025-04-16 PROBLEM — E66.01 MATERNAL MORBID OBESITY, ANTEPARTUM (HCC): Status: RESOLVED | Noted: 2023-03-30 | Resolved: 2025-04-16

## 2025-04-16 PROBLEM — O99.210 MATERNAL MORBID OBESITY, ANTEPARTUM (HCC): Status: RESOLVED | Noted: 2023-03-30 | Resolved: 2025-04-16

## 2025-04-16 LAB
EXT PREGNANCY TEST URINE: NEGATIVE
EXT. CONTROL: NORMAL

## 2025-04-16 PROCEDURE — 81025 URINE PREGNANCY TEST: CPT | Performed by: STUDENT IN AN ORGANIZED HEALTH CARE EDUCATION/TRAINING PROGRAM

## 2025-04-16 PROCEDURE — 88302 TISSUE EXAM BY PATHOLOGIST: CPT | Performed by: PATHOLOGY

## 2025-04-16 PROCEDURE — 58661 LAPAROSCOPY REMOVE ADNEXA: CPT | Performed by: STUDENT IN AN ORGANIZED HEALTH CARE EDUCATION/TRAINING PROGRAM

## 2025-04-16 PROCEDURE — NC001 PR NO CHARGE: Performed by: STUDENT IN AN ORGANIZED HEALTH CARE EDUCATION/TRAINING PROGRAM

## 2025-04-16 RX ORDER — LIDOCAINE HYDROCHLORIDE 10 MG/ML
INJECTION, SOLUTION EPIDURAL; INFILTRATION; INTRACAUDAL; PERINEURAL AS NEEDED
Status: DISCONTINUED | OUTPATIENT
Start: 2025-04-16 | End: 2025-04-16

## 2025-04-16 RX ORDER — ACETAMINOPHEN 325 MG/1
975 TABLET ORAL EVERY 6 HOURS PRN
Status: CANCELLED | OUTPATIENT
Start: 2025-04-16

## 2025-04-16 RX ORDER — ONDANSETRON 2 MG/ML
4 INJECTION INTRAMUSCULAR; INTRAVENOUS ONCE AS NEEDED
Status: COMPLETED | OUTPATIENT
Start: 2025-04-16 | End: 2025-04-16

## 2025-04-16 RX ORDER — IBUPROFEN 600 MG/1
600 TABLET, FILM COATED ORAL EVERY 6 HOURS SCHEDULED
Qty: 60 TABLET | Refills: 0 | Status: SHIPPED | OUTPATIENT
Start: 2025-04-16

## 2025-04-16 RX ORDER — PROPOFOL 10 MG/ML
INJECTION, EMULSION INTRAVENOUS AS NEEDED
Status: DISCONTINUED | OUTPATIENT
Start: 2025-04-16 | End: 2025-04-16

## 2025-04-16 RX ORDER — FENTANYL CITRATE 50 UG/ML
INJECTION, SOLUTION INTRAMUSCULAR; INTRAVENOUS AS NEEDED
Status: DISCONTINUED | OUTPATIENT
Start: 2025-04-16 | End: 2025-04-16

## 2025-04-16 RX ORDER — OXYCODONE HYDROCHLORIDE 5 MG/1
5 TABLET ORAL EVERY 4 HOURS PRN
Qty: 10 TABLET | Refills: 0 | Status: SHIPPED | OUTPATIENT
Start: 2025-04-16 | End: 2025-04-26

## 2025-04-16 RX ORDER — ONDANSETRON 2 MG/ML
INJECTION INTRAMUSCULAR; INTRAVENOUS AS NEEDED
Status: DISCONTINUED | OUTPATIENT
Start: 2025-04-16 | End: 2025-04-16

## 2025-04-16 RX ORDER — MIDAZOLAM HYDROCHLORIDE 2 MG/2ML
INJECTION, SOLUTION INTRAMUSCULAR; INTRAVENOUS AS NEEDED
Status: DISCONTINUED | OUTPATIENT
Start: 2025-04-16 | End: 2025-04-16

## 2025-04-16 RX ORDER — MEPERIDINE HYDROCHLORIDE 25 MG/ML
12.5 INJECTION INTRAMUSCULAR; INTRAVENOUS; SUBCUTANEOUS
Status: DISCONTINUED | OUTPATIENT
Start: 2025-04-16 | End: 2025-04-16 | Stop reason: HOSPADM

## 2025-04-16 RX ORDER — ACETAMINOPHEN 10 MG/ML
1000 INJECTION, SOLUTION INTRAVENOUS ONCE
Status: COMPLETED | OUTPATIENT
Start: 2025-04-16 | End: 2025-04-16

## 2025-04-16 RX ORDER — ACETAMINOPHEN 500 MG
1000 TABLET ORAL EVERY 6 HOURS PRN
Qty: 60 TABLET | Refills: 0 | Status: SHIPPED | OUTPATIENT
Start: 2025-04-16

## 2025-04-16 RX ORDER — ONDANSETRON 2 MG/ML
4 INJECTION INTRAMUSCULAR; INTRAVENOUS EVERY 6 HOURS PRN
Status: CANCELLED | OUTPATIENT
Start: 2025-04-16

## 2025-04-16 RX ORDER — BUPIVACAINE HYDROCHLORIDE 2.5 MG/ML
INJECTION, SOLUTION EPIDURAL; INFILTRATION; INTRACAUDAL; PERINEURAL AS NEEDED
Status: DISCONTINUED | OUTPATIENT
Start: 2025-04-16 | End: 2025-04-16 | Stop reason: HOSPADM

## 2025-04-16 RX ORDER — FENTANYL CITRATE/PF 50 MCG/ML
25 SYRINGE (ML) INJECTION
Status: DISCONTINUED | OUTPATIENT
Start: 2025-04-16 | End: 2025-04-16 | Stop reason: HOSPADM

## 2025-04-16 RX ORDER — IBUPROFEN 600 MG/1
600 TABLET, FILM COATED ORAL EVERY 6 HOURS PRN
Status: CANCELLED | OUTPATIENT
Start: 2025-04-16

## 2025-04-16 RX ORDER — MAGNESIUM HYDROXIDE 1200 MG/15ML
LIQUID ORAL AS NEEDED
Status: DISCONTINUED | OUTPATIENT
Start: 2025-04-16 | End: 2025-04-16 | Stop reason: HOSPADM

## 2025-04-16 RX ORDER — SODIUM CHLORIDE 9 MG/ML
INJECTION, SOLUTION INTRAVENOUS AS NEEDED
Status: DISCONTINUED | OUTPATIENT
Start: 2025-04-16 | End: 2025-04-16 | Stop reason: HOSPADM

## 2025-04-16 RX ORDER — SODIUM CHLORIDE, SODIUM LACTATE, POTASSIUM CHLORIDE, CALCIUM CHLORIDE 600; 310; 30; 20 MG/100ML; MG/100ML; MG/100ML; MG/100ML
125 INJECTION, SOLUTION INTRAVENOUS CONTINUOUS
Status: DISCONTINUED | OUTPATIENT
Start: 2025-04-16 | End: 2025-04-16 | Stop reason: HOSPADM

## 2025-04-16 RX ORDER — ROCURONIUM BROMIDE 10 MG/ML
INJECTION, SOLUTION INTRAVENOUS AS NEEDED
Status: DISCONTINUED | OUTPATIENT
Start: 2025-04-16 | End: 2025-04-16

## 2025-04-16 RX ORDER — ACETAMINOPHEN 500 MG
1000 TABLET ORAL EVERY 6 HOURS PRN
Start: 2025-04-16 | End: 2025-04-16

## 2025-04-16 RX ADMIN — PROPOFOL 300 MG: 10 INJECTION, EMULSION INTRAVENOUS at 13:04

## 2025-04-16 RX ADMIN — ONDANSETRON 4 MG: 2 INJECTION INTRAMUSCULAR; INTRAVENOUS at 15:06

## 2025-04-16 RX ADMIN — MIDAZOLAM 2 MG: 1 INJECTION INTRAMUSCULAR; INTRAVENOUS at 13:04

## 2025-04-16 RX ADMIN — ACETAMINOPHEN 1000 MG: 10 INJECTION INTRAVENOUS at 14:44

## 2025-04-16 RX ADMIN — ROCURONIUM BROMIDE 30 MG: 10 INJECTION, SOLUTION INTRAVENOUS at 13:21

## 2025-04-16 RX ADMIN — ROCURONIUM BROMIDE 50 MG: 10 INJECTION, SOLUTION INTRAVENOUS at 13:04

## 2025-04-16 RX ADMIN — FENTANYL CITRATE 25 MCG: 50 INJECTION INTRAMUSCULAR; INTRAVENOUS at 15:01

## 2025-04-16 RX ADMIN — SUGAMMADEX 400 MG: 100 INJECTION, SOLUTION INTRAVENOUS at 14:21

## 2025-04-16 RX ADMIN — SODIUM CHLORIDE, SODIUM LACTATE, POTASSIUM CHLORIDE, AND CALCIUM CHLORIDE 125 ML/HR: .6; .31; .03; .02 INJECTION, SOLUTION INTRAVENOUS at 11:40

## 2025-04-16 RX ADMIN — FENTANYL CITRATE 50 MCG: 50 INJECTION, SOLUTION INTRAMUSCULAR; INTRAVENOUS at 13:04

## 2025-04-16 RX ADMIN — FENTANYL CITRATE 50 MCG: 50 INJECTION, SOLUTION INTRAMUSCULAR; INTRAVENOUS at 13:15

## 2025-04-16 RX ADMIN — FENTANYL CITRATE 25 MCG: 50 INJECTION INTRAMUSCULAR; INTRAVENOUS at 14:52

## 2025-04-16 RX ADMIN — LIDOCAINE HYDROCHLORIDE 50 MG: 10 INJECTION, SOLUTION EPIDURAL; INFILTRATION; INTRACAUDAL; PERINEURAL at 13:04

## 2025-04-16 RX ADMIN — ONDANSETRON 4 MG: 2 INJECTION INTRAMUSCULAR; INTRAVENOUS at 13:27

## 2025-04-16 RX ADMIN — FENTANYL CITRATE 100 MCG: 50 INJECTION, SOLUTION INTRAMUSCULAR; INTRAVENOUS at 14:36

## 2025-04-16 NOTE — PERIOPERATIVE NURSING NOTE
Patient ambulated to the bathroom with minimal assistance where she voided pink tinged urine. Patient ready for dc home.     Patient discharged to home. Prescriptions and Discharge instructions were given and reviewed with patient. All questions answered. IV was removed per policy and procedure. Pt tolerated this well without complaint. Occlusive dressing was applied to the site. Patient left the unit with all belongings and a firm understanding of discharge instructions.

## 2025-04-16 NOTE — PROGRESS NOTES
Patient alert and awake, dressing clean, dry and intact, vital signs WNL. Patient transferred to APU via stretcher. Bedside report given to NUNO Webb. Stretcher in lowest position and locked, side rails up, call bell within reach.

## 2025-04-16 NOTE — OP NOTE
OPERATIVE REPORT  PATIENT NAME: Margoth Cook    :  1990  MRN: [id number]  Pt Location: [facility] OR ROOM 01    SURGERY DATE: 2025    Surgeons and Role:     * Katrina Mills MD - Primary     * Caroline Saucedo MD - Assisting    Preop Diagnosis:  Encounter for sterilization [Z30.2]    Post-Op Diagnosis Codes:     * Encounter for sterilization [Z30.2]    Procedure(s):  Bilateral - LAPAROSCOPIC BILATERAL SALPINGECTOMY. EXAM UNDER ANESTHESIA    Specimen(s):  ID Type Source Tests Collected by Time Destination   1 :  Tissue Fallopian Tubes, Bilateral TISSUE EXAM Katrina Mills MD 2025 1340        Estimated Blood Loss:   5 mL    Drains:  Urethral Catheter Non-latex 16 Fr. (Active)   Number of days: 0       Anesthesia Type:   General ET    Operative Indications:  Encounter for sterilization [Z30.2]    Operative Findings:  Normal external female genitalia  Bimanual exam limited by body habitus but with grossly normal uterine size and contour and no palpable uterine or adnexal masses.  Omental adhesions to the anterior abdominal wall around the umbilicus  Dense bladder adhesions to the lower uterine segment  Bilateral fallopian tubes densely adhered to the uterus and ovaries  Right ovary slightly enlarged though with no discrete cysts or masses appreciated and otherwise normal in appearance  Left ovary normal in appearance    Operative Technique:  Patient was taken to the operating room where correct patient and correct procedure were confirmed. General endotracheal anesthesia (GET) was administered, and the patient was positioned on the OR table in the dorsal lithotomy position.  All pressure points were padded, and a adri hugger was placed to maintain control of core body temperature.  The patient was prepped and draped in the usual sterile fashion with chloroprep on the abdomen and chlorhexidine prep on the vagina and perineum.  A time out was performed.    A araya catheter was placed and noted  to drain clear yellow urine.  A sponge stick was placed inside the vagina.  Sterile gloves were then exchanged, and attention was turned to the abdomen.     After injection of local, a 5 mm incision was made at the inferior edge of the umbilicus for introduction of a 5 mm trocar.  The trocar was introduced under direct visualization.  Pneumoperitoneum was established to a maximum of 15 mmHg.  Entry below the omentum was noted, and upon partial withdrawal of the trocar, it became clear the trocar had been within the omental adhesions to the anterior abdominal wall.  The omentum remained hemostatic, and no bowel was included within the adhesion.  The abdomen and pelvis were inspected in a systematic fashion, though visualization was extremely limited secondary to body habitus and adhesions as mentioned above.  There was no evidence of entry site injury or injury to bowel, bladder, vasculature, or other structures.  Two additional port sites were selected in the left and right lower abdominal quadrants approximately 2 cm superior and medial to the anterior superior iliac spines.  After injection of local, a 5 mm incision was made for introduction of a 5 mm trocar under direct visualization at each site.  Attention was then turned to the pelvis.    Patient was placed in Trendelenburg, and the uterus was elevated to visualize the fallopian tubes. An atraumatic bowel grasper was inserted and used to visualize the fimbriated ends of the tubes.  The left fallopian tube was grasped at its fimbriated end with an atraumatic bowel grasper and elevated to visualize the mesosalpinx.  The Enseal device was used to ligate along the mesosalpinx, taking care to avoid ovarian vasculature.  The fallopian tube was amputated at the uterine cornua.  The specimen was then withdrawn from the abdominal cavity and sent for pathology.  Attention was then turned to the contralateral tube which was amputated in similar fashion.  Excellent  hemostasis was confirmed following salpingectomy.    Pneumoperitoneum was allowed to escape.  Adequate hemostasis was visualized under low intra-abdominal pressure.  The inferior trocars were removed, and the laparoscope was withdrawn from the abdomen, followed by its trocar sleeve at the umbilicus.  Skin incisions were closed with 4-0 Monocryl.    Attention was turned to the vagina. The sponge stick was removed from the vagina.  The araya catheter was removed.  No vaginal or perineal injuries or lacerations were appreciated.    At the conclusion of the procedure, all needle, sponge, and instrument counts were noted to be correct x2. Patient tolerated the procedure well and was transferred to PACU in stable condition prior to discharge with follow up in 1-2 weeks.    Dr. Mills was present and participated in the entire case.    Complications:   None    Patient Disposition:  PACU         SIGNATURE: Caroline Saucedo MD  DATE: April 16, 2025  TIME: 2:30 PM

## 2025-04-16 NOTE — PERIOPERATIVE NURSING NOTE
Received patient from pacu rn dayday. Patient aoo vitals stable, patient resting in bed, bed locked in lowest position with side rails raised. Patient verbalizes pain to abdominal surgical site at 3/10, denies further pain or discomfort and states this pain is manageable and would not like further intervention at this time. Patient given water and is tolerating sips well without complaint. Patient laparoscopic abdominal incision Is CDI, exofin noted and in place, ice applied. Patient resting in bed, call light in reach and environment cleared. Plan of care ongoing.

## 2025-04-16 NOTE — H&P
History & Physical - OB/GYN   Margoth Cook 35 y.o. female MRN: [id number]  Unit/Bed#: OR [facility] Encounter: [id number]      HPI:  Margoth Cook is a 35 y.o.  who presents for laparoscopic bilateral salpingectomy for permanent sterilization. Please see H&P from office on 2/3/2025 for full history and counseling.She completed pap and colposcopy for h/o ASCUS and HPV positive pap-  Pap showed scant cellularity and HPV neg- colposcopy biopsies showed SERG 1. She denies any interval changes in health.    Active Problems:  Patient Active Problem List   Diagnosis    Depression    Nonintractable epilepsy without status epilepticus (HCC)    History of irregular menstrual bleeding    PCOS (polycystic ovarian syndrome)    Spain's esophagus    Gastric ulcer    GARIMA (obstructive sleep apnea)    Autism    Bipolar depression (HCC)    Morbid obesity (HCC)    Gastroesophageal reflux disease    Excessive daytime sleepiness    Iron deficiency anemia secondary to inadequate dietary iron intake    Dysphagia    Chronic constipation    Family history of congenital heart defect    Request for sterilization    Vision problem    Difficult airway for intubation    Hypothyroidism    High risk social situation    Prediabetes    Elevated LDL cholesterol level     PMH:  Past Medical History:   Diagnosis Date    37 weeks gestation of pregnancy 2024    Abnormal Pap smear of cervix     Anemia     Anxiety     Asthma     Asthma during pregnancy 2024    Continue albuterol inhaler - has not needed      Autism     Spain esophagus     Bipolar disorder (HCC)     CPAP (continuous positive airway pressure) dependence     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HCC)     not diagnosed as of 23-trying to R/O    Depression     Diabetes mellitus (HCC)     Difficult intubation     during emergency . No trouble prio with other surgeries    Disease of thyroid gland     hypo    Dysphagia     solids and liquids    Female infertility      "Fibromyalgia, primary     Gastric ulcer     H/O Cervical cerclage suture present in third trimester 2024    History indicated cerclage placed at [facility]  Cerclage removed today in office      History of bronchitis     History of  delivery, currently pregnant 2024     at 22 weeks  Son has a ATOH1 gene      History of stillbirth in currently pregnant patient, unspecified trimester 2023    Late to care in G1 pregnancy - 28w demise due to \"fluid around the heart/trisomy\"      Hypothyroid in pregnancy, antepartum 10/24/2022    Continue Levothyroxine      Hypothyroidism     Iron deficiency anemia     infusion in 2022    Irregular menses     Marijuana use     Patient reported she quit 2023    Miscarriage     Morbid obesity with BMI of 50.0-59.9, adult (HCC)     Plantar fasciitis of left foot     Polycystic ovary syndrome     Previous child with congenital anomaly, currently pregnant, antepartum 2023    G1 - Trisomy? Heart defect? Fetal demise  G4 - 22 week delivery with chronic health concerns due to prematurity; Also ATOH1 gene mutation (very rare)  CFDNA WNL         Reflux esophagitis     Rh negative state in antepartum period 2023    Rhogam administered 24  Will need again postpartum      S/P emergency  section 2024    Seizures (HCC)     last one 23    Sleep apnea     CPAP    Varicella     had disease    Wears glasses      PSH:  Past Surgical History:   Procedure Laterality Date    EGD      with Bx due to barretts esophagus    EYE SURGERY Bilateral     lazy eye repair    CA BIOPSY OF LIP N/A 11/10/2022    Procedure: EXCISION BIOPSY SALVARY GLANDS LOWER LIP;  Surgeon: Casey Florez MD;  Location: [facility];  Service: ENT    CA CERCLAGE UTERINE CERVIX NONOBSTETRICAL N/A 2023    Procedure: CERCLAGE CERVICAL;  Surgeon: Ant Marinelli MD;  Location: [facility] ;  Service: Obstetrics    CA  DELIVERY ONLY N/A 9/3/2024    Procedure: "  SECTION ();  Surgeon: Yany Juan MD;  Location: [facility] ;  Service: Obstetrics    DC HYSTEROSCOPY BX ENDOMETRIUM&/POLYPC W/WO D&C N/A 2021    Procedure: DILATATION AND CURETTAGE (D&C) WITH HYSTEROSCOPY;  Surgeon: Albina Parsons MD;  Location: [facility] OR;  Service: Gynecology    WISDOM TOOTH EXTRACTION       Family History   Problem Relation Age of Onset    Bipolar disorder Mother     Depression Mother     Depression Father     Other Son     Diabetes Maternal Grandmother     Thyroid disease Maternal Grandmother     Hypertension Maternal Grandmother     Heart disease Maternal Grandmother     Diabetes Maternal Grandfather     Diabetes Paternal Grandmother     Breast cancer Paternal Grandmother     Stroke Paternal Grandmother     Diabetes Paternal Grandfather     Breast cancer Maternal Aunt 35        bilateral    Stomach cancer Maternal Aunt      Social Hx:  Social History     Socioeconomic History    Marital status: Single     Spouse name: Not on file    Number of children: Not on file    Years of education: Not on file    Highest education level: Not on file   Occupational History    Not on file   Tobacco Use    Smoking status: Never     Passive exposure: Never    Smokeless tobacco: Never   Vaping Use    Vaping status: Never Used   Substance and Sexual Activity    Alcohol use: Not Currently    Drug use: Not Currently     Types: Marijuana     Comment: vapes THC several times a week    Sexual activity: Not Currently     Partners: Male     Comment: recovering from pregnancy   Other Topics Concern    Not on file   Social History Narrative    Not on file     Social Drivers of Health     Financial Resource Strain: Low Risk  (2024)    Overall Financial Resource Strain (CARDIA)     Difficulty of Paying Living Expenses: Not very hard   Recent Concern: Financial Resource Strain - Medium Risk (2024)    Received from [facility]    Overall Financial Resource Strain  (CARDIA)     Difficulty of Paying Living Expenses: Somewhat hard   Food Insecurity: No Food Insecurity (2024)    Nursing - Inadequate Food Risk Classification     Worried About Running Out of Food in the Last Year: Never true     Ran Out of Food in the Last Year: Never true     Ran Out of Food in the Last Year: Not on file   Recent Concern: Food Insecurity - Food Insecurity Present (2024)    Hunger Vital Sign     Worried About Running Out of Food in the Last Year: Sometimes true     Ran Out of Food in the Last Year: Sometimes true   Transportation Needs: No Transportation Needs (2024)    PRAPARE - Transportation     Lack of Transportation (Medical): No     Lack of Transportation (Non-Medical): No   Physical Activity: Not on file   Stress: No Stress Concern Present (2024)    [employer] [place] [employer] - Occupational Stress Questionnaire     Feeling of Stress : Not at all   Social Connections: Not on file   Intimate Partner Violence: Not At Risk (2024)    [facility]    Humiliation, Afraid, Rape, and Kick questionnaire     Fear of Current or Ex-Partner: No     Emotionally Abused: No     Physically Abused: No     Sexually Abused: No   Housing Stability: Low Risk  (2024)    Housing Stability Vital Sign     Unable to Pay for Housing in the Last Year: No     Number of Times Moved in the Last Year: 0     Homeless in the Last Year: No     OB Hx:  OB History    Para Term  AB Living   5 3 1 2 2 2   SAB IAB Ectopic Multiple Live Births   2 0 0 0 2      # Outcome Date GA Lbr Lopez/2nd Weight Sex Type Anes PTL Lv   5 Term 24 37w6d   M CS-LTranv   BHUPINDER      Name: Erik Cook      Apgar1: 6  Apgar5: 9   4  23 22w0d  600 g (1 lb 5.2 oz) M Vag-Spont EPI Y BHUPINDER      Birth Comments: currently at [facility] PICU      Complications:  labor in second trimester      Name: NICHOLE,BABY BOY  (CATHY)      Apgar1: 8  Apgar5: 8   3 SAB            2 SAB            1   28w0d    Vag-Spont   FD      Complications: ABO incompatibility in pregnancy      Obstetric Comments   Both SAB <2 months   Still birth at 7 months   Has had hypercoagulable workup in [place] which was negative    PTD @ 22w5d PreE, chorio and sepsis. h/o cerclage   Cerclage placed 3/11/24 at [place]   Genetic carrier screening completed - [place] genetic counselor f/u and referral to establish with [facility] Genetic Counselor (pt reports ATOH1 mutation for her and son Jay).          Meds:  No current facility-administered medications on file prior to encounter.     Current Outpatient Medications on File Prior to Encounter   Medication Sig Dispense Refill    DULoxetine (CYMBALTA) 20 mg capsule Take 20 mg by mouth daily      ferrous sulfate 324 (65 Fe) mg TAKE 1 TABLET BY MOUTH TWICE DAILY BEFORE MEALS 60 tablet 1    lamoTRIgine (LaMICtal) 100 mg tablet Take 1 tablet (100 mg total) by mouth 2 (two) times a day Start 1 tab twice daily Aug 4th. 60 tablet 0    lamoTRIgine (LaMICtal) 25 mg tablet START WITH 1 TABLET BY MOUTH FOR 1 WEEK, THEN 1 TABLET TWICE DAILY THE 2ND WEEK, THEN 2 TABLETS TWICE DAILY THE 3RD WEEK (GENERIC FOR LAMICTAL) 50 tablet 0    levETIRAcetam (Keppra) 500 mg tablet Take 1 tablet (500 mg total) by mouth daily In morning. 30 tablet 5    levothyroxine 25 mcg tablet Take 1 tablet (25 mcg total) by mouth every morning 90 tablet 2    albuterol (Proventil HFA) 90 mcg/act inhaler Inhale 2 puffs every 6 (six) hours as needed for wheezing 18 g 3    ibuprofen (MOTRIN) 600 mg tablet Take 1 tablet (600 mg total) by mouth every 6 (six) hours 30 tablet 0       Allergies:  Allergies   Allergen Reactions    Haloperidol Other (See Comments)     Jaw locks up    Jaw locks up   Jaw locks up    Bee Pollen Other (See Comments)    Penicillins Hives    Pollen Extract Allergic Rhinitis    Fluoxetine Allergic Rhinitis     Review of Systems    Constitutional:  Negative for chills and fever.   Eyes:  Negative for visual disturbance.   Respiratory:  Negative for chest tightness, shortness of breath and wheezing.    Cardiovascular:  Negative for chest pain, palpitations and leg swelling.   Gastrointestinal:  Negative for abdominal pain, constipation, diarrhea, nausea and vomiting.   Genitourinary:  Negative for dysuria, flank pain, frequency, hematuria and urgency.   Musculoskeletal:  Negative for arthralgias and myalgias.   Neurological:  Negative for dizziness, weakness, light-headedness and headaches.       Physical Exam:  /53   Pulse 80   Temp 99.3 °F (37.4 °C) (Skin)   Resp 16   LMP 2025 (Exact Date)   SpO2 98%     Physical Exam  Vitals reviewed.   Constitutional:       General: She is not in acute distress.     Appearance: She is well-developed. She is not diaphoretic.   HENT:      Head: Normocephalic and atraumatic.   Cardiovascular:      Rate and Rhythm: Normal rate and regular rhythm.      Heart sounds: Normal heart sounds. No murmur heard.     No friction rub. No gallop.   Pulmonary:      Effort: Pulmonary effort is normal. No respiratory distress.      Breath sounds: Normal breath sounds. No wheezing or rales.   Abdominal:      Palpations: Abdomen is soft.      Tenderness: There is no abdominal tenderness. There is no guarding or rebound.   Genitourinary:     Vagina: Normal.   Musculoskeletal:      Cervical back: Normal range of motion and neck supple.      Right lower leg: No edema.      Left lower leg: No edema.   Skin:     General: Skin is warm and dry.   Neurological:      Mental Status: She is alert and oriented to person, place, and time.   Psychiatric:         Behavior: Behavior normal.         Assessment/ Plan:   35 y.o.  presenting for laparoscopic BS for permanent sterilization.  Proe op tylenol for assistance with pain  FEN: NPO,  cc/hr  VTE Ppx: SCDs, early ambulation    Katrina Mills  MD  04/16/25

## 2025-04-16 NOTE — ANESTHESIA POSTPROCEDURE EVALUATION
Post-Op Assessment Note    CV Status:  Stable  Pain Score: 0    Pain management: adequate       Mental Status:  Awake   Hydration Status:  Stable   PONV Controlled:  None   Airway Patency:  Patent  Two or more mitigation strategies used for obstructive sleep apnea   Post Op Vitals Reviewed: Yes    No anethesia notable event occurred.    Staff: CRNA           Last Filed PACU Vitals:  Vitals Value Taken Time   Temp 97.4 °F (36.3 °C) 04/16/25 1434   Pulse 92    /62    Resp 14    SpO2 99

## 2025-04-16 NOTE — ANESTHESIA PREPROCEDURE EVALUATION
Procedure:  LAPAROSCOPIC BILATERAL SALPINGECTOMY, EXAM UNDER ANESTHESIA (Bilateral: Abdomen)    Relevant Problems   ENDO   (+) Hypothyroidism      GI/HEPATIC   (+) Dysphagia   (+) Gastric ulcer   (+) Gastroesophageal reflux disease      HEMATOLOGY   (+) Iron deficiency anemia secondary to inadequate dietary iron intake      NEURO/PSYCH   (+) Depression   (+) Nonintractable epilepsy without status epilepticus (HCC)      PULMONARY   (+) GARIMA (obstructive sleep apnea)      Behavioral Health   (+) Bipolar depression (HCC)      Other   (+) Morbid obesity (HCC)        Physical Exam    Airway    Mallampati score: III  TM Distance: >3 FB  Neck ROM: full     Dental   Comment: Denies loose teeth     Cardiovascular  Cardiovascular exam normal    Pulmonary  Pulmonary exam normal     Other Findings  Portions of exam deferred due to low yield and/or unknown COVID statuspost-pubertal.      Anesthesia Plan  ASA Score- 3     Anesthesia Type- general with ASA Monitors.         Additional Monitors:     Airway Plan: ETT.           Plan Factors-Exercise tolerance (METS): >4 METS.    Chart reviewed.   Existing labs reviewed. Patient summary reviewed.    Patient is not a current smoker.              Induction- intravenous.    Postoperative Plan-         Informed Consent- Anesthetic plan and risks discussed with patient.  I personally reviewed this patient with the CRNA. Discussed and agreed on the Anesthesia Plan with the CRNA..      NPO Status:  Vitals Value Taken Time   Date of last liquid 04/16/25 04/16/25 1135   Time of last liquid 0600 04/16/25 1135   Date of last solid 04/15/25 04/16/25 1135   Time of last solid 2100 04/16/25 1135

## 2025-04-17 NOTE — ANESTHESIA POSTPROCEDURE EVALUATION
Post-Op Assessment Note    CV Status:  Stable    Pain management: adequate       Mental Status:  Alert and awake   Hydration Status:  Euvolemic   PONV Controlled:  Controlled   Airway Patency:  Patent     Post Op Vitals Reviewed: Yes    No anethesia notable event occurred.            Last Filed PACU Vitals:  Vitals Value Taken Time   Temp 97.4 °F (36.3 °C) 04/16/25 1434   Pulse 92 04/16/25 1515   /57 04/16/25 1515   Resp 16 04/16/25 1515   SpO2 100 % 04/16/25 1515       Modified Tigist:     Vitals Value Taken Time   Activity 2 04/16/25 1445   Respiration 2 04/16/25 1445   Circulation 2 04/16/25 1445   Consciousness 2 04/16/25 1445   Oxygen Saturation 2 04/16/25 1445     Modified Tigist Score: 10

## 2025-04-22 ENCOUNTER — RESULTS FOLLOW-UP (OUTPATIENT)
Dept: OBGYN CLINIC | Facility: CLINIC | Age: 35
End: 2025-04-22

## 2025-04-22 PROCEDURE — 88302 TISSUE EXAM BY PATHOLOGIST: CPT | Performed by: PATHOLOGY

## 2025-04-30 ENCOUNTER — OFFICE VISIT (OUTPATIENT)
Dept: OBGYN CLINIC | Facility: CLINIC | Age: 35
End: 2025-04-30

## 2025-04-30 VITALS — BODY MASS INDEX: 55.08 KG/M2 | DIASTOLIC BLOOD PRESSURE: 70 MMHG | WEIGHT: 237 LBS | SYSTOLIC BLOOD PRESSURE: 114 MMHG

## 2025-04-30 DIAGNOSIS — Z90.79 H/O BILATERAL SALPINGECTOMY: Primary | ICD-10-CM

## 2025-04-30 PROCEDURE — 99024 POSTOP FOLLOW-UP VISIT: CPT | Performed by: STUDENT IN AN ORGANIZED HEALTH CARE EDUCATION/TRAINING PROGRAM

## 2025-04-30 NOTE — PROGRESS NOTES
[facility]  Post-Op Visit  Katrina Mills MD  04/30/25      Assessment   Patient is a 35 y.o. y.o. female who is POD #14 s/p lap BS who is doing well post-operatively.  Operative findings again reviewed.    OR photos reviewed.   Pathology report discussed.      Plan    1. Pain management PRN: Motrin/ Tylenol PRN  2. Wound care discussed.  3. Activity restrictions: none  4. Follow up: 2/2026 for annual well woman exam    Subjective    Margoth Cook is a 35 y.o. y.o. female who presents to the office 2 weeks status post  lap BS  for requested sterilization. She is eating a regular diet without difficulty. Urination and bowel movements are normal. The patient is not having any pain.. She is not having to take any pain medications.    Haloperidol, Bee pollen, Penicillins, Pollen extract, and Fluoxetine    Current Outpatient Medications:     acetaminophen (TYLENOL) 500 mg tablet, Take 2 tablets (1,000 mg total) by mouth every 6 (six) hours as needed for mild pain or moderate pain, Disp: 60 tablet, Rfl: 0    albuterol (Proventil HFA) 90 mcg/act inhaler, Inhale 2 puffs every 6 (six) hours as needed for wheezing, Disp: 18 g, Rfl: 3    DULoxetine (CYMBALTA) 20 mg capsule, Take 20 mg by mouth daily, Disp: , Rfl:     ferrous sulfate 324 (65 Fe) mg, TAKE 1 TABLET BY MOUTH TWICE DAILY BEFORE MEALS, Disp: 60 tablet, Rfl: 1    ibuprofen (MOTRIN) 600 mg tablet, Take 1 tablet (600 mg total) by mouth every 6 (six) hours, Disp: 60 tablet, Rfl: 0    lamoTRIgine (LaMICtal) 100 mg tablet, Take 1 tablet (100 mg total) by mouth 2 (two) times a day Start 1 tab twice daily Aug 4th., Disp: 60 tablet, Rfl: 0    lamoTRIgine (LaMICtal) 25 mg tablet, START WITH 1 TABLET BY MOUTH FOR 1 WEEK, THEN 1 TABLET TWICE DAILY THE 2ND WEEK, THEN 2 TABLETS TWICE DAILY THE 3RD WEEK (GENERIC FOR LAMICTAL), Disp: 50 tablet, Rfl: 0    levETIRAcetam (Keppra) 500 mg tablet, Take 1 tablet (500 mg total) by mouth daily In morning., Disp: 30  tablet, Rfl: 5    levothyroxine 25 mcg tablet, Take 1 tablet (25 mcg total) by mouth every morning, Disp: 90 tablet, Rfl: 2      Review of Systems  Denies Fevers/chills/N/V/Constipation/Vaginal bleeding/excessive pain/dysuria/frequency of urination. Also as noted in HPI.     PMH/ PSH/ SH/ PFH/ allergies and medications were reviewed and updated during this visit.     Objective   /70 (BP Location: Right arm, Patient Position: Sitting, Cuff Size: Large)   Wt 108 kg (237 lb)   LMP 04/29/2025 (Exact Date)   BMI 55.08 kg/m²   Physical Exam  Vitals reviewed.   Constitutional:       Appearance: Normal appearance. She is not ill-appearing or diaphoretic.   HENT:      Head: Normocephalic and atraumatic.   Cardiovascular:      Rate and Rhythm: Normal rate.   Pulmonary:      Effort: Pulmonary effort is normal. No respiratory distress.   Abdominal:      Palpations: Abdomen is soft.      Tenderness: There is no abdominal tenderness. There is no guarding or rebound.      Comments: Incisions C/D/I   Musculoskeletal:      Right lower leg: No edema.      Left lower leg: No edema.   Skin:     General: Skin is warm and dry.   Neurological:      Mental Status: She is alert and oriented to person, place, and time.   Psychiatric:         Mood and Affect: Mood normal.         Behavior: Behavior normal.

## 2025-05-06 ENCOUNTER — HOSPITAL ENCOUNTER (EMERGENCY)
Facility: HOSPITAL | Age: 35
Discharge: HOME | End: 2025-05-06
Payer: MEDICARE

## 2025-05-06 ENCOUNTER — APPOINTMENT (EMERGENCY)
Dept: RADIOLOGY | Facility: HOSPITAL | Age: 35
End: 2025-05-06
Payer: MEDICARE

## 2025-05-06 VITALS
BODY MASS INDEX: 54.85 KG/M2 | SYSTOLIC BLOOD PRESSURE: 129 MMHG | DIASTOLIC BLOOD PRESSURE: 67 MMHG | OXYGEN SATURATION: 100 % | WEIGHT: 237 LBS | TEMPERATURE: 97.5 F | RESPIRATION RATE: 16 BRPM | HEART RATE: 92 BPM | HEIGHT: 55 IN

## 2025-05-06 DIAGNOSIS — R11.0 NAUSEA: ICD-10-CM

## 2025-05-06 DIAGNOSIS — N39.0 UTI (URINARY TRACT INFECTION): ICD-10-CM

## 2025-05-06 DIAGNOSIS — R10.9 ACUTE RIGHT FLANK PAIN: Primary | ICD-10-CM

## 2025-05-06 LAB
ALBUMIN SERPL BCG-MCNC: 4.2 G/DL (ref 3.5–5)
ALP SERPL-CCNC: 112 U/L (ref 34–104)
ALT SERPL W P-5'-P-CCNC: 17 U/L (ref 7–52)
ANION GAP SERPL CALCULATED.3IONS-SCNC: 12 MMOL/L (ref 4–13)
AST SERPL W P-5'-P-CCNC: 18 U/L (ref 13–39)
BACTERIA UR QL AUTO: ABNORMAL /HPF
BASOPHILS # BLD AUTO: 0.07 THOUSANDS/ÂΜL (ref 0–0.1)
BASOPHILS NFR BLD AUTO: 1 % (ref 0–1)
BILIRUB SERPL-MCNC: 0.27 MG/DL (ref 0.2–1)
BILIRUB UR QL STRIP: NEGATIVE
BUN SERPL-MCNC: 14 MG/DL (ref 5–25)
CALCIUM SERPL-MCNC: 8.9 MG/DL (ref 8.4–10.2)
CHLORIDE SERPL-SCNC: 101 MMOL/L (ref 96–108)
CLARITY UR: CLEAR
CO2 SERPL-SCNC: 24 MMOL/L (ref 21–32)
COLOR UR: YELLOW
CREAT SERPL-MCNC: 0.57 MG/DL (ref 0.6–1.3)
EOSINOPHIL # BLD AUTO: 0.24 THOUSAND/ÂΜL (ref 0–0.61)
EOSINOPHIL NFR BLD AUTO: 4 % (ref 0–6)
ERYTHROCYTE [DISTWIDTH] IN BLOOD BY AUTOMATED COUNT: 14.1 % (ref 11.6–15.1)
EXT PREGNANCY TEST URINE: NEGATIVE
EXT. CONTROL: NORMAL
GFR SERPL CREATININE-BSD FRML MDRD: 121 ML/MIN/1.73SQ M
GLUCOSE SERPL-MCNC: 86 MG/DL (ref 65–140)
GLUCOSE UR STRIP-MCNC: NEGATIVE MG/DL
HCT VFR BLD AUTO: 41.5 % (ref 34.8–46.1)
HGB BLD-MCNC: 13.2 G/DL (ref 11.5–15.4)
HGB UR QL STRIP.AUTO: NEGATIVE
IMM GRANULOCYTES # BLD AUTO: 0.01 THOUSAND/UL (ref 0–0.2)
IMM GRANULOCYTES NFR BLD AUTO: 0 % (ref 0–2)
KETONES UR STRIP-MCNC: NEGATIVE MG/DL
LEUKOCYTE ESTERASE UR QL STRIP: NEGATIVE
LIPASE SERPL-CCNC: 14 U/L (ref 11–82)
LYMPHOCYTES # BLD AUTO: 0.87 THOUSANDS/ÂΜL (ref 0.6–4.47)
LYMPHOCYTES NFR BLD AUTO: 15 % (ref 14–44)
MCH RBC QN AUTO: 26.9 PG (ref 26.8–34.3)
MCHC RBC AUTO-ENTMCNC: 31.8 G/DL (ref 31.4–37.4)
MCV RBC AUTO: 85 FL (ref 82–98)
MONOCYTES # BLD AUTO: 0.25 THOUSAND/ÂΜL (ref 0.17–1.22)
MONOCYTES NFR BLD AUTO: 4 % (ref 4–12)
MUCOUS THREADS UR QL AUTO: ABNORMAL
NEUTROPHILS # BLD AUTO: 4.49 THOUSANDS/ÂΜL (ref 1.85–7.62)
NEUTS SEG NFR BLD AUTO: 76 % (ref 43–75)
NITRITE UR QL STRIP: NEGATIVE
NON-SQ EPI CELLS URNS QL MICRO: ABNORMAL /HPF
NRBC BLD AUTO-RTO: 0 /100 WBCS
PH UR STRIP.AUTO: 6.5 [PH]
PLATELET # BLD AUTO: 336 THOUSANDS/UL (ref 149–390)
PMV BLD AUTO: 9.1 FL (ref 8.9–12.7)
POTASSIUM SERPL-SCNC: 3.4 MMOL/L (ref 3.5–5.3)
PROT SERPL-MCNC: 7.8 G/DL (ref 6.4–8.4)
PROT UR STRIP-MCNC: ABNORMAL MG/DL
RBC # BLD AUTO: 4.9 MILLION/UL (ref 3.81–5.12)
RBC #/AREA URNS AUTO: ABNORMAL /HPF
SODIUM SERPL-SCNC: 137 MMOL/L (ref 135–147)
SP GR UR STRIP.AUTO: >=1.03 (ref 1–1.03)
UROBILINOGEN UR STRIP-ACNC: <2 MG/DL
WBC # BLD AUTO: 5.93 THOUSAND/UL (ref 4.31–10.16)
WBC #/AREA URNS AUTO: ABNORMAL /HPF

## 2025-05-06 PROCEDURE — 80053 COMPREHEN METABOLIC PANEL: CPT

## 2025-05-06 PROCEDURE — 36415 COLL VENOUS BLD VENIPUNCTURE: CPT

## 2025-05-06 PROCEDURE — 83690 ASSAY OF LIPASE: CPT

## 2025-05-06 PROCEDURE — 99284 EMERGENCY DEPT VISIT MOD MDM: CPT

## 2025-05-06 PROCEDURE — 96361 HYDRATE IV INFUSION ADD-ON: CPT

## 2025-05-06 PROCEDURE — 81001 URINALYSIS AUTO W/SCOPE: CPT

## 2025-05-06 PROCEDURE — 99285 EMERGENCY DEPT VISIT HI MDM: CPT

## 2025-05-06 PROCEDURE — 74177 CT ABD & PELVIS W/CONTRAST: CPT

## 2025-05-06 PROCEDURE — 96375 TX/PRO/DX INJ NEW DRUG ADDON: CPT

## 2025-05-06 PROCEDURE — 81025 URINE PREGNANCY TEST: CPT

## 2025-05-06 PROCEDURE — 85025 COMPLETE CBC W/AUTO DIFF WBC: CPT

## 2025-05-06 PROCEDURE — 96374 THER/PROPH/DIAG INJ IV PUSH: CPT

## 2025-05-06 RX ORDER — HYDROMORPHONE HCL/PF 1 MG/ML
0.5 SYRINGE (ML) INJECTION ONCE
Status: COMPLETED | OUTPATIENT
Start: 2025-05-06 | End: 2025-05-06

## 2025-05-06 RX ORDER — CEFPODOXIME PROXETIL 200 MG/1
200 TABLET, FILM COATED ORAL 2 TIMES DAILY WITH MEALS
Status: DISCONTINUED | OUTPATIENT
Start: 2025-05-07 | End: 2025-05-06

## 2025-05-06 RX ORDER — ONDANSETRON 4 MG/1
4 TABLET, ORALLY DISINTEGRATING ORAL EVERY 6 HOURS PRN
Qty: 15 TABLET | Refills: 0 | Status: SHIPPED | OUTPATIENT
Start: 2025-05-06

## 2025-05-06 RX ORDER — KETOROLAC TROMETHAMINE 30 MG/ML
15 INJECTION, SOLUTION INTRAMUSCULAR; INTRAVENOUS ONCE
Status: COMPLETED | OUTPATIENT
Start: 2025-05-06 | End: 2025-05-06

## 2025-05-06 RX ORDER — NAPROXEN 500 MG/1
500 TABLET ORAL 2 TIMES DAILY WITH MEALS
Qty: 30 TABLET | Refills: 0 | Status: SHIPPED | OUTPATIENT
Start: 2025-05-06

## 2025-05-06 RX ORDER — CEFPODOXIME PROXETIL 100 MG/1
100 TABLET, FILM COATED ORAL ONCE
Status: DISCONTINUED | OUTPATIENT
Start: 2025-05-06 | End: 2025-05-06

## 2025-05-06 RX ORDER — CEFPODOXIME PROXETIL 200 MG/1
200 TABLET, FILM COATED ORAL ONCE
Status: COMPLETED | OUTPATIENT
Start: 2025-05-06 | End: 2025-05-06

## 2025-05-06 RX ORDER — CEFPODOXIME PROXETIL 100 MG/1
200 TABLET, FILM COATED ORAL 2 TIMES DAILY
Qty: 20 TABLET | Refills: 0 | Status: SHIPPED | OUTPATIENT
Start: 2025-05-06 | End: 2025-05-11

## 2025-05-06 RX ORDER — CEFPODOXIME PROXETIL 100 MG/1
100 TABLET, FILM COATED ORAL 2 TIMES DAILY
Qty: 10 TABLET | Refills: 0 | Status: SHIPPED | OUTPATIENT
Start: 2025-05-06 | End: 2025-05-06

## 2025-05-06 RX ORDER — ONDANSETRON 2 MG/ML
4 INJECTION INTRAMUSCULAR; INTRAVENOUS ONCE
Status: COMPLETED | OUTPATIENT
Start: 2025-05-06 | End: 2025-05-06

## 2025-05-06 RX ADMIN — SODIUM CHLORIDE 1000 ML: 0.9 INJECTION, SOLUTION INTRAVENOUS at 17:57

## 2025-05-06 RX ADMIN — HYDROMORPHONE HYDROCHLORIDE 0.5 MG: 1 INJECTION, SOLUTION INTRAMUSCULAR; INTRAVENOUS; SUBCUTANEOUS at 17:51

## 2025-05-06 RX ADMIN — ONDANSETRON 4 MG: 2 INJECTION INTRAMUSCULAR; INTRAVENOUS at 17:42

## 2025-05-06 RX ADMIN — IOHEXOL 100 ML: 350 INJECTION, SOLUTION INTRAVENOUS at 18:19

## 2025-05-06 RX ADMIN — CEFPODOXIME PROXETIL 200 MG: 200 TABLET, FILM COATED ORAL at 19:46

## 2025-05-06 RX ADMIN — KETOROLAC TROMETHAMINE 15 MG: 30 INJECTION, SOLUTION INTRAMUSCULAR; INTRAVENOUS at 17:47

## 2025-05-06 NOTE — ED PROVIDER NOTES
Time reflects when diagnosis was documented in both MDM as applicable and the Disposition within this note       Time User Action Codes Description Comment    5/6/2025  6:58 PM Zan Nguyễn Add [R10.9] Acute right flank pain     5/6/2025  6:58 PM Zan Nguyễn Add [R11.0] Nausea     5/6/2025  6:59 PM Nguyễn Zuluaga Add [N39.0] UTI (urinary tract infection)           ED Disposition       ED Disposition   Discharge    Condition   Stable    Date/Time   Tue May 6, 2025  6:58 PM    Comment   Margoth Cook discharge to home/self care.                   Assessment & Plan       Medical Decision Making  Amount and/or Complexity of Data Reviewed  Labs: ordered.  Radiology: ordered.    Risk  Prescription drug management.        34 y/o F presents with lower abd pain. Pt states she woke around 2am with acute RLQ abd pain that has since radiated to R flank. Associated nausea and vomiting.  Denies dysuria, hematuria, fever, chills, chest pain.  Of note, patient did recently have bilateral salpingectomy however she denies any acute complications from this procedure.  Vital signs are stable  Patient appears overall comfortable positive right CVA tenderness, mild suprapubic tenderness.  Differential diagnosis includes pyelonephritis, appendicitis, cholecystitis, pancreatitis, ovarian cyst, surgical complication.  Plan for abdominal labs, CT imaging, analgesia.  Ultimately lab and CT imaging overall unrevealing.  Discussed findings with the patient who at this point has some improvement in her symptoms.  There is some bacteria in her urine however did discuss that this is not a classic urinalysis for urine infection.  However at this point this is we will ask patient have her pain considering the CVA tenderness.  Will treat with course of Vantin.  Patient recommended to return to the ER for any worsening symptoms.  Patient stable for discharge at this time.         Medications   ketorolac (TORADOL) injection 15 mg (15 mg  "Intravenous Given 25)   HYDROmorphone (DILAUDID) injection 0.5 mg (0.5 mg Intravenous Given 25 175)   sodium chloride 0.9 % bolus 1,000 mL (0 mL Intravenous Stopped 25)   ondansetron (ZOFRAN) injection 4 mg (4 mg Intravenous Given 25)   iohexol (OMNIPAQUE) 350 MG/ML injection (MULTI-DOSE) 100 mL (100 mL Intravenous Given 25)   cefpodoxime (VANTIN) tablet 200 mg (200 mg Oral Given 25)       ED Risk Strat Scores                    No data recorded                            History of Present Illness       Chief Complaint   Patient presents with    Abdominal Pain     Mid abd wraps around to back with n/v since 0200 sharp comes and goes worse when she sits up        Past Medical History:   Diagnosis Date    37 weeks gestation of pregnancy 2024    Abnormal Pap smear of cervix     Anemia     Anxiety     Asthma     Asthma during pregnancy 2024    Continue albuterol inhaler - has not needed      Autism     [place] esophagus     Bipolar disorder (HCC)     CPAP (continuous positive airway pressure) dependence     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HCC)     not diagnosed as of 23-trying to R/O    Depression     Developmental delay     Diabetes mellitus (HCC)     Difficult intubation     during emergency . No trouble prio with other surgeries    Disease of thyroid gland     hypo    Dysphagia     solids and liquids    Female infertility     Fibromyalgia, primary     Gastric ulcer     GERD (gastroesophageal reflux disease)     H/O Cervical cerclage suture present in third trimester 2024    History indicated [facility]clage placed [facility]  Cerclage removed today in office      History of bronchitis     History of  delivery, currently pregnant 2024     at 22 weeks  Son has a ATOH1 gene      History of stillbirth in currently pregnant patient, unspecified trimester 2023    Late to care in G1 pregnancy - 28w demise due to \"fluid around the " "heart/trisomy\"      Hypothyroid in pregnancy, antepartum 10/24/2022    Continue Levothyroxine      Hypothyroidism     Iron deficiency anemia     infusion in 2022    Irregular menses     Marijuana use     Patient reported she quit 2023    Miscarriage     Morbid obesity with BMI of 50.0-59.9, adult (HCC)     Plantar fasciitis of left foot     Polycystic ovary syndrome     Previous child with congenital anomaly, currently pregnant, antepartum 2023    G1 - Trisomy? Heart defect? Fetal demise  G4 - 22 week delivery with chronic health concerns due to prematurity; Also ATOH1 gene mutation (very rare)  CFDNA WNL         Reflux esophagitis     Rh negative state in antepartum period 2023    Rhogam administered 24  Will need again postpartum      S/P emergency  section 2024    Seizures ([facility])     last one 23    Sleep apnea     CPAP    Varicella     had disease    Vision loss     Wears glasses       Past Surgical History:   Procedure Laterality Date    COLONOSCOPY      EGD      with Bx due to barretts esophagus    EYE SURGERY Bilateral     lazy eye repair    NC BIOPSY OF LIP N/A 11/10/2022    Procedure: EXCISION BIOPSY SALVARY GLANDS LOWER LIP;  Surgeon: Casey Florez MD;  Location: [facility];  Service: ENT    NC CERCLAGE UTERINE CERVIX NONOBSTETRICAL N/A 2023    Procedure: CERCLAGE CERVICAL;  Surgeon: Ant Marinelli MD;  [facility]: [facility] ;  Service: Obstetrics    NC  DELIVERY ONLY N/A 2024    Procedure:  SECTION ();  Surgeon: Yany Juan MD;  [facility];  Service: Obstetrics    NC HYSTEROSCOPY BX ENDOMETRIUM&/POLYPC W/WO D&C N/A 2021    Procedure: DILATATION AND CURETTAGE (D&C) WITH HYSTEROSCOPY;  Surgeon: Albina Parsons MD;  Location: [facility];  Service: Gynecology    NC LAPAROSCOPY W/RMVL ADNEXAL STRUCTURES Bilateral 2025    Procedure: LAPAROSCOPIC BILATERAL SALPINGECTOMY, EXAM UNDER ANESTHESIA;  Surgeon: " Katrina Mills MD;  Location: [facility];  Service: Gynecology    UPPER GASTROINTESTINAL ENDOSCOPY      WISDOM TOOTH EXTRACTION        Family History   Problem Relation Age of Onset    Bipolar disorder Mother     Depression Mother     Mental illness Mother     Arthritis Mother     Depression Father     Mental illness Father     Arthritis Father     Asthma Father     Migraines Father     Other Son     Diabetes Maternal Grandmother     Thyroid disease Maternal Grandmother     Hypertension Maternal Grandmother     Heart disease Maternal Grandmother     Bone cancer Maternal Grandmother         Not Cynthia, but my great grandma Bety and her sister Flores  of leukemia    Mental illness Maternal Grandmother     Vision loss Maternal Grandmother     Addiction problem Maternal Grandmother     Hypothyroidism Maternal Grandmother     Multiple sclerosis Maternal Grandmother     Neuropathy Maternal Grandmother     Diabetes Maternal Grandfather     Diabetes Paternal Grandmother     Breast cancer Paternal Grandmother     Stroke Paternal Grandmother     COPD Paternal Grandmother     Hearing loss Paternal Grandmother     Hypothyroidism Paternal Grandmother     Diabetes Paternal Grandfather     Breast cancer Maternal Aunt 35        bilateral    Stomach cancer Maternal Aunt     Mental retardation Paternal Uncle     Learning disabilities Sister     Mental illness Sister     ADD / ADHD Sister     Mental illness Brother         Shireen Gabriel    ADD / ADHD Brother     Mental illness Brother     Schizophrenia Paternal Uncle         My paternal grandmother’s brother - it was caused by drug use      Social History     Tobacco Use    Smoking status: Never     Passive exposure: Never    Smokeless tobacco: Never   Vaping Use    Vaping status: Never Used   Substance Use Topics    Alcohol use: Not Currently    Drug use: Not Currently     Types: Marijuana     Comment: vapes THC several times a week      E-Cigarette/Vaping    E-Cigarette  Use Never User       E-Cigarette/Vaping Substances    Nicotine Yes     THC Yes     CBD No     Flavoring Yes     Other No     Unknown No       I have reviewed and agree with the history as documented.     HPI  See MDM  Review of Systems   Constitutional:  Negative for chills and fever.   HENT:  Negative for ear pain and sore throat.    Eyes:  Negative for pain and visual disturbance.   Respiratory:  Negative for cough and shortness of breath.    Cardiovascular:  Negative for chest pain and palpitations.   Gastrointestinal:  Positive for abdominal pain, nausea and vomiting.   Genitourinary:  Positive for flank pain. Negative for dysuria and hematuria.   Musculoskeletal:  Negative for arthralgias and back pain.   Skin:  Negative for color change and rash.   Neurological:  Negative for seizures and syncope.   All other systems reviewed and are negative.          Objective       ED Triage Vitals [05/06/25 1653]   Temperature Pulse Blood Pressure Respirations SpO2 Patient Position - Orthostatic VS   97.5 °F (36.4 °C) 92 129/67 16 100 % --      Temp Source Heart Rate Source BP Location FiO2 (%) Pain Score    Temporal -- -- -- 6      Vitals      Date and Time Temp Pulse SpO2 Resp BP Pain Score FACES Pain Rating User   05/06/25 1751 -- -- -- -- -- 6 -- NM   05/06/25 1747 -- -- -- -- -- 6 -- NM   05/06/25 1653 97.5 °F (36.4 °C) 92 100 % 16 129/67 6 -- RG            Physical Exam  Vitals and nursing note reviewed.   Constitutional:       General: She is in acute distress.      Appearance: She is obese.   HENT:      Head: Normocephalic and atraumatic.      Right Ear: External ear normal.      Left Ear: External ear normal.      Nose: Nose normal.      Mouth/Throat:      Pharynx: Oropharynx is clear.   Eyes:      Extraocular Movements: Extraocular movements intact.      Pupils: Pupils are equal, round, and reactive to light.   Cardiovascular:      Rate and Rhythm: Normal rate and regular rhythm.      Pulses: Normal pulses.       Heart sounds: Normal heart sounds. No murmur heard.     No friction rub. No gallop.   Pulmonary:      Effort: Pulmonary effort is normal. No respiratory distress.      Breath sounds: Normal breath sounds. No wheezing, rhonchi or rales.   Abdominal:      General: Abdomen is flat. There is no distension.      Palpations: Abdomen is soft.      Tenderness: There is abdominal tenderness in the suprapubic area. There is right CVA tenderness. There is no guarding or rebound.   Musculoskeletal:         General: No deformity. Normal range of motion.      Cervical back: Normal range of motion.      Right lower leg: No edema.      Left lower leg: No edema.   Skin:     General: Skin is warm and dry.      Capillary Refill: Capillary refill takes less than 2 seconds.      Findings: No rash.   Neurological:      General: No focal deficit present.      Mental Status: She is alert and oriented to person, place, and time.      Gait: Gait normal.   Psychiatric:         Mood and Affect: Mood normal.         Results Reviewed       Procedure Component Value Units Date/Time    Comprehensive metabolic panel [[id number]]  (Abnormal) Collected: 05/06/25 1756    Lab Status: Final result Specimen: Blood from Arm, Left Updated: 05/06/25 1820     Sodium 137 mmol/L      Potassium 3.4 mmol/L      Chloride 101 mmol/L      CO2 24 mmol/L      ANION GAP 12 mmol/L      BUN 14 mg/dL      Creatinine 0.57 mg/dL      Glucose 86 mg/dL      Calcium 8.9 mg/dL      AST 18 U/L      ALT 17 U/L      Alkaline Phosphatase 112 U/L      Total Protein 7.8 g/dL      Albumin 4.2 g/dL      Total Bilirubin 0.27 mg/dL      eGFR 121 ml/min/1.73sq m     Narrative:      National Kidney Disease Foundation guidelines for Chronic Kidney Disease (CKD):     Stage 1 with normal or high GFR (GFR > 90 mL/min/1.73 square meters)    Stage 2 Mild CKD (GFR = 60-89 mL/min/1.73 square meters)    Stage 3A Moderate CKD (GFR = 45-59 mL/min/1.73 square meters)    Stage 3B Moderate CKD (GFR =  30-44 mL/min/1.73 square meters)    Stage 4 Severe CKD (GFR = 15-29 mL/min/1.73 square meters)    Stage 5 End Stage CKD (GFR <15 mL/min/1.73 square meters)  Note: GFR calculation is accurate only with a steady state creatinine    Lipase [[id number]]  (Normal) Collected: 05/06/25 1756    Lab Status: Final result Specimen: Blood from Arm, Left Updated: 05/06/25 1820     Lipase 14 u/L     Urine Microscopic [[id number]]  (Abnormal) Collected: 05/06/25 1755    Lab Status: Final result Specimen: Urine, Clean Catch Updated: 05/06/25 1816     RBC, UA 0-1 /hpf      WBC, UA 1-2 /hpf      Epithelial Cells Occasional /hpf      Bacteria, UA Moderate /hpf      MUCUS THREADS Moderate    UA w Reflex to Microscopic w Reflex to Culture [[id number]]  (Abnormal) Collected: 05/06/25 1755    Lab Status: Final result Specimen: Urine, Clean Catch Updated: 05/06/25 1806     Color, UA Yellow     Clarity, UA Clear     Specific Gravity, UA >=1.030     pH, UA 6.5     Leukocytes, UA Negative     Nitrite, UA Negative     Protein, UA Trace mg/dl      Glucose, UA Negative mg/dl      Ketones, UA Negative mg/dl      Urobilinogen, UA <2.0 mg/dl      Bilirubin, UA Negative     Occult Blood, UA Negative    CBC and differential [[id number]]  (Abnormal) Collected: 05/06/25 1756    Lab Status: Final result Specimen: Blood from Arm, Left Updated: 05/06/25 1804     WBC 5.93 Thousand/uL      RBC 4.90 Million/uL      Hemoglobin 13.2 g/dL      Hematocrit 41.5 %      MCV 85 fL      MCH 26.9 pg      MCHC 31.8 g/dL      RDW 14.1 %      MPV 9.1 fL      Platelets 336 Thousands/uL      nRBC 0 /100 WBCs      Segmented % 76 %      Immature Grans % 0 %      Lymphocytes % 15 %      Monocytes % 4 %      Eosinophils Relative 4 %      Basophils Relative 1 %      Absolute Neutrophils 4.49 Thousands/µL      Absolute Immature Grans 0.01 Thousand/uL      Absolute Lymphocytes 0.87 Thousands/µL      Absolute Monocytes 0.25 Thousand/µL      Eosinophils Absolute 0.24 Thousand/µL       Basophils Absolute 0.07 Thousands/µL     POCT pregnancy, urine [[id number]]  (Normal) Collected: 05/06/25 1747    Lab Status: Final result Updated: 05/06/25 1747     EXT Preg Test, Ur Negative     Control Valid            CT abdomen pelvis with contrast   Final Interpretation by Lucas Kaur MD (05/06 1833)      No acute abdominopelvic pathology.         Workstation performed: [id number]             Procedures    ED Medication and Procedure Management   Prior to Admission Medications   Prescriptions Last Dose Informant Patient Reported? Taking?   DULoxetine (CYMBALTA) 20 mg capsule   Yes No   Sig: Take 20 mg by mouth daily   acetaminophen (TYLENOL) 500 mg tablet   No No   Sig: Take 2 tablets (1,000 mg total) by mouth every 6 (six) hours as needed for mild pain or moderate pain   albuterol (Proventil HFA) 90 mcg/act inhaler   No No   Sig: Inhale 2 puffs every 6 (six) hours as needed for wheezing   ferrous sulfate 324 (65 Fe) mg   No No   Sig: TAKE 1 TABLET BY MOUTH TWICE DAILY BEFORE MEALS   ibuprofen (MOTRIN) 600 mg tablet   No No   Sig: Take 1 tablet (600 mg total) by mouth every 6 (six) hours   lamoTRIgine (LaMICtal) 100 mg tablet   No No   Sig: Take 1 tablet (100 mg total) by mouth 2 (two) times a day Start 1 tab twice daily Aug 4th.   lamoTRIgine (LaMICtal) 25 mg tablet   No No   Sig: START WITH 1 TABLET BY MOUTH FOR 1 WEEK, THEN 1 TABLET TWICE DAILY THE 2ND WEEK, THEN 2 TABLETS TWICE DAILY THE 3RD WEEK (GENERIC FOR LAMICTAL)   levETIRAcetam (Keppra) 500 mg tablet   No No   Sig: Take 1 tablet (500 mg total) by mouth daily In morning.   levothyroxine 25 mcg tablet   No No   Sig: Take 1 tablet (25 mcg total) by mouth every morning      Facility-Administered Medications: None     Discharge Medication List as of 5/6/2025  6:59 PM        START taking these medications    Details   naproxen (Naprosyn) 500 mg tablet Take 1 tablet (500 mg total) by mouth 2 (two) times a day with meals, Starting Tue 5/6/2025, Normal       ondansetron (ZOFRAN-ODT) 4 mg disintegrating tablet Take 1 tablet (4 mg total) by mouth every 6 (six) hours as needed for nausea, Starting Tue 5/6/2025, Normal      cefpodoxime (VANTIN) 100 mg tablet Take 1 tablet (100 mg total) by mouth 2 (two) times a day for 5 days, Starting Tue 5/6/2025, Until Sun 5/11/2025, Normal           CONTINUE these medications which have NOT CHANGED    Details   acetaminophen (TYLENOL) 500 mg tablet Take 2 tablets (1,000 mg total) by mouth every 6 (six) hours as needed for mild pain or moderate pain, Starting Wed 4/16/2025, Normal      albuterol (Proventil HFA) 90 mcg/act inhaler Inhale 2 puffs every 6 (six) hours as needed for wheezing, Starting Mon 11/20/2023, Normal      DULoxetine (CYMBALTA) 20 mg capsule Take 20 mg by mouth daily, Historical Med      ferrous sulfate 324 (65 Fe) mg TAKE 1 TABLET BY MOUTH TWICE DAILY BEFORE MEALS, Starting Sat 12/23/2023, Normal      ibuprofen (MOTRIN) 600 mg tablet Take 1 tablet (600 mg total) by mouth every 6 (six) hours, Starting Wed 4/16/2025, Normal      !! lamoTRIgine (LaMICtal) 100 mg tablet Take 1 tablet (100 mg total) by mouth 2 (two) times a day Start 1 tab twice daily Aug 4th., Starting Mon 1/6/2025, Normal      !! lamoTRIgine (LaMICtal) 25 mg tablet START WITH 1 TABLET BY MOUTH FOR 1 WEEK, THEN 1 TABLET TWICE DAILY THE 2ND WEEK, THEN 2 TABLETS TWICE DAILY THE 3RD WEEK (GENERIC FOR LAMICTAL), Normal      levETIRAcetam (Keppra) 500 mg tablet Take 1 tablet (500 mg total) by mouth daily In morning., Starting Mon 1/6/2025, Normal      levothyroxine 25 mcg tablet Take 1 tablet (25 mcg total) by mouth every morning, Starting Thu 10/3/2024, Normal       !! - Potential duplicate medications found. Please discuss with provider.        No discharge procedures on file.  ED SEPSIS DOCUMENTATION   Time reflects when diagnosis was documented in both MDM as applicable and the Disposition within this note       Time User Action Codes Description Comment     5/6/2025  6:58 PM Nguyễn Zuluaga [R10.9] Acute right flank pain     5/6/2025  6:58 PM Nguyễn Zuluaga [R11.0] Nausea     5/6/2025  6:59 PM Nguyễn Zuluaga [N39.0] UTI (urinary tract infection)                  Nguyễn Zuluaga MD  05/06/25 4747

## 2025-05-06 NOTE — DISCHARGE INSTRUCTIONS
Take medications as prescribed.     If you develop new or worsening symptoms, please return to the Emergency Department for further evaluation.

## 2025-05-07 ENCOUNTER — TELEPHONE (OUTPATIENT)
Age: 35
End: 2025-05-07

## 2025-05-07 NOTE — TELEPHONE ENCOUNTER
Attempted contacting Patient regarding recent ED Visit dated 5/6/25.  Patient was seen in [facility]. Reached , left message provide office hours and phone number.        ED:[facility]  CC: Abdominal pain  DX:Acute right flank pain  Time: 6:58pm  Last OV: 10/3/24

## 2025-05-09 ENCOUNTER — OFFICE VISIT (OUTPATIENT)
Age: 35
End: 2025-05-09

## 2025-05-09 VITALS
BODY MASS INDEX: 54.85 KG/M2 | HEART RATE: 76 BPM | TEMPERATURE: 98.4 F | WEIGHT: 236 LBS | SYSTOLIC BLOOD PRESSURE: 115 MMHG | OXYGEN SATURATION: 97 % | RESPIRATION RATE: 20 BRPM | DIASTOLIC BLOOD PRESSURE: 80 MMHG

## 2025-05-09 DIAGNOSIS — R10.31 RLQ ABDOMINAL PAIN: ICD-10-CM

## 2025-05-09 DIAGNOSIS — K21.9 GASTROESOPHAGEAL REFLUX DISEASE, UNSPECIFIED WHETHER ESOPHAGITIS PRESENT: Primary | ICD-10-CM

## 2025-05-09 DIAGNOSIS — R05.8 POST-VIRAL COUGH SYNDROME: ICD-10-CM

## 2025-05-09 DIAGNOSIS — T78.40XS ALLERGY, SEQUELA: ICD-10-CM

## 2025-05-09 PROCEDURE — 99213 OFFICE O/P EST LOW 20 MIN: CPT | Performed by: FAMILY MEDICINE

## 2025-05-09 PROCEDURE — G2211 COMPLEX E/M VISIT ADD ON: HCPCS | Performed by: FAMILY MEDICINE

## 2025-05-09 RX ORDER — FEXOFENADINE HCL 60 MG/1
60 TABLET, FILM COATED ORAL DAILY
Qty: 30 TABLET | Refills: 0 | Status: SHIPPED | OUTPATIENT
Start: 2025-05-09

## 2025-05-09 RX ORDER — PANTOPRAZOLE SODIUM 40 MG/1
40 TABLET, DELAYED RELEASE ORAL DAILY
Qty: 30 TABLET | Refills: 0 | Status: SHIPPED | OUTPATIENT
Start: 2025-05-09

## 2025-05-09 NOTE — ASSESSMENT & PLAN NOTE
Chronic - had been following up with GI on outpatient basis but stopped f/u in 2023  EGD (2022): normal duodenum, mild antral gastritis  Negative for H pylori or EOE    Plan:  Recommend resuming protonix  Continue f/u GI   Orders:    Ambulatory Referral to Gastroenterology; Future    pantoprazole (PROTONIX) 40 mg tablet; Take 1 tablet (40 mg total) by mouth daily

## 2025-05-09 NOTE — PROGRESS NOTES
Name: Margoth Cook      : 1990      MRN: [id number]  Encounter Provider: Radha Plata DO  Encounter Date: 2025   Encounter department: [facility]    :  Assessment & Plan  Gastroesophageal reflux disease, unspecified whether esophagitis present  Chronic - had been following up with GI on outpatient basis but stopped f/u in   EGD (): normal duodenum, mild antral gastritis  Negative for H pylori or EOE    Plan:  Recommend resuming protonix  Continue f/u GI   Orders:  •  Ambulatory Referral to Gastroenterology; Future  •  pantoprazole (PROTONIX) 40 mg tablet; Take 1 tablet (40 mg total) by mouth daily    RLQ abdominal pain  Unknown etiology - worked up in ED with generally benign labs and imaging including CT  Normal BM, denies N/V/D or constipation  Patient had recent pelvic surgery   Recommend f/u with OBGYN and GI  Orders:  •  Ambulatory Referral to Gastroenterology; Future    Post-viral cough syndrome  Chronic - noting has been using vapor rubs in nose with obvious anterior epistaxis   Crusted blood observed in nares   Endorses that she is having cough, evidence of PND on exam  CXR per pt request   Orders:  •  XR chest pa and lateral; Future    Allergy, sequela  Chronic - noting she has poor tolerance to environmental allergens   Has not been taking regular antihistamine   Has not completed [place] allergy panel  Orders:  •  [facility] Allergy Panel, Adult; Future  •  fexofenadine (ALLEGRA) 60 MG tablet; Take 1 tablet (60 mg total) by mouth daily  •  Ambulatory Referral to Gastroenterology; Future  •  XR chest pa and lateral; Future             History of Present Illness       Presents for f/u of following:  -may 6 noting she had back pain and abd pain  -no recent vomiting, able to tolerate PO liquids   -noting frontal chest pain with   -increased wheezing   -tasting blood with cough  -noting cough happening regularly x 2 weeks  -noting she has not  started abx   -went to ED with normal findings   -post prandial pain  -denies fevers or chills   -having a lot of congestion      Review of Systems   Constitutional:  Negative for chills, fatigue and fever.   HENT:  Positive for congestion, postnasal drip and rhinorrhea. Negative for sinus pressure, sinus pain and sore throat.    Respiratory:  Positive for cough. Negative for shortness of breath and wheezing.    Cardiovascular:  Negative for chest pain.   Gastrointestinal:  Positive for abdominal pain (RLQ). Negative for blood in stool, constipation, diarrhea and nausea.   Musculoskeletal:  Negative for arthralgias and back pain.   Skin: Negative.      Objective   /80 (BP Location: Right arm, Patient Position: Sitting, Cuff Size: Large)   Pulse 76   Temp 98.4 °F (36.9 °C) (Tympanic)   Resp 20   Wt 107 kg (236 lb)   LMP 04/29/2025 (Exact Date)   SpO2 97%   BMI 54.85 kg/m²       Physical Exam  Constitutional:       General: She is not in acute distress.     Appearance: She is obese.   HENT:      Head: Normocephalic and atraumatic.      Right Ear: External ear normal.      Left Ear: External ear normal.      Nose: Rhinorrhea present.      Comments: Dried crusted blood in anterior nares     Mouth/Throat:      Comments: PND noted  Cardiovascular:      Rate and Rhythm: Normal rate and regular rhythm.   Pulmonary:      Breath sounds: Normal breath sounds.   Abdominal:      General: Bowel sounds are normal. There is no distension.      Palpations: Abdomen is soft.      Tenderness: There is abdominal tenderness (RLQ).   Musculoskeletal:      Right lower leg: No edema.      Left lower leg: No edema.   Skin:     General: Skin is warm and dry.   Neurological:      Mental Status: She is alert and oriented to person, place, and time.

## 2025-06-03 ENCOUNTER — HOSPITAL ENCOUNTER (EMERGENCY)
Facility: HOSPITAL | Age: 35
Discharge: HOME | End: 2025-06-03
Attending: EMERGENCY MEDICINE | Admitting: EMERGENCY MEDICINE
Payer: MEDICARE

## 2025-06-03 ENCOUNTER — NURSE TRIAGE (OUTPATIENT)
Age: 35
End: 2025-06-03

## 2025-06-03 VITALS
HEART RATE: 80 BPM | OXYGEN SATURATION: 99 % | DIASTOLIC BLOOD PRESSURE: 60 MMHG | WEIGHT: 236.6 LBS | TEMPERATURE: 97.8 F | SYSTOLIC BLOOD PRESSURE: 115 MMHG | BODY MASS INDEX: 54.99 KG/M2 | RESPIRATION RATE: 16 BRPM

## 2025-06-03 DIAGNOSIS — N93.8 DYSFUNCTIONAL UTERINE BLEEDING: Primary | ICD-10-CM

## 2025-06-03 LAB
ALBUMIN SERPL BCG-MCNC: 4.1 G/DL (ref 3.5–5)
ALP SERPL-CCNC: 108 U/L (ref 34–104)
ALT SERPL W P-5'-P-CCNC: 16 U/L (ref 7–52)
ANION GAP SERPL CALCULATED.3IONS-SCNC: 12 MMOL/L (ref 4–13)
AST SERPL W P-5'-P-CCNC: 14 U/L (ref 13–39)
BASOPHILS # BLD AUTO: 0.09 THOUSANDS/ÂΜL (ref 0–0.1)
BASOPHILS NFR BLD AUTO: 1 % (ref 0–1)
BILIRUB SERPL-MCNC: 0.19 MG/DL (ref 0.2–1)
BUN SERPL-MCNC: 18 MG/DL (ref 5–25)
CALCIUM SERPL-MCNC: 9 MG/DL (ref 8.4–10.2)
CHLORIDE SERPL-SCNC: 105 MMOL/L (ref 96–108)
CO2 SERPL-SCNC: 20 MMOL/L (ref 21–32)
CREAT SERPL-MCNC: 0.57 MG/DL (ref 0.6–1.3)
EOSINOPHIL # BLD AUTO: 0.3 THOUSAND/ÂΜL (ref 0–0.61)
EOSINOPHIL NFR BLD AUTO: 4 % (ref 0–6)
ERYTHROCYTE [DISTWIDTH] IN BLOOD BY AUTOMATED COUNT: 14.6 % (ref 11.6–15.1)
GFR SERPL CREATININE-BSD FRML MDRD: 121 ML/MIN/1.73SQ M
GLUCOSE SERPL-MCNC: 87 MG/DL (ref 65–140)
HCT VFR BLD AUTO: 36.5 % (ref 34.8–46.1)
HGB BLD-MCNC: 11.7 G/DL (ref 11.5–15.4)
IMM GRANULOCYTES # BLD AUTO: 0.02 THOUSAND/UL (ref 0–0.2)
IMM GRANULOCYTES NFR BLD AUTO: 0 % (ref 0–2)
LYMPHOCYTES # BLD AUTO: 1.58 THOUSANDS/ÂΜL (ref 0.6–4.47)
LYMPHOCYTES NFR BLD AUTO: 22 % (ref 14–44)
MCH RBC QN AUTO: 27 PG (ref 26.8–34.3)
MCHC RBC AUTO-ENTMCNC: 32.1 G/DL (ref 31.4–37.4)
MCV RBC AUTO: 84 FL (ref 82–98)
MONOCYTES # BLD AUTO: 0.36 THOUSAND/ÂΜL (ref 0.17–1.22)
MONOCYTES NFR BLD AUTO: 5 % (ref 4–12)
NEUTROPHILS # BLD AUTO: 4.78 THOUSANDS/ÂΜL (ref 1.85–7.62)
NEUTS SEG NFR BLD AUTO: 68 % (ref 43–75)
NRBC BLD AUTO-RTO: 0 /100 WBCS
PLATELET # BLD AUTO: 352 THOUSANDS/UL (ref 149–390)
PMV BLD AUTO: 9.1 FL (ref 8.9–12.7)
POTASSIUM SERPL-SCNC: 3.8 MMOL/L (ref 3.5–5.3)
PROT SERPL-MCNC: 7.8 G/DL (ref 6.4–8.4)
RBC # BLD AUTO: 4.33 MILLION/UL (ref 3.81–5.12)
SODIUM SERPL-SCNC: 137 MMOL/L (ref 135–147)
WBC # BLD AUTO: 7.13 THOUSAND/UL (ref 4.31–10.16)

## 2025-06-03 PROCEDURE — 99284 EMERGENCY DEPT VISIT MOD MDM: CPT

## 2025-06-03 PROCEDURE — 85025 COMPLETE CBC W/AUTO DIFF WBC: CPT | Performed by: EMERGENCY MEDICINE

## 2025-06-03 PROCEDURE — 80053 COMPREHEN METABOLIC PANEL: CPT | Performed by: EMERGENCY MEDICINE

## 2025-06-03 PROCEDURE — 93005 ELECTROCARDIOGRAM TRACING: CPT

## 2025-06-03 PROCEDURE — 81025 URINE PREGNANCY TEST: CPT | Performed by: EMERGENCY MEDICINE

## 2025-06-03 PROCEDURE — 96374 THER/PROPH/DIAG INJ IV PUSH: CPT

## 2025-06-03 PROCEDURE — 36415 COLL VENOUS BLD VENIPUNCTURE: CPT | Performed by: EMERGENCY MEDICINE

## 2025-06-03 PROCEDURE — 99284 EMERGENCY DEPT VISIT MOD MDM: CPT | Performed by: EMERGENCY MEDICINE

## 2025-06-03 RX ORDER — MEDROXYPROGESTERONE ACETATE 10 MG
20 TABLET ORAL ONCE
Status: COMPLETED | OUTPATIENT
Start: 2025-06-03 | End: 2025-06-03

## 2025-06-03 RX ORDER — MEDROXYPROGESTERONE ACETATE 150 MG/ML
150 INJECTION, SUSPENSION INTRAMUSCULAR ONCE
Status: DISCONTINUED | OUTPATIENT
Start: 2025-06-03 | End: 2025-06-03

## 2025-06-03 RX ORDER — MEDROXYPROGESTERONE ACETATE 5 MG
20 TABLET ORAL 3 TIMES DAILY
Qty: 5 TABLET | Refills: 0 | Status: SHIPPED | OUTPATIENT
Start: 2025-06-03

## 2025-06-03 RX ORDER — KETOROLAC TROMETHAMINE 30 MG/ML
15 INJECTION, SOLUTION INTRAMUSCULAR; INTRAVENOUS ONCE
Status: COMPLETED | OUTPATIENT
Start: 2025-06-03 | End: 2025-06-03

## 2025-06-03 RX ADMIN — KETOROLAC TROMETHAMINE 15 MG: 30 INJECTION, SOLUTION INTRAMUSCULAR at 18:11

## 2025-06-03 RX ADMIN — MEDROXYPROGESTERONE ACETATE 20 MG: 10 TABLET ORAL at 18:19

## 2025-06-03 NOTE — ED PROVIDER NOTES
Time reflects when diagnosis was documented in both MDM as applicable and the Disposition within this note       Time User Action Codes Description Comment    6/3/2025  6:11 PM Mao Alegria Add [N93.8] Dysfunctional uterine bleeding           ED Disposition       ED Disposition   Discharge    Condition   Stable    Date/Time   Tue Darius 3, 2025  6:11 PM    Comment   Margoth Cook discharge to home/self care.                   Assessment & Plan       Medical Decision Making  Differential diagnosis includes but is not limited to dysfunctional uterine bleeding, adenomyosis, fibroids, anemia, dehydration.  I ordered and reviewed lab work including CBC, CMP.  Patient treated with Depo-Provera, Toradol.  I discussed [facility] with [facility] who agrees with current plan. Patient with a hemoglobin of 11.7.  No brisk bleeding in the emergency department.  I discussed patient with Dr. Mills with the [facility] who recommends course of Provera and outpatient follow-up.  Patient agreeable with this plan.  Discharged with return precautions.    Amount and/or Complexity of Data Reviewed  External Data Reviewed: notes.     Details: Patient evaluated most recently by primary care provider on 5/9/2025 for GERD, right lower quadrant abdominal pain.  CT on 5/6/2025 unremarkable.  Labs: ordered.    Risk  Prescription drug management.             Medications   ketorolac (TORADOL) injection 15 mg (15 mg Intravenous Given 6/3/25 1811)   medroxyPROGESTERone (PROVERA) tablet 20 mg (20 mg Oral Given 6/3/25 1819)       ED Risk Strat Scores                    No data recorded        SBIRT 22yo+      Flowsheet Row Most Recent Value   Initial Alcohol Screen: US AUDIT-C     1. How often do you have a drink containing alcohol? 0 Filed at: 06/03/2025 1554   3b. FEMALE Any Age, or MALE 65+: How often do you have 4 or more drinks on one occassion? 0 Filed at: 06/03/2025 1550   Audit-C Score 0 Filed at:  06/03/2025 6125   RANDY: How many times in the past year have you...    Used an illegal drug or used a prescription medication for non-medical reasons? Never Filed at: 06/03/2025 0642                            History of Present Illness       Chief Complaint   Patient presents with    Vaginal Bleeding     Sent by OB for Day 9 menses, pad every 1-2 hours, passed 3 golf size and several grape size clots. Mild cramping. No possibility pregnancy, had tubes removed.        Past Medical History[1]   Past Surgical History[2]   Family History[3]   Social History[4]   E-Cigarette/Vaping    E-Cigarette Use Never User       E-Cigarette/Vaping Substances    Nicotine Yes     THC Yes     CBD No     Flavoring Yes     Other No     Unknown No       I have reviewed and agree with the history as documented.     Patient is a 35-year-old female history of morbid obesity, bipolar disorder, autism, hypothyroidism presenting for evaluation of vaginal bleeding.  Patient states about 9 days of vaginal bleeding worsening over the course of the last 24 hours with soaking of 1-2 pads per hour, occasional passage of large clots.  Patient stating some weakness over the course of the last 3 days, denies lightheadedness, syncope, near syncope, chest pain, shortness of breath.  Patient is status post bilateral salpingectomy, states there is no chance she can be pregnant.  Patient states only mild pelvic pain and cramping.  Patient denies anticoagulation or antiplatelet use.        Review of Systems   Constitutional:  Positive for fatigue.   Respiratory:  Negative for shortness of breath.    Cardiovascular:  Negative for chest pain.   Gastrointestinal:  Negative for nausea and vomiting.   Musculoskeletal:  Negative for arthralgias and myalgias.   Neurological:  Positive for weakness (Generalized). Negative for light-headedness.   Psychiatric/Behavioral:  Negative for confusion.    All other systems reviewed and are negative.          Objective        ED Triage Vitals [06/03/25 1552]   Temperature Pulse Blood Pressure Respirations SpO2 Patient Position - Orthostatic VS   99 °F (37.2 °C) 90 113/71 18 100 % Sitting      Temp Source Heart Rate Source BP Location FiO2 (%) Pain Score    Tympanic Monitor Left arm -- 1      Vitals      Date and Time Temp Pulse SpO2 Resp BP Pain Score FACES Pain Rating User   06/03/25 1811 -- -- -- -- -- 3 -- MDD   06/03/25 1552 99 °F (37.2 °C) 90 100 % 18 113/71 1 -- SW            Physical Exam  Vitals and nursing note reviewed.   Constitutional:       General: She is not in acute distress.     Appearance: Normal appearance. She is not ill-appearing, toxic-appearing or diaphoretic.      Comments: Well-appearing, nontoxic, nondistressed   HENT:      Head: Normocephalic and atraumatic.      Comments: Moist mucous membranes     Right Ear: External ear normal.      Left Ear: External ear normal.     Eyes:      General:         Right eye: No discharge.         Left eye: No discharge.       Cardiovascular:      Comments: Regular rate and rhythm, no murmurs rubs or gallops.  Extremities warm and well-perfused without mottling  Pulmonary:      Effort: No respiratory distress.      Comments: No increased work of breathing.  Speaking in complete sentences.  Lungs clear to auscultation bilaterally without wheezes, rales, rhonchi.  Satting 100% on room air indicating adequate oxygenation  Abdominal:      General: There is no distension.     Musculoskeletal:         General: No deformity.      Cervical back: Normal range of motion.     Skin:     Findings: No lesion or rash.     Neurological:      Mental Status: She is alert and oriented to person, place, and time. Mental status is at baseline.     Psychiatric:         Mood and Affect: Mood and affect normal.         Results Reviewed       Procedure Component Value Units Date/Time    Comprehensive metabolic panel [[id number]]  (Abnormal) Collected: 06/03/25 1646    Lab Status: Final result  Specimen: Blood from Arm, Right Updated: 06/03/25 1711     Sodium 137 mmol/L      Potassium 3.8 mmol/L      Chloride 105 mmol/L      CO2 20 mmol/L      ANION GAP 12 mmol/L      BUN 18 mg/dL      Creatinine 0.57 mg/dL      Glucose 87 mg/dL      Calcium 9.0 mg/dL      AST 14 U/L      ALT 16 U/L      Alkaline Phosphatase 108 U/L      Total Protein 7.8 g/dL      Albumin 4.1 g/dL      Total Bilirubin 0.19 mg/dL      eGFR 121 ml/min/1.73sq m     Narrative:      National Kidney Disease Foundation guidelines for Chronic Kidney Disease (CKD):     Stage 1 with normal or high GFR (GFR > 90 mL/min/1.73 square meters)    Stage 2 Mild CKD (GFR = 60-89 mL/min/1.73 square meters)    Stage 3A Moderate CKD (GFR = 45-59 mL/min/1.73 square meters)    Stage 3B Moderate CKD (GFR = 30-44 mL/min/1.73 square meters)    Stage 4 Severe CKD (GFR = 15-29 mL/min/1.73 square meters)    Stage 5 End Stage CKD (GFR <15 mL/min/1.73 square meters)  Note: GFR calculation is accurate only with a steady state creatinine    CBC and differential [[id number]] Collected: 06/03/25 1646    Lab Status: Final result Specimen: Blood from Arm, Right Updated: 06/03/25 1650     WBC 7.13 Thousand/uL      RBC 4.33 Million/uL      Hemoglobin 11.7 g/dL      Hematocrit 36.5 %      MCV 84 fL      MCH 27.0 pg      MCHC 32.1 g/dL      RDW 14.6 %      MPV 9.1 fL      Platelets 352 Thousands/uL      nRBC 0 /100 WBCs      Segmented % 68 %      Immature Grans % 0 %      Lymphocytes % 22 %      Monocytes % 5 %      Eosinophils Relative 4 %      Basophils Relative 1 %      Absolute Neutrophils 4.78 Thousands/µL      Absolute Immature Grans 0.02 Thousand/uL      Absolute Lymphocytes 1.58 Thousands/µL      Absolute Monocytes 0.36 Thousand/µL      Eosinophils Absolute 0.30 Thousand/µL      Basophils Absolute 0.09 Thousands/µL     POCT pregnancy, urine [[id number]]  (Normal) Collected: 06/03/25 1636    Lab Status: Final result Updated: 06/03/25 1636     EXT Preg Test, Ur --     Control  --            No orders to display       Procedures    ED Medication and Procedure Management   Prior to Admission Medications   Prescriptions Last Dose Informant Patient Reported? Taking?   DULoxetine (CYMBALTA) 20 mg capsule   Yes No   Sig: Take 20 mg by mouth daily   acetaminophen (TYLENOL) 500 mg tablet   No No   Sig: Take 2 tablets (1,000 mg total) by mouth every 6 (six) hours as needed for mild pain or moderate pain   albuterol (Proventil HFA) 90 mcg/act inhaler   No No   Sig: Inhale 2 puffs every 6 (six) hours as needed for wheezing   ferrous sulfate 324 (65 Fe) mg   No No   Sig: TAKE 1 TABLET BY MOUTH TWICE DAILY BEFORE MEALS   fexofenadine (ALLEGRA) 60 MG tablet   No No   Sig: Take 1 tablet (60 mg total) by mouth daily   ibuprofen (MOTRIN) 600 mg tablet   No No   Sig: Take 1 tablet (600 mg total) by mouth every 6 (six) hours   lamoTRIgine (LaMICtal) 100 mg tablet   No No   Sig: Take 1 tablet (100 mg total) by mouth 2 (two) times a day Start 1 tab twice daily Aug 4th.   lamoTRIgine (LaMICtal) 25 mg tablet   No No   Sig: START WITH 1 TABLET BY MOUTH FOR 1 WEEK, THEN 1 TABLET TWICE DAILY THE 2ND WEEK, THEN 2 TABLETS TWICE DAILY THE 3RD WEEK (GENERIC FOR LAMICTAL)   levETIRAcetam (Keppra) 500 mg tablet   No No   Sig: Take 1 tablet (500 mg total) by mouth daily In morning.   levothyroxine 25 mcg tablet   No No   Sig: Take 1 tablet (25 mcg total) by mouth every morning   naproxen (Naprosyn) 500 mg tablet   No No   Sig: Take 1 tablet (500 mg total) by mouth 2 (two) times a day with meals   ondansetron (ZOFRAN-ODT) 4 mg disintegrating tablet   No No   Sig: Take 1 tablet (4 mg total) by mouth every 6 (six) hours as needed for nausea   pantoprazole (PROTONIX) 40 mg tablet   No No   Sig: Take 1 tablet (40 mg total) by mouth daily      Facility-Administered Medications: None     Patient's Medications   Discharge Prescriptions    MEDROXYPROGESTERONE (PROVERA) 5 MG TABLET    Take 4 tablets (20 mg total) by mouth 3  "(three) times a day Take 20 mg Provera 3 times daily until bleeding slows then twice daily for an additional 10 days.       Start Date: 6/3/2025  End Date: --       Order Dose: 20 mg       Quantity: 5 tablet    Refills: 0     No discharge procedures on file.  ED SEPSIS DOCUMENTATION   Time reflects when diagnosis was documented in both MDM as applicable and the Disposition within this note       Time User Action Codes Description Comment    6/3/2025  6:11 PM Mao Alegria Add [N93.8] Dysfunctional uterine bleeding                    Mao Alegria MD  26         [1]   Past Medical History:  Diagnosis Date    37 weeks gestation of pregnancy 2024    Abnormal Pap smear of cervix     Anemia     Anxiety     Asthma     Asthma during pregnancy 2024    Continue albuterol inhaler - has not needed      Autism     Spani esophagus     Bipolar disorder (HCC)     CPAP (continuous positive airway pressure) dependence     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HCC)     not diagnosed as of 23-trying to R/O    Depression     Developmental delay     Diabetes mellitus (HCC)     Difficult intubation     during emergency . No trouble prio with other surgeries    Disease of thyroid gland     hypo    Dysphagia     solids and liquids    Female infertility     Fibromyalgia, primary     Gastric ulcer     GERD (gastroesophageal reflux disease)     H/O Cervical cerclage suture present in third trimester 2024    History indicated cerclage placed at [facility]  Cerclage removed today in office      History of bronchitis     History of  delivery, currently pregnant 2024     at 22 weeks  Son has a ATOH1 gene      History of stillbirth in currently pregnant patient, unspecified trimester 2023    Late to care in G1 pregnancy - 28w demise due to \"fluid around the heart/trisomy\"      Hypothyroid in pregnancy, antepartum 10/24/2022    Continue Levothyroxine      Hypothyroidism     Iron " deficiency anemia     infusion in 2022    Irregular menses     Marijuana use     Patient reported she quit 2023    Miscarriage     Morbid obesity with BMI of 50.0-59.9, adult (HCC)     Plantar fasciitis of left foot     Polycystic ovary syndrome     Previous child with congenital anomaly, currently pregnant, antepartum 2023    G1 - Trisomy? Heart defect? Fetal demise  G4 - 22 week delivery with chronic health concerns due to prematurity; Also ATOH1 gene mutation (very rare)  CFDNA WNL         Reflux esophagitis     Rh negative state in antepartum period 2023    Rhogam administered 24  Will need again postpartum      S/P emergency  section 2024    Seizures (HCC)     last one 23    Sleep apnea     CPAP    Varicella     had disease    Vision loss     Wears glasses    [2]   Past Surgical History:  Procedure Laterality Date    COLONOSCOPY      EGD      with Bx due to barretts esophagus    EYE SURGERY Bilateral     lazy eye repair    SD BIOPSY OF LIP N/A 11/10/2022    Procedure: EXCISION BIOPSY SALVARY GLANDS LOWER LIP;  Surgeon: Casey Florez MD;  Location: [facility];  Service: ENT    SD CERCLAGE UTERINE CERVIX NONOBSTETRICAL N/A 2023    Procedure: CERCLAGE CERVICAL;  Surgeon: Ant Marinelli MD;  [facility];  Service: Obstetrics    SD  DELIVERY ONLY N/A 2024    Procedure:  SECTION ();  Surgeon: Yany Juan MD;  [facility];  Service: Obstetrics    SD HYSTEROSCOPY BX ENDOMETRIUM&/POLYPC W/WO D&C N/A 2021    Procedure: DILATATION AND CURETTAGE (D&C) WITH HYSTEROSCOPY;  Surgeon: Albina Parsons MD;  Location: [facility];  Service: Gynecology    SD LAPAROSCOPY W/RMVL ADNEXAL STRUCTURES Bilateral 2025    Procedure: LAPAROSCOPIC BILATERAL SALPINGECTOMY, EXAM UNDER ANESTHESIA;  Surgeon: Katrina Mills MD;  Location: [facility];  Service: Gynecology    UPPER GASTROINTESTINAL ENDOSCOPY      MARLEN  TOOTH EXTRACTION     [3]   Family History  Problem Relation Name Age of Onset    Bipolar disorder Mother Elisa Harvey     Depression Mother Elisa Harvey     Mental illness Mother Elisa Harvey     Arthritis Mother Elisa Harvey     Depression Father John Martino Joey     Mental illness Father John Martino Joey     Arthritis Father John Ortizsus Joey     Asthma Father John Ortizsus Joey     Migraines Father John Ortizsus Joey     Other Son      Diabetes Maternal Grandmother Cynthia     Thyroid disease Maternal Grandmother Cynthia     Hypertension Maternal Grandmother Cynthia     Heart disease Maternal Grandmother Cynthia     Bone cancer Maternal Grandmother Cynthia         Not Cynthia, but my great grandma Bety and her sister Flores  of leukemia    Mental illness Maternal Grandmother Cynthia     Vision loss Maternal Grandmother Cynthia     Addiction problem Maternal Grandmother Cynthia     Hypothyroidism Maternal Grandmother Cynthia     Multiple sclerosis Maternal Grandmother Cynthia     Neuropathy Maternal Grandmother Cynthia     Diabetes Maternal Grandfather N/A     Diabetes Paternal Grandmother Maci     Breast cancer Paternal Grandmother Maci     Stroke Paternal Grandmother Maci     COPD Paternal Grandmother Maci     Hearing loss Paternal Grandmother Maci     Hypothyroidism Paternal Grandmother Maci     Diabetes Paternal Grandfather Edward Joey     Breast cancer Maternal Aunt mothers twin 35        bilateral    Stomach cancer Maternal Aunt mothers twin     Mental retardation Paternal Uncle Tang Daley     Learning disabilities Sister Shireen Harvey     Mental illness Sister Shireen Harvey     ADD / ADHD Sister Shireen Harvey     Mental illness Brother Norman Joey         Shireen Harvey    ADD / ADHD Brother Norman Joey     Mental illness Brother Zeke Joey     Schizophrenia Paternal Uncle Alfonso         My paternal grandmother’s brother - it was caused by drug use   [4]    Social History  Tobacco Use    Smoking status: Never     Passive exposure: Never    Smokeless tobacco: Never   Vaping Use    Vaping status: Never Used   Substance Use Topics    Alcohol use: Not Currently    Drug use: Not Currently     Types: Marijuana     Comment: vapes THC several times a week        Mao Alegria MD  06/03/25 2742

## 2025-06-03 NOTE — Clinical Note
Margoth Cook was seen and treated in our emergency department on 6/3/2025.                Diagnosis:     Margoth  .    She may return on this date: 06/06/2025         If you have any questions or concerns, please don't hesitate to call.      Mao Alegria MD    ______________________________           _______________          _______________  Hospital Representative                              Date                                Time

## 2025-06-03 NOTE — DISCHARGE INSTRUCTIONS
Your hemoglobin today was normal.  After talking to [facility]- we recommend taking taking the prescribed Provera 20 mg 3 times a day until bleeding slows then twice a day for an additional 10 days.  Follow-up with the [facility] in the next week for further evaluation.  If you have any severe worsening bleeding, 2 consecutive hours of 2 soaked pads per hour, worsening lightheadedness, chest pain, shortness of breath, if you pass out, return to the emergency department.

## 2025-06-03 NOTE — TELEPHONE ENCOUNTER
"REASON FOR CONVERSATION: Vaginal Bleeding    SYMPTOMS: Day 9 of menses bleeding is becoming heavier. Report changing saturated pad every 1-2 hrs since 9am with 3 golf ball sized clots and several grape sized clots. Mild pelvic cramping and fatigue. Denies dizziness and fever    OTHER HEALTH INFORMATION: cycles are usually regular, not so heavy and last 4-5 days    PROTOCOL DISPOSITION: Go to ED/UCC Now (Or to Office with PCP Approval)    CARE ADVICE PROVIDED: Go to ED for evaluation    PRACTICE FOLLOW-UP: None at this time. Advised patient to call to scheduled follow up after D/C from ED          Reason for Disposition   SEVERE vaginal bleeding (e.g., soaking 2 pads or tampons per hour and present 2 or more hours; 1 menstrual cup every 2 hours)    Answer Assessment - Initial Assessment Questions  1. BLEEDING SEVERITY: \"Describe the bleeding that you are having.\" \"How much bleeding is there?\"       Every 1-2 hrs since about 9 am. 3 golf ball sized clot, multiple grape sized clots  2. ONSET: \"When did the bleeding begin?\" \"Is it continuing now?\"      Day 9 of menses  3. MENSTRUAL PERIOD: \"When was the last normal menstrual period?\" \"How is this different than your period?\"      Usually last 4-6 days  4. REGULARITY: \"How regular are your periods?\"      Usually regular  5. ABDOMEN PAIN: \"Do you have any pain?\" \"How bad is the pain?\"  (e.g., Scale 0-10; none, mild, moderate, or severe)      Mild cramping  6. PREGNANCY: \"Is there any chance you are pregnant?\" \"When was your last menstrual period?\"      Denies  7. BREASTFEEDING: \"Are you breastfeeding?\"      Denies  8. HORMONE MEDICINES: \"Are you taking any hormone medicines, prescription or over-the-counter?\" (e.g., birth control pills, estrogen)      Denies  9. BLOOD THINNER MEDICINES: \"Do you take any blood thinners?\" (e.g., Coumadin / warfarin, Pradaxa / dabigatran, aspirin)      Denies  10. CAUSE: \"What do you think is causing the bleeding?\" (e.g., recent gyn surgery, " "recent gyn procedure; known bleeding disorder, cervical cancer, polycystic ovarian disease, fibroids)           Unsure  11. HEMODYNAMIC STATUS: \"Are you weak or feeling lightheaded?\" If Yes, ask: \"Can you stand and walk normally?\"         Denies dizziness, report fatigue  12. OTHER SYMPTOMS: \"What other symptoms are you having with the bleeding?\" (e.g., passed tissue, vaginal discharge, fever, menstrual-type cramps)        Denies fever    Protocols used: Vaginal Bleeding - Abnormal-Adult-OH    "

## 2025-06-04 ENCOUNTER — TELEPHONE (OUTPATIENT)
Age: 35
End: 2025-06-04

## 2025-06-04 DIAGNOSIS — N92.0 MENORRHAGIA WITH REGULAR CYCLE: ICD-10-CM

## 2025-06-04 DIAGNOSIS — Z90.79 H/O BILATERAL SALPINGECTOMY: Primary | ICD-10-CM

## 2025-06-04 LAB
ATRIAL RATE: 73 BPM
P AXIS: 16 DEGREES
PR INTERVAL: 124 MS
QRS AXIS: 41 DEGREES
QRSD INTERVAL: 88 MS
QT INTERVAL: 362 MS
QTC INTERVAL: 398 MS
T WAVE AXIS: 25 DEGREES
VENTRICULAR RATE: 73 BPM

## 2025-06-04 PROCEDURE — 93010 ELECTROCARDIOGRAM REPORT: CPT | Performed by: INTERNAL MEDICINE

## 2025-06-04 NOTE — TELEPHONE ENCOUNTER
Spoke with pt and states bleeding is a little better today. She was given progesterone yesterday in the ER.  At this time she denies any pain or discomfort.  Advised pt script was placed for her to have a pelvic ultrasound to further evaluate.  She was scheduled for visit 6/9/2025 and advised to call sooner if any further questions or concerns or if bleeding gets heavy again.

## 2025-06-04 NOTE — TELEPHONE ENCOUNTER
Patient called and was in the er for heavy bleeding and only goes to [facility] and I cannot find any soon openings.  Please call patient back and she said she was supposed to be seen in a week or two and cannot have appointments on Fridays. She can be reached at [phone] . Thank you

## 2025-06-09 ENCOUNTER — OFFICE VISIT (OUTPATIENT)
Dept: OBGYN CLINIC | Facility: CLINIC | Age: 35
End: 2025-06-09
Payer: MEDICARE

## 2025-06-09 VITALS — SYSTOLIC BLOOD PRESSURE: 110 MMHG | WEIGHT: 238 LBS | DIASTOLIC BLOOD PRESSURE: 60 MMHG | BODY MASS INDEX: 55.32 KG/M2

## 2025-06-09 DIAGNOSIS — N93.9 ABNORMAL UTERINE BLEEDING (AUB): Primary | ICD-10-CM

## 2025-06-09 PROCEDURE — 99213 OFFICE O/P EST LOW 20 MIN: CPT | Performed by: NURSE PRACTITIONER

## 2025-06-09 RX ORDER — LEVETIRACETAM 250 MG/1
TABLET ORAL
COMMUNITY
Start: 2025-04-12

## 2025-06-09 RX ORDER — LEVETIRACETAM 1000 MG/1
TABLET ORAL
COMMUNITY
Start: 2025-04-12

## 2025-06-09 RX ORDER — PRAZOSIN HYDROCHLORIDE 1 MG/1
CAPSULE ORAL
COMMUNITY
Start: 2025-03-14

## 2025-06-09 RX ORDER — LAMOTRIGINE 200 MG/1
TABLET ORAL
COMMUNITY
Start: 2025-03-12

## 2025-06-09 NOTE — PROGRESS NOTES
SUBJECTIVE:     Margoth Cook 35 y.o. female  presents for ER follow-up.   She was seen in the ER on 6/3/25 for dysfunctional uterine bleeding. At the time she had been experiencing a prolonged menses and was in her 9th day of bleeding, with occasional passage of large clots. At the time she was prescribed a course of provera. States bleeding stopped on the .      Hx of B/L salpingectomy for sterilization on 25.      Patient's last menstrual period was 2025.    OBJECTIVE:     She appears well, afebrile.  Abdomen: benign, soft, nontender, no masses.  Pelvic Exam: VULVA: normal appearing vulva with no masses, tenderness or lesions, VAGINA: normal appearing vagina with normal color and discharge, no lesions, CERVIX: normal appearing cervix without discharge or lesions.      ASSESSMENT AND PLAN:     Margoth was seen today for menstrual problem.    Diagnoses and all orders for this visit:    Abnormal uterine bleeding (AUB)      There is an order in [facility] for a pelvic US that I placed on 25. Encouraged her to schedule to r/o any etiologies that may have contributed to her prolonged bleeding. I explained that this could be an isolated episode due to her recent surgery the month before. She is agreeable.     Results will be released to [facility], will call to review and discuss treatment plan if one is warranted..

## 2025-06-09 NOTE — TELEPHONE ENCOUNTER
Patient scheduled to see Dr CHURCH today will postpone till tomorrow in case NO Show /Cancel . [place]

## 2025-06-19 ENCOUNTER — TELEPHONE (OUTPATIENT)
Dept: HEMATOLOGY ONCOLOGY | Facility: MEDICAL CENTER | Age: 35
End: 2025-06-19

## 2025-06-19 NOTE — TELEPHONE ENCOUNTER
Called to remind patient to have lab work completed prior to her appointment next week with Dr. Chin.

## 2025-06-21 ENCOUNTER — APPOINTMENT (OUTPATIENT)
Dept: LAB | Facility: HOSPITAL | Age: 35
End: 2025-06-21
Payer: MEDICARE

## 2025-06-21 DIAGNOSIS — Z30.2 ENCOUNTER FOR STERILIZATION: ICD-10-CM

## 2025-06-21 DIAGNOSIS — T78.40XS ALLERGY, SEQUELA: ICD-10-CM

## 2025-06-21 DIAGNOSIS — D50.8 IRON DEFICIENCY ANEMIA SECONDARY TO INADEQUATE DIETARY IRON INTAKE: ICD-10-CM

## 2025-06-21 LAB
ALBUMIN SERPL BCG-MCNC: 3.9 G/DL (ref 3.5–5)
ALP SERPL-CCNC: 109 U/L (ref 34–104)
ALT SERPL W P-5'-P-CCNC: 15 U/L (ref 7–52)
ANION GAP SERPL CALCULATED.3IONS-SCNC: 11 MMOL/L (ref 4–13)
AST SERPL W P-5'-P-CCNC: 12 U/L (ref 13–39)
BASOPHILS # BLD AUTO: 0.07 THOUSANDS/ÂΜL (ref 0–0.1)
BASOPHILS NFR BLD AUTO: 1 % (ref 0–1)
BILIRUB SERPL-MCNC: 0.24 MG/DL (ref 0.2–1)
BUN SERPL-MCNC: 13 MG/DL (ref 5–25)
CALCIUM SERPL-MCNC: 9 MG/DL (ref 8.4–10.2)
CHLORIDE SERPL-SCNC: 101 MMOL/L (ref 96–108)
CO2 SERPL-SCNC: 22 MMOL/L (ref 21–32)
CREAT SERPL-MCNC: 0.49 MG/DL (ref 0.6–1.3)
EOSINOPHIL # BLD AUTO: 0.15 THOUSAND/ÂΜL (ref 0–0.61)
EOSINOPHIL NFR BLD AUTO: 2 % (ref 0–6)
ERYTHROCYTE [DISTWIDTH] IN BLOOD BY AUTOMATED COUNT: 14.6 % (ref 11.6–15.1)
EST. AVERAGE GLUCOSE BLD GHB EST-MCNC: 120 MG/DL
FERRITIN SERPL-MCNC: 22 NG/ML (ref 30–307)
GFR SERPL CREATININE-BSD FRML MDRD: 127 ML/MIN/1.73SQ M
GLUCOSE SERPL-MCNC: 88 MG/DL (ref 65–140)
HBA1C MFR BLD: 5.8 %
HCT VFR BLD AUTO: 35.7 % (ref 34.8–46.1)
HGB BLD-MCNC: 11.2 G/DL (ref 11.5–15.4)
IMM GRANULOCYTES # BLD AUTO: 0.02 THOUSAND/UL (ref 0–0.2)
IMM GRANULOCYTES NFR BLD AUTO: 0 % (ref 0–2)
IRON SATN MFR SERPL: 13 % (ref 15–50)
IRON SERPL-MCNC: 51 UG/DL (ref 50–212)
LYMPHOCYTES # BLD AUTO: 1.32 THOUSANDS/ÂΜL (ref 0.6–4.47)
LYMPHOCYTES NFR BLD AUTO: 21 % (ref 14–44)
MCH RBC QN AUTO: 26.8 PG (ref 26.8–34.3)
MCHC RBC AUTO-ENTMCNC: 31.4 G/DL (ref 31.4–37.4)
MCV RBC AUTO: 85 FL (ref 82–98)
MONOCYTES # BLD AUTO: 0.35 THOUSAND/ÂΜL (ref 0.17–1.22)
MONOCYTES NFR BLD AUTO: 5 % (ref 4–12)
NEUTROPHILS # BLD AUTO: 4.53 THOUSANDS/ÂΜL (ref 1.85–7.62)
NEUTS SEG NFR BLD AUTO: 71 % (ref 43–75)
NRBC BLD AUTO-RTO: 0 /100 WBCS
PLATELET # BLD AUTO: 360 THOUSANDS/UL (ref 149–390)
PMV BLD AUTO: 9 FL (ref 8.9–12.7)
POTASSIUM SERPL-SCNC: 3.7 MMOL/L (ref 3.5–5.3)
PROT SERPL-MCNC: 7.2 G/DL (ref 6.4–8.4)
RBC # BLD AUTO: 4.18 MILLION/UL (ref 3.81–5.12)
SODIUM SERPL-SCNC: 134 MMOL/L (ref 135–147)
TIBC SERPL-MCNC: 387.8 UG/DL (ref 250–450)
TRANSFERRIN SERPL-MCNC: 277 MG/DL (ref 203–362)
UIBC SERPL-MCNC: 337 UG/DL (ref 155–355)
WBC # BLD AUTO: 6.44 THOUSAND/UL (ref 4.31–10.16)

## 2025-06-21 PROCEDURE — 83540 ASSAY OF IRON: CPT

## 2025-06-21 PROCEDURE — 36415 COLL VENOUS BLD VENIPUNCTURE: CPT

## 2025-06-21 PROCEDURE — 82728 ASSAY OF FERRITIN: CPT

## 2025-06-21 PROCEDURE — 86003 ALLG SPEC IGE CRUDE XTRC EA: CPT

## 2025-06-21 PROCEDURE — 83550 IRON BINDING TEST: CPT

## 2025-06-21 PROCEDURE — 80053 COMPREHEN METABOLIC PANEL: CPT

## 2025-06-21 PROCEDURE — 85025 COMPLETE CBC W/AUTO DIFF WBC: CPT

## 2025-06-21 PROCEDURE — 82785 ASSAY OF IGE: CPT

## 2025-06-21 PROCEDURE — 83036 HEMOGLOBIN GLYCOSYLATED A1C: CPT

## 2025-06-24 ENCOUNTER — OFFICE VISIT (OUTPATIENT)
Age: 35
End: 2025-06-24

## 2025-06-24 VITALS
HEART RATE: 98 BPM | WEIGHT: 239 LBS | TEMPERATURE: 98 F | OXYGEN SATURATION: 99 % | SYSTOLIC BLOOD PRESSURE: 108 MMHG | DIASTOLIC BLOOD PRESSURE: 74 MMHG | BODY MASS INDEX: 55.31 KG/M2 | RESPIRATION RATE: 16 BRPM | HEIGHT: 55 IN

## 2025-06-24 DIAGNOSIS — R73.03 PREDIABETES: ICD-10-CM

## 2025-06-24 DIAGNOSIS — T78.40XS ALLERGY, SEQUELA: ICD-10-CM

## 2025-06-24 DIAGNOSIS — E66.01 MORBID OBESITY WITH BMI OF 50.0-59.9, ADULT (HCC): ICD-10-CM

## 2025-06-24 DIAGNOSIS — G47.33 OSA (OBSTRUCTIVE SLEEP APNEA): Primary | Chronic | ICD-10-CM

## 2025-06-24 DIAGNOSIS — K21.00 GASTROESOPHAGEAL REFLUX DISEASE WITH ESOPHAGITIS WITHOUT HEMORRHAGE: ICD-10-CM

## 2025-06-24 LAB
A ALTERNATA IGE QN: <0.1 KUA/I (ref 0–0.1)
A FUMIGATUS IGE QN: <0.1 KUA/I (ref 0–0.1)
BERMUDA GRASS IGE QN: 6.14 KUA/I (ref 0–0.1)
BOXELDER IGE QN: 4.64 KUA/I (ref 0–0.1)
C HERBARUM IGE QN: <0.1 KUA/I (ref 0–0.1)
CAT DANDER IGE QN: 0.34 KUA/I (ref 0–0.1)
CMN PIGWEED IGE QN: 2.12 KUA/I (ref 0–0.1)
COMMON RAGWEED IGE QN: 3.77 KUA/I (ref 0–0.1)
COTTONWOOD IGE QN: 3.79 KUA/I (ref 0–0.1)
D FARINAE IGE QN: 1.76 KUA/I (ref 0–0.1)
D PTERONYSS IGE QN: 1.81 KUA/I (ref 0–0.1)
DOG DANDER IGE QN: 0.31 KUA/I (ref 0–0.1)
LONDON PLANE IGE QN: 4.99 KUA/I (ref 0–0.1)
MOUSE URINE PROT IGE QN: <0.1 KUA/I (ref 0–0.1)
MT JUNIPER IGE QN: 0.99 KUA/I (ref 0–0.1)
MUGWORT IGE QN: 0.97 KUA/I (ref 0–0.1)
P NOTATUM IGE QN: <0.1 KUA/I (ref 0–0.1)
ROACH IGE QN: <0.1 KUA/I (ref 0–0.1)
SHEEP SORREL IGE QN: 6.01 KUA/I (ref 0–0.1)
SILVER BIRCH IGE QN: 26.3 KUA/I (ref 0–0.1)
TIMOTHY IGE QN: 4.08 KUA/I (ref 0–0.1)
TOTAL IGE SMQN RAST: 132 KU/L (ref 0–113)
WALNUT IGE QN: 6.15 KUA/I (ref 0–0.1)
WHITE ASH IGE QN: 8.16 KUA/I (ref 0–0.1)
WHITE ELM IGE QN: 5.81 KUA/I (ref 0–0.1)
WHITE MULBERRY IGE QN: 0.3 KUA/I (ref 0–0.1)
WHITE OAK IGE QN: 24.9 KUA/I (ref 0–0.1)

## 2025-06-24 PROCEDURE — 99213 OFFICE O/P EST LOW 20 MIN: CPT | Performed by: FAMILY MEDICINE

## 2025-06-24 PROCEDURE — G2211 COMPLEX E/M VISIT ADD ON: HCPCS | Performed by: FAMILY MEDICINE

## 2025-06-24 RX ORDER — FEXOFENADINE HCL 60 MG/1
60 TABLET, FILM COATED ORAL DAILY
Qty: 30 TABLET | Refills: 0 | Status: SHIPPED | OUTPATIENT
Start: 2025-06-24

## 2025-06-24 RX ORDER — FAMOTIDINE 20 MG/1
20 TABLET, FILM COATED ORAL 2 TIMES DAILY
Qty: 180 TABLET | Refills: 3 | Status: SHIPPED | OUTPATIENT
Start: 2025-06-24 | End: 2026-06-19

## 2025-06-24 RX ORDER — TIRZEPATIDE 2.5 MG/.5ML
2.5 INJECTION, SOLUTION SUBCUTANEOUS WEEKLY
Qty: 2 ML | Refills: 0 | Status: SHIPPED | OUTPATIENT
Start: 2025-06-24 | End: 2025-07-16

## 2025-06-24 NOTE — PROGRESS NOTES
Name: Margoth Cook      : 1990      MRN: [id number]  Encounter Provider: Radha Plata DO  Encounter Date: 2025   Encounter department: [facility]    :  Assessment & Plan  Allergy, sequela  Chronic - reviewed allergy panel with patient  She has not started antihistamine  Recommend starting allegra   Orders:    fexofenadine (ALLEGRA) 60 MG tablet; Take 1 tablet (60 mg total) by mouth daily    GARIMA (obstructive sleep apnea)  Chronic - diagnosed on sleep study in   Endorses daily compliance with CPAP       Gastroesophageal reflux disease with esophagitis without hemorrhage  Chronic - noting she has been trying to modify diet   Endorses poorly controlled symptoms on protonix 40 mg QD  Plan to start pepcid 20 mg BID  Orders:    famotidine (PEPCID) 20 mg tablet; Take 1 tablet (20 mg total) by mouth 2 (two) times a day    Morbid obesity with BMI of 50.0-59.9, adult (HCC)  Prior Authorization Clinical Questions for Weight Management Pharmacotherapy    1. Does the patient have a contrainidcation to medication prescribed for weight management?: No  2. Does the patient have a diagnosis of obesity, confirmed by a BMI greater than or equal to 30 kg/m^2?: Yes  3. Does the patient have a BMI of greater than or equal to 27 kg/m^2 with at least one weight-related comorbidity/risk factor/complication (e.g. diabetes, dyslipidemia, coronary artery disease)?: Yes  4. Weight-related co-morbidities/risk factors: prediabetes, polycystic ovary syndrome (PCOS), obstructive sleep apnea (GARIMA), GERD, depression  6. ZEPBOUND GARIMA Indication: Does patient have documented GAIRMA diagnosed via sleep study (insurance will require copy of sleep study results for approval)?: Yes  7. Has the patient been on a weight loss regimen of low-calorie diet, increased physical activity, and lifestyle modifications for a minimum of 6 months?: Yes  8. Has the patient completed a comprehensive weight loss  program (ie, Weight Watchers, Noom, Bariatrics, other olga on phone)? If so, what?: No  9. Does the patient have a history of type 2 diabetes?: No  10. Has the member tried and failed other weight loss medication within the past 12 months?: Yes   -- Q10 Further explanation: failed metformin, was unable to tolerate due to GI symptoms   11. Will the member use requested medication in combination with another GLP agonist or weight loss drug?: No  12. Is the medication a controlled substance?: No     Baseline weight (in pounds): 239 lbs  Current weight (in pounds): 239 lbs  Weight loss percentage: 0%       Chronic - BMI 55.55  Noting she has tried weight loss with diet and exercise but unsuccessful   Noting she has been following low glycemic index diet without weight loss  Obesity related complications include GARIMA, prediabetes, depression, GERD, dyslipidemia, PCOS  Previously has tried metformin but was unable to tolerate due to GI intolerance   Orlistat would be contrainidcated due to GI intolerance  Contrave contraindicated due to bipolar disorder  Plan to start zepbound in setting of obesity and GARIMA  Continue diet and exercise regimen    Orders:    Tirzepatide (Mounjaro) 2.5 MG/0.5ML SOAJ; Inject 2.5 mg under the skin once a week for 4 doses    Prediabetes  Chronic - A1c 5.8  Does not tolerate metformin   Rest of plan as above                 History of Present Illness     History of Present Illness             Presents for chronic condition f/u:   Noting abd pain improved   Reflux is still the same - can start pepcid 20 mg   Hgb stable   A1c prediabetic   Has not been taking any antihistamine - will start   Low iron and ferritin, pending f/u with hem-onc  Previously tried metformin but did not tolerate    Using CPAP at night -severe GARIMA   Has tried diet and exercise     Review of Systems   Constitutional:  Negative for fatigue and fever.   HENT: Negative.     Neurological: Negative.      Objective   /74 (BP  "Location: Left arm, Patient Position: Sitting, Cuff Size: Large)   Pulse 98   Temp 98 °F (36.7 °C) (Tympanic)   Resp 16   Ht 4' 7\" (1.397 m)   Wt 108 kg (239 lb)   LMP 05/26/2025 Comment: currently on day 9 of menses  SpO2 99%   BMI 55.55 kg/m²     Physical Exam            Physical Exam  Constitutional:       Appearance: She is obese.   HENT:      Head: Normocephalic and atraumatic.      Right Ear: External ear normal.      Left Ear: External ear normal.      Mouth/Throat:      Mouth: Mucous membranes are moist.     Cardiovascular:      Rate and Rhythm: Normal rate and regular rhythm.      Heart sounds: Normal heart sounds.   Pulmonary:      Breath sounds: Normal breath sounds.   Abdominal:      General: There is no distension.      Palpations: Abdomen is soft.      Tenderness: There is no abdominal tenderness.     Musculoskeletal:      Right lower leg: No edema.      Left lower leg: No edema.     Neurological:      Mental Status: She is alert.         "

## 2025-06-24 NOTE — ASSESSMENT & PLAN NOTE
Chronic - noting she has been trying to modify diet   Endorses poorly controlled symptoms on protonix 40 mg QD  Plan to start pepcid 20 mg BID  Orders:    famotidine (PEPCID) 20 mg tablet; Take 1 tablet (20 mg total) by mouth 2 (two) times a day

## 2025-06-25 ENCOUNTER — OFFICE VISIT (OUTPATIENT)
Dept: HEMATOLOGY ONCOLOGY | Facility: MEDICAL CENTER | Age: 35
End: 2025-06-25
Payer: MEDICARE

## 2025-06-25 ENCOUNTER — TELEPHONE (OUTPATIENT)
Dept: HEMATOLOGY ONCOLOGY | Facility: CLINIC | Age: 35
End: 2025-06-25

## 2025-06-25 VITALS
OXYGEN SATURATION: 97 % | TEMPERATURE: 98.5 F | DIASTOLIC BLOOD PRESSURE: 62 MMHG | HEIGHT: 55 IN | WEIGHT: 239 LBS | SYSTOLIC BLOOD PRESSURE: 122 MMHG | RESPIRATION RATE: 16 BRPM | BODY MASS INDEX: 55.31 KG/M2 | HEART RATE: 110 BPM

## 2025-06-25 DIAGNOSIS — D50.8 IRON DEFICIENCY ANEMIA SECONDARY TO INADEQUATE DIETARY IRON INTAKE: Primary | ICD-10-CM

## 2025-06-25 DIAGNOSIS — Z87.42 HISTORY OF IRREGULAR MENSTRUAL BLEEDING: ICD-10-CM

## 2025-06-25 PROCEDURE — 99213 OFFICE O/P EST LOW 20 MIN: CPT | Performed by: PHYSICIAN ASSISTANT

## 2025-06-25 RX ORDER — SODIUM CHLORIDE 9 MG/ML
20 INJECTION, SOLUTION INTRAVENOUS ONCE
OUTPATIENT
Start: 2025-07-02

## 2025-06-25 NOTE — ASSESSMENT & PLAN NOTE
35-year-old female with anemia.  Prior workup was consistent with iron deficiency anemia.  Patient has been treated with Feraheme in the past.     She had repeat lab work on 6/21/2025.  Hemoglobin was 11.2, hematocrit 35.7, MCV 85, ferritin 22, iron saturation 13%.     Patient will be treated with another course of Feraheme.     We rediscussed what to monitor for as far as progressive fatigue, decreased activities, chewing ice, restless legs, respiratory issues, dizziness etc.     Mrs. Rothmanyer to return in 6 months but this may change depending upon patient's symptoms.  Patient knows to call the hematology/oncology office if there are any other questions or concerns.  Orders:    Iron Panel (Includes Ferritin, Iron Sat%, Iron, and TIBC); Future    CBC and differential; Future    Comprehensive metabolic panel; Future

## 2025-06-25 NOTE — PROGRESS NOTES
Name: Margoth Cook      : 1990      MRN: [id number]  Encounter Provider: Tiara Lopez PA-C  Encounter Date: 2025   Encounter department: [facility]  :  Assessment & Plan  Iron deficiency anemia secondary to inadequate dietary iron intake  35-year-old female with anemia.  Prior workup was consistent with iron deficiency anemia.  Patient has been treated with Feraheme in the past.     She had repeat lab work on 2025.  Hemoglobin was 11.2, hematocrit 35.7, MCV 85, ferritin 22, iron saturation 13%.     Patient will be treated with another course of Feraheme.     We rediscussed what to monitor for as far as progressive fatigue, decreased activities, chewing ice, restless legs, respiratory issues, dizziness etc.     Mrs. Qiu to return in 6 months but this may change depending upon patient's symptoms.  Patient knows to call the hematology/oncology office if there are any other questions or concerns.  Orders:    Iron Panel (Includes Ferritin, Iron Sat%, Iron, and TIBC); Future    CBC and differential; Future    Comprehensive metabolic panel; Future    History of irregular menstrual bleeding  Likely the reason for iron deficiency.  Defer to gynecology for management.          History of Present Illness   Follow-up:    Pertinent Medical History   25: Margoth Cook is a 35 y.o. female previously referred for evaluation of anemia.  Mrs. Qiu has had iron deficiency anemia for a number of years, heavy menstrual periods. Patient returns for follow-up.     Patient underwent  on  of this year.  Her hemoglobin was between 8 and 9 g during her hospital stay.  She received Venofer while inpatient.     Currently she is taking oral iron 1 tablet daily.     Patient states feeling exhausted.  Patient has a son who is about 2 years old who is ventilator dependent. She has difficulty caring for him at home but recently began having  nursing help.    No significant/excessive bruising or bleeding.  She says her menstrual periods are lasting for about 1 week. She says her periods are heavy for the whole 7 days. She is changing a pad/tampon every 90 minutes or so.  No evidence of GI bleeding.     Review of Systems   Constitutional:  Positive for fatigue. Negative for appetite change, fever and unexpected weight change.   HENT:  Negative for nosebleeds.    Respiratory:  Negative for cough, choking and shortness of breath.         Negative hemoptysis.   Cardiovascular:  Negative for chest pain, palpitations and leg swelling.   Gastrointestinal: Negative.  Negative for abdominal distention, abdominal pain, anal bleeding, blood in stool, constipation, diarrhea, nausea and vomiting.   Endocrine: Negative.  Negative for cold intolerance.   Genitourinary:  Positive for menstrual problem. Negative for hematuria, vaginal bleeding, vaginal discharge and vaginal pain.   Musculoskeletal: Negative.  Negative for arthralgias, myalgias, neck pain and neck stiffness.   Skin: Negative.  Negative for color change, pallor and rash.   Allergic/Immunologic: Negative.  Negative for immunocompromised state.   Neurological: Negative.  Negative for weakness and headaches.   Hematological:  Negative for adenopathy. Does not bruise/bleed easily.   All other systems reviewed and are negative.      Objective   Vitals:    06/25/25 1027   BP: 122/62   Pulse: (!) 110   Resp: 16   Temp: 98.5 °F (36.9 °C)   SpO2: 97%     Physical Exam  Constitutional:       General: She is not in acute distress.     Appearance: She is well-developed. She is obese.   HENT:      Head: Normocephalic and atraumatic.     Eyes:      General: No scleral icterus.     Conjunctiva/sclera: Conjunctivae normal.       Cardiovascular:      Rate and Rhythm: Normal rate and regular rhythm.   Pulmonary:      Effort: Pulmonary effort is normal. No respiratory distress.      Breath sounds: Normal breath sounds.    Abdominal:      General: There is no distension.      Palpations: Abdomen is soft.      Tenderness: There is no abdominal tenderness.     Musculoskeletal:      Right lower leg: No edema.      Left lower leg: No edema.     Skin:     General: Skin is warm.      Coloration: Skin is not pale.      Findings: No rash.     Neurological:      Mental Status: She is alert and oriented to person, place, and time.     Psychiatric:         Mood and Affect: Mood normal.         Thought Content: Thought content normal.         Judgment: Judgment normal.     Labs: I have reviewed the following labs:  Results for orders placed or performed in visit on 06/21/25   CBC and differential   Result Value Ref Range    WBC 6.44 4.31 - 10.16 Thousand/uL    RBC 4.18 3.81 - 5.12 Million/uL    Hemoglobin 11.2 (L) 11.5 - 15.4 g/dL    Hematocrit 35.7 34.8 - 46.1 %    MCV 85 82 - 98 fL    MCH 26.8 26.8 - 34.3 pg    MCHC 31.4 31.4 - 37.4 g/dL    RDW 14.6 11.6 - 15.1 %    MPV 9.0 8.9 - 12.7 fL    Platelets 360 149 - 390 Thousands/uL    nRBC 0 /100 WBCs    Segmented % 71 43 - 75 %    Immature Grans % 0 0 - 2 %    Lymphocytes % 21 14 - 44 %    Monocytes % 5 4 - 12 %    Eosinophils Relative 2 0 - 6 %    Basophils Relative 1 0 - 1 %    Absolute Neutrophils 4.53 1.85 - 7.62 Thousands/µL    Absolute Immature Grans 0.02 0.00 - 0.20 Thousand/uL    Absolute Lymphocytes 1.32 0.60 - 4.47 Thousands/µL    Absolute Monocytes 0.35 0.17 - 1.22 Thousand/µL    Eosinophils Absolute 0.15 0.00 - 0.61 Thousand/µL    Basophils Absolute 0.07 0.00 - 0.10 Thousands/µL   Comprehensive metabolic panel   Result Value Ref Range    Sodium 134 (L) 135 - 147 mmol/L    Potassium 3.7 3.5 - 5.3 mmol/L    Chloride 101 96 - 108 mmol/L    CO2 22 21 - 32 mmol/L    ANION GAP 11 4 - 13 mmol/L    BUN 13 5 - 25 mg/dL    Creatinine 0.49 (L) 0.60 - 1.30 mg/dL    Glucose 88 65 - 140 mg/dL    Calcium 9.0 8.4 - 10.2 mg/dL    AST 12 (L) 13 - 39 U/L    ALT 15 7 - 52 U/L    Alkaline Phosphatase 109  (H) 34 - 104 U/L    Total Protein 7.2 6.4 - 8.4 g/dL    Albumin 3.9 3.5 - 5.0 g/dL    Total Bilirubin 0.24 0.20 - 1.00 mg/dL    eGFR 127 ml/min/1.73sq m   HEMOGLOBIN A1C W/ EAG ESTIMATION   Result Value Ref Range    Hemoglobin A1C 5.8 (H) Normal 4.0-5.6%; PreDiabetic 5.7-6.4%; Diabetic >=6.5%; Glycemic control for adults with diabetes <7.0% %     mg/dl   [facility] Allergy Panel, Adult   Result Value Ref Range    A.ALTERNATA <0.10 0.00 - 0.09 kUA/I    A.FUMIGATUS <0.10 0.00 - 0.09 kUA/I    [place] Grass 6.14 (H) 0.00 - 0.09 kUA/I    [place]  4.64 (H) 0.00 - 0.09 kUA/I    Cat Epithellium-Dander 0.34 (H) 0.00 - 0.09 kUA/I    C.HERBARUM <0.10 0.00 - 0.09 kUA/I    Cockroach <0.10 0.00 - 0.09 kUA/I    Common Silver Birch 26.30 (H) 0.00 - 0.09 kUA/I    [place] 3.79 (H) 0.00 - 0.09 kUA/I    D. farinae 1.76 (H) 0.00 - 0.09 kUA/I    D. pteronyssinus 1.81 (H) 0.00 - 0.09 kUA/I    Dog Dander 0.31 (H) 0.00 - 0.09 kUA/I    Elm IgE 5.81 (H) 0.00 - 0.09 kUA/I    Mountain [place] Tree 0.99 (H) 0.00 - 0.09 kUA/I    Mugwort 0.97 (H) 0.00 - 0.09 kUA/I    [place] Tree 0.30 (H) 0.00 - 0.09 kUA/I    Oak 24.90 (H) 0.00 - 0.09 kUA/I    P.CHRYSOGENUM <0.10 0.00 - 0.09 kUA/I    Rough Pigweed  IgE 2.12 (H) 0.00 - 0.09 kUA/I    Common Ragweed 3.77 (H) 0.00 - 0.09 kUA/I    Sheep Sorrel IgE 6.01 (H) 0.00 - 0.09 kUA/I    [place] Tree 4.99 (H) 0.00 - 0.09 kUA/I    [place] Grass 4.08 (H) 0.00 - 0.09 kUA/I    [place] Tree 6.15 (H) 0.00 - 0.09 kUA/I    White Yuriy Tree 8.16 (H) 0.00 - 0.09 kUA/I    IgE 132 (H) 0 - 113 kU/l    MOUSE URINE <0.10 0.00 - 0.09 kUA/I   TIBC Panel (incl. Iron, TIBC, % Iron Saturation)   Result Value Ref Range    Iron Saturation 13 (L) 15 - 50 %    TIBC 387.8 250 - 450 ug/dL    Iron 51 50 - 212 ug/dL    Transferrin 277 203 - 362 mg/dL    UIBC 337 155 - 355 ug/dL   Result Value Ref Range    Ferritin 22 (L) 30 - 307 ng/mL     *Note: Due to a large number of results and/or encounters for the requested time period, some  results have not been displayed. A complete set of results can be found in Results Review.

## 2025-06-27 ENCOUNTER — TELEPHONE (OUTPATIENT)
Age: 35
End: 2025-06-27

## 2025-06-27 DIAGNOSIS — G47.33 OSA (OBSTRUCTIVE SLEEP APNEA): Primary | Chronic | ICD-10-CM

## 2025-06-27 NOTE — TELEPHONE ENCOUNTER
[facility]   Scanned into encounter and placed in PCP folder  Fax: [phone]

## 2025-06-30 ENCOUNTER — PATIENT MESSAGE (OUTPATIENT)
Dept: FAMILY MEDICINE CLINIC | Facility: CLINIC | Age: 35
End: 2025-06-30

## 2025-06-30 ENCOUNTER — TELEPHONE (OUTPATIENT)
Age: 35
End: 2025-06-30

## 2025-06-30 DIAGNOSIS — E66.01 MORBID OBESITY (HCC): Primary | Chronic | ICD-10-CM

## 2025-06-30 RX ORDER — TIRZEPATIDE 2.5 MG/.5ML
2.5 INJECTION, SOLUTION SUBCUTANEOUS WEEKLY
Qty: 2 ML | Refills: 0 | Status: SHIPPED | OUTPATIENT
Start: 2025-06-30 | End: 2025-07-28

## 2025-06-30 NOTE — TELEPHONE ENCOUNTER
Received paperwork for mounjaro to be approved, the paperwork is in your folder for completion.  I attached the office visit.  It looks as though we received this request around 3:00 on Friday the 27th and they gave us a return by - 4:00 on 6/28.  Maybe you can call?

## 2025-07-02 ENCOUNTER — HOSPITAL ENCOUNTER (OUTPATIENT)
Dept: INFUSION CENTER | Facility: HOSPITAL | Age: 35
Discharge: HOME | End: 2025-07-02
Attending: INTERNAL MEDICINE
Payer: MEDICARE

## 2025-07-02 VITALS
SYSTOLIC BLOOD PRESSURE: 134 MMHG | HEART RATE: 94 BPM | TEMPERATURE: 97.8 F | RESPIRATION RATE: 20 BRPM | DIASTOLIC BLOOD PRESSURE: 80 MMHG | OXYGEN SATURATION: 98 %

## 2025-07-02 DIAGNOSIS — D50.8 IRON DEFICIENCY ANEMIA SECONDARY TO INADEQUATE DIETARY IRON INTAKE: Primary | ICD-10-CM

## 2025-07-02 RX ORDER — SODIUM CHLORIDE 9 MG/ML
20 INJECTION, SOLUTION INTRAVENOUS ONCE
Status: COMPLETED | OUTPATIENT
Start: 2025-07-02 | End: 2025-07-02

## 2025-07-02 RX ORDER — SODIUM CHLORIDE 9 MG/ML
20 INJECTION, SOLUTION INTRAVENOUS ONCE
Status: CANCELLED | OUTPATIENT
Start: 2025-07-09

## 2025-07-02 RX ADMIN — FERUMOXYTOL 510 MG: 510 INJECTION INTRAVENOUS at 09:54

## 2025-07-02 RX ADMIN — SODIUM CHLORIDE 20 ML/HR: 9 INJECTION, SOLUTION INTRAVENOUS at 09:54

## 2025-07-02 NOTE — PROGRESS NOTES
Margoth Cook  tolerated treatment well with no complications.      Margoth Cook is aware of future appt on 7/6 @ 8416.    AVS printed and given to Margoth Cook: No (Declined by Margoth Cook)

## 2025-07-07 NOTE — PATIENT COMMUNICATION
Forwarding to the office, we do not handle [facility]'s [facility].    Sorry for routing it directly to you, I cannot find your office in [facility].

## 2025-07-09 ENCOUNTER — HOSPITAL ENCOUNTER (OUTPATIENT)
Dept: INFUSION CENTER | Facility: HOSPITAL | Age: 35
Discharge: HOME | End: 2025-07-09
Attending: INTERNAL MEDICINE
Payer: MEDICARE

## 2025-07-09 VITALS
RESPIRATION RATE: 18 BRPM | TEMPERATURE: 97.3 F | SYSTOLIC BLOOD PRESSURE: 121 MMHG | OXYGEN SATURATION: 97 % | HEART RATE: 92 BPM | DIASTOLIC BLOOD PRESSURE: 59 MMHG

## 2025-07-09 DIAGNOSIS — D50.8 IRON DEFICIENCY ANEMIA SECONDARY TO INADEQUATE DIETARY IRON INTAKE: Primary | ICD-10-CM

## 2025-07-09 PROCEDURE — 96365 THER/PROPH/DIAG IV INF INIT: CPT

## 2025-07-09 RX ORDER — SODIUM CHLORIDE 9 MG/ML
20 INJECTION, SOLUTION INTRAVENOUS ONCE
Status: CANCELLED | OUTPATIENT
Start: 2025-07-09

## 2025-07-09 RX ORDER — SODIUM CHLORIDE 9 MG/ML
20 INJECTION, SOLUTION INTRAVENOUS ONCE
Status: COMPLETED | OUTPATIENT
Start: 2025-07-09 | End: 2025-07-09

## 2025-07-09 RX ADMIN — SODIUM CHLORIDE 20 ML/HR: 0.9 INJECTION, SOLUTION INTRAVENOUS at 12:21

## 2025-07-09 RX ADMIN — FERUMOXYTOL 510 MG: 510 INJECTION INTRAVENOUS at 12:20

## 2025-07-09 NOTE — PROGRESS NOTES
Margoth Cook  tolerated treatment well with no complications.  She has completed her ordered course of infusions.     Margoth Cook she is aware to follow up with her ordering provider.    AVS printed and given to Margoth Cook:  No (Declined by Margoth Cook)

## 2025-07-17 DIAGNOSIS — D50.8 IRON DEFICIENCY ANEMIA SECONDARY TO INADEQUATE DIETARY IRON INTAKE: ICD-10-CM

## 2025-07-18 DIAGNOSIS — J20.9 ACUTE BRONCHITIS, UNSPECIFIED ORGANISM: ICD-10-CM

## 2025-07-18 RX ORDER — ALBUTEROL SULFATE 90 UG/1
2 INHALANT RESPIRATORY (INHALATION) EVERY 6 HOURS PRN
Qty: 18 G | Refills: 3 | Status: SHIPPED | OUTPATIENT
Start: 2025-07-18

## 2025-07-18 RX ORDER — FERROUS SULFATE 324(65)MG
324 TABLET, DELAYED RELEASE (ENTERIC COATED) ORAL
Qty: 60 TABLET | Refills: 2 | Status: SHIPPED | OUTPATIENT
Start: 2025-07-18

## 2025-07-23 ENCOUNTER — OFFICE VISIT (OUTPATIENT)
Dept: BARIATRICS | Facility: CLINIC | Age: 35
End: 2025-07-23
Payer: MEDICARE

## 2025-07-23 VITALS
SYSTOLIC BLOOD PRESSURE: 108 MMHG | HEIGHT: 56 IN | WEIGHT: 243.4 LBS | DIASTOLIC BLOOD PRESSURE: 60 MMHG | HEART RATE: 81 BPM | BODY MASS INDEX: 54.75 KG/M2

## 2025-07-23 DIAGNOSIS — G47.33 OSA (OBSTRUCTIVE SLEEP APNEA): Chronic | ICD-10-CM

## 2025-07-23 DIAGNOSIS — E66.813 CLASS 3 SEVERE OBESITY DUE TO EXCESS CALORIES WITH SERIOUS COMORBIDITY AND BODY MASS INDEX (BMI) OF 50.0 TO 59.9 IN ADULT: Primary | ICD-10-CM

## 2025-07-23 DIAGNOSIS — R73.03 PREDIABETES: ICD-10-CM

## 2025-07-23 DIAGNOSIS — E28.2 PCOS (POLYCYSTIC OVARIAN SYNDROME): ICD-10-CM

## 2025-07-23 DIAGNOSIS — G47.30 SEVERE SLEEP APNEA: ICD-10-CM

## 2025-07-23 PROCEDURE — G2211 COMPLEX E/M VISIT ADD ON: HCPCS | Performed by: NURSE PRACTITIONER

## 2025-07-23 PROCEDURE — 99204 OFFICE O/P NEW MOD 45 MIN: CPT | Performed by: NURSE PRACTITIONER

## 2025-07-23 RX ORDER — TIRZEPATIDE 2.5 MG/.5ML
2.5 INJECTION, SOLUTION SUBCUTANEOUS WEEKLY
Qty: 2 ML | Refills: 0 | Status: SHIPPED | OUTPATIENT
Start: 2025-07-23 | End: 2025-08-20

## 2025-07-23 NOTE — ASSESSMENT & PLAN NOTE
Patient with severe sleep apnea and uses CPAP nightly  Would likely benefit from Zepbound treatment as well

## 2025-07-23 NOTE — ASSESSMENT & PLAN NOTE
- Discussed options of HealthyCORE-Intensive Lifestyle Intervention Program, Very Low Calorie Diet-VLCD, Conservative Program, Alex-En-Y Gastric Bypass, and Vertical Sleeve Gastrectomy and the role of weight loss medications.  - Patient is interested in pursuing Conservative Program and follow up visits with medical weight management provider.  - Explained the importance of making lifestyle changes in addition to starting any anti-obesity medications.   - Initial weight loss goal of 5-10% weight loss for improved health. Weight loss can improve patient's co-morbid conditions and/or prevent weight-related complications.  - Weight is not at goal and patient has been unable to achieve a meaningful weight loss above 5% using various programs and tools for more than 6 months  - Labs reviewed from 6/2025    General Recommendations:  Nutrition:  Eat breakfast daily.  Do not skip meals.      Food log (ie.) www.FirstJob.com, sparkpeople.com, loseit.com, calorieking.com, etc.     Practice mindful eating.  Be sure to set aside time to eat, eat slowly, and savor your food.     Hydration:    At least 64oz of water daily.  No sugar sweetened beverages.  No juice (eat the fruit instead).     Exercise:  Studies have shown that the ideal exercise goal is somewhere between 150 to 300 minutes of moderate intensity exercise a week.  Start with exercising 10 minutes every other day and gradually increase physical activity with a goal of at least 150 minutes of moderate intensity exercise a week, divided over at least 3 days a week.  An example of this would be exercising 30 minutes a day, 5 days a week.  Resistance training can increase muscle mass and increase our resting metabolic rate.   FULL BODY resistance training is recommended 2-3 times a week.  Do not do this on consecutive days to allow for muscle recovery.     Aim for a bare minimum 5000 steps, even on days you do not exercise.     Monitoring:   Weigh yourself daily.  If  this causes undue stress, then just weigh yourself once a week.  Weigh yourself the same time of the day with the same amount of clothing on.  Preferably this should be done after waking up, before you eat, and with no clothing or minimal clothing on.     Specific Goals:  Calorie goal:  3691-0637 belinda/day (Provided with meal plan to follow)   Patient lifestyle habits were reviewed and barriers to weight loss were addressed today.  Nutrition was discussed and patient was encouraged to start to incorporate more protein throughout the day and attempt to more evenly distribute her calories.  She was encouraged to eat smaller more frequent meals.  She was also encouraged to start to reduce her soda intake and cut back to 1 bottle of soda per day for the next couple weeks and then reduce to 1 can of soda per day.  She was encouraged to replace her soda intake with water.  Medications were discussed:  Phentermine and bupropion are contraindicated due to patients seizure history  Topiramate reviewed but would need knee clearance through neurologist due to her current treatment for seizures  Metformin may be considered to help with insulin resistance  Zepbound was discussed to help with sleep apnea as well as weight and patient was in agreement to try this medication if covered by her insurance    Zepbound Instructions:    - Begin Zepbound 2.5 mg subcutaneously once a week. Dose changes may occur after 4 doses if medication is tolerated. You will be assessed prior to each dose change to make sure you are tolerating the medication well.  - Please message me when you have 2 pens left from the prescription so there are no lapses in treatment.  - If you have been off the medication for more than 14 days please contact the office as you will need to restart the titration at the starting dose again to avoid significant side effects or adverse events.  - Visit Zepbound.com for further information/injection instructions.   -Please  eat small frequent meals to help reduce nausea. Lemon water and saltine crackers may help with this.   - Side effects of Zepbound discussed: nausea, vomiting, diarrhea, and constipation. If you experience fever, nausea/vomiting, and pain radiating to your back this may be a sign of pancreatitis. Please go to the emergency room if this occurs.  - If on oral birth control a 2nd method of birth control is recommended during the 1st 8 weeks of therapy and for 4 weeks after any dosage change.   - Patient understands the side effects of the medication and proper administration. Patient agrees with the treatment plan and all questions were answered.  -Supply concerns were discussed with patient and patient voiced understanding that they will need to call with adequate time for refills to avoid any lapses in treatment.  Patient may also have to call around to different pharmacies to see if any pharmacy has the appropriate dose in stock.  - Pen demonstrated in the office today    Bariatric surgery was reviewed and patient may consider this in the future but due to her caregiver status with her son she is unable to commit to the recovery time.  Will continue to reevaluate at future visits.

## 2025-07-23 NOTE — PROGRESS NOTES
Assessment/Plan:    Class 3 severe obesity due to excess calories with serious comorbidity and body mass index (BMI) of 50.0 to 59.9 in adult  - Discussed options of [facility]-Intensive Lifestyle Intervention Program, Very Low Calorie Diet-VLCD, Conservative Program, Alex-En-Y Gastric Bypass, and Vertical Sleeve Gastrectomy and the role of weight loss medications.  - Patient is interested in pursuing Conservative Program and follow up visits with medical weight management provider.  - Explained the importance of making lifestyle changes in addition to starting any anti-obesity medications.   - Initial weight loss goal of 5-10% weight loss for improved health. Weight loss can improve patient's co-morbid conditions and/or prevent weight-related complications.  - Weight is not at goal and patient has been unable to achieve a meaningful weight loss above 5% using various programs and tools for more than 6 months  - Labs reviewed from 6/2025    General Recommendations:  Nutrition:  Eat breakfast daily.  Do not skip meals.      Food log (ie.) www.[employer].com, sparkpeople.com, loseit.com, calorieking.com, etc.     Practice mindful eating.  Be sure to set aside time to eat, eat slowly, and savor your food.     Hydration:    At least 64oz of water daily.  No sugar sweetened beverages.  No juice (eat the fruit instead).     Exercise:  Studies have shown that the ideal exercise goal is somewhere between 150 to 300 minutes of moderate intensity exercise a week.  Start with exercising 10 minutes every other day and gradually increase physical activity with a goal of at least 150 minutes of moderate intensity exercise a week, divided over at least 3 days a week.  An example of this would be exercising 30 minutes a day, 5 days a week.  Resistance training can increase muscle mass and increase our resting metabolic rate.   FULL BODY resistance training is recommended 2-3 times a week.  Do not do this on consecutive days to  allow for muscle recovery.     Aim for a bare minimum 5000 steps, even on days you do not exercise.     Monitoring:   Weigh yourself daily.  If this causes undue stress, then just weigh yourself once a week.  Weigh yourself the same time of the day with the same amount of clothing on.  Preferably this should be done after waking up, before you eat, and with no clothing or minimal clothing on.     Specific Goals:  Calorie goal:  5187-8251 belinda/day (Provided with meal plan to follow)   Patient lifestyle habits were reviewed and barriers to weight loss were addressed today.  Nutrition was discussed and patient was encouraged to start to incorporate more protein throughout the day and attempt to more evenly distribute her calories.  She was encouraged to eat smaller more frequent meals.  She was also encouraged to start to reduce her soda intake and cut back to 1 bottle of soda per day for the next couple weeks and then reduce to 1 can of soda per day.  She was encouraged to replace her soda intake with water.  Medications were discussed:  Phentermine and bupropion are contraindicated due to patients seizure history  Topiramate reviewed but would need knee clearance through neurologist due to her current treatment for seizures  Metformin may be considered to help with insulin resistance  Zepbound was discussed to help with sleep apnea as well as weight and patient was in agreement to try this medication if covered by her insurance    Zepbound Instructions:    - Begin Zepbound 2.5 mg subcutaneously once a week. Dose changes may occur after 4 doses if medication is tolerated. You will be assessed prior to each dose change to make sure you are tolerating the medication well.  - Please message me when you have 2 pens left from the prescription so there are no lapses in treatment.  - If you have been off the medication for more than 14 days please contact the office as you will need to restart the titration at the starting  dose again to avoid significant side effects or adverse events.  - Visit Zepbound.com for further information/injection instructions.   -Please eat small frequent meals to help reduce nausea. Lemon water and saltine crackers may help with this.   - Side effects of Zepbound discussed: nausea, vomiting, diarrhea, and constipation. If you experience fever, nausea/vomiting, and pain radiating to your back this may be a sign of pancreatitis. Please go to the emergency room if this occurs.  - If on oral birth control a 2nd method of birth control is recommended during the 1st 8 weeks of therapy and for 4 weeks after any dosage change.   - Patient understands the side effects of the medication and proper administration. Patient agrees with the treatment plan and all questions were answered.  -Supply concerns were discussed with patient and patient voiced understanding that they will need to call with adequate time for refills to avoid any lapses in treatment.  Patient may also have to call around to different pharmacies to see if any pharmacy has the appropriate dose in stock.  - Pen demonstrated in the office today    Bariatric surgery was reviewed and patient may consider this in the future but due to her caregiver status with her son she is unable to commit to the recovery time.  Will continue to reevaluate at future visits.    Prediabetes  Latest A1c 5.8  May consider metformin if Zepbound not covered    PCOS (polycystic ovarian syndrome)  Will likely benefit from GLP-1 medication and/or metformin    GARIMA (obstructive sleep apnea)  Patient with severe sleep apnea and uses CPAP nightly  Would likely benefit from Zepbound treatment as well         Margoth was seen today for consult.    Diagnoses and all orders for this visit:    Class 3 severe obesity due to excess calories with serious comorbidity and body mass index (BMI) of 50.0 to 59.9 in adult  -     Ambulatory Referral to Weight Management    Severe sleep apnea  -    "  tirzepatide (Zepbound) 2.5 mg/0.5 mL auto-injector; Inject 0.5 mL (2.5 mg total) under the skin once a week for 28 days    Prediabetes    PCOS (polycystic ovarian syndrome)    GARIMA (obstructive sleep apnea)       Medication consent was reviewed today and all questions were answered.  Patient agreed with all bulleted points.  Consent was signed, scanned into chart and patient was provided with a copy for their records.    Patient denies personal history of pancreatitis. Patient also denies personal and family history of medullary thyroid cancer and multiple endocrine neoplasia type 2 (MEN 2 tumor). Patient denies any history of kidney stones, seizures, or glaucoma.  Patient with history of salpingectomy.      Total time spent reviewing chart, interviewing patient, examining patient, discussing plan, answering all questions, and documentin min, with >50% face-to-face time spent counseling patient on nonsurgical interventions for the treatment of excess weight. Discussed in detail nonsurgical options including intensive lifestyle intervention program, very low-calorie diet program and conservative program.  Discussed the role of weight loss medications.  Counseled patient on diet behavior and exercise modification for weight loss.    Follow up in approximately 3 months with Non-Surgical Physician/Advanced Practitioner.    Subjective:   Chief Complaint   Patient presents with    Consult     Pt is here for MWM consult. Waist - 53.5\"       Patient ID: Margoth Cook  is a 35 y.o. female with excess weight/obesity here to pursue weight management.  Previous notes and records have been reviewed.    Past Medical History[1]  Past Surgical History[2]    HPI:  Wt Readings from Last 20 Encounters:   25 110 kg (243 lb 6.4 oz)   25 108 kg (239 lb)   25 108 kg (239 lb)   25 108 kg (238 lb)   25 107 kg (236 lb 9.6 oz)   25 107 kg (236 lb)   25 108 kg (237 lb)   25 108 kg (237 " lb)   03/09/25 110 kg (243 lb)   02/07/25 106 kg (233 lb)   02/03/25 106 kg (233 lb)   01/09/25 104 kg (229 lb 3.2 oz)   12/24/24 104 kg (229 lb)   10/03/24 99.7 kg (219 lb 12.8 oz)   09/19/24 101 kg (223 lb)   09/17/24 99.8 kg (220 lb 1.6 oz)   09/12/24 108 kg (237 lb)   09/03/24 112 kg (247 lb 9.2 oz)   08/26/24 108 kg (237 lb 9.6 oz)   08/22/24 108 kg (237 lb)       Patient presents today to medical weight management office for consult.  Patient has been struggling with her weight since childhood.  She has tried different programs including weight watchers in the past with no success.  She did struggle with disordered eating including purging but has not had any episodes of purging for more than 2 years.  Patient had previously pursued bariatric surgery as well as medical weight management but ended up getting pregnant at the time.  She has 2 sons under the age of 3.  Her 1 son is medically complex with a trach and a feeding tube.  Her other son is 10 months old and may have some developmental delays.  She denies having much support in the area and is raising both children on her own.  She is hopeful to get her health under control as well but is not sure she can commit to bariatric surgery at this time as she needs to lift and move her children.  Patient states everyone in her family has weight concerns.  She has been diagnosed with severe sleep apnea and uses a CPAP machine nightly.  She also has PCOS, hypothyroidism, [place]'s esophagus and hiatal hernia.    Patient was attempting to get on Zepbound through her PCP but was denied.  Her current eating habits consist of convenience foods and daily soda.  She is willing to make changes to get her health on track.  She states she does not tolerate fried food or chicken.        Starting MWM weight: 243.4 lbs  Starting BMI: 54.57  Starting Waist Measurement: 53.5 in  Goal weight: under 200 lbs to start    Obesity/Excess Weight:  Severity: Very Severe  Onset:   "lifelong    Modifiers: Diet and Exercise and Commercial Weight Loss Programs-ie. Weight Watchers, Wedding Reality, Nutrisystem, etc.  Contributing factors: Poor Food Choices, Insufficient Physical Activity, Stress/Emotional Eating, Binge Eating, Lack of knowledge of appropriate lifestyle changes, and Insufficient time to make appropriate lifestyle changes  Associated symptoms: comorbid conditions, fatigue, increased joint pain, decreased exercise capacity, body image issues, decreased self esteem, increased shortness of breath, decreased mobility, depression, inability to do certain activities, and clothes do not fit    Diet recall:  B: skips  L: 12pm-2pm - fast food OR grab in the house (grilled cheese OR fajitas OR pot roast) OR skips  S: no  D: pot roast OR fajitas  S: no  Take out frequency: daily    Hydration: 2 - 20oz bottles coke, 1 bottle water  Alcohol: no  Smoking: no  Exercise: no  Occupation: no - disability  Sleep: \"terrible\" - complex sleep   STOP bang: has GARIMA - CPAP      The following portions of the patient's history were reviewed and updated as appropriate: allergies, current medications, past family history, past medical history, past social history, past surgical history, and problem list.    Family History[3]     Review of Systems   Constitutional:  Positive for fatigue.   HENT:  Negative for sore throat.    Respiratory:  Negative for cough and shortness of breath.    Cardiovascular:  Negative for chest pain, palpitations and leg swelling.   Gastrointestinal:  Positive for abdominal pain and nausea. Negative for constipation and diarrhea.   Genitourinary:  Negative for dysuria.   Musculoskeletal:  Negative for arthralgias and back pain.   Skin:  Negative for rash.   Neurological:  Positive for headaches.   Psychiatric/Behavioral:  Positive for dysphoric mood. The patient is nervous/anxious.        Objective:  /60 (Patient Position: Sitting, Cuff Size: Large)   Pulse 81   Ht 4' 8\" (1.422 m)  "  Wt 110 kg (243 lb 6.4 oz)   LMP 07/01/2025 (Exact Date)   BMI 54.57 kg/m²     Physical Exam  Vitals and nursing note reviewed.   Constitutional:       Appearance: Normal appearance. She is obese.   HENT:      Head: Normocephalic.   Pulmonary:      Effort: Pulmonary effort is normal.     Neurological:      General: No focal deficit present.      Mental Status: She is alert and oriented to person, place, and time.     Psychiatric:         Mood and Affect: Mood normal.         Behavior: Behavior normal.         Thought Content: Thought content normal.         Judgment: Judgment normal.              Labs and Imaging  Recent labs and imaging have been personally reviewed.  Lab Results   Component Value Date    WBC 6.44 06/21/2025    HGB 11.2 (L) 06/21/2025    HCT 35.7 06/21/2025    MCV 85 06/21/2025     06/21/2025     Lab Results   Component Value Date    SODIUM 134 (L) 06/21/2025    K 3.7 06/21/2025     06/21/2025    CO2 22 06/21/2025    AGAP 11 06/21/2025    BUN 13 06/21/2025    CREATININE 0.49 (L) 06/21/2025    GLUC 88 06/21/2025    GLUF 83 04/04/2025    CALCIUM 9.0 06/21/2025    AST 12 (L) 06/21/2025    ALT 15 06/21/2025    ALKPHOS 109 (H) 06/21/2025    TP 7.2 06/21/2025    TBILI 0.24 06/21/2025    EGFR 127 06/21/2025     Lab Results   Component Value Date    HGBA1C 5.8 (H) 06/21/2025     Lab Results   Component Value Date    DPJ7XSNGOMMJ 2.491 07/30/2024     Lab Results   Component Value Date    CHOLESTEROL 166 04/04/2025     Lab Results   Component Value Date    HDL 47 (L) 04/04/2025     Lab Results   Component Value Date    TRIG 144 04/04/2025     Lab Results   Component Value Date    LDLCALC 90 04/04/2025          [1]   Past Medical History:  Diagnosis Date    37 weeks gestation of pregnancy 07/19/2024    Abnormal Pap smear of cervix     Anemia     Anxiety     Asthma     Asthma during pregnancy 08/20/2024    Continue albuterol inhaler - has not needed      Autism     [place] esophagus     Bipolar  "disorder (HCC)     CPAP (continuous positive airway pressure) dependence     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HCC)     not diagnosed as of 23-trying to R/O    Depression     Developmental delay     Diabetes mellitus (HCC)     Difficult intubation     during emergency . No trouble prio with other surgeries    Disease of thyroid gland     hypo    Dysphagia     solids and liquids    Female infertility     Fibromyalgia, primary     Gastric ulcer     GERD (gastroesophageal reflux disease)     H/O Cervical cerclage suture present in third trimester 2024    History indicated cerclage placed at [place]  Cerclage removed today in office      History of bronchitis     History of  delivery, currently pregnant 2024     at 22 weeks  Son has a ATOH1 gene      History of stillbirth in currently pregnant patient, unspecified trimester 2023    Late to care in G1 pregnancy - 28w demise due to \"fluid around the heart/trisomy\"      Hypothyroid in pregnancy, antepartum 10/24/2022    Continue Levothyroxine      Hypothyroidism     Iron deficiency anemia     infusion in 2022    Irregular menses     Marijuana use     Patient reported she quit 2023    Miscarriage     Morbid obesity with BMI of 50.0-59.9, adult (HCC)     Plantar fasciitis of left foot     Polycystic ovary syndrome     Previous child with congenital anomaly, currently pregnant, antepartum 2023    G1 - Trisomy? Heart defect? Fetal demise  G4 - 22 week delivery with chronic health concerns due to prematurity; Also ATOH1 gene mutation (very rare)  CFDNA WNL         Reflux esophagitis     Rh negative state in antepartum period 2023    Rhogam administered 24  Will need again postpartum      S/P emergency  section 2024    Seizures ([facility])     last one 23    Sleep apnea     CPAP    Varicella     had disease    Vision loss     Wears glasses    [2]   Past Surgical History:  Procedure Laterality Date    " COLONOSCOPY      EGD      with Bx due to barretts esophagus    EYE SURGERY Bilateral     lazy eye repair    WA BIOPSY OF LIP N/A 11/10/2022    Procedure: EXCISION BIOPSY SALVARY GLANDS LOWER LIP;  Surgeon: Casey Florez MD;  [facility]: [facility];  Service: ENT    WA CERCLAGE UTERINE CERVIX NONOBSTETRICAL N/A 2023    Procedure: CERCLAGE CERVICAL;  Surgeon: Ant Marinelli MD;  [facility];  Service: Obstetrics    WA  DELIVERY ONLY N/A 2024    Procedure:  SECTION ();  Surgeon: Yany Juan MD;  [facility];  Service: Obstetrics    WA HYSTEROSCOPY BX ENDOMETRIUM&/POLYPC W/WO D&C N/A 2021    Procedure: DILATATION AND CURETTAGE (D&C) WITH HYSTEROSCOPY;  Surgeon: Albina Parsons MD;  [facility];  Service: Gynecology    WA LAPAROSCOPY W/RMVL ADNEXAL STRUCTURES Bilateral 2025    Procedure: LAPAROSCOPIC BILATERAL SALPINGECTOMY, EXAM UNDER ANESTHESIA;  Surgeon: Katrina Mills MD;  [facility];  Service: Gynecology    UPPER GASTROINTESTINAL ENDOSCOPY      WISDOM TOOTH EXTRACTION     [3]   Family History  Problem Relation Name Age of Onset    Bipolar disorder Mother Elisa Harvey     Depression Mother Elisa Harvey     Mental illness Mother Elisa Harvey     Arthritis Mother Elisa Harvey     Depression Father John Martino Joey     Mental illness Father John Martino Joey     Arthritis Father John Martino Joey     Asthma Father John Martino Joey     Migraines Father John Martino Joey     Other Son      Diabetes Maternal Grandmother Cynthia     Thyroid disease Maternal Grandmother Cynthia     Hypertension Maternal Grandmother Cynthia     Heart disease Maternal Grandmother Cynthia     Bone cancer Maternal Grandmother Cynthia         Not Cynthia, but my great grandma Bety and her sister Flores  of leukemia    Mental illness Maternal Grandmother Cynthia     Vision loss Maternal Grandmother Cynthia     Addiction  problem Maternal Grandmother Cynthia     Hypothyroidism Maternal Grandmother Cynthia     Multiple sclerosis Maternal Grandmother Cynthia     Neuropathy Maternal Grandmother Cynthia     Diabetes Maternal Grandfather N/A     Diabetes Paternal Grandmother Maci     Breast cancer Paternal Grandmother Maci     Stroke Paternal Grandmother Maci     COPD Paternal Grandmother Maci     Hearing loss Paternal Grandmother Maci     Hypothyroidism Paternal Grandmother Maci     Diabetes Paternal Grandfather Edward Joey     Breast cancer Maternal Aunt mothers twin 35        bilateral    Stomach cancer Maternal Aunt mothers twin     Mental retardation Paternal Uncle Tang Daley     Learning disabilities Sister Shireen Harvey     Mental illness Sister Shireen Harvey     ADD / ADHD Sister Shireen Harvey     Mental illness Brother Norman Iyer Harvey    ADD / ADHD Brother Norman Cook     Mental illness Brother Zeke Cook     Schizophrenia Paternal Uncle Alfonso         My paternal grandmother’s brother - it was caused by drug use

## (undated) DEVICE — STERILE DOUBLE BASIN SET PACK: Brand: CARDINAL HEALTH

## (undated) DEVICE — GLOVE INDICATOR PI UNDERGLOVE SZ 6.5 BLUE

## (undated) DEVICE — INTENT TO BE USED WITH SUTURE MATERIAL FOR TISSUE CLOSURE: Brand: RICHARD-ALLAN® NEEDLE 1/2 CIRCLE TAPER

## (undated) DEVICE — LABEL STERILE (RSC) (2-SENSOR CAINE 2-LIDOCAINE 2-HEPARIN)

## (undated) DEVICE — 2, DISPOSABLE SUCTION/IRRIGATOR WITHOUT DISPOSABLE TIP: Brand: STRYKEFLOW

## (undated) DEVICE — TROCAR: Brand: KII FIOS FIRST ENTRY

## (undated) DEVICE — BASIC DOUBLE BASIN 2-LF: Brand: MEDLINE INDUSTRIES, INC.

## (undated) DEVICE — GLOVE SRG BIOGEL 7

## (undated) DEVICE — ANTI-FOG SOLUTION WITH FOAM PAD: Brand: DEVON

## (undated) DEVICE — GLOVE SRG BIOGEL ECLIPSE 6.5

## (undated) DEVICE — PERI/GYN PACK: Brand: CONVERTORS

## (undated) DEVICE — CHLORAPREP HI-LITE 26ML ORANGE

## (undated) DEVICE — RED RUBBER URETHRAL CATHETER: Brand: DOVER

## (undated) DEVICE — TELFA NON-ADHERENT ABSORBENT DRESSING: Brand: TELFA

## (undated) DEVICE — ENSEAL X1 TISSUE SEALER, CURVED JAW, 37 CM SHAFT LENGTH: Brand: ENSEAL

## (undated) DEVICE — SKIN MARKER DUAL TIP WITH RULER CAP, FLEXIBLE RULER AND LABELS: Brand: DEVON

## (undated) DEVICE — DENTAL PACK: Brand: CARDINAL HEALTH

## (undated) DEVICE — LUBRICANT JELLY SURGILUBE TUBE 4OZ FLIP TOP

## (undated) DEVICE — GLOVE INDICATOR PI UNDERGLOVE SZ 7.5 BLUE

## (undated) DEVICE — 60 ML SYRINGE,TOOMEY TYPE: Brand: MONOJECT

## (undated) DEVICE — SUT VICRYL 0 UR-6 27 IN J603H

## (undated) DEVICE — INTENDED FOR TISSUE SEPARATION, AND OTHER PROCEDURES THAT REQUIRE A SHARP SURGICAL BLADE TO PUNCTURE OR CUT.: Brand: BARD-PARKER SAFETY BLADES SIZE 11, STERILE

## (undated) DEVICE — INTENDED FOR TISSUE SEPARATION, AND OTHER PROCEDURES THAT REQUIRE A SHARP SURGICAL BLADE TO PUNCTURE OR CUT.: Brand: BARD-PARKER ® CARBON RIB-BACK BLADES

## (undated) DEVICE — SUT PLAIN 2-0 CTX 27 IN 872H

## (undated) DEVICE — AAMI LEVEL 4 POLY-REINFORCED SURGICAL GOWN, X-LARGE, STERILE, SET-IN: Brand: CONVERTORS

## (undated) DEVICE — Device

## (undated) DEVICE — 1840 FOAM BLOCK NEEDLE COUNTER: Brand: DEVON

## (undated) DEVICE — SPONGE 4 X 4 XRAY 16 PLY STRL LF RFD

## (undated) DEVICE — LIGHT GLOVE GREEN

## (undated) DEVICE — SUT MONOCRYL 4-0 PS-2 27 IN Y426H

## (undated) DEVICE — BOWL 32OZ TURQUOISE NON-STERILE BULK NS 100/CS: Brand: MEDEGEN MEDICAL PRODUCTS

## (undated) DEVICE — SMOKE REMOVAL SYSTEM: Brand: BOEHRINGER® SMOKE REMOVAL SYSTEM

## (undated) DEVICE — SPONGE STICK WITH PVP-I: Brand: KENDALL

## (undated) DEVICE — CHLORHEXIDINE 4PCT 4 OZ

## (undated) DEVICE — ENDOPATH XCEL UNIVERSAL TROCAR STABLILITY SLEEVES: Brand: ENDOPATH XCEL

## (undated) DEVICE — ASTOUND IMPERVIOUS SURGICAL GOWN: Brand: CONVERTORS

## (undated) DEVICE — PAD PERINEAL

## (undated) DEVICE — SUT VICRYL 0 CTX 36 IN J978H

## (undated) DEVICE — PREMIUM DRY TRAY LF: Brand: MEDLINE INDUSTRIES, INC.

## (undated) DEVICE — MICRO HVTSA, 0.5G AND HVTSA SOURCEMARK PRODUCT CODE M1206 AND M1206-01: Brand: EXOFIN MICRO HVTSA, 0.5G

## (undated) DEVICE — PROVE COVER: Brand: UNBRANDED

## (undated) DEVICE — SUT ETHIBOND 5 V-40 30 IN MB46G

## (undated) DEVICE — 3M™ STERI-STRIP™ REINFORCED ADHESIVE SKIN CLOSURES, R1542, 1/4 IN X 1-1/2 IN (6 MM X 38 MM), 6 STRIPS/ENVELOPE: Brand: 3M™ STERI-STRIP™

## (undated) DEVICE — SUT MONOCRYL 0 CTX 36 IN Y398H

## (undated) DEVICE — PACK C-SECTION PBDS

## (undated) DEVICE — [HIGH FLOW INSUFFLATOR,  DO NOT USE IF PACKAGE IS DAMAGED,  KEEP DRY,  KEEP AWAY FROM SUNLIGHT,  PROTECT FROM HEAT AND RADIOACTIVE SOURCES.]: Brand: PNEUMOSURE

## (undated) DEVICE — ADHESIVE SKIN HIGH VISCOSITY EXOFIN 1ML

## (undated) DEVICE — PACK GENERAL LF

## (undated) DEVICE — SUT MONOCRYL 4-0 PC-5 18 IN Y823G

## (undated) DEVICE — SUT VICRYL 0 CT-1 36 IN J946H

## (undated) DEVICE — ASTOUND STANDARD SURGICAL GOWN, XL: Brand: CONVERTORS

## (undated) DEVICE — GLOVE PI ULTRA TOUCH SZ.7.5

## (undated) DEVICE — SYRINGE 10ML LL CONTROL TOP

## (undated) DEVICE — TRAY FOLEY 16FR URIMETER SILICONE SURESTEP

## (undated) DEVICE — GLOVE INDICATOR PI UNDERGLOVE SZ 7 BLUE

## (undated) DEVICE — GLOVE SRG LF STRL BGL SKNSNS 7 PF

## (undated) DEVICE — INSUFFLATION TUBING PRIMFLO

## (undated) DEVICE — TIBURON LAPAROTOMY DRAPE: Brand: CONVERTORS

## (undated) DEVICE — NEEDLE 25G X 1 1/2

## (undated) DEVICE — SURGIFOAM 8.5 X 12.5

## (undated) DEVICE — GLOVE PI ULTRA TOUCH SZ.6.5

## (undated) DEVICE — FABRIC REINFORCED, SURGICAL GOWN, XL: Brand: CONVERTORS